# Patient Record
Sex: MALE | Race: WHITE | Employment: UNEMPLOYED | URBAN - METROPOLITAN AREA
[De-identification: names, ages, dates, MRNs, and addresses within clinical notes are randomized per-mention and may not be internally consistent; named-entity substitution may affect disease eponyms.]

---

## 2017-02-06 ENCOUNTER — OFFICE VISIT (OUTPATIENT)
Dept: FAMILY MEDICINE CLINIC | Age: 52
End: 2017-02-06

## 2017-02-06 VITALS
SYSTOLIC BLOOD PRESSURE: 103 MMHG | WEIGHT: 315 LBS | DIASTOLIC BLOOD PRESSURE: 51 MMHG | TEMPERATURE: 98.4 F | BODY MASS INDEX: 41.75 KG/M2 | OXYGEN SATURATION: 96 % | HEIGHT: 73 IN | HEART RATE: 85 BPM

## 2017-02-06 DIAGNOSIS — I48.20 CHRONIC ATRIAL FIBRILLATION (HCC): ICD-10-CM

## 2017-02-06 DIAGNOSIS — I95.1 ORTHOSTATIC HYPOTENSION: ICD-10-CM

## 2017-02-06 DIAGNOSIS — R06.09 DYSPNEA ON EXERTION: ICD-10-CM

## 2017-02-06 DIAGNOSIS — I10 ESSENTIAL HYPERTENSION: Primary | ICD-10-CM

## 2017-02-06 DIAGNOSIS — E11.9 CONTROLLED TYPE 2 DIABETES MELLITUS WITHOUT COMPLICATION, WITHOUT LONG-TERM CURRENT USE OF INSULIN (HCC): ICD-10-CM

## 2017-02-06 LAB — HBA1C MFR BLD HPLC: 7.9 %

## 2017-02-06 RX ORDER — CARVEDILOL 25 MG/1
TABLET ORAL
Qty: 30 TAB | Refills: 12 | Status: SHIPPED | OUTPATIENT
Start: 2017-02-06 | End: 2017-04-19 | Stop reason: SDUPTHER

## 2017-02-06 RX ORDER — METFORMIN HYDROCHLORIDE 500 MG/1
500 TABLET ORAL 2 TIMES DAILY WITH MEALS
Qty: 60 TAB | Refills: 12 | Status: SHIPPED | OUTPATIENT
Start: 2017-02-06 | End: 2017-12-27 | Stop reason: SDUPTHER

## 2017-02-06 RX ORDER — SPIRONOLACTONE 25 MG/1
TABLET ORAL DAILY
COMMUNITY
End: 2017-02-06 | Stop reason: ALTCHOICE

## 2017-02-06 RX ORDER — CARVEDILOL 25 MG/1
TABLET ORAL
Refills: 1 | COMMUNITY
Start: 2016-12-05 | End: 2017-02-06 | Stop reason: SDUPTHER

## 2017-02-06 RX ORDER — ATORVASTATIN CALCIUM 40 MG/1
TABLET, FILM COATED ORAL DAILY
COMMUNITY
End: 2017-02-06 | Stop reason: SDUPTHER

## 2017-02-06 RX ORDER — FUROSEMIDE 40 MG/1
40 TABLET ORAL DAILY
Qty: 30 TAB | Refills: 12 | Status: SHIPPED | OUTPATIENT
Start: 2017-02-06 | End: 2017-08-14

## 2017-02-06 RX ORDER — CARVEDILOL 25 MG/1
12.5 TABLET ORAL 2 TIMES DAILY
Qty: 60 TAB | Refills: 1 | Status: ON HOLD
Start: 2017-02-06 | End: 2017-08-14

## 2017-02-06 RX ORDER — ATORVASTATIN CALCIUM 40 MG/1
40 TABLET, FILM COATED ORAL DAILY
Qty: 30 TAB | Refills: 12 | Status: SHIPPED | OUTPATIENT
Start: 2017-02-06 | End: 2018-02-27 | Stop reason: SDUPTHER

## 2017-02-06 RX ORDER — ALBUTEROL SULFATE 90 UG/1
2 AEROSOL, METERED RESPIRATORY (INHALATION)
Qty: 1 INHALER | Refills: 11 | Status: SHIPPED | OUTPATIENT
Start: 2017-02-06 | End: 2018-02-27 | Stop reason: SDUPTHER

## 2017-02-06 NOTE — PROGRESS NOTES
HISTORY OF PRESENT ILLNESS  Daron Newton is a 50 y.o. male. HPI In for hospital followup. Had cardioversion at [facility] last month. Has been having dyspnea since then, L sided headache, memory loss. Needs diabetes test strips. Walking from waiting room to exam room will cause dyspnea. Not working for the last 2 years. NO smoking for the last 2 years. Has been dxed with COPD. No edema. STaying at a shelter , [facility]. ROS    Physical Exam   Constitutional: He appears well-developed and well-nourished. HENT:   Right Ear: External ear normal.   Left Ear: External ear normal.   Mouth/Throat: Oropharynx is clear and moist.   Neck: No thyromegaly present. Cardiovascular: Normal rate, regular rhythm, normal heart sounds and intact distal pulses. Pulmonary/Chest: Effort normal and breath sounds normal. No respiratory distress. He has no wheezes. Bilateral rhonchi   Abdominal: Soft. Bowel sounds are normal. He exhibits no distension and no mass. There is no tenderness. There is no guarding. Musculoskeletal: Normal range of motion. He exhibits no edema. Lymphadenopathy:     He has no cervical adenopathy. Nursing note and vitals reviewed. ASSESSMENT and PLAN  Orders Placed This Encounter    XR CHEST PA LAT    CBC WITH AUTOMATED DIFF    METABOLIC PANEL, COMPREHENSIVE    LIPID PANEL    AMB POC HEMOGLOBIN A1C    glucose blood VI test strips (FREESTYLE LITE STRIPS) strip    carvedilol (COREG) 25 mg tablet    carvedilol (COREG) 25 mg tablet    atorvastatin (LIPITOR) 40 mg tablet    metFORMIN (GLUCOPHAGE) 500 mg tablet    apixaban (ELIQUIS) 5 mg tablet    furosemide (LASIX) 40 mg tablet    albuterol (PROVENTIL HFA, VENTOLIN HFA, PROAIR HFA) 90 mcg/actuation inhaler     Taligregory Ariel was seen today for heart problem, ed follow-up and memory loss.     Diagnoses and all orders for this visit:    Essential hypertension  -     CBC WITH AUTOMATED DIFF  -     METABOLIC PANEL, COMPREHENSIVE  - LIPID PANEL  -     carvedilol (COREG) 25 mg tablet; Take 0.5 Tabs by mouth two (2) times a day. To slow heart rate down    Dyspnea on exertion  -     XR CHEST PA LAT; Future    Chronic atrial fibrillation (HCC)  -     apixaban (ELIQUIS) 5 mg tablet; Take 1 Tab by mouth two (2) times a day. To prevent stroke    Controlled type 2 diabetes mellitus without complication, without long-term current use of insulin (HCC)  -     AMB POC HEMOGLOBIN A1C    Orthostatic hypotension    Other orders  -     glucose blood VI test strips (FREESTYLE LITE STRIPS) strip; As directed  -     carvedilol (COREG) 25 mg tablet; 1/2  Tab po bid for heart and blood pressure  -     atorvastatin (LIPITOR) 40 mg tablet; Take 1 Tab by mouth daily. For cholesterol  -     metFORMIN (GLUCOPHAGE) 500 mg tablet; Take 1 Tab by mouth two (2) times daily (with meals). For diabetes  -     furosemide (LASIX) 40 mg tablet; Take 1 Tab by mouth daily. For fluid  -     albuterol (PROVENTIL HFA, VENTOLIN HFA, PROAIR HFA) 90 mcg/actuation inhaler; Take 2 Puffs by inhalation every four (4) hours as needed for Wheezing or Shortness of Breath. Follow-up Disposition:  Return in about 3 days (around 2/9/2017).    Will decrease coreg dose and dc spironolactone

## 2017-02-06 NOTE — PROGRESS NOTES
Chief Complaint   Patient presents with    Heart Problem    ED Follow-up     [address] 2017   Pneumonia-copd     Memory Loss     1. Have you been to the ER, urgent care clinic since your last visit? Hospitalized since your last visit? See chief complaint    2. Have you seen or consulted any other health care providers outside of the [address] [place] [address] since your last visit? Include any pap smears or colon screening.  No       Health Maintenance Due   Topic Date Due    FOBT Q 1 YEAR AGE 50-75  01/05/2017

## 2017-02-06 NOTE — MR AVS SNAPSHOT
Visit Information Date & Time Provider Department Dept. Phone Encounter #  
 2/6/2017 11:00 AM Dom Boles MD [facility] [phone] [id number] Upcoming Health Maintenance Date Due FOBT Q 1 YEAR AGE 50-75 1/5/2017 DTaP/Tdap/Td series (2 - Td) 2/6/2027 Allergies as of 2/6/2017  Review Complete On: 2/6/2017 By: Dom Boles MD  
  
 Severity Noted Reaction Type Reactions Mushroom Flavor  02/17/2014   Systemic Swelling Current Immunizations  Never Reviewed Name Date Td, Adsorbed PF 4/13/2016 Not reviewed this visit You Were Diagnosed With   
  
 Codes Comments Essential hypertension    -  Primary ICD-10-CM: I10 
ICD-9-CM: 401.9 Dyspnea on exertion     ICD-10-CM: R06.09 
ICD-9-CM: 786.09 Chronic atrial fibrillation (HCC)     ICD-10-CM: A44.9 ICD-9-CM: 427.31 Controlled type 2 diabetes mellitus without complication, without long-term current use of insulin ([facility]     ICD-10-CM: E11.9 ICD-9-CM: 250.00 Vitals BP Pulse Temp Height(growth percentile) Weight(growth percentile) SpO2  
 103/51 85 98.4 °F (36.9 °C) (Oral) 6' 1\" (1.854 m) 333 lb (151 kg) 96% BMI Smoking Status 43.93 kg/m2 Former Smoker BMI and BSA Data Body Mass Index Body Surface Area  
 43.93 kg/m 2 2.79 m 2 Preferred Pharmacy Pharmacy Name Phone [facility] [phone] Your Updated Medication List  
  
   
This list is accurate as of: 2/6/17 11:03 AM.  Always use your most recent med list.  
  
  
  
  
 albuterol 90 mcg/actuation inhaler Commonly known as:  PROVENTIL HFA, VENTOLIN HFA, PROAIR HFA Take 2 Puffs by inhalation every four (4) hours as needed for Wheezing or Shortness of Breath. apixaban 5 mg tablet Commonly known as:  Azar Gault Take 1 Tab by mouth two (2) times a day. To prevent stroke  
  
 atorvastatin 40 mg tablet Commonly known as:  LIPITOR Take 1 Tab by mouth daily. For cholesterol * carvedilol 25 mg tablet Commonly known as:  Clekianna Fab Take 0.5 Tabs by mouth two (2) times a day. To slow heart rate down * carvedilol 25 mg tablet Commonly known as:  COREG  
1/2  Tab po bid for heart and blood pressure  
  
 furosemide 40 mg tablet Commonly known as:  LASIX Take 1 Tab by mouth daily. For fluid  
  
 glucose blood VI test strips strip Commonly known as:  FREESTYLE LITE STRIPS As directed  
  
 metFORMIN 500 mg tablet Commonly known as:  GLUCOPHAGE Take 1 Tab by mouth two (2) times daily (with meals). For diabetes * Notice: This list has 2 medication(s) that are the same as other medications prescribed for you. Read the directions carefully, and ask your doctor or other care provider to review them with you. Prescriptions Sent to Pharmacy Refills  
 glucose blood VI test strips (FREESTYLE LITE STRIPS) strip 12 Sig: As directed Class: Normal  
 Pharmacy: [facility] Ph #: [phone]  
 carvedilol (COREG) 25 mg tablet 12 Si/2  Tab po bid for heart and blood pressure Class: Normal  
 Pharmacy: [facility] Ph #: [phone]  
 atorvastatin (LIPITOR) 40 mg tablet 12 Sig: Take 1 Tab by mouth daily. For cholesterol Class: Normal  
 Pharmacy: [facility] [address], [facility] [address] Ph #: [phone] Route: Oral  
 metFORMIN (GLUCOPHAGE) 500 mg tablet 12 Sig: Take 1 Tab by mouth two (2) times daily (with meals). For diabetes Class: Normal  
 [facility] [address] Ph #: [phone] Route: Oral  
 apixaban (ELIQUIS) 5 mg tablet 12 Sig: Take 1 Tab by mouth two (2) times a day. To prevent stroke  Class: Normal  
 Pharmacy: [place] [address] [address] [facility] Ph #: [phone] Route: Oral  
 furosemide (LASIX) 40 mg tablet 12 Sig: Take 1 Tab by mouth daily. For fluid Class: Normal  
 Pharmacy: [address] Ph #: [phone] Route: Oral  
 albuterol (PROVENTIL HFA, VENTOLIN HFA, PROAIR HFA) 90 mcg/actuation inhaler 11 Sig: Take 2 Puffs by inhalation every four (4) hours as needed for Wheezing or Shortness of Breath. Class: Normal  
 Pharmacy: [address] Ph #: [phone] Route: Inhalation We Performed the Following AMB POC HEMOGLOBIN A1C [57274 CPT(R)] CBC WITH AUTOMATED DIFF [41345 CPT(R)] LIPID PANEL [70747 CPT(R)] METABOLIC PANEL, COMPREHENSIVE [93517 CPT(R)] To-Do List   
 02/06/2017 Imaging:  XR CHEST PA LAT Introducing [facility]! [facility] introduces [employer] patient portal. Now you can access parts of your medical record, email your doctor's office, and request medication refills online. 1. In your internet browser, go to [email or web address] 2. Click on the First Time User? Click Here link in the Sign In box. You will see the New Member Sign Up page. 3. Enter your [employer] Access Code exactly as it appears below. You will not need to use this code after youve completed the sign-up process. If you do not sign up before the expiration date, you must request a new code. · [employer] Access Code: [email or web address] Expires: 5/7/2017 11:03 AM 
 
4. Enter the last four digits of your Social Security Number (xxxx) and Date of Birth (mm/dd/yyyy) as indicated and click Submit. You will be taken to the next sign-up page. 5. Create a [employer] ID. This will be your [employer] login ID and cannot be changed, so think of one that is secure and easy to remember. 6. Create a HappyBox password. You can change your password at any time. 7. Enter your Password Reset Question and Answer. This can be used at a later time if you forget your password. 8. Enter your e-mail address. You will receive e-mail notification when new information is available in [address] 9. Click Sign Up. You can now view and download portions of your medical record. 10. Click the Download Summary menu link to download a portable copy of your medical information. If you have questions, please visit the Frequently Asked Questions section of the HappyBox website. Remember, HappyBox is NOT to be used for urgent needs. For medical emergencies, dial 911. Now available from your iPhone and Android! Please provide this summary of care documentation to your next provider. Your primary care clinician is listed as Nalini Otto. If you have any questions after today's visit, please call [phone].

## 2017-02-07 ENCOUNTER — TELEPHONE (OUTPATIENT)
Dept: FAMILY MEDICINE CLINIC | Age: 52
End: 2017-02-07

## 2017-02-07 ENCOUNTER — DOCUMENTATION ONLY (OUTPATIENT)
Dept: FAMILY MEDICINE CLINIC | Age: 52
End: 2017-02-07

## 2017-02-07 LAB
ALBUMIN SERPL-MCNC: 3.9 G/DL (ref 3.5–5.5)
ALBUMIN/GLOB SERPL: 1.6 {RATIO} (ref 1.1–2.5)
ALP SERPL-CCNC: 58 IU/L (ref 39–117)
ALT SERPL-CCNC: 25 IU/L (ref 0–44)
AST SERPL-CCNC: 11 IU/L (ref 0–40)
BASOPHILS # BLD AUTO: 0.1 X10E3/UL (ref 0–0.2)
BASOPHILS NFR BLD AUTO: 1 %
BILIRUB SERPL-MCNC: 0.7 MG/DL (ref 0–1.2)
BUN SERPL-MCNC: 19 MG/DL (ref 6–24)
BUN/CREAT SERPL: 18 (ref 9–20)
CALCIUM SERPL-MCNC: 9.3 MG/DL (ref 8.7–10.2)
CHLORIDE SERPL-SCNC: 95 MMOL/L (ref 96–106)
CHOLEST SERPL-MCNC: 106 MG/DL (ref 100–199)
CO2 SERPL-SCNC: 23 MMOL/L (ref 18–29)
CREAT SERPL-MCNC: 1.03 MG/DL (ref 0.76–1.27)
EOSINOPHIL # BLD AUTO: 0.1 X10E3/UL (ref 0–0.4)
EOSINOPHIL NFR BLD AUTO: 1 %
ERYTHROCYTE [DISTWIDTH] IN BLOOD BY AUTOMATED COUNT: 15.6 % (ref 12.3–15.4)
GLOBULIN SER CALC-MCNC: 2.5 G/DL (ref 1.5–4.5)
GLUCOSE SERPL-MCNC: 268 MG/DL (ref 65–99)
HCT VFR BLD AUTO: 42.6 % (ref 37.5–51)
HDLC SERPL-MCNC: 25 MG/DL
HGB BLD-MCNC: 14 G/DL (ref 12.6–17.7)
IMM GRANULOCYTES # BLD: 0.1 X10E3/UL (ref 0–0.1)
IMM GRANULOCYTES NFR BLD: 1 %
INTERPRETATION, 910389: NORMAL
LDLC SERPL CALC-MCNC: 15 MG/DL (ref 0–99)
LYMPHOCYTES # BLD AUTO: 2.5 X10E3/UL (ref 0.7–3.1)
LYMPHOCYTES NFR BLD AUTO: 24 %
MCH RBC QN AUTO: 29.3 PG (ref 26.6–33)
MCHC RBC AUTO-ENTMCNC: 32.9 G/DL (ref 31.5–35.7)
MCV RBC AUTO: 89 FL (ref 79–97)
MONOCYTES # BLD AUTO: 0.5 X10E3/UL (ref 0.1–0.9)
MONOCYTES NFR BLD AUTO: 5 %
NEUTROPHILS # BLD AUTO: 6.9 X10E3/UL (ref 1.4–7)
NEUTROPHILS NFR BLD AUTO: 68 %
PLATELET # BLD AUTO: 190 X10E3/UL (ref 150–379)
POTASSIUM SERPL-SCNC: 4.5 MMOL/L (ref 3.5–5.2)
PROT SERPL-MCNC: 6.4 G/DL (ref 6–8.5)
RBC # BLD AUTO: 4.78 X10E6/UL (ref 4.14–5.8)
SODIUM SERPL-SCNC: 136 MMOL/L (ref 134–144)
TRIGL SERPL-MCNC: 332 MG/DL (ref 0–149)
VLDLC SERPL CALC-MCNC: 66 MG/DL (ref 5–40)
WBC # BLD AUTO: 10.2 X10E3/UL (ref 3.4–10.8)

## 2017-02-07 RX ORDER — BLOOD-GLUCOSE METER
EACH MISCELLANEOUS
Qty: 1 EACH | Refills: 0 | Status: SHIPPED | OUTPATIENT
Start: 2017-02-07 | End: 2018-11-21

## 2017-02-07 NOTE — PROGRESS NOTES
Pt called and notified that rx's were signed and sent over to pharmacy. Pt verbalized understanding and stated he just got them.

## 2017-02-07 NOTE — TELEPHONE ENCOUNTER
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 Pt is at the pharmacy now to  the test strips and the pharmacy has not received the fax. The pt would like to speak with someone regarding this.   His contact number is [phone]

## 2017-02-07 NOTE — PROGRESS NOTES
Received a call from pt stating that he was told his request for change in test strips and meter would be signed off and sent to pharmacy immmediately. Pt not sure who he spoke with but pt do not speak with the nurse. Pt stated he missed his appt and is about to miss his bus but to the homeless shelter and if this isnt done now he will have to sleep on the street. Pt very upset. Writer was just notified of pt situation but informed pt that we do not inform pt's that rx's will be sent immediately due to Doctor needing to sign off and approved. Told pt that the request to change to new meter and strips was sent to  and since pt is at the pharmacy will ask  to sign off rxs.

## 2017-02-07 NOTE — TELEPHONE ENCOUNTER
Pt is requesting a One touch test meter and test strips called in, he states the free style is not covered by his insurance    [facility]    Pt ph number is [phone]

## 2017-02-07 NOTE — TELEPHONE ENCOUNTER
Called pt back. Informed pt would call pharmacy to clarify test strips for testing BID.  Pt verbalized understanding

## 2017-03-28 RX ORDER — LISINOPRIL 5 MG/1
5 TABLET ORAL DAILY
Qty: 90 TAB | Refills: 3 | Status: SHIPPED | OUTPATIENT
Start: 2017-03-28 | End: 2018-04-02 | Stop reason: SDUPTHER

## 2017-04-19 ENCOUNTER — OFFICE VISIT (OUTPATIENT)
Dept: FAMILY MEDICINE CLINIC | Age: 52
End: 2017-04-19

## 2017-04-19 ENCOUNTER — DOCUMENTATION ONLY (OUTPATIENT)
Dept: FAMILY MEDICINE CLINIC | Age: 52
End: 2017-04-19

## 2017-04-19 VITALS
SYSTOLIC BLOOD PRESSURE: 103 MMHG | HEART RATE: 78 BPM | DIASTOLIC BLOOD PRESSURE: 68 MMHG | WEIGHT: 315 LBS | HEIGHT: 73 IN | RESPIRATION RATE: 18 BRPM | TEMPERATURE: 98.1 F | BODY MASS INDEX: 41.75 KG/M2 | OXYGEN SATURATION: 95 %

## 2017-04-19 DIAGNOSIS — I10 ESSENTIAL HYPERTENSION: Primary | ICD-10-CM

## 2017-04-19 DIAGNOSIS — R06.09 DYSPNEA ON EXERTION: ICD-10-CM

## 2017-04-19 LAB
BILIRUB UR QL STRIP: NEGATIVE
GLUCOSE UR-MCNC: NEGATIVE MG/DL
KETONES P FAST UR STRIP-MCNC: NEGATIVE MG/DL
PH UR STRIP: 5.5 [PH] (ref 4.6–8)
PROT UR QL STRIP: NEGATIVE MG/DL
SP GR UR STRIP: 1 (ref 1–1.03)
UA UROBILINOGEN AMB POC: NORMAL (ref 0.2–1)
URINALYSIS CLARITY POC: CLEAR
URINALYSIS COLOR POC: YELLOW
URINE BLOOD POC: NEGATIVE
URINE LEUKOCYTES POC: NEGATIVE
URINE NITRITES POC: NEGATIVE

## 2017-04-19 RX ORDER — IPRATROPIUM BROMIDE AND ALBUTEROL SULFATE 2.5; .5 MG/3ML; MG/3ML
3 SOLUTION RESPIRATORY (INHALATION)
Qty: 50 NEBULE | Refills: 12 | Status: SHIPPED | OUTPATIENT
Start: 2017-04-19 | End: 2018-06-05 | Stop reason: SDUPTHER

## 2017-04-19 NOTE — MR AVS SNAPSHOT
Visit Information Date & Time Provider Department Dept. Phone Encounter #  
 4/19/2017  9:15 AM Tyler La MD [facility] [phone] [id number] Follow-up Instructions Return in about 4 weeks (around 5/17/2017). Upcoming Health Maintenance Date Due FOBT Q 1 YEAR AGE 50-75 1/5/2017 DTaP/Tdap/Td series (2 - Td) 2/6/2027 Allergies as of 4/19/2017  Review Complete On: 4/19/2017 By: Alfred Dangelo LPN Severity Noted Reaction Type Reactions Mushroom Flavor  02/17/2014   Systemic Swelling Current Immunizations  Never Reviewed Name Date Td, Adsorbed PF 4/13/2016 Not reviewed this visit You Were Diagnosed With   
  
 Codes Comments Essential hypertension    -  Primary ICD-10-CM: I10 
ICD-9-CM: 401.9 Dyspnea on exertion     ICD-10-CM: R06.09 
ICD-9-CM: 786.09 Vitals BP Pulse Temp Resp Height(growth percentile) Weight(growth percentile) 103/68 78 98.1 °F (36.7 °C) (Oral) 18 6' 1\" (1.854 m) 333 lb 3.2 oz (151.1 kg) SpO2 BMI Smoking Status 95% 43.96 kg/m2 Former Smoker Vitals History BMI and BSA Data Body Mass Index Body Surface Area 43.96 kg/m 2 2.79 m 2 Preferred Pharmacy Pharmacy Name Phone [facility] [phone] Your Updated Medication List  
  
   
This list is accurate as of: 4/19/17  9:43 AM.  Always use your most recent med list.  
  
  
  
  
 albuterol 90 mcg/actuation inhaler Commonly known as:  PROVENTIL HFA, VENTOLIN HFA, PROAIR HFA Take 2 Puffs by inhalation every four (4) hours as needed for Wheezing or Shortness of Breath. albuterol-ipratropium 2.5 mg-0.5 mg/3 ml Nebu Commonly known as:  DUO-NEB  
3 mL by Nebulization route every six (6) hours as needed. apixaban 5 mg tablet Commonly known as:  Claudean Fries Take 1 Tab by mouth two (2) times a day. To prevent stroke atorvastatin 40 mg tablet Commonly known as:  LIPITOR Take 1 Tab by mouth daily. For cholesterol Blood-Glucose Meter Misc Use to check BS twice a day. Dx code E11.9  
  
 carvedilol 25 mg tablet Commonly known as:  Davon Shy Take 0.5 Tabs by mouth two (2) times a day. To slow heart rate down  
  
 furosemide 40 mg tablet Commonly known as:  LASIX Take 1 Tab by mouth daily. For fluid * glucose blood VI test strips strip Commonly known as:  FREESTYLE LITE STRIPS As directed * glucose blood VI test strips strip Commonly known as:  ONETOUCH ULTRA TEST Use to check BS twice a day. Dx code E11.9  
  
 lisinopril 5 mg tablet Commonly known as:  Keystone Neve Take 1 Tab by mouth daily. metFORMIN 500 mg tablet Commonly known as:  GLUCOPHAGE Take 1 Tab by mouth two (2) times daily (with meals). For diabetes  
  
 umeclidinium-vilanterol 62.5-25 mcg/actuation inhaler Commonly known as:  Hugo Lewis Take 1 Puff by inhalation daily. * Notice: This list has 2 medication(s) that are the same as other medications prescribed for you. Read the directions carefully, and ask your doctor or other care provider to review them with you. Prescriptions Sent to Pharmacy Refills  
 albuterol-ipratropium (DUO-NEB) 2.5 mg-0.5 mg/3 ml nebu 12 Sig: 3 mL by Nebulization route every six (6) hours as needed. Class: Normal  
 Pharmacy: [facility], [employer] [address] Ph #: [phone] Route: Nebulization  
 umeclidinium-vilanterol (ANORO ELLIPTA) 62.5-25 mcg/actuation inhaler 12 Sig: Take 1 Puff by inhalation daily. Class: Normal  
 Pharmacy: [facility] [address], [facility] [address] Ph #: [phone] Route: Inhalation We Performed the Following AMB POC SPIROMETRY [91196 CPT(R)] AMB POC URINALYSIS DIP STICK AUTO W/ MICRO [60534 CPT(R)] METABOLIC PANEL, COMPREHENSIVE [47868 CPT(R)] Follow-up Instructions Return in about 4 weeks (around 5/17/2017). Introducing [facility]! [employer] introduces [employer] patient portal. Now you can access parts of your medical record, email your doctor's office, and request medication refills online. 1. In your internet browser, go to [email or web address] 2. Click on the First Time User? Click Here link in the Sign In box. You will see the New Member Sign Up page. 3. Enter your [employer] Access Code exactly as it appears below. You will not need to use this code after youve completed the sign-up process. If you do not sign up before the expiration date, you must request a new code. · [employer] Access Code: [email or web address] Expires: 5/7/2017 12:03 PM 
 
4. Enter the last four digits of your Social Security Number (xxxx) and Date of Birth (mm/dd/yyyy) as indicated and click Submit. You will be taken to the next sign-up page. 5. Create a [employer] ID. This will be your [employer] login ID and cannot be changed, so think of one that is secure and easy to remember. 6. Create a [employer] password. You can change your password at any time. 7. Enter your Password Reset Question and Answer. This can be used at a later time if you forget your password. 8. Enter your e-mail address. You will receive e-mail notification when new information is available in [address] 9. Click Sign Up. You can now view and download portions of your medical record. 10. Click the Download Summary menu link to download a portable copy of your medical information. If you have questions, please visit the Frequently Asked Questions section of the [employer] website. Remember, [employer] is NOT to be used for urgent needs. For medical emergencies, dial 911. Now available from your iPhone and Android! Please provide this summary of care documentation to your next provider. Your primary care clinician is listed as Onofre Rowland. If you have any questions after today's visit, please call [phone].

## 2017-04-19 NOTE — PROGRESS NOTES
Chief Complaint   Patient presents with    ED Follow-up    Hypertension     Pt here due to going to ED for chest pain in march at [facility] and SOB. Dx with bronchitis. Pt stated he still feels congested at times. Last here 2/6/17. Coreg was decreased and spironolactone was decreased.

## 2017-04-19 NOTE — PROGRESS NOTES
HISTORY OF PRESENT ILLNESS  Melody Velasquez is a 50 y.o. male. HPI Pt. Comes in for blood pressure check. Has been having shortness of breath. Was in [facility] 2 weeks ago with bronchitis. Cough hasnt been productive. No edema. Slight chest pains at times. Needs rx for nebulizer solution. Moderate wheezing. hasnt smoked in 2 years. Used to smoke 1.5 ppd. Says that nebulizer machine works much better than proair inhaler. ROS    Physical Exam   Constitutional: He appears well-developed and well-nourished. HENT:   Right Ear: External ear normal.   Left Ear: External ear normal.   Mouth/Throat: Oropharynx is clear and moist.   Neck: No thyromegaly present. Cardiovascular: Normal rate, regular rhythm, normal heart sounds and intact distal pulses. Pulmonary/Chest: Effort normal and breath sounds normal. No respiratory distress. He has no wheezes. Abdominal: Soft. Bowel sounds are normal. He exhibits no distension and no mass. There is no tenderness. There is no guarding. Musculoskeletal: Normal range of motion. He exhibits no edema. Lymphadenopathy:     He has no cervical adenopathy. Nursing note and vitals reviewed. ASSESSMENT and PLAN  Orders Placed This Encounter    AMB POC SPIROMETRY    AMB POC URINALYSIS DIP STICK AUTO W/ MICRO    albuterol-ipratropium (DUO-NEB) 2.5 mg-0.5 mg/3 ml nebu    umeclidinium-vilanterol (ANORO ELLIPTA) 62.5-25 mcg/actuation inhaler     Ramandeep Starks was seen today for ed follow-up and hypertension. Diagnoses and all orders for this visit:    Essential hypertension  -     Cancel: METABOLIC PANEL, COMPREHENSIVE  -     AMB POC URINALYSIS DIP STICK AUTO W/ MICRO  -     AMB POC SPIROMETRY    Dyspnea on exertion    Other orders  -     albuterol-ipratropium (DUO-NEB) 2.5 mg-0.5 mg/3 ml nebu; 3 mL by Nebulization route every six (6) hours as needed. -     umeclidinium-vilanterol (ANORO ELLIPTA) 62.5-25 mcg/actuation inhaler; Take 1 Puff by inhalation daily.       Follow-up Disposition:  Return in about 4 weeks (around 5/17/2017).

## 2017-04-20 ENCOUNTER — DOCUMENTATION ONLY (OUTPATIENT)
Dept: FAMILY MEDICINE CLINIC | Age: 52
End: 2017-04-20

## 2017-07-21 ENCOUNTER — TELEPHONE (OUTPATIENT)
Dept: FAMILY MEDICINE CLINIC | Age: 52
End: 2017-07-21

## 2017-07-21 NOTE — TELEPHONE ENCOUNTER
PA Was started for this pt for Juliette Mcgraw ([place]: Rima Cárdenas) - [facility]  Anoro Ellipta 62.5-25MCG/INH aerosol powder  Status: Sent to Plan  Created: July 21st, 2017  Sent: July 21st, 2017

## 2017-08-10 ENCOUNTER — OFFICE VISIT (OUTPATIENT)
Dept: FAMILY MEDICINE CLINIC | Age: 52
End: 2017-08-10

## 2017-08-10 ENCOUNTER — HOSPITAL ENCOUNTER (OUTPATIENT)
Age: 52
Setting detail: OBSERVATION
Discharge: HOME | DRG: 194 | End: 2017-08-14
Attending: EMERGENCY MEDICINE | Admitting: FAMILY MEDICINE
Payer: COMMERCIAL

## 2017-08-10 ENCOUNTER — APPOINTMENT (OUTPATIENT)
Dept: GENERAL RADIOLOGY | Age: 52
DRG: 194 | End: 2017-08-10
Attending: EMERGENCY MEDICINE
Payer: COMMERCIAL

## 2017-08-10 VITALS
SYSTOLIC BLOOD PRESSURE: 105 MMHG | DIASTOLIC BLOOD PRESSURE: 88 MMHG | HEART RATE: 93 BPM | OXYGEN SATURATION: 94 % | RESPIRATION RATE: 18 BRPM | BODY MASS INDEX: 41.75 KG/M2 | TEMPERATURE: 98.4 F | WEIGHT: 315 LBS | HEIGHT: 73 IN

## 2017-08-10 DIAGNOSIS — I48.20 CHRONIC ATRIAL FIBRILLATION (HCC): ICD-10-CM

## 2017-08-10 DIAGNOSIS — I50.9 ACUTE CONGESTIVE HEART FAILURE, UNSPECIFIED CONGESTIVE HEART FAILURE TYPE: Primary | ICD-10-CM

## 2017-08-10 DIAGNOSIS — R06.02 SHORTNESS OF BREATH: Primary | ICD-10-CM

## 2017-08-10 DIAGNOSIS — I10 ESSENTIAL HYPERTENSION: ICD-10-CM

## 2017-08-10 DIAGNOSIS — I48.91 ATRIAL FIBRILLATION WITH RVR (HCC): ICD-10-CM

## 2017-08-10 DIAGNOSIS — I50.43 ACUTE ON CHRONIC COMBINED SYSTOLIC AND DIASTOLIC CONGESTIVE HEART FAILURE (HCC): ICD-10-CM

## 2017-08-10 DIAGNOSIS — E11.9 CONTROLLED TYPE 2 DIABETES MELLITUS WITHOUT COMPLICATION, WITHOUT LONG-TERM CURRENT USE OF INSULIN (HCC): ICD-10-CM

## 2017-08-10 DIAGNOSIS — R06.02 SHORTNESS OF BREATH: ICD-10-CM

## 2017-08-10 DIAGNOSIS — J98.4 RESTRICTIVE LUNG DISEASE: ICD-10-CM

## 2017-08-10 LAB
ALBUMIN SERPL BCP-MCNC: 3.5 G/DL (ref 3.5–5)
ALBUMIN/GLOB SERPL: 0.8 {RATIO} (ref 1.1–2.2)
ALP SERPL-CCNC: 85 U/L (ref 45–117)
ALT SERPL-CCNC: 36 U/L (ref 12–78)
ANION GAP BLD CALC-SCNC: 8 MMOL/L (ref 5–15)
AST SERPL W P-5'-P-CCNC: 24 U/L (ref 15–37)
BASOPHILS # BLD AUTO: 0 K/UL (ref 0–0.1)
BASOPHILS # BLD: 0 % (ref 0–1)
BILIRUB SERPL-MCNC: 1.2 MG/DL (ref 0.2–1)
BNP SERPL-MCNC: 4975 PG/ML (ref 0–125)
BUN SERPL-MCNC: 20 MG/DL (ref 6–20)
BUN/CREAT SERPL: 20 (ref 12–20)
CALCIUM SERPL-MCNC: 9.1 MG/DL (ref 8.5–10.1)
CHLORIDE SERPL-SCNC: 103 MMOL/L (ref 97–108)
CO2 SERPL-SCNC: 26 MMOL/L (ref 21–32)
CREAT SERPL-MCNC: 0.98 MG/DL (ref 0.7–1.3)
EOSINOPHIL # BLD: 0.1 K/UL (ref 0–0.4)
EOSINOPHIL NFR BLD: 1 % (ref 0–7)
ERYTHROCYTE [DISTWIDTH] IN BLOOD BY AUTOMATED COUNT: 15.4 % (ref 11.5–14.5)
GLOBULIN SER CALC-MCNC: 4.2 G/DL (ref 2–4)
GLUCOSE BLD STRIP.AUTO-MCNC: 276 MG/DL (ref 65–100)
GLUCOSE BLD STRIP.AUTO-MCNC: 303 MG/DL (ref 65–100)
GLUCOSE SERPL-MCNC: 165 MG/DL (ref 65–100)
HBA1C MFR BLD HPLC: 6.9 %
HCT VFR BLD AUTO: 38.8 % (ref 36.6–50.3)
HGB BLD-MCNC: 12.6 G/DL (ref 12.1–17)
LYMPHOCYTES # BLD AUTO: 24 % (ref 12–49)
LYMPHOCYTES # BLD: 2.1 K/UL (ref 0.8–3.5)
MCH RBC QN AUTO: 28.3 PG (ref 26–34)
MCHC RBC AUTO-ENTMCNC: 32.5 G/DL (ref 30–36.5)
MCV RBC AUTO: 87 FL (ref 80–99)
MONOCYTES # BLD: 0.2 K/UL (ref 0–1)
MONOCYTES NFR BLD AUTO: 2 % (ref 5–13)
NEUTS SEG # BLD: 6.5 K/UL (ref 1.8–8)
NEUTS SEG NFR BLD AUTO: 73 % (ref 32–75)
PLATELET # BLD AUTO: 163 K/UL (ref 150–400)
POTASSIUM SERPL-SCNC: 3.6 MMOL/L (ref 3.5–5.1)
PROT SERPL-MCNC: 7.7 G/DL (ref 6.4–8.2)
RBC # BLD AUTO: 4.46 M/UL (ref 4.1–5.7)
SERVICE CMNT-IMP: ABNORMAL
SERVICE CMNT-IMP: ABNORMAL
SODIUM SERPL-SCNC: 137 MMOL/L (ref 136–145)
TROPONIN I SERPL-MCNC: 0.07 NG/ML
WBC # BLD AUTO: 9 K/UL (ref 4.1–11.1)

## 2017-08-10 PROCEDURE — 74011636637 HC RX REV CODE- 636/637: Performed by: FAMILY MEDICINE

## 2017-08-10 PROCEDURE — 99218 HC RM OBSERVATION: CPT

## 2017-08-10 PROCEDURE — 96376 TX/PRO/DX INJ SAME DRUG ADON: CPT

## 2017-08-10 PROCEDURE — 65660000000 HC RM CCU STEPDOWN

## 2017-08-10 PROCEDURE — 36415 COLL VENOUS BLD VENIPUNCTURE: CPT | Performed by: EMERGENCY MEDICINE

## 2017-08-10 PROCEDURE — 99284 EMERGENCY DEPT VISIT MOD MDM: CPT

## 2017-08-10 PROCEDURE — 74011250637 HC RX REV CODE- 250/637: Performed by: FAMILY MEDICINE

## 2017-08-10 PROCEDURE — 85025 COMPLETE CBC W/AUTO DIFF WBC: CPT | Performed by: EMERGENCY MEDICINE

## 2017-08-10 PROCEDURE — 74011000250 HC RX REV CODE- 250: Performed by: FAMILY MEDICINE

## 2017-08-10 PROCEDURE — 80053 COMPREHEN METABOLIC PANEL: CPT | Performed by: EMERGENCY MEDICINE

## 2017-08-10 PROCEDURE — 84484 ASSAY OF TROPONIN QUANT: CPT | Performed by: EMERGENCY MEDICINE

## 2017-08-10 PROCEDURE — 82962 GLUCOSE BLOOD TEST: CPT

## 2017-08-10 PROCEDURE — 83880 ASSAY OF NATRIURETIC PEPTIDE: CPT | Performed by: EMERGENCY MEDICINE

## 2017-08-10 PROCEDURE — 71010 XR CHEST PORT: CPT

## 2017-08-10 PROCEDURE — 94640 AIRWAY INHALATION TREATMENT: CPT

## 2017-08-10 PROCEDURE — 96374 THER/PROPH/DIAG INJ IV PUSH: CPT

## 2017-08-10 PROCEDURE — 93005 ELECTROCARDIOGRAM TRACING: CPT

## 2017-08-10 PROCEDURE — 74011250636 HC RX REV CODE- 250/636: Performed by: EMERGENCY MEDICINE

## 2017-08-10 PROCEDURE — 77030029684 HC NEB SM VOL KT MONA -A

## 2017-08-10 RX ORDER — INSULIN LISPRO 100 [IU]/ML
INJECTION, SOLUTION INTRAVENOUS; SUBCUTANEOUS
Status: DISCONTINUED | OUTPATIENT
Start: 2017-08-11 | End: 2017-08-14 | Stop reason: HOSPADM

## 2017-08-10 RX ORDER — IPRATROPIUM BROMIDE AND ALBUTEROL SULFATE 2.5; .5 MG/3ML; MG/3ML
3 SOLUTION RESPIRATORY (INHALATION)
Status: DISCONTINUED | OUTPATIENT
Start: 2017-08-10 | End: 2017-08-12

## 2017-08-10 RX ORDER — ATORVASTATIN CALCIUM 40 MG/1
40 TABLET, FILM COATED ORAL DAILY
Status: DISCONTINUED | OUTPATIENT
Start: 2017-08-11 | End: 2017-08-14 | Stop reason: HOSPADM

## 2017-08-10 RX ORDER — DEXTROSE 50 % IN WATER (D50W) INTRAVENOUS SYRINGE
12.5-25 AS NEEDED
Status: DISCONTINUED | OUTPATIENT
Start: 2017-08-10 | End: 2017-08-10 | Stop reason: SDUPTHER

## 2017-08-10 RX ORDER — ZOLPIDEM TARTRATE 5 MG/1
5 TABLET ORAL
Status: DISCONTINUED | OUTPATIENT
Start: 2017-08-10 | End: 2017-08-14 | Stop reason: HOSPADM

## 2017-08-10 RX ORDER — SODIUM CHLORIDE 0.9 % (FLUSH) 0.9 %
5-10 SYRINGE (ML) INJECTION EVERY 8 HOURS
Status: DISCONTINUED | OUTPATIENT
Start: 2017-08-10 | End: 2017-08-14 | Stop reason: HOSPADM

## 2017-08-10 RX ORDER — INSULIN LISPRO 100 [IU]/ML
INJECTION, SOLUTION INTRAVENOUS; SUBCUTANEOUS
Status: DISCONTINUED | OUTPATIENT
Start: 2017-08-11 | End: 2017-08-10

## 2017-08-10 RX ORDER — SODIUM CHLORIDE 0.9 % (FLUSH) 0.9 %
5-10 SYRINGE (ML) INJECTION AS NEEDED
Status: DISCONTINUED | OUTPATIENT
Start: 2017-08-10 | End: 2017-08-14 | Stop reason: HOSPADM

## 2017-08-10 RX ORDER — ALBUTEROL SULFATE 90 UG/1
2 AEROSOL, METERED RESPIRATORY (INHALATION)
Status: DISCONTINUED | OUTPATIENT
Start: 2017-08-10 | End: 2017-08-14 | Stop reason: HOSPADM

## 2017-08-10 RX ORDER — FUROSEMIDE 10 MG/ML
80 INJECTION INTRAMUSCULAR; INTRAVENOUS 2 TIMES DAILY
Status: DISCONTINUED | OUTPATIENT
Start: 2017-08-10 | End: 2017-08-13

## 2017-08-10 RX ORDER — MAGNESIUM SULFATE 100 %
4 CRYSTALS MISCELLANEOUS AS NEEDED
Status: DISCONTINUED | OUTPATIENT
Start: 2017-08-10 | End: 2017-08-14 | Stop reason: HOSPADM

## 2017-08-10 RX ORDER — CARVEDILOL 12.5 MG/1
12.5 TABLET ORAL 2 TIMES DAILY
Status: DISCONTINUED | OUTPATIENT
Start: 2017-08-10 | End: 2017-08-12

## 2017-08-10 RX ORDER — LISINOPRIL 5 MG/1
5 TABLET ORAL DAILY
Status: DISCONTINUED | OUTPATIENT
Start: 2017-08-11 | End: 2017-08-14 | Stop reason: HOSPADM

## 2017-08-10 RX ORDER — FUROSEMIDE 10 MG/ML
80 INJECTION INTRAMUSCULAR; INTRAVENOUS
Status: COMPLETED | OUTPATIENT
Start: 2017-08-10 | End: 2017-08-10

## 2017-08-10 RX ORDER — DEXTROSE MONOHYDRATE 100 MG/ML
125-250 INJECTION, SOLUTION INTRAVENOUS AS NEEDED
Status: DISCONTINUED | OUTPATIENT
Start: 2017-08-10 | End: 2017-08-14 | Stop reason: HOSPADM

## 2017-08-10 RX ADMIN — INSULIN LISPRO 5 UNITS: 100 INJECTION, SOLUTION INTRAVENOUS; SUBCUTANEOUS at 23:29

## 2017-08-10 RX ADMIN — Medication 10 ML: at 17:17

## 2017-08-10 RX ADMIN — APIXABAN 5 MG: 5 TABLET, FILM COATED ORAL at 18:46

## 2017-08-10 RX ADMIN — CARVEDILOL 12.5 MG: 12.5 TABLET, FILM COATED ORAL at 18:46

## 2017-08-10 RX ADMIN — Medication 10 ML: at 23:30

## 2017-08-10 RX ADMIN — FUROSEMIDE 80 MG: 10 INJECTION, SOLUTION INTRAMUSCULAR; INTRAVENOUS at 16:57

## 2017-08-10 RX ADMIN — IPRATROPIUM BROMIDE AND ALBUTEROL SULFATE 3 ML: .5; 3 SOLUTION RESPIRATORY (INHALATION) at 20:58

## 2017-08-10 NOTE — PROGRESS NOTES
[address] SHIFT NURSING NOTE    Bedside shift change report given to elvie  (oncoming nurse) by Georgia  (offgoing nurse). Report included the following information SBAR. SHIFT SUMMARY: patient arrived to unit. He is tachycardic at rest.    Admission Date 8/10/2017   Admission Diagnosis CHF (congestive heart failure) ([address]   Consults IP CONSULT TO CARDIOLOGY        Consults   [] PT   [] OT   [] Speech   [] Palliative      [] Hospice    [] Case Management   [] None   Cardiac Monitoring   [] Yes   [] No     Antibiotics   [] Yes   [] No   GI Prophylaxis  (Ex: Protonix, Pepcid, etc,.)   [] Yes   [] No          DVT Prophylaxis   SCDs:             Ochoa stockings:         [] Medication (Ex: Lovenox, Eliquis, Brilinta, Coumadin,  Heparin, etc..)   [] Contraindicated   [] No VTE needed       Urinary Catheter             LDAs               Peripheral IV 08/10/17 Left Wrist (Active)   Site Assessment Clean, dry, & intact 8/10/2017  7:35 PM   Phlebitis Assessment 0 8/10/2017  7:35 PM   Infiltration Assessment 0 8/10/2017  7:35 PM   Dressing Status Clean, dry, & intact 8/10/2017  7:35 PM   Dressing Type Transparent 8/10/2017  7:35 PM   Hub Color/Line Status Blue 8/10/2017  7:35 PM   Action Taken Blood drawn 8/10/2017  4:33 PM                      I/Os   Intake/Output Summary (Last 24 hours) at 08/10/17 1942  Last data filed at 08/10/17 1836   Gross per 24 hour   Intake                0 ml   Output             2100 ml   Net            -2100 ml         Activity Level           Diet Active Orders   Diet    DIET REGULAR      Purposeful Rounding every 1-2 hour?    [] Yes    Aris Score  Total Score: 1   Bed Alarm (If score 3 or >)   [] Yes    [] Refused (See signed refusal form in chart)   Alvaro Score          Alvaro Score (if score 14 or less)   [] PMT consult   [] Nutrition consult   [] Wound Care consult      []  Specialty bed         Influenza Vaccine Received Flu Vaccine for Current Season (usually Sept-March): Not Flu Season Needs prior to discharge:   Home O2 required:    [] Yes   [] No     If yes, how much O2 required? Other:        Readmission Risk Assessment Tool Score Low Risk            6       Total Score        4 Pt. Coverage (Medicare=5 , Medicaid, or Self-Pay=4)    2 Charlson Comorbidity Score (Age + Comorbid Conditions)        Criteria that do not apply:    Has Seen PCP in Last 6 Months (Yes=3, No=0)    . Living with Significant Other. Assisted Living. LTAC. SNF.  or   Rehab    Patient Length of Stay (>5 days = 3)    IP Visits Last 12 Months (1-3=4, 4=9, >4=11)       Expected Length of Stay - - -   Actual Length of Stay 0

## 2017-08-10 NOTE — PROGRESS NOTES
Received report from Joshua Guillen in the ER on this patient in room 2288. He arrived to the unit and was tachycardic with activity.

## 2017-08-10 NOTE — ED NOTES
TRANSFER - OUT REPORT:    Verbal report given to  RN on [facility]  being transferred to [address] [place] [address] for routine progression of care       Report consisted of patients Situation, Background, Assessment and   Recommendations(SBAR). Information from the following report(s) SBAR, Kardex, ED Summary and Intake/Output was reviewed with the receiving nurse. Lines:   Peripheral IV 08/10/17 Left Wrist (Active)   Site Assessment Clean, dry, & intact 8/10/2017  4:33 PM   Phlebitis Assessment 0 8/10/2017  4:33 PM   Infiltration Assessment 0 8/10/2017  4:33 PM   Dressing Status Clean, dry, & intact 8/10/2017  4:33 PM   Hub Color/Line Status Blue 8/10/2017  4:33 PM   Action Taken Blood drawn 8/10/2017  4:33 PM        Opportunity for questions and clarification was provided.       Patient transported with:   Monitor  Registered Nurse

## 2017-08-10 NOTE — PROGRESS NOTES
HISTORY OF PRESENT ILLNESS  Deana Lee is a 50 y.o. male. HPI has been having dyspnea for a few months. Walking from one room to the other causes dyspnea. Also has orthopnea. Quit smoking 2 years ago. Is followed by cardiology, told that they wouldn't do an ablation due to his size. Has had 2 cardioversions- were unsuccessful. They are considering a pacemaker. Usually goes to [facility], sees dr. Tiffani Patel. Some pedal edema at times. Gets lots of chest pains intermittently thru the day. Pains can last 15-30 seconds. Had cardiac cath last year. FEV1 and FVC have been 45-50% of predicted. Insurance is currently covering all his prescriptions.      ROS    Physical Exam    ASSESSMENT and PLAN  pt admitted

## 2017-08-10 NOTE — H&P
[address]   HISTORY AND PHYSICAL       Name:  Oneta Severance   MR#:  [id number]   :  1965   Account #:  [de-identified]        Date of Adm:  08/10/2017       HISTORY OF PRESENT ILLNESS: The patient is a very pleasant 50  year-old white male admitted for evaluation and treatment of   congestive heart failure. The patient has had progressive dyspnea over   the last month. Walking from one room to the other causes severe   shortness of breath requiring him to sit down. He also has been having   orthopnea, says that he was unable to sleep the last 2 nights because   of shortness of breath. The patient has a prior history of recurrent   admissions at [facility] for heart failure, usually related to rapid   atrial fibrillation. He has had 2 cardioversions, which were   unsuccessful. Apparently, he has been told that he is too large   because of his body habitus to try an ablation. He has been followed   by Dr. Yrn Sosa. He has had multiple admissions at [facility] for   heart failure. Apparently, has had a transradial cardiac catheter in 2016, some ostial tapering of the left main, the left anterior   descending was free of disease, the circumflex was free of   disease, large ramus artery with mild distal disease, right coronary   artery disease, tortuous but mild disease. Echo apparently has shown   mild to moderately reduced ejection fraction, with no sign of valvular   disease. He was seen in the office and was obviously short of breath. Chest x-ray showed marked cardiomegaly, with pulmonary vascular   congestion, and he was admitted for further evaluation and treatment. On exam, he was also noted to be edematous, and review of office   records showed a 19-pound weight gain from 4 months ago.     PAST MEDICAL HISTORY: Medical problems include hypertension,   obstructive lung disease with FEV1 and FVC of 50% and 45% of predicted. History of recurrent heart failure. History of morbid   obesity. History of diabetes. PAST SURGERY: None. MEDICATIONS ON ADMISSION INCLUDED   1. Anoro inhaler, that he has lost.   2. DuoNeb via nebulizer. 3. Lisinopril 5 mg daily. 4. Coreg 25 mg half tab b.i.d.   5. Lovastatin 40 mg daily. 6. Metformin 500 mg b.i.d.   7. Eliquis 5 mg b.i.d.   8. Furosemide 40 mg 1 daily. 9. Albuterol inhaler p.r.n. ALLERGIES: OXYCODONE CAUSING A RASH. SOCIAL HISTORY: Smoking: The patient quit in 2014, had been a   pack a day smoker prior to that. Alcohol use is negative. 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 REVIEW OF SYSTEMS   CARDIOVASCULAR: As above. RESPIRATORY: Past history of smoking. Says he lost his Anoro   inhaler and the pharmacy would not refill it. Mild cough. GASTROINTESTINAL: Appetite has been okay, no abdominal pain or   melena. GENITOURINARY: No difficulty voiding. No hematuria. HEMATOLOGY/ONCOLOGY: No history of anemia or cancer. EAR, NOSE and THROAT: No runny nose or sore throat. GENERAL: A 19-pound weight gain as noted above. No fever or chills. NEUROLOGIC: No history of strokes or TIA. The remainder of review of systems are negative. PHYSICAL EXAMINATION   VITAL SIGNS: On admission, the patient is obviously short of breath. Respiratory rate was 30, platelets 830, up 19 pounds from 04/19/2017,   pulse was 100 and irregularly irregular, temperature was 98.4,   respiratory rate was 30. Height 6 foot 1 inch, weight 352. LUNGS: Basilar rales bilaterally. CARDIOVASCULAR: Irregularly irregular rhythm. No murmurs, thrills,   rubs or gallops. ABDOMEN: Soft without mass, tenderness or organomegaly. EXTREMITIES: 1-2+ edema bilaterally. DIAGNOSTIC DATA: Chest x-ray shows massive cardiomegaly with   increased pulmonary vascular congestion. ASSESSMENT   1. Progressive diastolic heart failure.    2. History of recurrent admissions for atrial fibrillation with rapid   ventricular response. 3. History of restrictive lung disease. 4. History of diabetes. 5. History of morbid obesity. 6. History of hypertension. PLAN: Admit the patient to the hospital, give IV Lasix, obtain echo   tomorrow morning and obtain cardiology consultation.         Mirella Puckett MD      MS / [facility] SHAHEED BROWER   D:  08/10/2017   17:16   T:  08/10/2017   17:55   Job #:  [id number]

## 2017-08-10 NOTE — ED TRIAGE NOTES
Pt arrived via EMS from PCP office with c/o worsening SOB. Pt has a hx of CHF. No acute distress. VSS.

## 2017-08-10 NOTE — ED NOTES
Notified by telemetry tech patient's heart monitor was for incorrect room. Retrieved correct monitor and placed on patient.

## 2017-08-10 NOTE — IP AVS SNAPSHOT
[address] [facility] 
[phone] Patient: Britton Edwards MRN: [id number] :1965 You are allergic to the following Allergen Reactions Mushroom Flavor Swelling Oxycodone Rash Recent Documentation Weight BMI Smoking Status 153.4 kg 44.62 kg/m2 Former Smoker Emergency Contacts Name Discharge Info Relation Home Work Mobile Alysia Enciso  Friend [5] [phone] Cullen Phelan  Mother [14] [phone] About your hospitalization You were admitted on:  August 10, 2017 You last received care in the:  [facility] 2 CARDIOPULMONARY CARE You were discharged on:  2017 Unit phone number:  [phone] Why you were hospitalized Your primary diagnosis was:  Not on File Your diagnoses also included:  Chf (Congestive Heart Failure) (Hcc), Hypertension, Restrictive Lung Disease, Dyspnea, Atrial Fibrillation With Rvr (Hcc) Providers Seen During Your Hospitalizations Provider Role Specialty Primary office phone Paramjit Horn MD Attending Provider Emergency Medicine [phone] Carli Onofre MD Attending Provider Emergency Medicine [phone] Pantera Eason MD Attending Provider [facility] [phone] Your Primary Care Physician (PCP) Primary Care Physician Office Phone Office Fax Perfecto Jones [phone] Follow-up Information Follow up With Details Comments Contact Info Pantera Eason MD   [address] [place] [address] 7 [phone] Current Discharge Medication List  
  
START taking these medications Dose & Instructions Dispensing Information Comments Morning Noon Evening Bedtime  
 potassium chloride 20 mEq tablet Commonly known as:  K-DUR, KLOR-CON Your next dose is:  TODAY Dose:  20 mEq Take 1 Tab by mouth two (2) times a day. Quantity:  60 Tab Refills:  12 CONTINUE these medications which have CHANGED Dose & Instructions Dispensing Information Comments Morning Noon Evening Bedtime  
 carvedilol 25 mg tablet Commonly known as:  Lisette Mcclain What changed:  how much to take Your next dose is:  TODAY Dose:  25 mg Take 1 Tab by mouth two (2) times a day. To slow heart rate down Quantity:  60 Tab Refills:  12  
     
   
   
  
   
  
 furosemide 80 mg tablet Commonly known as:  LASIX What changed:   
- medication strength 
- how much to take 
- how to take this - when to take this 
- additional instructions Your next dose is:  TOMORROW  
   
 2 tabs every am for fluid Quantity:  60 Tab Refills:  12 CONTINUE these medications which have NOT CHANGED Dose & Instructions Dispensing Information Comments Morning Noon Evening Bedtime  
 albuterol 90 mcg/actuation inhaler Commonly known as:  PROVENTIL HFA, VENTOLIN HFA, PROAIR HFA Your next dose is:  AS NEEDED Dose:  2 Puff Take 2 Puffs by inhalation every four (4) hours as needed for Wheezing or Shortness of Breath. Quantity:  1 Inhaler Refills:  11  
     
   
   
   
  
 albuterol-ipratropium 2.5 mg-0.5 mg/3 ml Nebu Commonly known as:  [place] Arabella Your next dose is:  AS NEEDED Dose:  3 mL  
3 mL by Nebulization route every six (6) hours as needed. Quantity:  50 Nebule Refills:  12  
     
   
   
   
  
 apixaban 5 mg tablet Commonly known as:  Sal Gerardor Your next dose is:  TODAY Dose:  5 mg Take 1 Tab by mouth two (2) times a day. To prevent stroke Quantity:  60 Tab Refills:  12  
     
   
   
  
   
  
 atorvastatin 40 mg tablet Commonly known as:  LIPITOR Your next dose is:  TOMORROW Dose:  40 mg Take 1 Tab by mouth daily. For cholesterol Quantity:  30 Tab Refills:  12 Blood-Glucose Meter Misc Use to check BS twice a day. Dx code E11.9 Quantity:  1 Each Refills:  0  
     
   
   
   
  
 * glucose blood VI test strips strip Commonly known as:  FREESTYLE LITE STRIPS As directed Quantity:  100 Strip Refills:  12  
     
   
   
   
  
 * glucose blood VI test strips strip Commonly known as:  ONETOUCH ULTRA TEST Use to check BS twice a day. Dx code E11.9 Quantity:  100 Strip Refills:  12  
     
   
   
   
  
 lisinopril 5 mg tablet Commonly known as:  Eunice November Your next dose is:  TOMORROW Dose:  5 mg Take 1 Tab by mouth daily. Quantity:  90 Tab Refills:  3  
     
  
   
   
   
  
 metFORMIN 500 mg tablet Commonly known as:  GLUCOPHAGE Your next dose is:  TODAY Dose:  500 mg Take 1 Tab by mouth two (2) times daily (with meals). For diabetes Quantity:  60 Tab Refills:  12  
     
   
   
  
   
  
 umeclidinium-vilanterol 62.5-25 mcg/actuation inhaler Commonly known as:  Sherry He Your next dose is:  TOMORROW Dose:  1 Puff Take 1 Puff by inhalation daily. Quantity:  1 Inhaler Refills:  12 * Notice: This list has 2 medication(s) that are the same as other medications prescribed for you. Read the directions carefully, and ask your doctor or other care provider to review them with you. Where to Get Your Medications Information on where to get these meds will be given to you by the nurse or doctor. ! Ask your nurse or doctor about these medications  
  carvedilol 25 mg tablet  
 furosemide 80 mg tablet  
 potassium chloride 20 mEq tablet Discharge Instructions PATIENT DISCHARGE INSTRUCTIONS PATIENT DISCHARGE INSTRUCTIONS Brandon Avery / [id number] : 1965 Admitted 8/10/2017 Discharged: 2017 · It is important that you take the medication exactly as they are prescribed. · Keep your medication in the bottles provided by the pharmacist and keep a list of the medication names, dosages, and times to be taken in your wallet. · Do not take other medications without consulting your doctor. What to do at [facility] Recommended Diet: Regular Diet Recommended Activity: Activity as tolerated If you experience any of the following symptoms increasing shortness of breath3, please follow up with / Fletcher France. Signed By: Sallie Browne MD   
 August 14, 2017 Discharge Instructions Attachments/References HEART FAILURE: AVOIDING TRIGGERS (ENGLISH) Discharge Orders None Introducing [facility]! Betty Camp introduces [employer] patient portal. Now you can access parts of your medical record, email your doctor's office, and request medication refills online. 1. In your internet browser, go to [email or web address] 2. Click on the First Time User? Click Here link in the Sign In box. You will see the New Member Sign Up page. 3. Enter your [employer] Access Code exactly as it appears below. You will not need to use this code after youve completed the sign-up process. If you do not sign up before the expiration date, you must request a new code. · [employer] Access Code: [email or web address] Expires: 11/8/2017  4:36 PM 
 
4. Enter the last four digits of your Social Security Number (xxxx) and Date of Birth (mm/dd/yyyy) as indicated and click Submit. You will be taken to the next sign-up page. 5. Create a [employer]o ID. This will be your [employer] login ID and cannot be changed, so think of one that is secure and easy to remember. 6. Create a [employer] password. You can change your password at any time. 7. Enter your Password Reset Question and Answer. This can be used at a later time if you forget your password. 8. Enter your e-mail address. You will receive e-mail notification when new information is available in [address] 9. Click Sign Up. You can now view and download portions of your medical record. 10. Click the Download Summary menu link to download a portable copy of your medical information. If you have questions, please visit the Frequently Asked Questions section of the [employer] website. Remember, [employer] is NOT to be used for urgent needs. For medical emergencies, dial 911. Now available from your iPhone and Android! General Information Please provide this summary of care documentation to your next provider. Patient Signature:  ____________________________________________________________ Date:  ____________________________________________________________  
  
Nisha Rodriguez Provider Signature:  ____________________________________________________________ Date:  ____________________________________________________________ More Information Avoiding Triggers With Heart Failure: Care Instructions Your Care Instructions Triggers are anything that make your heart failure flare up. A flare-up is also called \"sudden heart failure\" or \"acute heart failure. \" When you have a flare-up, fluid builds up in your lungs, and you have problems breathing. You might need to go to the hospital. By watching for changes in your condition and avoiding triggers, you can prevent heart failure flare-ups. Follow-up care is a key part of your treatment and safety. Be sure to make and go to all appointments, and call your doctor if you are having problems. It's also a good idea to know your test results and keep a list of the medicines you take. How can you care for yourself at home? Watch for changes in your weight and condition · Weigh yourself without clothing at the same time each day. Record your weight. Call your doctor if you have sudden weight gain, such as more than 2 to 3 pounds in a day or 5 pounds in a week.  (Your doctor may suggest a different range of weight gain.) A sudden weight gain may mean that your heart failure is getting worse. · Keep a daily record of your symptoms. Write down any changes in how you feel, such as new shortness of breath, cough, or problems eating. Also record if your ankles are more swollen than usual and if you feel more tired than usual. Note anything that you ate or did that could have triggered these changes. Limit sodium Sodium causes your body to hold on to extra water. This may cause your heart failure symptoms to get worse. People get most of their sodium from processed foods. Fast food and restaurant meals also tend to be very high in sodium. · Your doctor may suggest that you limit sodium to 2,000 milligrams (mg) a day or less. That is less than 1 teaspoon of salt a day, including all the salt you eat in cooking or in packaged foods. · Read food labels on cans and food packages. They tell you how much sodium you get in one serving. Check the serving size. If you eat more than one serving, you are getting more sodium. · Be aware that sodium can come in forms other than salt, including monosodium glutamate (MSG), sodium citrate, and sodium bicarbonate (baking soda). MSG is often added to Asian food. You can sometimes ask for food without MSG or salt. · Slowly reducing salt will help you adjust to the taste. Take the salt shaker off the table. · Flavor your food with garlic, lemon juice, onion, vinegar, herbs, and spices instead of salt. Do not use soy sauce, steak sauce, onion salt, garlic salt, mustard, or ketchup on your food, unless it is labeled \"low-sodium\" or \"low-salt. \" 
· Make your own salad dressings, sauces, and ketchup without adding salt. · Use fresh or frozen ingredients, instead of canned ones, whenever you can. Choose low-sodium canned goods. · Eat less processed food and food from restaurants, including fast food. Exercise as directed Moderate, regular exercise is very good for your heart. It improves your blood flow and helps control your weight. But too much exercise can stress your heart and cause a heart failure flare-up. · Check with your doctor before you start an exercise program. 
· Walking is an easy way to get exercise. Start out slowly. Gradually increase the length and pace of your walk. Swimming, riding a bike, and using a treadmill are also good forms of exercise. · When you exercise, watch for signs that your heart is working too hard. You are pushing yourself too hard if you cannot talk while you are exercising. If you become short of breath or dizzy or have chest pain, stop, sit down, and rest. 
· Do not exercise when you do not feel well. Take medicines correctly · Take your medicines exactly as prescribed. Call your doctor if you think you are having a problem with your medicine. · Make a list of all the medicines you take. Include those prescribed to you by other doctors and any over-the-counter medicines, vitamins, or supplements you take. Take this list with you when you go to any doctor. · Take your medicines at the same time every day. It may help you to post a list of all the medicines you take every day and what time of day you take them. · Make taking your medicine as simple as you can. Plan times to take your medicines when you are doing other things, such as eating a meal or getting ready for bed. This will make it easier to remember to take your medicines. · Get organized. Use helpful tools, such as daily or weekly pill containers. When should you call for help? Call 911 if you have symptoms of sudden heart failure such as: 
· You have severe trouble breathing. · You cough up pink, foamy mucus. · You have a new irregular or rapid heartbeat. Call your doctor now or seek immediate medical care if: 
· You have new or increased shortness of breath. · You are dizzy or lightheaded, or you feel like you may faint. · You have sudden weight gain, such as more than 2 to 3 pounds in a day or 5 pounds in a week. (Your doctor may suggest a different range of weight gain.) · You have increased swelling in your legs, ankles, or feet. · You are suddenly so tired or weak that you cannot do your usual activities. Watch closely for changes in your health, and be sure to contact your doctor if you develop new symptoms. Where can you learn more? Go to http://maisha-shelli.info/. Enter A146 in the search box to learn more about \"Avoiding Triggers With Heart Failure: Care Instructions. \" Current as of: February 23, 2017 Content Version: 11.3 © 2729-9431 [employer]. Care instructions adapted under license by [employer] (which disclaims liability or warranty for this information). If you have questions about a medical condition or this instruction, always ask your healthcare professional. [address] any warranty or liability for your use of this information.

## 2017-08-10 NOTE — PROGRESS NOTES
Chief Complaint   Patient presents with   [facility] ED Follow-up     breathing issues   [place]  2 month ago    Chest Pain     1. Have you been to the ER, urgent care clinic since your last visit? Hospitalized since your last visit? Se chief complaint    2. Have you seen or consulted any other health care providers outside of the [address] [place] [address] since your last visit? Include any pap smears or colon screening.  No     Health Maintenance Due   Topic Date Due    FOBT Q 1 YEAR AGE 50-75  01/05/2017    INFLUENZA AGE 9 TO ADULT  08/01/2017

## 2017-08-11 PROBLEM — I48.91 ATRIAL FIBRILLATION WITH RVR (HCC): Status: ACTIVE | Noted: 2017-08-11

## 2017-08-11 LAB
ALBUMIN SERPL BCP-MCNC: 3.5 G/DL (ref 3.5–5)
ALBUMIN SERPL-MCNC: 3.9 G/DL (ref 3.5–5.5)
ALBUMIN/GLOB SERPL: 0.8 {RATIO} (ref 1.1–2.2)
ALBUMIN/GLOB SERPL: 1.3 {RATIO} (ref 1.2–2.2)
ALP SERPL-CCNC: 77 IU/L (ref 39–117)
ALP SERPL-CCNC: 91 U/L (ref 45–117)
ALT SERPL-CCNC: 27 IU/L (ref 0–44)
ALT SERPL-CCNC: 35 U/L (ref 12–78)
ANION GAP BLD CALC-SCNC: 5 MMOL/L (ref 5–15)
AST SERPL W P-5'-P-CCNC: 19 U/L (ref 15–37)
AST SERPL-CCNC: 26 IU/L (ref 0–40)
ATRIAL RATE: 133 BPM
BASOPHILS # BLD AUTO: 0 K/UL (ref 0–0.1)
BASOPHILS # BLD: 0 % (ref 0–1)
BILIRUB SERPL-MCNC: 1.1 MG/DL (ref 0–1.2)
BILIRUB SERPL-MCNC: 1.3 MG/DL (ref 0.2–1)
BNP SERPL-MCNC: 4582 PG/ML (ref 0–125)
BUN SERPL-MCNC: 18 MG/DL (ref 6–20)
BUN SERPL-MCNC: 20 MG/DL (ref 6–24)
BUN/CREAT SERPL: 17 (ref 12–20)
BUN/CREAT SERPL: 19 (ref 9–20)
CALCIUM SERPL-MCNC: 8.5 MG/DL (ref 8.5–10.1)
CALCIUM SERPL-MCNC: 9.4 MG/DL (ref 8.7–10.2)
CALCULATED R AXIS, ECG10: -72 DEGREES
CALCULATED T AXIS, ECG11: 85 DEGREES
CHLORIDE SERPL-SCNC: 100 MMOL/L (ref 97–108)
CHLORIDE SERPL-SCNC: 98 MMOL/L (ref 96–106)
CO2 SERPL-SCNC: 20 MMOL/L (ref 18–29)
CO2 SERPL-SCNC: 30 MMOL/L (ref 21–32)
CREAT SERPL-MCNC: 1.04 MG/DL (ref 0.76–1.27)
CREAT SERPL-MCNC: 1.08 MG/DL (ref 0.7–1.3)
DIAGNOSIS, 93000: NORMAL
EOSINOPHIL # BLD: 0.1 K/UL (ref 0–0.4)
EOSINOPHIL NFR BLD: 2 % (ref 0–7)
ERYTHROCYTE [DISTWIDTH] IN BLOOD BY AUTOMATED COUNT: 15.6 % (ref 11.5–14.5)
EST. AVERAGE GLUCOSE BLD GHB EST-MCNC: 169 MG/DL
GLOBULIN SER CALC-MCNC: 3 G/DL (ref 1.5–4.5)
GLOBULIN SER CALC-MCNC: 4.4 G/DL (ref 2–4)
GLUCOSE BLD STRIP.AUTO-MCNC: 225 MG/DL (ref 65–100)
GLUCOSE BLD STRIP.AUTO-MCNC: 260 MG/DL (ref 65–100)
GLUCOSE BLD STRIP.AUTO-MCNC: 282 MG/DL (ref 65–100)
GLUCOSE BLD STRIP.AUTO-MCNC: 338 MG/DL (ref 65–100)
GLUCOSE SERPL-MCNC: 190 MG/DL (ref 65–99)
GLUCOSE SERPL-MCNC: 211 MG/DL (ref 65–100)
HBA1C MFR BLD: 7.5 % (ref 4.2–6.3)
HCT VFR BLD AUTO: 40.3 % (ref 36.6–50.3)
HGB BLD-MCNC: 13 G/DL (ref 12.1–17)
LYMPHOCYTES # BLD AUTO: 25 % (ref 12–49)
LYMPHOCYTES # BLD: 2.2 K/UL (ref 0.8–3.5)
MCH RBC QN AUTO: 28.4 PG (ref 26–34)
MCHC RBC AUTO-ENTMCNC: 32.3 G/DL (ref 30–36.5)
MCV RBC AUTO: 88.2 FL (ref 80–99)
MONOCYTES # BLD: 0.3 K/UL (ref 0–1)
MONOCYTES NFR BLD AUTO: 3 % (ref 5–13)
NEUTS SEG # BLD: 6.3 K/UL (ref 1.8–8)
NEUTS SEG NFR BLD AUTO: 70 % (ref 32–75)
NRBC # BLD: 0.02 K/UL (ref 0–0.01)
NRBC BLD-RTO: 0.2 PER 100 WBC
PLATELET # BLD AUTO: 162 K/UL (ref 150–400)
POTASSIUM SERPL-SCNC: 3.5 MMOL/L (ref 3.5–5.1)
POTASSIUM SERPL-SCNC: 4 MMOL/L (ref 3.5–5.2)
PROT SERPL-MCNC: 6.9 G/DL (ref 6–8.5)
PROT SERPL-MCNC: 7.9 G/DL (ref 6.4–8.2)
Q-T INTERVAL, ECG07: 350 MS
QRS DURATION, ECG06: 88 MS
QTC CALCULATION (BEZET), ECG08: 469 MS
RBC # BLD AUTO: 4.57 M/UL (ref 4.1–5.7)
SERVICE CMNT-IMP: ABNORMAL
SODIUM SERPL-SCNC: 135 MMOL/L (ref 136–145)
SODIUM SERPL-SCNC: 139 MMOL/L (ref 134–144)
TROPONIN I SERPL-MCNC: 0.07 NG/ML
VENTRICULAR RATE, ECG03: 108 BPM
WBC # BLD AUTO: 8.9 K/UL (ref 4.1–11.1)
WBC NRBC COR # BLD: ABNORMAL 10*3/UL

## 2017-08-11 PROCEDURE — 94640 AIRWAY INHALATION TREATMENT: CPT

## 2017-08-11 PROCEDURE — 84484 ASSAY OF TROPONIN QUANT: CPT | Performed by: FAMILY MEDICINE

## 2017-08-11 PROCEDURE — 74011250637 HC RX REV CODE- 250/637: Performed by: FAMILY MEDICINE

## 2017-08-11 PROCEDURE — 36415 COLL VENOUS BLD VENIPUNCTURE: CPT | Performed by: FAMILY MEDICINE

## 2017-08-11 PROCEDURE — 99218 HC RM OBSERVATION: CPT

## 2017-08-11 PROCEDURE — 65660000000 HC RM CCU STEPDOWN

## 2017-08-11 PROCEDURE — 77030029684 HC NEB SM VOL KT MONA -A

## 2017-08-11 PROCEDURE — 82962 GLUCOSE BLOOD TEST: CPT

## 2017-08-11 PROCEDURE — 74011250636 HC RX REV CODE- 250/636: Performed by: FAMILY MEDICINE

## 2017-08-11 PROCEDURE — 74011000250 HC RX REV CODE- 250: Performed by: FAMILY MEDICINE

## 2017-08-11 PROCEDURE — C8929 TTE W OR WO FOL WCON,DOPPLER: HCPCS

## 2017-08-11 PROCEDURE — 85025 COMPLETE CBC W/AUTO DIFF WBC: CPT | Performed by: FAMILY MEDICINE

## 2017-08-11 PROCEDURE — 80053 COMPREHEN METABOLIC PANEL: CPT | Performed by: FAMILY MEDICINE

## 2017-08-11 PROCEDURE — 74011636637 HC RX REV CODE- 636/637: Performed by: FAMILY MEDICINE

## 2017-08-11 PROCEDURE — 83880 ASSAY OF NATRIURETIC PEPTIDE: CPT | Performed by: FAMILY MEDICINE

## 2017-08-11 PROCEDURE — 96376 TX/PRO/DX INJ SAME DRUG ADON: CPT

## 2017-08-11 PROCEDURE — 83036 HEMOGLOBIN GLYCOSYLATED A1C: CPT | Performed by: FAMILY MEDICINE

## 2017-08-11 RX ORDER — POTASSIUM CHLORIDE 20 MEQ/1
20 TABLET, EXTENDED RELEASE ORAL 2 TIMES DAILY
Status: DISCONTINUED | OUTPATIENT
Start: 2017-08-11 | End: 2017-08-14 | Stop reason: HOSPADM

## 2017-08-11 RX ORDER — ACETAMINOPHEN 325 MG/1
650 TABLET ORAL
Status: DISCONTINUED | OUTPATIENT
Start: 2017-08-11 | End: 2017-08-14 | Stop reason: HOSPADM

## 2017-08-11 RX ORDER — SODIUM CHLORIDE 0.9 % (FLUSH) 0.9 %
SYRINGE (ML) INJECTION
Status: DISCONTINUED
Start: 2017-08-11 | End: 2017-08-14 | Stop reason: HOSPADM

## 2017-08-11 RX ADMIN — FUROSEMIDE 80 MG: 10 INJECTION, SOLUTION INTRAMUSCULAR; INTRAVENOUS at 17:08

## 2017-08-11 RX ADMIN — INSULIN LISPRO 7 UNITS: 100 INJECTION, SOLUTION INTRAVENOUS; SUBCUTANEOUS at 12:50

## 2017-08-11 RX ADMIN — IPRATROPIUM BROMIDE AND ALBUTEROL SULFATE 3 ML: .5; 3 SOLUTION RESPIRATORY (INHALATION) at 10:03

## 2017-08-11 RX ADMIN — ACETAMINOPHEN 650 MG: 325 TABLET ORAL at 12:06

## 2017-08-11 RX ADMIN — CARVEDILOL 12.5 MG: 12.5 TABLET, FILM COATED ORAL at 17:06

## 2017-08-11 RX ADMIN — UMECLIDINIUM BROMIDE AND VILANTEROL TRIFENATATE 1 PUFF: 62.5; 25 POWDER RESPIRATORY (INHALATION) at 09:14

## 2017-08-11 RX ADMIN — INSULIN LISPRO 4 UNITS: 100 INJECTION, SOLUTION INTRAVENOUS; SUBCUTANEOUS at 09:15

## 2017-08-11 RX ADMIN — IPRATROPIUM BROMIDE AND ALBUTEROL SULFATE 3 ML: .5; 3 SOLUTION RESPIRATORY (INHALATION) at 15:36

## 2017-08-11 RX ADMIN — Medication 10 ML: at 13:34

## 2017-08-11 RX ADMIN — Medication 10 ML: at 19:49

## 2017-08-11 RX ADMIN — INSULIN LISPRO 5 UNITS: 100 INJECTION, SOLUTION INTRAVENOUS; SUBCUTANEOUS at 21:58

## 2017-08-11 RX ADMIN — APIXABAN 5 MG: 5 TABLET, FILM COATED ORAL at 17:06

## 2017-08-11 RX ADMIN — ATORVASTATIN CALCIUM 40 MG: 40 TABLET, FILM COATED ORAL at 09:14

## 2017-08-11 RX ADMIN — FUROSEMIDE 80 MG: 10 INJECTION, SOLUTION INTRAMUSCULAR; INTRAVENOUS at 09:14

## 2017-08-11 RX ADMIN — CARVEDILOL 12.5 MG: 12.5 TABLET, FILM COATED ORAL at 09:14

## 2017-08-11 RX ADMIN — POTASSIUM CHLORIDE 20 MEQ: 1500 TABLET, EXTENDED RELEASE ORAL at 17:06

## 2017-08-11 RX ADMIN — LISINOPRIL 5 MG: 5 TABLET ORAL at 09:13

## 2017-08-11 RX ADMIN — INSULIN LISPRO 7 UNITS: 100 INJECTION, SOLUTION INTRAVENOUS; SUBCUTANEOUS at 17:08

## 2017-08-11 RX ADMIN — IPRATROPIUM BROMIDE AND ALBUTEROL SULFATE 3 ML: .5; 3 SOLUTION RESPIRATORY (INHALATION) at 20:50

## 2017-08-11 RX ADMIN — POTASSIUM CHLORIDE 20 MEQ: 1500 TABLET, EXTENDED RELEASE ORAL at 09:13

## 2017-08-11 RX ADMIN — Medication 10 ML: at 06:22

## 2017-08-11 RX ADMIN — APIXABAN 5 MG: 5 TABLET, FILM COATED ORAL at 09:13

## 2017-08-11 RX ADMIN — PERFLUTREN 2 ML: 6.52 INJECTION, SUSPENSION INTRAVENOUS at 12:00

## 2017-08-11 NOTE — PROGRESS NOTES
Bedside shift change report given to Wolf Ordoñez RN (oncoming nurse) by BELLE Foster and Declan, [facility] (offgoing nurse). Report included the following information SBAR, Kardex, Intake/Output, MAR, Recent Results, Med Rec Status and Cardiac Rhythm AFIB.      Primary Nurse Tarsha Cristobal RN and Angela Paredes RN performed a dual skin assessment on this patient No impairment noted  Alvaro score is 20

## 2017-08-11 NOTE — CDMP QUERY
2/2    Dr. Terrell Lynn MD :  Please clarify if this patient is being treated/managed for:    =>hyponatremia in setting of Na levels 139-135  =>Other Explanation of clinical findings  =>Unable to Determine (no explanation of clinical findings)    The medical record reflects the following clinical findings, treatment, and risk factors:    Risk Factors: 51 yo admitted for CHF  Clinical Indicators: Na levels 139-135  Treatment: serial labs, Lasix iv,     Please clarify and document your clinical opinion in the progress notes and discharge summary including the definitive and/or presumptive diagnosis, (suspected or probable), related to the above clinical findings. Please include clinical findings supporting your diagnosis. Thank Hay Veliz RN [address] [place] [address]; [id number]

## 2017-08-11 NOTE — PROGRESS NOTES
This is a 50 yr old male who has been admitted for treatment of CHF. Tried to visit patient and conduct an interview but patient was asleep. Will try back at a later time. Per review of the chart patient voiced inability to ambulate without the use os cane and getting SOB. PMH: HTN    Care Management Interventions  PCP Verified by CM:  Yes  Current Support Network: Own Home

## 2017-08-11 NOTE — PROGRESS NOTES
08/10/17 1908   Vitals   Temp 98 °F (36.7 °C)   Temp Source Oral   Pulse (Heart Rate) (!) 115   Resp Rate 28   O2 Sat (%) 94 %   Level of Consciousness Alert   BP (!) 146/94  (post activity)   MAP (Calculated) 111   BP 1 Location Left arm   BP 1 Method Automatic   BP Patient Position At rest   Cardiac Rhythm A Fib   MEWS Score 4   MEWS = 4 d/t elevated BP, HR and RR. Patient just returned from bathroom and has continued c/o HAMM. Will continue to monitor closely.

## 2017-08-11 NOTE — DIABETES MGMT
DTC Consult Note    Recommendations/ Comments: Please consider the followin- diabetic diet restrictions      DTC will continue to follow patient as needed. ____________________________    Consult received for:                    [x]           Hospital Blood Glucose Management    Chart reviewed and initial evaluation complete on [facility]. Patient is a 50 y.o. male with known diabetes on Metformin at home. A1c:   Lab Results   Component Value Date/Time    Hemoglobin A1c 7.5 2017 02:02 AM       Recent Glucose Results:   Lab Results   Component Value Date/Time     (H) 2017 02:02 AM     (H) 08/10/2017 04:32 PM    GLUCPOC 225 (H) 2017 08:32 AM    GLUCPOC 303 (H) 08/10/2017 09:47 PM    GLUCPOC 276 (H) 08/10/2017 07:15 PM        Lab Results   Component Value Date/Time    Creatinine 1.08 2017 02:02 AM       Active Orders   Diet    DIET REGULAR        PO intake: No data found. Current hospital DM medication: Lispro Correctional insulin with \"insulin resistant\" sensitivity      Patient follow appointment for diabetes management: PCP    Thank you.   Valentino Greenspan, [address] [place] [address], [address]  Office:  [phone]

## 2017-08-11 NOTE — CARDIO/PULMONARY
CP Rehab Note: chart review    Admit: SOB    Mhx: AFib, HT, HTN, restrictive lung disease, cardioversion,  EF 60% on 7/21/14. Former smoker. Echo ordered. Cardiology consult ordered. CP Rehab will continue to follow.

## 2017-08-11 NOTE — PROGRESS NOTES
Bedside [facility] by Jovan Easley (offgoing nurse). Report included the following information SBAR, Kardex, Intake/Output, MAR, Recent Results and Cardiac Rhythm afib. [address] SHIFT NURSING NOTE    Bedside shift change report given to nurse (oncoming nurse) by Yassets (offgoing nurse). Report included the following information SBAR, Kardex, Intake/Output, MAR, Recent Results and Cardiac Rhythm afib. SHIFT SUMMARY: ECHO completed today.     Admission Date 8/10/2017   Admission Diagnosis CHF (congestive heart failure) ([facility] [address]   Consults IP CONSULT TO CARDIOLOGY        Consults   [] PT   [] OT   [] Speech   [] Palliative      [] Hospice    [] Case Management   [] None   Cardiac Monitoring   [x] Yes   [] No     Antibiotics   [] Yes   [] No   GI Prophylaxis  (Ex: Protonix, Pepcid, etc,.)   [x] Yes   [] No          DVT Prophylaxis   SCDs:             Ochoa stockings:         [x] Medication (Ex: Lovenox, Eliquis, Brilinta, Coumadin,  Heparin, etc..)   [] Contraindicated   [] No VTE needed       Urinary Catheter             LDAs               Peripheral IV 08/10/17 Left Wrist (Active)   Site Assessment Clean, dry, & intact 8/11/2017  1:43 PM   Phlebitis Assessment 0 8/11/2017  1:43 PM   Infiltration Assessment 0 8/11/2017  1:43 PM   Dressing Status Clean, dry, & intact 8/11/2017  1:43 PM   Dressing Type Transparent;Tape 8/11/2017  1:43 PM   Hub Color/Line Status Blue;Flushed;Patent 8/11/2017  1:43 PM   Action Taken Blood drawn 8/10/2017  4:33 PM   Alcohol Cap Used No 8/11/2017  1:43 PM                      I/Os   Intake/Output Summary (Last 24 hours) at 08/11/17 1349  Last data filed at 08/11/17 0814   Gross per 24 hour   Intake              840 ml   Output             4700 ml   Net            -3860 ml         Activity Level Activity Level: Up ad katy     Activity Assistance: No assistance needed   Diet Active Orders   Diet    DIET REGULAR      Purposeful Rounding every 1-2 hour?   [x] Yes    Aris Score  Total Score: 1   Bed Alarm (If score 3 or >)   [] Yes    [] Refused (See signed refusal form in chart)   Alvaro Score  Alvaro Score: 20       Alvaro Score (if score 14 or less)   [] PMT consult   [] Nutrition consult   [] Wound Care consult      []  Specialty bed         Influenza Vaccine Received Flu Vaccine for Current Season (usually Sept-March): Not Flu Season               Needs prior to discharge:   Home O2 required:    [] Yes   [x] No     If yes, how much O2 required? Other:    Last Bowel Movement Date: 08/11/17   Readmission Risk Assessment Tool Score Low Risk            9       Total Score        3 Has Seen PCP in Last 6 Months (Yes=3, No=0)    4 Pt. Coverage (Medicare=5 , Medicaid, or Self-Pay=4)    2 Charlson Comorbidity Score (Age + Comorbid Conditions)        Criteria that do not apply:    . Living with Significant Other. Assisted Living. LTAC. SNF.  or   Rehab    Patient Length of Stay (>5 days = 3)    IP Visits Last 12 Months (1-3=4, 4=9, >4=11)       Expected Length of Stay 3d 12h   Actual Length of Stay 1

## 2017-08-11 NOTE — CDMP QUERY
1/2  Dr. Lucie Charlton MD :  Please clarify if this patient is being treated/managed for:    =>acute on chronic diastolic CHF in setting of 'progressive diastolic HF', 19 pound weight gain in 4 months, progressive diastolic HF, probnp [id number]  =>Other Explanation of clinical findings  =>Unable to Determine (no explanation of clinical findings)    The medical record reflects the following clinical findings, treatment, and risk factors:    Risk Factors: 51 yo admitted for CHF  Clinical Indicators: 19 pound weight gain in 4 months, progressive diastolic HF, probnp [phone]  Treatment: lasix iv, cardiac consult, serial labs     Please clarify and document your clinical opinion in the progress notes and discharge summary including the definitive and/or presumptive diagnosis, (suspected or probable), related to the above clinical findings. Please include clinical findings supporting your diagnosis. Thank Unruly Joy  [address]; V;[id number]

## 2017-08-11 NOTE — PROGRESS NOTES
10:20 PM Paged Dr. Barry Gordon, on call for Dr. Scotty Lim, regarding patient's blood glucose of 303 this evening and patient does not have orders for SSI. Awaiting return page. 10:50 PM Second page to Dr. Barry Gordon. 11:10 PM Spoke with Dr. Barry Gordon regarding patient's blood glucose. Orders received to initiate hyper/hypoglycemia protocol and SSI. See MAR. Will continue to monitor closely.

## 2017-08-11 NOTE — CONSULTS
[address] [facility], [address] [facility]     Date of  Admission: 8/10/2017  3:59 PM     Admission type:Emergency    Consult for: management of afib  Consult by: Dr. Darvin Mak     Subjective:     Markie Osman is a 50 y.o. male admitted for CHF (congestive heart failure) ([facility] [address] and chronic afib. Mr. Vipin Lawson is followed by Dr. Raine Ortega, [facility] and has been seen at [facility] as well. Admitted by PCP for c/o of worsening SOB, weight gain, inability to sleep due to SOB. Hx of chronic afib with 2 previous cardioversions in the past, neg cardiac cath at [facility] 4/2016, and supposed HF (?DHF vs SHF or both) - echo pending. Since admission has received IV lasix with neg 4.5L out. Feeling less SOB. ECG with RVR, no acute changes, troponin 0.07 x 2. States he occasionally has chest discomfort, but mostly with coughing, and SOB. Likely has MENDY, but has never been tested - plans to f/u with sleep center at discharge  Cardiac risk factors: family history, dyslipidemia, diabetes mellitus, obesity, sedentary life style, male gender, hypertension. Patient Active Problem List    Diagnosis Date Noted    Atrial fibrillation with RVR ([facility] [address] 08/11/2017    CHF (congestive heart failure) ([facility] [address] 08/10/2017    Restrictive lung disease     Dyspnea 07/15/2014    Hypertension 02/17/2014      Karl Humphreys MD  Past Medical History:   Diagnosis Date   Sully Lawson [facility]) 1/6/15    [facility] ED.  Pt reported this    Atrial fibrillation with RVR ([facility] [address] 8/11/2017    Heart failure (HCC)     Hypertension     Restrictive lung disease       Social History     Social History    Marital status: SINGLE     Spouse name: N/A    Number of children: N/A    Years of education: N/A     Social History Main Topics    Smoking status: Former Smoker     Packs/day: 1.00     Quit date: 2/3/2014    Smokeless tobacco: Never Used    Alcohol use No    Drug use: None    Sexual activity: Not Asked     Other Topics Concern    None     Social History Narrative    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      Allergies   Allergen Reactions    Mushroom Flavor Swelling    Oxycodone Rash      History reviewed. No pertinent family history.    Current Facility-Administered Medications   Medication Dose Route Frequency    potassium chloride (K-DUR, KLOR-CON) SR tablet 20 mEq  20 mEq Oral BID    acetaminophen (TYLENOL) tablet 650 mg  650 mg Oral Q4H PRN    sodium chloride (NS) 0.9 % flush        perflutren lipid microspheres (DEFINITY) 1.1 mg/mL contrast injection        albuterol (PROVENTIL HFA, VENTOLIN HFA, PROAIR HFA) inhaler 2 Puff  2 Puff Inhalation Q4H PRN    albuterol-ipratropium (DUO-NEB) 2.5 MG-0.5 MG/3 ML  3 mL Nebulization QID RT    apixaban (ELIQUIS) tablet 5 mg  5 mg Oral BID    atorvastatin (LIPITOR) tablet 40 mg  40 mg Oral DAILY    carvedilol (COREG) tablet 12.5 mg  12.5 mg Oral BID    lisinopril (PRINIVIL, ZESTRIL) tablet 5 mg  5 mg Oral DAILY    umeclidinium-vilanterol (ANORO ELLIPTA) 62.5 mcg- 25 mcg/inhalation  1 Puff Inhalation DAILY    sodium chloride (NS) flush 5-10 mL  5-10 mL IntraVENous Q8H    sodium chloride (NS) flush 5-10 mL  5-10 mL IntraVENous PRN    zolpidem (AMBIEN) tablet 5 mg  5 mg Oral QHS PRN    furosemide (LASIX) injection 80 mg  80 mg IntraVENous BID    glucose chewable tablet 16 g  4 Tab Oral PRN    glucagon (GLUCAGEN) injection 1 mg  1 mg IntraMUSCular PRN    insulin lispro (HUMALOG) injection   SubCUTAneous AC&HS    dextrose 10% infusion 125-250 mL  125-250 mL IntraVENous PRN        Review of Symptoms:   Constitutional: chronic debilitation  Eyes: negative   Ears, nose, mouth, throat, and face: negative  Respiratory: chronic HAMM, worse lately, unable to lay flat  Cardiovascular: occ chest pains  Gastrointestinal: negative  Genitourinary:negative Musculoskeletal:fatigue, weakness  Neurological: negative   Endocrine: negative          Objective:      Visit Vitals    /75 (BP 1 Location: Right arm, BP Patient Position: At rest)    Pulse 76    Temp 97.9 °F (36.6 °C)    Resp 22    Wt 156.7 kg (345 lb 8 oz)    SpO2 94%    BMI 45.58 kg/m2       Physical:   General: morbidly obese  male in no acute distress  Heart: irr, irr,  no m/S3/JVD, no carotid bruits   Lungs: diminished, shallow inspirations  Abdomen: Soft, +BS, NTND   Extremities: +1 jasen LE edema; chronic venous stasis  Neurologic: Grossly normal    Data Review:   Recent Labs      08/11/17   0202  08/10/17   1632   WBC  8.9  9.0   HGB  13.0  12.6   HCT  40.3  38.8   PLT  162  163     Recent Labs      08/11/17   0202  08/10/17   1632  08/10/17   1557   NA  135*  137  139   K  3.5  3.6  4.0   CL  100  103  98   CO2  30  26  20   GLU  211*  165*  190*   BUN  18  20  20   CREA  1.08  0.98  1.04   CA  8.5  9.1  9.4   ALB  3.5  3.5  3.9   TBILI  1.3*  1.2*  1.1   SGOT  19  24  26   ALT  35  36  27       Recent Labs      08/11/17   0202  08/10/17   1632   TROIQ  0.07*  0.07*         Intake/Output Summary (Last 24 hours) at 08/11/17 1557  Last data filed at 08/11/17 1448   Gross per 24 hour   Intake             1080 ml   Output             5300 ml   Net            -4220 ml        Cardiographics    Telemetry: afib with RVR, slowed to rate control  ECG: afib with rvr  Echocardiogram:  pending  CXRAY:Moderate bilateral pulmonary edema consistent with congestive heart failure  which has increased       Assessment:       Active Problems:    Hypertension (2/17/2014)      Dyspnea (7/15/2014)      Restrictive lung disease ()      CHF (congestive heart failure) ([facility]) (8/10/2017)      Atrial fibrillation with RVR ([facility]) (8/11/2017)         Plan:     Afib with RVR:  Rate controlled now, on BB, Eliquis.   Rate accelerated likely due to volume overload  Patient discussed that his Cardiologist considering PPI/AV cheryl ablation - recommend that the patient f/u with him at discharge for further management    HF of unknown type:   Echo pending  Agree with aggressive diuresis, monitoring I/Os, labs, and daily weights  Continue on BB and ACEI. Thank you for consulting SUE Holder NP       Pt seen and examined in details. Agree with NP A&P. No current indication for PPM. Case was also d/w Dr. Emily Abernathy. Medical treatment and will have him f/u with his cardiologist. Per pt he was seen at [facility] for a. Fib ablation and was deemed not suitable for ablation. Will d/w Dr. Cullen Antoine.      Lewis Howard MD

## 2017-08-11 NOTE — PROGRESS NOTES
Problem: Falls - Risk of  Goal: *Absence of Falls  Document Aris Fall Risk and appropriate interventions in the flowsheet.    Outcome: Progressing Towards Goal  Fall Risk Interventions:              Medication Interventions: Bed/chair exit alarm, Patient to call before getting OOB, Teach patient to arise slowly

## 2017-08-11 NOTE — PROGRESS NOTES
General Daily Progress Note    Admit Date: 8/10/2017  Hospital day 2    Subjective:     Patient has no complaint of chest pain. ..   Medication side effects: none    Current Facility-Administered Medications   Medication Dose Route Frequency    potassium chloride (K-DUR, KLOR-CON) SR tablet 20 mEq  20 mEq Oral BID    albuterol (PROVENTIL HFA, VENTOLIN HFA, PROAIR HFA) inhaler 2 Puff  2 Puff Inhalation Q4H PRN    albuterol-ipratropium (DUO-NEB) 2.5 MG-0.5 MG/3 ML  3 mL Nebulization QID RT    apixaban (ELIQUIS) tablet 5 mg  5 mg Oral BID    atorvastatin (LIPITOR) tablet 40 mg  40 mg Oral DAILY    carvedilol (COREG) tablet 12.5 mg  12.5 mg Oral BID    lisinopril (PRINIVIL, ZESTRIL) tablet 5 mg  5 mg Oral DAILY    umeclidinium-vilanterol (ANORO ELLIPTA) 62.5 mcg- 25 mcg/inhalation  1 Puff Inhalation DAILY    sodium chloride (NS) flush 5-10 mL  5-10 mL IntraVENous Q8H    sodium chloride (NS) flush 5-10 mL  5-10 mL IntraVENous PRN    zolpidem (AMBIEN) tablet 5 mg  5 mg Oral QHS PRN    furosemide (LASIX) injection 80 mg  80 mg IntraVENous BID    glucose chewable tablet 16 g  4 Tab Oral PRN    glucagon (GLUCAGEN) injection 1 mg  1 mg IntraMUSCular PRN    insulin lispro (HUMALOG) injection   SubCUTAneous AC&HS    dextrose 10% infusion 125-250 mL  125-250 mL IntraVENous PRN        Review of Systems  Pertinent items are noted in HPI. Objective:     Patient Vitals for the past 8 hrs:   BP Temp Pulse Resp SpO2   08/11/17 0721 130/82 97.9 °F (36.6 °C) 90 22 94 %   08/11/17 0209 (!) 147/97 97.8 °F (36.6 °C) 93 22 96 %        08/09 1901 - 08/11 0700  In: 480 [P.O.:480]  Out: 3700 [Urine:3700]    Physical Exam: Lungs: rales R base, L base  Heart: irregularly irregular rhythm  Abdomen: soft, non-tender.  Bowel sounds normal. No masses,  no organomegaly  Extremities: edema , 1-2+ bilaterally      ECG: atrial fibrillation, rate 90     Data Review   Recent Results (from the past 24 hour(s))   AMB POC HEMOGLOBIN A1C Collection Time: 08/10/17  2:48 PM   Result Value Ref Range    Hemoglobin A1c (POC) 6.9 %   METABOLIC PANEL, COMPREHENSIVE    Collection Time: 08/10/17  3:57 PM   Result Value Ref Range    Glucose 190 (H) 65 - 99 mg/dL    BUN 20 6 - 24 mg/dL    Creatinine 1.04 0.76 - 1.27 mg/dL    GFR est non-AA 83 >59 mL/min/1.73    GFR est AA 96 >59 mL/min/1.73    BUN/Creatinine ratio 19 9 - 20    Sodium 139 134 - 144 mmol/L    Potassium 4.0 3.5 - 5.2 mmol/L    Chloride 98 96 - 106 mmol/L    CO2 20 18 - 29 mmol/L    Calcium 9.4 8.7 - 10.2 mg/dL    Protein, total 6.9 6.0 - 8.5 g/dL    Albumin 3.9 3.5 - 5.5 g/dL    GLOBULIN, TOTAL 3.0 1.5 - 4.5 g/dL    A-G Ratio 1.3 1.2 - 2.2    Bilirubin, total 1.1 0.0 - 1.2 mg/dL    Alk. phosphatase 77 39 - 117 IU/L    AST (SGOT) 26 0 - 40 IU/L    ALT (SGPT) 27 0 - 44 IU/L   EKG, 12 LEAD, INITIAL    Collection Time: 08/10/17  4:05 PM   Result Value Ref Range    Ventricular Rate 108 BPM    Atrial Rate 133 BPM    QRS Duration 88 ms    Q-T Interval 350 ms    QTC Calculation (Bezet) 469 ms    Calculated R Axis -72 degrees    Calculated T Axis 85 degrees    Diagnosis       Atrial fibrillation with rapid ventricular response  Left axis deviation  Anteroseptal infarct (cited on or before 02-SEP-2012)  When compared with ECG of 02-SEP-2012 23:56,  Atrial fibrillation has replaced Sinus rhythm  Vent. rate has increased BY  39 BPM  Confirmed by Simon Jaimes ([id number]) on 8/11/2017 7:36:31 AM     CBC WITH AUTOMATED DIFF    Collection Time: 08/10/17  4:32 PM   Result Value Ref Range    WBC 9.0 4.1 - 11.1 K/uL    RBC 4.46 4.10 - 5.70 M/uL    HGB 12.6 12.1 - 17.0 g/dL    HCT 38.8 36.6 - 50.3 %    MCV 87.0 80.0 - 99.0 FL    MCH 28.3 26.0 - 34.0 PG    MCHC 32.5 30.0 - 36.5 g/dL    RDW 15.4 (H) 11.5 - 14.5 %    PLATELET 719 946 - 948 K/uL    NEUTROPHILS 73 32 - 75 %    LYMPHOCYTES 24 12 - 49 %    MONOCYTES 2 (L) 5 - 13 %    EOSINOPHILS 1 0 - 7 %    BASOPHILS 0 0 - 1 %    ABS.  NEUTROPHILS 6.5 1.8 - 8.0 K/UL ABS. LYMPHOCYTES 2.1 0.8 - 3.5 K/UL    ABS. MONOCYTES 0.2 0.0 - 1.0 K/UL    ABS. EOSINOPHILS 0.1 0.0 - 0.4 K/UL    ABS. BASOPHILS 0.0 0.0 - 0.1 K/UL   METABOLIC PANEL, COMPREHENSIVE    Collection Time: 08/10/17  4:32 PM   Result Value Ref Range    Sodium 137 136 - 145 mmol/L    Potassium 3.6 3.5 - 5.1 mmol/L    Chloride 103 97 - 108 mmol/L    CO2 26 21 - 32 mmol/L    Anion gap 8 5 - 15 mmol/L    Glucose 165 (H) 65 - 100 mg/dL    BUN 20 6 - 20 MG/DL    Creatinine 0.98 0.70 - 1.30 MG/DL    BUN/Creatinine ratio 20 12 - 20      GFR est AA >60 >60 ml/min/1.73m2    GFR est non-AA >60 >60 ml/min/1.73m2    Calcium 9.1 8.5 - 10.1 MG/DL    Bilirubin, total 1.2 (H) 0.2 - 1.0 MG/DL    ALT (SGPT) 36 12 - 78 U/L    AST (SGOT) 24 15 - 37 U/L    Alk.  phosphatase 85 45 - 117 U/L    Protein, total 7.7 6.4 - 8.2 g/dL    Albumin 3.5 3.5 - 5.0 g/dL    Globulin 4.2 (H) 2.0 - 4.0 g/dL    A-G Ratio 0.8 (L) 1.1 - 2.2     NT-PRO BNP    Collection Time: 08/10/17  4:32 PM   Result Value Ref Range    NT pro-BNP 4975 (H) 0 - 125 PG/ML   TROPONIN I    Collection Time: 08/10/17  4:32 PM   Result Value Ref Range    Troponin-I, Qt. 0.07 (H) <0.05 ng/mL   GLUCOSE, POC    Collection Time: 08/10/17  7:15 PM   Result Value Ref Range    Glucose (POC) 276 (H) 65 - 100 mg/dL    Performed by Damien Maynard    GLUCOSE, POC    Collection Time: 08/10/17  9:47 PM   Result Value Ref Range    Glucose (POC) 303 (H) 65 - 100 mg/dL    Performed by Aga Varela    METABOLIC PANEL, COMPREHENSIVE    Collection Time: 08/11/17  2:02 AM   Result Value Ref Range    Sodium 135 (L) 136 - 145 mmol/L    Potassium 3.5 3.5 - 5.1 mmol/L    Chloride 100 97 - 108 mmol/L    CO2 30 21 - 32 mmol/L    Anion gap 5 5 - 15 mmol/L    Glucose 211 (H) 65 - 100 mg/dL    BUN 18 6 - 20 MG/DL    Creatinine 1.08 0.70 - 1.30 MG/DL    BUN/Creatinine ratio 17 12 - 20      GFR est AA >60 >60 ml/min/1.73m2    GFR est non-AA >60 >60 ml/min/1.73m2    Calcium 8.5 8.5 - 10.1 MG/DL    Bilirubin, total 1.3 (H) 0.2 - 1.0 MG/DL    ALT (SGPT) 35 12 - 78 U/L    AST (SGOT) 19 15 - 37 U/L    Alk. phosphatase 91 45 - 117 U/L    Protein, total 7.9 6.4 - 8.2 g/dL    Albumin 3.5 3.5 - 5.0 g/dL    Globulin 4.4 (H) 2.0 - 4.0 g/dL    A-G Ratio 0.8 (L) 1.1 - 2.2     CBC WITH AUTOMATED DIFF    Collection Time: 08/11/17  2:02 AM   Result Value Ref Range    WBC 8.9 4.1 - 11.1 K/uL    RBC 4.57 4.10 - 5.70 M/uL    HGB 13.0 12.1 - 17.0 g/dL    HCT 40.3 36.6 - 50.3 %    MCV 88.2 80.0 - 99.0 FL    MCH 28.4 26.0 - 34.0 PG    MCHC 32.3 30.0 - 36.5 g/dL    RDW 15.6 (H) 11.5 - 14.5 %    PLATELET 719 566 - 988 K/uL    NEUTROPHILS 70 32 - 75 %    LYMPHOCYTES 25 12 - 49 %    MONOCYTES 3 (L) 5 - 13 %    EOSINOPHILS 2 0 - 7 %    BASOPHILS 0 0 - 1 %    ABS. NEUTROPHILS 6.3 1.8 - 8.0 K/UL    ABS. LYMPHOCYTES 2.2 0.8 - 3.5 K/UL    ABS. MONOCYTES 0.3 0.0 - 1.0 K/UL    ABS. EOSINOPHILS 0.1 0.0 - 0.4 K/UL    ABS. BASOPHILS 0.0 0.0 - 0.1 K/UL   NT-PRO BNP    Collection Time: 08/11/17  2:02 AM   Result Value Ref Range    NT pro-BNP 4582 (H) 0 - 125 PG/ML   TROPONIN I    Collection Time: 08/11/17  2:02 AM   Result Value Ref Range    Troponin-I, Qt. 0.07 (H) <0.05 ng/mL   HEMOGLOBIN A1C WITH EAG    Collection Time: 08/11/17  2:02 AM   Result Value Ref Range    Hemoglobin A1c 7.5 (H) 4.2 - 6.3 %    Est. average glucose 169 mg/dL   NUCLEATED RBC    Collection Time: 08/11/17  2:02 AM   Result Value Ref Range    NRBC 0.2 (H) 0  WBC    ABSOLUTE NRBC 0.02 (H) 0.00 - 0.01 K/uL    WBC CORRECTED FOR NR ADJUSTED FOR NUCLEATED RBC'S             Assessment:     Active Problems:    CHF (congestive heart failure) ([facility] [address] (8/10/2017)        Plan:     1. Diastolic heart failure. I/O -  480/3700. Symptomatically improved. Continue iv lasix today. For repeat echo today. 2. Mild hypokalemia- will replace  3. Chronic A fib- followed by Dr. Milagro Daugherty- has failed 2 cardioversions. Has also been told that ablation cant be attempted due to body size. 4. Diabetes. 5. Hx cardiac cath 1 year ago- minimal cad.

## 2017-08-12 LAB
ANION GAP BLD CALC-SCNC: 8 MMOL/L (ref 5–15)
BUN SERPL-MCNC: 17 MG/DL (ref 6–20)
BUN/CREAT SERPL: 18 (ref 12–20)
CALCIUM SERPL-MCNC: 8.3 MG/DL (ref 8.5–10.1)
CHLORIDE SERPL-SCNC: 100 MMOL/L (ref 97–108)
CO2 SERPL-SCNC: 26 MMOL/L (ref 21–32)
CREAT SERPL-MCNC: 0.93 MG/DL (ref 0.7–1.3)
ERYTHROCYTE [DISTWIDTH] IN BLOOD BY AUTOMATED COUNT: 15.4 % (ref 11.5–14.5)
GLUCOSE BLD STRIP.AUTO-MCNC: 281 MG/DL (ref 65–100)
GLUCOSE BLD STRIP.AUTO-MCNC: 330 MG/DL (ref 65–100)
GLUCOSE BLD STRIP.AUTO-MCNC: 345 MG/DL (ref 65–100)
GLUCOSE BLD STRIP.AUTO-MCNC: 360 MG/DL (ref 65–100)
GLUCOSE SERPL-MCNC: 246 MG/DL (ref 65–100)
HCT VFR BLD AUTO: 39.1 % (ref 36.6–50.3)
HGB BLD-MCNC: 12.6 G/DL (ref 12.1–17)
MCH RBC QN AUTO: 28.4 PG (ref 26–34)
MCHC RBC AUTO-ENTMCNC: 32.2 G/DL (ref 30–36.5)
MCV RBC AUTO: 88.1 FL (ref 80–99)
PLATELET # BLD AUTO: 135 K/UL (ref 150–400)
POTASSIUM SERPL-SCNC: 3.9 MMOL/L (ref 3.5–5.1)
RBC # BLD AUTO: 4.44 M/UL (ref 4.1–5.7)
SERVICE CMNT-IMP: ABNORMAL
SODIUM SERPL-SCNC: 134 MMOL/L (ref 136–145)
WBC # BLD AUTO: 8.3 K/UL (ref 4.1–11.1)

## 2017-08-12 PROCEDURE — 74011000250 HC RX REV CODE- 250: Performed by: FAMILY MEDICINE

## 2017-08-12 PROCEDURE — 65660000000 HC RM CCU STEPDOWN

## 2017-08-12 PROCEDURE — 96376 TX/PRO/DX INJ SAME DRUG ADON: CPT

## 2017-08-12 PROCEDURE — 82962 GLUCOSE BLOOD TEST: CPT

## 2017-08-12 PROCEDURE — 99218 HC RM OBSERVATION: CPT

## 2017-08-12 PROCEDURE — 80048 BASIC METABOLIC PNL TOTAL CA: CPT | Performed by: FAMILY MEDICINE

## 2017-08-12 PROCEDURE — 36415 COLL VENOUS BLD VENIPUNCTURE: CPT | Performed by: FAMILY MEDICINE

## 2017-08-12 PROCEDURE — 74011250637 HC RX REV CODE- 250/637: Performed by: FAMILY MEDICINE

## 2017-08-12 PROCEDURE — 74011250637 HC RX REV CODE- 250/637: Performed by: INTERNAL MEDICINE

## 2017-08-12 PROCEDURE — 74011250636 HC RX REV CODE- 250/636: Performed by: FAMILY MEDICINE

## 2017-08-12 PROCEDURE — 94640 AIRWAY INHALATION TREATMENT: CPT

## 2017-08-12 PROCEDURE — 74011636637 HC RX REV CODE- 636/637: Performed by: FAMILY MEDICINE

## 2017-08-12 PROCEDURE — 85027 COMPLETE CBC AUTOMATED: CPT | Performed by: FAMILY MEDICINE

## 2017-08-12 RX ORDER — IPRATROPIUM BROMIDE AND ALBUTEROL SULFATE 2.5; .5 MG/3ML; MG/3ML
3 SOLUTION RESPIRATORY (INHALATION)
Status: DISCONTINUED | OUTPATIENT
Start: 2017-08-12 | End: 2017-08-14 | Stop reason: HOSPADM

## 2017-08-12 RX ORDER — CARVEDILOL 12.5 MG/1
25 TABLET ORAL 2 TIMES DAILY
Status: DISCONTINUED | OUTPATIENT
Start: 2017-08-12 | End: 2017-08-14 | Stop reason: HOSPADM

## 2017-08-12 RX ORDER — INSULIN LISPRO 100 [IU]/ML
15 INJECTION, SOLUTION INTRAVENOUS; SUBCUTANEOUS ONCE
Status: ACTIVE | OUTPATIENT
Start: 2017-08-12 | End: 2017-08-13

## 2017-08-12 RX ORDER — DILTIAZEM HYDROCHLORIDE 30 MG/1
30 TABLET, FILM COATED ORAL
Status: DISCONTINUED | OUTPATIENT
Start: 2017-08-12 | End: 2017-08-12

## 2017-08-12 RX ORDER — IPRATROPIUM BROMIDE AND ALBUTEROL SULFATE 2.5; .5 MG/3ML; MG/3ML
3 SOLUTION RESPIRATORY (INHALATION)
Status: DISCONTINUED | OUTPATIENT
Start: 2017-08-12 | End: 2017-08-12

## 2017-08-12 RX ADMIN — FUROSEMIDE 80 MG: 10 INJECTION, SOLUTION INTRAMUSCULAR; INTRAVENOUS at 08:57

## 2017-08-12 RX ADMIN — IPRATROPIUM BROMIDE AND ALBUTEROL SULFATE 3 ML: .5; 3 SOLUTION RESPIRATORY (INHALATION) at 20:37

## 2017-08-12 RX ADMIN — POTASSIUM CHLORIDE 20 MEQ: 1500 TABLET, EXTENDED RELEASE ORAL at 17:12

## 2017-08-12 RX ADMIN — INSULIN LISPRO 15 UNITS: 100 INJECTION, SOLUTION INTRAVENOUS; SUBCUTANEOUS at 13:41

## 2017-08-12 RX ADMIN — INSULIN LISPRO 7 UNITS: 100 INJECTION, SOLUTION INTRAVENOUS; SUBCUTANEOUS at 17:17

## 2017-08-12 RX ADMIN — ATORVASTATIN CALCIUM 40 MG: 40 TABLET, FILM COATED ORAL at 08:57

## 2017-08-12 RX ADMIN — APIXABAN 5 MG: 5 TABLET, FILM COATED ORAL at 17:13

## 2017-08-12 RX ADMIN — POTASSIUM CHLORIDE 20 MEQ: 1500 TABLET, EXTENDED RELEASE ORAL at 08:56

## 2017-08-12 RX ADMIN — CARVEDILOL 25 MG: 12.5 TABLET, FILM COATED ORAL at 17:13

## 2017-08-12 RX ADMIN — LISINOPRIL 5 MG: 5 TABLET ORAL at 08:56

## 2017-08-12 RX ADMIN — INSULIN LISPRO 5 UNITS: 100 INJECTION, SOLUTION INTRAVENOUS; SUBCUTANEOUS at 21:55

## 2017-08-12 RX ADMIN — Medication 10 ML: at 20:43

## 2017-08-12 RX ADMIN — UMECLIDINIUM BROMIDE AND VILANTEROL TRIFENATATE 1 PUFF: 62.5; 25 POWDER RESPIRATORY (INHALATION) at 08:56

## 2017-08-12 RX ADMIN — APIXABAN 5 MG: 5 TABLET, FILM COATED ORAL at 08:57

## 2017-08-12 RX ADMIN — ACETAMINOPHEN 650 MG: 325 TABLET ORAL at 11:08

## 2017-08-12 RX ADMIN — INSULIN LISPRO 10 UNITS: 100 INJECTION, SOLUTION INTRAVENOUS; SUBCUTANEOUS at 08:57

## 2017-08-12 RX ADMIN — FUROSEMIDE 80 MG: 10 INJECTION, SOLUTION INTRAMUSCULAR; INTRAVENOUS at 18:00

## 2017-08-12 RX ADMIN — Medication 10 ML: at 14:15

## 2017-08-12 RX ADMIN — CARVEDILOL 12.5 MG: 12.5 TABLET, FILM COATED ORAL at 08:56

## 2017-08-12 NOTE — PROGRESS NOTES
Problem: Falls - Risk of  Goal: *Absence of Falls  Document Aris Fall Risk and appropriate interventions in the flowsheet.    Outcome: Progressing Towards Goal  Fall Risk Interventions:              Medication Interventions: Assess postural VS orthostatic hypotension

## 2017-08-12 NOTE — PROGRESS NOTES
Bedside shift change report given to Martin [facility] (oncoming nurse) by Dylon Landers (offgoing nurse). Report included the following information SBAR, Kardex, Intake/Output, MAR, Recent Results and Cardiac Rhythm afib. Spoke to MD regarding BG being 360. Dr. Linnette Buckley ordered 15 units of insulin for the pt for lunch dosing. [address] [facility] SHIFT NURSING NOTE    Bedside shift change report given to Dylon Landers (oncoming nurse) by Martin [facility] (offgoing nurse). Report included the following information SBAR, Kardex, Intake/Output, MAR, Recent Results and Cardiac Rhythm afib.     SHIFT SUMMARY:     Admission Date 8/10/2017   Admission Diagnosis CHF (congestive heart failure) ([facility] [address]   Consults IP CONSULT TO CARDIOLOGY        Consults   [] PT   [] OT   [] Speech   [] Palliative      [] Hospice    [] Case Management   [] None   Cardiac Monitoring   [x] Yes   [] No     Antibiotics   [] Yes   [] No   GI Prophylaxis  (Ex: Protonix, Pepcid, etc,.)   [x] Yes   [] No          DVT Prophylaxis   SCDs:             Ochoa stockings:         [x] Medication (Ex: Lovenox, Eliquis, Brilinta, Coumadin,  Heparin, etc..)   [] Contraindicated   [] No VTE needed       Urinary Catheter             LDAs               Peripheral IV 08/10/17 Left Wrist (Active)   Site Assessment Clean, dry, & intact 8/12/2017  2:28 PM   Phlebitis Assessment 0 8/12/2017  2:28 PM   Infiltration Assessment 0 8/12/2017  2:28 PM   Dressing Status Clean, dry, & intact 8/12/2017  2:28 PM   Dressing Type Transparent 8/12/2017  2:28 PM   Hub Color/Line Status Blue;Flushed;Patent 8/12/2017  2:28 PM   Action Taken Blood drawn 8/10/2017  4:33 PM   Alcohol Cap Used No 8/12/2017  2:28 PM                      I/Os   Intake/Output Summary (Last 24 hours) at 08/12/17 1431  Last data filed at 08/12/17 1107   Gross per 24 hour   Intake              240 ml   Output             5300 ml   Net            -5060 ml         Activity Level Activity Level: Up ad katy     Activity Assistance: No assistance needed Diet Active Orders   Diet    DIET REGULAR      Purposeful Rounding every 1-2 hour? [x] Yes    Aris Score  Total Score: 1   Bed Alarm (If score 3 or >)   [] Yes    [] Refused (See signed refusal form in chart)   Alvaro Score  Alvaro Score: 20       Alvaro Score (if score 14 or less)   [] PMT consult   [] Nutrition consult   [] Wound Care consult      []  Specialty bed         Influenza Vaccine Received Flu Vaccine for Current Season (usually Sept-March): Not Flu Season               Needs prior to discharge:   Home O2 required:    [] Yes   [x] No     If yes, how much O2 required? Other:    Last Bowel Movement Date: 08/11/17   Readmission Risk Assessment Tool Score Low Risk            9       Total Score        3 Has Seen PCP in Last 6 Months (Yes=3, No=0)    4 Pt. Coverage (Medicare=5 , Medicaid, or Self-Pay=4)    2 Charlson Comorbidity Score (Age + Comorbid Conditions)        Criteria that do not apply:    . Living with Significant Other. Assisted Living. LTAC. SNF.  or   Rehab    Patient Length of Stay (>5 days = 3)    IP Visits Last 12 Months (1-3=4, 4=9, >4=11)       Expected Length of Stay 3d 12h   Actual Length of Stay 2

## 2017-08-12 NOTE — CARDIO/PULMONARY
Cardiac Rehab:    Admit: SOB     Mhx: AFib, HT, HTN, restrictive lung disease, cardioversion,  EF 60% on 7/21/14.     Former smoker    Pt visited for CHF teaching. The CHF teaching packet and admission medication reconciliation sheet were provided. The pt also received information regarding the low sodium diet. Instruction given on s/s of CHF, checking weight every am and calling MD if weight is up 2-3 lbs in a day or 5 lbs in a week (or as directed by the physician), fluid/Na restrictions, s/s of worsening CHF and when to call MD.  Discussed the CHF \"zones\" and subsequent actions with pt. Reviewed activity as tolerated with frequent rest periods as needed, taking medications as prescribed, and the importance of follow up visits with physician. Pt denies adding sodium to his foods. Unable to list high sodium foods. Reviewed. States he hasn't been tracking daily weight as his scale is in storage. Reminded pt importance of monitoring for fluid overload. States he takes his medications as prescribed. Coaching needed in identifying name of his diuretic. Encouraged patient to verbalize concerns/questions. Pt verbalized understanding.

## 2017-08-12 NOTE — PROGRESS NOTES
[address] SHIFT NURSING NOTE    Bedside and Verbal shift change report given to [id number] [address] [facility] (oncoming nurse) by Keya Foley (offgoing nurse). Report included the following information SBAR. SHIFT SUMMARY:         Admission Date 8/10/2017   Admission Diagnosis CHF (congestive heart failure) ([facility] [address]   Consults IP CONSULT TO CARDIOLOGY        Consults   [] PT   [] OT   [] Speech   [] Palliative      [] Hospice    [] Case Management   [] None   Cardiac Monitoring   [x] Yes   [] No     Antibiotics   [] Yes   [] No   GI Prophylaxis  (Ex: Protonix, Pepcid, etc,.)   [] Yes   [] No          DVT Prophylaxis   SCDs:             Ochoa stockings:         [x] Medication (Ex: Lovenox, Eliquis, Brilinta, Coumadin,  Heparin, etc..)   [] Contraindicated   [] No VTE needed       Urinary Catheter             LDAs               Peripheral IV 08/10/17 Left Wrist (Active)   Site Assessment Clean, dry, & intact 8/12/2017  7:37 PM   Phlebitis Assessment 0 8/12/2017  7:37 PM   Infiltration Assessment 0 8/12/2017  7:37 PM   Dressing Status Clean, dry, & intact 8/12/2017  7:37 PM   Dressing Type Transparent;Tape 8/12/2017  7:37 PM   Hub Color/Line Status Blue;Capped 8/12/2017  7:37 PM   Action Taken Blood drawn 8/10/2017  4:33 PM   Alcohol Cap Used No 8/12/2017  2:28 PM                      I/Os   Intake/Output Summary (Last 24 hours) at 08/12/17 1940  Last data filed at 08/12/17 1837   Gross per 24 hour   Intake              720 ml   Output             4150 ml   Net            -3430 ml         Activity Level Activity Level: Up ad katy     Activity Assistance: No assistance needed   Diet Active Orders   Diet    DIET REGULAR      Purposeful Rounding every 1-2 hour?    [x] Yes    Aris Score  Total Score: 1   Bed Alarm (If score 3 or >)   [] Yes    [] Refused (See signed refusal form in chart)   Alvaro Score  Alvaro Score: 20       Alvaro Score (if score 14 or less)   [] PMT consult   [] Nutrition consult   [] Wound Care consult      []  Specialty bed Influenza Vaccine Received Flu Vaccine for Current Season (usually Sept-March): Not Flu Season               Needs prior to discharge:   Home O2 required:    [] Yes   [x] No     If yes, how much O2 required? Other:    Last Bowel Movement Date: 08/11/17   Readmission Risk Assessment Tool Score Low Risk            9       Total Score        3 Has Seen PCP in Last 6 Months (Yes=3, No=0)    4 Pt. Coverage (Medicare=5 , Medicaid, or Self-Pay=4)    2 Charlson Comorbidity Score (Age + Comorbid Conditions)        Criteria that do not apply:    . Living with Significant Other. Assisted Living. LTAC. SNF.  or   Rehab    Patient Length of Stay (>5 days = 3)    IP Visits Last 12 Months (1-3=4, 4=9, >4=11)       Expected Length of Stay 3d 12h   Actual Length of Stay 2

## 2017-08-12 NOTE — PROGRESS NOTES
Visited by Ming Skaggs 67 Partner on 8/12/17.     Victor Hugo Patient, MPS, [facility], [address] [facility] [address]     Paging Service  [phone]

## 2017-08-12 NOTE — PROGRESS NOTES
General Daily Progress Note    Admit Date: 8/10/2017  Hospital day 3    Subjective:     Patient has improving dyspnea. .   Medication side effects: none    Current Facility-Administered Medications   Medication Dose Route Frequency    potassium chloride (K-DUR, KLOR-CON) SR tablet 20 mEq  20 mEq Oral BID    acetaminophen (TYLENOL) tablet 650 mg  650 mg Oral Q4H PRN    sodium chloride (NS) 0.9 % flush        perflutren lipid microspheres (DEFINITY) 1.1 mg/mL contrast injection        albuterol (PROVENTIL HFA, VENTOLIN HFA, PROAIR HFA) inhaler 2 Puff  2 Puff Inhalation Q4H PRN    albuterol-ipratropium (DUO-NEB) 2.5 MG-0.5 MG/3 ML  3 mL Nebulization QID RT    apixaban (ELIQUIS) tablet 5 mg  5 mg Oral BID    atorvastatin (LIPITOR) tablet 40 mg  40 mg Oral DAILY    carvedilol (COREG) tablet 12.5 mg  12.5 mg Oral BID    lisinopril (PRINIVIL, ZESTRIL) tablet 5 mg  5 mg Oral DAILY    umeclidinium-vilanterol (ANORO ELLIPTA) 62.5 mcg- 25 mcg/inhalation  1 Puff Inhalation DAILY    sodium chloride (NS) flush 5-10 mL  5-10 mL IntraVENous Q8H    sodium chloride (NS) flush 5-10 mL  5-10 mL IntraVENous PRN    zolpidem (AMBIEN) tablet 5 mg  5 mg Oral QHS PRN    furosemide (LASIX) injection 80 mg  80 mg IntraVENous BID    glucose chewable tablet 16 g  4 Tab Oral PRN    glucagon (GLUCAGEN) injection 1 mg  1 mg IntraMUSCular PRN    insulin lispro (HUMALOG) injection   SubCUTAneous AC&HS    dextrose 10% infusion 125-250 mL  125-250 mL IntraVENous PRN        Review of Systems  Constitutional: positive for fatigue and malaise  Respiratory: positive for dyspnea on exertion  Cardiovascular: positive for dyspnea  Gastrointestinal: negative    Objective:     Patient Vitals for the past 8 hrs:   BP Temp Pulse Resp SpO2 Weight   08/12/17 0536 (!) 137/95 97.9 °F (36.6 °C) 68 20 95 % -   08/12/17 0027 - - - - - 341 lb (154.7 kg)   08/11/17 2342 153/90 97.7 °F (36.5 °C) 92 20 97 % -        08/10 1901 - 08/12 0700  In: 3806 [P.O.:1320]  Out: 5 [Urine:5900]    Physical Exam:   Visit Vitals    BP (!) 137/95 (BP 1 Location: Right arm, BP Patient Position: Sitting)    Pulse 68    Temp 97.9 °F (36.6 °C)    Resp 20    Wt 341 lb (154.7 kg)    SpO2 95%    BMI 44.99 kg/m2     General appearance: alert, fatigued, cooperative, no distress, appears stated age  Lungs: clear to auscultation bilaterally  Heart: irregularly irregular rhythm  Abdomen: soft, non-tender.  Bowel sounds normal. No masses,  no organomegaly  Extremities: edema 2+      ECG: normal sinus rhythm     Data Review   Recent Results (from the past 24 hour(s))   GLUCOSE, POC    Collection Time: 08/11/17  8:32 AM   Result Value Ref Range    Glucose (POC) 225 (H) 65 - 100 mg/dL    Performed by Charo Jessika (PCT)    GLUCOSE, POC    Collection Time: 08/11/17 11:57 AM   Result Value Ref Range    Glucose (POC) 282 (H) 65 - 100 mg/dL    Performed by [employer] (PCT)    GLUCOSE, POC    Collection Time: 08/11/17  4:41 PM   Result Value Ref Range    Glucose (POC) 260 (H) 65 - 100 mg/dL    Performed by [employer] (PCT)    GLUCOSE, POC    Collection Time: 08/11/17  9:22 PM   Result Value Ref Range    Glucose (POC) 338 (H) 65 - 100 mg/dL    Performed by Deanna Koch    METABOLIC PANEL, BASIC    Collection Time: 08/12/17  5:47 AM   Result Value Ref Range    Sodium 134 (L) 136 - 145 mmol/L    Potassium 3.9 3.5 - 5.1 mmol/L    Chloride 100 97 - 108 mmol/L    CO2 26 21 - 32 mmol/L    Anion gap 8 5 - 15 mmol/L    Glucose 246 (H) 65 - 100 mg/dL    BUN 17 6 - 20 MG/DL    Creatinine 0.93 0.70 - 1.30 MG/DL    BUN/Creatinine ratio 18 12 - 20      GFR est AA >60 >60 ml/min/1.73m2    GFR est non-AA >60 >60 ml/min/1.73m2    Calcium 8.3 (L) 8.5 - 10.1 MG/DL   CBC W/O DIFF    Collection Time: 08/12/17  5:47 AM   Result Value Ref Range    WBC 8.3 4.1 - 11.1 K/uL    RBC 4.44 4.10 - 5.70 M/uL    HGB 12.6 12.1 - 17.0 g/dL    HCT 39.1 36.6 - 50.3 %    MCV 88.1 80.0 - 99.0 FL    MCH 28.4 26.0 - 34.0 PG MCHC 32.2 30.0 - 36.5 g/dL    RDW 15.4 (H) 11.5 - 14.5 %    PLATELET 834 (L) 270 - 400 K/uL           Assessment:     Active Problems:    Hypertension (2/17/2014)      Dyspnea (7/15/2014)      Restrictive lung disease ()      CHF (congestive heart failure) ([facility] [address] (8/10/2017)      Atrial fibrillation with RVR ([facility] (8/11/2017)        Plan:     Feeling better ,rate good ,continues to diurese. Likely home in am

## 2017-08-12 NOTE — PROGRESS NOTES
8/12/2017 9:47 AM    Admit Date: 8/10/2017    Admit Diagnosis: CHF (congestive heart failure) ([facility])    Subjective:     Honey Baltazar  reports dyspnea is better. -ve fluid balance.      Visit Vitals    /78 (BP 1 Location: Right arm, BP Patient Position: At rest)    Pulse 89    Temp 98.5 °F (36.9 °C)    Resp 18    Wt 341 lb (154.7 kg)    SpO2 96%    BMI 44.99 kg/m2     Current Facility-Administered Medications   Medication Dose Route Frequency    albuterol-ipratropium (DUO-NEB) 2.5 MG-0.5 MG/3 ML  3 mL Nebulization TID RT    carvedilol (COREG) tablet 25 mg  25 mg Oral BID    potassium chloride (K-DUR, KLOR-CON) SR tablet 20 mEq  20 mEq Oral BID    acetaminophen (TYLENOL) tablet 650 mg  650 mg Oral Q4H PRN    sodium chloride (NS) 0.9 % flush        perflutren lipid microspheres (DEFINITY) 1.1 mg/mL contrast injection        albuterol (PROVENTIL HFA, VENTOLIN HFA, PROAIR HFA) inhaler 2 Puff  2 Puff Inhalation Q4H PRN    apixaban (ELIQUIS) tablet 5 mg  5 mg Oral BID    atorvastatin (LIPITOR) tablet 40 mg  40 mg Oral DAILY    lisinopril (PRINIVIL, ZESTRIL) tablet 5 mg  5 mg Oral DAILY    umeclidinium-vilanterol (ANORO ELLIPTA) 62.5 mcg- 25 mcg/inhalation  1 Puff Inhalation DAILY    sodium chloride (NS) flush 5-10 mL  5-10 mL IntraVENous Q8H    sodium chloride (NS) flush 5-10 mL  5-10 mL IntraVENous PRN    zolpidem (AMBIEN) tablet 5 mg  5 mg Oral QHS PRN    furosemide (LASIX) injection 80 mg  80 mg IntraVENous BID    glucose chewable tablet 16 g  4 Tab Oral PRN    glucagon (GLUCAGEN) injection 1 mg  1 mg IntraMUSCular PRN    insulin lispro (HUMALOG) injection   SubCUTAneous AC&HS    dextrose 10% infusion 125-250 mL  125-250 mL IntraVENous PRN         Objective:      Visit Vitals    /78 (BP 1 Location: Right arm, BP Patient Position: At rest)    Pulse 89    Temp 98.5 °F (36.9 °C)    Resp 18    Wt 341 lb (154.7 kg)    SpO2 96%    BMI 44.99 kg/m2       Physical Exam:  Abdomen: soft, non-tender.  Bowel sounds normal.   Extremities: no cyanosis or edema  Heart: Irregular rate and rhythm, S1, S2 normal, no murmur, click, rub or gallop  Lungs: clear to auscultation bilaterally  Neurologic: Grossly normal    Data Review:   Labs:    Recent Results (from the past 24 hour(s))   GLUCOSE, POC    Collection Time: 08/11/17 11:57 AM   Result Value Ref Range    Glucose (POC) 282 (H) 65 - 100 mg/dL    Performed by [employer] (PCT)    GLUCOSE, POC    Collection Time: 08/11/17  4:41 PM   Result Value Ref Range    Glucose (POC) 260 (H) 65 - 100 mg/dL    Performed by [employer] (PCT)    GLUCOSE, POC    Collection Time: 08/11/17  9:22 PM   Result Value Ref Range    Glucose (POC) 338 (H) 65 - 100 mg/dL    Performed by [facility]    METABOLIC PANEL, BASIC    Collection Time: 08/12/17  5:47 AM   Result Value Ref Range    Sodium 134 (L) 136 - 145 mmol/L    Potassium 3.9 3.5 - 5.1 mmol/L    Chloride 100 97 - 108 mmol/L    CO2 26 21 - 32 mmol/L    Anion gap 8 5 - 15 mmol/L    Glucose 246 (H) 65 - 100 mg/dL    BUN 17 6 - 20 MG/DL    Creatinine 0.93 0.70 - 1.30 MG/DL    BUN/Creatinine ratio 18 12 - 20      GFR est AA >60 >60 ml/min/1.73m2    GFR est non-AA >60 >60 ml/min/1.73m2    Calcium 8.3 (L) 8.5 - 10.1 MG/DL   CBC W/O DIFF    Collection Time: 08/12/17  5:47 AM   Result Value Ref Range    WBC 8.3 4.1 - 11.1 K/uL    RBC 4.44 4.10 - 5.70 M/uL    HGB 12.6 12.1 - 17.0 g/dL    HCT 39.1 36.6 - 50.3 %    MCV 88.1 80.0 - 99.0 FL    MCH 28.4 26.0 - 34.0 PG    MCHC 32.2 30.0 - 36.5 g/dL    RDW 15.4 (H) 11.5 - 14.5 %    PLATELET 001 (L) 194 - 400 K/uL   GLUCOSE, POC    Collection Time: 08/12/17  7:31 AM   Result Value Ref Range    Glucose (POC) 330 (H) 65 - 100 mg/dL    Performed by Vicky Sorto        Telemetry: AFIB      Assessment:     Active Problems:    Hypertension (2/17/2014)      Dyspnea (7/15/2014)      Restrictive lung disease ()      [facility] (8/10/2017) Atrial fibrillation with RVR ([facility] [address] (8/11/2017)        Plan:     Clinically better. Increase coreg dose. If feels good by tomorrow then f/u with his cardiologist at [facility] for further f/u and work up. Echo noted. Continue current meds.

## 2017-08-12 NOTE — PROGRESS NOTES
Problem: Falls - Risk of  Goal: *Absence of Falls  Document Aris Fall Risk and appropriate interventions in the flowsheet.    Outcome: Progressing Towards Goal  Fall Risk Interventions:              Medication Interventions: Patient to call before getting OOB

## 2017-08-12 NOTE — PROGRESS NOTES
[address] SHIFT NURSING NOTE    Bedside and Verbal shift change report given to [id number] ASUNCIONKaren Canales [facility] (oncoming nurse) by Juil Burciaga (offgoing nurse). Report included the following information SBAR. SHIFT SUMMARY:         Admission Date 8/10/2017   Admission Diagnosis CHF (congestive heart failure) ([facility] [address]   Consults IP CONSULT TO CARDIOLOGY        Consults   [] PT   [] OT   [] Speech   [] Palliative      [] Hospice    [] Case Management   [] None   Cardiac Monitoring   [x] Yes   [] No     Antibiotics   [] Yes   [] No   GI Prophylaxis  (Ex: Protonix, Pepcid, etc,.)   [] Yes   [] No          DVT Prophylaxis   SCDs:             Ochoa stockings:         [x] Medication (Ex: Lovenox, Eliquis, Brilinta, Coumadin,  Heparin, etc..)   [] Contraindicated   [] No VTE needed       Urinary Catheter             LDAs               Peripheral IV 08/10/17 Left Wrist (Active)   Site Assessment Clean, dry, & intact 8/11/2017  8:25 PM   Phlebitis Assessment 0 8/11/2017  8:25 PM   Infiltration Assessment 0 8/11/2017  8:25 PM   Dressing Status Clean, dry, & intact 8/11/2017  8:25 PM   Dressing Type Transparent;Tape 8/11/2017  8:25 PM   Hub Color/Line Status Blue;Capped 8/11/2017  8:25 PM   Action Taken Blood drawn 8/10/2017  4:33 PM   Alcohol Cap Used No 8/11/2017  1:43 PM                      I/Os   Intake/Output Summary (Last 24 hours) at 08/11/17 2029  Last data filed at 08/11/17 2025   Gross per 24 hour   Intake             1080 ml   Output             4700 ml   Net            -3620 ml         Activity Level Activity Level: Up ad katy     Activity Assistance: No assistance needed   Diet Active Orders   Diet    DIET REGULAR      Purposeful Rounding every 1-2 hour?    [x] Yes    Aris Score  Total Score: 1   Bed Alarm (If score 3 or >)   [] Yes    [] Refused (See signed refusal form in chart)   Alvaro Score  Alvaro Score: 20       Alvaro Score (if score 14 or less)   [] PMT consult   [] Nutrition consult   [] Wound Care consult      []  Specialty bed Influenza Vaccine Received Flu Vaccine for Current Season (usually Sept-March): Not Flu Season               Needs prior to discharge:   Home O2 required:    [] Yes   [x] No     If yes, how much O2 required? Other:    Last Bowel Movement Date: 08/11/17   Readmission Risk Assessment Tool Score Low Risk            9       Total Score        3 Has Seen PCP in Last 6 Months (Yes=3, No=0)    4 Pt. Coverage (Medicare=5 , Medicaid, or Self-Pay=4)    2 Charlson Comorbidity Score (Age + Comorbid Conditions)        Criteria that do not apply:    . Living with Significant Other. Assisted Living. LTAC. SNF.  or   Rehab    Patient Length of Stay (>5 days = 3)    IP Visits Last 12 Months (1-3=4, 4=9, >4=11)       Expected Length of Stay 3d 12h   Actual Length of Stay 1

## 2017-08-12 NOTE — PROGRESS NOTES
Problem: Falls - Risk of  Goal: *Absence of Falls  Document Aris Fall Risk and appropriate interventions in the flowsheet.    Outcome: Progressing Towards Goal  Fall Risk Interventions:              Medication Interventions: Assess postural VS orthostatic hypotension, Evaluate medications/consider consulting pharmacy, Patient to call before getting OOB

## 2017-08-13 LAB
GLUCOSE BLD STRIP.AUTO-MCNC: 327 MG/DL (ref 65–100)
GLUCOSE BLD STRIP.AUTO-MCNC: 349 MG/DL (ref 65–100)
GLUCOSE BLD STRIP.AUTO-MCNC: 383 MG/DL (ref 65–100)
GLUCOSE BLD STRIP.AUTO-MCNC: 403 MG/DL (ref 65–100)
SERVICE CMNT-IMP: ABNORMAL

## 2017-08-13 PROCEDURE — 74011636637 HC RX REV CODE- 636/637: Performed by: FAMILY MEDICINE

## 2017-08-13 PROCEDURE — 94640 AIRWAY INHALATION TREATMENT: CPT

## 2017-08-13 PROCEDURE — 99218 HC RM OBSERVATION: CPT

## 2017-08-13 PROCEDURE — 82962 GLUCOSE BLOOD TEST: CPT

## 2017-08-13 PROCEDURE — 74011250637 HC RX REV CODE- 250/637: Performed by: FAMILY MEDICINE

## 2017-08-13 PROCEDURE — 74011000250 HC RX REV CODE- 250: Performed by: FAMILY MEDICINE

## 2017-08-13 PROCEDURE — 74011250637 HC RX REV CODE- 250/637: Performed by: INTERNAL MEDICINE

## 2017-08-13 PROCEDURE — 65660000000 HC RM CCU STEPDOWN

## 2017-08-13 RX ORDER — FLUTICASONE PROPIONATE 50 MCG
2 SPRAY, SUSPENSION (ML) NASAL
Status: DISCONTINUED | OUTPATIENT
Start: 2017-08-13 | End: 2017-08-14 | Stop reason: HOSPADM

## 2017-08-13 RX ORDER — METFORMIN HYDROCHLORIDE 500 MG/1
500 TABLET ORAL 2 TIMES DAILY WITH MEALS
Status: DISCONTINUED | OUTPATIENT
Start: 2017-08-13 | End: 2017-08-14 | Stop reason: HOSPADM

## 2017-08-13 RX ORDER — DILTIAZEM HYDROCHLORIDE 120 MG/1
120 CAPSULE, COATED, EXTENDED RELEASE ORAL DAILY
Status: DISCONTINUED | OUTPATIENT
Start: 2017-08-13 | End: 2017-08-14 | Stop reason: HOSPADM

## 2017-08-13 RX ADMIN — FUROSEMIDE 120 MG: 80 TABLET ORAL at 17:21

## 2017-08-13 RX ADMIN — CARVEDILOL 25 MG: 12.5 TABLET, FILM COATED ORAL at 17:21

## 2017-08-13 RX ADMIN — INSULIN LISPRO 15 UNITS: 100 INJECTION, SOLUTION INTRAVENOUS; SUBCUTANEOUS at 12:51

## 2017-08-13 RX ADMIN — APIXABAN 5 MG: 5 TABLET, FILM COATED ORAL at 09:37

## 2017-08-13 RX ADMIN — METFORMIN HYDROCHLORIDE 500 MG: 500 TABLET, FILM COATED ORAL at 17:21

## 2017-08-13 RX ADMIN — POTASSIUM CHLORIDE 20 MEQ: 1500 TABLET, EXTENDED RELEASE ORAL at 09:37

## 2017-08-13 RX ADMIN — POTASSIUM CHLORIDE 20 MEQ: 1500 TABLET, EXTENDED RELEASE ORAL at 17:21

## 2017-08-13 RX ADMIN — INSULIN LISPRO 10 UNITS: 100 INJECTION, SOLUTION INTRAVENOUS; SUBCUTANEOUS at 08:42

## 2017-08-13 RX ADMIN — ACETAMINOPHEN 650 MG: 325 TABLET ORAL at 17:21

## 2017-08-13 RX ADMIN — IPRATROPIUM BROMIDE AND ALBUTEROL SULFATE 3 ML: .5; 3 SOLUTION RESPIRATORY (INHALATION) at 20:42

## 2017-08-13 RX ADMIN — FLUTICASONE PROPIONATE 2 SPRAY: 50 SPRAY, METERED NASAL at 14:56

## 2017-08-13 RX ADMIN — FUROSEMIDE 120 MG: 80 TABLET ORAL at 09:49

## 2017-08-13 RX ADMIN — INSULIN LISPRO 15 UNITS: 100 INJECTION, SOLUTION INTRAVENOUS; SUBCUTANEOUS at 17:21

## 2017-08-13 RX ADMIN — IPRATROPIUM BROMIDE AND ALBUTEROL SULFATE 3 ML: .5; 3 SOLUTION RESPIRATORY (INHALATION) at 07:38

## 2017-08-13 RX ADMIN — Medication 10 ML: at 20:40

## 2017-08-13 RX ADMIN — INSULIN LISPRO 5 UNITS: 100 INJECTION, SOLUTION INTRAVENOUS; SUBCUTANEOUS at 20:39

## 2017-08-13 RX ADMIN — ATORVASTATIN CALCIUM 40 MG: 40 TABLET, FILM COATED ORAL at 09:37

## 2017-08-13 RX ADMIN — UMECLIDINIUM BROMIDE AND VILANTEROL TRIFENATATE 1 PUFF: 62.5; 25 POWDER RESPIRATORY (INHALATION) at 09:49

## 2017-08-13 RX ADMIN — LISINOPRIL 5 MG: 5 TABLET ORAL at 09:37

## 2017-08-13 RX ADMIN — DILTIAZEM HYDROCHLORIDE 120 MG: 120 CAPSULE, COATED, EXTENDED RELEASE ORAL at 09:37

## 2017-08-13 RX ADMIN — CARVEDILOL 25 MG: 12.5 TABLET, FILM COATED ORAL at 09:37

## 2017-08-13 RX ADMIN — APIXABAN 5 MG: 5 TABLET, FILM COATED ORAL at 17:21

## 2017-08-13 NOTE — PROGRESS NOTES
General Daily Progress Note    Admit Date: 8/10/2017  Hospital day 4    Subjective:     Patient has some wheezing and HAMM,improving. .   Medication side effects: none    Current Facility-Administered Medications   Medication Dose Route Frequency    carvedilol (COREG) tablet 25 mg  25 mg Oral BID    albuterol-ipratropium (DUO-NEB) 2.5 MG-0.5 MG/3 ML  3 mL Nebulization BID RT    potassium chloride (K-DUR, KLOR-CON) SR tablet 20 mEq  20 mEq Oral BID    acetaminophen (TYLENOL) tablet 650 mg  650 mg Oral Q4H PRN    sodium chloride (NS) 0.9 % flush        perflutren lipid microspheres (DEFINITY) 1.1 mg/mL contrast injection        albuterol (PROVENTIL HFA, VENTOLIN HFA, PROAIR HFA) inhaler 2 Puff  2 Puff Inhalation Q4H PRN    apixaban (ELIQUIS) tablet 5 mg  5 mg Oral BID    atorvastatin (LIPITOR) tablet 40 mg  40 mg Oral DAILY    lisinopril (PRINIVIL, ZESTRIL) tablet 5 mg  5 mg Oral DAILY    umeclidinium-vilanterol (ANORO ELLIPTA) 62.5 mcg- 25 mcg/inhalation  1 Puff Inhalation DAILY    sodium chloride (NS) flush 5-10 mL  5-10 mL IntraVENous Q8H    sodium chloride (NS) flush 5-10 mL  5-10 mL IntraVENous PRN    zolpidem (AMBIEN) tablet 5 mg  5 mg Oral QHS PRN    furosemide (LASIX) injection 80 mg  80 mg IntraVENous BID    glucose chewable tablet 16 g  4 Tab Oral PRN    glucagon (GLUCAGEN) injection 1 mg  1 mg IntraMUSCular PRN    insulin lispro (HUMALOG) injection   SubCUTAneous AC&HS    dextrose 10% infusion 125-250 mL  125-250 mL IntraVENous PRN        Review of Systems  Constitutional: positive for fatigue and malaise  Respiratory: positive for wheezing or dyspnea on exertion  Cardiovascular: positive for dyspnea  Gastrointestinal: negative    Objective:     Patient Vitals for the past 8 hrs:   BP Temp Pulse Resp SpO2 Weight   08/13/17 0739 - - - - 95 % -   08/13/17 0722 125/75 98.3 °F (36.8 °C) 85 18 96 % -   08/13/17 0222 - - - - - 339 lb 15.2 oz (154.2 kg)        08/11 1901 - 08/13 0700  In: 720 [P.O.:720]  Out: 1321 [Urine:5450]    Physical Exam:   Visit Vitals    /75 (BP 1 Location: Right arm, BP Patient Position: At rest)    Pulse 85    Temp 98.3 °F (36.8 °C)    Resp 18    Wt 339 lb 15.2 oz (154.2 kg)    SpO2 95%    BMI 44.85 kg/m2     General appearance: alert, fatigued, cooperative, no distress, appears stated age  Lungs: diminished breath sounds diffusely  Heart: regular rate and rhythm, S1, S2 normal, no murmur, click, rub or gallop  Abdomen: soft, non-tender. Bowel sounds normal. No masses,  no organomegaly  Extremities: edema 1+      ECG: sinus tachycardia     Data Review   Recent Results (from the past 24 hour(s))   GLUCOSE, POC    Collection Time: 08/12/17 11:30 AM   Result Value Ref Range    Glucose (POC) 360 (H) 65 - 100 mg/dL    Performed by [address] [place] [address], POC    Collection Time: 08/12/17  4:31 PM   Result Value Ref Range    Glucose (POC) 281 (H) 65 - 100 mg/dL    Performed by Nerissa Frederick (PCT)    GLUCOSE, POC    Collection Time: 08/12/17  8:42 PM   Result Value Ref Range    Glucose (POC) 345 (H) 65 - 100 mg/dL    Performed by Alisson Park    GLUCOSE, POC    Collection Time: 08/13/17  7:23 AM   Result Value Ref Range    Glucose (POC) 349 (H) 65 - 100 mg/dL    Performed by Nessa Somers            Assessment:     Active Problems:    Hypertension (2/17/2014)      Dyspnea (7/15/2014)      Restrictive lung disease ()      CHF (congestive heart failure) ([facility] [address] (8/10/2017)      Atrial fibrillation with RVR ([facility] [address] (8/11/2017)        Plan:     Feeling better,continues to diurese. Nearing dry weight. Hopefully home in am

## 2017-08-13 NOTE — PROGRESS NOTES
[address] SHIFT NURSING NOTE    Bedside shift change report given to Sean Hernandez (oncoming nurse) by Navdeep Hodgson (offgoing nurse). Report included the following information SBAR, Kardex and MAR. SHIFT SUMMARY:         Admission Date 8/10/2017   Admission Diagnosis CHF (congestive heart failure) ([facility] [address]   Consults IP CONSULT TO CARDIOLOGY        Consults   [] PT   [] OT   [] Speech   [] Palliative      [] Hospice    [] Case Management   [x] None   Cardiac Monitoring   [x] Yes   [] No     Antibiotics   [] Yes   [x] No   GI Prophylaxis  (Ex: Protonix, Pepcid, etc,.)   [] Yes   [x] No          DVT Prophylaxis   SCDs:             Ochoa stockings:         [x] Medication (Ex: Lovenox, Eliquis, Brilinta, Coumadin,  Heparin, etc..)   [] Contraindicated   [] No VTE needed       Urinary Catheter             LDAs               Peripheral IV 08/10/17 Left Wrist (Active)   Site Assessment Clean, dry, & intact 8/13/2017  7:55 AM   Phlebitis Assessment 0 8/13/2017  7:55 AM   Infiltration Assessment 0 8/13/2017  7:55 AM   Dressing Status Clean, dry, & intact 8/13/2017  7:55 AM   Dressing Type Transparent;Tape 8/13/2017  7:55 AM   Hub Color/Line Status Blue;Flushed 8/13/2017  7:55 AM   Action Taken Blood drawn 8/10/2017  4:33 PM   Alcohol Cap Used No 8/12/2017  2:28 PM                      I/Os   Intake/Output Summary (Last 24 hours) at 08/13/17 1024  Last data filed at 08/12/17 1837   Gross per 24 hour   Intake              720 ml   Output             2750 ml   Net            -2030 ml         Activity Level Activity Level: Up ad katy     Activity Assistance: No assistance needed   Diet Active Orders   Diet    DIET REGULAR      Purposeful Rounding every 1-2 hour?    [x] Yes    Aris Score  Total Score: 1   Bed Alarm (If score 3 or >)   [] Yes    [] Refused (See signed refusal form in chart)   Alvaro Score  Alvaro Score: 22       Alvaro Score (if score 14 or less)   [] PMT consult   [] Nutrition consult   [] Wound Care consult      []  Specialty bed         Influenza Vaccine Received Flu Vaccine for Current Season (usually Sept-March): Not Flu Season               Needs prior to discharge:   Home O2 required:    [] Yes   [x] No     If yes, how much O2 required? Other:    Last Bowel Movement Date: 08/11/17   Readmission Risk Assessment Tool Score Low Risk            9       Total Score        3 Has Seen PCP in Last 6 Months (Yes=3, No=0)    4 Pt. Coverage (Medicare=5 , Medicaid, or Self-Pay=4)    2 Charlson Comorbidity Score (Age + Comorbid Conditions)        Criteria that do not apply:    . Living with Significant Other. Assisted Living. LTAC. SNF.  or   Rehab    Patient Length of Stay (>5 days = 3)    IP Visits Last 12 Months (1-3=4, 4=9, >4=11)       Expected Length of Stay 3d 12h   Actual Length of Stay 3

## 2017-08-13 NOTE — PROGRESS NOTES
8/13/2017 8:41 AM    Admit Date: 8/10/2017    Admit Diagnosis: CHF (congestive heart failure) ([facility]    Subjective:     Navneet Terrell feels better but still somewhat SOB. No other complaints.      Visit Vitals    /75 (BP 1 Location: Right arm, BP Patient Position: At rest)    Pulse 85    Temp 98.3 °F (36.8 °C)    Resp 18    Wt 339 lb 15.2 oz (154.2 kg)    SpO2 95%    BMI 44.85 kg/m2     Current Facility-Administered Medications   Medication Dose Route Frequency    furosemide (LASIX) tablet 120 mg  120 mg Oral ACB&D    dilTIAZem CD (CARDIZEM CD) capsule 120 mg  120 mg Oral DAILY    carvedilol (COREG) tablet 25 mg  25 mg Oral BID    albuterol-ipratropium (DUO-NEB) 2.5 MG-0.5 MG/3 ML  3 mL Nebulization BID RT    potassium chloride (K-DUR, KLOR-CON) SR tablet 20 mEq  20 mEq Oral BID    acetaminophen (TYLENOL) tablet 650 mg  650 mg Oral Q4H PRN    sodium chloride (NS) 0.9 % flush        perflutren lipid microspheres (DEFINITY) 1.1 mg/mL contrast injection        albuterol (PROVENTIL HFA, VENTOLIN HFA, PROAIR HFA) inhaler 2 Puff  2 Puff Inhalation Q4H PRN    apixaban (ELIQUIS) tablet 5 mg  5 mg Oral BID    atorvastatin (LIPITOR) tablet 40 mg  40 mg Oral DAILY    lisinopril (PRINIVIL, ZESTRIL) tablet 5 mg  5 mg Oral DAILY    umeclidinium-vilanterol (ANORO ELLIPTA) 62.5 mcg- 25 mcg/inhalation  1 Puff Inhalation DAILY    sodium chloride (NS) flush 5-10 mL  5-10 mL IntraVENous Q8H    sodium chloride (NS) flush 5-10 mL  5-10 mL IntraVENous PRN    zolpidem (AMBIEN) tablet 5 mg  5 mg Oral QHS PRN    glucose chewable tablet 16 g  4 Tab Oral PRN    glucagon (GLUCAGEN) injection 1 mg  1 mg IntraMUSCular PRN    insulin lispro (HUMALOG) injection   SubCUTAneous AC&HS    dextrose 10% infusion 125-250 mL  125-250 mL IntraVENous PRN         Objective:      Visit Vitals    /75 (BP 1 Location: Right arm, BP Patient Position: At rest)    Pulse 85    Temp 98.3 °F (36.8 °C)    Resp 18  Wt 339 lb 15.2 oz (154.2 kg)    SpO2 95%    BMI 44.85 kg/m2       Physical Exam:  Abdomen: soft, non-tender. Bowel sounds normal.   Extremities: no cyanosis or edema  Heart: Irregular rate and rhythm, S1, S2 normal, no murmur, click, rub or gallop  Lungs: clear to auscultation bilaterally  Neurologic: Grossly normal    Data Review:   Labs:    Recent Results (from the past 24 hour(s))   GLUCOSE, POC    Collection Time: 08/12/17 11:30 AM   Result Value Ref Range    Glucose (POC) 360 (H) 65 - 100 mg/dL    Performed by [address] [place] [address], POC    Collection Time: 08/12/17  4:31 PM   Result Value Ref Range    Glucose (POC) 281 (H) 65 - 100 mg/dL    Performed by Monica Delcid (PCT)    GLUCOSE, POC    Collection Time: 08/12/17  8:42 PM   Result Value Ref Range    Glucose (POC) 345 (H) 65 - 100 mg/dL    Performed by Radha Donahue    GLUCOSE, POC    Collection Time: 08/13/17  7:23 AM   Result Value Ref Range    Glucose (POC) 349 (H) 65 - 100 mg/dL    Performed by Stefan Mckeon        Telemetry: AFIB      Assessment:     Active Problems:    Hypertension (2/17/2014)      Dyspnea (7/15/2014)      Restrictive lung disease ()      CHF (congestive heart failure) ([facility] [address] (8/10/2017)      Atrial fibrillation with RVR ([facility] [address] (8/11/2017)        Plan:     Clinically better. Add cardizem for better rate control. On coreg and NOAC. F/u with his cardiologist at [facility] [place]. Echo noted.

## 2017-08-14 VITALS
WEIGHT: 315 LBS | TEMPERATURE: 97.8 F | DIASTOLIC BLOOD PRESSURE: 64 MMHG | RESPIRATION RATE: 18 BRPM | OXYGEN SATURATION: 94 % | HEART RATE: 93 BPM | BODY MASS INDEX: 44.62 KG/M2 | SYSTOLIC BLOOD PRESSURE: 102 MMHG

## 2017-08-14 LAB
ANION GAP BLD CALC-SCNC: 4 MMOL/L (ref 5–15)
BUN SERPL-MCNC: 17 MG/DL (ref 6–20)
BUN/CREAT SERPL: 18 (ref 12–20)
CALCIUM SERPL-MCNC: 8.9 MG/DL (ref 8.5–10.1)
CHLORIDE SERPL-SCNC: 94 MMOL/L (ref 97–108)
CO2 SERPL-SCNC: 35 MMOL/L (ref 21–32)
CREAT SERPL-MCNC: 0.97 MG/DL (ref 0.7–1.3)
ERYTHROCYTE [DISTWIDTH] IN BLOOD BY AUTOMATED COUNT: 15.3 % (ref 11.5–14.5)
GLUCOSE BLD STRIP.AUTO-MCNC: 205 MG/DL (ref 65–100)
GLUCOSE BLD STRIP.AUTO-MCNC: 310 MG/DL (ref 65–100)
GLUCOSE SERPL-MCNC: 191 MG/DL (ref 65–100)
HCT VFR BLD AUTO: 42.1 % (ref 36.6–50.3)
HGB BLD-MCNC: 13.6 G/DL (ref 12.1–17)
MCH RBC QN AUTO: 28.3 PG (ref 26–34)
MCHC RBC AUTO-ENTMCNC: 32.3 G/DL (ref 30–36.5)
MCV RBC AUTO: 87.7 FL (ref 80–99)
PLATELET # BLD AUTO: 182 K/UL (ref 150–400)
POTASSIUM SERPL-SCNC: 4.1 MMOL/L (ref 3.5–5.1)
RBC # BLD AUTO: 4.8 M/UL (ref 4.1–5.7)
SERVICE CMNT-IMP: ABNORMAL
SERVICE CMNT-IMP: ABNORMAL
SODIUM SERPL-SCNC: 133 MMOL/L (ref 136–145)
WBC # BLD AUTO: 9.3 K/UL (ref 4.1–11.1)

## 2017-08-14 PROCEDURE — 36415 COLL VENOUS BLD VENIPUNCTURE: CPT | Performed by: FAMILY MEDICINE

## 2017-08-14 PROCEDURE — 80048 BASIC METABOLIC PNL TOTAL CA: CPT | Performed by: FAMILY MEDICINE

## 2017-08-14 PROCEDURE — 82962 GLUCOSE BLOOD TEST: CPT

## 2017-08-14 PROCEDURE — 99218 HC RM OBSERVATION: CPT

## 2017-08-14 PROCEDURE — 74011250637 HC RX REV CODE- 250/637: Performed by: INTERNAL MEDICINE

## 2017-08-14 PROCEDURE — 85027 COMPLETE CBC AUTOMATED: CPT | Performed by: FAMILY MEDICINE

## 2017-08-14 PROCEDURE — 74011636637 HC RX REV CODE- 636/637: Performed by: FAMILY MEDICINE

## 2017-08-14 PROCEDURE — 74011250637 HC RX REV CODE- 250/637: Performed by: FAMILY MEDICINE

## 2017-08-14 PROCEDURE — 94640 AIRWAY INHALATION TREATMENT: CPT

## 2017-08-14 PROCEDURE — 74011000250 HC RX REV CODE- 250: Performed by: FAMILY MEDICINE

## 2017-08-14 RX ORDER — CARVEDILOL 25 MG/1
25 TABLET ORAL 2 TIMES DAILY
Qty: 60 TAB | Refills: 12 | Status: SHIPPED | OUTPATIENT
Start: 2017-08-14 | End: 2018-08-15 | Stop reason: SDUPTHER

## 2017-08-14 RX ORDER — POTASSIUM CHLORIDE 20 MEQ/1
20 TABLET, EXTENDED RELEASE ORAL 2 TIMES DAILY
Qty: 60 TAB | Refills: 12 | Status: SHIPPED | OUTPATIENT
Start: 2017-08-14 | End: 2018-08-15 | Stop reason: SDUPTHER

## 2017-08-14 RX ORDER — FUROSEMIDE 80 MG/1
TABLET ORAL
Qty: 60 TAB | Refills: 12 | Status: SHIPPED | OUTPATIENT
Start: 2017-08-14 | End: 2018-04-04 | Stop reason: SDUPTHER

## 2017-08-14 RX ADMIN — DILTIAZEM HYDROCHLORIDE 120 MG: 120 CAPSULE, COATED, EXTENDED RELEASE ORAL at 08:50

## 2017-08-14 RX ADMIN — FUROSEMIDE 120 MG: 80 TABLET ORAL at 08:49

## 2017-08-14 RX ADMIN — APIXABAN 5 MG: 5 TABLET, FILM COATED ORAL at 08:50

## 2017-08-14 RX ADMIN — IPRATROPIUM BROMIDE AND ALBUTEROL SULFATE 3 ML: .5; 3 SOLUTION RESPIRATORY (INHALATION) at 08:09

## 2017-08-14 RX ADMIN — POTASSIUM CHLORIDE 20 MEQ: 1500 TABLET, EXTENDED RELEASE ORAL at 08:49

## 2017-08-14 RX ADMIN — UMECLIDINIUM BROMIDE AND VILANTEROL TRIFENATATE 1 PUFF: 62.5; 25 POWDER RESPIRATORY (INHALATION) at 08:56

## 2017-08-14 RX ADMIN — INSULIN LISPRO 10 UNITS: 100 INJECTION, SOLUTION INTRAVENOUS; SUBCUTANEOUS at 11:43

## 2017-08-14 RX ADMIN — INSULIN LISPRO 4 UNITS: 100 INJECTION, SOLUTION INTRAVENOUS; SUBCUTANEOUS at 07:30

## 2017-08-14 RX ADMIN — Medication 5 ML: at 06:00

## 2017-08-14 RX ADMIN — ATORVASTATIN CALCIUM 40 MG: 40 TABLET, FILM COATED ORAL at 08:49

## 2017-08-14 RX ADMIN — CARVEDILOL 25 MG: 12.5 TABLET, FILM COATED ORAL at 08:50

## 2017-08-14 RX ADMIN — METFORMIN HYDROCHLORIDE 500 MG: 500 TABLET, FILM COATED ORAL at 08:50

## 2017-08-14 RX ADMIN — LISINOPRIL 5 MG: 5 TABLET ORAL at 08:49

## 2017-08-14 NOTE — PROGRESS NOTES
8/14/2017 8:09 AM    Admit Date: 8/10/2017    Admit Diagnosis: CHF (congestive heart failure) (HCC)    Subjective:     Daron Newton denies chest pain, chest pressure/discomfort, dyspnea, palpitations, irregular heart beats, near-syncope.     Visit Vitals    /83 (BP 1 Location: Right arm, BP Patient Position: Sitting)    Pulse 71    Temp 97.9 °F (36.6 °C)    Resp 18    Wt 338 lb 3.2 oz (153.4 kg)    SpO2 91%    BMI 44.62 kg/m2     Current Facility-Administered Medications   Medication Dose Route Frequency    furosemide (LASIX) tablet 120 mg  120 mg Oral ACB&D    dilTIAZem CD (CARDIZEM CD) capsule 120 mg  120 mg Oral DAILY    metFORMIN (GLUCOPHAGE) tablet 500 mg  500 mg Oral BID WITH MEALS    fluticasone (FLONASE) 50 mcg/actuation nasal spray 2 Spray  2 Spray Both Nostrils BID PRN    carvedilol (COREG) tablet 25 mg  25 mg Oral BID    albuterol-ipratropium (DUO-NEB) 2.5 MG-0.5 MG/3 ML  3 mL Nebulization BID RT    potassium chloride (K-DUR, KLOR-CON) SR tablet 20 mEq  20 mEq Oral BID    acetaminophen (TYLENOL) tablet 650 mg  650 mg Oral Q4H PRN    sodium chloride (NS) 0.9 % flush        perflutren lipid microspheres (DEFINITY) 1.1 mg/mL contrast injection        albuterol (PROVENTIL HFA, VENTOLIN HFA, PROAIR HFA) inhaler 2 Puff  2 Puff Inhalation Q4H PRN    apixaban (ELIQUIS) tablet 5 mg  5 mg Oral BID    atorvastatin (LIPITOR) tablet 40 mg  40 mg Oral DAILY    lisinopril (PRINIVIL, ZESTRIL) tablet 5 mg  5 mg Oral DAILY    umeclidinium-vilanterol (ANORO ELLIPTA) 62.5 mcg- 25 mcg/inhalation  1 Puff Inhalation DAILY    sodium chloride (NS) flush 5-10 mL  5-10 mL IntraVENous Q8H    sodium chloride (NS) flush 5-10 mL  5-10 mL IntraVENous PRN    zolpidem (AMBIEN) tablet 5 mg  5 mg Oral QHS PRN    glucose chewable tablet 16 g  4 Tab Oral PRN    glucagon (GLUCAGEN) injection 1 mg  1 mg IntraMUSCular PRN    insulin lispro (HUMALOG) injection   SubCUTAneous AC&HS    dextrose 10% infusion 125-250 mL  125-250 mL IntraVENous PRN         Objective:      Visit Vitals    /83 (BP 1 Location: Right arm, BP Patient Position: Sitting)    Pulse 71    Temp 97.9 °F (36.6 °C)    Resp 18    Wt 338 lb 3.2 oz (153.4 kg)    SpO2 91%    BMI 44.62 kg/m2       Physical Exam:  Abdomen: soft, non-tender. Bowel sounds normal.   Extremities: no cyanosis or edema  Heart: regular rate and rhythm, S1, S2 normal, no murmur, click, rub or gallop  Lungs: clear to auscultation bilaterally  Neurologic: Grossly normal    Data Review:   Labs:    Recent Results (from the past 24 hour(s))   GLUCOSE, POC    Collection Time: 08/13/17 11:53 AM   Result Value Ref Range    Glucose (POC) 383 (H) 65 - 100 mg/dL    Performed by [address] [place] [address], POC    Collection Time: 08/13/17  4:49 PM   Result Value Ref Range    Glucose (POC) 403 (H) 65 - 100 mg/dL    Performed by [facility], POC    Collection Time: 08/13/17  8:34 PM   Result Value Ref Range    Glucose (POC) 327 (H) 65 - 100 mg/dL    Performed by Vanessa Garrison    CBC W/O DIFF    Collection Time: 08/14/17  5:18 AM   Result Value Ref Range    WBC 9.3 4.1 - 11.1 K/uL    RBC 4.80 4. 10 - 5.70 M/uL    HGB 13.6 12.1 - 17.0 g/dL    HCT 42.1 36.6 - 50.3 %    MCV 87.7 80.0 - 99.0 FL    MCH 28.3 26.0 - 34.0 PG    MCHC 32.3 30.0 - 36.5 g/dL    RDW 15.3 (H) 11.5 - 14.5 %    PLATELET 139 880 - 395 K/uL   METABOLIC PANEL, BASIC    Collection Time: 08/14/17  5:18 AM   Result Value Ref Range    Sodium 133 (L) 136 - 145 mmol/L    Potassium 4.1 3.5 - 5.1 mmol/L    Chloride 94 (L) 97 - 108 mmol/L    CO2 35 (H) 21 - 32 mmol/L    Anion gap 4 (L) 5 - 15 mmol/L    Glucose 191 (H) 65 - 100 mg/dL    BUN 17 6 - 20 MG/DL    Creatinine 0.97 0.70 - 1.30 MG/DL    BUN/Creatinine ratio 18 12 - 20      GFR est AA >60 >60 ml/min/1.73m2    GFR est non-AA >60 >60 ml/min/1.73m2    Calcium 8.9 8.5 - 10.1 MG/DL   GLUCOSE, POC    Collection Time: 08/14/17  7:51 AM   Result Value Ref Range    Glucose (POC) 205 (H) 65 - 100 mg/dL    Performed by Bonifacio Hogan        Telemetry: AFIB    Assessment:     Active Problems:    Hypertension (2/17/2014)      Dyspnea (7/15/2014)      Restrictive lung disease ()      CHF (congestive heart failure) ([facility] [address] (8/10/2017)      [facility] (8/11/2017)        Plan:     Clinically better. Rate improved after adding cardizem. Continue current meds. [place] [facility] -ve fluid balance. Wt down.

## 2017-08-14 NOTE — DISCHARGE INSTRUCTIONS
PATIENT DISCHARGE INSTRUCTIONS      PATIENT DISCHARGE INSTRUCTIONS    Griselda Marin / [id number] : 1965    Admitted 8/10/2017 Discharged: 2017       · It is important that you take the medication exactly as they are prescribed. · Keep your medication in the bottles provided by the pharmacist and keep a list of the medication names, dosages, and times to be taken in your wallet. · Do not take other medications without consulting your doctor. What to do at Home    Recommended Diet: Regular Diet    Recommended Activity: Activity as tolerated    If you experience any of the following symptoms increasing shortness of breath3, please follow up with / Yaniv Young.         Signed By: Júnior Olmstead MD     2017

## 2017-08-14 NOTE — PROGRESS NOTES
[facility] SHIFT NURSING NOTE    Bedside shift change report given to Kenya Bernstein (oncoming nurse) by Abhishek Ramos (offgoing nurse). Report included the following information SBAR, Kardex and MAR. SHIFT SUMMARY:   4718 IV and tele removed. Discharge education completed with patient. Opportunity for questions provided. Pt verbalized understanding. Pt stable for discharge. Admission Date 8/10/2017   Admission Diagnosis CHF (congestive heart failure) ([address]   Consults IP CONSULT TO CARDIOLOGY        Consults   [] PT   [] OT   [] Speech   [] Palliative      [] Hospice    [] Case Management   [x] None   Cardiac Monitoring   [x] Yes   [] No     Antibiotics   [] Yes   [x] No   GI Prophylaxis  (Ex: Protonix, Pepcid, etc,.)   [] Yes   [x] No          DVT Prophylaxis   SCDs:             Ochoa stockings:         [x] Medication (Ex: Lovenox, Eliquis, Brilinta, Coumadin,  Heparin, etc..)   [] Contraindicated   [] No VTE needed       Urinary Catheter             LDAs               Peripheral IV 08/10/17 Left Wrist (Active)   Site Assessment Clean, dry, & intact 8/14/2017  8:00 AM   Phlebitis Assessment 0 8/14/2017  8:00 AM   Infiltration Assessment 0 8/14/2017  8:00 AM   Dressing Status Clean, dry, & intact 8/14/2017  8:00 AM   Dressing Type Transparent;Tape 8/14/2017  8:00 AM   Hub Color/Line Status Blue;Flushed 8/14/2017  8:00 AM   Action Taken Blood drawn 8/10/2017  4:33 PM   Alcohol Cap Used No 8/12/2017  2:28 PM                      I/Os No intake or output data in the 24 hours ending 08/14/17 0905      Activity Level Activity Level: Up ad katy     Activity Assistance: No assistance needed   Diet Active Orders   Diet    DIET DIABETIC CONSISTENT CARB Regular      Purposeful Rounding every 1-2 hour?    [x] Yes    Aris Score  Total Score: 1   Bed Alarm (If score 3 or >)   [] Yes    [] Refused (See signed refusal form in chart)   Alvaro Score  Alvaro Score: 22       Alvaro Score (if score 14 or less)   [] PMT consult   [] Nutrition consult   [] Wound Care consult      []  Specialty bed         Influenza Vaccine Received Flu Vaccine for Current Season (usually Sept-March): Not Flu Season               Needs prior to discharge:   Home O2 required:    [] Yes   [x] No     If yes, how much O2 required? Other:    Last Bowel Movement Date: 08/13/17   Readmission Risk Assessment Tool Score Low Risk            9       Total Score        3 Has Seen PCP in Last 6 Months (Yes=3, No=0)    4 Pt. Coverage (Medicare=5 , Medicaid, or Self-Pay=4)    2 Charlson Comorbidity Score (Age + Comorbid Conditions)        Criteria that do not apply:    . Living with Significant Other. Assisted Living. LTAC. SNF.  or   Rehab    Patient Length of Stay (>5 days = 3)    IP Visits Last 12 Months (1-3=4, 4=9, >4=11)       Expected Length of Stay 3d 12h   Actual Length of Stay 4

## 2017-08-14 NOTE — CARDIO/PULMONARY
Cardiac Rehab:     Admit: SOB      Mhx: AFib, HT, HTN, restrictive lung disease, cardioversion,  EF 40% 8/11/2017 JADEN.     Former smoker. DIET DIABETIC CONSISTENT CARB Regular    To be discharged today. Met with pt briefly as his door had been closed,lights off and under the covers. Lifted head up when I walked in and I introduced myself and asked if he would like any teaching on fluid overload. He stated he already knows all about it and I then reminded him when he goes home to weigh daily,keep sodium under 1500 mg daily,read labels,take meds as ordered and follow up with MD for appts. He thanked me and lay his head back on pillow.

## 2017-08-14 NOTE — PROGRESS NOTES
Patient has been cleared for discharge home with no needs identified. Patient wishes to schedule his own cardiology appointment and he has declined Hospital to Home Program.    Care Management Interventions  PCP Verified by CM:  Yes  Current Support Network: Own Home  Discharge Location  Discharge Placement: Home

## 2017-08-15 NOTE — DISCHARGE SUMMARY
[place] [facility] Mckeon, [address]   DISCHARGE SUMMARY       Name:  Stephie Saucedo   MR#:  [id number]   :  1965   Account #:  [de-identified]        Date of Adm:  08/10/2017       FINAL DIAGNOSES:   1. Diastolic congestive heart failure. 2. Atrial fibrillation with rapid ventricular response. 3. Diabetes. 4. Morbid obesity. 5. History of restrictive lung disease. 6. History of hypertension. DISCHARGE MEDICATIONS:   1. Lasix 80 mg 2 tablets daily. 2. Coreg 25 mg twice daily. 3. Potassium chloride 20 mEq b.i.d.   4. Metformin 500 mg b.i.d.   5. Anoro inhaler, 1 inhalation daily. 6. DuoNeb via nebulizer. 7. Lexapro 5 mg daily. 8. Lovastatin 40 mg daily. 9. Metformin 500 mg b.i.d. 10. Eliquis 5 mg b.i.d. FOLLOW UP: With me in my office in 1 week. CONSULTATIONS: Cardiology, Dr. Karmen Nair. HISTORY OF PRESENT ILLNESS: The patient is a 53-year-old white   male admitted for evaluation and treatment of congestive heart failure. The patient had progressive dyspnea for the last month, walking from   one room to the other caused severe shortness of breath requiring him   to sit down. He also had been having orthopnea, was unable to sleep   the last few nights because of shortness of breath. The patient has a   prior history of recurrent admissions to [facility] for heart failure   usually related to rapid atrial fibrillation. He has had 2 cardioversions   which were unsuccessful. Apparently, he has been told that it is too   large because of body habitus for an atrial fibrillation. He has been   followed by Dr. Yonatan Kumari at [facility]. He has had multiple   admissions for heart failure. He apparently had a transradial cardiac   catheterization in 2016 showing some mild ostial tapering of the   left main. Left anterior descending was free of disease. Circumflex was   free of disease.  There is a large ramus artery with mild distal   disease and right coronary artery disease. Right coronary artery was   tortuous, but had mild disease. Echo has shown mild to moderately   reduced ejection fraction with no sign of valvular disease. The patient   was seen in the office the day of admission, was obviously quite short   of breath. Chest x-ray showed marked cardiomegaly with pulmonary   vascular congestion. He was also noted to be edematous on exam and   review of office records showed a 19 pound weight gain from 4 months   ago. He was admitted for further evaluation and treatment. PAST MEDICAL HISTORY: Medical problems include hypertension,   obstructive lung disease with FEV1 and SVC of 50,  45% of   predicted, history of recurrent heart failure, history of morbid obesity,   history of chronic atrial fibrillation, history of diabetes. PAST SURGERY: None. MEDICATIONS ON ADMISSION:   1. Anoro inhaler that he had lost.   2. DuoNebs via nebulizer. 3. Lisinopril 5 mg daily. 4. Coreg 25 mg half tab b.i.d.   5. Lovastatin 40 mg daily. 6. Metformin 500 mg b.i.d.   7. Eliquis 5 mg b.i.d.   8. Losartan 40 mg one daily. 9. Albuterol inhaler p.r.n. ALLERGIES: OXYCODONE CAUSES A RASH. SOCIAL HISTORY: Smoking: He quit in 2014, had been a pack a day   smoker prior to that. Alcohol use is negative. 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 REVIEW OF SYSTEMS: Please see HPI. PHYSICAL EXAMINATION   GENERAL: The patient is mildly short of breath. VITAL SIGNS: Respiratory rate was 30, 52 with 19 pounds on   04/19/2017, pulse 100 and irregularly irregular, temperature 98.4,   respiratory rate was 30, height 6 foot 1, weight 352. LUNGS: Basilar   rales bilaterally. CARDIOVASCULAR: Irregularly irregular rhythm. No murmurs, rubs,   or gallops. ABDOMEN: Soft without mass, tenderness or organomegaly. EXTREMITIES: 1-2+ edema bilaterally.      DIAGNOSTIC DATA: Chest x-ray showed massive cardiomegaly with   increased pulmonary vascular congestion. HOSPITAL COURSE: The patient was admitted, treated with IV Lasix,   and lost some 14 pounds while in the hospital. Overall, his breathing   was doing better, feels mildly short of breath. An echocardiogram was   done while in the hospital. This showed ejection fraction in the range of   40%, mild mitral regurgitation. Aortic valve, no regurgitation, no   stenosis. In general, the patient is feeling much better. He is able to   walk around the room without shortness of breath. He was seen in   consultation by Cardiology. Discharged home on a high-dose of Lasix. He is to follow up in the office in 3-5 days.          Trey Rhodes MD      MS / [facility]   D:  08/14/2017   17:23   T:  08/14/2017   23:15   Job #:  [id number]

## 2017-08-21 ENCOUNTER — OFFICE VISIT (OUTPATIENT)
Dept: FAMILY MEDICINE CLINIC | Age: 52
End: 2017-08-21

## 2017-08-21 VITALS
RESPIRATION RATE: 16 BRPM | TEMPERATURE: 96.6 F | SYSTOLIC BLOOD PRESSURE: 108 MMHG | BODY MASS INDEX: 41.75 KG/M2 | HEART RATE: 78 BPM | WEIGHT: 315 LBS | DIASTOLIC BLOOD PRESSURE: 72 MMHG | HEIGHT: 73 IN | OXYGEN SATURATION: 94 %

## 2017-08-21 DIAGNOSIS — J98.4 RESTRICTIVE LUNG DISEASE: ICD-10-CM

## 2017-08-21 DIAGNOSIS — I50.32 CHRONIC DIASTOLIC CONGESTIVE HEART FAILURE (HCC): Primary | ICD-10-CM

## 2017-08-21 DIAGNOSIS — I48.91 ATRIAL FIBRILLATION WITH RVR (HCC): ICD-10-CM

## 2017-08-21 NOTE — MR AVS SNAPSHOT
Visit Information Date & Time Provider Department Dept. Phone Encounter #  
 8/21/2017  9:15 AM Juan Alberto Liz MD [facility] [phone] [id number] Follow-up Instructions Return in about 3 weeks (around 9/11/2017). Upcoming Health Maintenance Date Due Pneumococcal 19-64 Medium Risk (1 of 1 - PPSV23) 1/5/1986 FOBT Q 1 YEAR AGE 50-75 1/5/2017 INFLUENZA AGE 9 TO ADULT 8/1/2017 DTaP/Tdap/Td series (2 - Td) 2/6/2027 Allergies as of 8/21/2017  Review Complete On: 8/21/2017 By: Juan Alberto Liz MD  
  
 Severity Noted Reaction Type Reactions Mushroom Flavor  02/17/2014   Systemic Swelling Oxycodone  08/10/2017    Rash Current Immunizations  Never Reviewed Name Date Td, Adsorbed PF 4/13/2016 Not reviewed this visit You Were Diagnosed With   
  
 Codes Comments Chronic diastolic congestive heart failure (HCC)    -  Primary ICD-10-CM: I50.32 
ICD-9-CM: 428.32, 428.0 Restrictive lung disease     ICD-10-CM: J98.4 ICD-9-CM: 518.89 Atrial fibrillation with RVR (HCC)     ICD-10-CM: I48.91 
ICD-9-CM: 427.31 Vitals BP Pulse Temp Resp Height(growth percentile) Weight(growth percentile) 108/72 78 96.6 °F (35.9 °C) (Oral) 16 6' 1\" (1.854 m) 337 lb (152.9 kg) SpO2 BMI Smoking Status 94% 44.46 kg/m2 Former Smoker Vitals History BMI and BSA Data Body Mass Index Body Surface Area 44.46 kg/m 2 2.81 m 2 Preferred Pharmacy Pharmacy Name Phone [facility] [address] , [address] [place] [address] [phone] Your Updated Medication List  
  
   
This list is accurate as of: 8/21/17  9:17 AM.  Always use your most recent med list.  
  
  
  
  
 albuterol 90 mcg/actuation inhaler Commonly known as:  PROVENTIL HFA, VENTOLIN HFA, PROAIR HFA Take 2 Puffs by inhalation every four (4) hours as needed for Wheezing or Shortness of Breath. albuterol-ipratropium 2.5 mg-0.5 mg/3 ml Nebu Commonly known as:  DUO-NEB  
3 mL by Nebulization route every six (6) hours as needed. apixaban 5 mg tablet Commonly known as:  Doren Fausto Take 1 Tab by mouth two (2) times a day. To prevent stroke  
  
 atorvastatin 40 mg tablet Commonly known as:  LIPITOR Take 1 Tab by mouth daily. For cholesterol Blood-Glucose Meter Misc Use to check BS twice a day. Dx code E11.9  
  
 carvedilol 25 mg tablet Commonly known as:  Antionette Finder Take 1 Tab by mouth two (2) times a day. To slow heart rate down  
  
 furosemide 80 mg tablet Commonly known as:  LASIX  
2 tabs every am for fluid * glucose blood VI test strips strip Commonly known as:  FREESTYLE LITE STRIPS As directed * glucose blood VI test strips strip Commonly known as:  ONETOUCH ULTRA TEST Use to check BS twice a day. Dx code E11.9  
  
 lisinopril 5 mg tablet Commonly known as:  Inga Husbands Take 1 Tab by mouth daily. metFORMIN 500 mg tablet Commonly known as:  GLUCOPHAGE Take 1 Tab by mouth two (2) times daily (with meals). For diabetes  
  
 potassium chloride 20 mEq tablet Commonly known as:  K-DUR, KLOR-CON Take 1 Tab by mouth two (2) times a day. umeclidinium-vilanterol 62.5-25 mcg/actuation inhaler Commonly known as:  Jeannetta Tuan Take 1 Puff by inhalation daily. * Notice: This list has 2 medication(s) that are the same as other medications prescribed for you. Read the directions carefully, and ask your doctor or other care provider to review them with you. We Performed the Following METABOLIC PANEL, BASIC [26840 CPT(R)] Follow-up Instructions Return in about 3 weeks (around 9/11/2017). Introducing [facility]! Carlos Manuel Singleton introduces teextee patient portal. Now you can access parts of your medical record, email your doctor's office, and request medication refills online. 1. In your internet browser, go to [email or web address] 2. Click on the First Time User? Click Here link in the Sign In box. You will see the New Member Sign Up page. 3. Enter your Zapstitch Access Code exactly as it appears below. You will not need to use this code after youve completed the sign-up process. If you do not sign up before the expiration date, you must request a new code. · Zap[employer]ch Access Code: [email or web address] Expires: 11/8/2017  4:36 PM 
 
4. Enter the last four digits of your Social Security Number (xxxx) and Date of Birth (mm/dd/yyyy) as indicated and click Submit. You will be taken to the next sign-up page. 5. Create a Project Insiderst ID. This will be your Zapstitch login ID and cannot be changed, so think of one that is secure and easy to remember. 6. Create a Zap[employer]ch password. You can change your password at any time. 7. Enter your Password Reset Question and Answer. This can be used at a later time if you forget your password. 8. Enter your e-mail address. You will receive e-mail notification when new information is available in [address] 9. Click Sign Up. You can now view and download portions of your medical record. 10. Click the Download Summary menu link to download a portable copy of your medical information. If you have questions, please visit the Frequently Asked Questions section of the Zap[employer]ch website. Remember, Zapstitch is NOT to be used for urgent needs. For medical emergencies, dial 911. Now available from your iPhone and Android! Please provide this summary of care documentation to your next provider. Your primary care clinician is listed as Eber Castaneda. If you have any questions after today's visit, please call [phone].

## 2017-08-21 NOTE — PROGRESS NOTES
Chief Complaint   Patient presents with   [facility] Follow Up     chf  8/10/17     1. Have you been to the ER, urgent care clinic since your last visit? Hospitalized since your last visit? YES [facility] 8/10/17 chf    2. Have you seen or consulted any other health care providers outside of the [address] [place] [address] since your last visit? Include any pap smears or colon screening.  No     Health Maintenance Due   Topic Date Due    Pneumococcal 19-64 Medium Risk (1 of 1 - PPSV23) 01/05/1986    FOBT Q 1 YEAR AGE 50-75  01/05/2017    INFLUENZA AGE 9 TO ADULT  08/01/2017

## 2017-08-21 NOTE — PROGRESS NOTES
HISTORY OF PRESENT ILLNESS  Becka Rascon is a 50 y.o. male. HPI In for hospital followup. Still has some mild dyspnea, altho somewhat better than before admission. Minor chest pains. Still has some swelling in feet. Moderate cough, nonproductive. Currently on Lasix 80 mg 2 daily. ROS    Physical Exam   Constitutional: He appears well-developed and well-nourished. HENT:   Right Ear: External ear normal.   Left Ear: External ear normal.   Mouth/Throat: Oropharynx is clear and moist.   Neck: No thyromegaly present. Cardiovascular: Normal rate, normal heart sounds and intact distal pulses. Irregular irregular rhythmn   Pulmonary/Chest: Effort normal and breath sounds normal. No respiratory distress. He has no wheezes. Abdominal: Soft. Bowel sounds are normal. He exhibits no distension and no mass. There is no tenderness. There is no guarding. Musculoskeletal: Normal range of motion. He exhibits no edema. Lymphadenopathy:     He has no cervical adenopathy. Nursing note and vitals reviewed. ASSESSMENT and PLAN  Orders Placed This Encounter    METABOLIC PANEL, BASIC     Diagnoses and all orders for this visit:    1. Chronic diastolic congestive heart failure (HCC)  -     METABOLIC PANEL, BASIC    2. Restrictive lung disease    3. Atrial fibrillation with RVR ([facility] [address]      Follow-up Disposition:  Return in about 3 weeks (around 9/11/2017).

## 2017-08-22 LAB
BUN SERPL-MCNC: 24 MG/DL (ref 6–24)
BUN/CREAT SERPL: 23 (ref 9–20)
CALCIUM SERPL-MCNC: 9.2 MG/DL (ref 8.7–10.2)
CHLORIDE SERPL-SCNC: 94 MMOL/L (ref 96–106)
CO2 SERPL-SCNC: 23 MMOL/L (ref 18–29)
CREAT SERPL-MCNC: 1.06 MG/DL (ref 0.76–1.27)
GLUCOSE SERPL-MCNC: 322 MG/DL (ref 65–99)
POTASSIUM SERPL-SCNC: 4.2 MMOL/L (ref 3.5–5.2)
SODIUM SERPL-SCNC: 137 MMOL/L (ref 134–144)

## 2017-08-22 NOTE — PROGRESS NOTES
Pt was admitted with acute on chronic diastolic heart failure- wgt gain, pulmonary edema on CXR, dyspnea at rest.

## 2017-08-28 ENCOUNTER — TELEPHONE (OUTPATIENT)
Dept: FAMILY MEDICINE CLINIC | Age: 52
End: 2017-08-28

## 2017-08-31 ENCOUNTER — DOCUMENTATION ONLY (OUTPATIENT)
Dept: FAMILY MEDICINE CLINIC | Age: 52
End: 2017-08-31

## 2017-09-11 ENCOUNTER — OFFICE VISIT (OUTPATIENT)
Dept: FAMILY MEDICINE CLINIC | Age: 52
End: 2017-09-11

## 2017-09-11 VITALS
DIASTOLIC BLOOD PRESSURE: 86 MMHG | TEMPERATURE: 96.9 F | RESPIRATION RATE: 14 BRPM | BODY MASS INDEX: 41.75 KG/M2 | HEIGHT: 73 IN | OXYGEN SATURATION: 95 % | HEART RATE: 65 BPM | WEIGHT: 315 LBS | SYSTOLIC BLOOD PRESSURE: 146 MMHG

## 2017-09-11 DIAGNOSIS — I50.43 ACUTE ON CHRONIC COMBINED SYSTOLIC AND DIASTOLIC CONGESTIVE HEART FAILURE (HCC): Primary | ICD-10-CM

## 2017-09-11 DIAGNOSIS — I10 ESSENTIAL HYPERTENSION: ICD-10-CM

## 2017-09-11 NOTE — PROGRESS NOTES
HISTORY OF PRESENT ILLNESS  Farnaz Guevara is a 50 y.o. male. HPI In for followup. Still having some mild dyspnea. Has been having some sharp pains going into legs. Has to go to pharmacy twice a month. Medicine refills arent on the same schedule. Swelling in feet is doing better. Still having some difficulty sleeping. Pain in legs begins in lateral hips, and goes down both legs. Not taking nay meds for pain in legs. Has lost 14 lbs since hospital admission. ROS    Physical Exam   Constitutional: He appears well-developed and well-nourished. HENT:   Right Ear: External ear normal.   Left Ear: External ear normal.   Mouth/Throat: Oropharynx is clear and moist.   Neck: No thyromegaly present. Cardiovascular: Normal rate, regular rhythm, normal heart sounds and intact distal pulses. Pulmonary/Chest: Effort normal and breath sounds normal. No respiratory distress. He has no wheezes. Abdominal: Soft. Bowel sounds are normal. He exhibits no distension and no mass. There is no tenderness. There is no guarding. Musculoskeletal: Normal range of motion. He exhibits no edema. Lymphadenopathy:     He has no cervical adenopathy. Nursing note and vitals reviewed. ASSESSMENT and PLAN  Orders Placed This Encounter    METABOLIC PANEL, BASIC     Diagnoses and all orders for this visit:    1. Acute on chronic combined systolic and diastolic congestive heart failure ([address]    2. Essential hypertension  -     METABOLIC PANEL, BASIC      Follow-up Disposition:  Return in about 2 months (around 11/11/2017).

## 2017-09-11 NOTE — PROGRESS NOTES
Chief Complaint   Patient presents with    Breathing Problem    Hip Pain     bilateral at night     1. Have you been to the ER, urgent care clinic since your last visit? Hospitalized since your last visit? No    2. Have you seen or consulted any other health care providers outside of the [address] [place] [address] since your last visit? Include any pap smears or colon screening.  No     Health Maintenance Due   Topic Date Due    FOBT Q 1 YEAR AGE 50-75  01/05/2017

## 2017-09-11 NOTE — MR AVS SNAPSHOT
Visit Information Date & Time Provider Department Dept. Phone Encounter #  
 9/11/2017  2:30 PM Karina Arriola MD [facility] [phone] [id number] Follow-up Instructions Return in about 2 months (around 11/11/2017). Upcoming Health Maintenance Date Due FOBT Q 1 YEAR AGE 50-75 1/5/2017 DTaP/Tdap/Td series (2 - Td) 2/6/2027 Allergies as of 9/11/2017  Review Complete On: 9/11/2017 By: Karina Arriola MD  
  
 Severity Noted Reaction Type Reactions Influenza Virus Vaccines  09/11/2017    Nausea Only Fever  diarrhea Mushroom Flavor  02/17/2014   Systemic Swelling Oxycodone  08/10/2017    Rash Pneumococcal 23-jesús Ps Vaccine  09/11/2017    Nausea and Vomiting Vomit Current Immunizations  Never Reviewed Name Date Td, Adsorbed PF 4/13/2016 Not reviewed this visit You Were Diagnosed With   
  
 Codes Comments Acute on chronic combined systolic and diastolic congestive heart failure ([facility] [address]    -  Primary ICD-10-CM: I50.43 ICD-9-CM: 428.43, 428.0 Essential hypertension     ICD-10-CM: I10 
ICD-9-CM: 401.9 Vitals BP Pulse Temp Resp Height(growth percentile) Weight(growth percentile) 146/86 65 96.9 °F (36.1 °C) (Oral) 14 6' 1\" (1.854 m) 323 lb (146.5 kg) SpO2 BMI Smoking Status 95% 42.61 kg/m2 Former Smoker Vitals History BMI and BSA Data Body Mass Index Body Surface Area  
 42.61 kg/m 2 2.75 m 2 Preferred Pharmacy Pharmacy Name Phone [facility] Stovall [address] [place] [address], [address] [place] [address] [phone] Your Updated Medication List  
  
   
This list is accurate as of: 9/11/17  2:34 PM.  Always use your most recent med list.  
  
  
  
  
 albuterol 90 mcg/actuation inhaler Commonly known as:  PROVENTIL HFA, VENTOLIN HFA, PROAIR HFA Take 2 Puffs by inhalation every four (4) hours as needed for Wheezing or Shortness of Breath. albuterol-ipratropium 2.5 mg-0.5 mg/3 ml Nebu Commonly known as:  DUO-NEB  
3 mL by Nebulization route every six (6) hours as needed. apixaban 5 mg tablet Commonly known as:  Caridad Pillow Take 1 Tab by mouth two (2) times a day. To prevent stroke  
  
 atorvastatin 40 mg tablet Commonly known as:  LIPITOR Take 1 Tab by mouth daily. For cholesterol Blood-Glucose Meter Misc Use to check BS twice a day. Dx code E11.9  
  
 carvedilol 25 mg tablet Commonly known as:  Manual [place]ito Take 1 Tab by mouth two (2) times a day. To slow heart rate down  
  
 furosemide 80 mg tablet Commonly known as:  LASIX  
2 tabs every am for fluid * glucose blood VI test strips strip Commonly known as:  FREESTYLE LITE STRIPS As directed * glucose blood VI test strips strip Commonly known as:  ONETOUCH ULTRA TEST Use to check BS twice a day. Dx code E11.9  
  
 lisinopril 5 mg tablet Commonly known as:  Noemi Sylva Take 1 Tab by mouth daily. metFORMIN 500 mg tablet Commonly known as:  GLUCOPHAGE Take 1 Tab by mouth two (2) times daily (with meals). For diabetes  
  
 potassium chloride 20 mEq tablet Commonly known as:  K-DUR, KLOR-CON Take 1 Tab by mouth two (2) times a day. umeclidinium-vilanterol 62.5-25 mcg/actuation inhaler Commonly known as:  Vessie Oscar Take 1 Puff by inhalation daily. * Notice: This list has 2 medication(s) that are the same as other medications prescribed for you. Read the directions carefully, and ask your doctor or other care provider to review them with you. We Performed the Following METABOLIC PANEL, BASIC [03731 CPT(R)] Follow-up Instructions Return in about 2 months (around 11/11/2017). Introducing [facility]! Staci Swann introduces [employer] patient portal. Now you can access parts of your medical record, email your doctor's office, and request medication refills online. 1. In your internet browser, go to [email or web address] 2. Click on the First Time User? Click Here link in the Sign In box. You will see the New Member Sign Up page. 3. Enter your [employer] Access Code exactly as it appears below. You will not need to use this code after youve completed the sign-up process. If you do not sign up before the expiration date, you must request a new code. · [employer] Access Code: [email or web address] Expires: 11/8/2017  4:36 PM 
 
4. Enter the last four digits of your Social Security Number (xxxx) and Date of Birth (mm/dd/yyyy) as indicated and click Submit. You will be taken to the next sign-up page. 5. Create a Virt[employer] ID. This will be your [employer] login ID and cannot be changed, so think of one that is secure and easy to remember. 6. Create a [employer] password. You can change your password at any time. 7. Enter your Password Reset Question and Answer. This can be used at a later time if you forget your password. 8. Enter your e-mail address. You will receive e-mail notification when new information is available in [address] 9. Click Sign Up. You can now view and download portions of your medical record. 10. Click the Download Summary menu link to download a portable copy of your medical information. If you have questions, please visit the Frequently Asked Questions section of the [employer] website. Remember, [employer] is NOT to be used for urgent needs. For medical emergencies, dial 911. Now available from your iPhone and Android! Please provide this summary of care documentation to your next provider. Your primary care clinician is listed as Andres Ya. If you have any questions after today's visit, please call [phone].

## 2017-09-12 ENCOUNTER — TELEPHONE (OUTPATIENT)
Dept: FAMILY MEDICINE CLINIC | Age: 52
End: 2017-09-12

## 2017-09-12 ENCOUNTER — DOCUMENTATION ONLY (OUTPATIENT)
Dept: FAMILY MEDICINE CLINIC | Age: 52
End: 2017-09-12

## 2017-09-12 LAB
BUN SERPL-MCNC: 21 MG/DL (ref 6–24)
BUN/CREAT SERPL: 23 (ref 9–20)
CALCIUM SERPL-MCNC: 9.2 MG/DL (ref 8.7–10.2)
CHLORIDE SERPL-SCNC: 92 MMOL/L (ref 96–106)
CO2 SERPL-SCNC: 25 MMOL/L (ref 18–29)
CREAT SERPL-MCNC: 0.92 MG/DL (ref 0.76–1.27)
GLUCOSE SERPL-MCNC: 278 MG/DL (ref 65–99)
POTASSIUM SERPL-SCNC: 3.8 MMOL/L (ref 3.5–5.2)
SODIUM SERPL-SCNC: 136 MMOL/L (ref 134–144)

## 2017-09-12 NOTE — TELEPHONE ENCOUNTER
Patient wants a return call regarding the paper work for the nebulizer. He would like a call ASAP.   Please give him a call @ [phone]

## 2017-09-12 NOTE — PROGRESS NOTES
DME company [employer] is within [employer]. Order sent to them for nebulizer machine. Pt's insurance states that the DME will contact them and they will start a PA for the nebulizer  Machine. Order faxed to [phone].

## 2017-09-12 NOTE — TELEPHONE ENCOUNTER
Called pt back. Pt gave website to obtain correct form for PA to get a nebulizer machine through his insurance. website given was www. [employer]/va. Told pt would go on website and try to obtain form .  Pt verbalized understanding

## 2017-09-19 ENCOUNTER — TELEPHONE (OUTPATIENT)
Dept: FAMILY MEDICINE CLINIC | Age: 52
End: 2017-09-19

## 2017-09-19 NOTE — TELEPHONE ENCOUNTER
Pt called and asked us if we could send the prior auth for Nebulizer to [facility]. I told the pt I would look into the issue.  [facility] medical supply who is supposed to handle the  PA for nebulizer and spoke with Bernard. Shirley stated that the [facility] that is handling his PA for Nebulizer is out sick, when asked if anyone else is there to cover those cases, Bernard said the lady that was out sick was the only one able to do that. I gave pt's phone number to Bernard and asked her to call the pt and explain this. Shirley took the number, verbalized understanding and stated that she would call the pt.

## 2017-09-20 ENCOUNTER — DOCUMENTATION ONLY (OUTPATIENT)
Dept: FAMILY MEDICINE CLINIC | Age: 52
End: 2017-09-20

## 2017-09-20 NOTE — PROGRESS NOTES
PA form completed for pt's nebulizer machine and faxed back.  Awaiting outcome along with DME company to finish submitting their request to insurance

## 2017-09-21 ENCOUNTER — DOCUMENTATION ONLY (OUTPATIENT)
Dept: FAMILY MEDICINE CLINIC | Age: 52
End: 2017-09-21

## 2017-09-21 NOTE — PROGRESS NOTES
Pt's insurance [employer] called about getting a order sent to a [employer] company regarding a nebulizer. Told Munira that a order has already been sent to [employer] on 9/12/17and pt was notified then about the order and where it was sent to.   Representative verbalized understanding and will also let pt know

## 2017-09-22 ENCOUNTER — TELEPHONE (OUTPATIENT)
Dept: FAMILY MEDICINE CLINIC | Age: 52
End: 2017-09-22

## 2017-09-22 NOTE — TELEPHONE ENCOUNTER
----- Message from [facility] sent at 9/22/2017  9:38 AM EDT -----  Regarding: Dr. Oziel Figueroa, from [address] [place] [address], is requesting that nurse Uzair Howe give her a call regarding the matter they just spoke about. Darcy's direct number is [phone], and the office's toll free number is [phone].

## 2017-09-22 NOTE — TELEPHONE ENCOUNTER
Called pt to [facility]. Spoke with representative. They will call [facility] where order was sent for nebulizer and see where they are with the order.  Number given to representative to call [phone]

## 2017-09-22 NOTE — TELEPHONE ENCOUNTER
Called back and spoke with karla from [facility]. She informed me that [facility] stated they did not received the fax that was sent of 9/12/17 for the nebulizer machine.  Told karla will refax again

## 2017-09-22 NOTE — TELEPHONE ENCOUNTER
----- Message from Viji Cohn sent at 9/21/2017  3:36 PM EDT -----  Regarding: Dr. Ajay Simms from [facility]  needs you to call member services at [phone] to give authorization for the nebulizer for the patient.

## 2017-12-20 ENCOUNTER — OFFICE VISIT (OUTPATIENT)
Dept: FAMILY MEDICINE CLINIC | Age: 52
End: 2017-12-20

## 2017-12-20 VITALS
BODY MASS INDEX: 41.75 KG/M2 | TEMPERATURE: 97.2 F | HEART RATE: 82 BPM | HEIGHT: 73 IN | OXYGEN SATURATION: 95 % | SYSTOLIC BLOOD PRESSURE: 95 MMHG | WEIGHT: 315 LBS | RESPIRATION RATE: 16 BRPM | DIASTOLIC BLOOD PRESSURE: 68 MMHG

## 2017-12-20 DIAGNOSIS — I50.43 ACUTE ON CHRONIC COMBINED SYSTOLIC AND DIASTOLIC CONGESTIVE HEART FAILURE (HCC): Primary | ICD-10-CM

## 2017-12-20 DIAGNOSIS — I10 ESSENTIAL HYPERTENSION: ICD-10-CM

## 2017-12-20 DIAGNOSIS — E11.9 CONTROLLED TYPE 2 DIABETES MELLITUS WITHOUT COMPLICATION, WITHOUT LONG-TERM CURRENT USE OF INSULIN (HCC): ICD-10-CM

## 2017-12-20 PROBLEM — E66.01 OBESITY, MORBID (HCC): Status: ACTIVE | Noted: 2017-12-20

## 2017-12-20 LAB — HBA1C MFR BLD HPLC: 11.4 %

## 2017-12-20 NOTE — MR AVS SNAPSHOT
Visit Information Date & Time Provider Department Dept. Phone Encounter #  
 12/20/2017 10:30 AM Júnior Olmstead MD [facility] [phone] [id number] Upcoming Health Maintenance Date Due FOBT Q 1 YEAR AGE 50-75 1/5/2017 DTaP/Tdap/Td series (2 - Td) 2/6/2027 Allergies as of 12/20/2017  Review Complete On: 12/20/2017 By: Júnior Olmstead MD  
  
 Severity Noted Reaction Type Reactions Influenza Virus Vaccines  09/11/2017    Nausea Only Fever  diarrhea Mushroom Flavor  02/17/2014   Systemic Swelling Oxycodone  08/10/2017    Rash Pneumococcal 23-jesús Ps Vaccine  09/11/2017    Nausea and Vomiting Vomit Current Immunizations  Never Reviewed Name Date Td, Adsorbed PF 4/13/2016 Not reviewed this visit You Were Diagnosed With   
  
 Codes Comments Acute on chronic combined systolic and diastolic congestive heart failure ([facility] [address]    -  Primary ICD-10-CM: I50.43 ICD-9-CM: 428.43, 428.0 Essential hypertension     ICD-10-CM: I10 
ICD-9-CM: 401.9 Controlled type 2 diabetes mellitus without complication, without long-term current use of insulin ([facility] [address]     ICD-10-CM: E11.9 ICD-9-CM: 250.00 Vitals BP Pulse Temp Resp Height(growth percentile) Weight(growth percentile) 95/68 82 97.2 °F (36.2 °C) (Oral) 16 6' 1\" (1.854 m) 344 lb 6.4 oz (156.2 kg) SpO2 BMI Smoking Status 95% 45.44 kg/m2 Former Smoker Vitals History BMI and BSA Data Body Mass Index Body Surface Area 45.44 kg/m 2 2.84 m 2 Preferred Pharmacy Pharmacy Name Phone [facility] [address] [phone] Your Updated Medication List  
  
   
This list is accurate as of: 12/20/17 11:02 AM.  Always use your most recent med list.  
  
  
  
  
 albuterol 90 mcg/actuation inhaler Commonly known as:  PROVENTIL HFA, VENTOLIN HFA, PROAIR HFA  
 Take 2 Puffs by inhalation every four (4) hours as needed for Wheezing or Shortness of Breath. albuterol-ipratropium 2.5 mg-0.5 mg/3 ml Nebu Commonly known as:  DUO-NEB  
3 mL by Nebulization route every six (6) hours as needed. apixaban 5 mg tablet Commonly known as:  Lombardo Net Take 1 Tab by mouth two (2) times a day. To prevent stroke  
  
 atorvastatin 40 mg tablet Commonly known as:  LIPITOR Take 1 Tab by mouth daily. For cholesterol Blood-Glucose Meter Misc Use to check BS twice a day. Dx code E11.9  
  
 carvedilol 25 mg tablet Commonly known as:  Macel Samuel Take 1 Tab by mouth two (2) times a day. To slow heart rate down  
  
 furosemide 80 mg tablet Commonly known as:  LASIX  
2 tabs every am for fluid * glucose blood VI test strips strip Commonly known as:  FREESTYLE LITE STRIPS As directed * glucose blood VI test strips strip Commonly known as:  ONETOUCH ULTRA TEST Use to check BS twice a day. Dx code E11.9  
  
 lisinopril 5 mg tablet Commonly known as:  Kassie Needy Take 1 Tab by mouth daily. metFORMIN 500 mg tablet Commonly known as:  GLUCOPHAGE Take 1 Tab by mouth two (2) times daily (with meals). For diabetes  
  
 potassium chloride 20 mEq tablet Commonly known as:  K-DUR, KLOR-CON Take 1 Tab by mouth two (2) times a day. umeclidinium-vilanterol 62.5-25 mcg/actuation inhaler Commonly known as:  Thelma Contras Take 1 Puff by inhalation daily. * Notice: This list has 2 medication(s) that are the same as other medications prescribed for you. Read the directions carefully, and ask your doctor or other care provider to review them with you. We Performed the Following AMB POC HEMOGLOBIN A1C [19219 CPT(R)] METABOLIC PANEL, BASIC [83866 CPT(R)] Introducing [facility]!    
 Mario Matson introduces [employer] patient portal. Now you can access parts of your medical record, email your doctor's office, and request medication refills online. 1. In your internet browser, go to [email or web address] 2. Click on the First Time User? Click Here link in the Sign In box. You will see the New Member Sign Up page. 3. Enter your [employer] Access Code exactly as it appears below. You will not need to use this code after youve completed the sign-up process. If you do not sign up before the expiration date, you must request a new code. · [employer] Access Code: [email or web address] Expires: 3/20/2018  9:46 AM 
 
4. Enter the last four digits of your Social Security Number (xxxx) and Date of Birth (mm/dd/yyyy) as indicated and click Submit. You will be taken to the next sign-up page. 5. Create a Users[employer] ID. This will be your [employer] login ID and cannot be changed, so think of one that is secure and easy to remember. 6. Create a [employer] password. You can change your password at any time. 7. Enter your Password Reset Question and Answer. This can be used at a later time if you forget your password. 8. Enter your e-mail address. You will receive e-mail notification when new information is available in [address] 9. Click Sign Up. You can now view and download portions of your medical record. 10. Click the Download Summary menu link to download a portable copy of your medical information. If you have questions, please visit the Frequently Asked Questions section of the [employer] website. Remember, [employer] is NOT to be used for urgent needs. For medical emergencies, dial 911. Now available from your iPhone and Android! Please provide this summary of care documentation to your next provider. Your primary care clinician is listed as Libby Wilson. If you have any questions after today's visit, please call [phone].

## 2017-12-20 NOTE — PROGRESS NOTES
HISTORY OF PRESENT ILLNESS  Meaghan Knox is a 50 y.o. male. HPI In for checkup. Still has some mild dyspnea when walks. Has gained some 21 lbs since last seen. ROS    Physical Exam   Constitutional: He appears well-developed and well-nourished. HENT:   Right Ear: External ear normal.   Left Ear: External ear normal.   Mouth/Throat: Oropharynx is clear and moist.   Neck: No thyromegaly present. Cardiovascular: Normal rate, regular rhythm, normal heart sounds and intact distal pulses. Pulmonary/Chest: Effort normal and breath sounds normal. No respiratory distress. He has no wheezes. Abdominal: Soft. Bowel sounds are normal. He exhibits no distension and no mass. There is no tenderness. There is no guarding. Musculoskeletal: Normal range of motion. He exhibits no edema. Lymphadenopathy:     He has no cervical adenopathy. Nursing note and vitals reviewed. ASSESSMENT and PLAN  Orders Placed This Encounter    METABOLIC PANEL, BASIC    AMB POC HEMOGLOBIN A1C     Diagnoses and all orders for this visit:    1. Acute on chronic combined systolic and diastolic congestive heart failure (HCC)  -     METABOLIC PANEL, BASIC    2. Essential hypertension    3. Controlled type 2 diabetes mellitus without complication, without long-term current use of insulin (HCC)  -     AMB POC HEMOGLOBIN A1C      Follow-up Disposition:  Return in about 3 months (around 3/20/2018).

## 2017-12-20 NOTE — PROGRESS NOTES
Chief Complaint   Patient presents with    Heart Problem    Generalized Body Aches    CHF    COPD    Documentation     handicap sign for vehicle       1. Have you been to the ER, urgent care clinic since your last visit? Hospitalized since your last visit? No    2. Have you seen or consulted any other health care providers outside of the [address] [place] [address] since your last visit? Include any pap smears or colon screening.  No     Health Maintenance Due   Topic Date Due    FOBT Q 1 YEAR AGE 50-75  01/05/2017

## 2017-12-21 LAB
BUN SERPL-MCNC: 17 MG/DL (ref 6–24)
BUN/CREAT SERPL: 16 (ref 9–20)
CALCIUM SERPL-MCNC: 9 MG/DL (ref 8.7–10.2)
CHLORIDE SERPL-SCNC: 91 MMOL/L (ref 96–106)
CO2 SERPL-SCNC: 22 MMOL/L (ref 18–29)
CREAT SERPL-MCNC: 1.05 MG/DL (ref 0.76–1.27)
GLUCOSE SERPL-MCNC: 499 MG/DL (ref 65–99)
POTASSIUM SERPL-SCNC: 4.5 MMOL/L (ref 3.5–5.2)
SODIUM SERPL-SCNC: 134 MMOL/L (ref 134–144)

## 2017-12-27 ENCOUNTER — TELEPHONE (OUTPATIENT)
Dept: FAMILY MEDICINE CLINIC | Age: 52
End: 2017-12-27

## 2017-12-27 DIAGNOSIS — M54.50 CHRONIC BILATERAL LOW BACK PAIN WITHOUT SCIATICA: Primary | ICD-10-CM

## 2017-12-27 DIAGNOSIS — G89.29 CHRONIC BILATERAL LOW BACK PAIN WITHOUT SCIATICA: Primary | ICD-10-CM

## 2017-12-27 RX ORDER — GLIPIZIDE 10 MG/1
10 TABLET ORAL 2 TIMES DAILY
Qty: 60 TAB | Refills: 12 | Status: SHIPPED | OUTPATIENT
Start: 2017-12-27 | End: 2018-11-23 | Stop reason: SDUPTHER

## 2017-12-27 RX ORDER — METFORMIN HYDROCHLORIDE 1000 MG/1
1000 TABLET ORAL 2 TIMES DAILY WITH MEALS
Qty: 60 TAB | Refills: 12 | Status: SHIPPED | OUTPATIENT
Start: 2017-12-27 | End: 2018-04-09 | Stop reason: SDUPTHER

## 2017-12-27 RX ORDER — TRAMADOL HYDROCHLORIDE 50 MG/1
TABLET ORAL
Qty: 60 TAB | Refills: 2 | Status: SHIPPED | OUTPATIENT
Start: 2017-12-27 | End: 2018-04-09 | Stop reason: SDUPTHER

## 2017-12-28 ENCOUNTER — DOCUMENTATION ONLY (OUTPATIENT)
Dept: FAMILY MEDICINE CLINIC | Age: 52
End: 2017-12-28

## 2017-12-30 ENCOUNTER — APPOINTMENT (OUTPATIENT)
Dept: GENERAL RADIOLOGY | Age: 52
End: 2017-12-30
Attending: EMERGENCY MEDICINE
Payer: COMMERCIAL

## 2017-12-30 ENCOUNTER — HOSPITAL ENCOUNTER (EMERGENCY)
Age: 52
Discharge: HOME | End: 2017-12-30
Attending: EMERGENCY MEDICINE
Payer: COMMERCIAL

## 2017-12-30 ENCOUNTER — APPOINTMENT (OUTPATIENT)
Dept: ULTRASOUND IMAGING | Age: 52
End: 2017-12-30
Attending: EMERGENCY MEDICINE
Payer: COMMERCIAL

## 2017-12-30 VITALS
SYSTOLIC BLOOD PRESSURE: 128 MMHG | OXYGEN SATURATION: 96 % | RESPIRATION RATE: 20 BRPM | WEIGHT: 315 LBS | DIASTOLIC BLOOD PRESSURE: 59 MMHG | TEMPERATURE: 97.6 F | BODY MASS INDEX: 45.78 KG/M2 | HEART RATE: 100 BPM

## 2017-12-30 DIAGNOSIS — R73.9 HYPERGLYCEMIA: ICD-10-CM

## 2017-12-30 DIAGNOSIS — R10.11 RUQ ABDOMINAL PAIN: Primary | ICD-10-CM

## 2017-12-30 DIAGNOSIS — R05.9 COUGH: ICD-10-CM

## 2017-12-30 LAB
ALBUMIN SERPL-MCNC: 3.5 G/DL (ref 3.5–5)
ALBUMIN/GLOB SERPL: 0.8 {RATIO} (ref 1.1–2.2)
ALP SERPL-CCNC: 99 U/L (ref 45–117)
ALT SERPL-CCNC: 37 U/L (ref 12–78)
ANION GAP SERPL CALC-SCNC: 8 MMOL/L (ref 5–15)
APPEARANCE UR: CLEAR
AST SERPL-CCNC: 18 U/L (ref 15–37)
BACTERIA URNS QL MICRO: NEGATIVE /HPF
BASOPHILS # BLD: 0 K/UL (ref 0–0.1)
BASOPHILS NFR BLD: 0 % (ref 0–1)
BILIRUB SERPL-MCNC: 1 MG/DL (ref 0.2–1)
BILIRUB UR QL: NEGATIVE
BUN SERPL-MCNC: 21 MG/DL (ref 6–20)
BUN/CREAT SERPL: 14 (ref 12–20)
CALCIUM SERPL-MCNC: 8.4 MG/DL (ref 8.5–10.1)
CHLORIDE SERPL-SCNC: 95 MMOL/L (ref 97–108)
CO2 SERPL-SCNC: 27 MMOL/L (ref 21–32)
COLOR UR: ABNORMAL
CREAT SERPL-MCNC: 1.45 MG/DL (ref 0.7–1.3)
EOSINOPHIL # BLD: 0 K/UL (ref 0–0.4)
EOSINOPHIL NFR BLD: 0 % (ref 0–7)
EPITH CASTS URNS QL MICRO: ABNORMAL /LPF
ERYTHROCYTE [DISTWIDTH] IN BLOOD BY AUTOMATED COUNT: 14.6 % (ref 11.5–14.5)
GLOBULIN SER CALC-MCNC: 4.2 G/DL (ref 2–4)
GLUCOSE BLD STRIP.AUTO-MCNC: 297 MG/DL (ref 65–100)
GLUCOSE BLD STRIP.AUTO-MCNC: 412 MG/DL (ref 65–100)
GLUCOSE SERPL-MCNC: 550 MG/DL (ref 65–100)
GLUCOSE UR STRIP.AUTO-MCNC: >1000 MG/DL
HCT VFR BLD AUTO: 47.3 % (ref 36.6–50.3)
HGB BLD-MCNC: 15.8 G/DL (ref 12.1–17)
HGB UR QL STRIP: NEGATIVE
HYALINE CASTS URNS QL MICRO: ABNORMAL /LPF (ref 0–5)
KETONES UR QL STRIP.AUTO: NEGATIVE MG/DL
LEUKOCYTE ESTERASE UR QL STRIP.AUTO: NEGATIVE
LIPASE SERPL-CCNC: 191 U/L (ref 73–393)
LYMPHOCYTES # BLD: 1.3 K/UL (ref 0.8–3.5)
LYMPHOCYTES NFR BLD: 14 % (ref 12–49)
MCH RBC QN AUTO: 29.4 PG (ref 26–34)
MCHC RBC AUTO-ENTMCNC: 33.4 G/DL (ref 30–36.5)
MCV RBC AUTO: 88.1 FL (ref 80–99)
MONOCYTES # BLD: 0.4 K/UL (ref 0–1)
MONOCYTES NFR BLD: 4 % (ref 5–13)
NEUTS SEG # BLD: 7.8 K/UL (ref 1.8–8)
NEUTS SEG NFR BLD: 82 % (ref 32–75)
NITRITE UR QL STRIP.AUTO: NEGATIVE
PH UR STRIP: 5 [PH] (ref 5–8)
PLATELET # BLD AUTO: 190 K/UL (ref 150–400)
POTASSIUM SERPL-SCNC: 4.2 MMOL/L (ref 3.5–5.1)
PROT SERPL-MCNC: 7.7 G/DL (ref 6.4–8.2)
PROT UR STRIP-MCNC: NEGATIVE MG/DL
RBC # BLD AUTO: 5.37 M/UL (ref 4.1–5.7)
RBC #/AREA URNS HPF: ABNORMAL /HPF (ref 0–5)
SERVICE CMNT-IMP: ABNORMAL
SERVICE CMNT-IMP: ABNORMAL
SODIUM SERPL-SCNC: 130 MMOL/L (ref 136–145)
SP GR UR REFRACTOMETRY: 1.02 (ref 1–1.03)
UA: UC IF INDICATED,UAUC: ABNORMAL
UROBILINOGEN UR QL STRIP.AUTO: 0.2 EU/DL (ref 0.2–1)
WBC # BLD AUTO: 9.6 K/UL (ref 4.1–11.1)
WBC URNS QL MICRO: ABNORMAL /HPF (ref 0–4)

## 2017-12-30 PROCEDURE — 74011250636 HC RX REV CODE- 250/636: Performed by: EMERGENCY MEDICINE

## 2017-12-30 PROCEDURE — 96376 TX/PRO/DX INJ SAME DRUG ADON: CPT

## 2017-12-30 PROCEDURE — 82962 GLUCOSE BLOOD TEST: CPT

## 2017-12-30 PROCEDURE — 36415 COLL VENOUS BLD VENIPUNCTURE: CPT | Performed by: EMERGENCY MEDICINE

## 2017-12-30 PROCEDURE — 96374 THER/PROPH/DIAG INJ IV PUSH: CPT

## 2017-12-30 PROCEDURE — 99284 EMERGENCY DEPT VISIT MOD MDM: CPT

## 2017-12-30 PROCEDURE — 80053 COMPREHEN METABOLIC PANEL: CPT | Performed by: EMERGENCY MEDICINE

## 2017-12-30 PROCEDURE — 74011636637 HC RX REV CODE- 636/637: Performed by: EMERGENCY MEDICINE

## 2017-12-30 PROCEDURE — 83690 ASSAY OF LIPASE: CPT | Performed by: EMERGENCY MEDICINE

## 2017-12-30 PROCEDURE — 81001 URINALYSIS AUTO W/SCOPE: CPT | Performed by: EMERGENCY MEDICINE

## 2017-12-30 PROCEDURE — 76705 ECHO EXAM OF ABDOMEN: CPT

## 2017-12-30 PROCEDURE — 85025 COMPLETE CBC W/AUTO DIFF WBC: CPT | Performed by: EMERGENCY MEDICINE

## 2017-12-30 PROCEDURE — 71020 XR CHEST PA LAT: CPT

## 2017-12-30 PROCEDURE — 96375 TX/PRO/DX INJ NEW DRUG ADDON: CPT

## 2017-12-30 RX ORDER — FENTANYL CITRATE 50 UG/ML
50 INJECTION, SOLUTION INTRAMUSCULAR; INTRAVENOUS
Status: COMPLETED | OUTPATIENT
Start: 2017-12-30 | End: 2017-12-30

## 2017-12-30 RX ORDER — HYDROCODONE BITARTRATE AND HOMATROPINE METHYLBROMIDE 1.5; 5 MG/5ML; MG/5ML
5 SYRUP ORAL
Qty: 100 ML | Refills: 0 | Status: SHIPPED | OUTPATIENT
Start: 2017-12-30 | End: 2018-08-15 | Stop reason: ALTCHOICE

## 2017-12-30 RX ADMIN — INSULIN HUMAN 5 UNITS: 100 INJECTION, SOLUTION PARENTERAL at 14:49

## 2017-12-30 RX ADMIN — FENTANYL CITRATE 50 MCG: 50 INJECTION, SOLUTION INTRAMUSCULAR; INTRAVENOUS at 11:25

## 2017-12-30 RX ADMIN — INSULIN HUMAN 10 UNITS: 100 INJECTION, SOLUTION PARENTERAL at 13:21

## 2017-12-30 NOTE — ED NOTES
Dr Shivani Pollard reviewed discharge instructions with the patient. The patient verbalized understanding. All questions and concerns were addressed. The patient declined a wheelchair and is discharged ambulatory in the care of family members with instructions and prescriptions in hand. Pt is alert and oriented x 4. Respirations are clear and unlabored.

## 2017-12-30 NOTE — DISCHARGE INSTRUCTIONS
Abdominal Pain: Care Instructions  Your Care Instructions    Abdominal pain has many possible causes. Some aren't serious and get better on their own in a few days. Others need more testing and treatment. If your pain continues or gets worse, you need to be rechecked and may need more tests to find out what is wrong. You may need surgery to correct the problem. Don't ignore new symptoms, such as fever, nausea and vomiting, urination problems, pain that gets worse, and dizziness. These may be signs of a more serious problem. Your doctor may have recommended a follow-up visit in the next 8 to 12 hours. If you are not getting better, you may need more tests or treatment. The doctor has checked you carefully, but problems can develop later. If you notice any problems or new symptoms, get medical treatment right away. Follow-up care is a key part of your treatment and safety. Be sure to make and go to all appointments, and call your doctor if you are having problems. It's also a good idea to know your test results and keep a list of the medicines you take. How can you care for yourself at home? · Rest until you feel better. · To prevent dehydration, drink plenty of fluids, enough so that your urine is light yellow or clear like water. Choose water and other caffeine-free clear liquids until you feel better. If you have kidney, heart, or liver disease and have to limit fluids, talk with your doctor before you increase the amount of fluids you drink. · If your stomach is upset, eat mild foods, such as rice, dry toast or crackers, bananas, and applesauce. Try eating several small meals instead of two or three large ones. · Wait until 48 hours after all symptoms have gone away before you have spicy foods, alcohol, and drinks that contain caffeine. · Do not eat foods that are high in fat. · Avoid anti-inflammatory medicines such as aspirin, ibuprofen (Advil, Motrin), and naproxen (Aleve).  These can cause stomach upset. Talk to your doctor if you take daily aspirin for another health problem. When should you call for help? Call 911 anytime you think you may need emergency care. For example, call if:  ? · You passed out (lost consciousness). ? · You pass maroon or very bloody stools. ? · You vomit blood or what looks like coffee grounds. ? · You have new, severe belly pain. ?Call your doctor now or seek immediate medical care if:  ? · Your pain gets worse, especially if it becomes focused in one area of your belly. ? · You have a new or higher fever. ? · Your stools are black and look like tar, or they have streaks of blood. ? · You have unexpected vaginal bleeding. ? · You have symptoms of a urinary tract infection. These may include:  ¨ Pain when you urinate. ¨ Urinating more often than usual.  ¨ Blood in your urine. ? · You are dizzy or lightheaded, or you feel like you may faint. ? Watch closely for changes in your health, and be sure to contact your doctor if:  ? · You are not getting better after 1 day (24 hours). Where can you learn more? Go to http://[employer].info/. Enter D181 in the search box to learn more about \"Abdominal Pain: Care Instructions. \"  Current as of: March 20, 2017  Content Version: 11.4  © 3468-0962 [email or web address]. Care instructions adapted under license by [employer] (which disclaims liability or warranty for this information). If you have questions about a medical condition or this instruction, always ask your healthcare professional. [place] [address] any warranty or liability for your use of this information. Cough: Care Instructions  Your Care Instructions    A cough is your body's response to something that bothers your throat or airways. Many things can cause a cough. You might cough because of a cold or the flu, bronchitis, or asthma.  Smoking, postnasal drip, allergies, and stomach acid that backs up into your throat also can cause coughs. A cough is a symptom, not a disease. Most coughs stop when the cause, such as a cold, goes away. You can take a few steps at home to cough less and feel better. Follow-up care is a key part of your treatment and safety. Be sure to make and go to all appointments, and call your doctor if you are having problems. It's also a good idea to know your test results and keep a list of the medicines you take. How can you care for yourself at home? · Drink lots of water and other fluids. This helps thin the mucus and soothes a dry or sore throat. Honey or lemon juice in hot water or tea may ease a dry cough. · Take cough medicine as directed by your doctor. · Prop up your head on pillows to help you breathe and ease a dry cough. · Try cough drops to soothe a dry or sore throat. Cough drops don't stop a cough. Medicine-flavored cough drops are no better than candy-flavored drops or hard candy. · Do not smoke. Avoid secondhand smoke. If you need help quitting, talk to your doctor about stop-smoking programs and medicines. These can increase your chances of quitting for good. When should you call for help? Call 911 anytime you think you may need emergency care. For example, call if:  ? · You have severe trouble breathing. ?Call your doctor now or seek immediate medical care if:  ? · You cough up blood. ? · You have new or worse trouble breathing. ? · You have a new or higher fever. ? · You have a new rash. ? Watch closely for changes in your health, and be sure to contact your doctor if:  ? · You cough more deeply or more often, especially if you notice more mucus or a change in the color of your mucus. ? · You have new symptoms, such as a sore throat, an earache, or sinus pain. ? · You do not get better as expected. Where can you learn more? Go to http://maisha-shelli.info/.   Enter D223 in the search box to learn more about \"Cough: Care Instructions. \"  Current as of: May 12, 2017  Content Version: 11.4       Learning About High Blood Sugar  What is high blood sugar? Your body turns the food you eat into glucose (sugar), which it uses for energy. But if your body isn't able to use the sugar right away, it can build up in your blood and lead to high blood sugar. When the amount of sugar in your blood stays too high for too much of the time, you may have diabetes. Diabetes is a disease that can cause serious health problems. The good news is that lifestyle changes may help you get your blood sugar back to normal and avoid or delay diabetes. What causes high blood sugar? Sugar (glucose) can build up in your blood if you:  · Are overweight. · Have a family history of diabetes. · Take certain medicines, such as steroids. What are the symptoms? Having high blood sugar may not cause any symptoms at all. Or it may make you feel very thirsty or very hungry. You may also urinate more often than usual, have blurry vision, or lose weight without trying. How is high blood sugar treated? You can take steps to lower your blood sugar level if you understand what makes it get higher. Your doctor may want you to learn how to test your blood sugar level at home. Then you can see how illness, stress, or different kinds of food or medicine raise or lower your blood sugar level. Other tests may be needed to see if you have diabetes. How can you prevent high blood sugar? · Watch your weight. If you're overweight, losing just a small amount of weight may help. Reducing fat around your waist is most important. · Limit the amount of calories, sweets, and unhealthy fat you eat. Ask your doctor if a dietitian can help you. A registered dietitian can help you create meal plans that fit your lifestyle. · Get at least 30 minutes of exercise on most days of the week. Exercise helps control your blood sugar. It also helps you maintain a healthy weight. Walking is a good choice. You also may want to do other activities, such as running, swimming, cycling, or playing tennis or team sports. · If your doctor prescribed medicines, take them exactly as prescribed. Call your doctor if you think you are having a problem with your medicine. You will get more details on the specific medicines your doctor prescribes. Follow-up care is a key part of your treatment and safety. Be sure to make and go to all appointments, and call your doctor if you are having problems. It's also a good idea to know your test results and keep a list of the medicines you take. Where can you learn more? Go to http://maisha-shelli.info/. Enter O108 in the search box to learn more about \"Learning About High Blood Sugar. \"  Current as of: March 13, 2017  Content Version: 11.4  © 0336-9655 [employer]. Care instructions adapted under license by "[employer] (which disclaims liability or warranty for this information). If you have questions about a medical condition or this instruction, always ask your healthcare professional. [address] any warranty or liability for your use of this information. © 5754-2985 [employer]. Care instructions adapted under license by "[employer] (which disclaims liability or warranty for this information). If you have questions about a medical condition or this instruction, always ask your healthcare professional. [address] any warranty or liability for your use of this information.

## 2017-12-30 NOTE — ED NOTES
Complaint of right side pain radiating around to the RLQ x 1 week, worse when he breathes or coughs. Pt denies fever/n/v/d or urinary sx. Pain is sharp. It started with coughing and has progressed to being exacerbated by breathing. Pt is a morbidly obese male who refuses to remove his clothing and put on a hospital gown.

## 2017-12-30 NOTE — ED PROVIDER NOTES
EMERGENCY DEPARTMENT HISTORY AND PHYSICAL EXAM      Date: 12/30/2017  Patient Name: Casie Nunes    History of Presenting Illness     Chief Complaint   Patient presents with    Abdominal Pain     right mid abd pain x 1 week denies n/v       History Provided By: Patient    HPI: Casie Nunes, 50 y.o. male presents ambulatory to the ED with cc of cough, sharp severe R flank pain radiating to the RUQ abdominal pain x one week. Pt reports sxs started with a cough. He notes having R flank pain that radiated to the RUQ region. Pt states pain is worse with breathing and coughing. He notes having hx of similar R flank pain with coughing. He denies any changes in pain after eating. He also reports having chronic CP and SOB. Pt specifically denies any fevers, N/V/D, constipation, dysuria, numbness, weakness, and rashes. PMHx: HTN, A-fib, CHF, restrictive lung disease  Social hx: -(former) Tobacco, -EtOH, -Drugs    PCP: Claude Chavez MD    There are no other complaints, changes, or physical findings at this time. Current Outpatient Prescriptions   Medication Sig Dispense Refill    HYDROcodone-homatropine (HYCODAN) 5-1.5 mg/5 mL (5 mL) syrup Take 5 mL by mouth three (3) times daily as needed. Max Daily Amount: 15 mL. 100 mL 0    glipiZIDE (GLUCOTROL) 10 mg tablet Take 1 Tab by mouth two (2) times a day. 60 Tab 12    metFORMIN (GLUCOPHAGE) 1,000 mg tablet Take 1 Tab by mouth two (2) times daily (with meals). For diabetes 60 Tab 12    traMADol (ULTRAM) 50 mg tablet 1-2 tabs every 6 hours as needed for back pain 60 Tab 2    carvedilol (COREG) 25 mg tablet Take 1 Tab by mouth two (2) times a day. To slow heart rate down 60 Tab 12    furosemide (LASIX) 80 mg tablet 2 tabs every am for fluid 60 Tab 12    potassium chloride (K-DUR, KLOR-CON) 20 mEq tablet Take 1 Tab by mouth two (2) times a day.  60 Tab 12    albuterol-ipratropium (DUO-NEB) 2.5 mg-0.5 mg/3 ml nebu 3 mL by Nebulization route every six (6) hours as needed. 50 Nebule 12    umeclidinium-vilanterol (ANORO ELLIPTA) 62.5-25 mcg/actuation inhaler Take 1 Puff by inhalation daily. 1 Inhaler 12    lisinopril (PRINIVIL, ZESTRIL) 5 mg tablet Take 1 Tab by mouth daily. 90 Tab 3    Blood-Glucose Meter misc Use to check BS twice a day. Dx code E11.9 1 Each 0    glucose blood VI test strips (ONETOUCH ULTRA TEST) strip Use to check BS twice a day. Dx code E11.9 100 Strip 12    glucose blood VI test strips (FREESTYLE LITE STRIPS) strip As directed 100 Strip 12    atorvastatin (LIPITOR) 40 mg tablet Take 1 Tab by mouth daily. For cholesterol 30 Tab 12    apixaban (ELIQUIS) 5 mg tablet Take 1 Tab by mouth two (2) times a day. To prevent stroke 60 Tab 12    albuterol (PROVENTIL HFA, VENTOLIN HFA, PROAIR HFA) 90 mcg/actuation inhaler Take 2 Puffs by inhalation every four (4) hours as needed for Wheezing or Shortness of Breath. 1 Inhaler 11       Past History     Past Medical History:  Past Medical History:   Diagnosis Date    A-fib [facility]) 1/6/15    [facility] ED. Pt reported this    Atrial fibrillation with RVR ([address] 8/11/2017    Heart failure (HCC)     Hypertension     Restrictive lung disease        Past Surgical History:  History reviewed. No pertinent surgical history. Family History:  History reviewed. No pertinent family history. Social History:  Social History   Substance Use Topics    Smoking status: Former Smoker     Packs/day: 1.00     Quit date: 2/3/2014    Smokeless tobacco: Never Used    Alcohol use No       Allergies: Allergies   Allergen Reactions    Influenza Virus Vaccines Nausea Only     Fever  diarrhea    Mushroom Flavor Swelling    Oxycodone Rash    Pneumococcal 23-Allison Ps Vaccine Nausea and Vomiting     Vomit           Review of Systems   Review of Systems   Constitutional: Negative for chills, fatigue and fever. HENT: Negative for congestion and rhinorrhea. Eyes: Negative for visual disturbance.    Respiratory: Positive for cough. Negative for wheezing. Cardiovascular: Negative for palpitations. Gastrointestinal: Positive for abdominal pain (RUQ). Negative for abdominal distention, constipation, diarrhea, nausea and vomiting. Endocrine: Negative. Genitourinary: Positive for flank pain (R). Negative for difficulty urinating and dysuria. Skin: Negative for rash. Neurological: Negative for dizziness, weakness and light-headedness. Psychiatric/Behavioral: Negative for suicidal ideas. Physical Exam   Physical Exam   Constitutional: He is oriented to person, place, and time. He appears well-developed and well-nourished. No distress. Elevated BMI. HENT:   Head: Normocephalic and atraumatic. Mouth/Throat: Oropharynx is clear and moist.   Eyes: Conjunctivae and EOM are normal.   Neck: Neck supple. No JVD present. No tracheal deviation present. Cardiovascular: Normal rate, regular rhythm and intact distal pulses. Exam reveals no gallop and no friction rub. No murmur heard. Pulmonary/Chest: Effort normal and breath sounds normal. No stridor. No respiratory distress. He has no wheezes. Clear to auscultation. Abdominal: Soft. Bowel sounds are normal. He exhibits no distension and no mass. There is tenderness in the right upper quadrant. There is CVA tenderness (R) and positive Arauz's sign. There is no guarding. Musculoskeletal: Normal range of motion. He exhibits no edema or tenderness. No deformity   Neurological: He is alert and oriented to person, place, and time. He has normal strength. No focal deficits   Skin: Skin is warm, dry and intact. No rash noted. Psychiatric: He has a normal mood and affect. His behavior is normal. Judgment and thought content normal.   Nursing note and vitals reviewed.         Diagnostic Study Results     Labs -     Recent Results (from the past 12 hour(s))   URINALYSIS W/ REFLEX CULTURE    Collection Time: 12/30/17 11:26 AM   Result Value Ref Range    Color YELLOW/STRAW      Appearance CLEAR CLEAR      Specific gravity 1.023 1.003 - 1.030      pH (UA) 5.0 5.0 - 8.0      Protein NEGATIVE  NEG mg/dL    Glucose >1000 (A) NEG mg/dL    Ketone NEGATIVE  NEG mg/dL    Bilirubin NEGATIVE  NEG      Blood NEGATIVE  NEG      Urobilinogen 0.2 0.2 - 1.0 EU/dL    Nitrites NEGATIVE  NEG      Leukocyte Esterase NEGATIVE  NEG      WBC 0-4 0 - 4 /hpf    RBC 0-5 0 - 5 /hpf    Epithelial cells FEW FEW /lpf    Bacteria NEGATIVE  NEG /hpf    UA:UC IF INDICATED CULTURE NOT INDICATED BY UA RESULT CNI      Hyaline cast 0-2 0 - 5 /lpf   METABOLIC PANEL, COMPREHENSIVE    Collection Time: 12/30/17 11:26 AM   Result Value Ref Range    Sodium 130 (L) 136 - 145 mmol/L    Potassium 4.2 3.5 - 5.1 mmol/L    Chloride 95 (L) 97 - 108 mmol/L    CO2 27 21 - 32 mmol/L    Anion gap 8 5 - 15 mmol/L    Glucose 550 (H) 65 - 100 mg/dL    BUN 21 (H) 6 - 20 MG/DL    Creatinine 1.45 (H) 0.70 - 1.30 MG/DL    BUN/Creatinine ratio 14 12 - 20      GFR est AA >60 >60 ml/min/1.73m2    GFR est non-AA 52 (L) >60 ml/min/1.73m2    Calcium 8.4 (L) 8.5 - 10.1 MG/DL    Bilirubin, total 1.0 0.2 - 1.0 MG/DL    ALT (SGPT) 37 12 - 78 U/L    AST (SGOT) 18 15 - 37 U/L    Alk. phosphatase 99 45 - 117 U/L    Protein, total 7.7 6.4 - 8.2 g/dL    Albumin 3.5 3.5 - 5.0 g/dL    Globulin 4.2 (H) 2.0 - 4.0 g/dL    A-G Ratio 0.8 (L) 1.1 - 2.2     CBC WITH AUTOMATED DIFF    Collection Time: 12/30/17 11:26 AM   Result Value Ref Range    WBC 9.6 4.1 - 11.1 K/uL    RBC 5.37 4.10 - 5.70 M/uL    HGB 15.8 12.1 - 17.0 g/dL    HCT 47.3 36.6 - 50.3 %    MCV 88.1 80.0 - 99.0 FL    MCH 29.4 26.0 - 34.0 PG    MCHC 33.4 30.0 - 36.5 g/dL    RDW 14.6 (H) 11.5 - 14.5 %    PLATELET 937 598 - 052 K/uL    NEUTROPHILS 82 (H) 32 - 75 %    LYMPHOCYTES 14 12 - 49 %    MONOCYTES 4 (L) 5 - 13 %    EOSINOPHILS 0 0 - 7 %    BASOPHILS 0 0 - 1 %    ABS. NEUTROPHILS 7.8 1.8 - 8.0 K/UL    ABS. LYMPHOCYTES 1.3 0.8 - 3.5 K/UL    ABS. MONOCYTES 0.4 0.0 - 1.0 K/UL    ABS. EOSINOPHILS 0.0 0.0 - 0.4 K/UL    ABS. BASOPHILS 0.0 0.0 - 0.1 K/UL   LIPASE    Collection Time: 12/30/17 11:26 AM   Result Value Ref Range    Lipase 191 73 - 393 U/L   GLUCOSE, POC    Collection Time: 12/30/17  2:17 PM   Result Value Ref Range    Glucose (POC) 412 (H) 65 - 100 mg/dL    Performed by [employer] [facility] [address], POC    Collection Time: 12/30/17  3:21 PM   Result Value Ref Range    Glucose (POC) 297 (H) 65 - 100 mg/dL    Performed by Ju Louie        Radiologic Studies -   US [facility]   Final Result   INDICATION:  RUQ abdominal pain      Exam: Right upper quadrant ultrasound was performed.     FINDINGS:      Gallbladder: The gallbladder is normal. There is no gallbladder distention,  pericholecystic fluid, sonographic Arauz's sign or gallbladder wall thickening. The common bile duct is within normal limits measuring 3 mm in diameter.      Liver: The liver is diffusely echogenic. The portal vein is patent and  demonstrates appropriate directional hepatopedal flow.      Right kidney: The right kidney measures 11.2 cm in sagittal length. There is no  hydronephrosis.      Pancreas: The pancreas is not well-visualized due to overlying bowel gas.     IMPRESSION  IMPRESSION:   1. No cholelithiasis or evidence of acute cholecystitis. 2. Diffuse hepatic steatosis.        CXR Results  (Last 48 hours)               12/30/17 1242  XR CHEST PA LAT Final result    Impression:  IMPRESSION: Mild bilateral increased interstitial markings, possibly   representing interstitial edema. Narrative: Indication:  R inferior lateral chest pain. Right mid abdominal pain for one   week. Exam: PA and lateral views of the chest.       Direct comparison is made to prior CXR dated August 2017. Findings: Cardiomediastinal silhouette is within normal limits. There are mild   diffuse bilateral increased interstitial markings. There is no focal airspace   process. No pleural fluid is visualized.  There is no pneumothorax. Medical Decision Making   I am the first provider for this patient. I reviewed the vital signs, available nursing notes, past medical history, past surgical history, family history and social history. Vital Signs-Reviewed the patient's vital signs. Patient Vitals for the past 12 hrs:   Temp Pulse Resp BP SpO2   12/30/17 1430 - - - - 96 %   12/30/17 1400 - - - - 96 %   12/30/17 1330 - - - - 97 %   12/30/17 1300 - - - - 97 %   12/30/17 1230 - - - 128/59 96 %   12/30/17 1130 - - - (!) 131/91 97 %   12/30/17 1100 - - - (!) 123/92 98 %   12/30/17 1039 97.6 °F (36.4 °C) 100 20 (!) 138/93 96 %       Records Reviewed: Old Medical Records    Provider Notes (Medical Decision Making):   Pt presents with RUQ pain and R flank pain, worse with breathing and movement. DDx includes biliary colic, cholecystitis, pneumonia, renal stone, pyelonephritis, uti. Low suspicion for PE as patient is on Eliquis, and patient has no SOB, tachycardia, or O2 requirement. Will check labs, RUQ US, ua, CXR. ED Course:   Initial assessment performed. The patients presenting problems have been discussed, and they are in agreement with the care plan formulated and outlined with them. I have encouraged them to ask questions as they arise throughout their visit. 1:43 PM  Updated patient on his lab and imaging results. Pt states his pain has improved, but still hurts when he coughs. Pt has a dry, hacking cough. Pt states he has not smoked in 2 years, and has an albuterol inhaler and nebulizer at home which he uses. Discussed with patient his hyperglycemia--patient has been compliant with his medications but states he went to [facility] 2[facility] this morning. Insulin has been ordered for patient but will defer fluids given patient's hx of CHF. Pt does not appear volume overloaded at this time. Pt states she has tramadol at home that he can take for pain.      SIGN OUT:  2:04 PM  Patient's presentation, labs/imaging and plan of care was reviewed with Susi Zarate MD as part of sign out. They will await pt's BS to return to normal and dispo accordingly  as part of the plan discussed with the patient. Susi Zarate MD's assistance in completion of this plan is greatly appreciated but it should be noted that I will be the provider of record for this patient. Naseem Tristanian, DO    2:19 PM  Pt's BS is now 12. Critical Care Time:   None    Disposition:  DISCHARGE NOTE  3:42 PM  The patient has been re-evaluated and is ready for discharge. Reviewed available results with patient. Counseled pt on diagnosis and care plan. Pt has expressed understanding, and all questions have been answered. Pt agrees with plan and agrees to F/U as recommended, or return to the ED if their sxs worsen. Discharge instructions have been provided and explained to the pt, along with reasons to return to the ED. PLAN:  1. Discharge Medication List as of 12/30/2017  1:54 PM      START taking these medications    Details   HYDROcodone-homatropine (HYCODAN) 5-1.5 mg/5 mL (5 mL) syrup Take 5 mL by mouth three (3) times daily as needed. Max Daily Amount: 15 mL. , Print, Disp-100 mL, R-0         CONTINUE these medications which have NOT CHANGED    Details   glipiZIDE (GLUCOTROL) 10 mg tablet Take 1 Tab by mouth two (2) times a day., Normal, Disp-60 Tab, R-12      metFORMIN (GLUCOPHAGE) 1,000 mg tablet Take 1 Tab by mouth two (2) times daily (with meals). For diabetes, Normal, Disp-60 Tab, R-12      traMADol (ULTRAM) 50 mg tablet 1-2 tabs every 6 hours as needed for back pain, Print, Disp-60 Tab, R-2      carvedilol (COREG) 25 mg tablet Take 1 Tab by mouth two (2) times a day.  To slow heart rate down, Print, Disp-60 Tab, R-12      furosemide (LASIX) 80 mg tablet 2 tabs every am for fluid, Print, Disp-60 Tab, R-12      potassium chloride (K-DUR, KLOR-CON) 20 mEq tablet Take 1 Tab by mouth two (2) times a day., Print, Disp-60 Tab, R-12 albuterol-ipratropium (DUO-NEB) 2.5 mg-0.5 mg/3 ml nebu 3 mL by Nebulization route every six (6) hours as needed., Normal, Disp-50 Nebule, R-12      umeclidinium-vilanterol (ANORO ELLIPTA) 62.5-25 mcg/actuation inhaler Take 1 Puff by inhalation daily. , Normal, Disp-1 Inhaler, R-12      lisinopril (PRINIVIL, ZESTRIL) 5 mg tablet Take 1 Tab by mouth daily. , Normal, Disp-90 Tab, R-3      Blood-Glucose Meter misc Use to check BS twice a day. Dx code E11.9, Normal, Disp-1 Each, R-0      !! glucose blood VI test strips (ONETOUCH ULTRA TEST) strip Use to check BS twice a day. Dx code E11.9, Normal, Disp-100 Strip, R-12      !! glucose blood VI test strips (FREESTYLE LITE STRIPS) strip As directed, Normal, Disp-100 Strip, R-12      atorvastatin (LIPITOR) 40 mg tablet Take 1 Tab by mouth daily. For cholesterol, Normal, Disp-30 Tab, R-12      apixaban (ELIQUIS) 5 mg tablet Take 1 Tab by mouth two (2) times a day. To prevent stroke, Normal, Disp-60 Tab, R-12      albuterol (PROVENTIL HFA, VENTOLIN HFA, PROAIR HFA) 90 mcg/actuation inhaler Take 2 Puffs by inhalation every four (4) hours as needed for Wheezing or Shortness of Breath., Normal, Disp-1 Inhaler, R-11       !! - Potential duplicate medications found. Please discuss with provider. 2.   Follow-up Information     Follow up With Details Comments Contact Info    Jimmie Che MD Schedule an appointment as soon as possible for a visit  [address] [facility]  [address] [facility]  [phone]      \[facility] [place] [facility]\"" EMERGENCY DEPT  As needed, If symptoms worsen [address] [place] [address]  [phone]        Return to ED if worse     Diagnosis     Clinical Impression:   1. RUQ abdominal pain    2. Hyperglycemia    3. Cough        Attestations: This note is prepared by Demetri Brooke, acting as Scribe for Lynn Sharp DO.     Dewanda Copas, DO: The scribe's documentation has been prepared under my direction and personally reviewed by me in its entirety. I confirm that the note above accurately reflects all work, treatment, procedures, and medical decision making performed by me.

## 2018-02-27 DIAGNOSIS — I48.20 CHRONIC ATRIAL FIBRILLATION (HCC): ICD-10-CM

## 2018-02-27 RX ORDER — ATORVASTATIN CALCIUM 40 MG/1
TABLET, FILM COATED ORAL
Qty: 30 TAB | Refills: 7 | Status: SHIPPED | OUTPATIENT
Start: 2018-02-27 | End: 2018-08-15 | Stop reason: SDUPTHER

## 2018-02-27 RX ORDER — ALBUTEROL SULFATE 90 UG/1
AEROSOL, METERED RESPIRATORY (INHALATION)
Qty: 1 INHALER | Refills: 7 | Status: SHIPPED | OUTPATIENT
Start: 2018-02-27 | End: 2018-08-15 | Stop reason: SDUPTHER

## 2018-02-27 RX ORDER — APIXABAN 5 MG/1
TABLET, FILM COATED ORAL
Qty: 60 TAB | Refills: 9 | Status: SHIPPED | OUTPATIENT
Start: 2018-02-27 | End: 2018-11-23 | Stop reason: SDUPTHER

## 2018-04-02 RX ORDER — LISINOPRIL 5 MG/1
TABLET ORAL
Qty: 30 TAB | Refills: 1 | Status: SHIPPED | OUTPATIENT
Start: 2018-04-02 | End: 2018-04-04 | Stop reason: SDUPTHER

## 2018-04-04 ENCOUNTER — OFFICE VISIT (OUTPATIENT)
Dept: FAMILY MEDICINE CLINIC | Age: 53
End: 2018-04-04

## 2018-04-04 VITALS
OXYGEN SATURATION: 91 % | SYSTOLIC BLOOD PRESSURE: 127 MMHG | WEIGHT: 315 LBS | HEIGHT: 73 IN | RESPIRATION RATE: 14 BRPM | TEMPERATURE: 98.1 F | BODY MASS INDEX: 41.75 KG/M2 | DIASTOLIC BLOOD PRESSURE: 84 MMHG | HEART RATE: 99 BPM

## 2018-04-04 DIAGNOSIS — R06.01 ORTHOPNEA: Primary | ICD-10-CM

## 2018-04-04 DIAGNOSIS — E11.9 CONTROLLED TYPE 2 DIABETES MELLITUS WITHOUT COMPLICATION, WITHOUT LONG-TERM CURRENT USE OF INSULIN (HCC): ICD-10-CM

## 2018-04-04 LAB — HBA1C MFR BLD HPLC: 8.8 %

## 2018-04-04 RX ORDER — LISINOPRIL 20 MG/1
20 TABLET ORAL DAILY
Qty: 30 TAB | Refills: 12 | Status: SHIPPED | OUTPATIENT
Start: 2018-04-04 | End: 2018-11-23 | Stop reason: SDUPTHER

## 2018-04-04 RX ORDER — FUROSEMIDE 80 MG/1
TABLET ORAL
Qty: 90 TAB | Refills: 12 | Status: SHIPPED | OUTPATIENT
Start: 2018-04-04 | End: 2018-04-09 | Stop reason: SDUPTHER

## 2018-04-04 NOTE — PROGRESS NOTES
HISTORY OF PRESENT ILLNESS  Dina Cedeño is a 48 y.o. male. HPI In for recheck. Still gets short of breath when walks. NO real edema. Occasional chest pains. Had an episode 3 days ago that lasted a few days. Some chest pain in anterior chest when takes a deep breath. Some cough, nonproductive. Some dizziness. Say that is taking his meds regularly. Needs meds refilled. Some orthopnea. Blood sugar was 78 and 150 yesterday, over 300 this am.     ROS    Physical Exam   Constitutional: He appears well-developed and well-nourished. HENT:   Right Ear: External ear normal.   Left Ear: External ear normal.   Mouth/Throat: Oropharynx is clear and moist.   Neck: No thyromegaly present. Cardiovascular: Normal rate, normal heart sounds and intact distal pulses. Irregular irregular rhythmn   Pulmonary/Chest: Effort normal and breath sounds normal. No respiratory distress. He has no wheezes. Abdominal: Soft. Bowel sounds are normal. He exhibits no distension and no mass. There is no tenderness. There is no guarding. Musculoskeletal: Normal range of motion. He exhibits edema (tr edema bilaterally). Lymphadenopathy:     He has no cervical adenopathy. Nursing note and vitals reviewed. ASSESSMENT and PLAN  Orders Placed This Encounter    METABOLIC PANEL, BASIC    AMB POC COMPLETE CBC, AUTOMATED    AMB POC HEMOGLOBIN A1C    lisinopril (PRINIVIL, ZESTRIL) 20 mg tablet     Sig: Take 1 Tab by mouth daily. For heart     Dispense:  30 Tab     Refill:  12    furosemide (LASIX) 80 mg tablet     Sig: 3 tabs every am for fluid     Dispense:  90 Tab     Refill:  12     Orders Placed This Encounter    METABOLIC PANEL, BASIC    AMB POC COMPLETE CBC, AUTOMATED    AMB POC HEMOGLOBIN A1C    lisinopril (PRINIVIL, ZESTRIL) 20 mg tablet    furosemide (LASIX) 80 mg tablet     Diagnoses and all orders for this visit:    1. Orthopnea  -     AMB POC COMPLETE CBC, AUTOMATED    2.  Controlled type 2 diabetes mellitus without complication, without long-term current use of insulin ([facility])  -     METABOLIC PANEL, BASIC  -     AMB POC HEMOGLOBIN A1C    Other orders  -     lisinopril (PRINIVIL, ZESTRIL) 20 mg tablet; Take 1 Tab by mouth daily. For heart  -     furosemide (LASIX) 80 mg tablet; 3 tabs every am for fluid      Follow-up Disposition:  Return in about 3 weeks (around 4/25/2018).
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
## 2018-04-05 LAB
BUN SERPL-MCNC: 20 MG/DL (ref 6–24)
BUN/CREAT SERPL: 19 (ref 9–20)
CALCIUM SERPL-MCNC: 9.4 MG/DL (ref 8.7–10.2)
CHLORIDE SERPL-SCNC: 93 MMOL/L (ref 96–106)
CO2 SERPL-SCNC: 25 MMOL/L (ref 18–29)
CREAT SERPL-MCNC: 1.03 MG/DL (ref 0.76–1.27)
GFR SERPLBLD CREATININE-BSD FMLA CKD-EPI: 84 ML/MIN/1.73
GFR SERPLBLD CREATININE-BSD FMLA CKD-EPI: 97 ML/MIN/1.73
GLUCOSE SERPL-MCNC: 292 MG/DL (ref 65–99)
POTASSIUM SERPL-SCNC: 3.8 MMOL/L (ref 3.5–5.2)
SODIUM SERPL-SCNC: 139 MMOL/L (ref 134–144)

## 2018-04-09 DIAGNOSIS — M54.50 CHRONIC BILATERAL LOW BACK PAIN WITHOUT SCIATICA: ICD-10-CM

## 2018-04-09 DIAGNOSIS — R06.02 SHORTNESS OF BREATH: Primary | ICD-10-CM

## 2018-04-09 DIAGNOSIS — G89.29 CHRONIC BILATERAL LOW BACK PAIN WITHOUT SCIATICA: ICD-10-CM

## 2018-04-09 RX ORDER — METFORMIN HYDROCHLORIDE 1000 MG/1
TABLET ORAL
Qty: 75 TAB | Refills: 12 | Status: SHIPPED | OUTPATIENT
Start: 2018-04-09 | End: 2018-11-23 | Stop reason: SDUPTHER

## 2018-04-09 RX ORDER — FUROSEMIDE 80 MG/1
TABLET ORAL
Qty: 120 TAB | Refills: 12 | Status: SHIPPED | OUTPATIENT
Start: 2018-04-09 | End: 2018-08-12 | Stop reason: SDUPTHER

## 2018-04-09 RX ORDER — TRAMADOL HYDROCHLORIDE 50 MG/1
TABLET ORAL
Qty: 60 TAB | Refills: 2 | Status: SHIPPED | OUTPATIENT
Start: 2018-04-09 | End: 2018-06-05 | Stop reason: SDUPTHER

## 2018-04-09 NOTE — PROGRESS NOTES
pc with pt. Still having some dyspnea. Will increase lasix 80 mg to 2 bid, and send for CA of chest. Will also increase metformin 500 mg to 5 a day for diabetes.

## 2018-04-10 ENCOUNTER — TELEPHONE (OUTPATIENT)
Dept: FAMILY MEDICINE CLINIC | Age: 53
End: 2018-04-10

## 2018-04-19 ENCOUNTER — HOSPITAL ENCOUNTER (OUTPATIENT)
Dept: CT IMAGING | Age: 53
Discharge: HOME | End: 2018-04-19
Attending: FAMILY MEDICINE
Payer: MEDICAID

## 2018-04-19 DIAGNOSIS — R06.02 SHORTNESS OF BREATH: ICD-10-CM

## 2018-04-19 PROCEDURE — 74011636320 HC RX REV CODE- 636/320: Performed by: FAMILY MEDICINE

## 2018-04-19 PROCEDURE — 71275 CT ANGIOGRAPHY CHEST: CPT

## 2018-04-19 PROCEDURE — 74011250636 HC RX REV CODE- 250/636: Performed by: FAMILY MEDICINE

## 2018-04-19 RX ORDER — SODIUM CHLORIDE 0.9 % (FLUSH) 0.9 %
10 SYRINGE (ML) INJECTION
Status: COMPLETED | OUTPATIENT
Start: 2018-04-19 | End: 2018-04-19

## 2018-04-19 RX ORDER — SODIUM CHLORIDE 9 MG/ML
50 INJECTION, SOLUTION INTRAVENOUS
Status: COMPLETED | OUTPATIENT
Start: 2018-04-19 | End: 2018-04-19

## 2018-04-19 RX ADMIN — IOPAMIDOL 80 ML: 755 INJECTION, SOLUTION INTRAVENOUS at 13:12

## 2018-04-19 RX ADMIN — Medication 10 ML: at 13:13

## 2018-04-19 RX ADMIN — SODIUM CHLORIDE 50 ML/HR: 900 INJECTION, SOLUTION INTRAVENOUS at 13:13

## 2018-06-05 DIAGNOSIS — G89.29 CHRONIC BILATERAL LOW BACK PAIN WITHOUT SCIATICA: ICD-10-CM

## 2018-06-05 DIAGNOSIS — M54.50 CHRONIC BILATERAL LOW BACK PAIN WITHOUT SCIATICA: ICD-10-CM

## 2018-06-05 RX ORDER — TRAMADOL HYDROCHLORIDE 50 MG/1
TABLET ORAL
Qty: 56 TAB | Refills: 1 | Status: SHIPPED | OUTPATIENT
Start: 2018-06-05 | End: 2018-08-15 | Stop reason: SDUPTHER

## 2018-06-05 RX ORDER — IPRATROPIUM BROMIDE AND ALBUTEROL SULFATE 2.5; .5 MG/3ML; MG/3ML
SOLUTION RESPIRATORY (INHALATION)
Qty: 50 NEBULE | Refills: 10 | Status: SHIPPED | OUTPATIENT
Start: 2018-06-05 | End: 2019-03-04 | Stop reason: SDUPTHER

## 2018-06-06 ENCOUNTER — OFFICE VISIT (OUTPATIENT)
Dept: FAMILY MEDICINE CLINIC | Age: 53
End: 2018-06-06

## 2018-06-06 VITALS
DIASTOLIC BLOOD PRESSURE: 80 MMHG | OXYGEN SATURATION: 96 % | TEMPERATURE: 98.8 F | SYSTOLIC BLOOD PRESSURE: 108 MMHG | BODY MASS INDEX: 41.75 KG/M2 | HEIGHT: 73 IN | RESPIRATION RATE: 14 BRPM | HEART RATE: 97 BPM | WEIGHT: 315 LBS

## 2018-06-06 DIAGNOSIS — I48.91 ATRIAL FIBRILLATION WITH RVR (HCC): ICD-10-CM

## 2018-06-06 DIAGNOSIS — J98.4 RESTRICTIVE LUNG DISEASE: Primary | ICD-10-CM

## 2018-06-06 NOTE — PROGRESS NOTES
Chief Complaint   Patient presents with    Shortness of Breath    CHF    LOW BACK PAIN    Chest Pain    Dizziness     \"quite frequently)      1. Have you been to the ER, urgent care clinic since your last visit? Hospitalized since your last visit? No    2. Have you seen or consulted any other health care providers outside of the [address] [place] [address] since your last visit? Include any pap smears or colon screening. No     Health Maintenance Due   Topic Date Due    FOOT EXAM Q1  01/05/1975    MICROALBUMIN Q1  01/05/1977    EYE EXAM RETINAL OR DILATED Q1  01[birth year]05/1977    FOBT Q 1 YEAR AGE 50-75  01/05/2017    LIPID PANEL Q1  02/06/2018     Pt not sure about retinal scan. Pt feels \"FCI\".   Pt wants to find eye doctor

## 2018-06-06 NOTE — PROGRESS NOTES
HISTORY OF PRESENT ILLNESS  Gustavo Frances is a 48 y.o. male. HPI In for recheck. Still gets short of breath very easily- walking from bedroom to the bathroom. Occasional mild chest tightness. Mild nonproductive cough. No real edema. Some palpitations at times. ROS    Physical Exam   Constitutional: He appears well-developed and well-nourished. HENT:   Right Ear: External ear normal.   Left Ear: External ear normal.   Mouth/Throat: Oropharynx is clear and moist.   Neck: No thyromegaly present. Cardiovascular: Normal rate, regular rhythm, normal heart sounds and intact distal pulses. Pulmonary/Chest: Effort normal and breath sounds normal. No respiratory distress. He has no wheezes. Abdominal: Soft. Bowel sounds are normal. He exhibits no distension and no mass. There is no tenderness. There is no guarding. Musculoskeletal: Normal range of motion. He exhibits no edema. Lymphadenopathy:     He has no cervical adenopathy. Nursing note and vitals reviewed. ASSESSMENT and PLAN  Orders Placed This Encounter    METABOLIC PANEL, COMPREHENSIVE   [facility] [address] [facility]     Referral Priority:   Routine     Referral Type:   Consultation     Referral Reason:   Specialty Services Required     Referral Location:   [facility] [place] [address].     Referred to Provider:   Lindsay Holliday MD    [facility] [address]     Referral Priority:   Routine     Referral Type:   Consultation     Referral Reason:   Specialty Services Required     Referred to Provider:   Mine Thomson MD     Orders Placed This Encounter    METABOLIC PANEL, COMPREHENSIVE   [facility] [address]     Diagnoses and all orders for this visit:    1. Restrictive lung disease  -     [facility]    2.  Atrial fibrillation with RVR ([address]  -     [facility]  -     METABOLIC PANEL, COMPREHENSIVE

## 2018-06-06 NOTE — MR AVS SNAPSHOT
[address] [facility] 
 
 
 [address] [facility] [place] [address] [address] [phone] Patient: Kasia Arroyo MRN: [id number] :1965 Visit Information Date & Time Provider Department Dept. Phone Encounter #  
 2018 11:45 AM Desmond Damon MD [facility] [phone] [id number] Follow-up Instructions Return in about 4 weeks (around 2018). Upcoming Health Maintenance Date Due  
 FOOT EXAM Q1 1977 MICROALBUMIN Q1 1977 EYE EXAM RETINAL OR DILATED Q1 1977 FOBT Q 1 YEAR AGE 50-75 2017 LIPID PANEL Q1 2018 Influenza Age 5 to Adult 2018 HEMOGLOBIN A1C Q6M 10/4/2018 DTaP/Tdap/Td series (2 - Td) 2027 Allergies as of 2018  Review Complete On: 2018 By: Desmond Damon MD  
  
 Severity Noted Reaction Type Reactions Influenza Virus Vaccines  2017    Nausea Only Fever  diarrhea Mushroom Flavor  2014   Systemic Swelling Oxycodone  08/10/2017    Rash Pneumococcal 23-jesús Ps Vaccine  2017    Nausea and Vomiting Vomit Current Immunizations  Reviewed on 2017 Name Date Td, Adsorbed PF 2016 Not reviewed this visit You Were Diagnosed With   
  
 Codes Comments Restrictive lung disease    -  Primary ICD-10-CM: J98.4 ICD-9-CM: 518.89 Atrial fibrillation with RVR (HCC)     ICD-10-CM: I48.91 
ICD-9-CM: 427.31 Vitals BP Pulse Temp Resp Height(growth percentile) Weight(growth percentile) 108/80 97 98.8 °F (37.1 °C) (Oral) 14 6' 1\" (1.854 m) (!) 359 lb (162.8 kg) SpO2 BMI Smoking Status 96% 47.36 kg/m2 Former Smoker Vitals History BMI and BSA Data Body Mass Index Body Surface Area  
 47.36 kg/m 2 2.9 m 2 Preferred Pharmacy Pharmacy Name Phone Magalie Driscoll [address] [place] [address], [address] [place] [address] [phone] Your Updated Medication List  
  
   
 This list is accurate as of 6/6/18  1:09 PM.  Always use your most recent med list.  
  
  
  
  
 albuterol-ipratropium 2.5 mg-0.5 mg/3 ml Nebu Commonly known as:  Reyna Dale INHALE THREE MILLILITERS (ONE VIAL) VIA NEBULIZATION BY MOUTH EVERY 6 HOURS AS NEEDED  
  
 atorvastatin 40 mg tablet Commonly known as:  LIPITOR  
TAKE ONE TABLET BY MOUTH DAILY FOR CHOLESTEROL Blood-Glucose Meter Misc Use to check BS twice a day. Dx code E11.9  
  
 carvedilol 25 mg tablet Commonly known as:  Soham Grzegorz Take 1 Tab by mouth two (2) times a day. To slow heart rate down ELIQUIS 5 mg tablet Generic drug:  apixaban TAKE ONE TABLET BY MOUTH TWICE A DAY TO PREVENT STROKE  
  
 fluticasone-umeclidin-vilanter 100-62.5-25 mcg Dsdv Commonly known as:  Mg Maldonado Take 1 Inhalation by inhalation daily. furosemide 80 mg tablet Commonly known as:  LASIX  
2 tabs po- in am, and 2 tabs po around noon  
  
 glipiZIDE 10 mg tablet Commonly known as:  Nash Pane Take 1 Tab by mouth two (2) times a day. * glucose blood VI test strips strip Commonly known as:  FREESTYLE LITE STRIPS As directed * glucose blood VI test strips strip Commonly known as:  ONETOUCH ULTRA TEST Use to check BS twice a day. Dx code E11.9  
  
 * ONETOUCH ULTRA BLUE TEST STRIP strip Generic drug:  glucose blood VI test strips TEST BLOOD SUGAR TWICE DAILY HYDROcodone-homatropine 5-1.5 mg/5 mL (5 mL) syrup Commonly known as:  HYCODAN Take 5 mL by mouth three (3) times daily as needed. Max Daily Amount: 15 mL. lisinopril 20 mg tablet Commonly known as:  Shirley Flight Take 1 Tab by mouth daily. For heart  
  
 metFORMIN 1,000 mg tablet Commonly known as:  GLUCOPHAGE  
1 1/2 pills in am and 1 pill in pm. For diabetes  
  
 potassium chloride 20 mEq tablet Commonly known as:  K-DUR, KLOR-CON Take 1 Tab by mouth two (2) times a day. traMADol 50 mg tablet Commonly known as:  ULTRAM  
TAKE 1 TO 2 TABLETS BY MOUTH EVERY 6 HOURS AS NEEDED FOR PAIN  
  
 VENTOLIN HFA 90 mcg/actuation inhaler Generic drug:  albuterol INHALE TWO PUFFS BY MOUTH EVERY 4 HOURS AS NEEDED FOR FOR WHEEZING AND FOR SHORTNESS OF BREATH * Notice: This list has 3 medication(s) that are the same as other medications prescribed for you. Read the directions carefully, and ask your doctor or other care provider to review them with you. We Performed the Following REFERRAL TO CARDIOLOGY [ADD23 Custom] REFERRAL TO PULMONARY DISEASE [ECT79 Custom] Comments:  
 Restrictive lung disease. Follow-up Instructions Return in about 4 weeks (around 7/4/2018). Referral Information Referral ID Referred By Referred To  
  
 [id number] [facility] [address] [facility] [address], [address] Visits Status Start Date End Date 1 New Request 6/6/18 6/6/19 If your referral has a status of pending review or denied, additional information will be sent to support the outcome of this decision. Referral ID Referred By Referred To  
 [id number] JEFFREY Tang MD  
   [address] [address] [address] Phone: [phone] Fax: [phone] Visits Status Start Date End Date 1 New Request 6/6/18 6/6/19 If your referral has a status of pending review or denied, additional information will be sent to support the outcome of this decision. Introducing [facility] & HEALTH SERVICES! [facility] introduces [employer] patient portal. Now you can access parts of your medical record, email your doctor's office, and request medication refills online. 1. In your internet browser, go to [email or web address]. [employer]/"[employer] 2. Click on the First Time User? Click Here link in the Sign In box. You will see the New Member Sign Up page. 3. Enter your Hiri Access Code exactly as it appears below. You will not need to use this code after youve completed the sign-up process. If you do not sign up before the expiration date, you must request a new code. · Hiri Access Code: [email or web address] Expires: 7/3/2018 11:11 AM 
 
4. Enter the last four digits of your Social Security Number (xxxx) and Date of Birth (mm/dd/yyyy) as indicated and click Submit. You will be taken to the next sign-up page. 5. Create a Hiri ID. This will be your Hiri login ID and cannot be changed, so think of one that is secure and easy to remember. 6. Create a Hiri password. You can change your password at any time. 7. Enter your Password Reset Question and Answer. This can be used at a later time if you forget your password. 8. Enter your e-mail address. You will receive e-mail notification when new information is available in [address] 9. Click Sign Up. You can now view and download portions of your medical record. 10. Click the Download Summary menu link to download a portable copy of your medical information. If you have questions, please visit the Frequently Asked Questions section of the Hiri website. Remember, Hiri is NOT to be used for urgent needs. For medical emergencies, dial 911. Now available from your iPhone and Android! Please provide this summary of care documentation to your next provider. Your primary care clinician is listed as Jessica Salinas. If you have any questions after today's visit, please call [phone].

## 2018-06-07 ENCOUNTER — DOCUMENTATION ONLY (OUTPATIENT)
Dept: FAMILY MEDICINE CLINIC | Age: 53
End: 2018-06-07

## 2018-06-07 LAB
ALBUMIN SERPL-MCNC: 4.1 G/DL (ref 3.5–5.5)
ALBUMIN/GLOB SERPL: 1.4 {RATIO} (ref 1.2–2.2)
ALP SERPL-CCNC: 77 IU/L (ref 39–117)
ALT SERPL-CCNC: 25 IU/L (ref 0–44)
AST SERPL-CCNC: 16 IU/L (ref 0–40)
BILIRUB SERPL-MCNC: 0.9 MG/DL (ref 0–1.2)
BUN SERPL-MCNC: 12 MG/DL (ref 6–24)
BUN/CREAT SERPL: 11 (ref 9–20)
CALCIUM SERPL-MCNC: 9 MG/DL (ref 8.7–10.2)
CHLORIDE SERPL-SCNC: 98 MMOL/L (ref 96–106)
CO2 SERPL-SCNC: 22 MMOL/L (ref 18–29)
CREAT SERPL-MCNC: 1.09 MG/DL (ref 0.76–1.27)
GFR SERPLBLD CREATININE-BSD FMLA CKD-EPI: 78 ML/MIN/1.73
GFR SERPLBLD CREATININE-BSD FMLA CKD-EPI: 90 ML/MIN/1.73
GLOBULIN SER CALC-MCNC: 3 G/DL (ref 1.5–4.5)
GLUCOSE SERPL-MCNC: 246 MG/DL (ref 65–99)
POTASSIUM SERPL-SCNC: 4.2 MMOL/L (ref 3.5–5.2)
PROT SERPL-MCNC: 7.1 G/DL (ref 6–8.5)
SODIUM SERPL-SCNC: 139 MMOL/L (ref 134–144)

## 2018-06-08 ENCOUNTER — DOCUMENTATION ONLY (OUTPATIENT)
Dept: FAMILY MEDICINE CLINIC | Age: 53
End: 2018-06-08

## 2018-07-02 ENCOUNTER — OFFICE VISIT (OUTPATIENT)
Dept: CARDIOLOGY CLINIC | Age: 53
End: 2018-07-02

## 2018-07-02 VITALS
RESPIRATION RATE: 20 BRPM | SYSTOLIC BLOOD PRESSURE: 130 MMHG | BODY MASS INDEX: 41.75 KG/M2 | OXYGEN SATURATION: 94 % | HEART RATE: 90 BPM | WEIGHT: 315 LBS | DIASTOLIC BLOOD PRESSURE: 62 MMHG | HEIGHT: 73 IN

## 2018-07-02 DIAGNOSIS — E78.2 MIXED HYPERLIPIDEMIA: ICD-10-CM

## 2018-07-02 DIAGNOSIS — R07.89 CHEST TIGHTNESS: ICD-10-CM

## 2018-07-02 DIAGNOSIS — I10 ESSENTIAL HYPERTENSION: ICD-10-CM

## 2018-07-02 DIAGNOSIS — Z76.89 ENCOUNTER TO ESTABLISH CARE: ICD-10-CM

## 2018-07-02 DIAGNOSIS — R06.02 SHORTNESS OF BREATH: Primary | ICD-10-CM

## 2018-07-02 DIAGNOSIS — E66.01 OBESITY, MORBID (HCC): ICD-10-CM

## 2018-07-02 DIAGNOSIS — I48.20 CHRONIC ATRIAL FIBRILLATION (HCC): ICD-10-CM

## 2018-07-02 DIAGNOSIS — J98.4 RESTRICTIVE LUNG DISEASE: ICD-10-CM

## 2018-07-02 DIAGNOSIS — E11.8 CONTROLLED TYPE 2 DIABETES MELLITUS WITH COMPLICATION, WITHOUT LONG-TERM CURRENT USE OF INSULIN (HCC): ICD-10-CM

## 2018-07-02 NOTE — PROGRESS NOTES
[address] [place] [address], [address] [facility]  [phone]     Subjective:      Tiffani Olivas is a 48 y.o. male with pmhx HTN, HLD, DM, AF, HFrEF, restrictive lung disease, who is here to establish care and further evaluation of worsening SOB especially on exertion with associated chest tightness. Also reports occasional palpitations, PND. Denies orthopnea, LE edema, syncope, or presyncope. Cardiac risk factors: HTN, HLD, DM, age, M,  elevated BMI, sedentary lifestyle, unknown family hx, former smoker    Hx smoking: former   Alcohol: former   Illegal drug use: denies    Family hx: mother: unk  Father: unk   Siblings: unk    Patient Active Problem List    Diagnosis Date Noted    Obesity, morbid ([address] 12/20/2017    Atrial fibrillation with RVR ([facility] 08/11/2017    CHF (congestive heart failure) ([facility] 08/10/2017    Restrictive lung disease     Dyspnea 07/15/2014    Hypertension 02/17/2014      Sunny Keating MD  Past Medical History:   Diagnosis Date   [facility]) 1/6/15    [facility] ED. Pt reported this    Atrial fibrillation with RVR ([facility] 8/11/2017    Heart failure (HCC)     Hypertension     Restrictive lung disease     FVC2.59 (52%), FEV1 1.95 (49)- 2017    S/P cardiac cath     Dr. Kendra Hurtado, 2016. minimal cad      No past surgical history on file. Allergies   Allergen Reactions    Influenza Virus Vaccines Nausea Only     Fever  diarrhea    Mushroom Flavor Swelling    Oxycodone Rash    Pneumococcal 23-Allison Ps Vaccine Nausea and Vomiting     Vomit        No family history on file. Social History     Social History    Marital status: SINGLE     Spouse name: N/A    Number of children: N/A    Years of education: N/A     Occupational History    Not on file.      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       Current Outpatient Prescriptions   Medication Sig    albuterol-ipratropium (DUO-NEB) 2.5 mg-0.5 mg/3 ml nebu INHALE THREE MILLILITERS (ONE VIAL) VIA NEBULIZATION BY MOUTH EVERY 6 HOURS AS NEEDED    traMADol (ULTRAM) 50 mg tablet TAKE 1 TO 2 TABLETS BY MOUTH EVERY 6 HOURS AS NEEDED FOR PAIN    ONETOUCH ULTRA BLUE TEST STRIP strip TEST BLOOD SUGAR TWICE DAILY    fluticasone-umeclidin-vilanter (TRELEGY ELLIPTA) 100-62.5-25 mcg dsdv Take 1 Inhalation by inhalation daily.  metFORMIN (GLUCOPHAGE) 1,000 mg tablet 1 1/2 pills in am and 1 pill in pm. For diabetes (Patient taking differently: 1 pills in am and 1 pill in pm. For diabetes)    furosemide (LASIX) 80 mg tablet 2 tabs po- in am, and 2 tabs po around noon    lisinopril (PRINIVIL, ZESTRIL) 20 mg tablet Take 1 Tab by mouth daily. For heart    atorvastatin (LIPITOR) 40 mg tablet TAKE ONE TABLET BY MOUTH DAILY FOR CHOLESTEROL    ELIQUIS 5 mg tablet TAKE ONE TABLET BY MOUTH TWICE A DAY TO PREVENT STROKE    VENTOLIN HFA 90 mcg/actuation inhaler INHALE TWO PUFFS BY MOUTH EVERY 4 HOURS AS NEEDED FOR FOR WHEEZING AND FOR SHORTNESS OF BREATH    HYDROcodone-homatropine (HYCODAN) 5-1.5 mg/5 mL (5 mL) syrup Take 5 mL by mouth three (3) times daily as needed. Max Daily Amount: 15 mL.  glipiZIDE (GLUCOTROL) 10 mg tablet Take 1 Tab by mouth two (2) times a day.  carvedilol (COREG) 25 mg tablet Take 1 Tab by mouth two (2) times a day. To slow heart rate down    potassium chloride (K-DUR, KLOR-CON) 20 mEq tablet Take 1 Tab by mouth two (2) times a day.  Blood-Glucose Meter misc Use to check BS twice a day. Dx code E11.9    glucose blood VI test strips (ONETOUCH ULTRA TEST) strip Use to check BS twice a day.  Dx code E11.9    glucose blood VI test strips (FREESTYLE LITE STRIPS) strip As directed     No current facility-administered medications for this visit. Review of Symptoms:  11 systems reviewed, negative other than as stated in the HPI    Physical ExamPhysical Exam:    Vitals:    07/02/18 1042 07/02/18 1053 07/02/18 1054   BP: 150/74 140/70 130/62   Pulse: 90     Resp: 20     SpO2: 94%     Weight: (!) 362 lb (164.2 kg)     Height: 6' 1\" (1.854 m)       Body mass index is 47.76 kg/(m^2). General PE   Gen:  NAD  Mental Status - Alert. General Appearance - Not in acute distress. Chest and Lung Exam   Inspection: Accessory muscles - No use of accessory muscles in breathing. Auscultation:   Breath sounds: - Normal.   Cardiovascular   Inspection: Jugular vein - Bilateral - Inspection Normal.   Palpation/Percussion:   Apical Impulse: - Normal.   Auscultation: Rhythm - Regular. Heart Sounds - S1 WNL and S2 WNL. No S3 or S4. Murmurs & Other Heart Sounds: Auscultation of the heart reveals - No Murmurs. Peripheral Vascular   Upper Extremity: Inspection - Bilateral - No Cyanotic nailbeds or Digital clubbing. Lower Extremity:   Palpation: Edema - Bilateral - No edema. Abdomen:   Soft, non-tender, bowel sounds are active.   Neuro: A&O times 3, CN and motor grossly WNL    Labs:   Lab Results   Component Value Date/Time    Cholesterol, total 106 02/06/2017 11:12 AM    HDL Cholesterol 25 (L) 02/06/2017 11:12 AM    LDL, calculated 15 02/06/2017 11:12 AM    Triglyceride 332 (H) 02/06/2017 11:12 AM     No results found for: CPK, CPKX, CPX  Lab Results   Component Value Date/Time    Sodium 139 06/06/2018 01:10 PM    Potassium 4.2 06/06/2018 01:10 PM    Chloride 98 06/06/2018 01:10 PM    CO2 22 06/06/2018 01:10 PM    Anion gap 8 12/30/2017 11:26 AM    Glucose 246 (H) 06/06/2018 01:10 PM    BUN 12 06/06/2018 01:10 PM    Creatinine 1.09 06/06/2018 01:10 PM    BUN/Creatinine ratio 11 06/06/2018 01:10 PM    GFR est AA 90 06/06/2018 01:10 PM    GFR est non-AA 78 06/06/2018 01:10 PM    Calcium 9.0 06/06/2018 01:10 PM Bilirubin, total 0.9 06/06/2018 01:10 PM    AST (SGOT) 16 06/06/2018 01:10 PM    Alk. phosphatase 77 06/06/2018 01:10 PM    Protein, total 7.1 06/06/2018 01:10 PM    Albumin 4.1 06/06/2018 01:10 PM    Globulin 4.2 (H) 12/30/2017 11:26 AM    A-G Ratio 1.4 06/06/2018 01:10 PM    ALT (SGPT) 25 06/06/2018 01:10 PM       EKG:  AF     Assessment:     Assessment:      1. Shortness of breath    2. Essential hypertension    3. Chronic atrial fibrillation ([address]    4. Restrictive lung disease    5. Obesity, morbid ([address]    6. Controlled type 2 diabetes mellitus with complication, without long-term current use of insulin ([facility]    7. Encounter to establish care    8. Mixed hyperlipidemia        Orders Placed This Encounter    AMB POC EKG ROUTINE W/ 12 LEADS, INTER & REP     Order Specific Question:   Reason for Exam:     Answer:   genevieve        Plan:     Pt  is a 48 y.o. male with pmhx HTN, HLD, DM, AF, HFrEF, restrictive lung disease, who is here to establish care and further evaluation of worsening SOB especially on exertion with associated chest tightness. Also reports occasional palpitations, PND. Denies orthopnea, LE edema, syncope, or presyncope. Cardiac risk factors: HTN, HLD, DM, age, M,  elevated BMI, sedentary lifestyle, unknown family hx, former smoker      Minimal CAD per cardiac cath in 2016 by Dr Raine Ortega  Reports worsening exertional chest tightness, HAMM  On BB. Evaluate with Lexsican      AF, rate controlled  Reports 3 attempted cardioversions  On BB and Eliquis  Will likely refer to EP after testing completed  Understands the relationship between obesity/MENDY/PAF     HFrEF  EF40%, mild per per Echo in 08/17  NYHA III. On BB, Ace-I. Also on Furosemide 160 mg BID--Cr stable in 6/18  Repeat echo    HTN  Controlled with current therapy. HLD  2/17 LDL at 15. On statin.  Lipids and labs followed by PCP    DM  On oral agents    Stated dx MENDY but insurance wont cover CPAP      Continue current care and follow up when testing complete      Tiffanie Briscoe NP       Pt seen and examined in details. Agree with NP A&P. Will also get benefit from bariatric surgery referral. D/w pt. Will address it once above is done.      Asiya Loo MD

## 2018-07-02 NOTE — MR AVS SNAPSHOT
[address] [facility] 
[phone] Patient: Raúl Salmeron MRN: [id number] :1965 Visit Information Date & Time Provider Department Dept. Phone Encounter #  
 2018 10:30 AM Richar Heard, [address] [place] [address] [facility] [phone] [id number] Follow-up Instructions Return in about 6 months (around 2019). Routing History Your Appointments 2018 12:30 PM  
ECHO CARDIOGRAMS 2D with Emma Wilson, [facility]) Appt Note: Dr HERNANDEZ  6'1\" 352STRESS TEST LEXISCAN/CARDIOLITE [[id number]] (Order [id number]) 2D ECHO COMPLETE ADULT (TTE) W OR WO CONTR [[id number]] (Order [id number])  
 [address] [place] [address]  
[phone] [address] [place] [address] [address] [phone]  
  
    
 2018  1:30 PM  
NUCLEAR MEDICINE with NUCLEAR, [facility]) Appt Note: Dr HERNANDEZ  6'1\" 352STRESS TEST LEXISCAN/CARDIOLITE [[id number]] (Order [id number]) 2D ECHO COMPLETE ADULT (TTE) W OR WO CONTR [[id number]] (Order [id number])  
 [address] [place] [address]  
[phone] [address] [place] [address]  
  
    
 2018 11:00 AM  
ROUTINE CARE with Marta Chilel MD  
[facility]) Appt Note: routine follow up; rs from  routine follow up; rs from  routine follow up - kd 18  
 [address] [facility] [address] [address] [phone] [address] [facility] [address] Upcoming Health Maintenance Date Due  
 FOOT EXAM Q1 1977 MICROALBUMIN Q1 1977 EYE EXAM RETINAL OR DILATED Q1 1977 FOBT Q 1 YEAR AGE 50-75 2017 LIPID PANEL Q1 2018 Influenza Age 5 to Adult 2018 HEMOGLOBIN A1C Q6M 10/4/2018 DTaP/Tdap/Td series (2 - Td) 2027 Allergies as of 7/2/2018  Review Complete On: 7/2/2018 By: Joni Dempsey MD  
  
 Severity Noted Reaction Type Reactions Influenza Virus Vaccines  09/11/2017    Nausea Only Fever  diarrhea Mushroom Flavor  02/17/2014   Systemic Swelling Oxycodone  08/10/2017    Rash Pneumococcal 23-jesús Ps Vaccine  09/11/2017    Nausea and Vomiting Vomit Current Immunizations  Reviewed on 12/30/2017 Name Date Td, Adsorbed PF 4/13/2016 Not reviewed this visit You Were Diagnosed With   
  
 Codes Comments Shortness of breath    -  Primary ICD-10-CM: R06.02 
ICD-9-CM: 786.05 Essential hypertension     ICD-10-CM: I10 
ICD-9-CM: 401.9 Chronic atrial fibrillation (HCC)     ICD-10-CM: Y13.5 ICD-9-CM: 427.31 Restrictive lung disease     ICD-10-CM: J98.4 ICD-9-CM: 518.89 Obesity, morbid ([facility]     ICD-10-CM: E66.01 
ICD-9-CM: 278.01 Controlled type 2 diabetes mellitus with complication, without long-term current use of insulin (HCC)     ICD-10-CM: E11.8 ICD-9-CM: 250.90 Encounter to establish care     ICD-10-CM: Z76.89 
ICD-9-CM: V65.8 Mixed hyperlipidemia     ICD-10-CM: E78.2 ICD-9-CM: 272.2 Chest tightness     ICD-10-CM: R07.89 ICD-9-CM: 786.59 Vitals BP Pulse Resp Height(growth percentile) Weight(growth percentile) SpO2  
 130/62 (BP 1 Location: Right arm, BP Patient Position: Standing) 90 20 6' 1\" (1.854 m) (!) 362 lb (164.2 kg) 94% BMI Smoking Status 47.76 kg/m2 Former Smoker Vitals History BMI and BSA Data Body Mass Index Body Surface Area 47.76 kg/m 2 2.91 m 2 Preferred Pharmacy Pharmacy Name Phone [address] [address] Cecile Wilson, [address] [facility] [phone] Your Updated Medication List  
  
   
This list is accurate as of 7/2/18 11:48 AM.  Always use your most recent med list.  
  
  
  
  
 albuterol-ipratropium 2.5 mg-0.5 mg/3 ml Nebu Commonly known as:  Nadege Bryant INHALE THREE MILLILITERS (ONE VIAL) VIA NEBULIZATION BY MOUTH EVERY 6 HOURS AS NEEDED  
  
 atorvastatin 40 mg tablet Commonly known as:  LIPITOR  
TAKE ONE TABLET BY MOUTH DAILY FOR CHOLESTEROL Blood-Glucose Meter Misc Use to check BS twice a day. Dx code E11.9  
  
 carvedilol 25 mg tablet Commonly known as:  Va Jeffy Take 1 Tab by mouth two (2) times a day. To slow heart rate down ELIQUIS 5 mg tablet Generic drug:  apixaban TAKE ONE TABLET BY MOUTH TWICE A DAY TO PREVENT STROKE  
  
 fluticasone-umeclidin-vilanter 100-62.5-25 mcg Dsdv Commonly known as:  Angela Tiana Take 1 Inhalation by inhalation daily. furosemide 80 mg tablet Commonly known as:  LASIX  
2 tabs po- in am, and 2 tabs po around noon  
  
 glipiZIDE 10 mg tablet Commonly known as:  Stephanie Gennaro Take 1 Tab by mouth two (2) times a day. * glucose blood VI test strips strip Commonly known as:  FREESTYLE LITE STRIPS As directed * glucose blood VI test strips strip Commonly known as:  ONETOUCH ULTRA TEST Use to check BS twice a day. Dx code E11.9  
  
 * ONETOUCH ULTRA BLUE TEST STRIP strip Generic drug:  glucose blood VI test strips TEST BLOOD SUGAR TWICE DAILY HYDROcodone-homatropine 5-1.5 mg/5 mL (5 mL) syrup Commonly known as:  HYCODAN Take 5 mL by mouth three (3) times daily as needed. Max Daily Amount: 15 mL. lisinopril 20 mg tablet Commonly known as:  Kerwin Teto Take 1 Tab by mouth daily. For heart  
  
 metFORMIN 1,000 mg tablet Commonly known as:  GLUCOPHAGE  
1 1/2 pills in am and 1 pill in pm. For diabetes  
  
 potassium chloride 20 mEq tablet Commonly known as:  K-DUR, KLOR-CON Take 1 Tab by mouth two (2) times a day. traMADol 50 mg tablet Commonly known as:  ULTRAM  
TAKE 1 TO 2 TABLETS BY MOUTH EVERY 6 HOURS AS NEEDED FOR PAIN  
  
 VENTOLIN HFA 90 mcg/actuation inhaler Generic drug:  albuterol INHALE TWO PUFFS BY MOUTH EVERY 4 HOURS AS NEEDED FOR FOR WHEEZING AND FOR SHORTNESS OF BREATH * Notice: This list has 3 medication(s) that are the same as other medications prescribed for you. Read the directions carefully, and ask your doctor or other care provider to review them with you. We Performed the Following 2D ECHO COMPLETE ADULT (TTE) W OR WO CONTR [08255 CPT(R)] AMB POC EKG ROUTINE W/ 12 LEADS, INTER & REP [65807 CPT(R)] Follow-up Instructions Return in about 6 months (around 1/2/2019). To-Do List   
 07/02/2018 ECG:  STRESS TEST LEXISCAN/CARDIOLITE Introducing [facility] & HEALTH SERVICES! [facility] introduces [employer] patient portal. Now you can access parts of your medical record, email your doctor's office, and request medication refills online. 1. In your internet browser, go to [email or web address] 2. Click on the First Time User? Click Here link in the Sign In box. You will see the New Member Sign Up page. 3. Enter your CloudOn Access Code exactly as it appears below. You will not need to use this code after youve completed the sign-up process. If you do not sign up before the expiration date, you must request a new code. · [employer] Access Code: [email or web address] Expires: 7/3/2018 11:11 AM 
 
4. Enter the last four digits of your Social Security Number (xxxx) and Date of Birth (mm/dd/yyyy) as indicated and click Submit. You will be taken to the next sign-up page. 5. Create a CloudOn ID. This will be your CloudOn login ID and cannot be changed, so think of one that is secure and easy to remember. 6. Create a CloudOn password. You can change your password at any time. 7. Enter your Password Reset Question and Answer. This can be used at a later time if you forget your password. 8. Enter your e-mail address. You will receive e-mail notification when new information is available in [address] 9. Click Sign Up. You can now view and download portions of your medical record. 10. Click the Download Summary menu link to download a portable copy of your medical information. If you have questions, please visit the Frequently Asked Questions section of the [employer] website. Remember, [employer] is NOT to be used for urgent needs. For medical emergencies, dial 911. Now available from your iPhone and Android! Please provide this summary of care documentation to your next provider. Your primary care clinician is listed as Chance Rai. If you have any questions after today's visit, please call [phone].

## 2018-07-02 NOTE — PROGRESS NOTES
1. Have you been to the ER, urgent care clinic since your last visit? Hospitalized since your last visit? YES. 2 MONTHS AGO. 2. Have you seen or consulted any other health care providers outside of the [address] [place] [address] since your last visit? Include any pap smears or colon screening. NO    NEW PATIENT. C/O SOB, TIGHTNESS IN CHEST WHEN BREATHING, DIZZINESS.

## 2018-07-12 RX ORDER — LISINOPRIL 5 MG/1
TABLET ORAL
Qty: 30 TAB | Refills: 0 | Status: SHIPPED | OUTPATIENT
Start: 2018-07-12 | End: 2018-08-12 | Stop reason: SDUPTHER

## 2018-07-17 ENCOUNTER — CLINICAL SUPPORT (OUTPATIENT)
Dept: CARDIOLOGY CLINIC | Age: 53
End: 2018-07-17

## 2018-07-17 DIAGNOSIS — R07.89 OTHER CHEST PAIN: ICD-10-CM

## 2018-07-17 DIAGNOSIS — R00.2 PALPITATIONS: ICD-10-CM

## 2018-07-17 DIAGNOSIS — R06.09 DOE (DYSPNEA ON EXERTION): Primary | ICD-10-CM

## 2018-07-20 ENCOUNTER — TELEPHONE (OUTPATIENT)
Dept: FAMILY MEDICINE CLINIC | Age: 53
End: 2018-07-20

## 2018-07-20 NOTE — TELEPHONE ENCOUNTER
PA resubmitted for Trelegy. KEY  [id number]   \"Your demographic data has been sent to the plan successfully. They will respond with your clinical questions and you will be notified by email when available within the next business day. You can also check for an update later by opening this request from your dashboard. Please do not fax or call the plan to resubmit this request.  If you need assistance, please chat with Cover[employer]Meds or call us at [phone]. \"

## 2018-07-23 ENCOUNTER — TELEPHONE (OUTPATIENT)
Dept: CARDIOLOGY CLINIC | Age: 53
End: 2018-07-23

## 2018-07-23 ENCOUNTER — CLINICAL SUPPORT (OUTPATIENT)
Dept: CARDIOLOGY CLINIC | Age: 53
End: 2018-07-23

## 2018-07-23 DIAGNOSIS — Z76.89 ENCOUNTER TO ESTABLISH CARE: ICD-10-CM

## 2018-07-23 DIAGNOSIS — E11.8 CONTROLLED TYPE 2 DIABETES MELLITUS WITH COMPLICATION, WITHOUT LONG-TERM CURRENT USE OF INSULIN (HCC): ICD-10-CM

## 2018-07-23 DIAGNOSIS — J98.4 RESTRICTIVE LUNG DISEASE: ICD-10-CM

## 2018-07-23 DIAGNOSIS — E78.2 MIXED HYPERLIPIDEMIA: ICD-10-CM

## 2018-07-23 DIAGNOSIS — I48.20 CHRONIC ATRIAL FIBRILLATION (HCC): ICD-10-CM

## 2018-07-23 DIAGNOSIS — E66.01 OBESITY, MORBID (HCC): ICD-10-CM

## 2018-07-23 DIAGNOSIS — R06.02 SHORTNESS OF BREATH: ICD-10-CM

## 2018-07-23 DIAGNOSIS — I10 ESSENTIAL HYPERTENSION: ICD-10-CM

## 2018-07-23 DIAGNOSIS — R07.89 CHEST TIGHTNESS: ICD-10-CM

## 2018-07-23 NOTE — TELEPHONE ENCOUNTER
----- Message from Betsy Haines MD sent at 7/19/2018  5:47 PM EDT -----  Inform him Echo is K. EF has improved to 50%. Left message to call me back. Spoke to pt in office before his stress test, verified pt with two pt identifiers, told pt his echo is okay and his EF has improved to 50%. Pt verbalized understanding.

## 2018-07-23 NOTE — TELEPHONE ENCOUNTER
Patient is, currently, in the office and would like to know if you can discuss his results with him while he is in the office. Patient is having the second part of his nuclear test today.     Thanks,    [employer]

## 2018-07-31 NOTE — PROGRESS NOTES
Stress test showed No evidence of ischemia or infarction. F/u with DR Macias Prseamus as scheduled or sooner if worsening symptoms.

## 2018-08-03 ENCOUNTER — TELEPHONE (OUTPATIENT)
Dept: CARDIOLOGY CLINIC | Age: 53
End: 2018-08-03

## 2018-08-03 NOTE — TELEPHONE ENCOUNTER
----- Message from Yves Varela, NP sent at 7/31/2018  4:34 PM EDT -----  Stress test showed No evidence of ischemia or infarction. F/u with DR Benítez Part as scheduled or sooner if worsening symptoms. Called pt and left message to call me back. Called pt for 2nd time, verified pt with two pt identifiers, relayed message above to pt. Advised pt to f/u in office if any concerning symptoms. Pt verbalized understanding.

## 2018-08-13 RX ORDER — FUROSEMIDE 80 MG/1
TABLET ORAL
Qty: 60 TAB | Refills: 11 | Status: ON HOLD | OUTPATIENT
Start: 2018-08-13 | End: 2018-11-21 | Stop reason: SDUPTHER

## 2018-08-13 RX ORDER — POTASSIUM CHLORIDE 1500 MG/1
TABLET, FILM COATED, EXTENDED RELEASE ORAL
Qty: 60 TAB | Refills: 11 | Status: SHIPPED | OUTPATIENT
Start: 2018-08-13 | End: 2018-08-15 | Stop reason: ALTCHOICE

## 2018-08-13 RX ORDER — LISINOPRIL 5 MG/1
TABLET ORAL
Qty: 30 TAB | Refills: 0 | Status: SHIPPED | OUTPATIENT
Start: 2018-08-13 | End: 2018-08-15 | Stop reason: ALTCHOICE

## 2018-08-15 ENCOUNTER — OFFICE VISIT (OUTPATIENT)
Dept: FAMILY MEDICINE CLINIC | Age: 53
End: 2018-08-15

## 2018-08-15 VITALS
WEIGHT: 315 LBS | OXYGEN SATURATION: 95 % | TEMPERATURE: 97.2 F | RESPIRATION RATE: 14 BRPM | BODY MASS INDEX: 41.75 KG/M2 | HEART RATE: 84 BPM | SYSTOLIC BLOOD PRESSURE: 114 MMHG | DIASTOLIC BLOOD PRESSURE: 84 MMHG | HEIGHT: 73 IN

## 2018-08-15 DIAGNOSIS — E11.9 CONTROLLED TYPE 2 DIABETES MELLITUS WITHOUT COMPLICATION, WITHOUT LONG-TERM CURRENT USE OF INSULIN (HCC): ICD-10-CM

## 2018-08-15 DIAGNOSIS — R06.02 SHORTNESS OF BREATH: ICD-10-CM

## 2018-08-15 DIAGNOSIS — M54.50 CHRONIC BILATERAL LOW BACK PAIN WITHOUT SCIATICA: ICD-10-CM

## 2018-08-15 DIAGNOSIS — I10 ESSENTIAL HYPERTENSION: ICD-10-CM

## 2018-08-15 DIAGNOSIS — I48.91 ATRIAL FIBRILLATION WITH RVR (HCC): Primary | ICD-10-CM

## 2018-08-15 DIAGNOSIS — G89.29 CHRONIC BILATERAL LOW BACK PAIN WITHOUT SCIATICA: ICD-10-CM

## 2018-08-15 DIAGNOSIS — E78.00 HYPERCHOLESTEROLEMIA: ICD-10-CM

## 2018-08-15 LAB — HBA1C MFR BLD HPLC: 9.6 %

## 2018-08-15 RX ORDER — POTASSIUM CHLORIDE 20 MEQ/1
20 TABLET, EXTENDED RELEASE ORAL 2 TIMES DAILY
Qty: 60 TAB | Refills: 12 | Status: SHIPPED | OUTPATIENT
Start: 2018-08-15 | End: 2018-11-23 | Stop reason: SDUPTHER

## 2018-08-15 RX ORDER — ALBUTEROL SULFATE 90 UG/1
AEROSOL, METERED RESPIRATORY (INHALATION)
Qty: 1 INHALER | Refills: 12 | Status: SHIPPED | OUTPATIENT
Start: 2018-08-15 | End: 2020-07-23 | Stop reason: ALTCHOICE

## 2018-08-15 RX ORDER — TRAMADOL HYDROCHLORIDE 50 MG/1
TABLET ORAL
Qty: 60 TAB | Refills: 2 | Status: SHIPPED | OUTPATIENT
Start: 2018-08-15 | End: 2018-11-12 | Stop reason: SDUPTHER

## 2018-08-15 RX ORDER — CARVEDILOL 25 MG/1
25 TABLET ORAL 2 TIMES DAILY
Qty: 60 TAB | Refills: 12 | Status: ON HOLD | OUTPATIENT
Start: 2018-08-15 | End: 2018-11-21 | Stop reason: SDUPTHER

## 2018-08-15 RX ORDER — ATORVASTATIN CALCIUM 40 MG/1
TABLET, FILM COATED ORAL
Qty: 30 TAB | Refills: 12 | Status: SHIPPED | OUTPATIENT
Start: 2018-08-15 | End: 2018-11-23 | Stop reason: SDUPTHER

## 2018-08-15 NOTE — PROGRESS NOTES
Chief Complaint   Patient presents with    Referral Follow Up     pulmonary  f/u    Shortness of Breath     1. Have you been to the ER, urgent care clinic since your last visit? Hospitalized since your last visit? No    2. Have you seen or consulted any other health care providers outside of the [address] [place] [address] since your last visit? Include any pap smears or colon screening.  No     Health Maintenance Due   Topic Date Due    FOOT EXAM Q1  01/05/1977    MICROALBUMIN Q1  01/05/1977    EYE EXAM RETINAL OR DILATED Q1  01/05/1977    FOBT Q 1 YEAR AGE 50-75  01/05/2017    LIPID PANEL Q1  02/06/2018    Influenza Age 9 to Adult  08/01/2018

## 2018-08-15 NOTE — PROGRESS NOTES
HISTORY OF PRESENT ILLNESS  Nguyễn Blackmon is a 48 y.o. male. HPI In for followup. Was seen by pulmonary yesterday, had a spirometry test, and was told that didn't have a lung problem. Needs several prescriptions refilled. Still gets short of breath easily. Walking in house will cause dyspnea. Still gets some intermittent chest pains, can last up to 30 minutes. L foot is also numb and sore. Soreness is worse when tries to walk. ROS    Physical Exam   Constitutional: He appears well-developed and well-nourished. HENT:   Right Ear: External ear normal.   Left Ear: External ear normal.   Mouth/Throat: Oropharynx is clear and moist.   Neck: No thyromegaly present. Cardiovascular: Normal rate, regular rhythm, normal heart sounds and intact distal pulses. Pulmonary/Chest: Effort normal and breath sounds normal. No respiratory distress. He has no wheezes. Abdominal: Soft. Bowel sounds are normal. He exhibits no distension and no mass. There is no tenderness. There is no guarding. Musculoskeletal: Normal range of motion. He exhibits edema (1+ edema bilaterally). Lymphadenopathy:     He has no cervical adenopathy. Nursing note and vitals reviewed. ASSESSMENT and PLAN  Orders Placed This Encounter    CBC WITH AUTOMATED DIFF    METABOLIC PANEL, BASIC    LIPID PANEL    AMB POC HEMOGLOBIN A1C    traMADol (ULTRAM) 50 mg tablet     Sig: TAKE 1 TO 2 TABLETS BY MOUTH EVERY 6 HOURS AS NEEDED FOR PAIN     Dispense:  60 Tab     Refill:  2    atorvastatin (LIPITOR) 40 mg tablet     Sig: TAKE ONE TABLET BY MOUTH DAILY FOR CHOLESTEROL     Dispense:  30 Tab     Refill:  12    albuterol (VENTOLIN HFA) 90 mcg/actuation inhaler     Sig: INHALE TWO PUFFS BY MOUTH EVERY 4 HOURS AS NEEDED FOR FOR WHEEZING AND FOR SHORTNESS OF BREATH     Dispense:  1 Inhaler     Refill:  12    carvedilol (COREG) 25 mg tablet     Sig: Take 1 Tab by mouth two (2) times a day.  To slow heart rate down     Dispense:  60 Tab     Refill: 12    potassium chloride (K-DUR, KLOR-CON) 20 mEq tablet     Sig: Take 1 Tab by mouth two (2) times a day. Dispense:  60 Tab     Refill:  12     Orders Placed This Encounter    CBC WITH AUTOMATED DIFF    METABOLIC PANEL, BASIC    LIPID PANEL    AMB POC HEMOGLOBIN A1C    traMADol (ULTRAM) 50 mg tablet    atorvastatin (LIPITOR) 40 mg tablet    albuterol (VENTOLIN HFA) 90 mcg/actuation inhaler    carvedilol (COREG) 25 mg tablet    potassium chloride (K-DUR, KLOR-CON) 20 mEq tablet     Diagnoses and all orders for this visit:    1. Atrial fibrillation with RVR ([facility] [address]    2. Shortness of breath  -     CBC WITH AUTOMATED DIFF    3. Controlled type 2 diabetes mellitus without complication, without long-term current use of insulin ([facility])  -     METABOLIC PANEL, BASIC  -     AMB POC HEMOGLOBIN A1C    4. Hypercholesterolemia  -     LIPID PANEL    5. Chronic bilateral low back pain without sciatica  -     traMADol (ULTRAM) 50 mg tablet; TAKE 1 TO 2 TABLETS BY MOUTH EVERY 6 HOURS AS NEEDED FOR PAIN    6. Essential hypertension  -     carvedilol (COREG) 25 mg tablet; Take 1 Tab by mouth two (2) times a day. To slow heart rate down    Other orders  -     atorvastatin (LIPITOR) 40 mg tablet; TAKE ONE TABLET BY MOUTH DAILY FOR CHOLESTEROL  -     albuterol (VENTOLIN HFA) 90 mcg/actuation inhaler; INHALE TWO PUFFS BY MOUTH EVERY 4 HOURS AS NEEDED FOR FOR WHEEZING AND FOR SHORTNESS OF BREATH  -     potassium chloride (K-DUR, KLOR-CON) 20 mEq tablet; Take 1 Tab by mouth two (2) times a day.

## 2018-08-15 NOTE — MR AVS SNAPSHOT
[facility] 
 
 
 [address] [facility] [address] 
[phone] Patient: Flora Zeng MRN: [id number] :[id number] Visit Information Date & Time Provider Department Dept. Phone Encounter #  
 8/15/2018 11:45 AM Ruthie Garcia MD [facility] [phone] [id number] Follow-up Instructions Return in about 6 months (around 2/15/2019). Upcoming Health Maintenance Date Due  
 FOOT EXAM Q1 1977 MICROALBUMIN Q1 1977 EYE EXAM RETINAL OR DILATED Q1 1977 FOBT Q 1 YEAR AGE 50-75 2017 LIPID PANEL Q1 2018 Influenza Age 5 to Adult 2018 HEMOGLOBIN A1C Q6M 10/4/2018 DTaP/Tdap/Td series (2 - Td) 2027 Allergies as of 8/15/2018  Review Complete On: 8/15/2018 By: Ruthie Garcia MD  
  
 Severity Noted Reaction Type Reactions Influenza Virus Vaccines  2017    Nausea Only Fever  diarrhea Mushroom Flavor  2014   Systemic Swelling Oxycodone  08/10/2017    Rash Pneumococcal 23-jes[address] Ps Vaccine  2017    Nausea and Vomiting Vomit Current Immunizations  Reviewed on 2017 Name Date Td, Adsorbed PF 2016 Not reviewed this visit You Were Diagnosed With   
  
 Codes Comments Atrial fibrillation with RVR ([facility] [address]    -  Primary ICD-10-CM: I48.91 
ICD-9-CM: 427.31 Shortness of breath     ICD-10-CM: R06.02 
ICD-9-CM: 786.05 Controlled type 2 diabetes mellitus without complication, without long-term current use of insulin ([facility] [address]     ICD-10-CM: E11.9 ICD-9-CM: 250.00 Hypercholesterolemia     ICD-10-CM: E78.00 ICD-9-CM: 272.0 Chronic bilateral low back pain without sciatica     ICD-10-CM: M54.5, G89.29 ICD-9-CM: 724.2, 338.29 Essential hypertension     ICD-10-CM: I10 
ICD-9-CM: 401.9 Vitals BP Pulse Temp Resp Height(growth percentile) Weight(growth percentile) 114/84 84 97.2 °F (36.2 °C) (Oral) 14 6' 1\" (1.854 m) (!) 360 lb 9.6 oz (163.6 kg) SpO2 BMI Smoking Status 95% 47.58 kg/m2 Former Smoker Vitals History BMI and BSA Data Body Mass Index Body Surface Area  
 47.58 kg/m 2 2.9 m 2 Preferred Pharmacy Pharmacy Name Phone [facility] [address] [facility], [address] [place] [address] [phone] Your Updated Medication List  
  
   
This list is accurate as of 8/15/18 12:56 PM.  Always use your most recent med list.  
  
  
  
  
 albuterol 90 mcg/actuation inhaler Commonly known as:  VENTOLIN HFA INHALE TWO PUFFS BY MOUTH EVERY 4 HOURS AS NEEDED FOR FOR WHEEZING AND FOR SHORTNESS OF BREATH  
  
 albuterol-ipratropium 2.5 mg-0.5 mg/3 ml Nebu Commonly known as:  Ferny Bence INHALE THREE MILLILITERS (ONE VIAL) VIA NEBULIZATION BY MOUTH EVERY 6 HOURS AS NEEDED  
  
 atorvastatin 40 mg tablet Commonly known as:  LIPITOR  
TAKE ONE TABLET BY MOUTH DAILY FOR CHOLESTEROL Blood-Glucose Meter Misc Use to check BS twice a day. Dx code E11.9  
  
 carvedilol 25 mg tablet Commonly known as:  Tiffanie Chew Take 1 Tab by mouth two (2) times a day. To slow heart rate down ELIQUIS 5 mg tablet Generic drug:  apixaban TAKE ONE TABLET BY MOUTH TWICE A DAY TO PREVENT STROKE  
  
 furosemide 80 mg tablet Commonly known as:  LASIX TAKE TWO TABLETS BY MOUTH EVERY MORNING FOR FLUID  
  
 glipiZIDE 10 mg tablet Commonly known as:  Jaja [place] Take 1 Tab by mouth two (2) times a day. * glucose blood VI test strips strip Commonly known as:  FREESTYLE LITE STRIPS As directed * glucose blood VI test strips strip Commonly known as:  ONETOUCH ULTRA TEST Use to check BS twice a day. Dx code E11.9  
  
 * ONETOUCH ULTRA BLUE TEST STRIP strip Generic drug:  glucose blood VI test strips TEST BLOOD SUGAR TWICE DAILY  
  
 lisinopril 20 mg tablet Commonly known as:  Therdillan Guzman  
 Take 1 Tab by mouth daily. For heart  
  
 metFORMIN 1,000 mg tablet Commonly known as:  GLUCOPHAGE  
1 1/2 pills in am and 1 pill in pm. For diabetes  
  
 potassium chloride 20 mEq tablet Commonly known as:  K-DUR, KLOR-CON Take 1 Tab by mouth two (2) times a day. traMADol 50 mg tablet Commonly known as:  ULTRAM  
TAKE 1 TO 2 TABLETS BY MOUTH EVERY 6 HOURS AS NEEDED FOR PAIN  
  
 * Notice: This list has 3 medication(s) that are the same as other medications prescribed for you. Read the directions carefully, and ask your doctor or other care provider to review them with you. Prescriptions Printed Refills  
 traMADol (ULTRAM) 50 mg tablet 2 Sig: TAKE 1 TO 2 TABLETS BY MOUTH EVERY 6 HOURS AS NEEDED FOR PAIN Class: Print Prescriptions Sent to Pharmacy Refills  
 atorvastatin (LIPITOR) 40 mg tablet 12 Sig: TAKE ONE TABLET BY MOUTH DAILY FOR CHOLESTEROL Class: Normal  
 Pharmacy: [address] Ph #: [phone]  
 albuterol (VENTOLIN HFA) 90 mcg/actuation inhaler 12 Sig: INHALE TWO PUFFS BY MOUTH EVERY 4 HOURS AS NEEDED FOR FOR WHEEZING AND FOR SHORTNESS OF BREATH Class: Normal  
 Pharmacy: [address] Ph #: [phone]  
 carvedilol (COREG) 25 mg tablet 12 Sig: Take 1 Tab by mouth two (2) times a day. To slow heart rate down Class: Normal  
 Pharmacy: [address] Ph #: [phone] Route: Oral  
 potassium chloride (K-DUR, KLOR-CON) 20 mEq tablet 12 Sig: Take 1 Tab by mouth two (2) times a day. Class: Normal  
 Pharmacy: [address] Ph #: [phone] Route: Oral  
  
We Performed the Following AMB POC HEMOGLOBIN A1C [28775 CPT(R)] CBC WITH AUTOMATED DIFF [71260 CPT(R)] LIPID PANEL [88779 CPT(R)] METABOLIC PANEL, BASIC [41911 CPT(R)] Follow-up Instructions Return in about 6 months (around 2/15/2019). Introducing [facility]! Meghan Omer introduces MySiteApp patient portal. Now you can access parts of your medical record, email your doctor's office, and request medication refills online. 1. In your internet browser, go to [email or web address] 2. Click on the First Time User? Click Here link in the Sign In box. You will see the New Member Sign Up page. 3. Enter your MySiteApp Access Code exactly as it appears below. You will not need to use this code after youve completed the sign-up process. If you do not sign up before the expiration date, you must request a new code. · My[employer] Access Code: [email or web address] Expires: 10/18/2018  8:34 AM 
 
4. Enter the last four digits of your Social Security Number (xxxx) and Date of Birth (mm/dd/yyyy) as indicated and click Submit. You will be taken to the next sign-up page. 5. Create a MySiteApp ID. This will be your MySiteApp login ID and cannot be changed, so think of one that is secure and easy to remember. 6. Create a My[employer] password. You can change your password at any time. 7. Enter your Password Reset Question and Answer. This can be used at a later time if you forget your password. 8. Enter your e-mail address. You will receive e-mail notification when new information is available in [address]. 9. Click Sign Up. You can now view and download portions of your medical record. 10. Click the Download Summary menu link to download a portable copy of your medical information. If you have questions, please visit the Frequently Asked Questions section of the [employer] website. Remember, MySiteApp is NOT to be used for urgent needs. For medical emergencies, dial 911. Now available from your iPhone and Android! Please provide this summary of care documentation to your next provider. Your primary care clinician is listed as Chance Rai. If you have any questions after today's visit, please call [phone].

## 2018-08-16 LAB
BASOPHILS # BLD AUTO: 0 X10E3/UL (ref 0–0.2)
BASOPHILS NFR BLD AUTO: 0 %
BUN SERPL-MCNC: 12 MG/DL (ref 6–24)
BUN/CREAT SERPL: 12 (ref 9–20)
CALCIUM SERPL-MCNC: 9.2 MG/DL (ref 8.7–10.2)
CHLORIDE SERPL-SCNC: 98 MMOL/L (ref 96–106)
CHOLEST SERPL-MCNC: 84 MG/DL (ref 100–199)
CO2 SERPL-SCNC: 21 MMOL/L (ref 20–29)
CREAT SERPL-MCNC: 0.99 MG/DL (ref 0.76–1.27)
EOSINOPHIL # BLD AUTO: 0.1 X10E3/UL (ref 0–0.4)
EOSINOPHIL NFR BLD AUTO: 1 %
ERYTHROCYTE [DISTWIDTH] IN BLOOD BY AUTOMATED COUNT: 15.9 % (ref 12.3–15.4)
GLUCOSE SERPL-MCNC: 293 MG/DL (ref 65–99)
HCT VFR BLD AUTO: 43.4 % (ref 37.5–51)
HDLC SERPL-MCNC: 23 MG/DL
HGB BLD-MCNC: 14.2 G/DL (ref 13–17.7)
IMM GRANULOCYTES # BLD: 0.1 X10E3/UL (ref 0–0.1)
IMM GRANULOCYTES NFR BLD: 1 %
INTERPRETATION, 910389: NORMAL
LDLC SERPL CALC-MCNC: 22 MG/DL (ref 0–99)
LYMPHOCYTES # BLD AUTO: 1.5 X10E3/UL (ref 0.7–3.1)
LYMPHOCYTES NFR BLD AUTO: 16 %
Lab: NORMAL
MCH RBC QN AUTO: 28.9 PG (ref 26.6–33)
MCHC RBC AUTO-ENTMCNC: 32.7 G/DL (ref 31.5–35.7)
MCV RBC AUTO: 88 FL (ref 79–97)
MONOCYTES # BLD AUTO: 0.5 X10E3/UL (ref 0.1–0.9)
MONOCYTES NFR BLD AUTO: 5 %
NEUTROPHILS # BLD AUTO: 7.2 X10E3/UL (ref 1.4–7)
NEUTROPHILS NFR BLD AUTO: 77 %
PLATELET # BLD AUTO: 178 X10E3/UL (ref 150–379)
POTASSIUM SERPL-SCNC: 4.3 MMOL/L (ref 3.5–5.2)
RBC # BLD AUTO: 4.91 X10E6/UL (ref 4.14–5.8)
SODIUM SERPL-SCNC: 139 MMOL/L (ref 134–144)
TRIGL SERPL-MCNC: 195 MG/DL (ref 0–149)
VLDLC SERPL CALC-MCNC: 39 MG/DL (ref 5–40)
WBC # BLD AUTO: 9.4 X10E3/UL (ref 3.4–10.8)

## 2018-08-23 ENCOUNTER — DOCUMENTATION ONLY (OUTPATIENT)
Dept: FAMILY MEDICINE CLINIC | Age: 53
End: 2018-08-23

## 2018-09-21 RX ORDER — LISINOPRIL 5 MG/1
TABLET ORAL
Qty: 30 TAB | Refills: 0 | Status: SHIPPED | OUTPATIENT
Start: 2018-09-21 | End: 2018-10-21 | Stop reason: SDUPTHER

## 2018-09-27 ENCOUNTER — DOCUMENTATION ONLY (OUTPATIENT)
Dept: FAMILY MEDICINE CLINIC | Age: 53
End: 2018-09-27

## 2018-09-28 ENCOUNTER — TELEPHONE (OUTPATIENT)
Dept: FAMILY MEDICINE CLINIC | Age: 53
End: 2018-09-28

## 2018-11-12 DIAGNOSIS — G89.29 CHRONIC BILATERAL LOW BACK PAIN WITHOUT SCIATICA: ICD-10-CM

## 2018-11-12 DIAGNOSIS — M54.50 CHRONIC BILATERAL LOW BACK PAIN WITHOUT SCIATICA: ICD-10-CM

## 2018-11-12 RX ORDER — TRAMADOL HYDROCHLORIDE 50 MG/1
TABLET ORAL
Qty: 60 TAB | Refills: 2 | Status: SHIPPED | OUTPATIENT
Start: 2018-11-12 | End: 2019-07-09 | Stop reason: SDUPTHER

## 2018-11-12 NOTE — TELEPHONE ENCOUNTER
----- Message from Kaleb Hummel sent at 11/12/2018  3:04 PM EST -----  Regarding: Homar Houser/ Telephone  Pt requesting a authorization to be called in for prescription: \"tramadol\". [address] [place] [address] on file.  Pt best contact number is [phone]

## 2018-11-12 NOTE — TELEPHONE ENCOUNTER
Last Visit: 8/15  Next Appt: 12/4  Previous Refill Encounter: 8/15-60+2    Requested Prescriptions     Pending Prescriptions Disp Refills    traMADol (ULTRAM) 50 mg tablet 60 Tab 2     Sig: TAKE 1 TO 2 TABLETS BY MOUTH EVERY 6 HOURS AS NEEDED FOR PAIN

## 2018-11-14 ENCOUNTER — DOCUMENTATION ONLY (OUTPATIENT)
Dept: FAMILY MEDICINE CLINIC | Age: 53
End: 2018-11-14

## 2018-11-14 ENCOUNTER — HOSPITAL ENCOUNTER (INPATIENT)
Age: 53
LOS: 6 days | Discharge: HOME | DRG: 175 | End: 2018-11-21
Attending: EMERGENCY MEDICINE | Admitting: INTERNAL MEDICINE
Payer: MEDICAID

## 2018-11-14 DIAGNOSIS — I50.9 CONGESTIVE HEART FAILURE, UNSPECIFIED HF CHRONICITY, UNSPECIFIED HEART FAILURE TYPE (HCC): ICD-10-CM

## 2018-11-14 DIAGNOSIS — I48.91 ATRIAL FIBRILLATION WITH RAPID VENTRICULAR RESPONSE (HCC): Primary | ICD-10-CM

## 2018-11-14 DIAGNOSIS — I10 ESSENTIAL HYPERTENSION: ICD-10-CM

## 2018-11-14 DIAGNOSIS — N17.9 AKI (ACUTE KIDNEY INJURY) (HCC): ICD-10-CM

## 2018-11-14 DIAGNOSIS — R77.8 ELEVATED TROPONIN: ICD-10-CM

## 2018-11-14 PROCEDURE — 93005 ELECTROCARDIOGRAM TRACING: CPT

## 2018-11-14 PROCEDURE — 99285 EMERGENCY DEPT VISIT HI MDM: CPT

## 2018-11-14 PROCEDURE — 84484 ASSAY OF TROPONIN QUANT: CPT

## 2018-11-14 PROCEDURE — 83880 ASSAY OF NATRIURETIC PEPTIDE: CPT

## 2018-11-14 PROCEDURE — 83735 ASSAY OF MAGNESIUM: CPT

## 2018-11-14 PROCEDURE — 80053 COMPREHEN METABOLIC PANEL: CPT

## 2018-11-14 RX ORDER — DILTIAZEM HYDROCHLORIDE 5 MG/ML
15 INJECTION INTRAVENOUS
Status: COMPLETED | OUTPATIENT
Start: 2018-11-14 | End: 2018-11-15

## 2018-11-14 NOTE — PROGRESS NOTES
Tramadol called into [facility] pharmacy.  [facility] reviewed

## 2018-11-15 ENCOUNTER — APPOINTMENT (OUTPATIENT)
Dept: GENERAL RADIOLOGY | Age: 53
DRG: 175 | End: 2018-11-15
Attending: EMERGENCY MEDICINE
Payer: MEDICAID

## 2018-11-15 ENCOUNTER — APPOINTMENT (OUTPATIENT)
Dept: CT IMAGING | Age: 53
DRG: 175 | End: 2018-11-15
Attending: FAMILY MEDICINE
Payer: MEDICAID

## 2018-11-15 LAB
ALBUMIN SERPL-MCNC: 3.4 G/DL (ref 3.5–5)
ALBUMIN/GLOB SERPL: 0.9 {RATIO} (ref 1.1–2.2)
ALP SERPL-CCNC: 83 U/L (ref 45–117)
ALT SERPL-CCNC: 49 U/L (ref 12–78)
ANION GAP SERPL CALC-SCNC: 10 MMOL/L (ref 5–15)
APPEARANCE UR: CLEAR
ARTERIAL PATENCY WRIST A: YES
AST SERPL-CCNC: 35 U/L (ref 15–37)
ATRIAL RATE: 92 BPM
BACTERIA URNS QL MICRO: NEGATIVE /HPF
BASE DEFICIT BLDA-SCNC: 1.5 MMOL/L
BASOPHILS # BLD: 0.1 K/UL (ref 0–0.1)
BASOPHILS NFR BLD: 1 % (ref 0–1)
BDY SITE: ABNORMAL
BILIRUB SERPL-MCNC: 1 MG/DL (ref 0.2–1)
BILIRUB UR QL: NEGATIVE
BNP SERPL-MCNC: 2329 PG/ML (ref 0–125)
BUN SERPL-MCNC: 14 MG/DL (ref 6–20)
BUN/CREAT SERPL: 10 (ref 12–20)
CALCIUM SERPL-MCNC: 8.7 MG/DL (ref 8.5–10.1)
CALCULATED R AXIS, ECG10: -80 DEGREES
CALCULATED T AXIS, ECG11: 85 DEGREES
CHLORIDE SERPL-SCNC: 98 MMOL/L (ref 97–108)
CO2 SERPL-SCNC: 26 MMOL/L (ref 21–32)
COLOR UR: ABNORMAL
COMMENT, HOLDF: NORMAL
CREAT SERPL-MCNC: 1.4 MG/DL (ref 0.7–1.3)
D DIMER PPP FEU-MCNC: 0.48 MG/L FEU (ref 0–0.65)
DIAGNOSIS, 93000: NORMAL
DIFFERENTIAL METHOD BLD: ABNORMAL
EOSINOPHIL # BLD: 0.1 K/UL (ref 0–0.4)
EOSINOPHIL NFR BLD: 1 % (ref 0–7)
EPITH CASTS URNS QL MICRO: ABNORMAL /LPF
ERYTHROCYTE [DISTWIDTH] IN BLOOD BY AUTOMATED COUNT: 15.4 % (ref 11.5–14.5)
EST. AVERAGE GLUCOSE BLD GHB EST-MCNC: 226 MG/DL
GLOBULIN SER CALC-MCNC: 4 G/DL (ref 2–4)
GLUCOSE BLD STRIP.AUTO-MCNC: 301 MG/DL (ref 65–100)
GLUCOSE BLD STRIP.AUTO-MCNC: 318 MG/DL (ref 65–100)
GLUCOSE BLD STRIP.AUTO-MCNC: 338 MG/DL (ref 65–100)
GLUCOSE BLD STRIP.AUTO-MCNC: 370 MG/DL (ref 65–100)
GLUCOSE BLD STRIP.AUTO-MCNC: 436 MG/DL (ref 65–100)
GLUCOSE SERPL-MCNC: 407 MG/DL (ref 65–100)
GLUCOSE UR STRIP.AUTO-MCNC: >1000 MG/DL
HBA1C MFR BLD: 9.5 % (ref 4.2–6.3)
HCO3 BLDA-SCNC: 23 MMOL/L (ref 22–26)
HCT VFR BLD AUTO: 40.1 % (ref 36.6–50.3)
HGB BLD-MCNC: 13.1 G/DL (ref 12.1–17)
HGB UR QL STRIP: NEGATIVE
HYALINE CASTS URNS QL MICRO: ABNORMAL /LPF (ref 0–5)
IMM GRANULOCYTES # BLD: 0.1 K/UL (ref 0–0.04)
IMM GRANULOCYTES NFR BLD AUTO: 1 % (ref 0–0.5)
KETONES UR QL STRIP.AUTO: NEGATIVE MG/DL
LEUKOCYTE ESTERASE UR QL STRIP.AUTO: NEGATIVE
LYMPHOCYTES # BLD: 1.9 K/UL (ref 0.8–3.5)
LYMPHOCYTES NFR BLD: 20 % (ref 12–49)
MAGNESIUM SERPL-MCNC: 2.2 MG/DL (ref 1.6–2.4)
MCH RBC QN AUTO: 29.2 PG (ref 26–34)
MCHC RBC AUTO-ENTMCNC: 32.7 G/DL (ref 30–36.5)
MCV RBC AUTO: 89.3 FL (ref 80–99)
MONOCYTES # BLD: 0.5 K/UL (ref 0–1)
MONOCYTES NFR BLD: 6 % (ref 5–13)
NEUTS SEG # BLD: 6.7 K/UL (ref 1.8–8)
NEUTS SEG NFR BLD: 72 % (ref 32–75)
NITRITE UR QL STRIP.AUTO: NEGATIVE
NRBC # BLD: 0 K/UL (ref 0–0.01)
NRBC BLD-RTO: 0 PER 100 WBC
PCO2 BLDA: 36 MMHG (ref 35–45)
PH BLDA: 7.41 [PH] (ref 7.35–7.45)
PH UR STRIP: 5.5 [PH] (ref 5–8)
PLATELET # BLD AUTO: 171 K/UL (ref 150–400)
PMV BLD AUTO: 10.3 FL (ref 8.9–12.9)
PO2 BLDA: 77 MMHG (ref 80–100)
POTASSIUM SERPL-SCNC: 4 MMOL/L (ref 3.5–5.1)
PROT SERPL-MCNC: 7.4 G/DL (ref 6.4–8.2)
PROT UR STRIP-MCNC: ABNORMAL MG/DL
Q-T INTERVAL, ECG07: 306 MS
QRS DURATION, ECG06: 90 MS
QTC CALCULATION (BEZET), ECG08: 460 MS
RBC # BLD AUTO: 4.49 M/UL (ref 4.1–5.7)
RBC #/AREA URNS HPF: ABNORMAL /HPF (ref 0–5)
SAMPLES BEING HELD,HOLD: NORMAL
SAO2 % BLD: 96 % (ref 92–97)
SAO2% DEVICE SAO2% SENSOR NAME: ABNORMAL
SERVICE CMNT-IMP: ABNORMAL
SODIUM SERPL-SCNC: 134 MMOL/L (ref 136–145)
SP GR UR REFRACTOMETRY: 1.01 (ref 1–1.03)
SPECIMEN SITE: ABNORMAL
TROPONIN I SERPL-MCNC: 0.09 NG/ML
TROPONIN I SERPL-MCNC: 0.09 NG/ML
TROPONIN I SERPL-MCNC: 0.11 NG/ML
TSH SERPL DL<=0.05 MIU/L-ACNC: 4.21 UIU/ML (ref 0.36–3.74)
UROBILINOGEN UR QL STRIP.AUTO: 1 EU/DL (ref 0.2–1)
VENTRICULAR RATE, ECG03: 136 BPM
WBC # BLD AUTO: 9.3 K/UL (ref 4.1–11.1)
WBC URNS QL MICRO: ABNORMAL /HPF (ref 0–4)

## 2018-11-15 PROCEDURE — 74011250637 HC RX REV CODE- 250/637: Performed by: EMERGENCY MEDICINE

## 2018-11-15 PROCEDURE — 82962 GLUCOSE BLOOD TEST: CPT

## 2018-11-15 PROCEDURE — 74011636637 HC RX REV CODE- 636/637: Performed by: FAMILY MEDICINE

## 2018-11-15 PROCEDURE — 74011000258 HC RX REV CODE- 258: Performed by: EMERGENCY MEDICINE

## 2018-11-15 PROCEDURE — 71046 X-RAY EXAM CHEST 2 VIEWS: CPT

## 2018-11-15 PROCEDURE — 74011636637 HC RX REV CODE- 636/637: Performed by: EMERGENCY MEDICINE

## 2018-11-15 PROCEDURE — 74011250637 HC RX REV CODE- 250/637: Performed by: HOSPITALIST

## 2018-11-15 PROCEDURE — 74011636320 HC RX REV CODE- 636/320: Performed by: FAMILY MEDICINE

## 2018-11-15 PROCEDURE — 65660000000 HC RM CCU STEPDOWN

## 2018-11-15 PROCEDURE — 74011250636 HC RX REV CODE- 250/636: Performed by: EMERGENCY MEDICINE

## 2018-11-15 PROCEDURE — 71275 CT ANGIOGRAPHY CHEST: CPT

## 2018-11-15 PROCEDURE — 74011636637 HC RX REV CODE- 636/637: Performed by: INTERNAL MEDICINE

## 2018-11-15 PROCEDURE — 74011250636 HC RX REV CODE- 250/636: Performed by: FAMILY MEDICINE

## 2018-11-15 PROCEDURE — 74011000250 HC RX REV CODE- 250: Performed by: EMERGENCY MEDICINE

## 2018-11-15 PROCEDURE — 36600 WITHDRAWAL OF ARTERIAL BLOOD: CPT

## 2018-11-15 PROCEDURE — 96365 THER/PROPH/DIAG IV INF INIT: CPT

## 2018-11-15 PROCEDURE — 96374 THER/PROPH/DIAG INJ IV PUSH: CPT

## 2018-11-15 PROCEDURE — 96376 TX/PRO/DX INJ SAME DRUG ADON: CPT

## 2018-11-15 PROCEDURE — 74011000250 HC RX REV CODE- 250: Performed by: INTERNAL MEDICINE

## 2018-11-15 PROCEDURE — 74011250637 HC RX REV CODE- 250/637: Performed by: INTERNAL MEDICINE

## 2018-11-15 PROCEDURE — 36415 COLL VENOUS BLD VENIPUNCTURE: CPT

## 2018-11-15 PROCEDURE — 84443 ASSAY THYROID STIM HORMONE: CPT

## 2018-11-15 PROCEDURE — 85379 FIBRIN DEGRADATION QUANT: CPT

## 2018-11-15 PROCEDURE — 82803 BLOOD GASES ANY COMBINATION: CPT

## 2018-11-15 PROCEDURE — 96361 HYDRATE IV INFUSION ADD-ON: CPT

## 2018-11-15 PROCEDURE — 83036 HEMOGLOBIN GLYCOSYLATED A1C: CPT

## 2018-11-15 PROCEDURE — 85025 COMPLETE CBC W/AUTO DIFF WBC: CPT

## 2018-11-15 PROCEDURE — 81001 URINALYSIS AUTO W/SCOPE: CPT

## 2018-11-15 PROCEDURE — 94640 AIRWAY INHALATION TREATMENT: CPT

## 2018-11-15 PROCEDURE — 77030029684 HC NEB SM VOL KT MONA -A

## 2018-11-15 RX ORDER — INSULIN LISPRO 100 [IU]/ML
INJECTION, SOLUTION INTRAVENOUS; SUBCUTANEOUS
Status: DISCONTINUED | OUTPATIENT
Start: 2018-11-15 | End: 2018-11-21 | Stop reason: HOSPADM

## 2018-11-15 RX ORDER — SODIUM CHLORIDE 0.9 % (FLUSH) 0.9 %
10 SYRINGE (ML) INJECTION
Status: COMPLETED | OUTPATIENT
Start: 2018-11-15 | End: 2018-11-15

## 2018-11-15 RX ORDER — FUROSEMIDE 80 MG/1
80 TABLET ORAL
Status: DISCONTINUED | OUTPATIENT
Start: 2018-11-16 | End: 2018-11-18

## 2018-11-15 RX ORDER — ALBUTEROL SULFATE 90 UG/1
1 AEROSOL, METERED RESPIRATORY (INHALATION)
Status: DISCONTINUED | OUTPATIENT
Start: 2018-11-15 | End: 2018-11-21 | Stop reason: HOSPADM

## 2018-11-15 RX ORDER — MAGNESIUM SULFATE 100 %
4 CRYSTALS MISCELLANEOUS AS NEEDED
Status: DISCONTINUED | OUTPATIENT
Start: 2018-11-15 | End: 2018-11-21 | Stop reason: HOSPADM

## 2018-11-15 RX ORDER — SODIUM CHLORIDE 9 MG/ML
50 INJECTION, SOLUTION INTRAVENOUS
Status: COMPLETED | OUTPATIENT
Start: 2018-11-15 | End: 2018-11-15

## 2018-11-15 RX ORDER — GUAIFENESIN 100 MG/5ML
81 LIQUID (ML) ORAL DAILY
Status: DISCONTINUED | OUTPATIENT
Start: 2018-11-15 | End: 2018-11-21 | Stop reason: HOSPADM

## 2018-11-15 RX ORDER — ATORVASTATIN CALCIUM 40 MG/1
40 TABLET, FILM COATED ORAL DAILY
Status: DISCONTINUED | OUTPATIENT
Start: 2018-11-15 | End: 2018-11-21 | Stop reason: HOSPADM

## 2018-11-15 RX ORDER — IPRATROPIUM BROMIDE AND ALBUTEROL SULFATE 2.5; .5 MG/3ML; MG/3ML
3 SOLUTION RESPIRATORY (INHALATION)
Status: DISCONTINUED | OUTPATIENT
Start: 2018-11-15 | End: 2018-11-16

## 2018-11-15 RX ORDER — DEXTROSE 50 % IN WATER (D50W) INTRAVENOUS SYRINGE
25-50 AS NEEDED
Status: DISCONTINUED | OUTPATIENT
Start: 2018-11-15 | End: 2018-11-21 | Stop reason: HOSPADM

## 2018-11-15 RX ORDER — LEVALBUTEROL INHALATION SOLUTION 1.25 MG/3ML
1.25 SOLUTION RESPIRATORY (INHALATION)
Status: DISCONTINUED | OUTPATIENT
Start: 2018-11-15 | End: 2018-11-21 | Stop reason: HOSPADM

## 2018-11-15 RX ORDER — INSULIN GLARGINE 100 [IU]/ML
30 INJECTION, SOLUTION SUBCUTANEOUS
Status: COMPLETED | OUTPATIENT
Start: 2018-11-15 | End: 2018-11-15

## 2018-11-15 RX ORDER — CARVEDILOL 12.5 MG/1
25 TABLET ORAL 2 TIMES DAILY
Status: DISCONTINUED | OUTPATIENT
Start: 2018-11-15 | End: 2018-11-20

## 2018-11-15 RX ORDER — INSULIN GLARGINE 100 [IU]/ML
25 INJECTION, SOLUTION SUBCUTANEOUS DAILY
Status: DISCONTINUED | OUTPATIENT
Start: 2018-11-15 | End: 2018-11-16

## 2018-11-15 RX ORDER — INSULIN LISPRO 100 [IU]/ML
10 INJECTION, SOLUTION INTRAVENOUS; SUBCUTANEOUS ONCE
Status: COMPLETED | OUTPATIENT
Start: 2018-11-15 | End: 2018-11-15

## 2018-11-15 RX ORDER — TRAMADOL HYDROCHLORIDE 50 MG/1
50 TABLET ORAL
Status: DISCONTINUED | OUTPATIENT
Start: 2018-11-15 | End: 2018-11-21 | Stop reason: HOSPADM

## 2018-11-15 RX ORDER — POTASSIUM CHLORIDE 20 MEQ/1
20 TABLET, EXTENDED RELEASE ORAL 2 TIMES DAILY
Status: DISCONTINUED | OUTPATIENT
Start: 2018-11-16 | End: 2018-11-20

## 2018-11-15 RX ORDER — ASPIRIN 325 MG
325 TABLET ORAL ONCE
Status: COMPLETED | OUTPATIENT
Start: 2018-11-15 | End: 2018-11-15

## 2018-11-15 RX ADMIN — IOPAMIDOL 90 ML: 755 INJECTION, SOLUTION INTRAVENOUS at 10:25

## 2018-11-15 RX ADMIN — CARVEDILOL 25 MG: 12.5 TABLET, FILM COATED ORAL at 21:06

## 2018-11-15 RX ADMIN — INSULIN LISPRO 4 UNITS: 100 INJECTION, SOLUTION INTRAVENOUS; SUBCUTANEOUS at 21:05

## 2018-11-15 RX ADMIN — SODIUM CHLORIDE 500 ML: 900 INJECTION, SOLUTION INTRAVENOUS at 01:35

## 2018-11-15 RX ADMIN — APIXABAN 5 MG: 5 TABLET, FILM COATED ORAL at 10:44

## 2018-11-15 RX ADMIN — Medication 10 ML: at 10:25

## 2018-11-15 RX ADMIN — CARVEDILOL 25 MG: 12.5 TABLET, FILM COATED ORAL at 08:18

## 2018-11-15 RX ADMIN — DILTIAZEM HYDROCHLORIDE 15 MG: 5 INJECTION INTRAVENOUS at 00:11

## 2018-11-15 RX ADMIN — INSULIN GLARGINE 25 UNITS: 100 INJECTION, SOLUTION SUBCUTANEOUS at 10:00

## 2018-11-15 RX ADMIN — INSULIN HUMAN 15 UNITS: 100 INJECTION, SOLUTION PARENTERAL at 16:48

## 2018-11-15 RX ADMIN — INSULIN LISPRO 12 UNITS: 100 INJECTION, SOLUTION INTRAVENOUS; SUBCUTANEOUS at 14:02

## 2018-11-15 RX ADMIN — DILTIAZEM HYDROCHLORIDE 5 MG/HR: 5 INJECTION, SOLUTION INTRAVENOUS at 14:01

## 2018-11-15 RX ADMIN — ALBUTEROL SULFATE 1 PUFF: 90 AEROSOL, METERED RESPIRATORY (INHALATION) at 20:58

## 2018-11-15 RX ADMIN — ASPIRIN 325 MG: 325 TABLET ORAL at 00:59

## 2018-11-15 RX ADMIN — ATORVASTATIN CALCIUM 40 MG: 40 TABLET, FILM COATED ORAL at 08:18

## 2018-11-15 RX ADMIN — IPRATROPIUM BROMIDE AND ALBUTEROL SULFATE 3 ML: .5; 3 SOLUTION RESPIRATORY (INHALATION) at 08:18

## 2018-11-15 RX ADMIN — DILTIAZEM HYDROCHLORIDE 5 MG/HR: 5 INJECTION, SOLUTION INTRAVENOUS at 01:35

## 2018-11-15 RX ADMIN — ALBUTEROL SULFATE 1 PUFF: 90 AEROSOL, METERED RESPIRATORY (INHALATION) at 08:19

## 2018-11-15 RX ADMIN — APIXABAN 5 MG: 5 TABLET, FILM COATED ORAL at 21:06

## 2018-11-15 RX ADMIN — SODIUM CHLORIDE 500 ML: 900 INJECTION, SOLUTION INTRAVENOUS at 00:11

## 2018-11-15 RX ADMIN — IPRATROPIUM BROMIDE AND ALBUTEROL SULFATE 3 ML: .5; 3 SOLUTION RESPIRATORY (INHALATION) at 19:51

## 2018-11-15 RX ADMIN — SODIUM CHLORIDE 50 ML/HR: 900 INJECTION, SOLUTION INTRAVENOUS at 10:25

## 2018-11-15 RX ADMIN — ASPIRIN 81 MG CHEWABLE TABLET 81 MG: 81 TABLET CHEWABLE at 08:18

## 2018-11-15 RX ADMIN — TRAMADOL HYDROCHLORIDE 50 MG: 50 TABLET, FILM COATED ORAL at 23:57

## 2018-11-15 RX ADMIN — INSULIN GLARGINE 30 UNITS: 100 INJECTION, SOLUTION SUBCUTANEOUS at 14:01

## 2018-11-15 RX ADMIN — INSULIN LISPRO 7 UNITS: 100 INJECTION, SOLUTION INTRAVENOUS; SUBCUTANEOUS at 08:19

## 2018-11-15 RX ADMIN — ALBUTEROL SULFATE 1 PUFF: 90 AEROSOL, METERED RESPIRATORY (INHALATION) at 14:01

## 2018-11-15 RX ADMIN — INSULIN LISPRO 10 UNITS: 100 INJECTION, SOLUTION INTRAVENOUS; SUBCUTANEOUS at 00:59

## 2018-11-15 NOTE — ED NOTES
Pt meal tray has arrived. POC Glucose 436. Attempted to contact Dr Isabel Olivares regarding pt BS without success. Will try again

## 2018-11-15 NOTE — ED PROVIDER NOTES
EMERGENCY DEPARTMENT HISTORY AND PHYSICAL EXAM 
 
 
Date: 11/14/2018 Patient Name: Deandra Abraham History of Presenting Illness Chief Complaint Patient presents with  Shortness of Breath  High Blood Sugar History Provided By: Patient and EMS 
 
HPI: Deandra Abraham, 48 y.o. male with PMHx significant for A-fib (on eliquis), CHF, HTN, COPD, emphysema, and diabetes, presents via EMS to the ED with cc of persistent, progressively worsening, constant SOB that began a couple of days ago but worsened starting at 0400 today. The pt endorses using his inhaler and breathing treatments regularly but without much relief. . EMS reports that the pt is hyperglycemic and has a BG of 506. The pt states that he is taking his diabetes medications and notes that his current elevated BGL could be due to him drinking half of a 2L ginger ale tonight. The pt states that he is experiencing polydipsia/polyuria iver the last 24 hours. The pt also c/o persistent, intermittent, mild, sharp/stabbing, non-radiating CP that is exacerbated w/ deep inspiration but notes that he has not experienced the pain today. He specifically denies experiencing fever, chills, cough, N/V/D, abdominal pain, incontinence, lightheadedness, dizziness, HA, diaphoresis, neck pain, or back pain. PMHx: A-fib, CHF, HTN, COPD, emphysema, and diabetes PSHx: cardiac catheterization (2016) SHx: - EtOH, - tobacco, - illicit drugs There are no other complaints, changes, or physical findings at this time. PCP: Alex Jackson MD 
 
Current Facility-Administered Medications Medication Dose Route Frequency Provider Last Rate Last Dose  sodium chloride 0.9 % bolus infusion 500 mL  500 mL IntraVENous NOW Thao George MD      
 dilTIAZem (CARDIZEM) 125 mg in dextrose 5% 125 mL infusion  0-15 mg/hr IntraVENous TITRATE Thao George MD      
 
Current Outpatient Medications Medication Sig Dispense Refill  traMADol (ULTRAM) 50 mg tablet TAKE 1 TO 2 TABLETS BY MOUTH EVERY 6 HOURS AS NEEDED FOR PAIN 60 Tab 2  
 lisinopril (PRINIVIL, ZESTRIL) 5 mg tablet TAKE ONE TABLET BY MOUTH DAILY 30 Tab 12  Liraglutide (VICTOZA) 0.6 mg/0.1 mL (18 mg/3 mL) pnij 0.6 mg daily- 1st week, then 1.2 mg daily- 2nd week, then 1.8 mg daily. Please give pt needles also 1 Pen 12  
 atorvastatin (LIPITOR) 40 mg tablet TAKE ONE TABLET BY MOUTH DAILY FOR CHOLESTEROL 30 Tab 12  
 albuterol (VENTOLIN HFA) 90 mcg/actuation inhaler INHALE TWO PUFFS BY MOUTH EVERY 4 HOURS AS NEEDED FOR FOR WHEEZING AND FOR SHORTNESS OF BREATH 1 Inhaler 12  
 carvedilol (COREG) 25 mg tablet Take 1 Tab by mouth two (2) times a day. To slow heart rate down 60 Tab 12  
 potassium chloride (K-DUR, KLOR-CON) 20 mEq tablet Take 1 Tab by mouth two (2) times a day. 60 Tab 12  
 furosemide (LASIX) 80 mg tablet TAKE TWO TABLETS BY MOUTH EVERY MORNING FOR FLUID 60 Tab 11  
 albuterol-ipratropium (DUO-NEB) 2.5 mg-0.5 mg/3 ml nebu INHALE THREE MILLILITERS (ONE VIAL) VIA NEBULIZATION BY MOUTH EVERY 6 HOURS AS NEEDED 50 Nebule 10  
 ONETOUCH ULTRA BLUE TEST STRIP strip TEST BLOOD SUGAR TWICE DAILY 50 Strip 9  
 metFORMIN (GLUCOPHAGE) 1,000 mg tablet 1 1/2 pills in am and 1 pill in pm. For diabetes (Patient taking differently: 1 pills in am and 1 pill in pm. For diabetes) 75 Tab 12  
 lisinopril (PRINIVIL, ZESTRIL) 20 mg tablet Take 1 Tab by mouth daily. For heart 30 Tab 12  
 ELIQUIS 5 mg tablet TAKE ONE TABLET BY MOUTH TWICE A DAY TO PREVENT STROKE 60 Tab 9  
 glipiZIDE (GLUCOTROL) 10 mg tablet Take 1 Tab by mouth two (2) times a day. 60 Tab 12  Blood-Glucose Meter misc Use to check BS twice a day. Dx code E11.9 1 Each 0  
 glucose blood VI test strips (ONETOUCH ULTRA TEST) strip Use to check BS twice a day. Dx code E11.9 100 Strip 12  
 glucose blood VI test strips (FREESTYLE LITE STRIPS) strip As directed 100 Strip 12 Past History Past Medical History: 
Past Medical History:  
Diagnosis Date  A-fib ([facility] 1/6/15 [facility] ED. Pt reported this  Atrial fibrillation with RVR ([facility] [address] 2017  Heart failure ([facility] [address]  Hypertension  Restrictive lung disease FVC2.59 (52%), FEV1 1.95 (49)- 2017  S/P cardiac cath Dr. Justo Richter, 2016. minimal cad  Sleep apnea Past Surgical History: No past surgical history on file. Family History: No family history on file. Social History: 
Social History Tobacco Use  Smoking status: Former Smoker Packs/day: 1.00 Last attempt to quit: 2/3/2014 Years since quittin.7  Smokeless tobacco: Never Used Substance Use Topics  Alcohol use: No  
 Drug use: No  
 
 
Allergies: Allergies Allergen Reactions  Influenza Virus Vaccines Nausea Only Fever  diarrhea  Mushroom Flavor Swelling  Oxycodone Rash  Pneumococcal 23-Allison Ps Vaccine Nausea and Vomiting Vomit Review of Systems Review of Systems Constitutional: Negative for chills, diaphoresis, fatigue and fever. +hyperglycemia HENT: Negative for congestion, ear pain and rhinorrhea. Eyes: Negative for pain and visual disturbance. Respiratory: Positive for shortness of breath. Negative for cough. Cardiovascular: Positive for chest pain. Negative for palpitations and leg swelling. Gastrointestinal: Negative for abdominal pain, diarrhea, nausea and vomiting.  
     -incontinence Endocrine: Positive for polydipsia. Genitourinary: Negative for dysuria and flank pain. Musculoskeletal: Negative for back pain and neck pain. Skin: Negative for rash and wound. Neurological: Negative for dizziness, syncope, light-headedness and headaches. Psychiatric/Behavioral: Negative for self-injury and suicidal ideas. Physical Exam  
Physical Exam  
 
GENERAL: alert and oriented, no acute distress EYES: PEERL, No injection, discharge or icterus. HENT: Mucous membranes dry. NECK: Supple LUNGS: Airway patent. Non-labored respirations. Breath sounds clear with good air entry bilaterally. HEART: Irregularly irregular rhythm. Tachycardic in the 130s. No peripheral edema ABDOMEN: Non-distended and non-tender, without guarding or rebound. SKIN:  warm, dry MSK/ EXTREMITIES: Without swelling, tenderness or deformity, symmetric with normal ROM 
NEUROLOGICAL: Alert, oriented Diagnostic Study Results Labs - Recent Results (from the past 12 hour(s)) METABOLIC PANEL, COMPREHENSIVE Collection Time: 11/14/18 11:48 PM  
Result Value Ref Range Sodium 134 (L) 136 - 145 mmol/L Potassium 4.0 3.5 - 5.1 mmol/L Chloride 98 97 - 108 mmol/L  
 CO2 26 21 - 32 mmol/L Anion gap 10 5 - 15 mmol/L Glucose 407 (H) 65 - 100 mg/dL BUN 14 6 - 20 MG/DL Creatinine 1.40 (H) 0.70 - 1.30 MG/DL  
 BUN/Creatinine ratio 10 (L) 12 - 20 GFR est AA >60 >60 ml/min/1.73m2 GFR est non-AA 53 (L) >60 ml/min/1.73m2 Calcium 8.7 8.5 - 10.1 MG/DL Bilirubin, total 1.0 0.2 - 1.0 MG/DL  
 ALT (SGPT) 49 12 - 78 U/L  
 AST (SGOT) 35 15 - 37 U/L Alk. phosphatase 83 45 - 117 U/L Protein, total 7.4 6.4 - 8.2 g/dL Albumin 3.4 (L) 3.5 - 5.0 g/dL Globulin 4.0 2.0 - 4.0 g/dL A-G Ratio 0.9 (L) 1.1 - 2.2 MAGNESIUM Collection Time: 11/14/18 11:48 PM  
Result Value Ref Range Magnesium 2.2 1.6 - 2.4 mg/dL NT-PRO BNP Collection Time: 11/14/18 11:48 PM  
Result Value Ref Range NT pro-BNP 2,329 (H) 0 - 125 PG/ML  
TROPONIN I Collection Time: 11/14/18 11:48 PM  
Result Value Ref Range Troponin-I, Qt. 0.09 (H) <0.05 ng/mL SAMPLES BEING HELD Collection Time: 11/14/18 11:48 PM  
Result Value Ref Range SAMPLES BEING HELD BL   
 COMMENT Add-on orders for these samples will be processed based on acceptable specimen integrity and analyte stability, which may vary by analyte.   
CBC WITH AUTOMATED DIFF  
 Collection Time: 11/15/18 12:25 AM  
Result Value Ref Range WBC 9.3 4.1 - 11.1 K/uL  
 RBC 4.49 4. 10 - 5.70 M/uL  
 HGB 13.1 12.1 - 17.0 g/dL HCT 40.1 36.6 - 50.3 % MCV 89.3 80.0 - 99.0 FL  
 MCH 29.2 26.0 - 34.0 PG  
 MCHC 32.7 30.0 - 36.5 g/dL  
 RDW 15.4 (H) 11.5 - 14.5 % PLATELET 655 126 - 943 K/uL MPV 10.3 8.9 - 12.9 FL  
 NRBC 0.0 0  WBC ABSOLUTE NRBC 0.00 0.00 - 0.01 K/uL NEUTROPHILS 72 32 - 75 % LYMPHOCYTES 20 12 - 49 % MONOCYTES 6 5 - 13 % EOSINOPHILS 1 0 - 7 % BASOPHILS 1 0 - 1 % IMMATURE GRANULOCYTES 1 (H) 0.0 - 0.5 % ABS. NEUTROPHILS 6.7 1.8 - 8.0 K/UL  
 ABS. LYMPHOCYTES 1.9 0.8 - 3.5 K/UL  
 ABS. MONOCYTES 0.5 0.0 - 1.0 K/UL  
 ABS. EOSINOPHILS 0.1 0.0 - 0.4 K/UL  
 ABS. BASOPHILS 0.1 0.0 - 0.1 K/UL  
 ABS. IMM. GRANS. 0.1 (H) 0.00 - 0.04 K/UL  
 DF AUTOMATED Radiologic Studies - CXR Results  (Last 48 hours)  
          
 11/15/18 0038  XR CHEST PA LAT Final result Impression:  IMPRESSION: No acute intrathoracic disease. Narrative:  CLINICAL HISTORY: Dyspnea INDICATION: Dyspnea, A. fib COMPARISON: 12/30/2017 FINDINGS:   
PA and lateral views of the chest are obtained. The cardiopericardial silhouette is prominent. Chronic increased interstitial  
lung markings. The patient is on a cardiac monitor. . There is no pleural  
effusion, pneumothorax or focal consolidation present. Medical Decision Making I am the first provider for this patient. I reviewed the vital signs, available nursing notes, past medical history, past surgical history, family history and social history. Vital Signs-Reviewed the patient's vital signs. Patient Vitals for the past 12 hrs: 
 Temp Pulse Resp BP SpO2  
11/15/18 0103  (!) 108 20 106/66 97 % 11/15/18 0049     96 % 11/15/18 0047  (!) 109 13  96 % 11/15/18 0033  (!) 132 16 112/78 94 % 11/14/18 2302 98 °F (36.7 °C) 75 24 145/84 95 % Pulse Oximetry Analysis - 94% on RA Cardiac Monitor:  
Rate: 132 bpm 
Rhythm: Sinus Tachycardia EKG interpretation: (Preliminary) (23:03:14) Rhythm: A-fib w/ RVR w/ premature ventricular or aberrantly conducted complexes; and regular . Rate (approx.): 136; Axis: left axis deviation; AL interval: normal; QRS interval: normal ; ST/T wave: normal; Other findings: none. Written by Naomy Aguirre, ED Scribe, as dictated by Alyssa Rhodes MD. Records Reviewed: Nursing Notes and Old Medical Records Provider Notes (Medical Decision Making): On presentation the patient is well appearing, in no acute  distress with vital signs notable for sinus tachycardia. Based on the history and exam the differential diagnosis for this patient includes A-fib w/ RVR, heart failure, ACS, COPD exacerbation, PNA, and DKA. Workup notable for slight trop elevation, elevated BNP, MOODY and afib with rates in the 130-140's. Feel that his elevated BNP and trop may be rate related so initiated diltiazem infusion as well as fluids as his lungs are clear and he appears dry on exam.  On re-evaluation his heart rate had improved as well as his dyspnea. Will admit for further management. ED Course:  
Initial assessment performed. The patients presenting problems have been discussed, and they are in agreement with the care plan formulated and outlined with them. I have encouraged them to ask questions as they arise throughout their visit. CONSULT NOTE:  
1:17 AM 
Vishal Rhodes MD spoke with Dr. Karson Feliz, Specialty: Hospitalist 
Discussed pt's hx, disposition, and available diagnostic and imaging results. Reviewed care plans. Consultant will evaluate pt for admission. Written by Naomy Aguirre, DIGNA Scribe, as dictated by Alyssa Rhodes MD. Critical Care Time:  
Critical Care Note  12:45 AM 
 
IMPENDING DETERIORATION -Cardiovascular ASSOCIATED RISK FACTORS - Dysrhythmia MANAGEMENT- Bedside Assessment and Supervision of Care INTERPRETATION -  Xrays, ECG and Blood Pressure INTERVENTIONS - diltiazem infusion, IV fluids CASE REVIEW - Hospitalist 
TREATMENT RESPONSE -Improved PERFORMED BY - Self NOTES   : 
I have provided a total of 35 minutes of critical time. The reason for providing this level of medical care for this critically ill patient was due to a critical illness that impaired one or more vital organ systems such that there was a high probability of imminent or life threatening deterioration in the patients condition. This care involved high complexity decision making to assess, manipulate, and support vital system functions,  lab review, consultations with specialist, family decision- making, bedside attention and documentation. During this entire length of time I was immediately available to the patient Disposition: 
Admit 12:45 AM 
Patient is being admitted to the hospital by Dr. Edda Rodriguez. The results of their tests and reasons for their admission have been discussed with them and/or available family. They convey agreement and understanding for the need to be admitted and for their admission diagnosis. Consultation has been made with the inpatient physician specialist for hospitalization. Written by Robert Sanchez ED Scribe, as dictated by Bruce Mathews MD. 
 
PLAN: 
1. Admit to the hospital. 
 
Diagnosis Clinical Impression:  
1.afib with RVR 2. MOODY 3. Elevated troponin 4. Heart failure Attestation: This note is prepared by Robert Sanchez, acting as Scribe for [employer]. Adrienne Mathews MD. Bruce Mathews MD: The scribe's documentation has been prepared under my direction and personally reviewed by me in its entirety. I confirm that the note above accurately reflects all work, treatment, procedures, and medical decision making performed by me.

## 2018-11-15 NOTE — ED NOTES
Kaylen Gardner MD and discussed pt's BG of 370. Received a telephone verbal order with readback from MD Nohemy for 15 units of regular insulin.

## 2018-11-15 NOTE — ED NOTES
Pt requesting Jello and Icy at this time. Pt provided with Bartholomew Jello and aware that this option is sugar free and reminded of his BS being > 400 at this time. Pt response \"so. \"

## 2018-11-15 NOTE — PROGRESS NOTES
General Daily Progress Note Admit Date: 11/14/2018 Hospital day 2 Subjective:  
 
Patient has no complaint of chest pain at present. Has had gradually progressive dyspnea over the last 3 days, with some L anterior pleuritic chest pain. Hx a fib, had stopped eliquis for dental procedure last week. Denies noncompliance with meds. Feeling better now than last night. .. Medication side effects: none Current Facility-Administered Medications Medication Dose Route Frequency  dilTIAZem (CARDIZEM) 125 mg in dextrose 5% 125 mL infusion  0-15 mg/hr IntraVENous TITRATE  carvedilol (COREG) tablet 25 mg  25 mg Oral BID  
 . PHARMACY TO SUBSTITUTE PER PROTOCOL (Reordered from: Liraglutide (VICTOZA) 0.6 mg/0.1 mL (18 mg/3 mL) pnij)    Per Protocol  atorvastatin (LIPITOR) tablet 40 mg  40 mg Oral DAILY  albuterol (PROVENTIL HFA, VENTOLIN HFA, PROAIR HFA) inhaler 1 Puff  1 Puff Inhalation Q6H RT  
 albuterol-ipratropium (DUO-NEB) 2.5 MG-0.5 MG/3 ML  3 mL Nebulization Q4H RT  
 apixaban (ELIQUIS) tablet 5 mg  5 mg Oral BID  
 glucose chewable tablet 16 g  4 Tab Oral PRN  
 dextrose (D50W) injection syrg 12.5-25 g  25-50 mL IntraVENous PRN  
 glucagon (GLUCAGEN) injection 1 mg  1 mg IntraMUSCular PRN  
 insulin lispro (HUMALOG) injection   SubCUTAneous AC&HS  levalbuterol (XOPENEX) nebulizer soln 1.25 mg/3 mL  1.25 mg Nebulization Q4H PRN  
 aspirin chewable tablet 81 mg  81 mg Oral DAILY  insulin glargine (LANTUS) injection 25 Units  25 Units SubCUTAneous DAILY Current Outpatient Medications Medication Sig  
 traMADol (ULTRAM) 50 mg tablet TAKE 1 TO 2 TABLETS BY MOUTH EVERY 6 HOURS AS NEEDED FOR PAIN  
 lisinopril (PRINIVIL, ZESTRIL) 5 mg tablet TAKE ONE TABLET BY MOUTH DAILY  Liraglutide (VICTOZA) 0.6 mg/0.1 mL (18 mg/3 mL) pnij 0.6 mg daily- 1st week, then 1.2 mg daily- 2nd week, then 1.8 mg daily. Please give pt needles also  atorvastatin (LIPITOR) 40 mg tablet TAKE ONE TABLET BY MOUTH DAILY FOR CHOLESTEROL  albuterol (VENTOLIN HFA) 90 mcg/actuation inhaler INHALE TWO PUFFS BY MOUTH EVERY 4 HOURS AS NEEDED FOR FOR WHEEZING AND FOR SHORTNESS OF BREATH  carvedilol (COREG) 25 mg tablet Take 1 Tab by mouth two (2) times a day. To slow heart rate down  potassium chloride (K-DUR, KLOR-CON) 20 mEq tablet Take 1 Tab by mouth two (2) times a day.  furosemide (LASIX) 80 mg tablet TAKE TWO TABLETS BY MOUTH EVERY MORNING FOR FLUID  
 albuterol-ipratropium (DUO-NEB) 2.5 mg-0.5 mg/3 ml nebu INHALE THREE MILLILITERS (ONE VIAL) VIA NEBULIZATION BY MOUTH EVERY 6 HOURS AS NEEDED  
 ONETOUCH ULTRA BLUE TEST STRIP strip TEST BLOOD SUGAR TWICE DAILY  metFORMIN (GLUCOPHAGE) 1,000 mg tablet 1 1/2 pills in am and 1 pill in pm. For diabetes (Patient taking differently: 1 pills in am and 1 pill in pm. For diabetes)  lisinopril (PRINIVIL, ZESTRIL) 20 mg tablet Take 1 Tab by mouth daily. For heart  ELIQUIS 5 mg tablet TAKE ONE TABLET BY MOUTH TWICE A DAY TO PREVENT STROKE  glipiZIDE (GLUCOTROL) 10 mg tablet Take 1 Tab by mouth two (2) times a day.  Blood-Glucose Meter misc Use to check BS twice a day. Dx code E11.9  
 glucose blood VI test strips (ONETOUCH ULTRA TEST) strip Use to check BS twice a day. Dx code E11.9  
 glucose blood VI test strips (FREESTYLE LITE STRIPS) strip As directed Review of Systems Pertinent items are noted in HPI. Objective:  
 
Patient Vitals for the past 8 hrs: 
 BP Temp Pulse Resp SpO2  
11/15/18 0827 108/67  96 17 97 % 11/15/18 0700   (!) 114 22 96 % 11/15/18 0600 109/65 98.4 °F (36.9 °C) (!) 106 16 96 % 11/15/18 0313 112/60  (!) 115  96 % No intake/output data recorded. No intake/output data recorded. Physical Exam:  
Visit Vitals /67 Pulse 96 Temp 98.4 °F (36.9 °C) Resp 17 SpO2 97% Lungs: clear to auscultation bilaterally Heart: irregularly irregular rhythm Abdomen: soft, non-tender. Bowel sounds normal. No masses,  no organomegaly Extremities: edema , 1+ bilaterally ECG: atrial fibrillation, rate 104 Data Review Recent Results (from the past 24 hour(s)) METABOLIC PANEL, COMPREHENSIVE Collection Time: 11/14/18 11:48 PM  
Result Value Ref Range Sodium 134 (L) 136 - 145 mmol/L Potassium 4.0 3.5 - 5.1 mmol/L Chloride 98 97 - 108 mmol/L  
 CO2 26 21 - 32 mmol/L Anion gap 10 5 - 15 mmol/L Glucose 407 (H) 65 - 100 mg/dL BUN 14 6 - 20 MG/DL Creatinine 1.40 (H) 0.70 - 1.30 MG/DL  
 BUN/Creatinine ratio 10 (L) 12 - 20 GFR est AA >60 >60 ml/min/1.73m2 GFR est non-AA 53 (L) >60 ml/min/1.73m2 Calcium 8.7 8.5 - 10.1 MG/DL Bilirubin, total 1.0 0.2 - 1.0 MG/DL  
 ALT (SGPT) 49 12 - 78 U/L  
 AST (SGOT) 35 15 - 37 U/L Alk. phosphatase 83 45 - 117 U/L Protein, total 7.4 6.4 - 8.2 g/dL Albumin 3.4 (L) 3.5 - 5.0 g/dL Globulin 4.0 2.0 - 4.0 g/dL A-G Ratio 0.9 (L) 1.1 - 2.2 MAGNESIUM Collection Time: 11/14/18 11:48 PM  
Result Value Ref Range Magnesium 2.2 1.6 - 2.4 mg/dL NT-PRO BNP Collection Time: 11/14/18 11:48 PM  
Result Value Ref Range NT pro-BNP 2,329 (H) 0 - 125 PG/ML  
TROPONIN I Collection Time: 11/14/18 11:48 PM  
Result Value Ref Range Troponin-I, Qt. 0.09 (H) <0.05 ng/mL SAMPLES BEING HELD Collection Time: 11/14/18 11:48 PM  
Result Value Ref Range SAMPLES BEING HELD BL   
 COMMENT Add-on orders for these samples will be processed based on acceptable specimen integrity and analyte stability, which may vary by analyte. CBC WITH AUTOMATED DIFF Collection Time: 11/15/18 12:25 AM  
Result Value Ref Range WBC 9.3 4.1 - 11.1 K/uL  
 RBC 4.49 4. 10 - 5.70 M/uL  
 HGB 13.1 12.1 - 17.0 g/dL HCT 40.1 36.6 - 50.3 % MCV 89.3 80.0 - 99.0 FL  
 MCH 29.2 26.0 - 34.0 PG  
 MCHC 32.7 30.0 - 36.5 g/dL  
 RDW 15.4 (H) 11.5 - 14.5 % PLATELET 643 343 - 256 K/uL MPV 10.3 8.9 - 12.9 FL  
 NRBC 0.0 0  WBC ABSOLUTE NRBC 0.00 0.00 - 0.01 K/uL NEUTROPHILS 72 32 - 75 % LYMPHOCYTES 20 12 - 49 % MONOCYTES 6 5 - 13 % EOSINOPHILS 1 0 - 7 % BASOPHILS 1 0 - 1 % IMMATURE GRANULOCYTES 1 (H) 0.0 - 0.5 % ABS. NEUTROPHILS 6.7 1.8 - 8.0 K/UL  
 ABS. LYMPHOCYTES 1.9 0.8 - 3.5 K/UL  
 ABS. MONOCYTES 0.5 0.0 - 1.0 K/UL  
 ABS. EOSINOPHILS 0.1 0.0 - 0.4 K/UL  
 ABS. BASOPHILS 0.1 0.0 - 0.1 K/UL  
 ABS. IMM. GRANS. 0.1 (H) 0.00 - 0.04 K/UL  
 DF AUTOMATED HEMOGLOBIN A1C WITH EAG Collection Time: 11/15/18 12:25 AM  
Result Value Ref Range Hemoglobin A1c 9.5 (H) 4.2 - 6.3 % Est. average glucose 226 mg/dL GLUCOSE, POC Collection Time: 11/15/18  3:05 AM  
Result Value Ref Range Glucose (POC) 338 (H) 65 - 100 mg/dL Performed by TONO OCONNELL   
TROPONIN I Collection Time: 11/15/18  3:10 AM  
Result Value Ref Range Troponin-I, Qt. 0.11 (H) <0.05 ng/mL BLOOD GAS, ARTERIAL Collection Time: 11/15/18  4:45 AM  
Result Value Ref Range pH 7.41 7.35 - 7.45    
 PCO2 36 35.0 - 45.0 mmHg PO2 77 (L) 80 - 100 mmHg O2 SAT 96 92 - 97 % BICARBONATE 23 22 - 26 mmol/L  
 BASE DEFICIT 1.5 mmol/L  
 O2 METHOD ROOM AIR Sample source ARTERIAL    
 SITE RIGHT RADIAL PASHA'S TEST YES    
D DIMER Collection Time: 11/15/18  4:50 AM  
Result Value Ref Range D-dimer 0.48 0.00 - 0.65 mg/L FEU TSH 3RD GENERATION Collection Time: 11/15/18  4:50 AM  
Result Value Ref Range TSH 4.21 (H) 0.36 - 3.74 uIU/mL GLUCOSE, POC Collection Time: 11/15/18  8:05 AM  
Result Value Ref Range Glucose (POC) 318 (H) 65 - 100 mg/dL Performed by Lindy Haines Assessment:  
 
Active Problems: 
  Atrial fibrillation with RVR ([address] (8/11/2017) Plan: 1. A fib with RVR- better on cardizem drip 2. Dyspnea- probably secondary to above problem.  CXR was basically normal, and he was having some L sided pleuritic chest pain - will check CTA to rule out PE. Troponin slightly elevated. For cardiology consult. Had a cath in 2016 (DR. Peterson) showing minimal CAD. Also for echo 3. Diabetes- will add lantus and continue sliding scale for now. 4. Morbid obesity 5. Hx restrictive lung disease on spirometry and sleep apnea

## 2018-11-15 NOTE — ED NOTES
This RN spoke with Dr Natalia Manning and orders obtained for Insulin coverage. This RN also spoke with Cardiology Dr Mandi Medellin and updated him on consult.  Advises he will come and see pt in ED

## 2018-11-15 NOTE — PROGRESS NOTES
Non-formulary medication: victoza (liraglutide) 0.6mg/0.1mL Formulary medication: sliding scale insulin. Per P & T committee, switch patient to sliding scale insulin while admitted when patient cannot supply victoza. Patient does not have victoza on him and states he usually just does sliding scale insulin when admitted to hospital. Patient already has active order for sliding scale insulin. Will contact provider to discontinue victoza. Lantus + sliding scale ordered per provider as addressed per note.  
 
SKYLER Vernon

## 2018-11-15 NOTE — ED NOTES
Peter Keating NP bedside evaluating pt. Cardizem drip turned off at this time. Pt rate controlled around 80's

## 2018-11-15 NOTE — H&P
Hospitalist Admission NoteNAME: [facility] [place]  1965 MRN:  [id number] Date/Time:  11/15/2018 6:18 AM 
 
Patient PCP: Dequan Em MD 
______________________________________________________________________ Given the patient's current clinical presentation, I have a high level of concern for decompensation if discharged from the emergency department. Complex decision making was performed, which includes reviewing the patient's available past medical records, laboratory results, and x-ray films. My assessment of this patient's clinical condition and my plan of care is as follows. Assessment / Plan: A. fib with RVR Admit patient to stepdown Start patient on IV Cardizem Continue anticoagulation Eliquis Echocardiogram 
Cardiology consultation Check TSH level DM Hold oral hypoglycemic medication Start patient on sliding scale with insulin Hypertension Continue Coreg 25 mg 2 times a day Hold lisinopril because of worsening renal function Shortness of breath most likely secondary to atrial fibrillation with RVR and COPD Start patient on Xopenex nebulizer Continue Cardizem drip Check d-dimer and if it is significantly elevated consider VQ scan Check ABG Acute renal failure Hold Lasix and lisinopril Gentle hydration with IV fluid Elevated troponin  
-check serial troponin  
-cardiology consult  
-start on ASA Code Status: full code Surrogate Decision Maker: 
Nicolette Rosenberg Sister DVT Prophylaxis: Eliquis GI Prophylaxis: not indicated Baseline: independent Subjective: CHIEF COMPLAINT: SOB HISTORY OF PRESENT ILLNESS:    
 
48years old male from home with past medical history significant for atrial fibrillation, CHF, hypertension, COPD, emphysema, diabetes presented to the ED complaining from worsening shortness of breath started couple days ago associated with generalized weakness, patient also complaining from palpitation today, in ED patient heart rate was noticed to be 132 and atrial fibrillation, patient was started on Cardizem drip, patient stated he hold his Eliquis for 3 days because he had dental procedure done for him couple days ago, patient denies any fever any chills any cough, patient complaining from chest pain with deep breath. We were asked to admit for work up and evaluation of the above problems. Past Medical History:  
Diagnosis Date  A-fib ([facility] 1/6/15 [facility] ED. Pt reported this  Atrial fibrillation with RVR ([facility] 2017  Heart failure ([facility]  Hypertension  Restrictive lung disease FVC2.59 (52%), FEV1 1.95 (49)- 2017  S/P cardiac cath Dr. Mina Plata, 2016. minimal cad  Sleep apnea No past surgical history on file. Social History Tobacco Use  Smoking status: Former Smoker Packs/day: 1.00 Last attempt to quit: 2/3/2014 Years since quittin.7  Smokeless tobacco: Never Used Substance Use Topics  Alcohol use: No  
  
 
No family history on file. Allergies Allergen Reactions  Influenza Virus Vaccines Nausea Only Fever  diarrhea  Mushroom Flavor Swelling  Oxycodone Rash  Pneumococcal 23-Allison Ps Vaccine Nausea and Vomiting Vomit Prior to Admission medications Medication Sig Start Date End Date Taking? Authorizing Provider  
traMADol (ULTRAM) 50 mg tablet TAKE 1 TO 2 TABLETS BY MOUTH EVERY 6 HOURS AS NEEDED FOR PAIN 18   Tiarra Byrd MD  
lisinopril (PRINIVIL, ZESTRIL) 5 mg tablet TAKE ONE TABLET BY MOUTH DAILY 10/22/18   Tiarra Byrd MD  
Liraglutide (VICTOZA) 0.6 mg/0.1 mL (18 mg/3 mL) pnij 0.6 mg daily- 1st week, then 1.2 mg daily- 2nd week, then 1.8 mg daily.  Please give pt needles also 18   Tiarra Byrd MD  
atorvastatin (LIPITOR) 40 mg tablet TAKE ONE TABLET BY MOUTH DAILY FOR CHOLESTEROL 8/15/18   Tiarra Byrd MD  
 albuterol (VENTOLIN HFA) 90 mcg/actuation inhaler INHALE TWO PUFFS BY MOUTH EVERY 4 HOURS AS NEEDED FOR FOR WHEEZING AND FOR SHORTNESS OF BREATH 8/15/18   Phyllis Belcher MD  
carvedilol (COREG) 25 mg tablet Take 1 Tab by mouth two (2) times a day. To slow heart rate down 8/15/18   Phyllis Belcher MD  
potassium chloride (K-DUR, KLOR-CON) 20 mEq tablet Take 1 Tab by mouth two (2) times a day. 8/15/18   Phyllis Belcher MD  
furosemide (LASIX) 80 mg tablet TAKE TWO TABLETS BY MOUTH EVERY MORNING FOR FLUID 8/13/18   Phyllis Belcher MD  
albuterol-ipratropium (DUO-NEB) 2.5 mg-0.5 mg/3 ml nebu INHALE THREE MILLILITERS (ONE VIAL) VIA NEBULIZATION BY MOUTH EVERY 6 HOURS AS NEEDED 6/5/18   Phyllis Belcher MD  
[facility] ULTRA BLUE TEST STRIP strip TEST BLOOD SUGAR TWICE DAILY 5/2/18   Phyllis Belcher MD  
metFORMIN (GLUCOPHAGE) 1,000 mg tablet 1 1/2 pills in am and 1 pill in pm. For diabetes Patient taking differently: 1 pills in am and 1 pill in pm. For diabetes 4/9/18   Phyllis Belcher MD  
lisinopril (PRINIVIL, ZESTRIL) 20 mg tablet Take 1 Tab by mouth daily. For heart 4/4/18   Phyllis Belcher MD  
ELIQUIS 5 mg tablet TAKE ONE TABLET BY MOUTH TWICE A DAY TO PREVENT STROKE 2/27/18   Phyllis Belcher MD  
glipiZIDE (GLUCOTROL) 10 mg tablet Take 1 Tab by mouth two (2) times a day. 12/27/17   Phyllis Belcher MD  
Blood-Glucose Meter misc Use to check BS twice a day. Dx code E11.9 2/7/17   Phyllis Belcher MD  
glucose blood VI test strips (ONETOUCH ULTRA TEST) strip Use to check BS twice a day. Dx code E11.9 2/7/17   Phyllis Belcher MD  
glucose blood VI test strips (FREESTYLE LITE STRIPS) strip As directed 2/6/17   Phyllis Belcher MD  
 
 
REVIEW OF SYSTEMS:    
I am not able to complete the review of systems because: The patient is intubated and sedated The patient has altered mental status due to his acute medical problems The patient has baseline aphasia from prior stroke(s) The patient has baseline dementia and is not reliable historian The patient is in acute medical distress and unable to provide information Total of 12 systems reviewed as follows:   
   POSITIVE= underlined text  Negative = text not underlined General:  fever, chills, sweats, generalized weakness, weight loss/gain,  
   loss of appetite Eyes:    blurred vision, eye pain, loss of vision, double vision ENT:    rhinorrhea, pharyngitis Respiratory:   cough, sputum production, SOB, HAMM, wheezing, pleuritic pain  
Cardiology:   chest pain, palpitations, orthopnea, PND, edema, syncope Gastrointestinal:  abdominal pain , N/V, diarrhea, dysphagia, constipation, bleeding Genitourinary:  frequency, urgency, dysuria, hematuria, incontinence Muskuloskeletal :  arthralgia, myalgia, back pain Hematology:  easy bruising, nose or gum bleeding, lymphadenopathy Dermatological: rash, ulceration, pruritis, color change / jaundice Endocrine:   hot flashes or polydipsia Neurological:  headache, dizziness, confusion, focal weakness, paresthesia, Speech difficulties, memory loss, gait difficulty Psychological: Feelings of anxiety, depression, agitation Objective: VITALS:   
Visit Vitals /60 Pulse (!) 115 Temp 98 °F (36.7 °C) Resp 20 SpO2 96% PHYSICAL EXAM: 
 
General:    Alert, cooperative, no distress, appears stated age. HEENT: Atraumatic, anicteric sclerae, pink conjunctivae No oral ulcers, mucosa moist, throat clear, dentition fair Neck:  Supple, symmetrical,  thyroid: non tender Lungs:   Clear to auscultation bilaterally. No Wheezing or Rhonchi. No rales. Chest wall:  No tenderness  No Accessory muscle use. Heart:   Irregular irregular   No  murmur   No edema Abdomen:   Soft, non-tender. Not distended. Bowel sounds normal 
Extremities: No cyanosis. No clubbing,   
  Skin turgor normal, Capillary refill normal, Radial dial pulse 2+ Skin:     Not pale. Not Jaundiced  No rashes Psych:  Good insight. Not depressed. Not anxious or agitated. Neurologic: EOMs intact. No facial asymmetry. No aphasia or slurred speech. Symmetrical strength, Sensation grossly intact. Alert and oriented X 4.  
 
_______________________________________________________________________ Care Plan discussed with: 
  Comments Patient y Family  y   
RN Care Manager Consultant:     
_______________________________________________________________________ Expected  Disposition:  
Home with Family y HH/PT/OT/RN   
SNF/LTC   
MIKAYLA   
________________________________________________________________________ TOTAL TIME:  70 Minutes Critical Care Provided     Minutes non procedure based Comments  
 y Reviewed previous records  
>50% of visit spent in counseling and coordination of care y Discussion with patient and/or family and questions answered 
  
 
________________________________________________________________________ Signed: Ermalinda Aase, MD 
 
Procedures: see electronic medical records for all procedures/Xrays and details which were not copied into this note but were reviewed prior to creation of Plan. LAB DATA REVIEWED:   
Recent Results (from the past 24 hour(s)) METABOLIC PANEL, COMPREHENSIVE Collection Time: 11/14/18 11:48 PM  
Result Value Ref Range Sodium 134 (L) 136 - 145 mmol/L Potassium 4.0 3.5 - 5.1 mmol/L Chloride 98 97 - 108 mmol/L  
 CO2 26 21 - 32 mmol/L Anion gap 10 5 - 15 mmol/L Glucose 407 (H) 65 - 100 mg/dL BUN 14 6 - 20 MG/DL Creatinine 1.40 (H) 0.70 - 1.30 MG/DL  
 BUN/Creatinine ratio 10 (L) 12 - 20 GFR est AA >60 >60 ml/min/1.73m2 GFR est non-AA 53 (L) >60 ml/min/1.73m2 Calcium 8.7 8.5 - 10.1 MG/DL Bilirubin, total 1.0 0.2 - 1.0 MG/DL  
 ALT (SGPT) 49 12 - 78 U/L  
 AST (SGOT) 35 15 - 37 U/L Alk.  phosphatase 83 45 - 117 U/L  
 Protein, total 7.4 6.4 - 8.2 g/dL Albumin 3.4 (L) 3.5 - 5.0 g/dL Globulin 4.0 2.0 - 4.0 g/dL A-G Ratio 0.9 (L) 1.1 - 2.2 MAGNESIUM Collection Time: 11/14/18 11:48 PM  
Result Value Ref Range Magnesium 2.2 1.6 - 2.4 mg/dL NT-PRO BNP Collection Time: 11/14/18 11:48 PM  
Result Value Ref Range NT pro-BNP 2,329 (H) 0 - 125 PG/ML  
TROPONIN I Collection Time: 11/14/18 11:48 PM  
Result Value Ref Range Troponin-I, Qt. 0.09 (H) <0.05 ng/mL SAMPLES BEING HELD Collection Time: 11/14/18 11:48 PM  
Result Value Ref Range SAMPLES BEING HELD BL   
 COMMENT Add-on orders for these samples will be processed based on acceptable specimen integrity and analyte stability, which may vary by analyte. CBC WITH AUTOMATED DIFF Collection Time: 11/15/18 12:25 AM  
Result Value Ref Range WBC 9.3 4.1 - 11.1 K/uL  
 RBC 4.49 4. 10 - 5.70 M/uL  
 HGB 13.1 12.1 - 17.0 g/dL HCT 40.1 36.6 - 50.3 % MCV 89.3 80.0 - 99.0 FL  
 MCH 29.2 26.0 - 34.0 PG  
 MCHC 32.7 30.0 - 36.5 g/dL  
 RDW 15.4 (H) 11.5 - 14.5 % PLATELET 653 408 - 875 K/uL MPV 10.3 8.9 - 12.9 FL  
 NRBC 0.0 0  WBC ABSOLUTE NRBC 0.00 0.00 - 0.01 K/uL NEUTROPHILS 72 32 - 75 % LYMPHOCYTES 20 12 - 49 % MONOCYTES 6 5 - 13 % EOSINOPHILS 1 0 - 7 % BASOPHILS 1 0 - 1 % IMMATURE GRANULOCYTES 1 (H) 0.0 - 0.5 % ABS. NEUTROPHILS 6.7 1.8 - 8.0 K/UL  
 ABS. LYMPHOCYTES 1.9 0.8 - 3.5 K/UL  
 ABS. MONOCYTES 0.5 0.0 - 1.0 K/UL  
 ABS. EOSINOPHILS 0.1 0.0 - 0.4 K/UL  
 ABS. BASOPHILS 0.1 0.0 - 0.1 K/UL  
 ABS. IMM. GRANS. 0.1 (H) 0.00 - 0.04 K/UL  
 DF AUTOMATED    
GLUCOSE, POC Collection Time: 11/15/18  3:05 AM  
Result Value Ref Range Glucose (POC) 338 (H) 65 - 100 mg/dL Performed by TONO OCONNELL   
TROPONIN I Collection Time: 11/15/18  3:10 AM  
Result Value Ref Range Troponin-I, Qt. 0.11 (H) <0.05 ng/mL BLOOD GAS, ARTERIAL  Collection Time: 11/15/18  4:45 AM  
 Result Value Ref Range pH 7.41 7.35 - 7.45    
 PCO2 36 35.0 - 45.0 mmHg PO2 77 (L) 80 - 100 mmHg O2 SAT 96 92 - 97 % BICARBONATE 23 22 - 26 mmol/L  
 BASE DEFICIT 1.5 mmol/L  
 O2 METHOD ROOM AIR Sample source ARTERIAL    
 SITE RIGHT RADIAL PASHA'S TEST YES    
D DIMER Collection Time: 11/15/18  4:50 AM  
Result Value Ref Range D-dimer 0.48 0.00 - 0.65 mg/L FEU TSH 3RD GENERATION Collection Time: 11/15/18  4:50 AM  
Result Value Ref Range TSH 4.21 (H) 0.36 - 3.74 uIU/mL

## 2018-11-15 NOTE — ED NOTES
Bedside and Verbal shift change report given to Via Joao Worthington (oncoming nurse) by Alexander Mills (offgoing nurse). Report included the following information SBAR, ED Summary, MAR and Recent Results.

## 2018-11-15 NOTE — CONSULTS
[address], [address] [place] [address] Ne, [address] [facility]     Date of  Admission: 11/14/2018 10:56 PM     Admission type:Emergency    Consult for: afib RVR  Consult by: Subjective:     Dusty Avila is a 48 y.o. male admitted for Atrial fibrillation with RVR ([facility] [address] [address] Patient complains of  dyspnea, palpitations, dyspnea on exertion. Previous treatment/evaluation includes echocardiogram, stress echocardiogram and cardiac catheterization. PMH significant for DM type 2, obesity, Afib, CHF, COPD, and HTN. Patient last seen by Dr. [facility]ler [facility] in July 2018. Patient presenting to ED with c/o dyspnea and palpitations. States that he has been experiencing shortness of breath for a long time and it has not improved at all. States that palpitations started around noon yesterday 11/14. Denies any recent follow up with cardiology. States that he has been taking his medications regularly. When asked about his diabetes management and blood glucose checks, patient states that he is out of test strips and thus has not been checking his sugars recently. When patient presented to ED, blood glucose was ~500, which patient contributes to the large quantity of ginger ale he drank prior to coming. Patient states that he has had a sleep study done in the past, but has not gotten insurance approval for CPAP machine. States that he will follow-up with his doctor about this when discharge. Denies chest pain and peripheral edema. Does states that he has been gaining weight recently. Cardiac risk factors: smoking/ tobacco exposure, dyslipidemia, diabetes mellitus, obesity, sedentary life style, male gender, hypertension.       Patient Active Problem List    Diagnosis Date Noted    Controlled type 2 diabetes mellitus without complication, without long-term current use of insulin ([facility] 08/15/2018    Sleep apnea     Obesity, morbid ([facility] 12/20/2017  [facility]    CHF (congestive heart failure) ([facility] 08/10/2017    Restrictive lung disease     Dyspnea 07/15/2014    Hypertension 2014      Rand Brennan MD  Past Medical History:   Diagnosis Date   [facility]) 1/6/15    [facility] ED. Pt reported this    Atrial fibrillation with RVR ([facility] 2017    Heart failure (HCC)     Hypertension     Restrictive lung disease     FVC2.59 (52%), FEV1 1.95 (49)- 2017    S/P cardiac cath     Dr. Vicki Ayoub, 2016. minimal cad    Sleep apnea       Social History     Socioeconomic History    Marital status: SINGLE     Spouse name: Not on file    Number of children: Not on file    Years of education: Not on file    Highest education level: Not on file   Social Needs    Financial resource strain: Not on file    Food insecurity - worry: Not on file    Food insecurity - inability: Not on file    Transportation needs - medical: Not on file   [place] needs - non-medical: Not on file   Occupational History    Not on file   Tobacco Use    Smoking status: Former Smoker     Packs/day: 1.00     Last attempt to quit: 2/3/2014     Years since quittin.7    Smokeless tobacco: Never Used   Substance and Sexual Activity    Alcohol use: No    Drug use: No    Sexual activity: Not on file   Other Topics Concern    Not on file   Social History Narrative    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      Allergies   Allergen Reactions    Influenza Virus Vaccines Nausea Only     Fever  diarrhea    Mushroom Flavor Swelling    Oxycodone Rash    Pneumococcal 23-Allison Ps Vaccine Nausea and Vomiting     Vomit        No family history on file.    Current Facility-Administered Medications   Medication Dose Route Frequency    dilTIAZem (CARDIZEM) 125 mg in dextrose 5% 125 mL infusion  0-15 mg/hr IntraVENous TITRATE    carvedilol (COREG) tablet 25 mg  25 mg Oral BID    atorvastatin (LIPITOR) tablet 40 mg  40 mg Oral DAILY    albuterol (PROVENTIL HFA, VENTOLIN HFA, PROAIR HFA) inhaler 1 Puff  1 Puff Inhalation Q6H RT    albuterol-ipratropium (DUO-NEB) 2.5 MG-0.5 MG/3 ML  3 mL Nebulization Q4H RT    apixaban (ELIQUIS) tablet 5 mg  5 mg Oral BID    glucose chewable tablet 16 g  4 Tab Oral PRN    dextrose (D50W) injection syrg 12.5-25 g  25-50 mL IntraVENous PRN    glucagon (GLUCAGEN) injection 1 mg  1 mg IntraMUSCular PRN    insulin lispro (HUMALOG) injection   SubCUTAneous AC&HS    levalbuterol (XOPENEX) nebulizer soln 1.25 mg/3 mL  1.25 mg Nebulization Q4H PRN    aspirin chewable tablet 81 mg  81 mg Oral DAILY    insulin glargine (LANTUS) injection 25 Units  25 Units SubCUTAneous DAILY     Current Outpatient Medications   Medication Sig    traMADol (ULTRAM) 50 mg tablet TAKE 1 TO 2 TABLETS BY MOUTH EVERY 6 HOURS AS NEEDED FOR PAIN    lisinopril (PRINIVIL, ZESTRIL) 5 mg tablet TAKE ONE TABLET BY MOUTH DAILY    Liraglutide (VICTOZA) 0.6 mg/0.1 mL (18 mg/3 mL) pnij 0.6 mg daily- 1st week, then 1.2 mg daily- 2nd week, then 1.8 mg daily. Please give pt needles also    atorvastatin (LIPITOR) 40 mg tablet TAKE ONE TABLET BY MOUTH DAILY FOR CHOLESTEROL    albuterol (VENTOLIN HFA) 90 mcg/actuation inhaler INHALE TWO PUFFS BY MOUTH EVERY 4 HOURS AS NEEDED FOR FOR WHEEZING AND FOR SHORTNESS OF BREATH    carvedilol (COREG) 25 mg tablet Take 1 Tab by mouth two (2) times a day. To slow heart rate down    potassium chloride (K-DUR, KLOR-CON) 20 mEq tablet Take 1 Tab by mouth two (2) times a day.     furosemide (LASIX) 80 mg tablet TAKE TWO TABLETS BY MOUTH EVERY MORNING FOR FLUID    albuterol-ipratropium (DUO-NEB) 2.5 mg-0.5 mg/3 ml nebu INHALE THREE MILLILITERS (ONE VIAL) VIA NEBULIZATION BY MOUTH EVERY 6 HOURS AS NEEDED    ONETOUCH ULTRA BLUE TEST STRIP strip TEST BLOOD SUGAR TWICE DAILY    metFORMIN (GLUCOPHAGE) 1,000 mg tablet 1 1/2 pills in am and 1 pill in pm. For diabetes (Patient taking differently: 1 pills in am and 1 pill in pm. For diabetes)    lisinopril (PRINIVIL, ZESTRIL) 20 mg tablet Take 1 Tab by mouth daily. For heart    ELIQUIS 5 mg tablet TAKE ONE TABLET BY MOUTH TWICE A DAY TO PREVENT STROKE    glipiZIDE (GLUCOTROL) 10 mg tablet Take 1 Tab by mouth two (2) times a day.  Blood-Glucose Meter misc Use to check BS twice a day. Dx code E11.9    glucose blood VI test strips (ONETOUCH ULTRA TEST) strip Use to check BS twice a day. Dx code E11.9    glucose blood VI test strips (FREESTYLE LITE STRIPS) strip As directed        Review of Symptoms:   11 systems reviewed, negative other than as stated in the HPI        Objective:      Visit Vitals  /84 (BP 1 Location: Right arm, BP Patient Position: At rest;Sitting)   Pulse 81   Temp 98.4 °F (36.9 °C)   Resp 22   SpO2 96%       Physical:   General: Morbidly obese  male in no acute distress. Alert and oriented x3. Heart: RRR, no m/S3/JVD, no carotid bruits   Lungs: clear   Abdomen: Soft, +BS, NTND   Extremities: LE jasen +DP/PT, no edema   Neurologic: Grossly normal    Data Review:   Recent Labs     11/15/18  0025   WBC 9.3   HGB 13.1   HCT 40.1        Recent Labs     11/14/18  2348   *   K 4.0   CL 98   CO2 26   *   BUN 14   CREA 1.40*   CA 8.7   MG 2.2   ALB 3.4*   TBILI 1.0   SGOT 35   ALT 49       Recent Labs     11/15/18  1246 11/15/18  0310 11/14/18  2348   TROIQ 0.09* 0.11* 0.09*       No intake or output data in the 24 hours ending 11/15/18 1511     Cardiographics    Telemetry: Afib, rate controlled 80s  ECG: Afib with RVR    CXRAY: 11/14/18 No acute intrathoracic disease. Chest CTA: 11/15/18  Cardiomegaly. No definite pulmonary emboli identified. There is minor left basilar atelectasis/tiny effusion. No focal airspace process identified. Echocardiogram: 7/17/18  Left ventricle: Systolic function was at the lower limits of normal.  Ejection fraction was estimated to be 50 %.  No obvious wall motion  abnormalities identified in the views obtained. There was moderate  concentric hypertrophy. Left atrium: The atrium was dilated. Lexiscan: 7/23/18  Myocardial perfusion imaging is abnormal as noted above.  Overall calculated left ventricular systolic ejection fraction is likely to be inaccurate due to gating artifact from atrial fibrillation.  Correlation with echocardiogram recommended. Sury Garnica is no old study for comparison. These test results indicate low to moderate likelihood for the presence of angiographically significant coronary artery disease.  Prominent soft tissue attenuation versus nontransmural infarction.  No evidence of ischemia. Assessment:       Active Problems:    Atrial fibrillation with RVR ([facility]) (8/11/2017)       Plan:     Afib with RVR:  Rate controlled in 80s now. IV diltiazem was at 5 mg/hr- now turned off. Continue coreg and eliquis. Rapid ventricular rate possibly d/t underlying infection, MENDY, hypercapnia, or stress/demand from elevated blood glucose? Continue to monitor at this time. ECHO pending. HFrEF  EF50%, 8/2017  NYHA III. On BB, Ace-I. Also on Furosemide 160 mg BID     HTN  Controlled with current therapy. Maritza Avila, NPS    Agree with NP Student assessment and plan. No clear sign of infection, checking urinalysis. If rate continues to accelerate can restart Diltiazem.    Dr. Beverley Rizvi will see in AM

## 2018-11-15 NOTE — ED NOTES
Bedside shift change report given to Akua Roberts RN (oncoming nurse) by Tosha Meza RN (offgoing nurse). Report included the following information SBAR.

## 2018-11-15 NOTE — ED TRIAGE NOTES
Pt arrives to the ED via EMS with C/O SOB. Pt reports it has been getting progressively worse over the last few days but was really bad starting at 0400 this morning. Pt reports taking an albuterol treatment earlier without relief. Pt reports he has a hx of a fib and diabetes. EMS reports patients BG was 506.

## 2018-11-15 NOTE — ED NOTES
Spoke with Marshall Desai NP for Cardiology Earlier consult placed to incorrect group. Cardiology consult re paged to [facility].

## 2018-11-15 NOTE — ED NOTES
Pt ambulatory to restroom. Pt roommate has brought pt shorts to wear. Pt remains in home shirt and clothing. IV Cardizem infusing at 12.5 mg/hr. Pt Cardiology consult called by ED  this AM.

## 2018-11-16 ENCOUNTER — APPOINTMENT (OUTPATIENT)
Dept: GENERAL RADIOLOGY | Age: 53
DRG: 175 | End: 2018-11-16
Attending: INTERNAL MEDICINE
Payer: MEDICAID

## 2018-11-16 LAB
ANION GAP SERPL CALC-SCNC: 7 MMOL/L (ref 5–15)
BASOPHILS # BLD: 0.1 K/UL (ref 0–0.1)
BASOPHILS NFR BLD: 1 % (ref 0–1)
BUN SERPL-MCNC: 13 MG/DL (ref 6–20)
BUN/CREAT SERPL: 12 (ref 12–20)
CALCIUM SERPL-MCNC: 8.9 MG/DL (ref 8.5–10.1)
CHLORIDE SERPL-SCNC: 100 MMOL/L (ref 97–108)
CO2 SERPL-SCNC: 28 MMOL/L (ref 21–32)
CREAT SERPL-MCNC: 1.09 MG/DL (ref 0.7–1.3)
DIFFERENTIAL METHOD BLD: ABNORMAL
EOSINOPHIL # BLD: 0.1 K/UL (ref 0–0.4)
EOSINOPHIL NFR BLD: 1 % (ref 0–7)
ERYTHROCYTE [DISTWIDTH] IN BLOOD BY AUTOMATED COUNT: 15.4 % (ref 11.5–14.5)
GLUCOSE BLD STRIP.AUTO-MCNC: 267 MG/DL (ref 65–100)
GLUCOSE BLD STRIP.AUTO-MCNC: 324 MG/DL (ref 65–100)
GLUCOSE BLD STRIP.AUTO-MCNC: 333 MG/DL (ref 65–100)
GLUCOSE BLD STRIP.AUTO-MCNC: 344 MG/DL (ref 65–100)
GLUCOSE BLD STRIP.AUTO-MCNC: 369 MG/DL (ref 65–100)
GLUCOSE SERPL-MCNC: 249 MG/DL (ref 65–100)
HCT VFR BLD AUTO: 38.8 % (ref 36.6–50.3)
HGB BLD-MCNC: 12.4 G/DL (ref 12.1–17)
IMM GRANULOCYTES # BLD: 0.1 K/UL (ref 0–0.04)
IMM GRANULOCYTES NFR BLD AUTO: 1 % (ref 0–0.5)
LYMPHOCYTES # BLD: 2.1 K/UL (ref 0.8–3.5)
LYMPHOCYTES NFR BLD: 21 % (ref 12–49)
MCH RBC QN AUTO: 28.9 PG (ref 26–34)
MCHC RBC AUTO-ENTMCNC: 32 G/DL (ref 30–36.5)
MCV RBC AUTO: 90.4 FL (ref 80–99)
MONOCYTES # BLD: 0.5 K/UL (ref 0–1)
MONOCYTES NFR BLD: 6 % (ref 5–13)
NEUTS SEG # BLD: 7 K/UL (ref 1.8–8)
NEUTS SEG NFR BLD: 71 % (ref 32–75)
NRBC # BLD: 0.02 K/UL (ref 0–0.01)
NRBC BLD-RTO: 0.2 PER 100 WBC
PLATELET # BLD AUTO: 145 K/UL (ref 150–400)
PMV BLD AUTO: 9.9 FL (ref 8.9–12.9)
POTASSIUM SERPL-SCNC: 4.2 MMOL/L (ref 3.5–5.1)
RBC # BLD AUTO: 4.29 M/UL (ref 4.1–5.7)
SERVICE CMNT-IMP: ABNORMAL
SODIUM SERPL-SCNC: 135 MMOL/L (ref 136–145)
TROPONIN I SERPL-MCNC: 0.1 NG/ML
WBC # BLD AUTO: 9.9 K/UL (ref 4.1–11.1)

## 2018-11-16 PROCEDURE — 77030018836 HC SOL IRR NACL ICUM -A

## 2018-11-16 PROCEDURE — 36415 COLL VENOUS BLD VENIPUNCTURE: CPT

## 2018-11-16 PROCEDURE — 94640 AIRWAY INHALATION TREATMENT: CPT

## 2018-11-16 PROCEDURE — 99152 MOD SED SAME PHYS/QHP 5/>YRS: CPT

## 2018-11-16 PROCEDURE — 74011636637 HC RX REV CODE- 636/637: Performed by: INTERNAL MEDICINE

## 2018-11-16 PROCEDURE — 77030018673

## 2018-11-16 PROCEDURE — 77030030806 HC PTCH ENSIT NAVX STJU -G

## 2018-11-16 PROCEDURE — 77030037029 HC IMPL ENV ICD ANTIBACT ABSRB TYRX MEDT -G

## 2018-11-16 PROCEDURE — 80048 BASIC METABOLIC PNL TOTAL CA: CPT

## 2018-11-16 PROCEDURE — 65660000000 HC RM CCU STEPDOWN

## 2018-11-16 PROCEDURE — 82962 GLUCOSE BLOOD TEST: CPT

## 2018-11-16 PROCEDURE — 02HK3JZ INSERTION OF PACEMAKER LEAD INTO RIGHT VENTRICLE, PERCUTANEOUS APPROACH: ICD-10-PCS | Performed by: INTERNAL MEDICINE

## 2018-11-16 PROCEDURE — 4A0234Z MEASUREMENT OF CARDIAC ELECTRICAL ACTIVITY, PERCUTANEOUS APPROACH: ICD-10-PCS | Performed by: INTERNAL MEDICINE

## 2018-11-16 PROCEDURE — C1894 INTRO/SHEATH, NON-LASER: HCPCS

## 2018-11-16 PROCEDURE — C1733 CATH, EP, OTHR THAN COOL-TIP: HCPCS

## 2018-11-16 PROCEDURE — 74011250637 HC RX REV CODE- 250/637: Performed by: HOSPITALIST

## 2018-11-16 PROCEDURE — 77030002996 HC SUT SLK J&J -A

## 2018-11-16 PROCEDURE — C1898 LEAD, PMKR, OTHER THAN TRANS: HCPCS

## 2018-11-16 PROCEDURE — 84484 ASSAY OF TROPONIN QUANT: CPT

## 2018-11-16 PROCEDURE — 74011250636 HC RX REV CODE- 250/636

## 2018-11-16 PROCEDURE — 74011636637 HC RX REV CODE- 636/637: Performed by: FAMILY MEDICINE

## 2018-11-16 PROCEDURE — A4565 SLINGS: HCPCS

## 2018-11-16 PROCEDURE — 77030016894 HC CBL EP DX CATH3 STJU -B

## 2018-11-16 PROCEDURE — 02K83ZZ MAP CONDUCTION MECHANISM, PERCUTANEOUS APPROACH: ICD-10-PCS | Performed by: INTERNAL MEDICINE

## 2018-11-16 PROCEDURE — 71045 X-RAY EXAM CHEST 1 VIEW: CPT

## 2018-11-16 PROCEDURE — 77030037713 HC CLOSR DEV INCIS ZIP STRY -B

## 2018-11-16 PROCEDURE — 0JH604Z INSERTION OF PACEMAKER, SINGLE CHAMBER INTO CHEST SUBCUTANEOUS TISSUE AND FASCIA, OPEN APPROACH: ICD-10-PCS | Performed by: INTERNAL MEDICINE

## 2018-11-16 PROCEDURE — 02583ZZ DESTRUCTION OF CONDUCTION MECHANISM, PERCUTANEOUS APPROACH: ICD-10-PCS | Performed by: INTERNAL MEDICINE

## 2018-11-16 PROCEDURE — 85025 COMPLETE CBC W/AUTO DIFF WBC: CPT

## 2018-11-16 PROCEDURE — C1730 CATH, EP, 19 OR FEW ELECT: HCPCS

## 2018-11-16 PROCEDURE — 77030018729 HC ELECTRD DEFIB PAD CARD -B

## 2018-11-16 PROCEDURE — C1786 PMKR, SINGLE, RATE-RESP: HCPCS

## 2018-11-16 PROCEDURE — 74011000250 HC RX REV CODE- 250: Performed by: INTERNAL MEDICINE

## 2018-11-16 PROCEDURE — 77010033678 HC OXYGEN DAILY

## 2018-11-16 PROCEDURE — 77030031139 HC SUT VCRL2 J&J -A

## 2018-11-16 PROCEDURE — 74011250637 HC RX REV CODE- 250/637: Performed by: INTERNAL MEDICINE

## 2018-11-16 PROCEDURE — 94760 N-INVAS EAR/PLS OXIMETRY 1: CPT

## 2018-11-16 PROCEDURE — 74011000250 HC RX REV CODE- 250

## 2018-11-16 PROCEDURE — 74011250637 HC RX REV CODE- 250/637: Performed by: FAMILY MEDICINE

## 2018-11-16 PROCEDURE — L3650 SO 8 ABD RESTRAINT PRE OTS: HCPCS

## 2018-11-16 RX ORDER — FENTANYL CITRATE 50 UG/ML
INJECTION, SOLUTION INTRAMUSCULAR; INTRAVENOUS
Status: COMPLETED
Start: 2018-11-16 | End: 2018-11-16

## 2018-11-16 RX ORDER — ACETAMINOPHEN 325 MG/1
650 TABLET ORAL
Status: DISCONTINUED | OUTPATIENT
Start: 2018-11-16 | End: 2018-11-21 | Stop reason: HOSPADM

## 2018-11-16 RX ORDER — SODIUM CHLORIDE 0.9 % (FLUSH) 0.9 %
5-10 SYRINGE (ML) INJECTION EVERY 8 HOURS
Status: DISCONTINUED | OUTPATIENT
Start: 2018-11-16 | End: 2018-11-21 | Stop reason: HOSPADM

## 2018-11-16 RX ORDER — NALOXONE HYDROCHLORIDE 0.4 MG/ML
0.4 INJECTION, SOLUTION INTRAMUSCULAR; INTRAVENOUS; SUBCUTANEOUS AS NEEDED
Status: DISCONTINUED | OUTPATIENT
Start: 2018-11-16 | End: 2018-11-21 | Stop reason: HOSPADM

## 2018-11-16 RX ORDER — IPRATROPIUM BROMIDE AND ALBUTEROL SULFATE 2.5; .5 MG/3ML; MG/3ML
3 SOLUTION RESPIRATORY (INHALATION)
Status: DISCONTINUED | OUTPATIENT
Start: 2018-11-17 | End: 2018-11-21 | Stop reason: HOSPADM

## 2018-11-16 RX ORDER — LIDOCAINE HYDROCHLORIDE 10 MG/ML
INJECTION, SOLUTION EPIDURAL; INFILTRATION; INTRACAUDAL; PERINEURAL
Status: DISPENSED
Start: 2018-11-16 | End: 2018-11-16

## 2018-11-16 RX ORDER — BACITRACIN 50000 [IU]/1
50000 INJECTION, POWDER, FOR SOLUTION INTRAMUSCULAR ONCE
Status: CANCELLED | OUTPATIENT
Start: 2018-11-16 | End: 2018-11-16

## 2018-11-16 RX ORDER — HEPARIN SODIUM 200 [USP'U]/100ML
INJECTION, SOLUTION INTRAVENOUS
Status: COMPLETED
Start: 2018-11-16 | End: 2018-11-16

## 2018-11-16 RX ORDER — HEPARIN SODIUM 200 [USP'U]/100ML
2000 INJECTION, SOLUTION INTRAVENOUS ONCE
Status: CANCELLED | OUTPATIENT
Start: 2018-11-16 | End: 2018-11-16

## 2018-11-16 RX ORDER — VANCOMYCIN HYDROCHLORIDE 1 G/20ML
INJECTION, POWDER, LYOPHILIZED, FOR SOLUTION INTRAVENOUS
Status: COMPLETED
Start: 2018-11-16 | End: 2018-11-16

## 2018-11-16 RX ORDER — GLIPIZIDE 5 MG/1
10 TABLET ORAL 2 TIMES DAILY
Status: DISCONTINUED | OUTPATIENT
Start: 2018-11-16 | End: 2018-11-21 | Stop reason: HOSPADM

## 2018-11-16 RX ORDER — LIDOCAINE HYDROCHLORIDE 10 MG/ML
1-40 INJECTION INFILTRATION; PERINEURAL
Status: CANCELLED | OUTPATIENT
Start: 2018-11-16

## 2018-11-16 RX ORDER — ALBUTEROL SULFATE 0.83 MG/ML
SOLUTION RESPIRATORY (INHALATION)
Status: DISPENSED
Start: 2018-11-16 | End: 2018-11-17

## 2018-11-16 RX ORDER — INSULIN GLARGINE 100 [IU]/ML
35 INJECTION, SOLUTION SUBCUTANEOUS DAILY
Status: DISCONTINUED | OUTPATIENT
Start: 2018-11-16 | End: 2018-11-17

## 2018-11-16 RX ORDER — FENTANYL CITRATE 50 UG/ML
12.5-5 INJECTION, SOLUTION INTRAMUSCULAR; INTRAVENOUS
Status: CANCELLED | OUTPATIENT
Start: 2018-11-16

## 2018-11-16 RX ORDER — MIDAZOLAM HYDROCHLORIDE 1 MG/ML
INJECTION, SOLUTION INTRAMUSCULAR; INTRAVENOUS
Status: COMPLETED
Start: 2018-11-16 | End: 2018-11-16

## 2018-11-16 RX ORDER — DOBUTAMINE HYDROCHLORIDE 200 MG/100ML
INJECTION INTRAVENOUS
Status: COMPLETED
Start: 2018-11-16 | End: 2018-11-16

## 2018-11-16 RX ORDER — IPRATROPIUM BROMIDE AND ALBUTEROL SULFATE 2.5; .5 MG/3ML; MG/3ML
3 SOLUTION RESPIRATORY (INHALATION)
Status: DISCONTINUED | OUTPATIENT
Start: 2018-11-16 | End: 2018-11-16

## 2018-11-16 RX ORDER — SODIUM CHLORIDE 900 MG/100ML
INJECTION INTRAVENOUS
Status: DISCONTINUED
Start: 2018-11-16 | End: 2018-11-21 | Stop reason: HOSPADM

## 2018-11-16 RX ORDER — DOBUTAMINE HYDROCHLORIDE 200 MG/100ML
0-10 INJECTION INTRAVENOUS
Status: CANCELLED | OUTPATIENT
Start: 2018-11-16

## 2018-11-16 RX ORDER — MIDAZOLAM HYDROCHLORIDE 1 MG/ML
1-5 INJECTION, SOLUTION INTRAMUSCULAR; INTRAVENOUS
Status: CANCELLED | OUTPATIENT
Start: 2018-11-16

## 2018-11-16 RX ORDER — LIDOCAINE HYDROCHLORIDE 10 MG/ML
INJECTION INFILTRATION; PERINEURAL
Status: COMPLETED
Start: 2018-11-16 | End: 2018-11-16

## 2018-11-16 RX ORDER — BACITRACIN 50000 [IU]/1
INJECTION, POWDER, FOR SOLUTION INTRAMUSCULAR
Status: COMPLETED
Start: 2018-11-16 | End: 2018-11-16

## 2018-11-16 RX ORDER — SODIUM CHLORIDE 0.9 % (FLUSH) 0.9 %
5-10 SYRINGE (ML) INJECTION AS NEEDED
Status: DISCONTINUED | OUTPATIENT
Start: 2018-11-16 | End: 2018-11-21 | Stop reason: HOSPADM

## 2018-11-16 RX ADMIN — ASPIRIN 81 MG CHEWABLE TABLET 81 MG: 81 TABLET CHEWABLE at 08:44

## 2018-11-16 RX ADMIN — IPRATROPIUM BROMIDE AND ALBUTEROL SULFATE 3 ML: .5; 3 SOLUTION RESPIRATORY (INHALATION) at 00:51

## 2018-11-16 RX ADMIN — IPRATROPIUM BROMIDE AND ALBUTEROL SULFATE 3 ML: .5; 3 SOLUTION RESPIRATORY (INHALATION) at 07:49

## 2018-11-16 RX ADMIN — INSULIN LISPRO 4 UNITS: 100 INJECTION, SOLUTION INTRAVENOUS; SUBCUTANEOUS at 21:39

## 2018-11-16 RX ADMIN — GLIPIZIDE 10 MG: 5 TABLET ORAL at 08:44

## 2018-11-16 RX ADMIN — MIDAZOLAM HYDROCHLORIDE 3 MG: 1 INJECTION, SOLUTION INTRAMUSCULAR; INTRAVENOUS at 12:15

## 2018-11-16 RX ADMIN — FUROSEMIDE 80 MG: 80 TABLET ORAL at 17:32

## 2018-11-16 RX ADMIN — MIDAZOLAM HYDROCHLORIDE 2 MG: 1 INJECTION, SOLUTION INTRAMUSCULAR; INTRAVENOUS at 11:55

## 2018-11-16 RX ADMIN — TRAMADOL HYDROCHLORIDE 50 MG: 50 TABLET, FILM COATED ORAL at 22:02

## 2018-11-16 RX ADMIN — BACITRACIN 50000 UNITS: 5000 INJECTION, POWDER, FOR SOLUTION INTRAMUSCULAR at 12:19

## 2018-11-16 RX ADMIN — IPRATROPIUM BROMIDE AND ALBUTEROL SULFATE 3 ML: .5; 3 SOLUTION RESPIRATORY (INHALATION) at 20:02

## 2018-11-16 RX ADMIN — HEPARIN SODIUM 2000 UNITS: 200 INJECTION, SOLUTION INTRAVENOUS at 12:00

## 2018-11-16 RX ADMIN — CARVEDILOL 25 MG: 12.5 TABLET, FILM COATED ORAL at 17:32

## 2018-11-16 RX ADMIN — DOBUTAMINE HYDROCHLORIDE 5 MCG/KG/MIN: 200 INJECTION INTRAVENOUS at 12:40

## 2018-11-16 RX ADMIN — APIXABAN 5 MG: 5 TABLET, FILM COATED ORAL at 08:44

## 2018-11-16 RX ADMIN — FUROSEMIDE 80 MG: 80 TABLET ORAL at 08:44

## 2018-11-16 RX ADMIN — VANCOMYCIN HYDROCHLORIDE 1000 MG: 1 INJECTION, POWDER, LYOPHILIZED, FOR SOLUTION INTRAVENOUS at 11:45

## 2018-11-16 RX ADMIN — LIDOCAINE HYDROCHLORIDE 10 ML: 10 INJECTION, SOLUTION INFILTRATION; PERINEURAL at 12:30

## 2018-11-16 RX ADMIN — INSULIN LISPRO 7 UNITS: 100 INJECTION, SOLUTION INTRAVENOUS; SUBCUTANEOUS at 14:40

## 2018-11-16 RX ADMIN — POTASSIUM CHLORIDE 20 MEQ: 20 TABLET, EXTENDED RELEASE ORAL at 17:32

## 2018-11-16 RX ADMIN — POTASSIUM CHLORIDE 20 MEQ: 20 TABLET, EXTENDED RELEASE ORAL at 08:44

## 2018-11-16 RX ADMIN — INSULIN GLARGINE 35 UNITS: 100 INJECTION, SOLUTION SUBCUTANEOUS at 08:44

## 2018-11-16 RX ADMIN — MIDAZOLAM HYDROCHLORIDE 3 MG: 1 INJECTION, SOLUTION INTRAMUSCULAR; INTRAVENOUS at 11:50

## 2018-11-16 RX ADMIN — INSULIN LISPRO 7 UNITS: 100 INJECTION, SOLUTION INTRAVENOUS; SUBCUTANEOUS at 08:44

## 2018-11-16 RX ADMIN — GLIPIZIDE 10 MG: 5 TABLET ORAL at 17:32

## 2018-11-16 RX ADMIN — IPRATROPIUM BROMIDE AND ALBUTEROL SULFATE 3 ML: .5; 3 SOLUTION RESPIRATORY (INHALATION) at 15:26

## 2018-11-16 RX ADMIN — FENTANYL CITRATE 50 MCG: 50 INJECTION, SOLUTION INTRAMUSCULAR; INTRAVENOUS at 11:50

## 2018-11-16 RX ADMIN — INSULIN LISPRO 5 UNITS: 100 INJECTION, SOLUTION INTRAVENOUS; SUBCUTANEOUS at 17:32

## 2018-11-16 RX ADMIN — CARVEDILOL 25 MG: 12.5 TABLET, FILM COATED ORAL at 08:44

## 2018-11-16 RX ADMIN — LIDOCAINE HYDROCHLORIDE 30 ML: 10 INJECTION, SOLUTION INFILTRATION; PERINEURAL at 12:08

## 2018-11-16 RX ADMIN — ATORVASTATIN CALCIUM 40 MG: 40 TABLET, FILM COATED ORAL at 08:44

## 2018-11-16 RX ADMIN — Medication 10 ML: at 14:42

## 2018-11-16 RX ADMIN — FENTANYL CITRATE 50 MCG: 50 INJECTION, SOLUTION INTRAMUSCULAR; INTRAVENOUS at 12:10

## 2018-11-16 RX ADMIN — ALBUTEROL SULFATE 1 PUFF: 90 AEROSOL, METERED RESPIRATORY (INHALATION) at 20:00

## 2018-11-16 RX ADMIN — Medication 10 ML: at 22:03

## 2018-11-16 NOTE — PROCEDURES
[address] [place] [address] [address] [facility]  [phone]    Indications and Pre-Procedure Diagnosis:  Abhishek Evans is a [facility] y.o. male with AF with tachycardia and sob is referred for single chamber pacemaker. Post Procedure Diagnosis:    AF  Tachycardia  sob    Pacemaker Implant Procedure and Findings:  Informed consent was obtained and the patient was premedicated with vancomycin. The procedure was performed under local anesthesia. Continuous pulse oximetry and cuff pressure were monitored. During the procedure, the patient received Versed and Fentanyl for sedation per nursing personnel. The left deltopectoral area was prepped and draped in the usual sterile fashion and was liberally infiltrated with 1% lidocaine. An incision was made over the left subpectoral area and a generator pocket was manually dissected. Access was achieved in the left axillary vein under fluoroscopic guidance and using the seldinger technique. Through the left axillary vein, pacing leads were positioned in appropriate regions in the right heart chambers where satisfactory pacing and sensing parameters were measured. Stability of the leads was assessed with deep breathing and there was no diaphragmatic pacing at 10V output. The leads were anchored using the sleeves and a pulse generator pocket fashioned using blunt dissection. The leads were then connected to the pulse generator. The pulse generator pocket was then liberally infiltrated with bacitracin solution, and the device implanted with a single silk fixation suture in the header to prevent migration. The wound was closed in layers using intermittent 2-0 Vicryl and Zipline suture sleeve. A bio-occlusive dressing were applied to the skin. Fluoroscopy and total procedure times were 1 and 20 minutes respectively. Estimated blood loss <10 ml. Sharp count: correct. Specimen(s) collected: none. The following procedure related complication occurred: none.  The following problems were encountered: none. Findings: successful pacemaker placement. Device Data Measurements:  Lead Sensing (mV) Threshold (V)Pulse Width (ms) Impedance (Ohms)    RV 9.9  1.1  0.5   1215    Final Programmed Parameters  Bradycardia pacing rate  75 bpm  Pacing Mode    VVIR  Pacing Output    3.5 V@ 0.5 ms    Supplies Summary available in the chart  [employer]    Thank you for allowing me to participate in this patients care.     Awilda Lu MD, Iker Burk

## 2018-11-16 NOTE — PROGRESS NOTES
Pharmacy Automatic Direct Oral Anticoagulant Renal Dosing Protocol Patient currently receiving Apixaban 5 mg bid with an indication of AF. Start date: PTA Major interacting medications:  
 
Platelet inhibitors (dose/frequency): ASA Labs: 
Recent Labs 11/16/18 
99 [id number]  11/14/18 
2348 CREA 1.09  --  1.40* HGB 12.4   < >  --   
*   < >  --   
 < > = values in this interval not displayed. Wt Readings from Last 1 Encounters:  
11/15/18 (!) 161.5 kg (356 lb 0.7 oz) Creatinine Clearance: 88 Impression/Plan:  
Apixaban 5 mg BID to start in 72 hours Pharmacy will follow daily and adjust as appropriate. Thank you, 
Azeem Browning RPH  
 
? Child Sainz Score calculator 
[facility]. ? Apixaban ? Dabigatran 
? Edoxaban ? Rivaroxaban 
 
[email or web address]. [email or web address]

## 2018-11-16 NOTE — PROCEDURES
[address]  [phone]    Indications and Pre-Procedure Diagnosis:  Zbigniew Velasquez is a 48 y.o. male with AF with rvr and sob is referred for electr-physiologic evaluation and intervention. Post Procedure Diagnosis    AF  Tachycardia  sob    AV Rabia Ablation Procedure  After informed consent was obtained, the patient was brought back to the EP lab in fasting condition. The presenting rhythm was atrial fibrillation. The patient received Versed and Fentanyl for conscious sedation per nursing personnel. Venous sheaths were placed in the right femoral vein using sterile Seldinger technique. Mapping was performed using standard catheter-based techniques and 3-D electro-anatomic mapping. A quadrapolar catheter was placed in the His position for mapping. An Blazer 8F/10mm Large Curve ablation catheter was advanced to the AV junction and 1 RF lesions totaling 2 minutes were applied resulting in complete heart block. Dobutamine at 5 mcg/min was started and no increased ventricular rates were noted. A slow ventricular escape rhythm of 40 bpm was present. Rapid atrial pacing to 200 ms, on and off dobutamine, demonstrated antegrade AV block. Rapid ventricular pacing to 600 ms, on and off dobutamine, demonstrated retrograde VA block. At the end of the procedure, catheters and sheaths were removed and manual compression held for hemostasis. Fluoroscopy and total procedure times were 1 and 20 minutes respectively. Estimated blood loss: < 10 ml. Sharp counts: correct. Specimen (s) collected: none. The following procedure related complication occurred: none. The following problems were encountered: none. Conduction Intervals (ms)  H-V QRS Q-T R-R   55 528 525 192     AV Rabia Conduction    VA Block when pacing at 600 ms    AV Wenckebach when pacing at 200 ms    Findings and Summary: This study demonstrates:  1. Successful AV node ablation    Recommendations:    1.  Hold oac, resume in 72 hours  2. VVIR 75 bpm      Thank you for allowing me to participate in this patients care.     Deejay Joya MD, Bridger Shah

## 2018-11-16 NOTE — PROGRESS NOTES
PCU SHIFT NURSING NOTE Bedside and Verbal shift change report given to Jose Manuel Mei RN (oncoming nurse) by Gaudencio Matson RN (offgoing nurse). Report included the following information SBAR, Kardex, Intake/Output, MAR, Recent Results and Cardiac Rhythm A-Fib. Shift Summary:  
1320 - PACU nurse holding pt to keep pt from moving left arm and raising above his head; pt very forgetful and keeps arguing that he needs to turn on side. Angelika Reed RN for assistance. [address] after chest xray. Admission Date 11/14/2018 Admission Diagnosis Atrial fibrillation with RVR ([facility] [address] Consults IP CONSULT TO CARDIOLOGY Consults []PT []OT []Speech  
[]Case Management  
  
[] Palliative Cardiac Monitoring Order []Yes []No  
 
IV drips []Yes Drip:                            Dose: 
Drip:                            Dose: 
Drip:                            Dose:  
[]No  
 
GI Prophylaxis []Yes []No  
 
 
 
DVT Prophylaxis SCDs:     
     
 Ochoa stockings:     
  
[] Medication []Contraindicated []None Activity Level Activity Level: Up ad katy Activity Assistance: No assistance needed Purposeful Rounding every 1-2 hour? []Yes Gutierrez Score  Total Score: 2 Bed Alarm (If score 3 or >) []Yes  
[] Refused (See signed refusal form in chart) Alvaro Score  Alvaro Score: 21 Alvaro Score (if score 14 or less) []PMT consult  
[]Wound Care consult []Specialty bed  
[] Nutrition consult Needs prior to discharge:  
Home O2 required:   
[]Yes []No  
 If yes, how much O2 required? Other:  
 Last Bowel Movement: Last Bowel Movement Date: 11/15/18 Influenza Vaccine Received Flu Vaccine for Current Season (usually Sept-March): No  
 Patient/Guardian Refused (Notify MD): No  
Pneumonia Vaccine Diet Active Orders Diet DIET DIABETIC CONSISTENT CARB Regular LDAs Peripheral IV 11/14/18 Left; Anterior Forearm (Active) Site Assessment Clean, dry, & intact 11/16/2018  4:57 AM  
Phlebitis Assessment 0 11/16/2018  4:57 AM  
Infiltration Assessment 0 11/16/2018  4:57 AM  
Dressing Status Clean, dry, & intact 11/16/2018  4:57 AM  
Dressing Type Transparent;Tape 11/16/2018  4:57 AM  
Hub Color/Line Status Pink;Flushed;Patent 11/16/2018  4:57 AM  
   
Peripheral IV 11/15/18 Left Antecubital (Active) Site Assessment Clean, dry, & intact 11/15/2018  6:47 PM  
Phlebitis Assessment 0 11/15/2018  6:47 PM  
Infiltration Assessment 0 11/15/2018  6:47 PM  
Dressing Status Clean, dry, & intact 11/15/2018  6:47 PM  
Dressing Type Tape;Transparent 11/15/2018  6:47 PM  
Hub Color/Line Status Pink;Flushed 11/15/2018  6:47 PM  
                  
Urinary Catheter Intake & Output Date 11/15/18 0700 - 11/16/18 0659 11/16/18 0700 - 11/17/18 7013 Shift 7025-8307 1594-0906 24 Hour Total 4719-8137 6013-5593 24 Hour Total  
INTAKE  
P.O.  240 240     
  P. O.  240 240     
I. V.(mL/kg/hr)  50(0) 50(0) Volume (0.9% sodium chloride infusion)  50 50 Shift Total(mL/kg)  290(1.8) 290(1.8) OUTPUT Urine(mL/kg/hr) 650(0.3) 150(0.1) 800(0.2) Urine Voided 650 150 800 Shift Total(mL/kg) 650(4.1) 150(0.9) 800(5) NET -650 140 -510 Weight (kg) 158.5 161.5 161.5 161.5 161.5 161.5 Readmission Risk Assessment Tool Score Low Risk 6 Total Score 3 Has Seen PCP in Last 6 Months (Yes=3, No=0)  
 4 Pt. Coverage (Medicare=5 , Medicaid, or Self-Pay=4) 4 Charlson Comorbidity Score (Age + Comorbid Conditions) Criteria that do not apply:  
 . Living with Significant Other. Assisted Living. LTAC. SNF. or  
Rehab Patient Length of Stay (>5 days = 3) IP Visits Last 12 Months (1-3=4, 4=9, >4=11) Expected Length of Stay 2d 12h Actual Length of Stay 1

## 2018-11-16 NOTE — PROGRESS NOTES
**Consult Information** 
Member Facility: [facility] Facility MRN: [id number] Consult ID: [id number] Facility Time Zone: ET 
Date and Time of Consult: 11/15/2018 11:16:08 PM 
Requesting Clinician: Xavier Reed Time of Call : 11/15/2018 11:33:00 PM 
Patient Name: Jas Price YOB: 1965 Gender: Male **Clinical Note** Clinical Note: Pt usually takes 80mg of Lasix daily and takes prn Tramadol for chronic back pain. Can we have orders for this. Pt currently asking for Tramadol now. Also when pt arrived to floor he had 14 beats of v-tach. Asymptomatic. Vitals stable. started him on lasix 80 mg twice a day and oral tramadol. Electrolytes are normal; monitor for any further V tach

## 2018-11-16 NOTE — PROGRESS NOTES
11/15/18 2047 Vitals Temp 98.3 °F (36.8 °C) Temp Source Oral  
Pulse (Heart Rate) (!) 110 Heart Rate Source Monitor Resp Rate 22  
O2 Sat (%) 98 % Level of Consciousness Alert  
BP (!) 143/99 MAP (Monitor) 111 MEWS Score 3 Elevated HR. Asymptomatic.

## 2018-11-16 NOTE — PROGRESS NOTES
[facility] on 11/16/2018. Documented by Rev. Lisa Felix, [facility] paging service: [phone])

## 2018-11-16 NOTE — PROGRESS NOTES
General Daily Progress Note Admit Date: 11/14/2018 Hospital day 2 Subjective:  
 
Patient has no complaint of chest pain. Still gets short of breath walking to bathroom and back. Heart rate much better. .. Medication side effects: none Current Facility-Administered Medications Medication Dose Route Frequency  dilTIAZem (CARDIZEM) 125 mg in dextrose 5% 125 mL infusion  0-15 mg/hr IntraVENous TITRATE  carvedilol (COREG) tablet 25 mg  25 mg Oral BID  atorvastatin (LIPITOR) tablet 40 mg  40 mg Oral DAILY  albuterol (PROVENTIL HFA, VENTOLIN HFA, PROAIR HFA) inhaler 1 Puff  1 Puff Inhalation Q6H RT  
 albuterol-ipratropium (DUO-NEB) 2.5 MG-0.5 MG/3 ML  3 mL Nebulization Q4H RT  
 apixaban (ELIQUIS) tablet 5 mg  5 mg Oral BID  
 glucose chewable tablet 16 g  4 Tab Oral PRN  
 dextrose (D50W) injection syrg 12.5-25 g  25-50 mL IntraVENous PRN  
 glucagon (GLUCAGEN) injection 1 mg  1 mg IntraMUSCular PRN  
 insulin lispro (HUMALOG) injection   SubCUTAneous AC&HS  levalbuterol (XOPENEX) nebulizer soln 1.25 mg/3 mL  1.25 mg Nebulization Q4H PRN  
 aspirin chewable tablet 81 mg  81 mg Oral DAILY  insulin glargine (LANTUS) injection 25 Units  25 Units SubCUTAneous DAILY  traMADol (ULTRAM) tablet 50 mg  50 mg Oral Q6H PRN  
 furosemide (LASIX) tablet 80 mg  80 mg Oral ACB&D  potassium chloride (K-DUR, KLOR-CON) SR tablet 20 mEq  20 mEq Oral BID Review of Systems Pertinent items are noted in HPI. Objective:  
 
Patient Vitals for the past 8 hrs: 
 BP Temp Pulse Resp SpO2  
11/16/18 0734 115/83 97.9 °F (36.6 °C) 97 20 96 % No intake/output data recorded. 11/14 1901 - 11/16 0700 In: 48 [I.V.:50] Out: 650 [Urine:650] Physical Exam:  
Visit Vitals /83 Pulse 97 Temp 97.9 °F (36.6 °C) Resp 20 Wt (!) 356 lb 0.7 oz (161.5 kg) SpO2 96% BMI 46.97 kg/m² Lungs: clear to auscultation bilaterally Heart: irregularly irregular rhythm Abdomen: soft, non-tender. Bowel sounds normal. No masses,  no organomegaly Extremities: edema , 1+ bilaterally ECG: atrial fibrillation, rate 85 Data Review Recent Results (from the past 24 hour(s)) GLUCOSE, POC Collection Time: 11/15/18  8:05 AM  
Result Value Ref Range Glucose (POC) 318 (H) 65 - 100 mg/dL Performed by Edgar Patten GLUCOSE, POC Collection Time: 11/15/18 11:52 AM  
Result Value Ref Range Glucose (POC) 436 (H) 65 - 100 mg/dL Performed by Christos Rogers   
TROPONIN I Collection Time: 11/15/18 12:46 PM  
Result Value Ref Range Troponin-I, Qt. 0.09 (H) <0.05 ng/mL GLUCOSE, POC Collection Time: 11/15/18  4:05 PM  
Result Value Ref Range Glucose (POC) 370 (H) 65 - 100 mg/dL Performed by Agusto Ramírez (PCT) URINALYSIS W/ RFLX MICROSCOPIC Collection Time: 11/15/18  5:23 PM  
Result Value Ref Range Color YELLOW/STRAW Appearance CLEAR CLEAR Specific gravity 1.015 1.003 - 1.030    
 pH (UA) 5.5 5.0 - 8.0 Protein TRACE (A) NEG mg/dL Glucose >1,000 (A) NEG mg/dL Ketone NEGATIVE  NEG mg/dL Bilirubin NEGATIVE  NEG Blood NEGATIVE  NEG Urobilinogen 1.0 0.2 - 1.0 EU/dL Nitrites NEGATIVE  NEG Leukocyte Esterase NEGATIVE  NEG    
 WBC 0-4 0 - 4 /hpf  
 RBC 0-5 0 - 5 /hpf Epithelial cells FEW FEW /lpf Bacteria NEGATIVE  NEG /hpf Hyaline cast 0-2 0 - 5 /lpf  
GLUCOSE, POC Collection Time: 11/15/18  8:56 PM  
Result Value Ref Range Glucose (POC) 301 (H) 65 - 100 mg/dL Performed by Debi Cornejo METABOLIC PANEL, BASIC Collection Time: 11/16/18  4:57 AM  
Result Value Ref Range Sodium 135 (L) 136 - 145 mmol/L Potassium 4.2 3.5 - 5.1 mmol/L Chloride 100 97 - 108 mmol/L  
 CO2 28 21 - 32 mmol/L Anion gap 7 5 - 15 mmol/L Glucose 249 (H) 65 - 100 mg/dL BUN 13 6 - 20 MG/DL Creatinine 1.09 0.70 - 1.30 MG/DL  
 BUN/Creatinine ratio 12 12 - 20 GFR est AA >60 >60 ml/min/1.73m2 GFR est non-AA >60 >60 ml/min/1.73m2 Calcium 8.9 8.5 - 10.1 MG/DL  
TROPONIN I Collection Time: 11/16/18  4:57 AM  
Result Value Ref Range Troponin-I, Qt. 0.10 (H) <0.05 ng/mL CBC WITH AUTOMATED DIFF Collection Time: 11/16/18  4:57 AM  
Result Value Ref Range WBC 9.9 4.1 - 11.1 K/uL  
 RBC 4.29 4. 10 - 5.70 M/uL  
 HGB 12.4 12.1 - 17.0 g/dL HCT 38.8 36.6 - 50.3 % MCV 90.4 80.0 - 99.0 FL  
 MCH 28.9 26.0 - 34.0 PG  
 MCHC 32.0 30.0 - 36.5 g/dL  
 RDW 15.4 (H) 11.5 - 14.5 % PLATELET 043 (L) 394 - 400 K/uL MPV 9.9 8.9 - 12.9 FL  
 NRBC 0.2 (H) 0  WBC ABSOLUTE NRBC 0.02 (H) 0.00 - 0.01 K/uL NEUTROPHILS 71 32 - 75 % LYMPHOCYTES 21 12 - 49 % MONOCYTES 6 5 - 13 % EOSINOPHILS 1 0 - 7 % BASOPHILS 1 0 - 1 % IMMATURE GRANULOCYTES 1 (H) 0.0 - 0.5 % ABS. NEUTROPHILS 7.0 1.8 - 8.0 K/UL  
 ABS. LYMPHOCYTES 2.1 0.8 - 3.5 K/UL  
 ABS. MONOCYTES 0.5 0.0 - 1.0 K/UL  
 ABS. EOSINOPHILS 0.1 0.0 - 0.4 K/UL  
 ABS. BASOPHILS 0.1 0.0 - 0.1 K/UL  
 ABS. IMM. GRANS. 0.1 (H) 0.00 - 0.04 K/UL  
 DF AUTOMATED Assessment:  
 
Active Problems: 
  Atrial fibrillation with RVR ([address] (8/11/2017) Plan: 1. A fib with RVR- off cardizem drip 2. Dyspnea- probably secondary to above problem. CXR and CTA unremarkable. Troponin slightly elevated. . Had a cath in 2016 (DR. Peterson) showing minimal CAD. Also for echo- results pending. 3.Diabetes- will add lantus and continue sliding scale for now. Resume his glipizide and increase dose of Lantus to 35 units daily. 4. Morbid obesity 5. Hx restrictive lung disease on spirometry and sleep apnea 6. Recurrent episodes of admission for rapid a fib- will discuss with cardiology whether he would be a candidate for ablation or av node ablation + pacemaker.   
Will also consult Dr. Tristan Sam

## 2018-11-16 NOTE — ED NOTES
TRANSFER - OUT REPORT: 
 
Verbal report given to Rashid Goodrich RN(name) on Shaun Hernandez  being transferred to PCU(unit) for routine progression of care Report consisted of patients Situation, Background, Assessment and  
Recommendations(SBAR). Information from the following report(s) SBAR was reviewed with the receiving nurse. Lines:  
Peripheral IV 11/14/18 Left; Anterior Forearm (Active) Site Assessment Clean, dry, & intact 11/15/2018  6:47 PM  
Phlebitis Assessment 0 11/15/2018  6:47 PM  
Infiltration Assessment 0 11/15/2018  6:47 PM  
Dressing Status Clean, dry, & intact 11/15/2018  6:47 PM  
Dressing Type Tape;Transparent 11/15/2018  6:47 PM  
Hub Color/Line Status Pink;Flushed 11/15/2018  6:47 PM  
   
Peripheral IV 11/15/18 Left Antecubital (Active) Site Assessment Clean, dry, & intact 11/15/2018  6:47 PM  
Phlebitis Assessment 0 11/15/2018  6:47 PM  
Infiltration Assessment 0 11/15/2018  6:47 PM  
Dressing Status Clean, dry, & intact 11/15/2018  6:47 PM  
Dressing Type Tape;Transparent 11/15/2018  6:47 PM  
Hub Color/Line Status Pink;Flushed 11/15/2018  6:47 PM  
  
 
Opportunity for questions and clarification was provided. Patient transported with: 
 Monitor

## 2018-11-16 NOTE — DIABETES MGMT
DTC Progress Note Recommendations/ Comments: Chart reviewed for extreme hyperglycemia. Note pt received 55 units of Lantus total yesterday plus 48 units of correction insulin. Also pt's weight is 161.5 kg. If appropriate, please consider: 
Add a one time dose of Lantus 20 units now Increasing Lantus to 60 units daily Changing correction insulin to insulin resistant scale Current hospital DM medication: Glipizide 10 mg BID, Humalog for correction, normal sensitivity scale and Lantus 35 units once daily AM. Chart reviewed on [address] [place] [address],[address]. Patient is a 48 y.o. male with hx Type 2 Diabetes on Metformin BID, Glipizide 10 mg BID and Victoza at home. A1c:  
Lab Results Component Value Date/Time Hemoglobin A1c 9.5 (H) 11/15/2018 12:25 AM  
 Hemoglobin A1c 7.5 (H) 08/11/2017 02:02 AM  
 
 
Recent Glucose Results:  
Lab Results Component Value Date/Time  (H) 11/16/2018 04:57 AM  
 GLUCPOC 333 (H) 11/16/2018 08:43 AM  
 GLUCPOC 301 (H) 11/15/2018 08:56 PM  
 GLUCPOC 370 (H) 11/15/2018 04:05 PM  
  
 
Lab Results Component Value Date/Time Creatinine 1.09 11/16/2018 04:57 AM  
 
CrCl cannot be calculated (Unknown ideal weight. ). Active Orders Diet DIET NPO  
  
 
PO intake: No data found. Will continue to follow as needed. Thank you Mervat Luz RD, [facility]

## 2018-11-16 NOTE — PROGRESS NOTES
TRANSFER - IN REPORT: 
 
Verbal report received from B(name) on Juan Alberto Chambers  being received from ED(unit) for routine progression of care Report consisted of patients Situation, Background, Assessment and  
Recommendations(SBAR). Information from the following report(s) SBAR, Kardex, MAR, Recent Results and Cardiac Rhythm afib was reviewed with the receiving nurse. Opportunity for questions and clarification was provided. Assessment completed upon patients arrival to unit and care assumed. Primary Nurse Melvina Baptiste RN and Denice Zapata RN performed a dual skin assessment on this patient No impairment noted Alvaro score is 21. Only able to do partial skin assessment, as pt refused to let us look at groin, buttock area. Has on shorts, and politely refused to pull them Waneta Fass after being educated on reasons for performing skin assessments. Melvina Baptiste RN

## 2018-11-17 PROBLEM — I42.8 CARDIOMYOPATHY, NONISCHEMIC (HCC): Status: ACTIVE | Noted: 2018-11-17

## 2018-11-17 LAB
ATRIAL RATE: 75 BPM
CALCULATED R AXIS, ECG10: -67 DEGREES
CALCULATED T AXIS, ECG11: 97 DEGREES
DIAGNOSIS, 93000: NORMAL
GLUCOSE BLD STRIP.AUTO-MCNC: 257 MG/DL (ref 65–100)
GLUCOSE BLD STRIP.AUTO-MCNC: 292 MG/DL (ref 65–100)
GLUCOSE BLD STRIP.AUTO-MCNC: 305 MG/DL (ref 65–100)
GLUCOSE BLD STRIP.AUTO-MCNC: 323 MG/DL (ref 65–100)
Q-T INTERVAL, ECG07: 496 MS
QRS DURATION, ECG06: 198 MS
QTC CALCULATION (BEZET), ECG08: 553 MS
SERVICE CMNT-IMP: ABNORMAL
VENTRICULAR RATE, ECG03: 75 BPM

## 2018-11-17 PROCEDURE — 82962 GLUCOSE BLOOD TEST: CPT

## 2018-11-17 PROCEDURE — 93005 ELECTROCARDIOGRAM TRACING: CPT

## 2018-11-17 PROCEDURE — 74011250637 HC RX REV CODE- 250/637: Performed by: INTERNAL MEDICINE

## 2018-11-17 PROCEDURE — 74011250637 HC RX REV CODE- 250/637: Performed by: FAMILY MEDICINE

## 2018-11-17 PROCEDURE — 65660000000 HC RM CCU STEPDOWN

## 2018-11-17 PROCEDURE — 74011636637 HC RX REV CODE- 636/637: Performed by: INTERNAL MEDICINE

## 2018-11-17 PROCEDURE — 94640 AIRWAY INHALATION TREATMENT: CPT

## 2018-11-17 PROCEDURE — 74011250637 HC RX REV CODE- 250/637: Performed by: HOSPITALIST

## 2018-11-17 PROCEDURE — 74011636637 HC RX REV CODE- 636/637: Performed by: FAMILY MEDICINE

## 2018-11-17 PROCEDURE — 74011000250 HC RX REV CODE- 250: Performed by: INTERNAL MEDICINE

## 2018-11-17 PROCEDURE — C8923 2D TTE W OR W/O FOL W/CON,CO: HCPCS

## 2018-11-17 RX ORDER — LISINOPRIL 5 MG/1
2.5 TABLET ORAL DAILY
Status: DISCONTINUED | OUTPATIENT
Start: 2018-11-18 | End: 2018-11-19

## 2018-11-17 RX ORDER — INSULIN GLARGINE 100 [IU]/ML
50 INJECTION, SOLUTION SUBCUTANEOUS DAILY
Status: DISCONTINUED | OUTPATIENT
Start: 2018-11-17 | End: 2018-11-18

## 2018-11-17 RX ADMIN — CARVEDILOL 25 MG: 12.5 TABLET, FILM COATED ORAL at 08:37

## 2018-11-17 RX ADMIN — INSULIN LISPRO 7 UNITS: 100 INJECTION, SOLUTION INTRAVENOUS; SUBCUTANEOUS at 11:34

## 2018-11-17 RX ADMIN — INSULIN LISPRO 5 UNITS: 100 INJECTION, SOLUTION INTRAVENOUS; SUBCUTANEOUS at 08:37

## 2018-11-17 RX ADMIN — Medication 10 ML: at 22:20

## 2018-11-17 RX ADMIN — ACETAMINOPHEN 650 MG: 325 TABLET ORAL at 13:02

## 2018-11-17 RX ADMIN — INSULIN LISPRO 5 UNITS: 100 INJECTION, SOLUTION INTRAVENOUS; SUBCUTANEOUS at 17:03

## 2018-11-17 RX ADMIN — POTASSIUM CHLORIDE 20 MEQ: 20 TABLET, EXTENDED RELEASE ORAL at 08:37

## 2018-11-17 RX ADMIN — ASPIRIN 81 MG CHEWABLE TABLET 81 MG: 81 TABLET CHEWABLE at 08:37

## 2018-11-17 RX ADMIN — ATORVASTATIN CALCIUM 40 MG: 40 TABLET, FILM COATED ORAL at 08:37

## 2018-11-17 RX ADMIN — ALBUTEROL SULFATE 1 PUFF: 90 AEROSOL, METERED RESPIRATORY (INHALATION) at 02:00

## 2018-11-17 RX ADMIN — INSULIN GLARGINE 50 UNITS: 100 INJECTION, SOLUTION SUBCUTANEOUS at 08:37

## 2018-11-17 RX ADMIN — GLIPIZIDE 10 MG: 5 TABLET ORAL at 08:37

## 2018-11-17 RX ADMIN — FUROSEMIDE 80 MG: 80 TABLET ORAL at 08:37

## 2018-11-17 RX ADMIN — Medication 10 ML: at 05:13

## 2018-11-17 RX ADMIN — ALBUTEROL SULFATE 1 PUFF: 90 AEROSOL, METERED RESPIRATORY (INHALATION) at 22:13

## 2018-11-17 RX ADMIN — IPRATROPIUM BROMIDE AND ALBUTEROL SULFATE 3 ML: .5; 3 SOLUTION RESPIRATORY (INHALATION) at 07:38

## 2018-11-17 RX ADMIN — ALBUTEROL SULFATE 1 PUFF: 90 AEROSOL, METERED RESPIRATORY (INHALATION) at 08:37

## 2018-11-17 RX ADMIN — IPRATROPIUM BROMIDE AND ALBUTEROL SULFATE 3 ML: .5; 3 SOLUTION RESPIRATORY (INHALATION) at 20:18

## 2018-11-17 RX ADMIN — GLIPIZIDE 10 MG: 5 TABLET ORAL at 17:06

## 2018-11-17 RX ADMIN — FUROSEMIDE 80 MG: 80 TABLET ORAL at 17:03

## 2018-11-17 RX ADMIN — TRAMADOL HYDROCHLORIDE 50 MG: 50 TABLET, FILM COATED ORAL at 22:12

## 2018-11-17 RX ADMIN — INSULIN LISPRO 4 UNITS: 100 INJECTION, SOLUTION INTRAVENOUS; SUBCUTANEOUS at 22:12

## 2018-11-17 RX ADMIN — Medication 10 ML: at 12:56

## 2018-11-17 RX ADMIN — IPRATROPIUM BROMIDE AND ALBUTEROL SULFATE 3 ML: .5; 3 SOLUTION RESPIRATORY (INHALATION) at 13:35

## 2018-11-17 RX ADMIN — CARVEDILOL 25 MG: 12.5 TABLET, FILM COATED ORAL at 17:06

## 2018-11-17 RX ADMIN — POTASSIUM CHLORIDE 20 MEQ: 20 TABLET, EXTENDED RELEASE ORAL at 17:06

## 2018-11-17 NOTE — PROGRESS NOTES
PCU SHIFT NURSING NOTE Bedside and Verbal shift change report given to Rose Marie Patrick RN (oncoming nurse) by Bunny Jacinto (offgoing nurse). Report included the following information SBAR, Kardex, Intake/Output, MAR, Recent Results and Cardiac Rhythm Paced. Shift Summary:  
0800 - Pt stating that he is walking to the bathroom without assistance; pt refusing to wear arm immobilizer. Observed pt using left arm despite education. Dressing to pacemaker site reinforced. Admission Date 11/14/2018 Admission Diagnosis Atrial fibrillation with RVR ([address] Consults IP CONSULT TO CARDIOLOGY 
IP CONSULT TO ELECTROPHYSIOLOGY Consults []PT []OT []Speech  
[]Case Management  
  
[] Palliative Cardiac Monitoring Order []Yes []No  
 
IV drips []Yes Drip:                            Dose: 
Drip:                            Dose: 
Drip:                            Dose:  
[]No  
 
GI Prophylaxis []Yes []No  
 
 
 
DVT Prophylaxis SCDs:     
     
 Ochoa stockings:     
  
[] Medication []Contraindicated []None Activity Level Activity Level: Bed Rest   
 Activity Assistance: No assistance needed Purposeful Rounding every 1-2 hour? []Yes Gutierrez Score  Total Score: 1 Bed Alarm (If score 3 or >) []Yes  
[] Refused (See signed refusal form in chart) Alvaro Score  Alvaro Score: 20 Alvaro Score (if score 14 or less) []PMT consult  
[]Wound Care consult []Specialty bed  
[] Nutrition consult Needs prior to discharge:  
Home O2 required:   
[]Yes []No  
 If yes, how much O2 required? Other:  
 Last Bowel Movement: Last Bowel Movement Date: 11/15/18 Influenza Vaccine Received Flu Vaccine for Current Season (usually Sept-March): No  
 Patient/Guardian Refused (Notify MD): No  
Pneumonia Vaccine Diet Active Orders Diet DIET CARDIAC Regular LDAs Peripheral IV 11/14/18 Left; Anterior Forearm (Active) Site Assessment Clean, dry, & intact 11/17/2018  4:59 AM  
Phlebitis Assessment 0 11/17/2018  4:59 AM  
Infiltration Assessment 0 11/17/2018  4:59 AM  
Dressing Status Clean, dry, & intact 11/17/2018  4:59 AM  
Dressing Type Transparent 11/17/2018  4:59 AM  
Hub Color/Line Status Pink;Flushed;Capped 11/17/2018  4:59 AM  
Action Taken Open ports on tubing capped 11/17/2018  4:59 AM  
Alcohol Cap Used Yes 11/17/2018  4:59 AM  
   
Peripheral IV 11/15/18 Left Antecubital (Active) Site Assessment Clean, dry, & intact 11/17/2018  4:59 AM  
Phlebitis Assessment 0 11/17/2018  4:59 AM  
Infiltration Assessment 0 11/17/2018  4:59 AM  
Dressing Status Clean, dry, & intact 11/17/2018  4:59 AM  
Dressing Type Transparent 11/17/2018  4:59 AM  
Hub Color/Line Status Pink;Flushed;Capped 11/17/2018  4:59 AM  
Action Taken Open ports on tubing capped 11/17/2018  4:59 AM  
Alcohol Cap Used Yes 11/17/2018  4:59 AM  
                  
Urinary Catheter Intake & Output Date 11/16/18 0700 - 11/17/18 0659 11/17/18 0700 - 11/18/18 2489 Shift 9539-1290 2924-4897 24 Hour Total 5993-7850 6153-7218 24 Hour Total  
INTAKE  
P.O.  1680 1680     
  P. O.  1680 1680 Shift Total(mL/kg)  1928(26.7) [id number]) OUTPUT Urine(mL/kg/hr)  1200(0.6) 1200(0.3) Urine Voided  1200 1200 Shift Total(mL/kg)  1200(7.4) 1200(7.4) NET  480 480 Weight (kg) 161.5 161.5 161.5 161.5 161.5 161.5 Readmission Risk Assessment Tool Score Low Risk 6 Total Score 3 Has Seen PCP in Last 6 Months (Yes=3, No=0)  
 4 Pt. Coverage (Medicare=5 , Medicaid, or Self-Pay=4) 4 Charlson Comorbidity Score (Age + Comorbid Conditions) Criteria that do not apply:  
 . Living with Significant Other. Assisted Living. LTAC. SNF. or  
Rehab Patient Length of Stay (>5 days = 3) IP Visits Last 12 Months (1-3=4, 4=9, >4=11) Expected Length of Stay 3d 7h Actual Length of Stay 2

## 2018-11-17 NOTE — PROGRESS NOTES
ADULT PROTOCOL: JET AEROSOL ASSESSMENT Patient  Oxana Nuñez     48 y.o.   male     2018  9:52 PM 
 
Breath Sounds Pre Procedure: Right Breath Sounds: Diminished Left Breath Sounds: Diminished Breath Sounds Post Procedure: Right Breath Sounds: Diminished Left Breath Sounds: Diminished Breathing pattern: Pre procedure Breathing Pattern: Regular Post procedure Breathing Pattern: Regular Heart Rate: Pre procedure Pulse: 75 Post procedure Pulse: 78 Resp Rate: Pre procedure Respirations: 16 Post procedure Respirations: 22 
 
     
 
Cough: Pre procedure Cough: Non-productive Post procedure Cough: Non-productive Oxygen: O2 Device: Nasal cannula   Flow rate (L/min) 2 Changed: NO SpO2: Pre procedure SpO2: 96 %   with oxygen Post procedure SpO2: 97 %  with oxygen Nebulizer Therapy: Current medications Aerosolized Medications: DuoNeb Changed: YES, to TID Smoking History:  Smoking status: Former Smoker  
    Packs/day: 1.00  
    Last attempt to quit: 2/3/2014  
    Years since quittin.7 Problem List:  
Patient Active Problem List  
Diagnosis Code  Hypertension I10  Dyspnea R06.00  Restrictive lung disease J98.4  CHF (congestive heart failure) ([facility]) I50.9  Atrial fibrillation with RVR ([facility]) I48.91  
 Obesity, morbid ([facility]) E66.01  
 Sleep apnea G47.30  Controlled type 2 diabetes mellitus without complication, without long-term current use of insulin ([facility]) E11.9 Respiratory Therapist: Micheal Oconnor RT

## 2018-11-17 NOTE — PROGRESS NOTES
General Daily Progress Note Admit Date: 11/14/2018 Hospital day 4 Subjective:  
 
Patient has no complaint of chest pain. Still has dyspnea walking to bathroom. Blood sugars remain high. .  
Medication side effects: none Current Facility-Administered Medications Medication Dose Route Frequency  insulin glargine (LANTUS) injection 50 Units  50 Units SubCUTAneous DAILY  umeclidinium-vilanterol (ANORO ELLIPTA) 62.5 mcg- 25 mcg/inhalation  1 Puff Inhalation DAILY  glipiZIDE (GLUCOTROL) tablet 10 mg  10 mg Oral BID  ADDaptor      
 0.9% sodium chloride (MBP/ADV) infusion  sodium chloride (NS) flush 5-10 mL  5-10 mL IntraVENous Q8H  
 sodium chloride (NS) flush 5-10 mL  5-10 mL IntraVENous PRN  
 naloxone (NARCAN) injection 0.4 mg  0.4 mg IntraVENous PRN  
 acetaminophen (TYLENOL) tablet 650 mg  650 mg Oral Q4H PRN  
 [START ON 11/19/2018] apixaban (ELIQUIS) tablet 5 mg  5 mg Oral Q12H  
 albuterol (PROVENTIL VENTOLIN) 2.5 mg /3 mL (0.083 %) nebulizer solution  albuterol-ipratropium (DUO-NEB) 2.5 MG-0.5 MG/3 ML  3 mL Nebulization TID RT  
 dilTIAZem (CARDIZEM) 125 mg in dextrose 5% 125 mL infusion  0-15 mg/hr IntraVENous TITRATE  carvedilol (COREG) tablet 25 mg  25 mg Oral BID  atorvastatin (LIPITOR) tablet 40 mg  40 mg Oral DAILY  albuterol (PROVENTIL HFA, VENTOLIN HFA, PROAIR HFA) inhaler 1 Puff  1 Puff Inhalation Q6H RT  
 glucose chewable tablet 16 g  4 Tab Oral PRN  
 dextrose (D50W) injection syrg 12.5-25 g  25-50 mL IntraVENous PRN  
 glucagon (GLUCAGEN) injection 1 mg  1 mg IntraMUSCular PRN  
 insulin lispro (HUMALOG) injection   SubCUTAneous AC&HS  levalbuterol (XOPENEX) nebulizer soln 1.25 mg/3 mL  1.25 mg Nebulization Q4H PRN  
 aspirin chewable tablet 81 mg  81 mg Oral DAILY  traMADol (ULTRAM) tablet 50 mg  50 mg Oral Q6H PRN  
 furosemide (LASIX) tablet 80 mg  80 mg Oral ACB&D  
  potassium chloride (K-DUR, KLOR-CON) SR tablet 20 mEq  20 mEq Oral BID Review of Systems Pertinent items are noted in HPI. Objective:  
 
Patient Vitals for the past 8 hrs: 
 BP Temp Pulse Resp SpO2  
11/17/18 0738     95 % 11/17/18 0459 109/70 98.5 °F (36.9 °C) 75 16 94 % No intake/output data recorded. 11/15 1901 - 11/17 0700 In: 1970 [P.O.:1920; I.V.:50] [facility] 1350 [facility] Physical Exam:  
Visit Vitals /70 (BP 1 Location: Right arm, BP Patient Position: At rest) Pulse 75 Temp 98.5 °F (36.9 °C) Resp 16 Ht 6' (1.829 m) Wt (!) 356 lb 0.7 oz (161.5 kg) SpO2 95% BMI 48.29 kg/m² Lungs: clear to auscultation bilaterally Heart: regular rate and rhythm, S1, S2 normal, no murmur, click, rub or gallop Abdomen: soft, non-tender. Bowel sounds normal. No masses,  no organomegaly Extremities: edema 1 bilaterally ECG: normal sinus rhythm Data Review Recent Results (from the past 24 hour(s)) GLUCOSE, POC Collection Time: 11/16/18  8:43 AM  
Result Value Ref Range Glucose (POC) 333 (H) 65 - 100 mg/dL Performed by "[employer] GLUCOSE, POC Collection Time: 11/16/18 11:14 AM  
Result Value Ref Range Glucose (POC) 369 (H) 65 - 100 mg/dL Performed by "[employer] GLUCOSE, POC Collection Time: 11/16/18  2:39 PM  
Result Value Ref Range Glucose (POC) 344 (H) 65 - 100 mg/dL Performed by "[employer] GLUCOSE, POC Collection Time: 11/16/18  5:20 PM  
Result Value Ref Range Glucose (POC) 267 (H) 65 - 100 mg/dL Performed by Estefani Pisano GLUCOSE, POC Collection Time: 11/16/18  9:18 PM  
Result Value Ref Range Glucose (POC) 324 (H) 65 - 100 mg/dL Performed by Yves Estelle   
EKG, 12 LEAD, INITIAL Collection Time: 11/17/18  5:05 AM  
Result Value Ref Range Ventricular Rate 75 BPM  
 Atrial Rate 75 BPM  
 QRS Duration 198 ms Q-T Interval 496 ms QTC Calculation (Bezet) 553 ms Calculated R Axis -67 degrees Calculated T Axis 97 degrees Diagnosis Electronic ventricular pacemaker When compared with ECG of 14-NOV-2018 23:03, Electronic ventricular pacemaker has replaced Atrial fibrillation Vent. rate has decreased BY  61 BPM 
  
 
 
   
Assessment:  
 
Principal Problem: 
  Atrial fibrillation with RVR ([address] (8/11/2017) Active Problems: Hypertension (2/17/2014) Obesity, morbid ([address] (12/20/2017) Plan: 1. A fib with RVR- sp av cheryl ablation and pacemaker placement 2. Dyspnea- persistent. . CXR and CTA unremarkable. Troponin slightly elevated. . Had a cath in 2016 (DR. Peterson) showing minimal CAD and L main ostial tapering. Ricardo Mealy recently showed low to moderate likelihood of CAD. Will resume his Anoro. ABG not all that bad. Dyspnea may be related to weight and restrictive lung disease. Will discuss with cardiology. 3.Diabetes- will increase lantus and continue sliding scale for now. Resume his glipizide. 4. Morbid obesity 5. Hx restrictive lung disease on spirometry and sleep apnea 6. Recurrent episodes of admission for rapid a fib- appreciate Dr. Miah Holly help.

## 2018-11-18 ENCOUNTER — APPOINTMENT (OUTPATIENT)
Dept: GENERAL RADIOLOGY | Age: 53
DRG: 175 | End: 2018-11-18
Attending: FAMILY MEDICINE
Payer: MEDICAID

## 2018-11-18 LAB
GLUCOSE BLD STRIP.AUTO-MCNC: 287 MG/DL (ref 65–100)
GLUCOSE BLD STRIP.AUTO-MCNC: 304 MG/DL (ref 65–100)
GLUCOSE BLD STRIP.AUTO-MCNC: 305 MG/DL (ref 65–100)
GLUCOSE BLD STRIP.AUTO-MCNC: 394 MG/DL (ref 65–100)
SERVICE CMNT-IMP: ABNORMAL
TROPONIN I SERPL-MCNC: 0.24 NG/ML

## 2018-11-18 PROCEDURE — 74011250637 HC RX REV CODE- 250/637: Performed by: HOSPITALIST

## 2018-11-18 PROCEDURE — 74011636637 HC RX REV CODE- 636/637: Performed by: FAMILY MEDICINE

## 2018-11-18 PROCEDURE — 84484 ASSAY OF TROPONIN QUANT: CPT

## 2018-11-18 PROCEDURE — 94640 AIRWAY INHALATION TREATMENT: CPT

## 2018-11-18 PROCEDURE — 74011250637 HC RX REV CODE- 250/637: Performed by: INTERNAL MEDICINE

## 2018-11-18 PROCEDURE — 36415 COLL VENOUS BLD VENIPUNCTURE: CPT

## 2018-11-18 PROCEDURE — 74011000250 HC RX REV CODE- 250: Performed by: INTERNAL MEDICINE

## 2018-11-18 PROCEDURE — 74011250637 HC RX REV CODE- 250/637: Performed by: FAMILY MEDICINE

## 2018-11-18 PROCEDURE — 65660000000 HC RM CCU STEPDOWN

## 2018-11-18 PROCEDURE — 82962 GLUCOSE BLOOD TEST: CPT

## 2018-11-18 PROCEDURE — 74011636637 HC RX REV CODE- 636/637: Performed by: INTERNAL MEDICINE

## 2018-11-18 PROCEDURE — 71046 X-RAY EXAM CHEST 2 VIEWS: CPT

## 2018-11-18 PROCEDURE — 93005 ELECTROCARDIOGRAM TRACING: CPT

## 2018-11-18 PROCEDURE — 77030029684 HC NEB SM VOL KT MONA -A

## 2018-11-18 RX ORDER — FUROSEMIDE 80 MG/1
80 TABLET ORAL ONCE
Status: COMPLETED | OUTPATIENT
Start: 2018-11-18 | End: 2018-11-18

## 2018-11-18 RX ORDER — FUROSEMIDE 80 MG/1
160 TABLET ORAL
Status: DISCONTINUED | OUTPATIENT
Start: 2018-11-19 | End: 2018-11-19

## 2018-11-18 RX ORDER — FUROSEMIDE 80 MG/1
80 TABLET ORAL
Status: DISCONTINUED | OUTPATIENT
Start: 2018-11-18 | End: 2018-11-19

## 2018-11-18 RX ORDER — INSULIN GLARGINE 100 [IU]/ML
60 INJECTION, SOLUTION SUBCUTANEOUS DAILY
Status: DISCONTINUED | OUTPATIENT
Start: 2018-11-19 | End: 2018-11-19

## 2018-11-18 RX ADMIN — IPRATROPIUM BROMIDE AND ALBUTEROL SULFATE 3 ML: .5; 3 SOLUTION RESPIRATORY (INHALATION) at 07:47

## 2018-11-18 RX ADMIN — INSULIN LISPRO 7 UNITS: 100 INJECTION, SOLUTION INTRAVENOUS; SUBCUTANEOUS at 16:49

## 2018-11-18 RX ADMIN — INSULIN LISPRO 5 UNITS: 100 INJECTION, SOLUTION INTRAVENOUS; SUBCUTANEOUS at 08:19

## 2018-11-18 RX ADMIN — INSULIN LISPRO 3 UNITS: 100 INJECTION, SOLUTION INTRAVENOUS; SUBCUTANEOUS at 22:35

## 2018-11-18 RX ADMIN — TRAMADOL HYDROCHLORIDE 50 MG: 50 TABLET, FILM COATED ORAL at 03:48

## 2018-11-18 RX ADMIN — INSULIN LISPRO 7 UNITS: 100 INJECTION, SOLUTION INTRAVENOUS; SUBCUTANEOUS at 11:29

## 2018-11-18 RX ADMIN — ASPIRIN 81 MG CHEWABLE TABLET 81 MG: 81 TABLET CHEWABLE at 08:20

## 2018-11-18 RX ADMIN — POTASSIUM CHLORIDE 20 MEQ: 20 TABLET, EXTENDED RELEASE ORAL at 08:20

## 2018-11-18 RX ADMIN — ATORVASTATIN CALCIUM 40 MG: 40 TABLET, FILM COATED ORAL at 08:21

## 2018-11-18 RX ADMIN — POTASSIUM CHLORIDE 20 MEQ: 20 TABLET, EXTENDED RELEASE ORAL at 16:48

## 2018-11-18 RX ADMIN — ALBUTEROL SULFATE 1 PUFF: 90 AEROSOL, METERED RESPIRATORY (INHALATION) at 20:00

## 2018-11-18 RX ADMIN — GLIPIZIDE 10 MG: 5 TABLET ORAL at 16:49

## 2018-11-18 RX ADMIN — INSULIN GLARGINE 50 UNITS: 100 INJECTION, SOLUTION SUBCUTANEOUS at 08:19

## 2018-11-18 RX ADMIN — Medication 10 ML: at 13:37

## 2018-11-18 RX ADMIN — IPRATROPIUM BROMIDE AND ALBUTEROL SULFATE 3 ML: .5; 3 SOLUTION RESPIRATORY (INHALATION) at 20:14

## 2018-11-18 RX ADMIN — CARVEDILOL 25 MG: 12.5 TABLET, FILM COATED ORAL at 08:20

## 2018-11-18 RX ADMIN — GLIPIZIDE 10 MG: 5 TABLET ORAL at 08:20

## 2018-11-18 RX ADMIN — IPRATROPIUM BROMIDE AND ALBUTEROL SULFATE 3 ML: .5; 3 SOLUTION RESPIRATORY (INHALATION) at 13:26

## 2018-11-18 RX ADMIN — FUROSEMIDE 80 MG: 80 TABLET ORAL at 08:20

## 2018-11-18 RX ADMIN — UMECLIDINIUM BROMIDE AND VILANTEROL TRIFENATATE 1 PUFF: 62.5; 25 POWDER RESPIRATORY (INHALATION) at 18:55

## 2018-11-18 RX ADMIN — ALBUTEROL SULFATE 1 PUFF: 90 AEROSOL, METERED RESPIRATORY (INHALATION) at 02:00

## 2018-11-18 RX ADMIN — Medication 10 ML: at 22:35

## 2018-11-18 RX ADMIN — Medication 10 ML: at 06:55

## 2018-11-18 RX ADMIN — ALBUTEROL SULFATE 1 PUFF: 90 AEROSOL, METERED RESPIRATORY (INHALATION) at 13:37

## 2018-11-18 RX ADMIN — LISINOPRIL 2.5 MG: 5 TABLET ORAL at 08:20

## 2018-11-18 RX ADMIN — CARVEDILOL 25 MG: 12.5 TABLET, FILM COATED ORAL at 16:48

## 2018-11-18 RX ADMIN — FUROSEMIDE 80 MG: 80 TABLET ORAL at 16:48

## 2018-11-18 RX ADMIN — TRAMADOL HYDROCHLORIDE 50 MG: 50 TABLET, FILM COATED ORAL at 22:35

## 2018-11-18 RX ADMIN — FUROSEMIDE 80 MG: 80 TABLET ORAL at 11:29

## 2018-11-18 NOTE — PROGRESS NOTES
11/17/2018 7:57 PM 
 
Admit Date: 11/14/2018 Admit Diagnosis:  
Atrial fibrillation with RVR ([address] Subjective:  
 
Juan Alberto Mccarty is still short of breath with exertion. Current Facility-Administered Medications Medication Dose Route Frequency  insulin glargine (LANTUS) injection 50 Units  50 Units SubCUTAneous DAILY  umeclidinium-vilanterol (ANORO ELLIPTA) 62.5 mcg- 25 mcg/inhalation  1 Puff Inhalation DAILY  perflutren lipid microspheres (DEFINITY) contrast injection 2 mL  2 mL IntraVENous ONCE  
 [START ON 11/18/2018] lisinopril (PRINIVIL, ZESTRIL) tablet 2.5 mg  2.5 mg Oral DAILY  glipiZIDE (GLUCOTROL) tablet 10 mg  10 mg Oral BID  ADDaptor      
 0.9% sodium chloride (MBP/ADV) infusion  sodium chloride (NS) flush 5-10 mL  5-10 mL IntraVENous Q8H  
 sodium chloride (NS) flush 5-10 mL  5-10 mL IntraVENous PRN  
 naloxone (NARCAN) injection 0.4 mg  0.4 mg IntraVENous PRN  
 acetaminophen (TYLENOL) tablet 650 mg  650 mg Oral Q4H PRN  
 [START ON 11/19/2018] apixaban (ELIQUIS) tablet 5 mg  5 mg Oral Q12H  
 albuterol-ipratropium (DUO-NEB) 2.5 MG-0.5 MG/3 ML  3 mL Nebulization TID RT  
 dilTIAZem (CARDIZEM) 125 mg in dextrose 5% 125 mL infusion  0-15 mg/hr IntraVENous TITRATE  carvedilol (COREG) tablet 25 mg  25 mg Oral BID  atorvastatin (LIPITOR) tablet 40 mg  40 mg Oral DAILY  albuterol (PROVENTIL HFA, VENTOLIN HFA, PROAIR HFA) inhaler 1 Puff  1 Puff Inhalation Q6H RT  
 glucose chewable tablet 16 g  4 Tab Oral PRN  
 dextrose (D50W) injection syrg 12.5-25 g  25-50 mL IntraVENous PRN  
 glucagon (GLUCAGEN) injection 1 mg  1 mg IntraMUSCular PRN  
 insulin lispro (HUMALOG) injection   SubCUTAneous AC&HS  levalbuterol (XOPENEX) nebulizer soln 1.25 mg/3 mL  1.25 mg Nebulization Q4H PRN  
 aspirin chewable tablet 81 mg  81 mg Oral DAILY  traMADol (ULTRAM) tablet 50 mg  50 mg Oral Q6H PRN  
 furosemide (LASIX) tablet 80 mg  80 mg Oral ACB&D  
  potassium chloride (K-DUR, KLOR-CON) SR tablet 20 mEq  20 mEq Oral BID Objective:  
  
Physical Exam:   
Visit Vitals /59 Pulse 75 Temp 97.7 °F (36.5 °C) Resp 18 Ht 6' (1.829 m) Wt (!) 356 lb 0.7 oz (161.5 kg) SpO2 95% BMI 48.29 kg/m² Gen:  NAD Mental Status - Alert. General Appearance - Not in acute distress. Chest and Lung Exam  
Inspection: Accessory muscles - No use of accessory muscles in breathing. Auscultation:  
Breath sounds: - Normal.  
Cardiovascular Inspection: Jugular vein - Bilateral - Inspection Normal.  Pacemaker site uncomplicated Palpation/Percussion:  
Apical Impulse: - Normal.  
Auscultation: Rhythm - Regular. Heart Sounds - S1 WNL and S2 WNL. No S3 or S4. Murmurs & Other Heart Sounds: Auscultation of the heart reveals - No Murmurs. Peripheral Vascular Upper Extremity: Inspection - Bilateral - No Cyanotic nailbeds or Digital clubbing. Lower Extremity:  
Palpation: Edema - Bilateral - No edema. No groin bruits or hematomas Abdomen:   Soft, non-tender, bowel sounds are active. Neuro: A&O times 3, CN and motor grossly WNL Data Review:  
Recent Labs 11/16/18 
0457 11/15/18 
0025 WBC 9.9 9.3 HGB 12.4 13.1 HCT 38.8 40.1 * 171 Recent Labs 11/16/18 
99 [id number] 11/14/18 
2348 * 134* K 4.2 4.0  
 98 CO2 28 26 * 407* BUN 13 14 CREA 1.09 1.40* CA 8.9 8.7 MG  --  2.2 ALB  --  3.4* TBILI  --  1.0 SGOT  --  35 ALT  --  49 Recent Labs 11/16/18 
99 [id number] 11/15/18 
1246 11/15/18 
0310 11/14/18 
2348 TROIQ 0.10* 0.09* 0.11* 0.09* Intake/Output Summary (Last 24 hours) at 11/17/2018 2006 Last data filed at 11/17/2018 5217 Gross per 24 hour Intake 1200 ml Output 900 ml Net 300 ml Telemetry: Ventricular paced, occasional nonsustained VT Assessment:  
 
Principal Problem: 
  Atrial fibrillation with RVR ([address] (8/11/2017) Active Problems: Hypertension (2/17/2014) Obesity, morbid ([facility] (12/20/2017) Controlled type 2 diabetes mellitus without complication, without long-term current use of insulin ([facility] (8/15/2018) Cardiomyopathy, nonischemic ([facility] (11/17/2018) Plan:  
 
Persistent atrial fibrillation status post AV cheryl ablation and pacemaker: 
· Pacemaker and groin sites stable. Normal pacemaker functioning, chest x-ray okay. · Continue Eliquis Newly diagnosed nonischemic cardiomyopathy, LVEF 20-25%: 
· Suspect this may be largely tachycardia induced. The EF was 50% in July 2018. Stress test negative for ischemia at that time. Had a prior cardiac catheterization without significant CAD. Hope for improvement now that AV cheryl ablation and pacemaker have taken place. · Restarting lisinopril at a very low dose with borderline hypotension. Continue Coreg. Continue Lasix. · Patient is currently lying flat, clinically appears compensated. · Recheck chest x-ray Monday morning. Ordering strict ins and outs, daily weights. Dr. Malika Ackerman and/or Anika Hester will follow with you again starting Monday.

## 2018-11-18 NOTE — PROGRESS NOTES
ADULT PROTOCOL: JET AEROSOL  REASSESSMENT Patient  Zen Hanson     48 y.o.   male     11/18/2018  9:06 AM 
 
Breath Sounds Pre Procedure:  Diminished Breath Sounds Post Procedure: Diminished Breathing pattern: Pre procedure Breathing Pattern: Regular Post procedure Breathing Pattern: Regular Heart Rate: Pre procedure Pulse: 76 Post procedure Pulse: 76 Resp Rate: Pre procedure Respirations: 18 Post procedure Respirations: 18 
 
     
 
Cough: Pre procedure Cough: Non-productive Post procedure Cough: Non-productive Oxygen: O2 Device: Room air SpO2: Pre procedure SpO2: 94 % Post procedure SpO2: 94 % Nebulizer Therapy: Current medications Aerosolized Medications: DuoNeb Smoking History: Former Problem List:  
Patient Active Problem List  
Diagnosis Code  Hypertension I10  Dyspnea R06.00  Restrictive lung disease J98.4  CHF (congestive heart failure) ([facility]) I50.9  Atrial fibrillation with RVR ([facility]) I48.91  
 Obesity, morbid ([facility]) E66.01  
 Sleep apnea G47.30  Controlled type 2 diabetes mellitus without complication, without long-term current use of insulin ([facility]) E11.9  Cardiomyopathy, nonischemic ([facility]) I42.8 Respiratory Therapist: Lorena Burton V RT

## 2018-11-18 NOTE — PROGRESS NOTES
PCU SHIFT NURSING NOTE Bedside and Verbal shift change report given to Melani Nickerson RN (oncoming nurse) by Candy Rogers (offgoing nurse). Report included the following information SBAR, Kardex, Intake/Output, MAR, Recent Results and Cardiac Rhythm PaCED. Shift Summary:  
 
0930 - Informed Dr. Vicki Cadena that pt has been complaining of aching in his upper chest area, described that it hurts when he lays on his right side. MD placed order for EKG and troponin to r/o cardiac changes. 6927 - EKG obtained; shows no changes to prior EKG. Dr. Cohen Blinks aware. 12 - Paged Dr. Domingo Kaye regarding troponin 0.24; waiting for return call. 18 - Paged Dr. Domingo Kaye again; waiting for return call. 1 - Notified Dr. Domingo Kaye of elevated troponin and pt's complaints. Troponin expected to be slightly elevated after ablation per MD. Also made him aware of additional ordered lasix. MD to see pt soon. Admission Date 11/14/2018 Admission Diagnosis Atrial fibrillation with RVR ([address] Consults IP CONSULT TO CARDIOLOGY 
IP CONSULT TO ELECTROPHYSIOLOGY Consults []PT []OT []Speech  
[]Case Management  
  
[] Palliative Cardiac Monitoring Order []Yes []No  
 
IV drips []Yes Drip:                            Dose: 
Drip:                            Dose: 
Drip:                            Dose:  
[]No  
 
GI Prophylaxis []Yes []No  
 
 
 
DVT Prophylaxis SCDs:     
     
 Ochoa stockings:     
  
[] Medication []Contraindicated []None Activity Level Activity Level: Chair Activity Assistance: No assistance needed Purposeful Rounding every 1-2 hour? []Yes Gutierrez Score  Total Score: 1 Bed Alarm (If score 3 or >) []Yes  
[] Refused (See signed refusal form in chart) Alvaro Score  Alvaro Score: 21 Alvaro Score (if score 14 or less) []PMT consult  
[]Wound Care consult []Specialty bed  
[] Nutrition consult Needs prior to discharge:  
Home O2 required:   
[]Yes []No  
 If yes, how much O2 required? Other:  
 Last Bowel Movement: Last Bowel Movement Date: 11/15/18 Influenza Vaccine Received Flu Vaccine for Current Season (usually Sept-March): No  
 Patient/Guardian Refused (Notify MD): No  
Pneumonia Vaccine Diet Active Orders Diet DIET CARDIAC Regular LDAs Peripheral IV 11/15/18 Left Antecubital (Active) Site Assessment Clean, dry, & intact 11/18/2018  3:30 AM  
Phlebitis Assessment 0 11/18/2018  3:30 AM  
Infiltration Assessment 0 11/18/2018  3:30 AM  
Dressing Status Clean, dry, & intact 11/18/2018  3:30 AM  
Dressing Type Transparent 11/18/2018  3:30 AM  
Hub Color/Line Status Pink;Flushed;Capped 11/18/2018  3:30 AM  
Action Taken Open ports on tubing capped 11/18/2018  3:30 AM  
Alcohol Cap Used Yes 11/18/2018  3:30 AM  
                  
Urinary Catheter Intake & Output Date 11/17/18 0700 - 11/18/18 0659 11/18/18 0700 - 11/19/18 9425 Shift 5218-3891 5893-2954 24 Hour Total 6452-3195 0718-4935 24 Hour Total  
INTAKE  
P.O.  480 480     
  P. O.  480 480 Shift Total(mL/kg)  480(3) 480(3) OUTPUT Urine(mL/kg/hr)  1050(0.5) 1050(0.3) Urine Voided  1050 1050 Shift Total(mL/kg)  1050(6.5) 1050(6.5) NET  -570 -570 Weight (kg) 161.5 161.3 161.3 161.3 161.3 161.3 Readmission Risk Assessment Tool Score Low Risk 6 Total Score 3 Has Seen PCP in Last 6 Months (Yes=3, No=0)  
 4 Pt. Coverage (Medicare=5 , Medicaid, or Self-Pay=4) 4 Charlson Comorbidity Score (Age + Comorbid Conditions) Criteria that do not apply:  
 . Living with Significant Other. Assisted Living. LTAC. SNF. or  
Rehab Patient Length of Stay (>5 days = 3) IP Visits Last 12 Months (1-3=4, 4=9, >4=11) Expected Length of Stay 3d 7h Actual Length of Stay 3

## 2018-11-18 NOTE — PROGRESS NOTES
General Daily Progress Note Admit Date: 11/14/2018 Hospital day 5 Subjective:  
 
Patient has no complaint of fever, chills. Had one hour episode of chest pain last night , pleuritic at times. Usually sleeps on L side, but cannot do this now due to pacemaker. Dyspnea persists, even walking to bathroom. .. Medication side effects: none Current Facility-Administered Medications Medication Dose Route Frequency  [START ON 11/19/2018] insulin glargine (LANTUS) injection 60 Units  60 Units SubCUTAneous DAILY  furosemide (LASIX) tablet 160 mg  160 mg Oral QAM RT  
 furosemide (LASIX) tablet 80 mg  80 mg Oral DAILY WITH DINNER  
 umeclidinium-vilanterol (ANORO ELLIPTA) 62.5 mcg- 25 mcg/inhalation  1 Puff Inhalation DAILY  lisinopril (PRINIVIL, ZESTRIL) tablet 2.5 mg  2.5 mg Oral DAILY  glipiZIDE (GLUCOTROL) tablet 10 mg  10 mg Oral BID  ADDaptor      
 0.9% sodium chloride (MBP/ADV) infusion  sodium chloride (NS) flush 5-10 mL  5-10 mL IntraVENous Q8H  
 sodium chloride (NS) flush 5-10 mL  5-10 mL IntraVENous PRN  
 naloxone (NARCAN) injection 0.4 mg  0.4 mg IntraVENous PRN  
 acetaminophen (TYLENOL) tablet 650 mg  650 mg Oral Q4H PRN  
 [START ON 11/19/2018] apixaban (ELIQUIS) tablet 5 mg  5 mg Oral Q12H  
 albuterol-ipratropium (DUO-NEB) 2.5 MG-0.5 MG/3 ML  3 mL Nebulization TID RT  
 carvedilol (COREG) tablet 25 mg  25 mg Oral BID  atorvastatin (LIPITOR) tablet 40 mg  40 mg Oral DAILY  albuterol (PROVENTIL HFA, VENTOLIN HFA, PROAIR HFA) inhaler 1 Puff  1 Puff Inhalation Q6H RT  
 glucose chewable tablet 16 g  4 Tab Oral PRN  
 dextrose (D50W) injection syrg 12.5-25 g  25-50 mL IntraVENous PRN  
 glucagon (GLUCAGEN) injection 1 mg  1 mg IntraMUSCular PRN  
 insulin lispro (HUMALOG) injection   SubCUTAneous AC&HS  levalbuterol (XOPENEX) nebulizer soln 1.25 mg/3 mL  1.25 mg Nebulization Q4H PRN  
 aspirin chewable tablet 81 mg  81 mg Oral DAILY  traMADol (ULTRAM) tablet 50 mg  50 mg Oral Q6H PRN  potassium chloride (K-DUR, KLOR-CON) SR tablet 20 mEq  20 mEq Oral BID Review of Systems Pertinent items are noted in HPI. Objective:  
 
Patient Vitals for the past 8 hrs: 
 BP Temp Pulse Resp SpO2  
11/18/18 0747     94 % 11/18/18 0712 110/83 98.2 °F (36.8 °C) 75 20 98 % 11/18/18 0330 112/80 98 °F (36.7 °C) 75 20 97 % No intake/output data recorded. 11/16 1901 - 11/18 0700 In: 2160 [P.O.:2160] Out: 2250 [Urine:2250] Physical Exam:  
Visit Vitals /83 Pulse 75 Temp 98.2 °F (36.8 °C) Resp 20 Ht 6' (1.829 m) Wt (!) 355 lb 9.6 oz (161.3 kg) SpO2 94% BMI 48.23 kg/m² Lungs: clear to auscultation bilaterally Heart: regular rate and rhythm, S1, S2 normal, no murmur, click, rub or gallop Abdomen: soft, non-tender. Bowel sounds normal. No masses,  no organomegaly Extremities: edema , 1-2 + bilaterally ECG: normal sinus rhythm Data Review Recent Results (from the past 24 hour(s)) GLUCOSE, POC Collection Time: 11/17/18 11:13 AM  
Result Value Ref Range Glucose (POC) 305 (H) 65 - 100 mg/dL Performed by Henderson Fee GLUCOSE, POC Collection Time: 11/17/18  4:37 PM  
Result Value Ref Range Glucose (POC) 257 (H) 65 - 100 mg/dL Performed by Henderson Fee GLUCOSE, POC Collection Time: 11/17/18  9:03 PM  
Result Value Ref Range Glucose (POC) 323 (H) 65 - 100 mg/dL Performed by Courtney House (PCT) GLUCOSE, POC Collection Time: 11/18/18  7:44 AM  
Result Value Ref Range Glucose (POC) 287 (H) 65 - 100 mg/dL Performed by Ariel Brown Assessment:  
 
Principal Problem: 
  Atrial fibrillation with RVR ([facility] (8/11/2017) Active Problems: Hypertension (2/17/2014) Obesity, morbid ([address] (12/20/2017) Controlled type 2 diabetes mellitus without complication, without long-term current use of insulin ([facility] (8/15/2018) Cardiomyopathy, nonischemic ([facility] (11/17/2018) Plan: 1. A fib with RVR- sp av cheryl ablation and pacemaker placement 2. Dyspnea- persistent. Charlette Running CTA unremarkable. Troponin slightly elevated. . Had a cath in 2016 (DR. Peterson) showing minimal CAD and L main ostial tapering. Michael Chon recently showed low to moderate likelihood of CAD. . ABG not all that bad. Dyspnea may be related to weight and restrictive lung disease. He also has had a marked drop in his EF on recent echo- EF now 20-25%, was 50 % earlier this year. Diffuse hypokinesis. Agree with lisinopril, and will increase his lasix from 160 mg/day to 240 in view of edema. Have to watch bp closely as is on low end of normal.  
3.Diabetes- will increase lantus again today  and continue sliding scale for now. Resume his glipizide. 4. Morbid obesity 5. Hx restrictive lung disease on spirometry and sleep apnea. Anoro resumed on 11-17.   
6. Recurrent episodes of admission for rapid a fib- appreciate Dr. Ryan Tse help.  
Neal Connell. Episode of chest pain last night- check EKG and troponin, altho may just be positional, as is used to sleeping on L side.

## 2018-11-19 ENCOUNTER — APPOINTMENT (OUTPATIENT)
Dept: GENERAL RADIOLOGY | Age: 53
DRG: 175 | End: 2018-11-19
Attending: INTERNAL MEDICINE
Payer: MEDICAID

## 2018-11-19 LAB
ANION GAP SERPL CALC-SCNC: 9 MMOL/L (ref 5–15)
ATRIAL RATE: 74 BPM
BASOPHILS # BLD: 0 K/UL (ref 0–0.1)
BASOPHILS NFR BLD: 1 % (ref 0–1)
BUN SERPL-MCNC: 18 MG/DL (ref 6–20)
BUN/CREAT SERPL: 14 (ref 12–20)
CALCIUM SERPL-MCNC: 8.6 MG/DL (ref 8.5–10.1)
CALCULATED R AXIS, ECG10: -77 DEGREES
CALCULATED T AXIS, ECG11: 95 DEGREES
CHLORIDE SERPL-SCNC: 95 MMOL/L (ref 97–108)
CO2 SERPL-SCNC: 28 MMOL/L (ref 21–32)
CREAT SERPL-MCNC: 1.27 MG/DL (ref 0.7–1.3)
DIAGNOSIS, 93000: NORMAL
DIFFERENTIAL METHOD BLD: ABNORMAL
EOSINOPHIL # BLD: 0.1 K/UL (ref 0–0.4)
EOSINOPHIL NFR BLD: 1 % (ref 0–7)
ERYTHROCYTE [DISTWIDTH] IN BLOOD BY AUTOMATED COUNT: 15.9 % (ref 11.5–14.5)
GLUCOSE BLD STRIP.AUTO-MCNC: 200 MG/DL (ref 65–100)
GLUCOSE BLD STRIP.AUTO-MCNC: 247 MG/DL (ref 65–100)
GLUCOSE BLD STRIP.AUTO-MCNC: 334 MG/DL (ref 65–100)
GLUCOSE BLD STRIP.AUTO-MCNC: 358 MG/DL (ref 65–100)
GLUCOSE SERPL-MCNC: 340 MG/DL (ref 65–100)
HCT VFR BLD AUTO: 38.5 % (ref 36.6–50.3)
HGB BLD-MCNC: 12.6 G/DL (ref 12.1–17)
IMM GRANULOCYTES # BLD: 0.1 K/UL (ref 0–0.04)
IMM GRANULOCYTES NFR BLD AUTO: 1 % (ref 0–0.5)
LYMPHOCYTES # BLD: 1.2 K/UL (ref 0.8–3.5)
LYMPHOCYTES NFR BLD: 13 % (ref 12–49)
MCH RBC QN AUTO: 29.3 PG (ref 26–34)
MCHC RBC AUTO-ENTMCNC: 32.7 G/DL (ref 30–36.5)
MCV RBC AUTO: 89.5 FL (ref 80–99)
MONOCYTES # BLD: 0.4 K/UL (ref 0–1)
MONOCYTES NFR BLD: 5 % (ref 5–13)
NEUTS SEG # BLD: 7 K/UL (ref 1.8–8)
NEUTS SEG NFR BLD: 80 % (ref 32–75)
NRBC # BLD: 0 K/UL (ref 0–0.01)
NRBC BLD-RTO: 0 PER 100 WBC
P-R INTERVAL, ECG05: 176 MS
PLATELET # BLD AUTO: 166 K/UL (ref 150–400)
PMV BLD AUTO: 10.6 FL (ref 8.9–12.9)
POTASSIUM SERPL-SCNC: 3.4 MMOL/L (ref 3.5–5.1)
Q-T INTERVAL, ECG07: 490 MS
QRS DURATION, ECG06: 202 MS
QTC CALCULATION (BEZET), ECG08: 547 MS
RBC # BLD AUTO: 4.3 M/UL (ref 4.1–5.7)
SERVICE CMNT-IMP: ABNORMAL
SODIUM SERPL-SCNC: 132 MMOL/L (ref 136–145)
VENTRICULAR RATE, ECG03: 75 BPM
WBC # BLD AUTO: 8.8 K/UL (ref 4.1–11.1)

## 2018-11-19 PROCEDURE — 97161 PT EVAL LOW COMPLEX 20 MIN: CPT

## 2018-11-19 PROCEDURE — 74011250636 HC RX REV CODE- 250/636: Performed by: FAMILY MEDICINE

## 2018-11-19 PROCEDURE — 74011000250 HC RX REV CODE- 250: Performed by: INTERNAL MEDICINE

## 2018-11-19 PROCEDURE — G8979 MOBILITY GOAL STATUS: HCPCS

## 2018-11-19 PROCEDURE — 71045 X-RAY EXAM CHEST 1 VIEW: CPT

## 2018-11-19 PROCEDURE — 80048 BASIC METABOLIC PNL TOTAL CA: CPT

## 2018-11-19 PROCEDURE — 85025 COMPLETE CBC W/AUTO DIFF WBC: CPT

## 2018-11-19 PROCEDURE — G8980 MOBILITY D/C STATUS: HCPCS

## 2018-11-19 PROCEDURE — 74011250637 HC RX REV CODE- 250/637: Performed by: FAMILY MEDICINE

## 2018-11-19 PROCEDURE — 94640 AIRWAY INHALATION TREATMENT: CPT

## 2018-11-19 PROCEDURE — 36415 COLL VENOUS BLD VENIPUNCTURE: CPT

## 2018-11-19 PROCEDURE — 74011250637 HC RX REV CODE- 250/637: Performed by: INTERNAL MEDICINE

## 2018-11-19 PROCEDURE — 65660000000 HC RM CCU STEPDOWN

## 2018-11-19 PROCEDURE — G8978 MOBILITY CURRENT STATUS: HCPCS

## 2018-11-19 PROCEDURE — 74011636637 HC RX REV CODE- 636/637: Performed by: INTERNAL MEDICINE

## 2018-11-19 PROCEDURE — 74011250637 HC RX REV CODE- 250/637: Performed by: HOSPITALIST

## 2018-11-19 PROCEDURE — 82962 GLUCOSE BLOOD TEST: CPT

## 2018-11-19 PROCEDURE — 74011636637 HC RX REV CODE- 636/637: Performed by: FAMILY MEDICINE

## 2018-11-19 RX ORDER — INSULIN GLARGINE 100 [IU]/ML
70 INJECTION, SOLUTION SUBCUTANEOUS DAILY
Status: DISCONTINUED | OUTPATIENT
Start: 2018-11-19 | End: 2018-11-20

## 2018-11-19 RX ORDER — LISINOPRIL 5 MG/1
5 TABLET ORAL DAILY
Status: DISCONTINUED | OUTPATIENT
Start: 2018-11-19 | End: 2018-11-20

## 2018-11-19 RX ORDER — FUROSEMIDE 10 MG/ML
80 INJECTION INTRAMUSCULAR; INTRAVENOUS 2 TIMES DAILY
Status: DISCONTINUED | OUTPATIENT
Start: 2018-11-19 | End: 2018-11-21 | Stop reason: HOSPADM

## 2018-11-19 RX ADMIN — POTASSIUM CHLORIDE 20 MEQ: 20 TABLET, EXTENDED RELEASE ORAL at 17:07

## 2018-11-19 RX ADMIN — TRAMADOL HYDROCHLORIDE 50 MG: 50 TABLET, FILM COATED ORAL at 11:20

## 2018-11-19 RX ADMIN — Medication 10 ML: at 22:53

## 2018-11-19 RX ADMIN — UMECLIDINIUM BROMIDE AND VILANTEROL TRIFENATATE 1 PUFF: 62.5; 25 POWDER RESPIRATORY (INHALATION) at 08:38

## 2018-11-19 RX ADMIN — IPRATROPIUM BROMIDE AND ALBUTEROL SULFATE 3 ML: .5; 3 SOLUTION RESPIRATORY (INHALATION) at 07:28

## 2018-11-19 RX ADMIN — GLIPIZIDE 10 MG: 5 TABLET ORAL at 08:31

## 2018-11-19 RX ADMIN — Medication 10 ML: at 17:07

## 2018-11-19 RX ADMIN — IPRATROPIUM BROMIDE AND ALBUTEROL SULFATE 3 ML: .5; 3 SOLUTION RESPIRATORY (INHALATION) at 14:18

## 2018-11-19 RX ADMIN — Medication 10 ML: at 15:22

## 2018-11-19 RX ADMIN — LISINOPRIL 5 MG: 5 TABLET ORAL at 08:31

## 2018-11-19 RX ADMIN — GLIPIZIDE 10 MG: 5 TABLET ORAL at 17:07

## 2018-11-19 RX ADMIN — IPRATROPIUM BROMIDE AND ALBUTEROL SULFATE 3 ML: .5; 3 SOLUTION RESPIRATORY (INHALATION) at 21:52

## 2018-11-19 RX ADMIN — CARVEDILOL 25 MG: 12.5 TABLET, FILM COATED ORAL at 08:31

## 2018-11-19 RX ADMIN — INSULIN LISPRO 5 UNITS: 100 INJECTION, SOLUTION INTRAVENOUS; SUBCUTANEOUS at 22:53

## 2018-11-19 RX ADMIN — INSULIN GLARGINE 70 UNITS: 100 INJECTION, SOLUTION SUBCUTANEOUS at 08:30

## 2018-11-19 RX ADMIN — ALBUTEROL SULFATE 1 PUFF: 90 AEROSOL, METERED RESPIRATORY (INHALATION) at 22:58

## 2018-11-19 RX ADMIN — FUROSEMIDE 80 MG: 10 INJECTION, SOLUTION INTRAMUSCULAR; INTRAVENOUS at 08:34

## 2018-11-19 RX ADMIN — INSULIN LISPRO 7 UNITS: 100 INJECTION, SOLUTION INTRAVENOUS; SUBCUTANEOUS at 11:53

## 2018-11-19 RX ADMIN — ATORVASTATIN CALCIUM 40 MG: 40 TABLET, FILM COATED ORAL at 08:31

## 2018-11-19 RX ADMIN — FUROSEMIDE 80 MG: 10 INJECTION, SOLUTION INTRAMUSCULAR; INTRAVENOUS at 17:07

## 2018-11-19 RX ADMIN — APIXABAN 5 MG: 5 TABLET, FILM COATED ORAL at 22:53

## 2018-11-19 RX ADMIN — INSULIN LISPRO 3 UNITS: 100 INJECTION, SOLUTION INTRAVENOUS; SUBCUTANEOUS at 17:07

## 2018-11-19 RX ADMIN — ALBUTEROL SULFATE 1 PUFF: 90 AEROSOL, METERED RESPIRATORY (INHALATION) at 02:00

## 2018-11-19 RX ADMIN — CARVEDILOL 25 MG: 12.5 TABLET, FILM COATED ORAL at 17:07

## 2018-11-19 RX ADMIN — INSULIN LISPRO 3 UNITS: 100 INJECTION, SOLUTION INTRAVENOUS; SUBCUTANEOUS at 08:30

## 2018-11-19 RX ADMIN — POTASSIUM CHLORIDE 20 MEQ: 20 TABLET, EXTENDED RELEASE ORAL at 08:31

## 2018-11-19 RX ADMIN — Medication 10 ML: at 08:34

## 2018-11-19 RX ADMIN — ASPIRIN 81 MG CHEWABLE TABLET 81 MG: 81 TABLET CHEWABLE at 08:31

## 2018-11-19 RX ADMIN — Medication 10 ML: at 05:50

## 2018-11-19 NOTE — PROGRESS NOTES
PCU SHIFT NURSING NOTE Bedside and Verbal shift change report given to Alvaro Power (oncoming nurse) by Katrina Calderon (offgoing nurse). Report included the following information SBAR, Kardex, Procedure Summary, Intake/Output, MAR and Recent Results. Shift Summary: [id number]: Patient up to chair, breathing treatment given. Portable chest xray done. Right upper chest wall pacemaker dressing site dry and intact 1030: Blood work drawn and sent to lab 1115: Tramadol given for back pain 6 out of 10 on pain scale 
1200: Patient back to bed and resting, no complaints. 1440: Physical therapy at bedside 1800: Patient back to bed to rest 
 
Admission Date 11/14/2018 Admission Diagnosis Atrial fibrillation with RVR ([facility] [address] Consults IP CONSULT TO CARDIOLOGY 
IP CONSULT TO ELECTROPHYSIOLOGY Consults [x]PT [x]OT []Speech  
[]Case Management  
  
[] Palliative Cardiac Monitoring Order  
[x]Yes []No  
 
IV drips []Yes Drip:                            Dose: 
Drip:                            Dose: 
Drip:                            Dose:  
[]No  
 
GI Prophylaxis []Yes []No  
 
 
 
DVT Prophylaxis SCDs:     
     
 Ochoa stockings:     
  
[] Medication []Contraindicated []None Activity Level Activity Level: Chair Activity Assistance: No assistance needed Purposeful Rounding every 1-2 hour? [x]Yes Gutierrez Score  Total Score: 1 Bed Alarm (If score 3 or >) []Yes  
[] Refused (See signed refusal form in chart) Alvaro Score  Alvaro Score: 20 Alvaro Score (if score 14 or less) []PMT consult  
[]Wound Care consult []Specialty bed  
[] Nutrition consult Needs prior to discharge:  
Home O2 required:   
[]Yes  
[x]No  
 If yes, how much O2 required? Other:  
 Last Bowel Movement: Last Bowel Movement Date: 11/15/18 Influenza Vaccine Received Flu Vaccine for Current Season (usually Sept-March): No  
 Patient/Guardian Refused (Notify MD): Yes Pneumonia Vaccine Diet Active Orders Diet DIET CARDIAC Regular LDAs Peripheral IV 11/15/18 Left Antecubital (Active) Site Assessment Clean, dry, & intact 11/18/2018 10:38 PM  
Phlebitis Assessment 0 11/18/2018 10:38 PM  
Infiltration Assessment 0 11/18/2018 10:38 PM  
Dressing Status Clean, dry, & intact 11/18/2018 10:38 PM  
Dressing Type Transparent 11/18/2018 10:38 PM  
Hub Color/Line Status Pink;Capped 11/18/2018 10:38 PM  
Action Taken Open ports on tubing capped 11/18/2018 10:38 PM  
Alcohol Cap Used Yes 11/18/2018 10:38 PM  
                  
Urinary Catheter Intake & Output Date 11/18/18 0700 - 11/19/18 0659 11/19/18 0700 - 11/20/18 7767 Shift 6122-7875 0942-6036 24 Hour Total 1401-2720 2854-9065 24 Hour Total  
INTAKE  
P.O.  480 480     
  P. O.  480 480 Shift Total(mL/kg)  480(3) 480(3) OUTPUT Urine(mL/kg/hr) 1950(1) 300(0.2) 2250(0.6) Urine Voided 1764 271 2229 Urine Occurrence(s)  1 x 1 x Shift Total(mL/kg) 5200(61.9) 300(1.9) [id number]) NET -1950 [address] Weight (kg) 161.3 160.8 160.8 160.8 160.8 160.8 Readmission Risk Assessment Tool Score Low Risk 6 Total Score 3 Has Seen PCP in Last 6 Months (Yes=3, No=0)  
 4 Pt. Coverage (Medicare=5 , Medicaid, or Self-Pay=4) 4 Charlson Comorbidity Score (Age + Comorbid Conditions) Criteria that do not apply:  
 . Living with Significant Other. Assisted Living. LTAC. SNF. or  
Rehab Patient Length of Stay (>5 days = 3) IP Visits Last 12 Months (1-3=4, 4=9, >4=11) Expected Length of Stay 3d 7h Actual Length of Stay 4

## 2018-11-19 NOTE — PROGRESS NOTES
11/18/2018 8:23 PM 
 
Admit Date: 11/14/2018 Admit Diagnosis:  
Atrial fibrillation with RVR ([facility] [address] Subjective:  
 
[place] Current Facility-Administered Medications Medication Dose Route Frequency  [START ON 11/19/2018] insulin glargine (LANTUS) injection 60 Units  60 Units SubCUTAneous DAILY  [START ON 11/19/2018] furosemide (LASIX) tablet 160 mg  160 mg Oral QAM RT  
 furosemide (LASIX) tablet 80 mg  80 mg Oral DAILY WITH DINNER  
 umeclidinium-vilanterol (ANORO ELLIPTA) 62.5 mcg- 25 mcg/inhalation  1 Puff Inhalation DAILY  lisinopril (PRINIVIL, ZESTRIL) tablet 2.5 mg  2.5 mg Oral DAILY  glipiZIDE (GLUCOTROL) tablet 10 mg  10 mg Oral BID  ADDaptor      
 0.9% sodium chloride (MBP/ADV) infusion  sodium chloride (NS) flush 5-10 mL  5-10 mL IntraVENous Q8H  
 sodium chloride (NS) flush 5-10 mL  5-10 mL IntraVENous PRN  
 naloxone (NARCAN) injection 0.4 mg  0.4 mg IntraVENous PRN  
 acetaminophen (TYLENOL) tablet 650 mg  650 mg Oral Q4H PRN  
 [START ON 11/19/2018] apixaban (ELIQUIS) tablet 5 mg  5 mg Oral Q12H  
 albuterol-ipratropium (DUO-NEB) 2.5 MG-0.5 MG/3 ML  3 mL Nebulization TID RT  
 carvedilol (COREG) tablet 25 mg  25 mg Oral BID  atorvastatin (LIPITOR) tablet 40 mg  40 mg Oral DAILY  albuterol (PROVENTIL HFA, VENTOLIN HFA, PROAIR HFA) inhaler 1 Puff  1 Puff Inhalation Q6H RT  
 glucose chewable tablet 16 g  4 Tab Oral PRN  
 dextrose (D50W) injection syrg 12.5-25 g  25-50 mL IntraVENous PRN  
 glucagon (GLUCAGEN) injection 1 mg  1 mg IntraMUSCular PRN  
 insulin lispro (HUMALOG) injection   SubCUTAneous AC&HS  levalbuterol (XOPENEX) nebulizer soln 1.25 mg/3 mL  1.25 mg Nebulization Q4H PRN  
 aspirin chewable tablet 81 mg  81 mg Oral DAILY  traMADol (ULTRAM) tablet 50 mg  50 mg Oral Q6H PRN  potassium chloride (K-DUR, KLOR-CON) SR tablet 20 mEq  20 mEq Oral BID Objective:  
  
Physical Exam:   
Visit Vitals BP 96/61 Pulse 75 Temp 98.2 °F (36.8 °C) Resp 20 Ht 6' (1.829 m) Wt (!) 355 lb 9.6 oz (161.3 kg) SpO2 98% BMI 48.23 kg/m² Gen:  NAD Mental Status - Alert. General Appearance - Not in acute distress. Chest and Lung Exam  
Inspection: Accessory muscles - No use of accessory muscles in breathing. Auscultation:  
Breath sounds: - Normal.  
Cardiovascular Inspection: Jugular vein - Bilateral - Inspection Normal.  
Palpation/Percussion:  
Apical Impulse: - Normal.  
Auscultation: Rhythm - Regular. Heart Sounds - S1 WNL and S2 WNL. No S3 or S4. Murmurs & Other Heart Sounds: Auscultation of the heart reveals - No Murmurs. Peripheral Vascular Upper Extremity: Inspection - Bilateral - No Cyanotic nailbeds or Digital clubbing. Lower Extremity:  
Palpation: Edema - Bilateral - No edema. Abdomen:   Soft, non-tender, bowel sounds are active. Neuro: A&O times 3, CN and motor grossly WNL Data Review:  
Recent Labs 11/16/18 
0457 WBC 9.9 HGB 12.4 HCT 38.8 * Recent Labs 11/16/18 
0457 * K 4.2  CO2 28  
* BUN 13  
CREA 1.09  
CA 8.9 Recent Labs 11/18/18 
79 749 74 51 11/16/18 
0457 TROIQ 0.24* 0.10* Intake/Output Summary (Last 24 hours) at 11/18/2018 2023 Last data filed at 11/18/2018 1523 Gross per 24 hour Intake 480 ml Output 3000 ml Net -2520 ml Telemetry: Ventricular pacing Assessment:  
 
Principal Problem: 
  Atrial fibrillation with RVR ([facility] [address] (8/11/2017) Active Problems: Hypertension (2/17/2014) Obesity, morbid ([address] (12/20/2017) Controlled type 2 diabetes mellitus without complication, without long-term current use of insulin ([address] (8/15/2018) Cardiomyopathy, nonischemic ([facility] [address] (11/17/2018) Plan:  
 
 
Persistent atrial fibrillation status post AV cheryl ablation and pacemaker: 
· Pacemaker and groin sites stable. Normal pacemaker functioning, chest x-ray okay.  
· Continue Eliquis 
  
 Newly diagnosed nonischemic cardiomyopathy, LVEF 20-25%: 
· Suspect this may be largely tachycardia induced. The EF was 50% in July 2018. Stress test negative for ischemia at that time. Had a prior cardiac catheterization without significant CAD. Hope for improvement now that AV cheryl ablation and pacemaker have taken place. · Restarting lisinopril at a very low dose with borderline hypotension. Continue Coreg. Continue Lasix. Extra Lasix given this morning for a total of 160 mg. Patient notes increased diuresis. · Patient is currently lying flat, clinically appears compensated. · Recheck chest x-ray Monday morning. Ordering strict ins and outs, daily weights.   Dr. Vinny Ramírez and/or Mone Wong will follow with you again starting Monday.

## 2018-11-19 NOTE — PROGRESS NOTES
Bedside shift change report given to BISHOP Marinelli RN (oncoming nurse) by Aileen Friday RN (offgoing nurse). Report included the following information SBAR, Kardex, Procedure Summary, Intake/Output, MAR, Recent Results, Med Rec Status and Cardiac Rhythm 100% Vpaced. Pt continues to have BG>300- pt eating non-diabetic foods-pt not concerned with his elevated BS-remain safe and monitored throughout shift.

## 2018-11-19 NOTE — PROGRESS NOTES
Cardiac Electrophysiology Progress Note [address] [facility], [address]  [phone] 
 
11/19/2018 10:49 AM 
 
Admit Date: 11/14/2018 Admit Diagnosis: Atrial fibrillation with RVR ([address] Subjective:  
 
Shaun Hernandez   denies chest pain, chest pressure/discomfort, dyspnea, palpitations. Visit Vitals /71 Pulse 82 Temp 98.8 °F (37.1 °C) Resp 17 Ht 6' (1.829 m) Wt (!) 354 lb 8 oz (160.8 kg) SpO2 97% BMI 48.08 kg/m² Current Facility-Administered Medications Medication Dose Route Frequency  furosemide (LASIX) injection 80 mg  80 mg IntraVENous BID  lisinopril (PRINIVIL, ZESTRIL) tablet 5 mg  5 mg Oral DAILY  insulin glargine (LANTUS) injection 70 Units  70 Units SubCUTAneous DAILY  umeclidinium-vilanterol (ANORO ELLIPTA) 62.5 mcg- 25 mcg/inhalation  1 Puff Inhalation DAILY  glipiZIDE (GLUCOTROL) tablet 10 mg  10 mg Oral BID  ADDaptor      
 0.9% sodium chloride (MBP/ADV) infusion  sodium chloride (NS) flush 5-10 mL  5-10 mL IntraVENous Q8H  
 sodium chloride (NS) flush 5-10 mL  5-10 mL IntraVENous PRN  
 naloxone (NARCAN) injection 0.4 mg  0.4 mg IntraVENous PRN  
 acetaminophen (TYLENOL) tablet 650 mg  650 mg Oral Q4H PRN  
 apixaban (ELIQUIS) tablet 5 mg  5 mg Oral Q12H  
 albuterol-ipratropium (DUO-NEB) 2.5 MG-0.5 MG/3 ML  3 mL Nebulization TID RT  
 carvedilol (COREG) tablet 25 mg  25 mg Oral BID  atorvastatin (LIPITOR) tablet 40 mg  40 mg Oral DAILY  albuterol (PROVENTIL HFA, VENTOLIN HFA, PROAIR HFA) inhaler 1 Puff  1 Puff Inhalation Q6H RT  
 glucose chewable tablet 16 g  4 Tab Oral PRN  
 dextrose (D50W) injection syrg 12.5-25 g  25-50 mL IntraVENous PRN  
 glucagon (GLUCAGEN) injection 1 mg  1 mg IntraMUSCular PRN  
 insulin lispro (HUMALOG) injection   SubCUTAneous AC&HS  levalbuterol (XOPENEX) nebulizer soln 1.25 mg/3 mL  1.25 mg Nebulization Q4H PRN  
  aspirin chewable tablet 81 mg  81 mg Oral DAILY  traMADol (ULTRAM) tablet 50 mg  50 mg Oral Q6H PRN  potassium chloride (K-DUR, KLOR-CON) SR tablet 20 mEq  20 mEq Oral BID Objective:  
  
Visit Vitals /71 Pulse 82 Temp 98.8 °F (37.1 °C) Resp 17 Ht 6' (1.829 m) Wt (!) 354 lb 8 oz (160.8 kg) SpO2 97% BMI 48.08 kg/m² Physical Exam: Abdomen: obese, soft, non-tender Extremities: extremities normal, + edema lower extremity edema. Heart: regular rate and rhythm Lungs: clear to auscultation bilaterally Pulses: 2+ and symmetric Data Review:  
Labs:   
No results for input(s): WBC, HGB, HCT, PLT, HGBEXT, HCTEXT, PLTEXT in the last 72 hours. No results for input(s): NA, K, CL, CO2, GLU, BUN, CREA, CA, MG, PHOS, ALB, TBIL, TBILI, SGOT, ALT, INR in the last 72 hours. No lab exists for component:  BNP, INREXT Recent Labs 11/18/18 
7700 TROIQ 0.24* Intake/Output Summary (Last 24 hours) at 11/19/2018 1049 Last data filed at 11/19/2018 4312 Gross per 24 hour Intake 840 ml Output 2850 ml Net -2010 ml Telemetry: V paced Assessment:  
 
Principal Problem: 
  Atrial fibrillation with RVR ([address] (8/11/2017) Active Problems: Hypertension (2/17/2014) Obesity, morbid ([address] (12/20/2017) Controlled type 2 diabetes mellitus without complication, without long-term current use of insulin ([address] (8/15/2018) Cardiomyopathy, nonischemic ([facility] [address] (11/17/2018) Plan:  
 
Rhona Colon is s/p AV node ablation and PPM 11/16/18. He is feeling better. Left subclavian PM dressing dry and intact. Ef 20-25% per echo. Poss tachycardia induced cardiomyopathy. Will schedule echo for 90 days, if EF remains < 35% he will be a candidate for ICD. Instructed pt to leave dressing on until seen in follow up. Will schedule a follow up for him today. Continue [address] Cardiology will sign off.   
 
Geetha Charles, ANP 
 Patient seen and examined by me with nurse practitioner. I personally performed all components of the history, physical, and medical decision making and agree with the assessment and plan with minor modifications as noted. Resume oac. Cont ace inh and bb. Ok for [employer] from ep standpoint. F/u in 1-2 weeks as outpatient. Hopefully tachycardia induced cardiomyopathy Zandra Hitchcock MD, [facility] 
 
 
 
11/19/2018 10:49 AM

## 2018-11-19 NOTE — DIABETES MGMT
DTC Progress Note Recommendations/ Comments: Chart reviewed for hyperglycemia. Noted Lantus increased to 70 units. If appropriate, please consider: 1. Changing correction scale to insulin resistant 2. Add CHO consistent to current diet If pt's BG continues to trend upward despite increase in Lantus, pt may require prandial insulin to help manage BG 
 
 
Current hospital DM medication: Glipizide 10 mg BID, Humalog for correction, normal sensitivity scale and Lantus 35 units once daily AM. Chart reviewed on [address] [place] [address],[address]. Patient is a 48 y.o. male with hx Type 2 Diabetes on Metformin BID, Glipizide 10 mg BID and Victoza at home. A1c:  
Lab Results Component Value Date/Time Hemoglobin A1c 9.5 (H) 11/15/2018 12:25 AM  
 Hemoglobin A1c 7.5 (H) 08/11/2017 02:02 AM  
 
 
Recent Glucose Results:  
Lab Results Component Value Date/Time  (H) 11/19/2018 10:34 AM  
 GLUCPOC 334 (H) 11/19/2018 11:51 AM  
 GLUCPOC 200 (H) 11/19/2018 07:26 AM  
 GLUCPOC 304 (H) 11/18/2018 09:49 PM  
  
 
Lab Results Component Value Date/Time Creatinine 1.27 11/19/2018 10:34 AM  
 
Estimated Creatinine Clearance: 107.9 mL/min (based on SCr of 1.27 mg/dL). Active Orders Diet DIET CARDIAC Regular PO intake:  
Patient Vitals for the past 72 hrs: 
 % Diet Eaten 11/19/18 0730 100 % Will continue to follow as needed. Thank you Suzie Gonzalez, [facility]

## 2018-11-19 NOTE — PROGRESS NOTES
General Daily Progress Note Admit Date: 11/14/2018 Hospital day 6 Subjective:  
 
Patient has no complaint of chest pain. Still has some moderate dyspnea on exertion and thinks ankles are swelling more. .. Medication side effects: none Current Facility-Administered Medications Medication Dose Route Frequency  furosemide (LASIX) injection 80 mg  80 mg IntraVENous BID  lisinopril (PRINIVIL, ZESTRIL) tablet 5 mg  5 mg Oral DAILY  insulin glargine (LANTUS) injection 60 Units  60 Units SubCUTAneous DAILY  umeclidinium-vilanterol (ANORO ELLIPTA) 62.5 mcg- 25 mcg/inhalation  1 Puff Inhalation DAILY  glipiZIDE (GLUCOTROL) tablet 10 mg  10 mg Oral BID  ADDaptor      
 0.9% sodium chloride (MBP/ADV) infusion  sodium chloride (NS) flush 5-10 mL  5-10 mL IntraVENous Q8H  
 sodium chloride (NS) flush 5-10 mL  5-10 mL IntraVENous PRN  
 naloxone (NARCAN) injection 0.4 mg  0.4 mg IntraVENous PRN  
 acetaminophen (TYLENOL) tablet 650 mg  650 mg Oral Q4H PRN  
 apixaban (ELIQUIS) tablet 5 mg  5 mg Oral Q12H  
 albuterol-ipratropium (DUO-NEB) 2.5 MG-0.5 MG/3 ML  3 mL Nebulization TID RT  
 carvedilol (COREG) tablet 25 mg  25 mg Oral BID  atorvastatin (LIPITOR) tablet 40 mg  40 mg Oral DAILY  albuterol (PROVENTIL HFA, VENTOLIN HFA, PROAIR HFA) inhaler 1 Puff  1 Puff Inhalation Q6H RT  
 glucose chewable tablet 16 g  4 Tab Oral PRN  
 dextrose (D50W) injection syrg 12.5-25 g  25-50 mL IntraVENous PRN  
 glucagon (GLUCAGEN) injection 1 mg  1 mg IntraMUSCular PRN  
 insulin lispro (HUMALOG) injection   SubCUTAneous AC&HS  levalbuterol (XOPENEX) nebulizer soln 1.25 mg/3 mL  1.25 mg Nebulization Q4H PRN  
 aspirin chewable tablet 81 mg  81 mg Oral DAILY  traMADol (ULTRAM) tablet 50 mg  50 mg Oral Q6H PRN  potassium chloride (K-DUR, KLOR-CON) SR tablet 20 mEq  20 mEq Oral BID Review of Systems Pertinent items are noted in HPI. Objective: Patient Vitals for the past 8 hrs: 
 BP Temp Pulse Resp SpO2  
11/19/18 0730 119/71 98.8 °F (37.1 °C) 82 17 97 % 11/19/18 0729     95 % No intake/output data recorded. 11/17 1901 - 11/19 0700 In: 960 [P.O.:960] Out: 3300 [YYAYI:[id number]] Physical Exam:  
Visit Vitals /71 Pulse 82 Temp 98.8 °F (37.1 °C) Resp 17 Ht 6' (1.829 m) Wt (!) 354 lb 8 oz (160.8 kg) SpO2 97% BMI 48.08 kg/m² Lungs: rales R base, L base Heart: regular rate and rhythm, S1, S2 normal, no murmur, click, rub or gallop Abdomen: soft, non-tender. Bowel sounds normal. No masses,  no organomegaly Extremities: edema -2 + edema bilaterally ECG: normal sinus rhythm Data Review Recent Results (from the past 24 hour(s)) EKG, 12 LEAD, INITIAL Collection Time: 11/18/18  9:43 AM  
Result Value Ref Range Ventricular Rate 75 BPM  
 Atrial Rate 74 BPM  
 P-R Interval 176 ms QRS Duration 202 ms Q-T Interval 490 ms QTC Calculation (Bezet) 547 ms Calculated R Axis -77 degrees Calculated T Axis 95 degrees Diagnosis Electronic ventricular pacemaker When compared with ECG of 17-NOV-2018 05:05, No significant change was found TROPONIN I Collection Time: 11/18/18  9:55 AM  
Result Value Ref Range Troponin-I, Qt. 0.24 (H) <0.05 ng/mL GLUCOSE, POC Collection Time: 11/18/18 10:47 AM  
Result Value Ref Range Glucose (POC) 394 (H) 65 - 100 mg/dL Performed by Mariya Uriarte GLUCOSE, POC Collection Time: 11/18/18  4:41 PM  
Result Value Ref Range Glucose (POC) 305 (H) 65 - 100 mg/dL Performed by Mariya Uriarte GLUCOSE, POC Collection Time: 11/18/18  9:49 PM  
Result Value Ref Range Glucose (POC) 304 (H) 65 - 100 mg/dL Performed by Ricky Lima ([facility]) GLUCOSE, POC Collection Time: 11/19/18  7:26 AM  
Result Value Ref Range Glucose (POC) 200 (H) 65 - 100 mg/dL Performed by Jose Jon Assessment:  
 
Principal Problem: Atrial fibrillation with RVR ([facility] (8/11/2017) Active Problems: Hypertension (2/17/2014) Obesity, morbid ([facility] (12/20/2017) Controlled type 2 diabetes mellitus without complication, without long-term current use of insulin ([facility] (8/15/2018) Cardiomyopathy, nonischemic ([facility] (11/17/2018) Plan: 1. A fib with RVR- sp av cheryl ablation and pacemaker placement 2. Dyspnea- persistent. . Svetlana Ryan showing CHF and cardiomegaly. will chage to iv lasix and increase lisinopril dose to 5 mg daily.  Troponin slightly elevated. . Had a cath in 2016 (DR. Peterson) showing minimal CAD and L main ostial tapering. Yana Berman recently showed low to moderate likelihood of CAD. . ABG not all that bad. Dyspnea may also be related to weight and restrictive lung disease. He also has had a marked drop in his EF on recent echo- EF now 20-25%, was 50 % earlier this year. Diffuse hypokinesis. 3.Diabetes- will increase lantus again today  and continue sliding scale for now. Resume his glipizide.  
4. Morbid obesity 5. Hx restrictive lung disease on spirometry and sleep apnea. Anoro resumed on 11-17. 6. Recurrent episodes of admission for rapid a fib- appreciate Dr. Elsie Conte help.  
 7. Episode of chest pain last night- check EKG and troponin, altho may just be positional, as is used to sleeping on L side.
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
## 2018-11-20 ENCOUNTER — APPOINTMENT (OUTPATIENT)
Dept: GENERAL RADIOLOGY | Age: 53
DRG: 175 | End: 2018-11-20
Attending: FAMILY MEDICINE
Payer: MEDICAID

## 2018-11-20 ENCOUNTER — HOME HEALTH ADMISSION (OUTPATIENT)
Dept: HOME HEALTH SERVICES | Facility: HOME HEALTH | Age: 53
End: 2018-11-20

## 2018-11-20 LAB
GLUCOSE BLD STRIP.AUTO-MCNC: 167 MG/DL (ref 65–100)
GLUCOSE BLD STRIP.AUTO-MCNC: 243 MG/DL (ref 65–100)
GLUCOSE BLD STRIP.AUTO-MCNC: 257 MG/DL (ref 65–100)
GLUCOSE BLD STRIP.AUTO-MCNC: 321 MG/DL (ref 65–100)
SERVICE CMNT-IMP: ABNORMAL

## 2018-11-20 PROCEDURE — 74011636637 HC RX REV CODE- 636/637: Performed by: FAMILY MEDICINE

## 2018-11-20 PROCEDURE — 65660000000 HC RM CCU STEPDOWN

## 2018-11-20 PROCEDURE — 94760 N-INVAS EAR/PLS OXIMETRY 1: CPT

## 2018-11-20 PROCEDURE — 74011250637 HC RX REV CODE- 250/637: Performed by: INTERNAL MEDICINE

## 2018-11-20 PROCEDURE — 74011250637 HC RX REV CODE- 250/637: Performed by: HOSPITALIST

## 2018-11-20 PROCEDURE — 74011250637 HC RX REV CODE- 250/637: Performed by: FAMILY MEDICINE

## 2018-11-20 PROCEDURE — 94640 AIRWAY INHALATION TREATMENT: CPT

## 2018-11-20 PROCEDURE — 82962 GLUCOSE BLOOD TEST: CPT

## 2018-11-20 PROCEDURE — 71046 X-RAY EXAM CHEST 2 VIEWS: CPT

## 2018-11-20 PROCEDURE — 74011250636 HC RX REV CODE- 250/636: Performed by: FAMILY MEDICINE

## 2018-11-20 PROCEDURE — 74011000250 HC RX REV CODE- 250: Performed by: INTERNAL MEDICINE

## 2018-11-20 PROCEDURE — 74011636637 HC RX REV CODE- 636/637: Performed by: INTERNAL MEDICINE

## 2018-11-20 RX ORDER — POTASSIUM CHLORIDE 20 MEQ/1
40 TABLET, EXTENDED RELEASE ORAL 2 TIMES DAILY
Status: DISCONTINUED | OUTPATIENT
Start: 2018-11-20 | End: 2018-11-21 | Stop reason: HOSPADM

## 2018-11-20 RX ORDER — INSULIN GLARGINE 100 [IU]/ML
80 INJECTION, SOLUTION SUBCUTANEOUS DAILY
Status: DISCONTINUED | OUTPATIENT
Start: 2018-11-20 | End: 2018-11-21 | Stop reason: HOSPADM

## 2018-11-20 RX ORDER — CARVEDILOL 12.5 MG/1
12.5 TABLET ORAL 2 TIMES DAILY
Status: DISCONTINUED | OUTPATIENT
Start: 2018-11-20 | End: 2018-11-21 | Stop reason: HOSPADM

## 2018-11-20 RX ORDER — LISINOPRIL 5 MG/1
5 TABLET ORAL DAILY
Status: DISCONTINUED | OUTPATIENT
Start: 2018-11-20 | End: 2018-11-21 | Stop reason: HOSPADM

## 2018-11-20 RX ADMIN — Medication 10 ML: at 17:06

## 2018-11-20 RX ADMIN — IPRATROPIUM BROMIDE AND ALBUTEROL SULFATE 3 ML: .5; 3 SOLUTION RESPIRATORY (INHALATION) at 19:25

## 2018-11-20 RX ADMIN — ASPIRIN 81 MG CHEWABLE TABLET 81 MG: 81 TABLET CHEWABLE at 08:35

## 2018-11-20 RX ADMIN — GLIPIZIDE 10 MG: 5 TABLET ORAL at 17:05

## 2018-11-20 RX ADMIN — UMECLIDINIUM BROMIDE AND VILANTEROL TRIFENATATE 1 PUFF: 62.5; 25 POWDER RESPIRATORY (INHALATION) at 09:29

## 2018-11-20 RX ADMIN — APIXABAN 5 MG: 5 TABLET, FILM COATED ORAL at 22:06

## 2018-11-20 RX ADMIN — TRAMADOL HYDROCHLORIDE 50 MG: 50 TABLET, FILM COATED ORAL at 00:11

## 2018-11-20 RX ADMIN — LISINOPRIL 5 MG: 5 TABLET ORAL at 09:27

## 2018-11-20 RX ADMIN — POTASSIUM CHLORIDE 40 MEQ: 20 TABLET, EXTENDED RELEASE ORAL at 09:27

## 2018-11-20 RX ADMIN — ALBUTEROL SULFATE 1 PUFF: 90 AEROSOL, METERED RESPIRATORY (INHALATION) at 09:28

## 2018-11-20 RX ADMIN — CARVEDILOL 12.5 MG: 12.5 TABLET, FILM COATED ORAL at 09:27

## 2018-11-20 RX ADMIN — IPRATROPIUM BROMIDE AND ALBUTEROL SULFATE 3 ML: .5; 3 SOLUTION RESPIRATORY (INHALATION) at 14:12

## 2018-11-20 RX ADMIN — ALBUTEROL SULFATE 1 PUFF: 90 AEROSOL, METERED RESPIRATORY (INHALATION) at 19:44

## 2018-11-20 RX ADMIN — CARVEDILOL 12.5 MG: 12.5 TABLET, FILM COATED ORAL at 17:05

## 2018-11-20 RX ADMIN — Medication 10 ML: at 14:32

## 2018-11-20 RX ADMIN — Medication 10 ML: at 08:36

## 2018-11-20 RX ADMIN — IPRATROPIUM BROMIDE AND ALBUTEROL SULFATE 3 ML: .5; 3 SOLUTION RESPIRATORY (INHALATION) at 07:14

## 2018-11-20 RX ADMIN — FUROSEMIDE 80 MG: 10 INJECTION, SOLUTION INTRAMUSCULAR; INTRAVENOUS at 17:06

## 2018-11-20 RX ADMIN — INSULIN LISPRO 4 UNITS: 100 INJECTION, SOLUTION INTRAVENOUS; SUBCUTANEOUS at 22:06

## 2018-11-20 RX ADMIN — ATORVASTATIN CALCIUM 40 MG: 40 TABLET, FILM COATED ORAL at 08:35

## 2018-11-20 RX ADMIN — INSULIN LISPRO 5 UNITS: 100 INJECTION, SOLUTION INTRAVENOUS; SUBCUTANEOUS at 11:57

## 2018-11-20 RX ADMIN — Medication 10 ML: at 22:06

## 2018-11-20 RX ADMIN — INSULIN GLARGINE 80 UNITS: 100 INJECTION, SOLUTION SUBCUTANEOUS at 09:27

## 2018-11-20 RX ADMIN — Medication 10 ML: at 06:22

## 2018-11-20 RX ADMIN — FUROSEMIDE 80 MG: 10 INJECTION, SOLUTION INTRAMUSCULAR; INTRAVENOUS at 08:36

## 2018-11-20 RX ADMIN — INSULIN LISPRO 3 UNITS: 100 INJECTION, SOLUTION INTRAVENOUS; SUBCUTANEOUS at 17:06

## 2018-11-20 RX ADMIN — APIXABAN 5 MG: 5 TABLET, FILM COATED ORAL at 08:35

## 2018-11-20 RX ADMIN — POTASSIUM CHLORIDE 40 MEQ: 20 TABLET, EXTENDED RELEASE ORAL at 17:05

## 2018-11-20 RX ADMIN — INSULIN LISPRO 2 UNITS: 100 INJECTION, SOLUTION INTRAVENOUS; SUBCUTANEOUS at 07:30

## 2018-11-20 RX ADMIN — ALBUTEROL SULFATE 1 PUFF: 90 AEROSOL, METERED RESPIRATORY (INHALATION) at 02:00

## 2018-11-20 RX ADMIN — GLIPIZIDE 10 MG: 5 TABLET ORAL at 08:35

## 2018-11-20 NOTE — CDMP QUERY
Please clarify if this patient is (was) being treated/managed for:  
 
=> Acute on chronic systolic (congestive) heart failure in the setting of A-Fib with RVR requiring Cardiology consult, 2D echo and IV diuretics =>Chronic systolic (congestive)heart failre in the setting of A-Fib with RVR requiring Cardiology consult, 2D echo and IV diuretics 
=> Other explanation of clinical findings 
=> Clinically Undetermined (no explanation for clinical findings) The medical record reflects the following clinical findings, treatment, and risk factors. Risk Factors:  hx of CHF/HTN/COPD/ A-Fib; c/o persistent and worsening SOB Clinical Indicators: Extremities: edema , 1+ bilaterally; RR: 20-24; Dyspnea- persistent; cxr:Pulmonary vascular congestion without overt pulmonary edema; 2D Echo: The ventricle was dilated. Systolic function was severely reduced. Ejection fraction was estimated in the range of 20 % to 25 %. There was severe diffuse hypokinesis. Treatment: Cardiology consult; 2D Echo; Lasix 80 mg IV BID Please clarify and document your clinical opinion in the progress notes and discharge summary including the definitive and/or presumptive diagnosis, (suspected or probable), related to the above clinical findings. Please include clinical findings supporting your diagnosis. Thank [facility] 
[phone]

## 2018-11-20 NOTE — PROGRESS NOTES
Bedside shift change report given to BISHOP Marinelli RN (oncoming nurse) by Jacklyn Mueller RN (offgoing nurse). Report included the following information SBAR, Kardex, Procedure Summary, Intake/Output, Recent Results, Med Rec Status and Cardiac Rhythm V paced.

## 2018-11-20 NOTE — PROGRESS NOTES
PCU SHIFT NURSING NOTE Bedside and Verbal shift change report given to Madhuri Samuels (oncoming nurse) by Jesus Schultz (offgoing nurse). Report included the following information SBAR, Kardex, Procedure Summary, Intake/Output, MAR and Recent Results. Shift Summary: 6828 
 
6067: Patient off the unit for PA and lateral chest xray 1600: Back from xray, sitting up in chair. No complaints at this time. Vital signs stable Admission Date 11/14/2018 Admission Diagnosis Atrial fibrillation with RVR ([address] Consults IP CONSULT TO CARDIOLOGY 
IP CONSULT TO ELECTROPHYSIOLOGY Consults [x]PT []OT []Speech  
[]Case Management  
  
[] Palliative Cardiac Monitoring Order  
[x]Yes []No  
 
IV drips []Yes Drip:                            Dose: 
Drip:                            Dose: 
Drip:                            Dose:  
[]No  
 
GI Prophylaxis []Yes []No  
 
 
 
DVT Prophylaxis SCDs:     
     
 Ochoa stockings:     
  
[] Medication []Contraindicated []None Activity Level Activity Level: Up ad katy Activity Assistance: No assistance needed Purposeful Rounding every 1-2 hour? [x]Yes Gutierrez Score  Total Score: 1 Bed Alarm (If score 3 or >) []Yes  
[] Refused (See signed refusal form in chart) Alvaro Score  Alvaro Score: 21 Alvaro Score (if score 14 or less) []PMT consult  
[]Wound Care consult []Specialty bed  
[] Nutrition consult Needs prior to discharge:  
Home O2 required:   
[]Yes []No  
 If yes, how much O2 required? Other:  
 Last Bowel Movement: Last Bowel Movement Date: 11/15/18 Influenza Vaccine Received Flu Vaccine for Current Season (usually Sept-March): No  
 Patient/Guardian Refused (Notify MD): Yes Pneumonia Vaccine Diet Active Orders Diet DIET CARDIAC Regular LDAs Peripheral IV 11/15/18 Left Antecubital (Active) Site Assessment Clean, dry, & intact 11/20/2018  9:12 AM  
 Phlebitis Assessment 0 11/20/2018  9:12 AM  
Infiltration Assessment 0 11/20/2018  9:12 AM  
Dressing Status Clean, dry, & intact 11/20/2018  9:12 AM  
Dressing Type Transparent 11/20/2018  9:12 AM  
Hub Color/Line Status Pink;Capped 11/20/2018  6:20 AM  
Action Taken Open ports on tubing capped 11/20/2018  6:20 AM  
Alcohol Cap Used Yes 11/20/2018  6:20 AM  
                  
Urinary Catheter Intake & Output Date 11/19/18 0700 - 11/20/18 0659 11/20/18 0700 - 11/21/18 3563 Shift 9723-7002 3804-7789 24 Hour Total 6867-6642 1790-9533 24 Hour Total  
INTAKE  
P.O.  360  360  
  P. O.  360  360 Shift Total(mL/kg) 870(5.4) 360(2.2) 1230(7.6) 360(2.2)  360(2.2) OUTPUT Urine(mL/kg/hr) 1850(1) 900(0.5) 2750(0.7) 450  450 Urine Voided 0160 943 2452 450  450 Urine Occurrence(s) 2 x  2 x Shift Total(mL/kg) 1850(11.5) 900(5.6) [id number] 450(2.8)  450(2.8) NET -980 -540 -1520 -90  -90 Weight (kg) 160.8 161.7 161.7 161.7 161.7 161.7 Readmission Risk Assessment Tool Score Medium Risk 15 Total Score 3 Has Seen PCP in Last 6 Months (Yes=3, No=0) 3 Patient Length of Stay (>5 days = 3)  
 4 Pt. Coverage (Medicare=5 , Medicaid, or Self-Pay=4) 4 Charlson Comorbidity Score (Age + Comorbid Conditions) Criteria that do not apply:  
 . Living with Significant Other. Assisted Living. LTAC. SNF. or  
Rehab  
 IP Visits Last 12 Months (1-3=4, 4=9, >4=11) Expected Length of Stay 3d 7h Actual Length of Stay 5

## 2018-11-20 NOTE — PROGRESS NOTES
General Daily Progress Note Admit Date: 11/14/2018 Hospital day 7 Subjective:  
 
Patient has no complaint of chest pain. Still has exertional dyspnea. Occasional mild dizziness when stands. .. Medication side effects: hypokalemia Current Facility-Administered Medications Medication Dose Route Frequency  carvedilol (COREG) tablet 12.5 mg  12.5 mg Oral BID  insulin glargine (LANTUS) injection 80 Units  80 Units SubCUTAneous DAILY  potassium chloride (K-DUR, KLOR-CON) SR tablet 40 mEq  40 mEq Oral BID  lisinopril (PRINIVIL, ZESTRIL) tablet 5 mg  5 mg Oral DAILY  furosemide (LASIX) injection 80 mg  80 mg IntraVENous BID  
 umeclidinium-vilanterol (ANORO ELLIPTA) 62.5 mcg- 25 mcg/inhalation  1 Puff Inhalation DAILY  glipiZIDE (GLUCOTROL) tablet 10 mg  10 mg Oral BID  ADDaptor      
 0.9% sodium chloride (MBP/ADV) infusion  sodium chloride (NS) flush 5-10 mL  5-10 mL IntraVENous Q8H  
 sodium chloride (NS) flush 5-10 mL  5-10 mL IntraVENous PRN  
 naloxone (NARCAN) injection 0.4 mg  0.4 mg IntraVENous PRN  
 acetaminophen (TYLENOL) tablet 650 mg  650 mg Oral Q4H PRN  
 apixaban (ELIQUIS) tablet 5 mg  5 mg Oral Q12H  
 albuterol-ipratropium (DUO-NEB) 2.5 MG-0.5 MG/3 ML  3 mL Nebulization TID RT  
 atorvastatin (LIPITOR) tablet 40 mg  40 mg Oral DAILY  albuterol (PROVENTIL HFA, VENTOLIN HFA, PROAIR HFA) inhaler 1 Puff  1 Puff Inhalation Q6H RT  
 glucose chewable tablet 16 g  4 Tab Oral PRN  
 dextrose (D50W) injection syrg 12.5-25 g  25-50 mL IntraVENous PRN  
 glucagon (GLUCAGEN) injection 1 mg  1 mg IntraMUSCular PRN  
 insulin lispro (HUMALOG) injection   SubCUTAneous AC&HS  levalbuterol (XOPENEX) nebulizer soln 1.25 mg/3 mL  1.25 mg Nebulization Q4H PRN  
 aspirin chewable tablet 81 mg  81 mg Oral DAILY  traMADol (ULTRAM) tablet 50 mg  50 mg Oral Q6H PRN Review of Systems Pertinent items are noted in HPI. Objective: Patient Vitals for the past 8 hrs: 
 BP Temp Pulse Resp SpO2 Weight 11/20/18 0620 (!) 89/49 98.2 °F (36.8 °C) 75 20 94 %   
11/20/18 0011      (!) 356 lb 7.7 oz (161.7 kg) No intake/output data recorded. 11/18 1901 - 11/20 0700 In: 9713 [P.O.:1710] Out: 1013 Ariel Salinas [[id number]] Physical Exam:  
Visit Vitals /77 Pulse 76 Temp 97.7 °F (36.5 °C) Resp 18 Ht 6' (1.829 m) Wt (!) 356 lb 7.7 oz (161.7 kg) SpO2 99% BMI 48.35 kg/m² Lungs: rales R base Heart: regular rate and rhythm, S1, S2 normal, no murmur, click, rub or gallop Abdomen: soft, non-tender. Bowel sounds normal. No masses,  no organomegaly Extremities: edema , 1= bilaterally ECG: normal sinus rhythm Data Review Recent Results (from the past 24 hour(s)) METABOLIC PANEL, BASIC Collection Time: 11/19/18 10:34 AM  
Result Value Ref Range Sodium 132 (L) 136 - 145 mmol/L Potassium 3.4 (L) 3.5 - 5.1 mmol/L Chloride 95 (L) 97 - 108 mmol/L  
 CO2 28 21 - 32 mmol/L Anion gap 9 5 - 15 mmol/L Glucose 340 (H) 65 - 100 mg/dL BUN 18 6 - 20 MG/DL Creatinine 1.27 0.70 - 1.30 MG/DL  
 BUN/Creatinine ratio 14 12 - 20 GFR est AA >60 >60 ml/min/1.73m2 GFR est non-AA 60 (L) >60 ml/min/1.73m2 Calcium 8.6 8.5 - 10.1 MG/DL  
CBC WITH AUTOMATED DIFF Collection Time: 11/19/18 10:34 AM  
Result Value Ref Range WBC 8.8 4.1 - 11.1 K/uL  
 RBC 4.30 4. 10 - 5.70 M/uL  
 HGB 12.6 12.1 - 17.0 g/dL HCT 38.5 36.6 - 50.3 % MCV 89.5 80.0 - 99.0 FL  
 MCH 29.3 26.0 - 34.0 PG  
 MCHC 32.7 30.0 - 36.5 g/dL  
 RDW 15.9 (H) 11.5 - 14.5 % PLATELET 751 357 - 493 K/uL MPV 10.6 8.9 - 12.9 FL  
 NRBC 0.0 0  WBC ABSOLUTE NRBC 0.00 0.00 - 0.01 K/uL NEUTROPHILS 80 (H) 32 - 75 % LYMPHOCYTES 13 12 - 49 % MONOCYTES 5 5 - 13 % EOSINOPHILS 1 0 - 7 % BASOPHILS 1 0 - 1 % IMMATURE GRANULOCYTES 1 (H) 0.0 - 0.5 % ABS. NEUTROPHILS 7.0 1.8 - 8.0 K/UL  
 ABS. LYMPHOCYTES 1.2 0.8 - 3.5 K/UL ABS. MONOCYTES 0.4 0.0 - 1.0 K/UL  
 ABS. EOSINOPHILS 0.1 0.0 - 0.4 K/UL  
 ABS. BASOPHILS 0.0 0.0 - 0.1 K/UL  
 ABS. IMM. GRANS. 0.1 (H) 0.00 - 0.04 K/UL  
 DF AUTOMATED    
GLUCOSE, POC Collection Time: 11/19/18 11:51 AM  
Result Value Ref Range Glucose (POC) 334 (H) 65 - 100 mg/dL Performed by Stephy Medina GLUCOSE, POC Collection Time: 11/19/18  5:07 PM  
Result Value Ref Range Glucose (POC) 247 (H) 65 - 100 mg/dL Performed by Vikas Haro (PCT) GLUCOSE, POC Collection Time: 11/19/18  9:03 PM  
Result Value Ref Range Glucose (POC) 358 (H) 65 - 100 mg/dL Performed by Unkown  GLUCOSE, POC Collection Time: 11/20/18  7:23 AM  
Result Value Ref Range Glucose (POC) 167 (H) 65 - 100 mg/dL Performed by Vikas Haro (PCT) Assessment:  
 
Principal Problem: 
  Atrial fibrillation with RVR ([facility] [address] (8/11/2017) Active Problems: Hypertension (2/17/2014) Obesity, morbid ([address] (12/20/2017) Controlled type 2 diabetes mellitus without complication, without long-term current use of insulin ([address] (8/15/2018) Cardiomyopathy, nonischemic ([facility] [address] (11/17/2018) Plan: 1. A fib with RVR- sp av cheryl ablation and pacemaker placement 2. Dyspnea- persistent. . Marlee Morris showing CHF and cardiomegaly. will chage to iv lasix and increase lisinopril dose to 5 mg daily.  Troponin slightly elevated. . Had a cath in 2016 (DR. Peterson) showing minimal CAD and L main ostial tapering. Jaylene Terry recently showed low to moderate likelihood of CAD. . ABG not all that bad. Dyspnea may also be related to weight and restrictive lung disease. He also has had a marked drop in his EF on recent echo- EF now 20-25%, was 50 % earlier this year. Diffuse hypokinesis. Will work to increase dose of lisinopril and decrease dose of diuretics over next month or 2. 3.Diabetes- will increase lantus again today  and continue sliding scale for now. Resume his glipizide.  
 4. Morbid obesity 5. Hx restrictive lung disease on spirometry and sleep apnea. Anoro resumed on 11-17.  
6. Recurrent episodes of admission for rapid a fib- appreciate Dr. Harmeet Sierra help.  
7. Mild hypotension yesterday- will decrease dose of coreg. 8. Hypokalemia- increase dose of potassium 9.  Disposition- hope to get home tomorrow if continues to improve.

## 2018-11-20 NOTE — PROGRESS NOTES
2:31PM 
Referral sent to [facility] for [facility] for CHF. Referral sent to [place] for straight cane. Waiting for responses. 4:21PM 
H2H referral accepted. Information added to AVS. Cane delivered to pt's room. CM will continue to follow and assist with d/c planning. Dian Durand, MSW Care Manager

## 2018-11-21 VITALS
WEIGHT: 315 LBS | HEIGHT: 72 IN | DIASTOLIC BLOOD PRESSURE: 72 MMHG | TEMPERATURE: 97.4 F | OXYGEN SATURATION: 97 % | RESPIRATION RATE: 20 BRPM | HEART RATE: 79 BPM | SYSTOLIC BLOOD PRESSURE: 113 MMHG | BODY MASS INDEX: 42.66 KG/M2

## 2018-11-21 LAB
ANION GAP SERPL CALC-SCNC: 4 MMOL/L (ref 5–15)
BUN SERPL-MCNC: 17 MG/DL (ref 6–20)
BUN/CREAT SERPL: 17 (ref 12–20)
CALCIUM SERPL-MCNC: 8.5 MG/DL (ref 8.5–10.1)
CHLORIDE SERPL-SCNC: 99 MMOL/L (ref 97–108)
CO2 SERPL-SCNC: 32 MMOL/L (ref 21–32)
CREAT SERPL-MCNC: 1.03 MG/DL (ref 0.7–1.3)
GLUCOSE BLD STRIP.AUTO-MCNC: 169 MG/DL (ref 65–100)
GLUCOSE SERPL-MCNC: 191 MG/DL (ref 65–100)
POTASSIUM SERPL-SCNC: 4 MMOL/L (ref 3.5–5.1)
SERVICE CMNT-IMP: ABNORMAL
SODIUM SERPL-SCNC: 135 MMOL/L (ref 136–145)

## 2018-11-21 PROCEDURE — 74011250637 HC RX REV CODE- 250/637: Performed by: INTERNAL MEDICINE

## 2018-11-21 PROCEDURE — 74011636637 HC RX REV CODE- 636/637: Performed by: FAMILY MEDICINE

## 2018-11-21 PROCEDURE — 36415 COLL VENOUS BLD VENIPUNCTURE: CPT

## 2018-11-21 PROCEDURE — 74011000250 HC RX REV CODE- 250: Performed by: INTERNAL MEDICINE

## 2018-11-21 PROCEDURE — 74011636637 HC RX REV CODE- 636/637: Performed by: INTERNAL MEDICINE

## 2018-11-21 PROCEDURE — 82962 GLUCOSE BLOOD TEST: CPT

## 2018-11-21 PROCEDURE — 74011250636 HC RX REV CODE- 250/636: Performed by: FAMILY MEDICINE

## 2018-11-21 PROCEDURE — 74011250637 HC RX REV CODE- 250/637: Performed by: FAMILY MEDICINE

## 2018-11-21 PROCEDURE — 80048 BASIC METABOLIC PNL TOTAL CA: CPT

## 2018-11-21 PROCEDURE — 94640 AIRWAY INHALATION TREATMENT: CPT

## 2018-11-21 RX ORDER — FUROSEMIDE 80 MG/1
160 TABLET ORAL 2 TIMES DAILY
Qty: 120 TAB | Refills: 11 | Status: SHIPPED | OUTPATIENT
Start: 2018-11-21 | End: 2018-12-04

## 2018-11-21 RX ORDER — CARVEDILOL 25 MG/1
12.5 TABLET ORAL 2 TIMES DAILY
Qty: 60 TAB | Refills: 12 | Status: SHIPPED | OUTPATIENT
Start: 2018-11-21 | End: 2018-11-23

## 2018-11-21 RX ADMIN — FUROSEMIDE 80 MG: 10 INJECTION, SOLUTION INTRAMUSCULAR; INTRAVENOUS at 08:44

## 2018-11-21 RX ADMIN — LISINOPRIL 5 MG: 5 TABLET ORAL at 08:44

## 2018-11-21 RX ADMIN — INSULIN GLARGINE 80 UNITS: 100 INJECTION, SOLUTION SUBCUTANEOUS at 08:28

## 2018-11-21 RX ADMIN — IPRATROPIUM BROMIDE AND ALBUTEROL SULFATE 3 ML: .5; 3 SOLUTION RESPIRATORY (INHALATION) at 07:48

## 2018-11-21 RX ADMIN — Medication 10 ML: at 08:49

## 2018-11-21 RX ADMIN — APIXABAN 5 MG: 5 TABLET, FILM COATED ORAL at 08:44

## 2018-11-21 RX ADMIN — POTASSIUM CHLORIDE 40 MEQ: 20 TABLET, EXTENDED RELEASE ORAL at 08:44

## 2018-11-21 RX ADMIN — UMECLIDINIUM BROMIDE AND VILANTEROL TRIFENATATE 1 PUFF: 62.5; 25 POWDER RESPIRATORY (INHALATION) at 08:45

## 2018-11-21 RX ADMIN — INSULIN LISPRO 2 UNITS: 100 INJECTION, SOLUTION INTRAVENOUS; SUBCUTANEOUS at 08:28

## 2018-11-21 RX ADMIN — Medication 10 ML: at 06:00

## 2018-11-21 RX ADMIN — CARVEDILOL 12.5 MG: 12.5 TABLET, FILM COATED ORAL at 08:44

## 2018-11-21 RX ADMIN — ALBUTEROL SULFATE 1 PUFF: 90 AEROSOL, METERED RESPIRATORY (INHALATION) at 02:00

## 2018-11-21 RX ADMIN — ATORVASTATIN CALCIUM 40 MG: 40 TABLET, FILM COATED ORAL at 08:44

## 2018-11-21 RX ADMIN — GLIPIZIDE 10 MG: 5 TABLET ORAL at 08:44

## 2018-11-21 RX ADMIN — ASPIRIN 81 MG CHEWABLE TABLET 81 MG: 81 TABLET CHEWABLE at 08:44

## 2018-11-21 NOTE — PROGRESS NOTES
8:13AM 
D/c order acknowledged by CM. Pt to d/c home with [facility]. Cane delivered to pt's room. Per pt, he has ride coming and does not need Medicaid transportation arranged. PCP appt scheduled and on AVS. Pt ready for d/c from CM perspective. CM available for any additional needs. Kim Hurt, MSW Care Manager

## 2018-11-21 NOTE — DISCHARGE SUMMARY
[address]  MR#: [id number]  : 1965  ACCOUNT #: [de-identified]   ADMIT DATE: 2018  DISCHARGE DATE: 2018    FINAL DIAGNOSES:  1. Congestive heart failure. 2.  Atrial fibrillation with rapid ventricular response. 3.  Diabetes. 4.  Restrictive lung disease. 5.  Obesity hypoventilation. 6.  Cardiomyopathy, ejection fraction 20-25%. 7.  Mild hypotension. 8.  Hypokalemia. CONSULTATIONS:  Cardiology, Dr. Vinny Ramírez and Dr. Ceci Ricks. PROCEDURES:  AV node ablation and implantation of pacemaker. FOLLOWUP:  With me in 2 days. HISTORY OF PRESENT ILLNESS:  The patient is a 77-year-old white male with a history of chronic AFib, heart failure, hypertension, restrictive lung disease, morbid obesity, and diabetes, who came to the emergency room with worsening shortness of breath beginning 2-3 days prior to admission. This was associated with palpitations. In the emergency room, he was noted to have a heart rate of 132 and atrial fibrillation, and he was started on a Cardizem drip. The patient also stopped his Eliquis for 3 days due to a dental procedure. He denied any fever, chills, or cough, though has had some pleuritic chest pain. PAST MEDICAL HISTORY:  Significant for chronic AFib, several admissions for atrial fibrillation with rapid ventricular response, history of heart failure, history of hypertension, history of restrictive lung disease. FVC 2.59, or 52% of predicted, FEV1 of 1.95, or 49% of predicted. History of cardiac cath with  , minimal coronary artery disease, history of sleep apnea. SOCIAL HISTORY:  Smoking, a pack per day, quit in . Alcohol use is occasional beer. ALLERGIES:  INFLUENZA VACCINE, OXYCODONE, PNEUMOCOCCAL 23 VACCINE.     ADMISSION MEDICATIONS:  Tramadol 1-2 p.r.n. back pain, lisinopril 20 mg daily, Victoza 1.8 mg daily, atorvastatin 40 mg a day, albuterol inhaler, carvedilol 25 mg 1 tab b.i.d., potassium chloride 20 mEq b.i.d., furosemide 80 mg 2 tabs daily, DuoNeb p.r.n., metformin 1000 mg 1/2 in the morning and 1 in the evening, Eliquis 5 mg twice daily, glipizide 10 mg twice daily. REVIEW OF SYSTEMS:  Please see HPI. PHYSICAL EXAMINATION:  VITAL SIGNS:  Blood pressure 112/60, pulse 115, irregular, temperature 98.0, respiratory rate 20. O2 sat 96%. GENERAL:  Alert and cooperative. NECK:  Supple, symmetrical.  Thyroid nontender. LUNGS:  Clear to auscultation and percussion with basilar rales. CARDIOVASCULAR:  Irregularly irregular rhythm. No murmurs, rubs, or gallop. 2+ edema. ABDOMEN:  Soft, nontender. NEUROLOGIC:  Nonfocal.    HOSPITAL COURSE:  Patient was admitted. He eventually was weaned off his Cardizem drip. BNP was 2329. Troponin was 0.09 and peak was 0.24. A1c was 9.5. BUN 14, creatinine 1.4 on admission, blood glucose 407. Blood sugars were managed with Lantus and sliding scale as his glipizide was resumed. Discharge sugar was down to 191. Patient was seen in consultation by Dr. Naresh Martin and had an AV node ablation with pacemaker implantation. He had had several admissions for rapid AFib prior to this. Repeat echocardiogram while in the hospital showed an ejection fraction of 20-25% and severe diffuse hypokinesis. ABGs actually were reasonably well, PO2 of 77, pCO2 of 36, pH of 7.41, O2 sat 96%, and he never did really desaturate. Patient continued to have problems with peripheral edema and mild pulmonary edema on chest x-ray. He was treated with increasing doses of Lasix and eventually switched to IV Lasix. At the time of discharge, this was improving somewhat. His x-ray was improving. He had lost a few pounds. His edema had decreased a little bit. Eventually, he was stable enough to be discharged home on 11/21/2018. He will follow up with me in 2 days.   Over the next month or two, we will work to increase his lisinopril dose and decrease his diuretic dose.  Also of note, the patient is to follow up with Dr. Natali Palacios and repeat echo will be done in a few months with consideration of a defibrillator placement if this does not improve. DISPOSITION:  Discharged to home.       Parvez Davis MD       MS/DN  D: 11/21/2018 07:51     T: 11/21/2018 15:39  JOB #: [id number]

## 2018-11-21 NOTE — DISCHARGE INSTRUCTIONS
[address] [address]  [phone]        NEW PACEMAKER IMPLANT DISCHARGE INSTRUCTIONS    Patient ID:  Arvind Harris  [id number]  29 y.o.  1965    Admit Date: 11/14/2018    Discharge Date: 11/16/2018     Admitting Physician: Pedrito Torrez MD     Discharge Physician: Pedrito Torrez MD    Admission Diagnoses:   Atrial fibrillation with RVR [facility])    Discharge Diagnoses:   Principal Problem:    Atrial fibrillation with RVR ([facility] [address] (8/11/2017)    Active Problems:    Hypertension (2/17/2014)      Obesity, morbid ([facility] [address] (12/20/2017)        Discharge Condition: Good    Cardiology Procedures this Admission:  Pacemaker insertion. Disposition: home    Reference discharge instructions provided by nursing for diet and activity. Follow-up with device clinic in two weeks. Call [phone] to make an appointment. Signed:  ISAIAH Osei  11/16/2018  12:21 PM      DISCHARGE INSTRUCTIONS FOR PATIENTS WITH PACEMAKERS    1. Remember to call for an appointment for 2 weeks [phone] to check healing and implant programming. 2. Medic Alert Bracelets are available from your pharmacist to wear at all times if you choose to wear one. 3. Carry your ID card for pacemaker with you at all times. This card will be given to you in the hospital or mailed to you. 4. The pacemaker will bulge slightly under your skin. The bulge will decrease in size over the next few weeks. Please notify the doctor's office if you notice any of the following around your site:   A.  A bruise that does not go away. B.  Soreness or yellow, green, or brown drainage from the site. C. Any swelling from the site. D. If you have a fever of 100 degrees or higher that lasts for a few days. INCISION CARE       1.  Leave dressing over your site until it starts to fall off, usually in a few weeks.   2.  You may shower after 3 days as long as your incision isnt submerged or directly sprayed upon until well healed. 3.  For comfort, wear loose fitting clothing. 4.  Report any signs of infection, fever, pain, swelling, redness, oozing, or heat at site especially if these symptoms increase after the first 3 to 4 days. ACTIVITY PRECAUTIONS     1. Avoid rough contact with the implant site. 2. No driving for 14 days. 3. Avoid lifting your arm over your head, carrying anything on the affected side, or lifting over 10 pounds for 30 days. For the first 2 days only bend your arm at the elbow. 4. Any extreme activity such as golf, weight lifting or exercise biking should be restricted for 60 days. 5. Do not carry objects by holding them against your implant site. 6.  No shooting rifles or any type of gun with the affected shoulder permanently. SPECIAL PRECAUTIONS     1. You should avoid all strong magnetic fields, such as arc welding, large transformers, large motors. 2.  You may or may not (depending on your device) have an MRI which uses a strong magnet to take pictures. 3.  Treatments or surgery that requires diathermy or electrocautery should be discussed with your doctor before scheduled. 4. Avoid radio frequency transmitters, including radar. 5. Advise dentist or other medical personnel you see that you have a pacemaker. 6.  Cell phones and microwave oven use is okay. 7.  If you plan to move or take a trip to a new area, the doctor's office will give you a name of a doctor to contact for any problems. ANTIBIOTIC THERAPY    During the first 8 weeks after your pacemaker insertion, you may need antibiotics before any dental work or certain tests or operations. Let the dentist or doctor who is caring for you know that you have had an implanted device. S/P AV NODE ABLATION DISCHARGE INSTRUCTIONS    It is normal to feel tired the first couple days. Take it easy and follow the physicians instructions.       CHECK THE CATHETER INSERTION SITE DAILY:  You may shower 24 hours after the procedure, remove the bandage during showering. Wash with soap and water and pat dry. Gentle cleaning of the site with soap and water is sufficient, cover with a dry clean dressing or bandage. Do not apply creams or powders to the area. Do not sit in a bathtub or pool of water for 7 days or until wound has completely healed. Temporary bruising and discomfort is normal and may last a few weeks. You may have a  formation of a small lump at the site which may last up to 6 weeks. CALL THE PHYSICIAN:  If the site becomes red, swollen or feels warm to the touch  If there is bleeding or drainage or if there is unusual pain at the groin or down the leg. If there is any bleeding, lie down, apply pressure or have someone apply pressure with a clean cloth until the bleeding stops. If the bleeding continues, call 911 to be transported to the hospital.  DO NOT DRIVE YOURSELF, [facility]. Activity:      For the first 24-48 hours or as instructed by the physician:  No lifting, pushing or pulling over 5 pounds and no straining the insertion site. Do not life grocery bags or the garbage can, do not run the vacuum  or  for 7 days. Start with short walks as in the hospital and gradually increase as tolerated each day. It is recommended to walk 30 minutes 5-7 days per week. Follow your physicians instructions on activity. Avoid walking outside in extremes of heat or cold. Walk inside when it is cold and windy or hot and humid. Things to keep in mind:  No driving for at least 5 days, or as designated by your physician. Limit the number of times you go up and down the stairs  Take rests and pace yourself with activity. Be careful and do not strain with bowel movements. Medications:     Take all medications as prescribed  Call your physician if you have any questions  Keep an updated list of your medications with you at all times and give a list to your physician and pharmacist    Signs and Symptoms:  Be cautious of symptoms of angina or recurrent symptoms such as chest discomfort, unusual shortness of breath or fatigue, palpitations. After Care: Follow up with your physician as instructed. Follow a heart healthy diet with proper portion control, daily stress management, daily exercise, blood pressure and cholesterol control , and smoking cessation. [facility] Activation    Thank you for requesting access to [facility]. Please follow the instructions below to securely access and download your online medical record. [facility] allows you to send messages to your doctor, view your test results, renew your prescriptions, schedule appointments, and more. How Do I Sign Up? 1. In your internet browser, go to [email or web address]  2. Click on the First Time User? Click Here link in the Sign In box. You will be redirect to the New Member Sign Up page. 3. Enter your [facility] Access Code exactly as it appears below. You will not need to use this code after youve completed the sign-up process. If you do not sign up before the expiration date, you must request a new code. [facility] Access Code: [email or web address]  Expires: 2019 11:02 PM (This is the date your [facility] [employer] access code will )    4. Enter the last four digits of your Social Security Number (xxxx) and Date of Birth (mm/dd/yyyy) as indicated and click Submit. You will be taken to the next sign-up page. 5. Create a [facility] ID. This will be your [facility] login ID and cannot be changed, so think of one that is secure and easy to remember. 6. Create a [facility] password. You can change your password at any time. 7. Enter your Password Reset Question and Answer. This can be used at a later time if you forget your password. 8. Enter your e-mail address. You will receive e-mail notification when new information is available in [address] 9. Click Sign Up.  You can now view and download portions of your medical record. 10. Click the Download Summary menu link to download a portable copy of your medical information. Additional Information    If you have questions, please visit the Frequently Asked Questions section of the [employer] website at [email or web address] Remember, [facility] is NOT to be used for urgent needs. For medical emergencies, dial 911.

## 2018-11-21 NOTE — PROGRESS NOTES
PATIENT DISCHARGE INSTRUCTIONS PATIENT DISCHARGE INSTRUCTIONS Tonie Vallecillo / [id number] : 1965 Admitted 2018 Discharged: 2018 · It is important that you take the medication exactly as they are prescribed. · Keep your medication in the bottles provided by the pharmacist and keep a list of the medication names, dosages, and times to be taken in your wallet. · Do not take other medications without consulting your doctor. What to do at [facility] Recommended Diet: low salt diet Recommended Activity: Activity as tolerated If you experience any of the following symptoms increasing shortness of breath, please follow up with Dr. Perlita Costa. Signed By: Ligia Hatfield MD   
 2018

## 2018-11-23 ENCOUNTER — HOME CARE VISIT (OUTPATIENT)
Dept: HOME HEALTH SERVICES | Facility: HOME HEALTH | Age: 53
End: 2018-11-23

## 2018-11-23 ENCOUNTER — OFFICE VISIT (OUTPATIENT)
Dept: FAMILY MEDICINE CLINIC | Age: 53
End: 2018-11-23

## 2018-11-23 VITALS
WEIGHT: 315 LBS | SYSTOLIC BLOOD PRESSURE: 124 MMHG | DIASTOLIC BLOOD PRESSURE: 76 MMHG | HEART RATE: 77 BPM | OXYGEN SATURATION: 94 % | HEIGHT: 72 IN | TEMPERATURE: 98.2 F | RESPIRATION RATE: 14 BRPM | BODY MASS INDEX: 42.66 KG/M2

## 2018-11-23 DIAGNOSIS — I48.20 CHRONIC ATRIAL FIBRILLATION (HCC): ICD-10-CM

## 2018-11-23 DIAGNOSIS — I10 ESSENTIAL HYPERTENSION: ICD-10-CM

## 2018-11-23 DIAGNOSIS — I50.22 CHRONIC SYSTOLIC CONGESTIVE HEART FAILURE (HCC): Primary | ICD-10-CM

## 2018-11-23 RX ORDER — APIXABAN 5 MG/1
TABLET, FILM COATED ORAL
Qty: 60 TAB | Refills: 8 | Status: SHIPPED | OUTPATIENT
Start: 2018-11-23 | End: 2019-01-07 | Stop reason: ALTCHOICE

## 2018-11-23 RX ORDER — CARVEDILOL 12.5 MG/1
12.5 TABLET ORAL 2 TIMES DAILY
Qty: 60 TAB | Refills: 12 | Status: SHIPPED | OUTPATIENT
Start: 2018-11-23 | End: 2019-12-05 | Stop reason: SDUPTHER

## 2018-11-23 RX ORDER — METFORMIN HYDROCHLORIDE 1000 MG/1
TABLET ORAL
Qty: 75 TAB | Refills: 12 | Status: SHIPPED | OUTPATIENT
Start: 2018-11-23 | End: 2019-11-04 | Stop reason: SDUPTHER

## 2018-11-23 RX ORDER — GLIPIZIDE 10 MG/1
10 TABLET ORAL 2 TIMES DAILY
Qty: 60 TAB | Refills: 12 | Status: SHIPPED | OUTPATIENT
Start: 2018-11-23 | End: 2019-11-02 | Stop reason: SDUPTHER

## 2018-11-23 RX ORDER — POTASSIUM CHLORIDE 20 MEQ/1
20 TABLET, EXTENDED RELEASE ORAL 2 TIMES DAILY
Qty: 60 TAB | Refills: 12 | Status: SHIPPED | OUTPATIENT
Start: 2018-11-23 | End: 2019-01-14

## 2018-11-23 RX ORDER — ATORVASTATIN CALCIUM 40 MG/1
TABLET, FILM COATED ORAL
Qty: 30 TAB | Refills: 12 | Status: SHIPPED | OUTPATIENT
Start: 2018-11-23 | End: 2019-11-04 | Stop reason: SDUPTHER

## 2018-11-23 RX ORDER — LISINOPRIL 20 MG/1
20 TABLET ORAL DAILY
Qty: 30 TAB | Refills: 12 | Status: SHIPPED | OUTPATIENT
Start: 2018-11-23 | End: 2019-11-04 | Stop reason: SDUPTHER

## 2018-11-23 NOTE — PROGRESS NOTES
Chief Complaint   Patient presents with   [facility] ED Follow-up     AFIB  [facility]   11/14/18    Cough     1. Have you been to the ER, urgent care clinic since your last visit? Hospitalized since your last visit?see chief complaint     2. Have you seen or consulted any other health care providers outside of the [address] [place] [address] since your last visit? Include any pap smears or colon screening.  No     Health Maintenance Due   Topic Date Due    FOOT EXAM Q1  01/05/1977    MICROALBUMIN Q1  01/05/1977    EYE EXAM RETINAL OR DILATED Q1  01/05/1977    Shingrix Vaccine Age 50> (1 of 2) 01/05/2017    FOBT Q 1 YEAR AGE 50-75  01/05/2017

## 2018-11-23 NOTE — PROGRESS NOTES
HISTORY OF PRESENT ILLNESS  Jesus Johnson is a 48 y.o. male. HPI In for hospital followup. Was discharged from ED [facility] 2 days ago, after stay for CHF, rapid A fib. Breathing is doing better. Swelling in legs has gone down a lot. Mild chest tightness at times. Having some itching and soreness around site of pacemaker insertion. Had an AV node ablation and pacemaker insertions during stay. Brings in all meds today. Past Medical History:   Diagnosis Date   [facility]) 1/6/15    [facility] ED. Pt reported this    Atrial fibrillation with RVR ([address] 2017    Heart failure (HCC)     Hypertension     Restrictive lung disease     FVC2.59 (52%), FEV1 1.95 (49)- 2017    S/P cardiac cath     Dr. Danika Allen, 2016. 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        Current Outpatient Medications   Medication Sig Dispense Refill    carvedilol (COREG) 25 mg tablet Take 0.5 Tabs by mouth two (2) times a day. To slow heart rate down 60 Tab 12    furosemide (LASIX) 80 mg tablet Take 2 Tabs by mouth two (2) times a day. 120 Tab 11    traMADol (ULTRAM) 50 mg tablet TAKE 1 TO 2 TABLETS BY MOUTH EVERY 6 HOURS AS NEEDED FOR PAIN 60 Tab 2    Liraglutide (VICTOZA) 0.6 mg/0.1 mL (18 mg/3 mL) pnij 0.6 mg daily- 1st week, then 1.2 mg daily- 2nd week, then 1.8 mg daily.  Please give pt needles also 1 Pen 12    atorvastatin (LIPITOR) 40 mg tablet TAKE ONE TABLET BY MOUTH DAILY FOR CHOLESTEROL 30 Tab 12    albuterol (VENTOLIN HFA) 90 mcg/actuation inhaler INHALE TWO PUFFS BY MOUTH EVERY 4 HOURS AS NEEDED FOR FOR WHEEZING AND FOR SHORTNESS OF BREATH 1 Inhaler 12    potassium chloride (K-DUR, KLOR-CON) 20 mEq tablet Take 1 Tab by mouth two (2) times a day. 60 Tab 12    albuterol-ipratropium (DUO-NEB) 2.5 mg-0.5 mg/3 ml nebu INHALE THREE MILLILITERS (ONE VIAL) VIA NEBULIZATION BY MOUTH EVERY 6 HOURS AS NEEDED 50 Nebule 10    ONETOUCH ULTRA BLUE TEST STRIP strip TEST BLOOD SUGAR TWICE DAILY 50 Strip 9    metFORMIN (GLUCOPHAGE) 1,000 mg tablet 1 1/2 pills in am and 1 pill in pm. For diabetes (Patient taking differently: 1 pills in am and 1 pill in pm. For diabetes) 75 Tab 12    lisinopril (PRINIVIL, ZESTRIL) 20 mg tablet Take 1 Tab by mouth daily. For heart 30 Tab 12    ELIQUIS 5 mg tablet TAKE ONE TABLET BY MOUTH TWICE A DAY TO PREVENT STROKE 60 Tab 9    glipiZIDE (GLUCOTROL) 10 mg tablet Take 1 Tab by mouth two (2) times a day. 60 Tab 12    umeclidinium-vilanterol (ANORO ELLIPTA) 62.5-25 mcg/actuation inhaler Take 1 Puff by inhalation daily. 1 Inhaler 12         ROS    Physical Exam   Constitutional: He appears well-developed and well-nourished. HENT:   Right Ear: External ear normal.   Left Ear: External ear normal.   Mouth/Throat: Oropharynx is clear and moist.   Neck: No thyromegaly present. Cardiovascular: Normal rate, regular rhythm, normal heart sounds and intact distal pulses. Pulmonary/Chest: Effort normal and breath sounds normal. No respiratory distress. He has no wheezes. Abdominal: Soft. Bowel sounds are normal. He exhibits no distension and no mass. There is no tenderness. There is no guarding. Musculoskeletal: Normal range of motion. He exhibits edema (1+ bilaterally). Lymphadenopathy:     He has no cervical adenopathy. Nursing note and vitals reviewed.       ASSESSMENT and PLAN  Orders Placed This Encounter    METABOLIC PANEL, BASIC    glucose blood VI test strips (ONETOUCH ULTRA BLUE TEST STRIP) strip     Sig: Check blood sugar 3 times daily, before meals. Dispense:  150 Strip     Refill:  12    metFORMIN (GLUCOPHAGE) 1,000 mg tablet     Si 1/2 pills in am and 1 pill in pm. For diabetes     Dispense:  75 Tab     Refill:  12    lisinopril (PRINIVIL, ZESTRIL) 20 mg tablet     Sig: Take 1 Tab by mouth daily. For heart     Dispense:  30 Tab     Refill:  12    atorvastatin (LIPITOR) 40 mg tablet     Sig: TAKE ONE TABLET BY MOUTH DAILY FOR CHOLESTEROL     Dispense:  30 Tab     Refill:  12    potassium chloride (K-DUR, KLOR-CON) 20 mEq tablet     Sig: Take 1 Tab by mouth two (2) times a day. Dispense:  60 Tab     Refill:  12    apixaban (ELIQUIS) 5 mg tablet     Sig: TAKE ONE TABLET BY MOUTH TWICE A DAY TO PREVENT STROKE     Dispense:  60 Tab     Refill:  12    glipiZIDE (GLUCOTROL) 10 mg tablet     Sig: Take 1 Tab by mouth two (2) times a day. Dispense:  60 Tab     Refill:  12    carvedilol (COREG) 12.5 mg tablet     Sig: Take 1 Tab by mouth two (2) times a day. To slow heart rate down     Dispense:  60 Tab     Refill:  12     Orders Placed This Encounter    METABOLIC PANEL, BASIC    glucose blood VI test strips (ONETOUCH ULTRA BLUE TEST STRIP) strip    metFORMIN (GLUCOPHAGE) 1,000 mg tablet    lisinopril (PRINIVIL, ZESTRIL) 20 mg tablet    atorvastatin (LIPITOR) 40 mg tablet    potassium chloride (K-DUR, KLOR-CON) 20 mEq tablet    apixaban (ELIQUIS) 5 mg tablet    glipiZIDE (GLUCOTROL) 10 mg tablet    carvedilol (COREG) 12.5 mg tablet     Diagnoses and all orders for this visit:    1. Chronic systolic congestive heart failure (HCC)  -     METABOLIC PANEL, BASIC    2. Chronic atrial fibrillation (HCC)  -     apixaban (ELIQUIS) 5 mg tablet; TAKE ONE TABLET BY MOUTH TWICE A DAY TO PREVENT STROKE    3. Essential hypertension  -     carvedilol (COREG) 12.5 mg tablet; Take 1 Tab by mouth two (2) times a day.  To slow heart rate down    Other orders  -     glucose blood VI test strips (ONETOUCH ULTRA BLUE TEST STRIP) strip; Check blood sugar 3 times daily, before meals. -     metFORMIN (GLUCOPHAGE) 1,000 mg tablet; 1 1/2 pills in am and 1 pill in pm. For diabetes  -     lisinopril (PRINIVIL, ZESTRIL) 20 mg tablet; Take 1 Tab by mouth daily. For heart  -     atorvastatin (LIPITOR) 40 mg tablet; TAKE ONE TABLET BY MOUTH DAILY FOR CHOLESTEROL  -     potassium chloride (K-DUR, KLOR-CON) 20 mEq tablet; Take 1 Tab by mouth two (2) times a day. -     glipiZIDE (GLUCOTROL) 10 mg tablet; Take 1 Tab by mouth two (2) times a day. Follow-up Disposition:  Return in about 2 weeks (around 12/7/2018).

## 2018-11-24 LAB
BUN SERPL-MCNC: 16 MG/DL (ref 6–24)
BUN/CREAT SERPL: 14 (ref 9–20)
CALCIUM SERPL-MCNC: 8.4 MG/DL (ref 8.7–10.2)
CHLORIDE SERPL-SCNC: 94 MMOL/L (ref 96–106)
CO2 SERPL-SCNC: 27 MMOL/L (ref 20–29)
CREAT SERPL-MCNC: 1.12 MG/DL (ref 0.76–1.27)
GLUCOSE SERPL-MCNC: 336 MG/DL (ref 65–99)
POTASSIUM SERPL-SCNC: 4.1 MMOL/L (ref 3.5–5.2)
SODIUM SERPL-SCNC: 139 MMOL/L (ref 134–144)

## 2018-11-26 ENCOUNTER — TELEPHONE (OUTPATIENT)
Dept: FAMILY MEDICINE CLINIC | Age: 53
End: 2018-11-26

## 2018-11-26 ENCOUNTER — TELEPHONE (OUTPATIENT)
Dept: CARDIOLOGY CLINIC | Age: 53
End: 2018-11-26

## 2018-11-26 NOTE — TELEPHONE ENCOUNTER
Left voice mail, Advised to wait 8 weeks post implant of pacemaker before any dental work. Problem: Patient Care Overview  Goal: Plan of Care Review  Outcome: Ongoing (interventions implemented as appropriate)  Patient doing well this shift. Free from distress throughout shift. 1L NC in place throughout shift. Telemetry and continuous pulse ox in place. TPN and lipids in progress. VSS, Tmax 100.0. NPO throughout shift. Good UOP, no BM but passing flatus this shift. NGT placed sxn stopped at 9am, patient up with PT and NGT came out, replaced and XR confirmed, no output at 1pm, 27cc output at 5pm. Plan of care discussed with mother and family throughout shift, verbalized understanding to all.

## 2018-11-26 NOTE — TELEPHONE ENCOUNTER
Pt's dentist needs to know if he can have dental work done. Phone number is [phone]. Was told to ask for Albania Richardson.   Thanks, [employer]

## 2018-11-27 ENCOUNTER — HOME CARE VISIT (OUTPATIENT)
Dept: SCHEDULING | Facility: HOME HEALTH | Age: 53
End: 2018-11-27

## 2018-11-27 PROCEDURE — G0495 RN CARE TRAIN/EDU IN HH: HCPCS

## 2018-11-30 NOTE — TELEPHONE ENCOUNTER
[facility] requesting clearance for tooth extractions under local anesthesia.  Please advise thanks Advised she stop using vitamin A serum. \\n\\nRecommended she stop using epiduo if planning pregnancy, give it two months very she sees results. Detail Level: Detailed

## 2018-12-03 NOTE — TELEPHONE ENCOUNTER
MD Yana Boucher LPN 3 days ago      K. They can. (Routing comment)          MD Yamileth Boucher LPN 3 days ago      He can, as long as understand risk of stroke. No other choice. (Routing comment)     Carlyn Canas MD 4 hours ago (12:25 PM)      How many days prior can he hold his Eliquis prior to dental work? Thanks. (Routing comment)      MD Pam Boucher LPN   Caller: Unspecified (3 days ago,  8:45 AM)             2-3 days      Faxed note to [facility] stating that pt is cleared for procedure. If needed he can hold his Eliquis 2-3 days before procedure. Pt needs to understand he is at an increased risk for a blood clot while holding his Eliquis. Faxed to [phone] and received fax confirmation.

## 2018-12-04 ENCOUNTER — OFFICE VISIT (OUTPATIENT)
Dept: FAMILY MEDICINE CLINIC | Age: 53
End: 2018-12-04

## 2018-12-04 VITALS
SYSTOLIC BLOOD PRESSURE: 102 MMHG | RESPIRATION RATE: 18 BRPM | HEART RATE: 75 BPM | BODY MASS INDEX: 42.66 KG/M2 | OXYGEN SATURATION: 95 % | HEIGHT: 72 IN | TEMPERATURE: 96.6 F | WEIGHT: 315 LBS | DIASTOLIC BLOOD PRESSURE: 62 MMHG

## 2018-12-04 DIAGNOSIS — I50.42 CHRONIC COMBINED SYSTOLIC AND DIASTOLIC CONGESTIVE HEART FAILURE (HCC): Primary | ICD-10-CM

## 2018-12-04 RX ORDER — FUROSEMIDE 80 MG/1
TABLET ORAL
Qty: 120 TAB | Refills: 11
Start: 2018-12-04 | End: 2019-01-07

## 2018-12-04 NOTE — PROGRESS NOTES
General Jones is a 48 y.o. male     Chief Complaint   Patient presents with    Hypertension     follow up       Visit Vitals  /62 (BP 1 Location: Left arm, BP Patient Position: Sitting)   Pulse 75   Temp 96.6 °F (35.9 °C) (Oral)   Resp 18   Ht 6' (1.829 m)   Wt 346 lb (156.9 kg)   SpO2 95%   BMI 46.93 kg/m²       Health Maintenance Due   Topic Date Due    FOOT EXAM Q1  01/05/1977    MICROALBUMIN Q1  01/05/1977    EYE EXAM RETINAL OR DILATED Q1  01/05/1977    Shingrix Vaccine Age 50> (1 of 2) 01/05/2017    FOBT Q 1 YEAR AGE 50-75  01/05/2017       1. Have you been to the ER, urgent care clinic since your last visit? Hospitalized since your last visit? No    2. Have you seen or consulted any other health care providers outside of the [address] [place] [address] since your last visit? Include any pap smears or colon screening.  No

## 2018-12-04 NOTE — PROGRESS NOTES
HISTORY OF PRESENT ILLNESS  Shaun Hernandez is a 48 y.o. male. HPI In for followup of atrial fib. Is sp av node ablation and pacemaker insertion. Is walking is doing a little better, can walk from bedroom to bathroom without problems, but does get short of breath if walks to front door. Overall energy is improving very slowly. Blood sugars around 200, no hypoglycemic episodes. Needs prior auth on anoro. ROS    Physical Exam   Constitutional: He appears well-developed and well-nourished. HENT:   Right Ear: External ear normal.   Left Ear: External ear normal.   Mouth/Throat: Oropharynx is clear and moist.   Neck: No thyromegaly present. Cardiovascular: Normal rate, regular rhythm, normal heart sounds and intact distal pulses. Pulmonary/Chest: Effort normal and breath sounds normal. No respiratory distress. He has no wheezes. Abdominal: Soft. Bowel sounds are normal. He exhibits no distension and no mass. There is no tenderness. There is no guarding. Musculoskeletal: Normal range of motion. He exhibits no edema. Lymphadenopathy:     He has no cervical adenopathy. Nursing note and vitals reviewed. ASSESSMENT and PLAN  Orders Placed This Encounter    METABOLIC PANEL, BASIC    furosemide (LASIX) 80 mg tablet     Diagnoses and all orders for this visit:    1. Chronic combined systolic and diastolic congestive heart failure (HCC)  -     METABOLIC PANEL, BASIC    Other orders  -     furosemide (LASIX) 80 mg tablet; 2 tabs in am and 1 tab po in pm      Follow-up Disposition:  Return in about 3 weeks (around 12/25/2018). Will start decreasing dose of lasix and eventually increase lisionpril to 40 mg a day.

## 2018-12-05 LAB
BUN SERPL-MCNC: 22 MG/DL (ref 6–24)
BUN/CREAT SERPL: 22 (ref 9–20)
CALCIUM SERPL-MCNC: 9 MG/DL (ref 8.7–10.2)
CHLORIDE SERPL-SCNC: 91 MMOL/L (ref 96–106)
CO2 SERPL-SCNC: 28 MMOL/L (ref 20–29)
CREAT SERPL-MCNC: 1.02 MG/DL (ref 0.76–1.27)
GLUCOSE SERPL-MCNC: 333 MG/DL (ref 65–99)
POTASSIUM SERPL-SCNC: 3.7 MMOL/L (ref 3.5–5.2)
SODIUM SERPL-SCNC: 135 MMOL/L (ref 134–144)

## 2018-12-18 ENCOUNTER — TELEPHONE (OUTPATIENT)
Dept: FAMILY MEDICINE CLINIC | Age: 53
End: 2018-12-18

## 2018-12-18 NOTE — TELEPHONE ENCOUNTER
----- Message from Merline Guaman sent at 12/18/2018  3:26 PM EST -----  Regarding: /Telephone  Pt is requesting a call back from [facility] regarding a Rx ( did not give any details).     Best Contact:[phone]

## 2019-01-07 ENCOUNTER — OFFICE VISIT (OUTPATIENT)
Dept: FAMILY MEDICINE CLINIC | Age: 54
End: 2019-01-07

## 2019-01-07 VITALS
OXYGEN SATURATION: 95 % | BODY MASS INDEX: 42.66 KG/M2 | TEMPERATURE: 98.8 F | SYSTOLIC BLOOD PRESSURE: 116 MMHG | DIASTOLIC BLOOD PRESSURE: 80 MMHG | RESPIRATION RATE: 14 BRPM | WEIGHT: 315 LBS | HEART RATE: 78 BPM | HEIGHT: 72 IN

## 2019-01-07 DIAGNOSIS — I50.22 CHRONIC SYSTOLIC CONGESTIVE HEART FAILURE (HCC): Primary | ICD-10-CM

## 2019-01-07 DIAGNOSIS — G47.33 OSA (OBSTRUCTIVE SLEEP APNEA): ICD-10-CM

## 2019-01-07 RX ORDER — FUROSEMIDE 80 MG/1
TABLET ORAL
Qty: 120 TAB | Refills: 11 | Status: SHIPPED | OUTPATIENT
Start: 2019-01-07 | End: 2019-11-04 | Stop reason: SDUPTHER

## 2019-01-07 RX ORDER — TRAZODONE HYDROCHLORIDE 100 MG/1
100 TABLET ORAL
Qty: 30 TAB | Refills: 5 | Status: SHIPPED | OUTPATIENT
Start: 2019-01-07 | End: 2019-02-11

## 2019-01-07 NOTE — PROGRESS NOTES
Chief Complaint   Patient presents with    Breathing Problem    Heart Problem    CHF    Joint Pain     1. Have you been to the ER, urgent care clinic since your last visit? Hospitalized since your last visit? No    2. Have you seen or consulted any other health care providers outside of the [address] [place] [address] since your last visit? Include any pap smears or colon screening.  No     Health Maintenance Due   Topic Date Due    FOOT EXAM Q1  01/05/1977    MICROALBUMIN Q1  01/05/1977    EYE EXAM RETINAL OR DILATED  01/05/1977    Shingrix Vaccine Age 50> (1 of 2) 01/05/2017    FOBT Q 1 YEAR AGE 50-75  01/05/2017

## 2019-01-07 NOTE — PROGRESS NOTES
HISTORY OF PRESENT ILLNESS  Danny Braden is a 48 y.o. male. HPI In for followup. Some days has some trouble with breathing, some days doesn't. Overall is doing somewhat better, altho some days gets short of breath walking 30 feet. Feels lightheaded at times also. Swelling in feet is intermittent. .   Also trying to get a CPAP machine. Had a sleep study last year showing severe sleep apnea, gets drowsy during the day, has difficulty sleeping. , wakes up tired, wakes up at night choking. ROS    Physical Exam   Constitutional: He appears well-developed and well-nourished. HENT:   Right Ear: External ear normal.   Left Ear: External ear normal.   Mouth/Throat: Oropharynx is clear and moist.   Neck: No thyromegaly present. Cardiovascular: Normal rate, regular rhythm, normal heart sounds and intact distal pulses. Pulmonary/Chest: Effort normal and breath sounds normal. No respiratory distress. He has no wheezes. Abdominal: Soft. Bowel sounds are normal. He exhibits no distension and no mass. There is no tenderness. There is no guarding. Musculoskeletal: Normal range of motion. He exhibits no edema. Lymphadenopathy:     He has no cervical adenopathy. Nursing note and vitals reviewed. ASSESSMENT and PLAN  Orders Placed This Encounter    CBC WITH AUTOMATED DIFF    METABOLIC PANEL, BASIC    furosemide (LASIX) 80 mg tablet    traZODone (DESYREL) 100 mg tablet     Diagnoses and all orders for this visit:    1. Chronic systolic congestive heart failure (HCC)  -     CBC WITH AUTOMATED DIFF  -     METABOLIC PANEL, BASIC    2. MENDY (obstructive sleep apnea)    Other orders  -     furosemide (LASIX) 80 mg tablet; 2 tabs in am  -     traZODone (DESYREL) 100 mg tablet; Take 1 Tab by mouth nightly. For sleep      Follow-up Disposition:  Return in about 4 weeks (around 2/4/2019).

## 2019-01-08 LAB
BASOPHILS # BLD AUTO: 0.1 X10E3/UL (ref 0–0.2)
BASOPHILS NFR BLD AUTO: 1 %
BUN SERPL-MCNC: 14 MG/DL (ref 6–24)
BUN/CREAT SERPL: 12 (ref 9–20)
CALCIUM SERPL-MCNC: 9.2 MG/DL (ref 8.7–10.2)
CHLORIDE SERPL-SCNC: 94 MMOL/L (ref 96–106)
CO2 SERPL-SCNC: 26 MMOL/L (ref 20–29)
CREAT SERPL-MCNC: 1.16 MG/DL (ref 0.76–1.27)
EOSINOPHIL # BLD AUTO: 0.1 X10E3/UL (ref 0–0.4)
EOSINOPHIL NFR BLD AUTO: 1 %
ERYTHROCYTE [DISTWIDTH] IN BLOOD BY AUTOMATED COUNT: 15.3 % (ref 12.3–15.4)
GLUCOSE SERPL-MCNC: 256 MG/DL (ref 65–99)
HCT VFR BLD AUTO: 45.3 % (ref 37.5–51)
HGB BLD-MCNC: 15 G/DL (ref 13–17.7)
IMM GRANULOCYTES # BLD AUTO: 0.1 X10E3/UL (ref 0–0.1)
IMM GRANULOCYTES NFR BLD AUTO: 1 %
LYMPHOCYTES # BLD AUTO: 2 X10E3/UL (ref 0.7–3.1)
LYMPHOCYTES NFR BLD AUTO: 20 %
MCH RBC QN AUTO: 29.2 PG (ref 26.6–33)
MCHC RBC AUTO-ENTMCNC: 33.1 G/DL (ref 31.5–35.7)
MCV RBC AUTO: 88 FL (ref 79–97)
MONOCYTES # BLD AUTO: 0.5 X10E3/UL (ref 0.1–0.9)
MONOCYTES NFR BLD AUTO: 5 %
NEUTROPHILS # BLD AUTO: 7.4 X10E3/UL (ref 1.4–7)
NEUTROPHILS NFR BLD AUTO: 72 %
PLATELET # BLD AUTO: 212 X10E3/UL (ref 150–379)
POTASSIUM SERPL-SCNC: 3.3 MMOL/L (ref 3.5–5.2)
RBC # BLD AUTO: 5.13 X10E6/UL (ref 4.14–5.8)
SODIUM SERPL-SCNC: 141 MMOL/L (ref 134–144)
WBC # BLD AUTO: 10.1 X10E3/UL (ref 3.4–10.8)

## 2019-01-14 ENCOUNTER — CLINICAL SUPPORT (OUTPATIENT)
Dept: CARDIOLOGY CLINIC | Age: 54
End: 2019-01-14

## 2019-01-14 ENCOUNTER — OFFICE VISIT (OUTPATIENT)
Dept: CARDIOLOGY CLINIC | Age: 54
End: 2019-01-14

## 2019-01-14 VITALS
OXYGEN SATURATION: 94 % | HEART RATE: 81 BPM | BODY MASS INDEX: 42.66 KG/M2 | WEIGHT: 315 LBS | RESPIRATION RATE: 18 BRPM | HEIGHT: 72 IN | SYSTOLIC BLOOD PRESSURE: 98 MMHG | DIASTOLIC BLOOD PRESSURE: 72 MMHG

## 2019-01-14 DIAGNOSIS — Z95.0 CARDIAC PACEMAKER IN SITU: Primary | ICD-10-CM

## 2019-01-14 DIAGNOSIS — I10 ESSENTIAL HYPERTENSION: ICD-10-CM

## 2019-01-14 DIAGNOSIS — I48.91 ATRIAL FIBRILLATION WITH RVR (HCC): Primary | ICD-10-CM

## 2019-01-14 DIAGNOSIS — I42.8 CARDIOMYOPATHY, NONISCHEMIC (HCC): ICD-10-CM

## 2019-01-14 DIAGNOSIS — E11.9 CONTROLLED TYPE 2 DIABETES MELLITUS WITHOUT COMPLICATION, WITHOUT LONG-TERM CURRENT USE OF INSULIN (HCC): ICD-10-CM

## 2019-01-14 DIAGNOSIS — E66.01 OBESITY, MORBID (HCC): ICD-10-CM

## 2019-01-14 DIAGNOSIS — I48.91 ATRIAL FIBRILLATION WITH RVR (HCC): ICD-10-CM

## 2019-01-14 RX ORDER — POTASSIUM CHLORIDE 20 MEQ/1
TABLET, EXTENDED RELEASE ORAL
Qty: 90 TAB | Refills: 12 | Status: SHIPPED | OUTPATIENT
Start: 2019-01-14 | End: 2020-02-03

## 2019-01-14 NOTE — PROGRESS NOTES
Subjective:      Pauline Nolan is a 48 y.o. male is here for EP follow up from AV node ablation and PPM 11/16/18. The patient denies shortness of breath, orthopnea, PND, LE edema, palpitations, syncope, presyncope or fatigue. Took his pacer dressing off a week after discharge as he felt it had an odor. Notes cleaning it with alcohol and hydrogen peroxide. Additionally he took left over clindamycin 150, 2 tab BID that he had from his dentist. Reports after a week of medications, the site looked better so he stopped. Is aware of sharp pain in his sternum that occurs every 2 weeks, mostly at night, not with activity. Patient Active Problem List    Diagnosis Date Noted    Cardiomyopathy, nonischemic ([address] 11/17/2018    Controlled type 2 diabetes mellitus without complication, without long-term current use of insulin ([address] 08/15/2018    Sleep apnea     Obesity, morbid ([address] 12/20/2017    Atrial fibrillation with RVR ([address] 08/11/2017    CHF (congestive heart failure) ([address] 08/10/2017    Restrictive lung disease     Dyspnea 07/15/2014    Hypertension 02/17/2014      Rashmi Abernathy MD  Past Medical History:   Diagnosis Date   [facility]) 1/6/15    [facility] ED. Pt reported this    Atrial fibrillation with RVR ([address] 8/11/2017    Heart failure (HCC)     Hypertension     Restrictive lung disease     FVC2.59 (52%), FEV1 1.95 (49)- 2017    S/P cardiac cath     Dr. Teena Luna, 2016. minimal cad    Sleep apnea       Past Surgical History:   Procedure Laterality Date    CARDIAC SURG PROCEDURE UNLIST  11/14/2018    Implantation of PACEMAKER    HX PACEMAKER       Allergies   Allergen Reactions    Influenza Virus Vaccines Nausea Only     Fever  diarrhea    Mushroom Flavor Swelling    Oxycodone Rash    Pneumococcal 23-Allison Ps Vaccine Nausea and Vomiting     Vomit        History reviewed. No pertinent family history.  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      Current Outpatient Medications   Medication Sig    potassium chloride (K-DUR, KLOR-CON) 20 mEq tablet 2 tabs po qam, and 1 tab po qpm (Patient taking differently: 20 mEq. 2 tabs po qam, and 1 tab po qpm)    apixaban (ELIQUIS) 5 mg tablet Take 5 mg by mouth two (2) times a day.  furosemide (LASIX) 80 mg tablet 2 tabs in am    traZODone (DESYREL) 100 mg tablet Take 1 Tab by mouth nightly. For sleep    ONETOUCH ULTRA BLUE TEST STRIP strip USE TO TEST BLOOD SUGAR THREE TIMES DAILY BEFORE MEALS.  metFORMIN (GLUCOPHAGE) 1,000 mg tablet 1 1/2 pills in am and 1 pill in pm. For diabetes    lisinopril (PRINIVIL, ZESTRIL) 20 mg tablet Take 1 Tab by mouth daily. For heart    atorvastatin (LIPITOR) 40 mg tablet TAKE ONE TABLET BY MOUTH DAILY FOR CHOLESTEROL    glipiZIDE (GLUCOTROL) 10 mg tablet Take 1 Tab by mouth two (2) times a day.  carvedilol (COREG) 12.5 mg tablet Take 1 Tab by mouth two (2) times a day. To slow heart rate down (Patient taking differently: Take 12.5 mg by mouth daily. To slow heart rate down)    traMADol (ULTRAM) 50 mg tablet TAKE 1 TO 2 TABLETS BY MOUTH EVERY 6 HOURS AS NEEDED FOR PAIN    Liraglutide (VICTOZA) 0.6 mg/0.1 mL (18 mg/3 mL) pnij 0.6 mg daily- 1st week, then 1.2 mg daily- 2nd week, then 1.8 mg daily.  Please give pt needles also    albuterol (VENTOLIN HFA) 90 mcg/actuation inhaler INHALE TWO PUFFS BY MOUTH EVERY 4 HOURS AS NEEDED FOR FOR WHEEZING AND FOR SHORTNESS OF BREATH    albuterol-ipratropium (DUO-NEB) 2.5 mg-0.5 mg/3 ml nebu INHALE THREE MILLILITERS (ONE VIAL) VIA NEBULIZATION BY MOUTH EVERY 6 HOURS AS NEEDED    umeclidinium-vilanterol (ANORO ELLIPTA) 62.5-25 mcg/actuation inhaler Take 1 Puff by inhalation daily. No current facility-administered medications for this visit. Vitals:    01/14/19 1324   BP: 98/72   Pulse: 81   Resp: 18   SpO2: 94%   Weight: 346 lb (156.9 kg)   Height: 6' (1.829 m)       I have reviewed the nurses notes, vitals, problem list, allergy list, medical history, family, social history and medications. Review of Symptoms:    General: Pt denies excessive weight gain or loss. Pt is able to conduct ADL's  HEENT: Denies blurred vision, headaches, epistaxis and difficulty swallowing. Respiratory: Denies shortness of breath, HAMM, wheezing or stridor. Cardiovascular: Denies precordial pain, palpitations, edema or PND  Gastrointestinal: Denies poor appetite, indigestion, abdominal pain or blood in stool  Urinary: Denies dysuria, pyuria  Musculoskeletal: Denies pain or swelling from muscles or joints  Neurologic: Denies tremor, paresthesias, or sensory motor disturbance  Skin: Denies rash, itching or texture change. Psych: Denies depression      Physical Exam:      General: Well developed, in no acute distress. HEENT: Eyes - PERRL, no jvd  Heart:  Normal S1/S2 negative S3 or S4. Regular, no murmur, gallop or rub.   Respiratory: Clear bilaterally x 4, no wheezing or rales  Extremities:  No edema, normal cap refill, no cyanosis. Musculoskeletal: No clubbing  Neuro: A&Ox3, speech clear, gait stable. Skin: Skin color is normal. No rashes or lesions. Non diaphoretic  Vascular: 2+ pulses symmetric in all extremities    Cardiographics    Ekg: V paced.      Results for orders placed or performed during the hospital encounter of 11/14/18   EKG, 12 LEAD, INITIAL   Result Value Ref Range    Ventricular Rate 75 BPM    Atrial Rate 74 BPM    P-R Interval 176 ms    QRS Duration 202 ms    Q-T Interval 490 ms    QTC Calculation (Bezet) 547 ms    Calculated R Axis -77 degrees    Calculated T Axis 95 degrees    Diagnosis       Electronic ventricular pacemaker  When compared with ECG of 17-NOV-2018 05:05,  No significant change was found  Confirmed by Xena Rankin ([id number]) on 11/19/2018 9:14:58 AM           Lab Results   Component Value Date/Time    WBC 10.1 01/07/2019 01:06 PM    HGB 15.0 01/07/2019 01:06 PM    HCT 45.3 01/07/2019 01:06 PM    PLATELET 693 98/82/4886 01:06 PM    MCV 88 01/07/2019 01:06 PM      Lab Results   Component Value Date/Time    Sodium 141 01/07/2019 01:06 PM    Potassium 3.3 (L) 01/07/2019 01:06 PM    Chloride 94 (L) 01/07/2019 01:06 PM    CO2 26 01/07/2019 01:06 PM    Anion gap 4 (L) 11/21/2018 04:10 AM    Glucose 256 (H) 01/07/2019 01:06 PM    BUN 14 01/07/2019 01:06 PM    Creatinine 1.16 01/07/2019 01:06 PM    BUN/Creatinine ratio 12 01/07/2019 01:06 PM    GFR est AA 83 01/07/2019 01:06 PM    GFR est non-AA 72 01/07/2019 01:06 PM    Calcium 9.2 01/07/2019 01:06 PM    Bilirubin, total 1.0 11/14/2018 11:48 PM    AST (SGOT) 35 11/14/2018 11:48 PM    Alk. phosphatase 83 11/14/2018 11:48 PM    Protein, total 7.4 11/14/2018 11:48 PM    Albumin 3.4 (L) 11/14/2018 11:48 PM    Globulin 4.0 11/14/2018 11:48 PM    A-G Ratio 0.9 (L) 11/14/2018 11:48 PM    ALT (SGPT) 49 11/14/2018 11:48 PM      Lab Results   Component Value Date/Time    TSH 4.21 (H) 11/15/2018 04:50 AM        Assessment:           ICD-10-CM ICD-9-CM    1. Atrial fibrillation with RVR (HCC) I48.91 427.31    2.  Essential hypertension I10 401.9 AMB POC EKG ROUTINE W/ 12 LEADS, INTER & REP   3. Obesity, morbid ([facility] E66.01 278.01      Orders Placed This Encounter    AMB POC EKG ROUTINE W/ 12 LEADS, INTER & REP     Order Specific Question:   Reason for Exam:     Answer:   routine        Plan:     Silas Childers is s/p AV node ablation and PPM 11/16/18. Left subclavian PM site without signs of infection, approximated well. He is 100% RVP. EF 20-25% per echo in Nov. Poss tachycardia induced cardiomyopathy. Will schedule echo for 90 days (2/15/19), if EF remains < 35% he will be a candidate for ICD. Continue 934 . Follow after echo. Continue medical management for AF, HTN . Thank you for allowing me to participate in Sophie Araiza 's care.       Patricia Joseph, TEODORO

## 2019-01-14 NOTE — PROGRESS NOTES
1. Have you been to the ER, urgent care clinic since your last visit? Hospitalized since your last visit? Yes f/u from EP study. 2. Have you seen or consulted any other health care providers outside of the [address] [place] [address] since your last visit? Include any pap smears or colon screening. Seen by PCP.     Chief Complaint   Patient presents with   [facility] Follow Up     since pacemaker implant sharp chest pains

## 2019-02-06 ENCOUNTER — HOSPITAL ENCOUNTER (EMERGENCY)
Age: 54
Discharge: HOME | End: 2019-02-07
Attending: EMERGENCY MEDICINE
Payer: MEDICAID

## 2019-02-06 DIAGNOSIS — E87.6 HYPOKALEMIA: ICD-10-CM

## 2019-02-06 DIAGNOSIS — Z79.01 ANTICOAGULATED: ICD-10-CM

## 2019-02-06 DIAGNOSIS — R10.32 ABDOMINAL PAIN, LLQ (LEFT LOWER QUADRANT): Primary | ICD-10-CM

## 2019-02-06 PROCEDURE — 99283 EMERGENCY DEPT VISIT LOW MDM: CPT

## 2019-02-06 PROCEDURE — 85610 PROTHROMBIN TIME: CPT

## 2019-02-06 PROCEDURE — 85730 THROMBOPLASTIN TIME PARTIAL: CPT

## 2019-02-06 PROCEDURE — 85025 COMPLETE CBC W/AUTO DIFF WBC: CPT

## 2019-02-06 PROCEDURE — 80053 COMPREHEN METABOLIC PANEL: CPT

## 2019-02-06 PROCEDURE — 36415 COLL VENOUS BLD VENIPUNCTURE: CPT

## 2019-02-07 ENCOUNTER — APPOINTMENT (OUTPATIENT)
Dept: CT IMAGING | Age: 54
End: 2019-02-07
Attending: EMERGENCY MEDICINE
Payer: MEDICAID

## 2019-02-07 VITALS
HEIGHT: 72 IN | OXYGEN SATURATION: 97 % | BODY MASS INDEX: 42.66 KG/M2 | TEMPERATURE: 98.4 F | DIASTOLIC BLOOD PRESSURE: 71 MMHG | RESPIRATION RATE: 18 BRPM | HEART RATE: 79 BPM | WEIGHT: 315 LBS | SYSTOLIC BLOOD PRESSURE: 122 MMHG

## 2019-02-07 LAB
ALBUMIN SERPL-MCNC: 3.7 G/DL (ref 3.5–5)
ALBUMIN/GLOB SERPL: 0.9 {RATIO} (ref 1.1–2.2)
ALP SERPL-CCNC: 88 U/L (ref 45–117)
ALT SERPL-CCNC: 37 U/L (ref 12–78)
ANION GAP SERPL CALC-SCNC: 8 MMOL/L (ref 5–15)
APTT PPP: 26.2 SEC (ref 22.1–32)
AST SERPL-CCNC: 27 U/L (ref 15–37)
BASOPHILS # BLD: 0.1 K/UL (ref 0–0.1)
BASOPHILS NFR BLD: 1 % (ref 0–1)
BILIRUB SERPL-MCNC: 1 MG/DL (ref 0.2–1)
BUN SERPL-MCNC: 20 MG/DL (ref 6–20)
BUN/CREAT SERPL: 13 (ref 12–20)
CALCIUM SERPL-MCNC: 8.9 MG/DL (ref 8.5–10.1)
CHLORIDE SERPL-SCNC: 95 MMOL/L (ref 97–108)
CO2 SERPL-SCNC: 32 MMOL/L (ref 21–32)
CREAT SERPL-MCNC: 1.49 MG/DL (ref 0.7–1.3)
DIFFERENTIAL METHOD BLD: ABNORMAL
EOSINOPHIL # BLD: 0.1 K/UL (ref 0–0.4)
EOSINOPHIL NFR BLD: 1 % (ref 0–7)
ERYTHROCYTE [DISTWIDTH] IN BLOOD BY AUTOMATED COUNT: 15.1 % (ref 11.5–14.5)
GLOBULIN SER CALC-MCNC: 4.1 G/DL (ref 2–4)
GLUCOSE SERPL-MCNC: 215 MG/DL (ref 65–100)
HCT VFR BLD AUTO: 41.5 % (ref 36.6–50.3)
HGB BLD-MCNC: 14.1 G/DL (ref 12.1–17)
IMM GRANULOCYTES # BLD AUTO: 0.1 K/UL (ref 0–0.04)
IMM GRANULOCYTES NFR BLD AUTO: 1 % (ref 0–0.5)
INR PPP: 1.1 (ref 0.9–1.1)
LYMPHOCYTES # BLD: 2.8 K/UL (ref 0.8–3.5)
LYMPHOCYTES NFR BLD: 21 % (ref 12–49)
MCH RBC QN AUTO: 29.1 PG (ref 26–34)
MCHC RBC AUTO-ENTMCNC: 34 G/DL (ref 30–36.5)
MCV RBC AUTO: 85.7 FL (ref 80–99)
MONOCYTES # BLD: 0.8 K/UL (ref 0–1)
MONOCYTES NFR BLD: 6 % (ref 5–13)
NEUTS SEG # BLD: 9.3 K/UL (ref 1.8–8)
NEUTS SEG NFR BLD: 70 % (ref 32–75)
NRBC # BLD: 0 K/UL (ref 0–0.01)
NRBC BLD-RTO: 0 PER 100 WBC
PLATELET # BLD AUTO: 208 K/UL (ref 150–400)
PMV BLD AUTO: 9.7 FL (ref 8.9–12.9)
POTASSIUM SERPL-SCNC: 3 MMOL/L (ref 3.5–5.1)
PROT SERPL-MCNC: 7.8 G/DL (ref 6.4–8.2)
PROTHROMBIN TIME: 10.8 SEC (ref 9–11.1)
RBC # BLD AUTO: 4.84 M/UL (ref 4.1–5.7)
SODIUM SERPL-SCNC: 135 MMOL/L (ref 136–145)
THERAPEUTIC RANGE,PTTT: NORMAL SECS (ref 58–77)
WBC # BLD AUTO: 13.3 K/UL (ref 4.1–11.1)

## 2019-02-07 PROCEDURE — 74011250637 HC RX REV CODE- 250/637: Performed by: EMERGENCY MEDICINE

## 2019-02-07 PROCEDURE — 74176 CT ABD & PELVIS W/O CONTRAST: CPT

## 2019-02-07 RX ORDER — ACETAMINOPHEN 500 MG
1000 TABLET ORAL ONCE
Status: COMPLETED | OUTPATIENT
Start: 2019-02-07 | End: 2019-02-07

## 2019-02-07 RX ORDER — SODIUM CHLORIDE 0.9 % (FLUSH) 0.9 %
10 SYRINGE (ML) INJECTION
Status: DISCONTINUED | OUTPATIENT
Start: 2019-02-07 | End: 2019-02-07

## 2019-02-07 RX ORDER — POTASSIUM CHLORIDE 750 MG/1
40 TABLET, FILM COATED, EXTENDED RELEASE ORAL
Status: COMPLETED | OUTPATIENT
Start: 2019-02-07 | End: 2019-02-07

## 2019-02-07 RX ADMIN — POTASSIUM CHLORIDE 40 MEQ: 750 TABLET, EXTENDED RELEASE ORAL at 02:40

## 2019-02-07 RX ADMIN — ACETAMINOPHEN 1000 MG: 500 TABLET ORAL at 00:20

## 2019-02-07 NOTE — ED PROVIDER NOTES
0000EMERGENCY DEPARTMENT HISTORY AND PHYSICAL EXAM 
 
 
Date: 2/6/2019 Patient Name: Pauline Nolan History of Presenting Illness Chief Complaint Patient presents with  Bleeding/Bruising  
  reports noticing bruising to LLQ abdomen x couple hours and tendor to touch. pt currently on 5mg of eliquis History Provided By: Patient HPI: Pauline Nolan, 48 y.o. male with PMHx significant for A fib s/p pacemaker, CHF, DM, HTN, presents ambulatory with cane to the ED with cc of worsening LLQ abd pain pain which began 3-4 days ago. Pt states that he noticed a bruise in his L groin 3-4 days ago along with pain but was unsure when the bruising started. Today pt states the bruising is gone but pain remains prompting him to come to the ED. Pt endorses using Eliquis. Pt states he does inject insulin, but states he only uses his right abd, right thigh and left arm for injection sites. Pt does note he recently had dental surgery and has been taking ibuprofen for pain. Pt denies any n/v/d, constipation, or CP. There are no other complaints, changes, or physical findings at this time. PCP: Rashmi Abernathy MD 
 
No current facility-administered medications on file prior to encounter. Current Outpatient Medications on File Prior to Encounter Medication Sig Dispense Refill  ONETOUCH ULTRA BLUE TEST STRIP strip USE TO TEST BLOOD SUGAR THREE TIMES DAILY BEFORE MEALS. 100 Strip 0  
 potassium chloride (K-DUR, KLOR-CON) 20 mEq tablet 2 tabs po qam, and 1 tab po qpm (Patient taking differently: 20 mEq. 2 tabs po qam, and 1 tab po qpm) 90 Tab 12  
 apixaban (ELIQUIS) 5 mg tablet Take 5 mg by mouth two (2) times a day.  furosemide (LASIX) 80 mg tablet 2 tabs in am 120 Tab 11  
 traZODone (DESYREL) 100 mg tablet Take 1 Tab by mouth nightly. For sleep 30 Tab 5  
 ONETOUCH ULTRA BLUE TEST STRIP strip USE TO TEST BLOOD SUGAR THREE TIMES DAILY BEFORE MEALS.  100 Strip 0  
  metFORMIN (GLUCOPHAGE) 1,000 mg tablet 1 1/2 pills in am and 1 pill in pm. For diabetes 75 Tab 12  
 lisinopril (PRINIVIL, ZESTRIL) 20 mg tablet Take 1 Tab by mouth daily. For heart 30 Tab 12  
 atorvastatin (LIPITOR) 40 mg tablet TAKE ONE TABLET BY MOUTH DAILY FOR CHOLESTEROL 30 Tab 12  
 glipiZIDE (GLUCOTROL) 10 mg tablet Take 1 Tab by mouth two (2) times a day. 60 Tab 12  carvedilol (COREG) 12.5 mg tablet Take 1 Tab by mouth two (2) times a day. To slow heart rate down (Patient taking differently: Take 12.5 mg by mouth daily. To slow heart rate down) 60 Tab 12  
 umeclidinium-vilanterol (ANORO ELLIPTA) 62.5-25 mcg/actuation inhaler Take 1 Puff by inhalation daily. 1 Inhaler 12  
 traMADol (ULTRAM) 50 mg tablet TAKE 1 TO 2 TABLETS BY MOUTH EVERY 6 HOURS AS NEEDED FOR PAIN 60 Tab 2  Liraglutide (VICTOZA) 0.6 mg/0.1 mL (18 mg/3 mL) pnij 0.6 mg daily- 1st week, then 1.2 mg daily- 2nd week, then 1.8 mg daily. Please give pt needles also 1 Pen 12  
 albuterol (VENTOLIN HFA) 90 mcg/actuation inhaler INHALE TWO PUFFS BY MOUTH EVERY 4 HOURS AS NEEDED FOR FOR WHEEZING AND FOR SHORTNESS OF BREATH 1 Inhaler 12  
 albuterol-ipratropium (DUO-NEB) 2.5 mg-0.5 mg/3 ml nebu INHALE THREE MILLILITERS (ONE VIAL) VIA NEBULIZATION BY MOUTH EVERY 6 HOURS AS NEEDED 50 Nebule 10 Past History Past Medical History: 
Past Medical History:  
Diagnosis Date  A-fib ([address] 1/6/15 [facility] ED. Pt reported this  Atrial fibrillation with RVR ([address] 8/11/2017  Heart failure ([facility]  Hypertension  Restrictive lung disease FVC2.59 (52%), FEV1 1.95 (49)- 2017  S/P cardiac cath Dr. Garcia Every, 2016. minimal cad  Sleep apnea Past Surgical History: 
Past Surgical History:  
Procedure Laterality Date  CARDIAC SURG PROCEDURE UNLIST  11/14/2018 Implantation of PACEMAKER  
 HX PACEMAKER Family History: No family history on file. Social History: 
Social History Tobacco Use  Smoking status: Former Smoker Packs/day: 1.00 Types: Cigarettes Last attempt to quit: 2/3/2014 Years since quittin.0  Smokeless tobacco: Never Used Substance Use Topics  Alcohol use: No  
  Comment: stopped drinking at age 29  Drug use: No  
 
 
Allergies: Allergies Allergen Reactions  Influenza Virus Vaccines Nausea Only Fever  diarrhea  Mushroom Flavor Swelling  Oxycodone Rash  Pneumococcal 23-Allison Ps Vaccine Nausea and Vomiting Vomit Review of Systems Review of Systems Constitutional: Negative for chills and fever. HENT: Negative for congestion, ear pain, rhinorrhea, sore throat and trouble swallowing. Eyes: Negative for visual disturbance. Respiratory: Negative for cough, chest tightness and shortness of breath. Cardiovascular: Negative for chest pain and palpitations. Gastrointestinal: Positive for abdominal pain (LLQ). Negative for blood in stool, constipation, diarrhea, nausea and vomiting. Genitourinary: Negative for decreased urine volume, difficulty urinating, dysuria and frequency. Musculoskeletal: Negative for back pain and neck pain. Skin: Negative for color change and rash. Neurological: Negative for dizziness, weakness, light-headedness and headaches. Physical Exam  
Physical Exam  
Constitutional: He is oriented to person, place, and time. He appears well-developed. He does not appear ill. No distress. Morbidly Obese HENT:  
Mouth/Throat: Oropharynx is clear and moist.  
Eyes: Conjunctivae are normal.  
Neck: Neck supple. Cardiovascular: Normal rate and regular rhythm. Pulmonary/Chest: Effort normal and breath sounds normal. No accessory muscle usage. No respiratory distress. Abdominal: Soft. He exhibits no distension. There is tenderness in the left lower quadrant. There is no rebound, no guarding and negative Arauz's sign. Lymphadenopathy:  
  He has no cervical adenopathy. Neurological: He is alert and oriented to person, place, and time. He has normal strength. No cranial nerve deficit or sensory deficit. Skin: Skin is warm and dry. Nursing note and vitals reviewed. Diagnostic Study Results Labs - Recent Results (from the past 12 hour(s)) CBC WITH AUTOMATED DIFF Collection Time: 02/06/19 11:43 PM  
Result Value Ref Range WBC 13.3 (H) 4.1 - 11.1 K/uL  
 RBC 4.84 4.10 - 5.70 M/uL  
 HGB 14.1 12.1 - 17.0 g/dL HCT 41.5 36.6 - 50.3 % MCV 85.7 80.0 - 99.0 FL  
 MCH 29.1 26.0 - 34.0 PG  
 MCHC 34.0 30.0 - 36.5 g/dL  
 RDW 15.1 (H) 11.5 - 14.5 % PLATELET 801 896 - 431 K/uL MPV 9.7 8.9 - 12.9 FL  
 NRBC 0.0 0  WBC ABSOLUTE NRBC 0.00 0.00 - 0.01 K/uL NEUTROPHILS 70 32 - 75 % LYMPHOCYTES 21 12 - 49 % MONOCYTES 6 5 - 13 % EOSINOPHILS 1 0 - 7 % BASOPHILS 1 0 - 1 % IMMATURE GRANULOCYTES 1 (H) 0.0 - 0.5 % ABS. NEUTROPHILS 9.3 (H) 1.8 - 8.0 K/UL  
 ABS. LYMPHOCYTES 2.8 0.8 - 3.5 K/UL  
 ABS. MONOCYTES 0.8 0.0 - 1.0 K/UL  
 ABS. EOSINOPHILS 0.1 0.0 - 0.4 K/UL  
 ABS. BASOPHILS 0.1 0.0 - 0.1 K/UL  
 ABS. IMM. GRANS. 0.1 (H) 0.00 - 0.04 K/UL  
 DF AUTOMATED METABOLIC PANEL, COMPREHENSIVE Collection Time: 02/06/19 11:43 PM  
Result Value Ref Range Sodium 135 (L) 136 - 145 mmol/L Potassium 3.0 (L) 3.5 - 5.1 mmol/L Chloride 95 (L) 97 - 108 mmol/L  
 CO2 32 21 - 32 mmol/L Anion gap 8 5 - 15 mmol/L Glucose 215 (H) 65 - 100 mg/dL BUN 20 6 - 20 MG/DL Creatinine 1.49 (H) 0.70 - 1.30 MG/DL  
 BUN/Creatinine ratio 13 12 - 20 GFR est AA >60 >60 ml/min/1.73m2 GFR est non-AA 50 (L) >60 ml/min/1.73m2 Calcium 8.9 8.5 - 10.1 MG/DL Bilirubin, total 1.0 0.2 - 1.0 MG/DL  
 ALT (SGPT) 37 12 - 78 U/L  
 AST (SGOT) 27 15 - 37 U/L Alk. phosphatase 88 45 - 117 U/L Protein, total 7.8 6.4 - 8.2 g/dL Albumin 3.7 3.5 - 5.0 g/dL Globulin 4.1 (H) 2.0 - 4.0 g/dL A-G Ratio 0.9 (L) 1.1 - 2.2 PTT Collection Time: 02/06/19 11:43 PM  
Result Value Ref Range aPTT 26.2 22.1 - 32.0 sec  
 aPTT, therapeutic range     58.0 - 77.0 SECS  
PROTHROMBIN TIME + INR Collection Time: 02/06/19 11:43 PM  
Result Value Ref Range INR 1.1 0.9 - 1.1 Prothrombin time 10.8 9.0 - 11.1 sec Radiologic Studies -  
CT ABD PELV WO CONT    (Results Pending) CT Results  (Last 48 hours) None CXR Results  (Last 48 hours) None Medical Decision Making I am the first provider for this patient. I reviewed the vital signs, available nursing notes, past medical history, past surgical history, family history and social history. Vital Signs-Reviewed the patient's vital signs. Patient Vitals for the past 12 hrs: 
 Temp Pulse Resp BP SpO2  
02/07/19 0017  78 18 134/75 96 % 02/06/19 2109 98.4 °F (36.9 °C) 82 17 129/65 97 % Records Reviewed: Nursing Notes and Old Medical Records Provider Notes (Medical Decision Making): Presents with LLQ pain and bruising. No reported injury. Anticoagulated. Obesity makes abdominal exam difficult. Labs were unremarkable. CT imaging negative for diverticulitis, hernia, kidney stone, and/or hematoma. Improving already, will discharge and instructions to return if worse. He was been taking NSAIDs while on anticoagulants. Advised against this due to increased risk of bleeding. Potassium levels are reportedly always low per the patient. Supplemented with oral K in ED and will follow-up with PCP. ED Course:  
Initial assessment performed. The patients presenting problems have been discussed, and they are in agreement with the care plan formulated and outlined with them. I have encouraged them to ask questions as they arise throughout their visit. Critical Care Time:  
0 Disposition: 
DISCHARGE NOTE 
2:40 AM 
The patient has been re-evaluated and is ready for discharge.  Reviewed available results with patient. Counseled pt on diagnosis and care plan. Pt has expressed understanding, and all questions have been answered. Pt agrees with plan and agrees to F/U as recommended, or return to the ED if their sxs worsen. Discharge instructions have been provided and explained to the pt, along with reasons to return to the ED. PLAN: 
1. Current Discharge Medication List  
  
 
2. Follow-up Information Follow up With Specialties Details Why Contact Info Linnea Weinstein MD Family Practice Schedule an appointment as soon as possible for a visit in 1 week  [address] [place] [address] 7 [phone] Return to ED if worse Diagnosis Clinical Impression: 1. Abdominal pain, LLQ (left lower quadrant) 2. Anticoagulated 3. Hypokalemia Attestations: This note is prepared by Raman Kebede, acting as Scribe for Watson López MD. 
 
The scribe's documentation has been prepared under my direction and personally reviewed by me in its entirety. I confirm that the note above accurately reflects all work, treatment, procedures, and medical decision making performed by me, Watson López MD 
 
 
 
 
This note will not be viewable in [address]

## 2019-02-11 ENCOUNTER — OFFICE VISIT (OUTPATIENT)
Dept: FAMILY MEDICINE CLINIC | Age: 54
End: 2019-02-11

## 2019-02-11 VITALS
HEART RATE: 76 BPM | RESPIRATION RATE: 22 BRPM | BODY MASS INDEX: 42.66 KG/M2 | OXYGEN SATURATION: 98 % | SYSTOLIC BLOOD PRESSURE: 131 MMHG | DIASTOLIC BLOOD PRESSURE: 84 MMHG | WEIGHT: 315 LBS | HEIGHT: 72 IN | TEMPERATURE: 98.2 F

## 2019-02-11 DIAGNOSIS — E87.6 HYPOKALEMIA: ICD-10-CM

## 2019-02-11 DIAGNOSIS — F51.01 PRIMARY INSOMNIA: ICD-10-CM

## 2019-02-11 DIAGNOSIS — R10.10 PAIN OF UPPER ABDOMEN: Primary | ICD-10-CM

## 2019-02-11 RX ORDER — TRAZODONE HYDROCHLORIDE 100 MG/1
200 TABLET ORAL
Qty: 60 TAB | Refills: 5 | Status: SHIPPED | OUTPATIENT
Start: 2019-02-11 | End: 2019-09-03 | Stop reason: ALTCHOICE

## 2019-02-11 NOTE — PROGRESS NOTES
Identified pt with two pt identifiers(name and ). Reviewed record in preparation for visit and have obtained necessary documentation. Chief Complaint Patient presents with  
[facility] Follow Up  
  1 month follow up Visit Vitals /84 (BP 1 Location: Left arm, BP Patient Position: Sitting) Pulse 76 Temp 98.2 °F (36.8 °C) Resp 22 Ht 6' (1.829 m) Wt 342 lb 12.8 oz (155.5 kg) SpO2 98% BMI 46.49 kg/m² Health Maintenance Due Topic  FOOT EXAM Q1   
 MICROALBUMIN Q1   
 EYE EXAM RETINAL OR DILATED  Shingrix Vaccine Age 50> (1 of 2)  FOBT Q 1 YEAR AGE 50-75 Coordination of Care Questionnaire: 
:  
1) Have you been to an emergency room, urgent care, or hospitalized since your last visit?   no If yes, where when, and reason for visit? 2. Have seen or consulted any other health care provider since your last visit? YES If yes, where when, and reason for visit? 3) Do you have an Advanced Directive/ Living Will in place? NO If yes, do we have a copy on file NO If no, would you like information NO Patient is accompanied by self I have received verbal consent from [address] [place] [address] to discuss any/all medical information while they are present in the room.

## 2019-02-12 LAB
BASOPHILS # BLD AUTO: 0.1 X10E3/UL (ref 0–0.2)
BASOPHILS NFR BLD AUTO: 1 %
BUN SERPL-MCNC: 16 MG/DL (ref 6–24)
BUN/CREAT SERPL: 14 (ref 9–20)
CALCIUM SERPL-MCNC: 8.7 MG/DL (ref 8.7–10.2)
CHLORIDE SERPL-SCNC: 97 MMOL/L (ref 96–106)
CO2 SERPL-SCNC: 27 MMOL/L (ref 20–29)
CREAT SERPL-MCNC: 1.11 MG/DL (ref 0.76–1.27)
EOSINOPHIL # BLD AUTO: 0.2 X10E3/UL (ref 0–0.4)
EOSINOPHIL NFR BLD AUTO: 1 %
ERYTHROCYTE [DISTWIDTH] IN BLOOD BY AUTOMATED COUNT: 15.4 % (ref 12.3–15.4)
GLUCOSE SERPL-MCNC: 185 MG/DL (ref 65–99)
HCT VFR BLD AUTO: 43.6 % (ref 37.5–51)
HGB BLD-MCNC: 14.5 G/DL (ref 13–17.7)
IMM GRANULOCYTES # BLD AUTO: 0 X10E3/UL (ref 0–0.1)
IMM GRANULOCYTES NFR BLD AUTO: 0 %
LYMPHOCYTES # BLD AUTO: 2.5 X10E3/UL (ref 0.7–3.1)
LYMPHOCYTES NFR BLD AUTO: 23 %
MCH RBC QN AUTO: 29 PG (ref 26.6–33)
MCHC RBC AUTO-ENTMCNC: 33.3 G/DL (ref 31.5–35.7)
MCV RBC AUTO: 87 FL (ref 79–97)
MONOCYTES # BLD AUTO: 0.6 X10E3/UL (ref 0.1–0.9)
MONOCYTES NFR BLD AUTO: 6 %
NEUTROPHILS # BLD AUTO: 7.4 X10E3/UL (ref 1.4–7)
NEUTROPHILS NFR BLD AUTO: 69 %
PLATELET # BLD AUTO: 237 X10E3/UL (ref 150–379)
POTASSIUM SERPL-SCNC: 3.7 MMOL/L (ref 3.5–5.2)
RBC # BLD AUTO: 5 X10E6/UL (ref 4.14–5.8)
SODIUM SERPL-SCNC: 140 MMOL/L (ref 134–144)
WBC # BLD AUTO: 10.8 X10E3/UL (ref 3.4–10.8)

## 2019-02-19 ENCOUNTER — OFFICE VISIT (OUTPATIENT)
Dept: CARDIOLOGY CLINIC | Age: 54
End: 2019-02-19

## 2019-02-19 VITALS
OXYGEN SATURATION: 97 % | HEIGHT: 72 IN | DIASTOLIC BLOOD PRESSURE: 70 MMHG | RESPIRATION RATE: 18 BRPM | HEART RATE: 78 BPM | WEIGHT: 315 LBS | BODY MASS INDEX: 42.66 KG/M2 | SYSTOLIC BLOOD PRESSURE: 100 MMHG

## 2019-02-19 DIAGNOSIS — E78.2 MIXED HYPERLIPIDEMIA: ICD-10-CM

## 2019-02-19 DIAGNOSIS — I49.9 IRREGULAR HEARTBEAT: Primary | ICD-10-CM

## 2019-02-19 DIAGNOSIS — I44.2 CHB (COMPLETE HEART BLOCK) (HCC): ICD-10-CM

## 2019-02-19 DIAGNOSIS — I42.0 DILATED CARDIOMYOPATHY (HCC): ICD-10-CM

## 2019-02-19 DIAGNOSIS — I48.21 PERMANENT ATRIAL FIBRILLATION (HCC): ICD-10-CM

## 2019-02-19 DIAGNOSIS — I50.22 CHRONIC SYSTOLIC CONGESTIVE HEART FAILURE (HCC): ICD-10-CM

## 2019-02-19 NOTE — H&P (VIEW-ONLY)
Subjective:      Silas Childers is a 48 y.o. male is here for f/u of his af. He has sob with exertion and increased le edema. Patient Active Problem List    Diagnosis Date Noted    Cardiomyopathy, nonischemic ([address] 2018    Controlled type 2 diabetes mellitus without complication, without long-term current use of insulin ([address] 08/15/2018    Sleep apnea     Obesity, morbid ([address] 2017    Atrial fibrillation with RVR ([address] 2017    CHF (congestive heart failure) ([address] 08/10/2017    Restrictive lung disease     Dyspnea 07/15/2014    Hypertension 2014      Pearl Sylvester MD  Past Medical History:   Diagnosis Date   [facility]) 1/6/15    [facility] ED. Pt reported this    Atrial fibrillation with RVR ([address] 2017    Heart failure (HCC)     Hypertension     Restrictive lung disease     FVC2.59 (52%), FEV1 1.95 (49)- 2017    S/P cardiac cath     Dr. Pérez . minimal cad    Sleep apnea       Past Surgical History:   Procedure Laterality Date    CARDIAC SURG PROCEDURE UNLIST  2018    Implantation of PACEMAKER    HX PACEMAKER       Allergies   Allergen Reactions    Influenza Virus Vaccines Nausea Only     Fever  diarrhea    Mushroom Flavor Swelling    Oxycodone Rash    Pneumococcal 23-Allison Ps Vaccine Nausea and Vomiting     Vomit        History reviewed. No pertinent family history.  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      Current Outpatient Medications   Medication Sig    traZODone (DESYREL) 100 mg tablet Take 2 Tabs by mouth nightly. For sleep    ONETOUCH ULTRA BLUE TEST STRIP strip USE TO TEST BLOOD SUGAR THREE TIMES DAILY BEFORE MEALS.  potassium chloride (K-DUR, KLOR-CON) 20 mEq tablet 2 tabs po qam, and 1 tab po qpm (Patient taking differently: 20 mEq. 2 tabs po qam, and 1 tab po qpm)    apixaban (ELIQUIS) 5 mg tablet Take 5 mg by mouth two (2) times a day.  furosemide (LASIX) 80 mg tablet 2 tabs in am    ONETOUCH ULTRA BLUE TEST STRIP strip USE TO TEST BLOOD SUGAR THREE TIMES DAILY BEFORE MEALS.  metFORMIN (GLUCOPHAGE) 1,000 mg tablet 1 1/2 pills in am and 1 pill in pm. For diabetes    lisinopril (PRINIVIL, ZESTRIL) 20 mg tablet Take 1 Tab by mouth daily. For heart    atorvastatin (LIPITOR) 40 mg tablet TAKE ONE TABLET BY MOUTH DAILY FOR CHOLESTEROL    glipiZIDE (GLUCOTROL) 10 mg tablet Take 1 Tab by mouth two (2) times a day.  carvedilol (COREG) 12.5 mg tablet Take 1 Tab by mouth two (2) times a day. To slow heart rate down (Patient taking differently: Take 12.5 mg by mouth daily. To slow heart rate down)    traMADol (ULTRAM) 50 mg tablet TAKE 1 TO 2 TABLETS BY MOUTH EVERY 6 HOURS AS NEEDED FOR PAIN    Liraglutide (VICTOZA) 0.6 mg/0.1 mL (18 mg/3 mL) pnij 0.6 mg daily- 1st week, then 1.2 mg daily- 2nd week, then 1.8 mg daily. Please give pt needles also    albuterol (VENTOLIN HFA) 90 mcg/actuation inhaler INHALE TWO PUFFS BY MOUTH EVERY 4 HOURS AS NEEDED FOR FOR WHEEZING AND FOR SHORTNESS OF BREATH    albuterol-ipratropium (DUO-NEB) 2.5 mg-0.5 mg/3 ml nebu INHALE THREE MILLILITERS (ONE VIAL) VIA NEBULIZATION BY MOUTH EVERY 6 HOURS AS NEEDED    umeclidinium-vilanterol (ANORO ELLIPTA) 62.5-25 mcg/actuation inhaler Take 1 Puff by inhalation daily. No current facility-administered medications for this visit.        Vitals:    02/19/19 1358   BP: 100/70 Pulse: 78   Resp: 18   SpO2: 97%   Weight: 332 lb 8 oz (150.8 kg)   Height: 6' (1.829 m)       I have reviewed the nurses notes, vitals, problem list, allergy list, medical history, family, social history and medications. Review of Symptoms:    General: Pt denies excessive weight gain or loss. Pt is able to conduct ADL's  HEENT: Denies blurred vision, headaches, epistaxis and difficulty swallowing. Respiratory: + shortness of breath, HAMM, denies wheezing or stridor. Cardiovascular: Denies precordial pain, palpitations, edema or PND  Gastrointestinal: Denies poor appetite, indigestion, abdominal pain or blood in stool  Urinary: Denies dysuria, pyuria  Musculoskeletal: Denies pain or swelling from muscles or joints  Neurologic: Denies tremor, paresthesias, or sensory motor disturbance  Skin: Denies rash, itching or texture change. Psych: Denies depression      Physical Exam:      General: Well developed, in no acute distress. HEENT: Eyes - PERRL, no jvd  Heart:  Normal S1/S2 negative S3 or S4. Regular, no murmur, gallop or rub.   Respiratory: Clear bilaterally x 4, no wheezing or rales  Abdomen:   Soft, non-tender, bowel sounds are active.   Extremities:  +2 edema, normal cap refill, no cyanosis. Musculoskeletal: No clubbing  Neuro: A&Ox3, speech clear, gait stable. Skin: Skin color is normal. No rashes or lesions.  Non diaphoretic  Vascular: 2+ pulses symmetric in all extremities    Cardiographics    Ekg: v paced    Echo - ef 25-30    Results for orders placed or performed during the hospital encounter of 11/14/18   EKG, 12 LEAD, INITIAL   Result Value Ref Range    Ventricular Rate 75 BPM    Atrial Rate 74 BPM    P-R Interval 176 ms    QRS Duration 202 ms    Q-T Interval 490 ms    QTC Calculation (Bezet) 547 ms    Calculated R Axis -77 degrees    Calculated T Axis 95 degrees    Diagnosis       Electronic ventricular pacemaker  When compared with ECG of 17-NOV-2018 05:05,  No significant change was found  Confirmed by Vamshi Mcginnis ([id number]) on 11/19/2018 9:14:58 AM     f      Lab Results   Component Value Date/Time    WBC 10.8 02/11/2019 02:03 PM    HGB 14.5 02/11/2019 02:03 PM    HCT 43.6 02/11/2019 02:03 PM    PLATELET 380 98/21/1454 02:03 PM    MCV 87 02/11/2019 02:03 PM      Lab Results   Component Value Date/Time    Sodium 140 02/11/2019 02:03 PM    Potassium 3.7 02/11/2019 02:03 PM    Chloride 97 02/11/2019 02:03 PM    CO2 27 02/11/2019 02:03 PM    Anion gap 8 02/06/2019 11:43 PM    Glucose 185 (H) 02/11/2019 02:03 PM    BUN 16 02/11/2019 02:03 PM    Creatinine 1.11 02/11/2019 02:03 PM    BUN/Creatinine ratio 14 02/11/2019 02:03 PM    GFR est AA 88 02/11/2019 02:03 PM    GFR est non-AA 76 02/11/2019 02:03 PM    Calcium 8.7 02/11/2019 02:03 PM    Bilirubin, total 1.0 02/06/2019 11:43 PM    AST (SGOT) 27 02/06/2019 11:43 PM    Alk. phosphatase 88 02/06/2019 11:43 PM    Protein, total 7.8 02/06/2019 11:43 PM    Albumin 3.7 02/06/2019 11:43 PM    Globulin 4.1 (H) 02/06/2019 11:43 PM    A-G Ratio 0.9 (L) 02/06/2019 11:43 PM    ALT (SGPT) 37 02/06/2019 11:43 PM         Assessment:     Assessment:        ICD-10-CM ICD-9-CM    1. Irregular heartbeat I49.9 427.9 AMB POC EKG ROUTINE W/ 12 LEADS, INTER & REP      CBC WITH AUTOMATED DIFF      PROTHROMBIN TIME + INR      METABOLIC PANEL, COMPREHENSIVE      XR CHEST PA LAT   2. Dilated cardiomyopathy (HCC) I42.0 425.4 CBC WITH AUTOMATED DIFF      PROTHROMBIN TIME + INR      METABOLIC PANEL, COMPREHENSIVE      XR CHEST PA LAT   3. Chronic systolic congestive heart failure (HCC) I50.22 428.22 CBC WITH AUTOMATED DIFF     428.0 PROTHROMBIN TIME + INR      METABOLIC PANEL, COMPREHENSIVE      XR CHEST PA LAT   4. CHB (complete heart block) (HCC) I44.2 426.0 CBC WITH AUTOMATED DIFF      PROTHROMBIN TIME + INR      METABOLIC PANEL, COMPREHENSIVE      XR CHEST PA LAT   5.  Permanent atrial fibrillation (HCC) I48.2 427.31 CBC WITH AUTOMATED DIFF      PROTHROMBIN TIME + INR METABOLIC PANEL, COMPREHENSIVE      XR CHEST PA LAT   6. Mixed hyperlipidemia E78.2 272.2 CBC WITH AUTOMATED DIFF      PROTHROMBIN TIME + INR      METABOLIC PANEL, COMPREHENSIVE      XR CHEST PA LAT     Orders Placed This Encounter    XR CHEST PA LAT     Standing Status:   Future     Standing Expiration Date:   3/19/2020     Order Specific Question:   Reason for Exam     Answer:   pre op     Order Specific Question:   Is Patient Allergic to Contrast Dye? Answer:   Unknown    CBC WITH AUTOMATED DIFF    PROTHROMBIN TIME + INR    METABOLIC PANEL, COMPREHENSIVE    AMB POC EKG ROUTINE W/ 12 LEADS, INTER & REP     Order Specific Question:   Reason for Exam:     Answer:   routine        Plan:   Mr Marivel Allison is presenting with class III CHF. His repeat echo after greater than 3 months of med rx reveals that his lvef has not improved (ef 25-30%). With his chb, class III and cardiomyopathy, he is a candidate for an upgrade to a biv icd. I discussed the risks/benefits/alternatives of the procedure with the patient. Risks include (but are not limited to) bleeding, heart block, infection, cva/mi/tamponade/death. The patient understands and agrees to proceed. Thank you for this interesting consultation. Continue medical management for cardiomyopathy, af and chf. Thank you for allowing me to participate in Nolan Lyle 's care.     Azalia Sultana MD, [place]

## 2019-02-19 NOTE — PROGRESS NOTES
Subjective:      Charly Kim is a 48 y.o. male is here for f/u of his af. He has sob with exertion and increased le edema. Patient Active Problem List    Diagnosis Date Noted    Cardiomyopathy, nonischemic ([address] 2018    Controlled type 2 diabetes mellitus without complication, without long-term current use of insulin ([address] 08/15/2018    Sleep apnea     Obesity, morbid ([address] 2017    Atrial fibrillation with RVR ([address] 2017    CHF (congestive heart failure) ([address] 08/10/2017    Restrictive lung disease     Dyspnea 07/15/2014    Hypertension 2014      Kath Huffman MD  Past Medical History:   Diagnosis Date   [facility] 1/[facility]15    [facility] ED. Pt reported this    Atrial fibrillation with RVR ([address] 2017    Heart failure (HCC)     Hypertension     Restrictive lung disease     FVC2.59 (52%), FEV1 1.95 (49)- 2017    S/P cardiac cath     Dr. Huy Bee, 2016. minimal cad    Sleep apnea       Past Surgical History:   Procedure Laterality Date    CARDIAC SURG PROCEDURE UNLIST  2018    Implantation of PACEMAKER    HX PACEMAKER       Allergies   Allergen Reactions    Influenza Virus Vaccines Nausea Only     Fever  diarrhea    Mushroom Flavor Swelling    Oxycodone Rash    Pneumococcal 23-Allison Ps Vaccine Nausea and Vomiting     Vomit        History reviewed. No pertinent family history.  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      Current Outpatient Medications   Medication Sig    traZODone (DESYREL) 100 mg tablet Take 2 Tabs by mouth nightly. For sleep    ONETOUCH ULTRA BLUE TEST STRIP strip USE TO TEST BLOOD SUGAR THREE TIMES DAILY BEFORE MEALS.  potassium chloride (K-DUR, KLOR-CON) 20 mEq tablet 2 tabs po qam, and 1 tab po qpm (Patient taking differently: 20 mEq. 2 tabs po qam, and 1 tab po qpm)    apixaban (ELIQUIS) 5 mg tablet Take 5 mg by mouth two (2) times a day.  furosemide (LASIX) 80 mg tablet 2 tabs in am    ONETOUCH ULTRA BLUE TEST STRIP strip USE TO TEST BLOOD SUGAR THREE TIMES DAILY BEFORE MEALS.  metFORMIN (GLUCOPHAGE) 1,000 mg tablet 1 1/2 pills in am and 1 pill in pm. For diabetes    lisinopril (PRINIVIL, ZESTRIL) 20 mg tablet Take 1 Tab by mouth daily. For heart    atorvastatin (LIPITOR) 40 mg tablet TAKE ONE TABLET BY MOUTH DAILY FOR CHOLESTEROL    glipiZIDE (GLUCOTROL) 10 mg tablet Take 1 Tab by mouth two (2) times a day.  carvedilol (COREG) 12.5 mg tablet Take 1 Tab by mouth two (2) times a day. To slow heart rate down (Patient taking differently: Take 12.5 mg by mouth daily. To slow heart rate down)    traMADol (ULTRAM) 50 mg tablet TAKE 1 TO 2 TABLETS BY MOUTH EVERY 6 HOURS AS NEEDED FOR PAIN    Liraglutide (VICTOZA) 0.6 mg/0.1 mL (18 mg/3 mL) pnij 0.6 mg daily- 1st week, then 1.2 mg daily- 2nd week, then 1.8 mg daily. Please give pt needles also    albuterol (VENTOLIN HFA) 90 mcg/actuation inhaler INHALE TWO PUFFS BY MOUTH EVERY 4 HOURS AS NEEDED FOR FOR WHEEZING AND FOR SHORTNESS OF BREATH    albuterol-ipratropium (DUO-NEB) 2.5 mg-0.5 mg/3 ml nebu INHALE THREE MILLILITERS (ONE VIAL) VIA NEBULIZATION BY MOUTH EVERY 6 HOURS AS NEEDED    umeclidinium-vilanterol (ANORO ELLIPTA) 62.5-25 mcg/actuation inhaler Take 1 Puff by inhalation daily. No current facility-administered medications for this visit.        Vitals:    02/19/19 1358   BP: 100/70 Pulse: 78   Resp: 18   SpO2: 97%   Weight: 332 lb 8 oz (150.8 kg)   Height: 6' (1.829 m)       I have reviewed the nurses notes, vitals, problem list, allergy list, medical history, family, social history and medications. Review of Symptoms:    General: Pt denies excessive weight gain or loss. Pt is able to conduct ADL's  HEENT: Denies blurred vision, headaches, epistaxis and difficulty swallowing. Respiratory: + shortness of breath, HAMM, denies wheezing or stridor. Cardiovascular: Denies precordial pain, palpitations, edema or PND  Gastrointestinal: Denies poor appetite, indigestion, abdominal pain or blood in stool  Urinary: Denies dysuria, pyuria  Musculoskeletal: Denies pain or swelling from muscles or joints  Neurologic: Denies tremor, paresthesias, or sensory motor disturbance  Skin: Denies rash, itching or texture change. Psych: Denies depression      Physical Exam:      General: Well developed, in no acute distress. HEENT: Eyes - PERRL, no jvd  Heart:  Normal S1/S2 negative S3 or S4. Regular, no murmur, gallop or rub.   Respiratory: Clear bilaterally x 4, no wheezing or rales  Abdomen:   Soft, non-tender, bowel sounds are active.   Extremities:  +2 edema, normal cap refill, no cyanosis. Musculoskeletal: No clubbing  Neuro: A&Ox3, speech clear, gait stable. Skin: Skin color is normal. No rashes or lesions.  Non diaphoretic  Vascular: 2+ pulses symmetric in all extremities    Cardiographics    Ekg: v paced    Echo - ef 25-30    Results for orders placed or performed during the hospital encounter of 11/14/18   EKG, 12 LEAD, INITIAL   Result Value Ref Range    Ventricular Rate 75 BPM    Atrial Rate 74 BPM    P-R Interval 176 ms    QRS Duration 202 ms    Q-T Interval 490 ms    QTC Calculation (Bezet) 547 ms    Calculated R Axis -77 degrees    Calculated T Axis 95 degrees    Diagnosis       Electronic ventricular pacemaker  When compared with ECG of 17-NOV-2018 05:05,  No significant change was found  Confirmed by Marivel Allison ([id number]) on 11/19/2018 9:14:58 AM     f      Lab Results   Component Value Date/Time    WBC 10.8 02/11/2019 02:03 PM    HGB 14.5 02/11/2019 02:03 PM    HCT 43.6 02/11/2019 02:03 PM    PLATELET 576 09/88/4469 02:03 PM    MCV 87 02/11/2019 02:03 PM      Lab Results   Component Value Date/Time    Sodium 140 02/11/2019 02:03 PM    Potassium 3.7 02/11/2019 02:03 PM    Chloride 97 02/11/2019 02:03 PM    CO2 27 02/11/2019 02:03 PM    Anion gap 8 02/06/2019 11:43 PM    Glucose 185 (H) 02/11/2019 02:03 PM    BUN 16 02/11/2019 02:03 PM    Creatinine 1.11 02/11/2019 02:03 PM    BUN/Creatinine ratio 14 02/11/2019 02:03 PM    GFR est AA 88 02/11/2019 02:03 PM    GFR est non-AA 76 02/11/2019 02:03 PM    Calcium 8.7 02/11/2019 02:03 PM    Bilirubin, total 1.0 02/06/2019 11:43 PM    AST (SGOT) 27 02/06/2019 11:43 PM    Alk. phosphatase 88 02/06/2019 11:43 PM    Protein, total 7.8 02/06/2019 11:43 PM    Albumin 3.7 02/06/2019 11:43 PM    Globulin 4.1 (H) 02/06/2019 11:43 PM    A-G Ratio 0.9 (L) 02/06/2019 11:43 PM    ALT (SGPT) 37 02/06/2019 11:43 PM         Assessment:     Assessment:        ICD-10-CM ICD-9-CM    1. Irregular heartbeat I49.9 427.9 AMB POC EKG ROUTINE W/ 12 LEADS, INTER & REP      CBC WITH AUTOMATED DIFF      PROTHROMBIN TIME + INR      METABOLIC PANEL, COMPREHENSIVE      XR CHEST PA LAT   2. Dilated cardiomyopathy (HCC) I42.0 425.4 CBC WITH AUTOMATED DIFF      PROTHROMBIN TIME + INR      METABOLIC PANEL, COMPREHENSIVE      XR CHEST PA LAT   3. Chronic systolic congestive heart failure (HCC) I50.22 428.22 CBC WITH AUTOMATED DIFF     428.0 PROTHROMBIN TIME + INR      METABOLIC PANEL, COMPREHENSIVE      XR CHEST PA LAT   4. CHB (complete heart block) (HCC) I44.2 426.0 CBC WITH AUTOMATED DIFF      PROTHROMBIN TIME + INR      METABOLIC PANEL, COMPREHENSIVE      XR CHEST PA LAT   5.  Permanent atrial fibrillation (HCC) I48.2 427.31 CBC WITH AUTOMATED DIFF      PROTHROMBIN TIME + INR METABOLIC PANEL, COMPREHENSIVE      XR CHEST PA LAT   6. Mixed hyperlipidemia E78.2 272.2 CBC WITH AUTOMATED DIFF      PROTHROMBIN TIME + INR      METABOLIC PANEL, COMPREHENSIVE      XR CHEST PA LAT     Orders Placed This Encounter    XR CHEST PA LAT     Standing Status:   Future     Standing Expiration Date:   3/19/2020     Order Specific Question:   Reason for Exam     Answer:   pre op     Order Specific Question:   Is Patient Allergic to Contrast Dye? Answer:   Unknown    CBC WITH AUTOMATED DIFF    PROTHROMBIN TIME + INR    METABOLIC PANEL, COMPREHENSIVE    AMB POC EKG ROUTINE W/ 12 LEADS, INTER & REP     Order Specific Question:   Reason for Exam:     Answer:   routine        Plan:   Mr Osvaldo Nguyen is presenting with class III CHF. His repeat echo after greater than 3 months of med rx reveals that his lvef has not improved (ef 25-30%). With his chb, class III and cardiomyopathy, he is a candidate for an upgrade to a biv icd. I discussed the risks/benefits/alternatives of the procedure with the patient. Risks include (but are not limited to) bleeding, heart block, infection, cva/mi/tamponade/death. The patient understands and agrees to proceed. Thank you for this interesting consultation. Continue medical management for cardiomyopathy, af and chf. Thank you for allowing me to participate in Delio Turner 's care.     Irene Sun MD, [employer]

## 2019-03-04 ENCOUNTER — HOSPITAL ENCOUNTER (OUTPATIENT)
Dept: GENERAL RADIOLOGY | Age: 54
Discharge: HOME | End: 2019-03-04
Payer: MEDICAID

## 2019-03-04 DIAGNOSIS — Z01.811 PRE-OP CHEST EXAM: ICD-10-CM

## 2019-03-04 PROCEDURE — 71046 X-RAY EXAM CHEST 2 VIEWS: CPT

## 2019-03-04 RX ORDER — IPRATROPIUM BROMIDE AND ALBUTEROL SULFATE 2.5; .5 MG/3ML; MG/3ML
SOLUTION RESPIRATORY (INHALATION)
Qty: 50 NEBULE | Refills: 10 | Status: SHIPPED | OUTPATIENT
Start: 2019-03-04 | End: 2020-04-15

## 2019-03-04 NOTE — TELEPHONE ENCOUNTER
Called pt back . Pt stated he wanted to see about changing trazodone because he says when he takes it the next morning he feels like he is in a kianna. Asked pt what time does he take it and pt stated around 9-10. Told pt to try taking earlier at night and see if that makes a difference. Pt to try and let us know. Pt also asked for a refill on neb tx.

## 2019-03-09 LAB
ALBUMIN SERPL-MCNC: 4.2 G/DL (ref 3.5–5.5)
ALBUMIN/GLOB SERPL: 1.4 {RATIO} (ref 1.2–2.2)
ALP SERPL-CCNC: 79 IU/L (ref 39–117)
ALT SERPL-CCNC: 26 IU/L (ref 0–44)
AST SERPL-CCNC: 16 IU/L (ref 0–40)
BASOPHILS # BLD AUTO: 0 X10E3/UL (ref 0–0.2)
BASOPHILS NFR BLD AUTO: 1 %
BILIRUB SERPL-MCNC: 0.7 MG/DL (ref 0–1.2)
BUN SERPL-MCNC: 17 MG/DL (ref 6–24)
BUN/CREAT SERPL: 15 (ref 9–20)
CALCIUM SERPL-MCNC: 9 MG/DL (ref 8.7–10.2)
CHLORIDE SERPL-SCNC: 99 MMOL/L (ref 96–106)
CO2 SERPL-SCNC: 22 MMOL/L (ref 20–29)
CREAT SERPL-MCNC: 1.11 MG/DL (ref 0.76–1.27)
EOSINOPHIL # BLD AUTO: 0.1 X10E3/UL (ref 0–0.4)
EOSINOPHIL NFR BLD AUTO: 2 %
ERYTHROCYTE [DISTWIDTH] IN BLOOD BY AUTOMATED COUNT: 15.2 % (ref 12.3–15.4)
GLOBULIN SER CALC-MCNC: 3 G/DL (ref 1.5–4.5)
GLUCOSE SERPL-MCNC: 290 MG/DL (ref 65–99)
HCT VFR BLD AUTO: 43.5 % (ref 37.5–51)
HGB BLD-MCNC: 14.1 G/DL (ref 13–17.7)
IMM GRANULOCYTES # BLD AUTO: 0 X10E3/UL (ref 0–0.1)
IMM GRANULOCYTES NFR BLD AUTO: 0 %
INR PPP: 1.1 (ref 0.8–1.2)
LYMPHOCYTES # BLD AUTO: 1 X10E3/UL (ref 0.7–3.1)
LYMPHOCYTES NFR BLD AUTO: 16 %
MCH RBC QN AUTO: 28.8 PG (ref 26.6–33)
MCHC RBC AUTO-ENTMCNC: 32.4 G/DL (ref 31.5–35.7)
MCV RBC AUTO: 89 FL (ref 79–97)
MONOCYTES # BLD AUTO: 0.4 X10E3/UL (ref 0.1–0.9)
MONOCYTES NFR BLD AUTO: 7 %
NEUTROPHILS # BLD AUTO: 5 X10E3/UL (ref 1.4–7)
NEUTROPHILS NFR BLD AUTO: 74 %
PLATELET # BLD AUTO: 165 X10E3/UL (ref 150–379)
POTASSIUM SERPL-SCNC: 4 MMOL/L (ref 3.5–5.2)
PROT SERPL-MCNC: 7.2 G/DL (ref 6–8.5)
PROTHROMBIN TIME: 11.2 SEC (ref 9.1–12)
RBC # BLD AUTO: 4.89 X10E6/UL (ref 4.14–5.8)
SODIUM SERPL-SCNC: 140 MMOL/L (ref 134–144)
WBC # BLD AUTO: 6.6 X10E3/UL (ref 3.4–10.8)

## 2019-03-10 ENCOUNTER — APPOINTMENT (OUTPATIENT)
Dept: GENERAL RADIOLOGY | Age: 54
End: 2019-03-10
Attending: PHYSICIAN ASSISTANT
Payer: MEDICAID

## 2019-03-10 ENCOUNTER — HOSPITAL ENCOUNTER (EMERGENCY)
Age: 54
Discharge: HOME | End: 2019-03-10
Attending: INTERNAL MEDICINE
Payer: MEDICAID

## 2019-03-10 VITALS
HEART RATE: 88 BPM | DIASTOLIC BLOOD PRESSURE: 84 MMHG | SYSTOLIC BLOOD PRESSURE: 124 MMHG | WEIGHT: 315 LBS | TEMPERATURE: 97.9 F | OXYGEN SATURATION: 97 % | RESPIRATION RATE: 18 BRPM | BODY MASS INDEX: 46.79 KG/M2

## 2019-03-10 DIAGNOSIS — J06.9 VIRAL UPPER RESPIRATORY TRACT INFECTION: Primary | ICD-10-CM

## 2019-03-10 LAB
ANION GAP SERPL CALC-SCNC: 9 MMOL/L (ref 5–15)
BASOPHILS # BLD: 0.1 K/UL (ref 0–0.1)
BASOPHILS NFR BLD: 1 % (ref 0–1)
BNP SERPL-MCNC: 710 PG/ML (ref 0–125)
BUN SERPL-MCNC: 18 MG/DL (ref 6–20)
BUN/CREAT SERPL: 16 (ref 12–20)
CALCIUM SERPL-MCNC: 8.8 MG/DL (ref 8.5–10.1)
CHLORIDE SERPL-SCNC: 100 MMOL/L (ref 97–108)
CO2 SERPL-SCNC: 28 MMOL/L (ref 21–32)
CREAT SERPL-MCNC: 1.14 MG/DL (ref 0.7–1.3)
DIFFERENTIAL METHOD BLD: ABNORMAL
EOSINOPHIL # BLD: 0.1 K/UL (ref 0–0.4)
EOSINOPHIL NFR BLD: 1 % (ref 0–7)
ERYTHROCYTE [DISTWIDTH] IN BLOOD BY AUTOMATED COUNT: 14.2 % (ref 11.5–14.5)
FLUAV AG NPH QL IA: NEGATIVE
FLUBV AG NOSE QL IA: NEGATIVE
GLUCOSE BLD STRIP.AUTO-MCNC: 298 MG/DL (ref 65–100)
GLUCOSE SERPL-MCNC: 328 MG/DL (ref 65–100)
HCT VFR BLD AUTO: 41.5 % (ref 36.6–50.3)
HGB BLD-MCNC: 13.4 G/DL (ref 12.1–17)
IMM GRANULOCYTES # BLD AUTO: 0 K/UL (ref 0–0.04)
IMM GRANULOCYTES NFR BLD AUTO: 1 % (ref 0–0.5)
LYMPHOCYTES # BLD: 1.9 K/UL (ref 0.8–3.5)
LYMPHOCYTES NFR BLD: 23 % (ref 12–49)
MCH RBC QN AUTO: 28.3 PG (ref 26–34)
MCHC RBC AUTO-ENTMCNC: 32.3 G/DL (ref 30–36.5)
MCV RBC AUTO: 87.7 FL (ref 80–99)
MONOCYTES # BLD: 0.5 K/UL (ref 0–1)
MONOCYTES NFR BLD: 6 % (ref 5–13)
NEUTS SEG # BLD: 5.6 K/UL (ref 1.8–8)
NEUTS SEG NFR BLD: 68 % (ref 32–75)
NRBC # BLD: 0 K/UL (ref 0–0.01)
NRBC BLD-RTO: 0 PER 100 WBC
PLATELET # BLD AUTO: 192 K/UL (ref 150–400)
PMV BLD AUTO: 10.8 FL (ref 8.9–12.9)
POTASSIUM SERPL-SCNC: 4.1 MMOL/L (ref 3.5–5.1)
RBC # BLD AUTO: 4.73 M/UL (ref 4.1–5.7)
SERVICE CMNT-IMP: ABNORMAL
SODIUM SERPL-SCNC: 137 MMOL/L (ref 136–145)
TROPONIN I SERPL-MCNC: <0.05 NG/ML
WBC # BLD AUTO: 8.2 K/UL (ref 4.1–11.1)

## 2019-03-10 PROCEDURE — 93005 ELECTROCARDIOGRAM TRACING: CPT

## 2019-03-10 PROCEDURE — 74011000250 HC RX REV CODE- 250: Performed by: PHYSICIAN ASSISTANT

## 2019-03-10 PROCEDURE — 85025 COMPLETE CBC W/AUTO DIFF WBC: CPT

## 2019-03-10 PROCEDURE — 83880 ASSAY OF NATRIURETIC PEPTIDE: CPT

## 2019-03-10 PROCEDURE — 99283 EMERGENCY DEPT VISIT LOW MDM: CPT

## 2019-03-10 PROCEDURE — 82962 GLUCOSE BLOOD TEST: CPT

## 2019-03-10 PROCEDURE — 94640 AIRWAY INHALATION TREATMENT: CPT

## 2019-03-10 PROCEDURE — 80048 BASIC METABOLIC PNL TOTAL CA: CPT

## 2019-03-10 PROCEDURE — 74011636637 HC RX REV CODE- 636/637: Performed by: PHYSICIAN ASSISTANT

## 2019-03-10 PROCEDURE — 84484 ASSAY OF TROPONIN QUANT: CPT

## 2019-03-10 PROCEDURE — 96374 THER/PROPH/DIAG INJ IV PUSH: CPT

## 2019-03-10 PROCEDURE — 71046 X-RAY EXAM CHEST 2 VIEWS: CPT

## 2019-03-10 PROCEDURE — 36415 COLL VENOUS BLD VENIPUNCTURE: CPT

## 2019-03-10 PROCEDURE — 77030029684 HC NEB SM VOL KT MONA -A

## 2019-03-10 PROCEDURE — 87804 INFLUENZA ASSAY W/OPTIC: CPT

## 2019-03-10 RX ORDER — SODIUM CHLORIDE 0.9 % (FLUSH) 0.9 %
5-40 SYRINGE (ML) INJECTION EVERY 8 HOURS
Status: DISCONTINUED | OUTPATIENT
Start: 2019-03-10 | End: 2019-03-11 | Stop reason: HOSPADM

## 2019-03-10 RX ORDER — IPRATROPIUM BROMIDE AND ALBUTEROL SULFATE 2.5; .5 MG/3ML; MG/3ML
3 SOLUTION RESPIRATORY (INHALATION)
Status: COMPLETED | OUTPATIENT
Start: 2019-03-10 | End: 2019-03-10

## 2019-03-10 RX ORDER — SODIUM CHLORIDE 0.9 % (FLUSH) 0.9 %
5-40 SYRINGE (ML) INJECTION AS NEEDED
Status: DISCONTINUED | OUTPATIENT
Start: 2019-03-10 | End: 2019-03-11 | Stop reason: HOSPADM

## 2019-03-10 RX ADMIN — IPRATROPIUM BROMIDE AND ALBUTEROL SULFATE 3 ML: .5; 3 SOLUTION RESPIRATORY (INHALATION) at 20:59

## 2019-03-10 RX ADMIN — HUMAN INSULIN 10 UNITS: 100 INJECTION, SOLUTION SUBCUTANEOUS at 21:24

## 2019-03-10 NOTE — ED TRIAGE NOTES
Patient presents to ED with c/o chest tightness for 1 week. Patient states that he is suppose to have Defibrillator place at [facility].

## 2019-03-10 NOTE — ED NOTES
Pt arrived to ED  with c/o non-productive cough x 2 days. \"I want to make sure I don't have pneumonia. \". Pt is in no acute distress. Will continue to monitor. See nursing assessment. Safety precautions in place; call light within reach. Emergency Department Nursing Plan of Care       The Nursing Plan of Care is developed from the Nursing assessment and Emergency Department Attending provider initial evaluation. The plan of care may be reviewed in the ED Provider note.     The Plan of Care was developed with the following considerations:   Patient / Family readiness to learn indicated by:verbalized understanding  Persons(s) to be included in education: patient  Barriers to Learning/Limitations:No    Signed     Ligia Stack RN    3/10/2019   7:47 PM

## 2019-03-10 NOTE — ED PROVIDER NOTES
EMERGENCY DEPARTMENT HISTORY AND PHYSICAL EXAM      Date: 3/10/2019  Patient Name: Destiney Dykes    History of Presenting Illness     Chief Complaint   Patient presents with    Chest Pain       History Provided By: Patient    HPI: Destiney Dykes, 48 y.o. male with PMHx significant for HTN, dilated cardiomyopathy, chronic systolic CHF, complete heart block/ A-fib w/ pacemaker, mixed hyperlipidemia, restrictive lung disease, cardiac cath 2016 w/ minimal CAD, sleep apnea, presents ambulatory to the ED with cc of acute mild chest tightness, cough, wheezing, sob, rhinorrhea, congestion, head cold x 2-3 days. Albuterol neb tx w/o relief. Pt endorses he has not been taking his medications other than insulin, metformin, furosemide, albuterol prn x 4 days because he is scheduled to have Defibrillator placement procedure at [facility], [place], Cardiology tomorrow. Pt contacted PCP, Rosalia Aguillon, who recommended pt have xray in ED to make sure he does not have pneumonia. Denies fever, chills, n/v, CP, hemoptysis, back pain, syncope, lightheadedness, dizziness, numbness/tingling, palpitations, leg swelling. There are no other complaints, changes, or physical findings at this time. Cardiology: Mary Bhatia MD and Evelyn Padgett MD.      PCP: Lawrence Holcomb MD    No current facility-administered medications on file prior to encounter. Current Outpatient Medications on File Prior to Encounter   Medication Sig Dispense Refill    albuterol-ipratropium (DUO-NEB) 2.5 mg-0.5 mg/3 ml nebu INHALE THREE MILLILITERS (ONE VIAL) VIA NEBULIZATION BY MOUTH EVERY 6 HOURS AS NEEDED 50 Nebule 10    potassium chloride (K-DUR, KLOR-CON) 20 mEq tablet 2 tabs po qam, and 1 tab po qpm (Patient taking differently: 20 mEq. 2 tabs po qam, and 1 tab po qpm) 90 Tab 12    apixaban (ELIQUIS) 5 mg tablet Take 5 mg by mouth two (2) times a day.       furosemide (LASIX) 80 mg tablet 2 tabs in am 120 Tab 11    metFORMIN (GLUCOPHAGE) 1,000 mg tablet 1 1/2 pills in am and 1 pill in pm. For diabetes 75 Tab 12    lisinopril (PRINIVIL, ZESTRIL) 20 mg tablet Take 1 Tab by mouth daily. For heart 30 Tab 12    atorvastatin (LIPITOR) 40 mg tablet TAKE ONE TABLET BY MOUTH DAILY FOR CHOLESTEROL 30 Tab 12    glipiZIDE (GLUCOTROL) 10 mg tablet Take 1 Tab by mouth two (2) times a day. 60 Tab 12    carvedilol (COREG) 12.5 mg tablet Take 1 Tab by mouth two (2) times a day. To slow heart rate down (Patient taking differently: Take 12.5 mg by mouth daily. To slow heart rate down) 60 Tab 12    Liraglutide (VICTOZA) 0.6 mg/0.1 mL (18 mg/3 mL) pnij 0.6 mg daily- 1st week, then 1.2 mg daily- 2nd week, then 1.8 mg daily. Please give pt needles also 1 Pen 12    albuterol (VENTOLIN HFA) 90 mcg/actuation inhaler INHALE TWO PUFFS BY MOUTH EVERY 4 HOURS AS NEEDED FOR FOR WHEEZING AND FOR SHORTNESS OF BREATH 1 Inhaler 12    traZODone (DESYREL) 100 mg tablet Take 2 Tabs by mouth nightly. For sleep 60 Tab 5    ONETOUCH ULTRA BLUE TEST STRIP strip USE TO TEST BLOOD SUGAR THREE TIMES DAILY BEFORE MEALS. 100 Strip 0    ONETOUCH ULTRA BLUE TEST STRIP strip USE TO TEST BLOOD SUGAR THREE TIMES DAILY BEFORE MEALS. 100 Strip 0    umeclidinium-vilanterol (ANORO ELLIPTA) 62.5-25 mcg/actuation inhaler Take 1 Puff by inhalation daily. 1 Inhaler 12    traMADol (ULTRAM) 50 mg tablet TAKE 1 TO 2 TABLETS BY MOUTH EVERY 6 HOURS AS NEEDED FOR PAIN 60 Tab 2       Past History     Past Medical History:  Past Medical History:   Diagnosis Date    A-fib [facility]) 1/6/15    [facility] ED. Pt reported this    Atrial fibrillation with RVR ([address] 8/11/2017    Heart failure (HCC)     Hypertension     Restrictive lung disease     FVC2.59 (52%), FEV1 1.95 (49)- 2017    S/P cardiac cath     Dr. Jody Rodriguez, 2016.  minimal cad    Sleep apnea        Past Surgical History:  Past Surgical History:   Procedure Laterality Date    CARDIAC SURG PROCEDURE UNLIST  11/14/2018 Implantation of PACEMAKER    HX PACEMAKER         Family History:  History reviewed. No pertinent family history. Social History:  Social History     Tobacco Use    Smoking status: Former Smoker     Packs/day: 1.00     Types: Cigarettes     Last attempt to quit: 2/3/2014     Years since quittin.0    Smokeless tobacco: Never Used   Substance Use Topics    Alcohol use: No     Comment: stopped drinking at age 29   Hadrian[employer] Drug use: No       Allergies: Allergies   Allergen Reactions    Influenza Virus Vaccines Nausea Only     Fever  diarrhea    Mushroom Flavor Swelling    Oxycodone Rash    Pneumococcal 23-Allison Ps Vaccine Nausea and Vomiting     Vomit           Review of Systems   Review of Systems   Constitutional: Negative for activity change, appetite change, chills, diaphoresis, fatigue and fever. HENT: Positive for congestion and rhinorrhea. Negative for dental problem, drooling, ear discharge, ear pain, facial swelling, hearing loss, nosebleeds, postnasal drip, sinus pressure, sore throat, trouble swallowing and voice change. Eyes: Negative. Negative for pain, redness and visual disturbance. Respiratory: Positive for cough, chest tightness, shortness of breath and wheezing. Negative for apnea and stridor. Cardiovascular: Negative. Negative for chest pain, palpitations and leg swelling. Gastrointestinal: Negative. Negative for abdominal pain, constipation, diarrhea, nausea and vomiting. Genitourinary: Negative. Negative for dysuria. Musculoskeletal: Negative. Negative for arthralgias, back pain, gait problem, joint swelling, myalgias, neck pain and neck stiffness. Skin: Negative. Negative for rash. Neurological: Negative for dizziness, seizures, syncope, weakness, light-headedness and headaches. Psychiatric/Behavioral: Negative. Negative for confusion. Physical Exam   Physical Exam   Constitutional: He is oriented to person, place, and time.  He appears well-developed and well-nourished. No distress. Obese  male sitting upright in NAD. Talking in complete sentences. HENT:   Head: Normocephalic and atraumatic. Right Ear: Hearing and external ear normal.   Left Ear: Hearing and external ear normal.   Nose: Nose normal.   Eyes: Conjunctivae and EOM are normal. Pupils are equal, round, and reactive to light. Neck: Normal range of motion. Cardiovascular: Normal rate, regular rhythm, normal heart sounds and intact distal pulses. Exam reveals no gallop and no friction rub. No murmur heard. Pulmonary/Chest: Effort normal. No accessory muscle usage. No respiratory distress. He has wheezes (faint bilateral expiratory). He has no rhonchi. He has no rales. He exhibits no tenderness. Distant lung sounds. Abdominal: Soft. There is no tenderness. There is no rebound and no guarding. Obese. Musculoskeletal: Normal range of motion. Neurological: He is alert and oriented to person, place, and time. Skin: Skin is warm, dry and intact. He is not diaphoretic. No pallor. Psychiatric: He has a normal mood and affect. His speech is normal and behavior is normal. Judgment and thought content normal.   Nursing note and vitals reviewed. Diagnostic Study Results     Labs -     Recent Results (from the past 12 hour(s))   EKG, 12 LEAD, INITIAL    Collection Time: 03/10/19  7:37 PM   Result Value Ref Range    Ventricular Rate 82 BPM    Atrial Rate 83 BPM    QRS Duration 182 ms    Q-T Interval 436 ms    QTC Calculation (Bezet) 509 ms    Calculated R Axis -68 degrees    Calculated T Axis 99 degrees    Diagnosis       Ventricular-paced rhythm  Abnormal ECG  When compared with ECG of 18-NOV-2018 09:43,  Vent.  rate has increased BY   7 BPM     CBC WITH AUTOMATED DIFF    Collection Time: 03/10/19  7:54 PM   Result Value Ref Range    WBC 8.2 4.1 - 11.1 K/uL    RBC 4.73 4.10 - 5.70 M/uL    HGB 13.4 12.1 - 17.0 g/dL    HCT 41.5 36.6 - 50.3 %    MCV 87.7 80.0 - 99.0 FL    MCH 28.3 26.0 - 34.0 PG    MCHC 32.3 30.0 - 36.5 g/dL    RDW 14.2 11.5 - 14.5 %    PLATELET 254 378 - 462 K/uL    MPV 10.8 8.9 - 12.9 FL    NRBC 0.0 0  WBC    ABSOLUTE NRBC 0.00 0.00 - 0.01 K/uL    NEUTROPHILS 68 32 - 75 %    LYMPHOCYTES 23 12 - 49 %    MONOCYTES 6 5 - 13 %    EOSINOPHILS 1 0 - 7 %    BASOPHILS 1 0 - 1 %    IMMATURE GRANULOCYTES 1 (H) 0.0 - 0.5 %    ABS. NEUTROPHILS 5.6 1.8 - 8.0 K/UL    ABS. LYMPHOCYTES 1.9 0.8 - 3.5 K/UL    ABS. MONOCYTES 0.5 0.0 - 1.0 K/UL    ABS. EOSINOPHILS 0.1 0.0 - 0.4 K/UL    ABS. BASOPHILS 0.1 0.0 - 0.1 K/UL    ABS. IMM. GRANS. 0.0 0.00 - 0.04 K/UL    DF AUTOMATED     METABOLIC PANEL, BASIC    Collection Time: 03/10/19  7:54 PM   Result Value Ref Range    Sodium 137 136 - 145 mmol/L    Potassium 4.1 3.5 - 5.1 mmol/L    Chloride 100 97 - 108 mmol/L    CO2 28 21 - 32 mmol/L    Anion gap 9 5 - 15 mmol/L    Glucose 328 (H) 65 - 100 mg/dL    BUN 18 6 - 20 MG/DL    Creatinine 1.14 0.70 - 1.30 MG/DL    BUN/Creatinine ratio 16 12 - 20      GFR est AA >60 >60 ml/min/1.73m2    GFR est non-AA >60 >60 ml/min/1.73m2    Calcium 8.8 8.5 - 10.1 MG/DL   NT-PRO BNP    Collection Time: 03/10/19  7:54 PM   Result Value Ref Range    NT pro- (H) 0 - 125 PG/ML   TROPONIN I    Collection Time: 03/10/19  7:54 PM   Result Value Ref Range    Troponin-I, Qt. <0.05 <0.05 ng/mL   INFLUENZA A & B AG (RAPID TEST)    Collection Time: 03/10/19  7:54 PM   Result Value Ref Range    Influenza A Antigen NEGATIVE  NEG      Influenza B Antigen NEGATIVE  NEG     GLUCOSE, POC    Collection Time: 03/10/19  9:23 PM   Result Value Ref Range    Glucose (POC) 298 (H) 65 - 100 mg/dL    Performed by [facility]        Radiologic Studies -   XR CHEST PA LAT   Final Result   IMPRESSION:      No acute process. Stable exam.           CT Results  (Last 48 hours)    None        CXR Results  (Last 48 hours)               03/10/19 1959  XR CHEST PA LAT Final result    Impression:  IMPRESSION:       No acute process.  Stable exam.           Narrative:  EXAM:  XR CHEST PA LAT       INDICATION: Cough and chest tightness. COMPARISON: 3/4/2019       TECHNIQUE: PA and lateral chest views       FINDINGS: The left chest ICD and wire are stable. The cardiomediastinal contours   are stable. The pulmonary vasculature is within normal limits. The lungs and pleural spaces are clear. There is no pneumothorax. The bones and   upper abdomen are stable. Medical Decision Making   I am the first provider for this patient. I reviewed the vital signs, available nursing notes, past medical history, past surgical history, family history and social history. Vital Signs-Reviewed the patient's vital signs. Patient Vitals for the past 12 hrs:   Temp Pulse Resp BP SpO2   03/10/19 2130    124/84    03/10/19 1938 97.9 °F (36.6 °C) 88 18 (!) 152/91 97 %       Pulse Oximetry Analysis - 97% on RA    EKG interpretation: (Preliminary)  Rhythm: paced; and regular . Rate (approx.): 82; Axis: normal; [place] interval: normal; QRS interval: normal ; ST/T wave: normal; Other findings: abnormal ekg. Records Reviewed: Nursing Notes, Old Medical Records, Previous electrocardiograms, Previous Radiology Studies and Previous Laboratory Studies    Provider Notes (Medical Decision Making): The patient complains of nasal congestion, rhinorrhea, and cough. DDx: viral URI, COPD exacerbation, acute bronchitis, bacterial sinusitis vs. pharyngitis, flu; ACS unlikely. Will get CXR, ekg, labs to r/o PNA and treat symptoms. ED Course:   Initial assessment performed. The patients presenting problems have been discussed, and they are in agreement with the care plan formulated and outlined with them. I have encouraged them to ask questions as they arise throughout their visit. 9:13 PM  Pt endorses he is feeling better after receiving tx. States he will continue tx at home every 4 hrs. Refusing additional tx in ED at this time.  Pt also endorses he remebers he forgot his insulin today and that is why his BG is high. Requesting to go hoime and take medication, Advised pt we can give in ED and recheck BG. Pt agrees to receiving insulin but requesting discharge as he has to be up early. States he will follow upwt Cardiology tomorrow ad inform them of visit today. Vitals and exam stable. Critical Care Time:   0    Disposition:  9:15 PM  I have discussed with patient their diagnosis, treatment, and follow up plan. The patient agrees to follow up as outlined in discharge paperwork and also to return to the ED with any worsening. Bridgett Dougherty PA-C      PLAN:  1. Discharge Medication List as of 3/10/2019  9:18 PM      CONTINUE these medications which have NOT CHANGED    Details   albuterol-ipratropium (DUO-NEB) 2.5 mg-0.5 mg/3 ml nebu INHALE THREE MILLILITERS (ONE VIAL) VIA NEBULIZATION BY MOUTH EVERY 6 HOURS AS NEEDED, Normal, Disp-50 Nebule, R-10      potassium chloride (K-DUR, KLOR-CON) 20 mEq tablet 2 tabs po qam, and 1 tab po qpm, Normal, Disp-90 Tab, R-12      apixaban (ELIQUIS) 5 mg tablet Take 5 mg by mouth two (2) times a day., Historical Med      furosemide (LASIX) 80 mg tablet 2 tabs in am, Normal, Disp-120 Tab, R-11      metFORMIN (GLUCOPHAGE) 1,000 mg tablet 1 1/2 pills in am and 1 pill in pm. For diabetes, Normal, Disp-75 Tab, R-12      lisinopril (PRINIVIL, ZESTRIL) 20 mg tablet Take 1 Tab by mouth daily. For heart, Normal, Disp-30 Tab, R-12      atorvastatin (LIPITOR) 40 mg tablet TAKE ONE TABLET BY MOUTH DAILY FOR CHOLESTEROL, Normal, Disp-30 Tab, R-12      glipiZIDE (GLUCOTROL) 10 mg tablet Take 1 Tab by mouth two (2) times a day., Normal, Disp-60 Tab, R-12      carvedilol (COREG) 12.5 mg tablet Take 1 Tab by mouth two (2) times a day. To slow heart rate down, Normal, Disp-60 Tab, R-12      Liraglutide (VICTOZA) 0.6 mg/0.1 mL (18 mg/3 mL) pnij 0.6 mg daily- 1st week, then 1.2 mg daily- 2nd week, then 1.8 mg daily.  Please give pt needles also, Normal, Disp-1 Pen, R-12      albuterol (VENTOLIN HFA) 90 mcg/actuation inhaler INHALE TWO PUFFS BY MOUTH EVERY 4 HOURS AS NEEDED FOR FOR WHEEZING AND FOR SHORTNESS OF BREATH, Normal, Disp-1 Inhaler, R-12      traZODone (DESYREL) 100 mg tablet Take 2 Tabs by mouth nightly. For sleep, Normal, Disp-60 Tab, R-5      !! ONETOUCH ULTRA BLUE TEST STRIP strip USE TO TEST BLOOD SUGAR THREE TIMES DAILY BEFORE MEALS., Normal, Disp-100 Strip, R-0      !! ONETOUCH ULTRA BLUE TEST STRIP strip USE TO TEST BLOOD SUGAR THREE TIMES DAILY BEFORE MEALS., Normal, Disp-100 Strip, R-0      umeclidinium-vilanterol (ANORO ELLIPTA) 62.5-25 mcg/actuation inhaler Take 1 Puff by inhalation daily. , Print, Disp-1 Inhaler, R-12      traMADol (ULTRAM) 50 mg tablet TAKE 1 TO 2 TABLETS BY MOUTH EVERY 6 HOURS AS NEEDED FOR PAIN, Print, Disp-60 Tab, R-2       !! - Potential duplicate medications found. Please discuss with provider. 2.   Follow-up Information     Follow up With Specialties Details Why Contact Info    Delmi Matias MD [facility] Schedule an appointment as soon as possible for a visit in 1 day As needed [address] [address] [address] [facility]  [phone]          Return to ED if worse     Diagnosis     Clinical Impression:   1.  Viral upper respiratory tract infection        Attestations:

## 2019-03-11 ENCOUNTER — ANESTHESIA EVENT (OUTPATIENT)
Dept: CARDIAC CATH/INVASIVE PROCEDURES | Age: 54
End: 2019-03-11
Payer: MEDICAID

## 2019-03-11 ENCOUNTER — ANESTHESIA (OUTPATIENT)
Dept: CARDIAC CATH/INVASIVE PROCEDURES | Age: 54
End: 2019-03-11
Payer: MEDICAID

## 2019-03-11 ENCOUNTER — HOSPITAL ENCOUNTER (OUTPATIENT)
Age: 54
Discharge: HOME | End: 2019-03-11
Attending: INTERNAL MEDICINE | Admitting: INTERNAL MEDICINE
Payer: MEDICAID

## 2019-03-11 ENCOUNTER — APPOINTMENT (OUTPATIENT)
Dept: GENERAL RADIOLOGY | Age: 54
End: 2019-03-11
Attending: INTERNAL MEDICINE
Payer: MEDICAID

## 2019-03-11 VITALS
OXYGEN SATURATION: 94 % | SYSTOLIC BLOOD PRESSURE: 139 MMHG | DIASTOLIC BLOOD PRESSURE: 89 MMHG | HEART RATE: 76 BPM | RESPIRATION RATE: 16 BRPM | TEMPERATURE: 98.1 F

## 2019-03-11 DIAGNOSIS — I42.9 CARDIOMYOPATHY, UNSPECIFIED TYPE (HCC): ICD-10-CM

## 2019-03-11 LAB
GLUCOSE BLD STRIP.AUTO-MCNC: 182 MG/DL (ref 65–100)
GLUCOSE BLD STRIP.AUTO-MCNC: 218 MG/DL (ref 65–100)
SERVICE CMNT-IMP: ABNORMAL
SERVICE CMNT-IMP: ABNORMAL

## 2019-03-11 PROCEDURE — 33249 INSJ/RPLCMT DEFIB W/LEAD(S): CPT | Performed by: INTERNAL MEDICINE

## 2019-03-11 PROCEDURE — C1887 CATHETER, GUIDING: HCPCS | Performed by: INTERNAL MEDICINE

## 2019-03-11 PROCEDURE — C1894 INTRO/SHEATH, NON-LASER: HCPCS | Performed by: INTERNAL MEDICINE

## 2019-03-11 PROCEDURE — 74011636320 HC RX REV CODE- 636/320: Performed by: INTERNAL MEDICINE

## 2019-03-11 PROCEDURE — C1777 LEAD, AICD, ENDO SINGLE COIL: HCPCS | Performed by: INTERNAL MEDICINE

## 2019-03-11 PROCEDURE — 77030028698 HC BLD TISS PLSM MEDT -D: Performed by: INTERNAL MEDICINE

## 2019-03-11 PROCEDURE — 74011250636 HC RX REV CODE- 250/636

## 2019-03-11 PROCEDURE — C1900 LEAD, CORONARY VENOUS: HCPCS | Performed by: INTERNAL MEDICINE

## 2019-03-11 PROCEDURE — C1882 AICD, OTHER THAN SING/DUAL: HCPCS | Performed by: INTERNAL MEDICINE

## 2019-03-11 PROCEDURE — C1769 GUIDE WIRE: HCPCS | Performed by: INTERNAL MEDICINE

## 2019-03-11 PROCEDURE — 77030039825 HC MSK NSL PAP SUPERNO2VA VYRM -B: Performed by: NURSE ANESTHETIST, CERTIFIED REGISTERED

## 2019-03-11 PROCEDURE — 76060000034 HC ANESTHESIA 1.5 TO 2 HR: Performed by: INTERNAL MEDICINE

## 2019-03-11 PROCEDURE — 33233 REMOVAL OF PM GENERATOR: CPT | Performed by: INTERNAL MEDICINE

## 2019-03-11 PROCEDURE — 77030018729 HC ELECTRD DEFIB PAD CARD -B: Performed by: INTERNAL MEDICINE

## 2019-03-11 PROCEDURE — C1893 INTRO/SHEATH, FIXED,NON-PEEL: HCPCS | Performed by: INTERNAL MEDICINE

## 2019-03-11 PROCEDURE — 77030019580 HC CBL PACE MEDT -B: Performed by: INTERNAL MEDICINE

## 2019-03-11 PROCEDURE — C1751 CATH, INF, PER/CENT/MIDLINE: HCPCS | Performed by: INTERNAL MEDICINE

## 2019-03-11 PROCEDURE — 77030018673: Performed by: INTERNAL MEDICINE

## 2019-03-11 PROCEDURE — 74011250636 HC RX REV CODE- 250/636: Performed by: INTERNAL MEDICINE

## 2019-03-11 PROCEDURE — 77030018836 HC SOL IRR NACL ICUM -A: Performed by: INTERNAL MEDICINE

## 2019-03-11 PROCEDURE — 74011250637 HC RX REV CODE- 250/637: Performed by: INTERNAL MEDICINE

## 2019-03-11 PROCEDURE — 82962 GLUCOSE BLOOD TEST: CPT

## 2019-03-11 PROCEDURE — 77030033819 HC SELCT VEIN WORLEY MRTM -C: Performed by: INTERNAL MEDICINE

## 2019-03-11 PROCEDURE — 77030031139 HC SUT VCRL2 J&J -A: Performed by: INTERNAL MEDICINE

## 2019-03-11 PROCEDURE — 74011000250 HC RX REV CODE- 250: Performed by: INTERNAL MEDICINE

## 2019-03-11 PROCEDURE — 77030037029 HC IMPL ENV ICD ANTIBACT ABSRB TYRX MEDT -G: Performed by: INTERNAL MEDICINE

## 2019-03-11 PROCEDURE — 71045 X-RAY EXAM CHEST 1 VIEW: CPT

## 2019-03-11 DEVICE — CARDIAC RESYNCHRONIZATION THERAPY DEFIBRILLATOR
Type: IMPLANTABLE DEVICE | Status: FUNCTIONAL
Brand: VIGILANT™ X4 CRT-D

## 2019-03-11 DEVICE — ENVELOPE CMRM6133 ABSORB LRG US
Type: IMPLANTABLE DEVICE | Status: FUNCTIONAL
Brand: TYRX™

## 2019-03-11 DEVICE — PACE/SENSE LEAD
Type: IMPLANTABLE DEVICE | Status: FUNCTIONAL
Brand: ACUITY™ X4 STRAIGHT

## 2019-03-11 DEVICE — STEROID-ELUTING BIPOLAR PACE/SENSE AND DEFIBRILLATION LEAD
Type: IMPLANTABLE DEVICE | Status: FUNCTIONAL
Brand: ENDOTAK RELIANCE® SG

## 2019-03-11 RX ORDER — LIDOCAINE HYDROCHLORIDE 10 MG/ML
INJECTION INFILTRATION; PERINEURAL AS NEEDED
Status: DISCONTINUED | OUTPATIENT
Start: 2019-03-11 | End: 2019-03-11 | Stop reason: HOSPADM

## 2019-03-11 RX ORDER — SODIUM CHLORIDE 9 MG/ML
INJECTION, SOLUTION INTRAVENOUS
Status: DISCONTINUED | OUTPATIENT
Start: 2019-03-11 | End: 2019-03-11 | Stop reason: HOSPADM

## 2019-03-11 RX ORDER — BACITRACIN 50000 [IU]/1
INJECTION, POWDER, FOR SOLUTION INTRAMUSCULAR AS NEEDED
Status: DISCONTINUED | OUTPATIENT
Start: 2019-03-11 | End: 2019-03-11 | Stop reason: HOSPADM

## 2019-03-11 RX ORDER — SODIUM CHLORIDE 0.9 % (FLUSH) 0.9 %
5-40 SYRINGE (ML) INJECTION AS NEEDED
Status: DISCONTINUED | OUTPATIENT
Start: 2019-03-11 | End: 2019-03-11 | Stop reason: HOSPADM

## 2019-03-11 RX ORDER — PROPOFOL 10 MG/ML
INJECTION, EMULSION INTRAVENOUS AS NEEDED
Status: DISCONTINUED | OUTPATIENT
Start: 2019-03-11 | End: 2019-03-11 | Stop reason: HOSPADM

## 2019-03-11 RX ORDER — HEPARIN SODIUM 200 [USP'U]/100ML
INJECTION, SOLUTION INTRAVENOUS
Status: COMPLETED | OUTPATIENT
Start: 2019-03-11 | End: 2019-03-11

## 2019-03-11 RX ORDER — LIDOCAINE HYDROCHLORIDE 20 MG/ML
INJECTION, SOLUTION EPIDURAL; INFILTRATION; INTRACAUDAL; PERINEURAL AS NEEDED
Status: DISCONTINUED | OUTPATIENT
Start: 2019-03-11 | End: 2019-03-11 | Stop reason: HOSPADM

## 2019-03-11 RX ORDER — NALOXONE HYDROCHLORIDE 0.4 MG/ML
0.4 INJECTION, SOLUTION INTRAMUSCULAR; INTRAVENOUS; SUBCUTANEOUS AS NEEDED
Status: DISCONTINUED | OUTPATIENT
Start: 2019-03-11 | End: 2019-03-11 | Stop reason: HOSPADM

## 2019-03-11 RX ORDER — FENTANYL CITRATE 50 UG/ML
INJECTION, SOLUTION INTRAMUSCULAR; INTRAVENOUS AS NEEDED
Status: DISCONTINUED | OUTPATIENT
Start: 2019-03-11 | End: 2019-03-11 | Stop reason: HOSPADM

## 2019-03-11 RX ORDER — ACETAMINOPHEN 325 MG/1
650 TABLET ORAL
Status: DISCONTINUED | OUTPATIENT
Start: 2019-03-11 | End: 2019-03-11 | Stop reason: HOSPADM

## 2019-03-11 RX ORDER — MIDAZOLAM HYDROCHLORIDE 1 MG/ML
INJECTION, SOLUTION INTRAMUSCULAR; INTRAVENOUS AS NEEDED
Status: DISCONTINUED | OUTPATIENT
Start: 2019-03-11 | End: 2019-03-11 | Stop reason: HOSPADM

## 2019-03-11 RX ORDER — SODIUM CHLORIDE 0.9 % (FLUSH) 0.9 %
5-40 SYRINGE (ML) INJECTION EVERY 8 HOURS
Status: DISCONTINUED | OUTPATIENT
Start: 2019-03-11 | End: 2019-03-11 | Stop reason: HOSPADM

## 2019-03-11 RX ORDER — PROPOFOL 10 MG/ML
INJECTION, EMULSION INTRAVENOUS
Status: DISCONTINUED | OUTPATIENT
Start: 2019-03-11 | End: 2019-03-11 | Stop reason: HOSPADM

## 2019-03-11 RX ADMIN — PROPOFOL 40 MG: 10 INJECTION, EMULSION INTRAVENOUS at 09:37

## 2019-03-11 RX ADMIN — FENTANYL CITRATE 50 MCG: 50 INJECTION, SOLUTION INTRAMUSCULAR; INTRAVENOUS at 09:04

## 2019-03-11 RX ADMIN — PROPOFOL 20 MG: 10 INJECTION, EMULSION INTRAVENOUS at 08:20

## 2019-03-11 RX ADMIN — PROPOFOL 30 MG: 10 INJECTION, EMULSION INTRAVENOUS at 08:31

## 2019-03-11 RX ADMIN — MIDAZOLAM HYDROCHLORIDE 2 MG: 1 INJECTION, SOLUTION INTRAMUSCULAR; INTRAVENOUS at 08:12

## 2019-03-11 RX ADMIN — PROPOFOL 20 MG: 10 INJECTION, EMULSION INTRAVENOUS at 08:36

## 2019-03-11 RX ADMIN — FENTANYL CITRATE 50 MCG: 50 INJECTION, SOLUTION INTRAMUSCULAR; INTRAVENOUS at 08:12

## 2019-03-11 RX ADMIN — PROPOFOL 50 MCG/KG/MIN: 10 INJECTION, EMULSION INTRAVENOUS at 08:17

## 2019-03-11 RX ADMIN — ACETAMINOPHEN 650 MG: 325 TABLET ORAL at 10:58

## 2019-03-11 RX ADMIN — PROPOFOL 20 MG: 10 INJECTION, EMULSION INTRAVENOUS at 09:39

## 2019-03-11 RX ADMIN — LIDOCAINE HYDROCHLORIDE 40 MG: 20 INJECTION, SOLUTION EPIDURAL; INFILTRATION; INTRACAUDAL; PERINEURAL at 08:17

## 2019-03-11 RX ADMIN — PROPOFOL 10 MG: 10 INJECTION, EMULSION INTRAVENOUS at 08:40

## 2019-03-11 RX ADMIN — PROPOFOL 50 MG: 10 INJECTION, EMULSION INTRAVENOUS at 09:02

## 2019-03-11 RX ADMIN — SODIUM CHLORIDE: 9 INJECTION, SOLUTION INTRAVENOUS at 07:58

## 2019-03-11 RX ADMIN — PROPOFOL 20 MG: 10 INJECTION, EMULSION INTRAVENOUS at 08:23

## 2019-03-11 NOTE — DISCHARGE INSTRUCTIONS
[address]  [phone]        ICD INSERTION DISCHARGE INSTRUCTIONS    Patient ID:  Saintclair Nick  [id number]  69 y.o.  1965    Admit Date: 3/11/2019    Discharge Date: 3/11/2019     Admitting Physician: Jalil Holloway MD     Discharge Physician: Jalil Holloway MD    Admission Diagnoses:   Cardiomyopathy, unspecified type [facility]) [I42.9]    Discharge Diagnoses: Active Problems:    * No active hospital problems. *      Discharge Condition: Good    Cardiology Procedures this Admission:  S/P ICD implantation. Disposition: home    Reference discharge instructions provided by nursing for diet and activity. Follow-up with ICD/PM clinic in 2 weeks. Call [phone] to make an appointment. Signed:  ISAIAH Pagan  3/11/2019  9:09 AM      DISCHARGE INSTRUCTIONS FOR PATIENTS WITH ICD'S    1. Remember to call for an appointment in 2-4 weeks [phone]. 2. Medic Alert Bracelets are available from your pharmacist to wear at all times if you choose to wear one. 3. Carry your ID card for your ICD with you at all times. This card will be given to you in the hospital or mailed to you. 4. The ICD will bulge slightly under your skin. The bulge will decrease in size over the next few weeks. Please notify the doctor's office if you notice any of the following around your ICD site:   A.  A bruise that does not go away. B.  Soreness or yellow, green, or brown drainage from the site. C. Any swelling from the site. D. If you have a fever of 100 degrees or higher that lasts for a few days. INCISION CARE       1.  Leave the dressing over your site until your follow up visit. 2.  You may not shower until after follow up visit. 3.  For comfort, wear loose fitting clothing. 4.  Ice pack to affected shoulder for first 24 hours, wear your sling for 2 days.   5.  Report any signs of infection, fever, pain, swelling, redness, oozing, or heat at site especially if these symptoms increase after the first 3 to 4 days. ACTIVITY PRECAUTIONS     1. Avoid rough contact with the implant site. 2. No driving for 14 days. 3. Avoid lifting your arm over your head, carrying anything on the affected side, or lifting over 10 pounds for 30 days. For the first 2 days only bend your arm at the elbow. 4. Any extreme activity such as golf, weight lifting or exercise biking should be restricted for 60 days. 5. Do not carry objects by holding them against your implant site. 6.  No shooting rifles or any type of gun with the affected shoulder permanently. 7.  Welding and chainsaws are prohibited. SPECIAL PRECAUTIONS     1. You should avoid all strong magnetic fields, such as arc welding, large transformers, large motors. Some ICD devices will beep if it detects a strong magnet. If this occurs, move out of the area. 2.  You may or may not have an MRI which uses a strong magnet to take pictures. 3.  Treatments or surgery that requires diathermy or electrocautery should be discussed with your doctor before scheduled. 4.  Avoid radio frequency transmitters, including radar. 5.  Advise dentist or other medical personnel you see that you have an ICD. 6.  Cell phones and microwave oven use is okay. 7.  If you plan to move or take a trip to a new area, the doctor's office will give you a name of a doctor to contact for any problems. SPECIAL INSTRUCTIONS ON SHOCKS     1. Notify your doctor for any of the following:       A. Anytime a shock is received in a 24 hour period. An office visit is not usually required for a single shock. B.  Two or more shocks in a row. If you do not feel well, call the [facility], otherwise call your doctor. This may require an office visit. C. Two or more shocks spaced apart by several hours. This may require an office visit. 2.  Keep a record of events. Include date, time, symptoms and activity at that time. ANTIBIOTIC THERAPY    During the first 8 weeks after your ICD insertion, you may need antibiotics before any dental work or certain tests or operations. Let the dentist or doctor who is caring for you know that you have had an implanted device.

## 2019-03-11 NOTE — ED NOTES
Patient given copy of dc instructions and 0 script(s). Patient (s) 0 verbalized understanding of instructions and script (s). Patient given a current medication reconciliation form and verbalized understanding of their medications. Patient verbalized understanding of the importance of discussing medications with  his or her physician or clinic they will be following up with. Patient alert and oriented and in no acute distress. Patient discharged home ambulatory with cane.

## 2019-03-11 NOTE — DISCHARGE INSTRUCTIONS
Patient Education        Upper Respiratory Infection (Cold): Care Instructions  Your Care Instructions    An upper respiratory infection, or URI, is an infection of the nose, sinuses, or throat. URIs are spread by coughs, sneezes, and direct contact. The common cold is the most frequent kind of URI. The flu and sinus infections are other kinds of URIs. Almost all URIs are caused by viruses. Antibiotics won't cure them. But you can treat most infections with home care. This may include drinking lots of fluids and taking over-the-counter pain medicine. You will probably feel better in 4 to 10 days. The doctor has checked you carefully, but problems can develop later. If you notice any problems or new symptoms, get medical treatment right away. Follow-up care is a key part of your treatment and safety. Be sure to make and go to all appointments, and call your doctor if you are having problems. It's also a good idea to know your test results and keep a list of the medicines you take. How can you care for yourself at home? · To prevent dehydration, drink plenty of fluids, enough so that your urine is light yellow or clear like water. Choose water and other caffeine-free clear liquids until you feel better. If you have kidney, heart, or liver disease and have to limit fluids, talk with your doctor before you increase the amount of fluids you drink. · Take an over-the-counter pain medicine, such as acetaminophen (Tylenol), ibuprofen (Advil, Motrin), or naproxen (Aleve). Read and follow all instructions on the label. · Before you use cough and cold medicines, check the label. These medicines may not be safe for young children or for people with certain health problems. · Be careful when taking over-the-counter cold or flu medicines and Tylenol at the same time. Many of these medicines have acetaminophen, which is Tylenol. Read the labels to make sure that you are not taking more than the recommended dose.  Too much acetaminophen (Tylenol) can be harmful. · Get plenty of rest.  · Do not smoke or allow others to smoke around you. If you need help quitting, talk to your doctor about stop-smoking programs and medicines. These can increase your chances of quitting for good. When should you call for help? Call 911 anytime you think you may need emergency care. For example, call if:    · You have severe trouble breathing.    Call your doctor now or seek immediate medical care if:    · You seem to be getting much sicker.     · You have new or worse trouble breathing.     · You have a new or higher fever.     · You have a new rash.    Watch closely for changes in your health, and be sure to contact your doctor if:    · You have a new symptom, such as a sore throat, an earache, or sinus pain.     · You cough more deeply or more often, especially if you notice more mucus or a change in the color of your mucus.     · You do not get better as expected. Where can you learn more? Go to http://maisha-shelli.info/. Enter I034 in the search box to learn more about \"Upper Respiratory Infection (Cold): Care Instructions. \"  Current as of: September 5, 2018  Content Version: 11.9  © 6306-5965 [employer]. Care instructions adapted under license by [employer] (which disclaims liability or warranty for this information). If you have questions about a medical condition or this instruction, always ask your healthcare professional. [place] [address] any warranty or liability for your use of this information.

## 2019-03-11 NOTE — ANESTHESIA POSTPROCEDURE EVALUATION
Post-Anesthesia Evaluation and Assessment    Patient: Nolan Lyle MRN: [id number]  SSN: [id number]    YOB: 1965  Age: 48 y.o. Sex: male      I have evaluated the patient and they are stable and ready for discharge from the PACU. Cardiovascular Function/Vital Signs  Visit Vitals  BP (!) 181/108   Pulse 75   Temp 36.9 °C (98.5 °F)   Resp 17   SpO2 96%       Patient is status post * No anesthesia type entered * anesthesia for Procedure(s):  INSERT ICD BIV MULTI  Venogram  Remove Pacemaker Single Lead. Nausea/Vomiting: None    Postoperative hydration reviewed and adequate. Pain:  Pain Scale 1: Numeric (0 - 10) (03/11/19 1000)  Pain Intensity 1: 0 (03/11/19 1000)   Managed    Neurological Status: At baseline    Mental Status, Level of Consciousness: Alert and  oriented to person, place, and time    Pulmonary Status:   O2 Device: Nasal cannula (03/11/19 1000)   Adequate oxygenation and airway patent    Complications related to anesthesia: None    Post-anesthesia assessment completed.  No concerns    Signed By: Yonatan Reagan MD     March 11, 2019              Procedure(s):  INSERT ICD BIV MULTI  Venogram  Remove Pacemaker Single Lead.    <BSHSIANPOST>    Visit Vitals  BP (!) 181/108   Pulse 75   Temp 36.9 °C (98.5 °F)   Resp 17   SpO2 96%

## 2019-03-11 NOTE — INTERVAL H&P NOTE
H&P Update:  Kaye Kerr was seen and examined. History and physical has been reviewed. The patient has been examined.  There have been no significant clinical changes since the completion of the originally dated History and Physical.    Signed By: Go Lloyd MD     March 11, 2019 8:06 AM

## 2019-03-11 NOTE — ROUTINE PROCESS
Cardiac Cath Lab Recovery Arrival Note:      Jovanni Morgan arrived to Cardiac Cath Lab, Recovery Area. Staff introduced to patient. Patient identifiers verified with NAME and DATE OF BIRTH. Procedure verified with patient. Consent forms reviewed and signed by patient or authorized representative and verified. Allergies verified. Patient and family oriented to department. Patient and family informed of procedure and plan of care. Questions answered with review. Patient prepped for procedure, per orders from physician, prior to arrival.    Patient on cardiac monitor, non-invasive blood pressure, SPO2 monitor. On room air. Patient is A&Ox 3. Patient reports no complaints. Patient in stretcher, in low position, with side rails up, call bell within reach, patient instructed to call if assistance as needed. Patient prep in: [address], [place] [address]. Family in: waiting area. Prep by:  CAPTAIN MODESTA GOYAL [facility]

## 2019-03-11 NOTE — PROGRESS NOTES
Patient and family member provided with discharge instructions and follow up information. Opportunity for questions and answers provided. Pt refused sling for arm immobilization stating \"It is too small and I'm just going to throw it on the floor. \" Education provided about necessity of left arm remaining immobile for physician ordered amount of time. Pt acknowledged that he would be compliant without the sling. Nurse practitioner made aware. Patient vital signs stable and PO and mobility challenge passed. IV removed and patient discharged home with family member.

## 2019-03-11 NOTE — ANESTHESIA PREPROCEDURE EVALUATION
Anesthetic History   No history of anesthetic complications            Review of Systems / Medical History  Patient summary reviewed, nursing notes reviewed and pertinent labs reviewed    Pulmonary        Sleep apnea: CPAP  Shortness of breath         Neuro/Psych   Within defined limits           Cardiovascular    Hypertension: well controlled      CHF: NYHA Classification II    Pacemaker    Exercise tolerance: <4 METS     GI/Hepatic/Renal  Within defined limits              Endo/Other    Diabetes: type 2    Obesity     Other Findings              Physical Exam    Airway  Mallampati: II  TM Distance: > 6 cm  Neck ROM: normal range of motion   Mouth opening: Normal     Cardiovascular          Murmur: Grade 2, Mitral area     Dental    Dentition: Edentulous     Pulmonary    Rhonchi:bilateral             Abdominal  GI exam deferred       Other Findings            Anesthetic Plan    ASA: 3  Anesthesia type: MAC          Induction: Intravenous  Anesthetic plan and risks discussed with: Patient

## 2019-03-11 NOTE — PROGRESS NOTES
TRANSFER - IN REPORT:    Verbal report received from Cheryle [facility] on [address] [facility]  being received from EP Lab for routine progression of care. Report consisted of patients Situation, Background, Assessment and Recommendations(SBAR). Information from the following report(s) SBAR, Kardex, Procedure Summary and MAR was reviewed with the receiving clinician. Opportunity for questions and clarification was provided. Assessment completed upon patients arrival to [address] [place] [address] and care assumed. Cardiac Cath Lab Recovery Arrival Note:    [address] [facility] arrived to [facility] recovery area. Patient procedure= PPI. Patient on cardiac monitor, non-invasive blood pressure, SPO2 monitor. On 2L of O2  lpm via NC.  IV  of NS on pump at 100 ml/hr. Patient status doing well without problems. Patient is A&Ox 4. Patient reports no complaints. PROCEDURE SITE CHECK:    Procedure site:without any bleeding and no hematoma, no pain/discomfort reported at procedure site. No change in patient status. Continue to monitor patient and status.

## 2019-03-11 NOTE — Clinical Note
TRANSFER - OUT REPORT:  
 
Verbal report given to: recovery . Report consisted of patient's Situation, Background, Assessment and  
Recommendations(SBAR). Opportunity for questions and clarification was provided. Patient transported with a Registered Nurse. Patient transported to: recovery .

## 2019-03-12 LAB
ATRIAL RATE: 83 BPM
CALCULATED R AXIS, ECG10: -68 DEGREES
CALCULATED T AXIS, ECG11: 99 DEGREES
DIAGNOSIS, 93000: NORMAL
Q-T INTERVAL, ECG07: 436 MS
QRS DURATION, ECG06: 182 MS
QTC CALCULATION (BEZET), ECG08: 509 MS
VENTRICULAR RATE, ECG03: 82 BPM

## 2019-03-13 ENCOUNTER — HOSPITAL ENCOUNTER (EMERGENCY)
Age: 54
Discharge: HOME | End: 2019-03-13
Attending: EMERGENCY MEDICINE | Admitting: EMERGENCY MEDICINE
Payer: MEDICAID

## 2019-03-13 VITALS
HEART RATE: 76 BPM | WEIGHT: 315 LBS | TEMPERATURE: 98.3 F | OXYGEN SATURATION: 94 % | HEIGHT: 72 IN | SYSTOLIC BLOOD PRESSURE: 134 MMHG | DIASTOLIC BLOOD PRESSURE: 78 MMHG | RESPIRATION RATE: 16 BRPM | BODY MASS INDEX: 42.66 KG/M2

## 2019-03-13 DIAGNOSIS — L30.9 DERMATITIS: Primary | ICD-10-CM

## 2019-03-13 PROCEDURE — 99284 EMERGENCY DEPT VISIT MOD MDM: CPT

## 2019-03-13 PROCEDURE — 74011250637 HC RX REV CODE- 250/637: Performed by: EMERGENCY MEDICINE

## 2019-03-13 RX ORDER — SULFAMETHOXAZOLE AND TRIMETHOPRIM 800; 160 MG/1; MG/1
2 TABLET ORAL 2 TIMES DAILY
Qty: 40 TAB | Refills: 0 | Status: SHIPPED | OUTPATIENT
Start: 2019-03-13 | End: 2019-05-13 | Stop reason: ALTCHOICE

## 2019-03-13 RX ORDER — CEPHALEXIN 500 MG/1
500 CAPSULE ORAL 4 TIMES DAILY
Qty: 40 CAP | Refills: 0 | Status: SHIPPED | OUTPATIENT
Start: 2019-03-13 | End: 2019-03-23

## 2019-03-13 RX ORDER — CEPHALEXIN 250 MG/1
500 CAPSULE ORAL
Status: COMPLETED | OUTPATIENT
Start: 2019-03-13 | End: 2019-03-13

## 2019-03-13 RX ORDER — SULFAMETHOXAZOLE AND TRIMETHOPRIM 800; 160 MG/1; MG/1
2 TABLET ORAL
Status: COMPLETED | OUTPATIENT
Start: 2019-03-13 | End: 2019-03-13

## 2019-03-13 RX ADMIN — SULFAMETHOXAZOLE AND TRIMETHOPRIM 2 TABLET: 800; 160 TABLET ORAL at 22:04

## 2019-03-13 RX ADMIN — CEPHALEXIN 500 MG: 250 CAPSULE ORAL at 22:04

## 2019-03-14 NOTE — ED NOTES
Pt arrived to ED via ambulatory with c/o clear and purulent drainage from Pacemaker site placed on Monday at [facility]. Site excoriated and red around tegaderm. Pt. Encouraged not to touch area as observed as frequently touching site. Lungs clear. Pt is in no acute distress. Will continue to monitor. See nursing assessment. Safety precautions in place; call light within reach. Emergency Department Nursing Plan of Care       The Nursing Plan of Care is developed from the Nursing assessment and Emergency Department Attending provider initial evaluation. The plan of care may be reviewed in the ED Provider note.     The Plan of Care was developed with the following considerations:   Patient / Family readiness to learn indicated by:verbalized understanding  Persons(s) to be included in education: patient  Barriers to Learning/Limitations:No    Signed     Araceli Donahue RN    3/13/2019   9:23 PM

## 2019-03-14 NOTE — ED PROVIDER NOTES
EMERGENCY DEPARTMENT HISTORY AND PHYSICAL EXAM      Date: 3/13/2019  Patient Name: Becka Rascon    History of Presenting Illness     Chief Complaint   Patient presents with    Wound Check       History Provided By: Patient    HPI: Becka Rascon, 48 y.o. male with PMHx significant for HTN, A-fib, heart failure, Defibrillator placement, presents ambulatory to the ED with cc erythema with associated drainage and pain to defibrillator site this afternoon. Pt reports he had a defibrillator implantation ~ 3 days ago and states he was informed to come to ED if he noticed any drainage or erythema to wound site. Pt reports having a pacemaker implanted ~ 11/14/18, with an acute infection occurring afterwards. He reports infection resolved after taking abx. He denies any modifying factors. He specifically denies any fevers, chills, nausea, vomiting, chest pain, shortness of breath, headache, diarrhea, sweating or weight loss. There are no other complaints, changes, or physical findings at this time. PCP: Mikhail Valentin MD    No current facility-administered medications on file prior to encounter. Current Outpatient Medications on File Prior to Encounter   Medication Sig Dispense Refill    traZODone (DESYREL) 100 mg tablet Take 2 Tabs by mouth nightly. For sleep 60 Tab 5    ONETOUCH ULTRA BLUE TEST STRIP strip USE TO TEST BLOOD SUGAR THREE TIMES DAILY BEFORE MEALS. 100 Strip 0    potassium chloride (K-DUR, KLOR-CON) 20 mEq tablet 2 tabs po qam, and 1 tab po qpm (Patient taking differently: 20 mEq. 2 tabs po qam, and 1 tab po qpm) 90 Tab 12    apixaban (ELIQUIS) 5 mg tablet Take 5 mg by mouth two (2) times a day.  furosemide (LASIX) 80 mg tablet 2 tabs in am 120 Tab 11    metFORMIN (GLUCOPHAGE) 1,000 mg tablet 1 1/2 pills in am and 1 pill in pm. For diabetes 75 Tab 12    lisinopril (PRINIVIL, ZESTRIL) 20 mg tablet Take 1 Tab by mouth daily.  For heart 30 Tab 12    atorvastatin (LIPITOR) 40 mg tablet TAKE ONE TABLET BY MOUTH DAILY FOR CHOLESTEROL 30 Tab 12    glipiZIDE (GLUCOTROL) 10 mg tablet Take 1 Tab by mouth two (2) times a day. 60 Tab 12    carvedilol (COREG) 12.5 mg tablet Take 1 Tab by mouth two (2) times a day. To slow heart rate down (Patient taking differently: Take 12.5 mg by mouth daily. To slow heart rate down) 60 Tab 12    Liraglutide (VICTOZA) 0.6 mg/0.1 mL (18 mg/3 mL) pnij 0.6 mg daily- 1st week, then 1.2 mg daily- 2nd week, then 1.8 mg daily. Please give pt needles also 1 Pen 12    albuterol (VENTOLIN HFA) 90 mcg/actuation inhaler INHALE TWO PUFFS BY MOUTH EVERY 4 HOURS AS NEEDED FOR FOR WHEEZING AND FOR SHORTNESS OF BREATH 1 Inhaler 12    albuterol-ipratropium (DUO-NEB) 2.5 mg-0.5 mg/3 ml nebu INHALE THREE MILLILITERS (ONE VIAL) VIA NEBULIZATION BY MOUTH EVERY 6 HOURS AS NEEDED 50 Nebule 10    ONETOUCH ULTRA BLUE TEST STRIP strip USE TO TEST BLOOD SUGAR THREE TIMES DAILY BEFORE MEALS. 100 Strip 0    umeclidinium-vilanterol (ANORO ELLIPTA) 62.5-25 mcg/actuation inhaler Take 1 Puff by inhalation daily. 1 Inhaler 12    traMADol (ULTRAM) 50 mg tablet TAKE 1 TO 2 TABLETS BY MOUTH EVERY 6 HOURS AS NEEDED FOR PAIN 60 Tab 2       Past History     Past Medical History:  Past Medical History:   Diagnosis Date    A-fib [facility]) 1/6/15    [facility] ED. Pt reported this    Atrial fibrillation with RVR ([address] 8/11/2017    Heart failure (HCC)     Hypertension     Restrictive lung disease     FVC2.59 (52%), FEV1 1.95 (49)- 2017    S/P cardiac cath     Dr. Eliane Castillo, 2016. minimal cad    Sleep apnea        Past Surgical History:  Past Surgical History:   Procedure Laterality Date    CARDIAC SURG PROCEDURE UNLIST  11/14/2018    Implantation of PACEMAKER    HX PACEMAKER      ND INSJ/RPLCMT PERM DFB W/TRNSVNS LDS 1/DUAL CHMBR N/A 3/11/2019    INSERT ICD BIV MULTI performed by Kortney Donald MD at [address]  CARDIAC CATH LAB       Family History:  History reviewed.  No pertinent family history. Social History:  Social History     Tobacco Use    Smoking status: Former Smoker     Packs/day: 1.00     Types: Cigarettes     Last attempt to quit: 2/3/2014     Years since quittin.1    Smokeless tobacco: Never Used    Tobacco comment: Quit 3 years ago   Substance Use Topics    Alcohol use: No     Comment: stopped drinking at age 29   [facility] Drug use: No       Allergies: Allergies   Allergen Reactions    Codeine Hives    Influenza Virus Vaccines Nausea Only     Fever  diarrhea    Mushroom Flavor Swelling    Oxycodone Rash    Pneumococcal 23-Allison Ps Vaccine Nausea and Vomiting     Vomit           Review of Systems   Review of Systems   Constitutional: Negative. Negative for fever. HENT: Negative. Negative for drooling, facial swelling and trouble swallowing. Eyes: Negative. Negative for discharge and redness. Respiratory: Negative. Negative for chest tightness, shortness of breath and wheezing. Cardiovascular: Negative. Negative for chest pain. Gastrointestinal: Negative. Negative for abdominal distention, abdominal pain, constipation, diarrhea, nausea and vomiting. Endocrine: Negative. Genitourinary: Negative. Negative for difficulty urinating and dysuria. Musculoskeletal: Positive for myalgias. Negative for arthralgias. Skin: Positive for color change (erythema) and rash. Allergic/Immunologic: Negative. Neurological: Negative. Negative for syncope, facial asymmetry and speech difficulty. Hematological: Negative. Psychiatric/Behavioral: Negative. Negative for agitation and confusion. All other systems reviewed and are negative. Physical Exam   Physical Exam   Constitutional: He is oriented to person, place, and time. He appears well-developed and well-nourished. HENT:   Head: Normocephalic and atraumatic. Eyes: Conjunctivae and EOM are normal.   Neck: Neck supple. Cardiovascular: Normal rate, regular rhythm and intact distal pulses. Pulmonary/Chest: No accessory muscle usage. No respiratory distress. Abdominal: Soft. Normal appearance. There is no tenderness. Musculoskeletal: Normal range of motion. Neurological: He is alert and oriented to person, place, and time. Skin: Skin is warm and dry. There is erythema. Wound clean dry intact. No purulent drainage or erythema adjacent to wound. 2 cm inferior and lateral to wound is an area of erythema and spontaneous drained blister. Psychiatric: He has a normal mood and affect. His behavior is normal. Thought content normal.   Nursing note and vitals reviewed. Diagnostic Study Results     Labs -   No results found for this or any previous visit (from the past 12 hour(s)). Radiologic Studies -   None    Medical Decision Making   I am the first provider for this patient. I reviewed the vital signs, available nursing notes, past medical history, past surgical history, family history and social history. Vital Signs-Reviewed the patient's vital signs. Patient Vitals for the past 12 hrs:   Temp Pulse Resp BP SpO2   03/13/19 2125     94 %   03/13/19 2108 98.3 °F (36.8 °C) 76 16 134/78 94 %       Pulse Oximetry Analysis - 94% on RA    Records Reviewed: Nursing Notes, Old Medical Records, Previous Radiology Studies and Previous Laboratory Studies    Provider Notes (Medical Decision Making):   DDx: dermatitis, cellulitis, wound infection     ED Course:   Initial assessment performed. The patients presenting problems have been discussed, and they are in agreement with the care plan formulated and outlined with them. I have encouraged them to ask questions as they arise throughout their visit. ED Course as of Mar 13 2218   Wed Mar 13, 2019   2211 Consult Note:  10:11 PM  Ladean Schlatter, MD spoke with Dr. Emmanuel Sinclair,  Specialty: Cardiology   Discussed pt's hx, disposition, and available diagnostic and imaging results. Reviewed care plans.  Consultant wants pt to call office in the morning to move appointment to an earlier date. [CW]      ED Course User Index  [CW] Ian Meliton PERDUE       Critical Care Time:   0 minutes    Disposition:  Discharge Note:  9:52 PM  The pt is ready for discharge. The pt's signs, symptoms, diagnosis, and discharge instructions have been discussed and pt has conveyed their understanding. The pt is to follow up as recommended or return to ER should their symptoms worsen. Plan has been discussed and pt is in agreement. PLAN:  1. Current Discharge Medication List      START taking these medications    Details   trimethoprim-sulfamethoxazole (BACTRIM DS) 160-800 mg per tablet Take 2 Tabs by mouth two (2) times a day. Qty: 40 Tab, Refills: 0      cephALEXin (KEFLEX) 500 mg capsule Take 1 Cap by mouth four (4) times daily for 10 days. Qty: 40 Cap, Refills: 0           2. Follow-up Information     Follow up With Specialties Details Why Contact Info    Kristin Sousa MD [facility] Schedule an appointment as soon as possible for a visit As needed [place] [address]  [phone]      Lana Boucher MD Cardiology, [address] [place] [address] Vascular Surgery, Internal Medicine Schedule an appointment as soon as possible for a visit in 1 day  [address] [facility]  [phone]          Return to ED if worse     Diagnosis     Clinical Impression:   1. Dermatitis        Attestations: This note is prepared by Julieth Morataya, acting as Scribe for Luis Keller MD.    Luis Keller MD: The scribe's documentation has been prepared under my direction and personally reviewed by me in its entirety.  I confirm that the note above accurately reflects all work, treatment, procedures, and medical decision making performed by me

## 2019-03-14 NOTE — DISCHARGE INSTRUCTIONS
Patient Education        Dermatitis: Care Instructions  Your Care Instructions  Dermatitis is the general name used for any rash or inflammation of the skin. Different kinds of dermatitis cause different kinds of rashes. Common causes of a rash include new medicines, plants (such as poison oak or poison ivy), heat, and stress. Certain illnesses can also cause a rash. An allergic reaction to something that touches your skin, such as latex, nickel, or poison ivy, is called contact dermatitis. Contact dermatitis may also be caused by something that irritates the skin, such as bleach, a chemical, or soap. These types of rashes cannot be spread from person to person. How long your rash will last depends on what caused it. Rashes may last a few days or months. Follow-up care is a key part of your treatment and safety. Be sure to make and go to all appointments, and call your doctor if you are having problems. It's also a good idea to know your test results and keep a list of the medicines you take. How can you care for yourself at home? · Do not scratch the rash. Cut your nails short, and file them smooth. Or wear gloves if this helps keep you from scratching. · Wash the area with water only. Pat dry. · Put cold, wet cloths on the rash to reduce itching. · Keep cool, and stay out of the sun. · Leave the rash open to the air as much as possible. · If the rash itches, use hydrocortisone cream. Follow the directions on the label. Calamine lotion may help for plant rashes. · Take an over-the-counter antihistamine, such as diphenhydramine (Benadryl) or loratadine (Claritin), to help calm the itching. Read and follow all instructions on the label. · If your doctor prescribed a cream, use it as directed. If your doctor prescribed medicine, take it exactly as directed. When should you call for help?   Call your doctor now or seek immediate medical care if:    · You have symptoms of infection, such as:  ? Increased pain, swelling, warmth, or redness. ? Red streaks leading from the area. ? Pus draining from the area. ? A fever.     · You have joint pain along with the rash.    Watch closely for changes in your health, and be sure to contact your doctor if:    · Your rash is changing or getting worse.     · You are not getting better as expected. Where can you learn more? Go to http://maisha-shelli.info/. Enter (36) 3839 7117 in the search box to learn more about \"Dermatitis: Care Instructions. \"  Current as of: April 17, 2018  Content Version: 11.9  © 4032-0261 [employer]. Care instructions adapted under license by [employer] (which disclaims liability or warranty for this information). If you have questions about a medical condition or this instruction, always ask your healthcare professional. [address] any warranty or liability for your use of this information. Patient Education        Infection After Surgery: Care Instructions  Your Care Instructions  After surgery, an infection is always possible. It doesn't mean that the surgery didn't go well. Because an infection can be serious, your doctor has taken steps to manage it. Your doctor checked the infection and cleaned it if necessary. He or she may have made an opening in the area so that the pus can drain out. You may have gauze in the cut so that the area will stay open and keep draining. You may need antibiotics. You will need to follow up with your doctor to make sure the infection has gone away. Follow-up care is a key part of your treatment and safety. Be sure to make and go to all appointments, and call your doctor if you are having problems. It's also a good idea to know your test results and keep a list of the medicines you take. How can you care for yourself at home? · Make sure your surgeon knows that you saw a doctor about the infection.   · If your doctor prescribed antibiotics, take them as directed. Do not stop taking them just because you feel better. You need to take the full course of antibiotics. · Ask your doctor if you can take an over-the-counter pain medicine, such as acetaminophen (Tylenol), ibuprofen (Advil, Motrin), or naproxen (Aleve). Be safe with medicines. Read and follow all instructions on the label. · Do not take two or more pain medicines at the same time unless the doctor told you to. Many pain medicines have acetaminophen, which is Tylenol. Too much acetaminophen (Tylenol) can be harmful. · Prop up the area on a pillow anytime you sit or lie down during the next 3 days. Try to keep it above the level of your heart. This will help reduce swelling. · Keep the skin clean and dry. · If you have a bandage, keep it clean and dry. · You may have a dressing over the cut (incision). A dressing helps the incision heal and protects it. Your doctor will tell you how to take care of this. You can expect drainage from the wound. · If your doctor told you how to care for your incision, follow your doctor's instructions. If you did not get instructions, follow this general advice:  ? Wash around the incision with clean water 2 times a day. Don't use hydrogen peroxide or alcohol, which can slow healing. When should you call for help? Call your doctor now or seek immediate medical care if:    · You have signs that your infection is getting worse, such as:  ? Increased pain, swelling, warmth, or redness in the area. ? Red streaks leading from the area. ? Pus draining from the wound. ? A new or higher fever.    Watch closely for changes in your health, and be sure to contact your doctor if you have any problems. Where can you learn more? Go to http://maisha-shelli.info/. Enter C340 in the search box to learn more about \"Infection After Surgery: Care Instructions. \"  Current as of: September 23, 2018  Content Version: 11.9  © 6908-8700 [employer].  Care instructions adapted under license by [employer] (which disclaims liability or warranty for this information). If you have questions about a medical condition or this instruction, always ask your healthcare professional. [address] any warranty or liability for your use of this information.

## 2019-03-14 NOTE — ED TRIAGE NOTES
Had ICD replaced Monday at [facility]. Here for evaluation of site as he reports it has started oozing.

## 2019-03-20 ENCOUNTER — TELEPHONE (OUTPATIENT)
Dept: FAMILY MEDICINE CLINIC | Age: 54
End: 2019-03-20

## 2019-03-20 NOTE — TELEPHONE ENCOUNTER
Patient called stating that he would like a referral to have a colonoscopy. Patient stated that its a requirement with his insurance. Patient can be reached at [phone].

## 2019-03-25 DIAGNOSIS — Z02.6 ENCOUNTER FOR EXAMINATION FOR INSURANCE PURPOSES: Primary | ICD-10-CM

## 2019-04-01 ENCOUNTER — CLINICAL SUPPORT (OUTPATIENT)
Dept: CARDIOLOGY CLINIC | Age: 54
End: 2019-04-01

## 2019-04-01 DIAGNOSIS — Z95.810 PRE-OPERATIVE CARDIOVASCULAR EXAMINATION, ICD IN PLACE: Primary | ICD-10-CM

## 2019-04-01 DIAGNOSIS — Z51.89 VISIT FOR WOUND CHECK: ICD-10-CM

## 2019-04-01 DIAGNOSIS — Z95.810 ICD (IMPLANTABLE CARDIOVERTER-DEFIBRILLATOR) IN PLACE: Primary | ICD-10-CM

## 2019-04-01 DIAGNOSIS — I42.9 CARDIOMYOPATHY, UNSPECIFIED TYPE (HCC): ICD-10-CM

## 2019-04-01 DIAGNOSIS — I50.20 SYSTOLIC CONGESTIVE HEART FAILURE, UNSPECIFIED HF CHRONICITY (HCC): ICD-10-CM

## 2019-04-01 DIAGNOSIS — Z01.810 PRE-OPERATIVE CARDIOVASCULAR EXAMINATION, ICD IN PLACE: Primary | ICD-10-CM

## 2019-04-01 NOTE — PROGRESS NOTES
Reji Joe  1965  Julian Puente MD          Subjective:    Patient is here for 2 week site check and device interrogation after pacemaker implantation. The patient denies chest pain/shortness of breath or fevers. Patient Active Problem List    Diagnosis Date Noted    Cardiomyopathy ([facility] 03/11/2019     Priority: 1 - One    Cardiomyopathy, nonischemic ([facility] 11/17/2018    Controlled type 2 diabetes mellitus without complication, without long-term current use of insulin ([facility] 08/15/2018    Sleep apnea     Obesity, morbid ([facility] 12/20/2017    Atrial fibrillation with RVR ([facility] 08/11/2017    CHF (congestive heart failure) ([facility] 08/10/2017    Restrictive lung disease     Dyspnea 07/15/2014    Hypertension 02/17/2014      Past Medical History:   Diagnosis Date    A-fib [facility]) 1/6/15    [facility]. Pt reported this    Atrial fibrillation with RVR ([facility] 8/11/2017    Heart failure (HCC)     Hypertension     Restrictive lung disease     FVC2.59 (52%), FEV1 1.95 (49)- 2017    S/P cardiac cath     Dr. Em Majano, 2016. minimal cad    Sleep apnea       Past Surgical History:   Procedure Laterality Date    CARDIAC SURG PROCEDURE UNLIST  11/14/2018    Implantation of PACEMAKER    HX PACEMAKER      CO INSJ/RPLCMT PERM DFB W/TRNSVNS LDS 1/DUAL CHMBR N/A 3/11/2019    INSERT ICD BIV MULTI performed by Chen Leo MD at [facility] CARDIAC CATH LAB     Allergies   Allergen Reactions    Codeine Hives    Influenza Virus Vaccines Nausea Only     Fever  diarrhea    Mushroom Flavor Swelling    Oxycodone Rash    Pneumococcal 23-Allison Ps Vaccine Nausea and Vomiting     Vomit        History reviewed. No pertinent family history. Current Outpatient Medications   Medication Sig    trimethoprim-sulfamethoxazole (BACTRIM DS) 160-800 mg per tablet Take 2 Tabs by mouth two (2) times a day.     albuterol-ipratropium (DUO-NEB) 2.5 mg-0.5 mg/3 ml nebu INHALE THREE MILLILITERS (ONE VIAL) VIA NEBULIZATION BY MOUTH EVERY 6 HOURS AS NEEDED    traZODone (DESYREL) 100 mg tablet Take 2 Tabs by mouth nightly. For sleep    ONETOUCH ULTRA BLUE TEST STRIP strip USE TO TEST BLOOD SUGAR THREE TIMES DAILY BEFORE MEALS.  potassium chloride (K-DUR, KLOR-CON) 20 mEq tablet 2 tabs po qam, and 1 tab po qpm (Patient taking differently: 20 mEq. 2 tabs po qam, and 1 tab po qpm)    apixaban (ELIQUIS) 5 mg tablet Take 5 mg by mouth two (2) times a day.  furosemide (LASIX) 80 mg tablet 2 tabs in am    ONETOUCH ULTRA BLUE TEST STRIP strip USE TO TEST BLOOD SUGAR THREE TIMES DAILY BEFORE MEALS.  metFORMIN (GLUCOPHAGE) 1,000 mg tablet 1 1/2 pills in am and 1 pill in pm. For diabetes    lisinopril (PRINIVIL, ZESTRIL) 20 mg tablet Take 1 Tab by mouth daily. For heart    atorvastatin (LIPITOR) 40 mg tablet TAKE ONE TABLET BY MOUTH DAILY FOR CHOLESTEROL    glipiZIDE (GLUCOTROL) 10 mg tablet Take 1 Tab by mouth two (2) times a day.  carvedilol (COREG) 12.5 mg tablet Take 1 Tab by mouth two (2) times a day. To slow heart rate down (Patient taking differently: Take 12.5 mg by mouth daily. To slow heart rate down)    umeclidinium-vilanterol (ANORO ELLIPTA) 62.5-25 mcg/actuation inhaler Take 1 Puff by inhalation daily.  traMADol (ULTRAM) 50 mg tablet TAKE 1 TO 2 TABLETS BY MOUTH EVERY 6 HOURS AS NEEDED FOR PAIN    Liraglutide (VICTOZA) 0.6 mg/0.1 mL (18 mg/3 mL) pnij 0.6 mg daily- 1st week, then 1.2 mg daily- 2nd week, then 1.8 mg daily. Please give pt needles also    albuterol (VENTOLIN HFA) 90 mcg/actuation inhaler INHALE TWO PUFFS BY MOUTH EVERY 4 HOURS AS NEEDED FOR FOR WHEEZING AND FOR SHORTNESS OF BREATH     No current facility-administered medications for this visit. Review of Systems:    General: Pt denies excessive weight gain or loss. Pt is able to conduct ADL's  Respiratory: Denies shortness of breath, HAMM, wheezing or stridor.   Cardiovascular: Denies precordial pain, palpitations, edema or PND        Physical ExamPhysical Exam:      General: Well developed, in no acute distress. .  Chest: left subclavian pacer site approximated well  Neuro: A&Ox3, speech clear, gait stable. Assessment:   Assessment:     ICD-10-CM ICD-9-CM    1. ICD (implantable cardioverter-defibrillator) in place Z95.810 V45.02    2. Visit for wound check Z51.89 V58.89         Plan:     Patient feels well following upgrade to BIV ICD mplantation. Left subclavian pacemaker site approximated well, no discharge. No erythema or heat. He was seen in the ER at [facility] for possible site infection. He had drainage from a blister formed d/t adhesive, incision site was normal. He still has some erythema where outer edge of dresing had formed blister. Advised him to follow up promptly for recurrence of drainage. Follow up as planned.      Neptali Smith NP

## 2019-05-13 ENCOUNTER — OFFICE VISIT (OUTPATIENT)
Dept: FAMILY MEDICINE CLINIC | Age: 54
End: 2019-05-13

## 2019-05-13 VITALS
SYSTOLIC BLOOD PRESSURE: 122 MMHG | DIASTOLIC BLOOD PRESSURE: 64 MMHG | RESPIRATION RATE: 14 BRPM | BODY MASS INDEX: 42.66 KG/M2 | TEMPERATURE: 97.1 F | WEIGHT: 315 LBS | HEIGHT: 72 IN | OXYGEN SATURATION: 95 % | HEART RATE: 70 BPM

## 2019-05-13 DIAGNOSIS — I10 ESSENTIAL HYPERTENSION: ICD-10-CM

## 2019-05-13 DIAGNOSIS — L03.811 CELLULITIS OF HEAD EXCEPT FACE: Primary | ICD-10-CM

## 2019-05-13 DIAGNOSIS — E66.01 MORBID OBESITY (HCC): ICD-10-CM

## 2019-05-13 DIAGNOSIS — E11.9 CONTROLLED TYPE 2 DIABETES MELLITUS WITHOUT COMPLICATION, WITHOUT LONG-TERM CURRENT USE OF INSULIN (HCC): ICD-10-CM

## 2019-05-13 LAB — HBA1C MFR BLD HPLC: 8 %

## 2019-05-13 RX ORDER — CLINDAMYCIN HYDROCHLORIDE 300 MG/1
300 CAPSULE ORAL 3 TIMES DAILY
Qty: 30 CAP | Refills: 0 | Status: SHIPPED | OUTPATIENT
Start: 2019-05-13 | End: 2019-05-23

## 2019-05-13 NOTE — PROGRESS NOTES
Chief Complaint   Patient presents with    Headache    Breathing Problem    Heart Problem    LOW BACK PAIN     1. Have you been to the ER, urgent care clinic since your last visit? Hospitalized since your last visit? No    2. Have you seen or consulted any other health care providers outside of the [address] [place] [address] since your last visit? Include any pap smears or colon screening.  No     Health Maintenance Due   Topic Date Due    Pneumococcal 0-64 years (1 of 1 - PPSV23) 01/05/1973    FOOT EXAM Q1  01/05/1977    MICROALBUMIN Q1  01/05/1977    EYE EXAM RETINAL OR DILATED  01/05/1977    Shingrix Vaccine Age 50> (1 of 2) 01/05/2017    FOBT Q 1 YEAR AGE 50-75  01/05/2017    HEMOGLOBIN A1C Q6M  05/15/2019

## 2019-05-13 NOTE — PROGRESS NOTES
HISTORY OF PRESENT ILLNESS  Sherif Beltre is a 48 y.o. male. HPI in for followup CHF. Breathing has been reasonably well. Slight chest pain when takes a deep breath. Has had a painful bump on the R side of scalp for the last 2 days. No swelling in feet. Takes a breathing treatment occasionally- seems to help. ROS    Physical Exam   Constitutional: He appears well-developed and well-nourished. HENT:   Right Ear: External ear normal.   Left Ear: External ear normal.   Mouth/Throat: Oropharynx is clear and moist.   Neck: No thyromegaly present. Cardiovascular: Normal rate, regular rhythm, normal heart sounds and intact distal pulses. Pulmonary/Chest: Effort normal and breath sounds normal. No respiratory distress. He has no wheezes. Abdominal: Soft. Bowel sounds are normal. He exhibits no distension and no mass. There is no tenderness. There is no guarding. Musculoskeletal: Normal range of motion. He exhibits no edema. Lymphadenopathy:     He has no cervical adenopathy. Skin:   Erythematous, tender 2 x 2 cm area R anterior scalp   Nursing note and vitals reviewed. ASSESSMENT and PLAN  Orders Placed This Encounter    METABOLIC PANEL, COMPREHENSIVE    LIPID PANEL    AMB POC HEMOGLOBIN A1C    clindamycin (CLEOCIN) 300 mg capsule     Diagnoses and all orders for this visit:    1. Cellulitis of head except face    2. Essential hypertension  -     METABOLIC PANEL, COMPREHENSIVE    3. Morbid obesity ([address]    4. Controlled type 2 diabetes mellitus without complication, without long-term current use of insulin ([facility])  -     LIPID PANEL  -     AMB POC HEMOGLOBIN A1C    Other orders  -     clindamycin (CLEOCIN) 300 mg capsule; Take 1 Cap by mouth three (3) times daily for 10 days.

## 2019-05-14 LAB
ALBUMIN SERPL-MCNC: 4 G/DL (ref 3.5–5.5)
ALBUMIN/GLOB SERPL: 1.4 {RATIO} (ref 1.2–2.2)
ALP SERPL-CCNC: 73 IU/L (ref 39–117)
ALT SERPL-CCNC: 26 IU/L (ref 0–44)
AST SERPL-CCNC: 22 IU/L (ref 0–40)
BILIRUB SERPL-MCNC: 0.7 MG/DL (ref 0–1.2)
BUN SERPL-MCNC: 16 MG/DL (ref 6–24)
BUN/CREAT SERPL: 16 (ref 9–20)
CALCIUM SERPL-MCNC: 8.9 MG/DL (ref 8.7–10.2)
CHLORIDE SERPL-SCNC: 96 MMOL/L (ref 96–106)
CHOLEST SERPL-MCNC: 101 MG/DL (ref 100–199)
CO2 SERPL-SCNC: 25 MMOL/L (ref 20–29)
CREAT SERPL-MCNC: 1.03 MG/DL (ref 0.76–1.27)
GLOBULIN SER CALC-MCNC: 2.8 G/DL (ref 1.5–4.5)
GLUCOSE SERPL-MCNC: 251 MG/DL (ref 65–99)
HDLC SERPL-MCNC: 25 MG/DL
INTERPRETATION, 910389: NORMAL
LDLC SERPL CALC-MCNC: 12 MG/DL (ref 0–99)
Lab: NORMAL
POTASSIUM SERPL-SCNC: 3.8 MMOL/L (ref 3.5–5.2)
PROT SERPL-MCNC: 6.8 G/DL (ref 6–8.5)
SODIUM SERPL-SCNC: 139 MMOL/L (ref 134–144)
TRIGL SERPL-MCNC: 322 MG/DL (ref 0–149)
VLDLC SERPL CALC-MCNC: 64 MG/DL (ref 5–40)

## 2019-05-16 ENCOUNTER — APPOINTMENT (OUTPATIENT)
Dept: CT IMAGING | Age: 54
End: 2019-05-16
Attending: EMERGENCY MEDICINE
Payer: MEDICAID

## 2019-05-16 ENCOUNTER — HOSPITAL ENCOUNTER (EMERGENCY)
Age: 54
Discharge: HOME | End: 2019-05-17
Attending: EMERGENCY MEDICINE
Payer: MEDICAID

## 2019-05-16 VITALS
HEART RATE: 71 BPM | RESPIRATION RATE: 20 BRPM | DIASTOLIC BLOOD PRESSURE: 85 MMHG | HEIGHT: 72 IN | OXYGEN SATURATION: 96 % | WEIGHT: 315 LBS | TEMPERATURE: 98 F | SYSTOLIC BLOOD PRESSURE: 157 MMHG | BODY MASS INDEX: 42.66 KG/M2

## 2019-05-16 DIAGNOSIS — M54.2 NECK PAIN: Primary | ICD-10-CM

## 2019-05-16 DIAGNOSIS — B02.30 HERPES ZOSTER OPHTHALMICUS OF RIGHT EYE: ICD-10-CM

## 2019-05-16 PROCEDURE — 99284 EMERGENCY DEPT VISIT MOD MDM: CPT

## 2019-05-16 PROCEDURE — 93005 ELECTROCARDIOGRAM TRACING: CPT

## 2019-05-16 PROCEDURE — 74011250637 HC RX REV CODE- 250/637: Performed by: EMERGENCY MEDICINE

## 2019-05-16 PROCEDURE — 96374 THER/PROPH/DIAG INJ IV PUSH: CPT

## 2019-05-16 PROCEDURE — 74011000250 HC RX REV CODE- 250: Performed by: EMERGENCY MEDICINE

## 2019-05-16 PROCEDURE — 70450 CT HEAD/BRAIN W/O DYE: CPT

## 2019-05-16 PROCEDURE — 74011250636 HC RX REV CODE- 250/636: Performed by: EMERGENCY MEDICINE

## 2019-05-16 RX ORDER — HYDROCODONE BITARTRATE AND ACETAMINOPHEN 5; 325 MG/1; MG/1
1 TABLET ORAL
Status: COMPLETED | OUTPATIENT
Start: 2019-05-16 | End: 2019-05-16

## 2019-05-16 RX ORDER — DIAZEPAM 5 MG/1
5 TABLET ORAL
Status: COMPLETED | OUTPATIENT
Start: 2019-05-16 | End: 2019-05-16

## 2019-05-16 RX ORDER — KETOROLAC TROMETHAMINE 30 MG/ML
30 INJECTION, SOLUTION INTRAMUSCULAR; INTRAVENOUS
Status: COMPLETED | OUTPATIENT
Start: 2019-05-16 | End: 2019-05-16

## 2019-05-16 RX ORDER — TETRACAINE HYDROCHLORIDE 5 MG/ML
1 SOLUTION OPHTHALMIC
Status: COMPLETED | OUTPATIENT
Start: 2019-05-16 | End: 2019-05-16

## 2019-05-16 RX ADMIN — HYDROCODONE BITARTRATE AND ACETAMINOPHEN 1 TABLET: 5; 325 TABLET ORAL at 22:57

## 2019-05-16 RX ADMIN — TETRACAINE HYDROCHLORIDE 1 DROP: 5 SOLUTION OPHTHALMIC at 22:57

## 2019-05-16 RX ADMIN — FLUORESCEIN SODIUM 1 STRIP: 0.6 STRIP OPHTHALMIC at 23:28

## 2019-05-16 RX ADMIN — KETOROLAC TROMETHAMINE 30 MG: 30 INJECTION, SOLUTION INTRAMUSCULAR; INTRAVENOUS at 22:57

## 2019-05-16 RX ADMIN — DIAZEPAM 5 MG: 5 TABLET ORAL at 22:57

## 2019-05-17 LAB
ATRIAL RATE: 500 BPM
CALCULATED R AXIS, ECG10: -125 DEGREES
CALCULATED T AXIS, ECG11: 108 DEGREES
DIAGNOSIS, 93000: NORMAL
Q-T INTERVAL, ECG07: 444 MS
QRS DURATION, ECG06: 150 MS
QTC CALCULATION (BEZET), ECG08: 479 MS
VENTRICULAR RATE, ECG03: 70 BPM

## 2019-05-17 PROCEDURE — 74011250637 HC RX REV CODE- 250/637: Performed by: EMERGENCY MEDICINE

## 2019-05-17 RX ORDER — ACYCLOVIR 800 MG/1
800 TABLET ORAL
Qty: 35 TAB | Refills: 0 | Status: SHIPPED | OUTPATIENT
Start: 2019-05-17 | End: 2019-05-24

## 2019-05-17 RX ORDER — TRAMADOL HYDROCHLORIDE 50 MG/1
50 TABLET ORAL
Qty: 20 TAB | Refills: 0 | Status: SHIPPED | OUTPATIENT
Start: 2019-05-17 | End: 2019-05-27

## 2019-05-17 RX ORDER — DIPHENHYDRAMINE HCL 25 MG
25 CAPSULE ORAL
Status: COMPLETED | OUTPATIENT
Start: 2019-05-17 | End: 2019-05-17

## 2019-05-17 RX ORDER — NAPROXEN 500 MG/1
500 TABLET ORAL 2 TIMES DAILY WITH MEALS
Qty: 20 TAB | Refills: 0 | Status: SHIPPED | OUTPATIENT
Start: 2019-05-17 | End: 2019-05-27

## 2019-05-17 RX ORDER — DIPHENHYDRAMINE HCL 25 MG
25 CAPSULE ORAL
Status: DISCONTINUED | OUTPATIENT
Start: 2019-05-17 | End: 2019-05-17

## 2019-05-17 RX ORDER — DIAZEPAM 5 MG/1
5 TABLET ORAL
Qty: 15 TAB | Refills: 0 | Status: SHIPPED | OUTPATIENT
Start: 2019-05-17 | End: 2019-09-03 | Stop reason: ALTCHOICE

## 2019-05-17 RX ORDER — HYDROXYZINE 25 MG/1
25 TABLET, FILM COATED ORAL
Status: COMPLETED | OUTPATIENT
Start: 2019-05-17 | End: 2019-05-17

## 2019-05-17 RX ADMIN — DIPHENHYDRAMINE HYDROCHLORIDE 25 MG: 25 CAPSULE ORAL at 00:22

## 2019-05-17 RX ADMIN — HYDROXYZINE HYDROCHLORIDE 25 MG: 25 TABLET ORAL at 01:00

## 2019-05-17 NOTE — ED TRIAGE NOTES
Pt comes in with c/o R neck pain that shoots to base of R skull starting on 05/12. Pt reports that around [address] that there was a flash of pain that made him fall to his knee. Pt has hx stroke and pacemaker. Pt has L sided deficit from past storke. Pt denies any new vision changes.

## 2019-05-17 NOTE — DISCHARGE INSTRUCTIONS
Patient Education        Neck Pain: Care Instructions  Your Care Instructions    You can have neck pain anywhere from the bottom of your head to the top of your shoulders. It can spread to the upper back or arms. Injuries, painting a ceiling, sleeping with your neck twisted, staying in one position for too long, and many other activities can cause neck pain. Most neck pain gets better with home care. Your doctor may recommend medicine to relieve pain or relax your muscles. He or she may suggest exercise and physical therapy to increase flexibility and relieve stress. You may need to wear a special (cervical) collar to support your neck for a day or two. Follow-up care is a key part of your treatment and safety. Be sure to make and go to all appointments, and call your doctor if you are having problems. It's also a good idea to know your test results and keep a list of the medicines you take. How can you care for yourself at home? · Try using a heating pad on a low or medium setting for 15 to 20 minutes every 2 or 3 hours. Try a warm shower in place of one session with the heating pad. · You can also try an ice pack for 10 to 15 minutes every 2 to 3 hours. Put a thin cloth between the ice and your skin. · Take pain medicines exactly as directed. ¨ If the doctor gave you a prescription medicine for pain, take it as prescribed. ¨ If you are not taking a prescription pain medicine, ask your doctor if you can take an over-the-counter medicine. · If your doctor recommends a cervical collar, wear it exactly as directed. When should you call for help? Call your doctor now or seek immediate medical care if:  ? · You have new or worsening numbness in your arms, buttocks or legs. ? · You have new or worsening weakness in your arms or legs. (This could make it hard to stand up.)   ? · You lose control of your bladder or bowels. ? Watch closely for changes in your health, and be sure to contact your doctor if:  ? · Your neck pain is getting worse. ? · You are not getting better after 1 week. ? · You do not get better as expected. Where can you learn more? Go to http://maisha-shelli.info/. Enter [id number] in the search box to learn more about \"Neck Pain: Care Instructions. \"  Current as of: March 21, 2017  Content Version: 11.5  © 6082-9570 [employer]. Care instructions adapted under license by [employer] (which disclaims liability or warranty for this information). If you have questions about a medical condition or this instruction, always ask your healthcare professional. [place] [address] any warranty or liability for your use of this information. Patient Education        Shingles: Care Instructions  Your Care Instructions    Shingles (herpes zoster) causes pain and a blistered rash. The rash can appear anywhere on the body but will be on only one side of the body, the left or right. It will be in a band, a strip, or a small area. The pain can be very severe. Shingles can also cause tingling or itching in the area of the rash. The blisters scab over after a few days and heal in 2 to 4 weeks. Medicines can help you feel better and may help prevent more serious problems caused by shingles. Shingles is caused by the same virus that causes chickenpox. When you have chickenpox, the virus gets into your nerve roots and stays there (becomes dormant) long after you get over the chickenpox. If the virus becomes active again, it can cause shingles. Follow-up care is a key part of your treatment and safety. Be sure to make and go to all appointments, and call your doctor if you are having problems. It's also a good idea to know your test results and keep a list of the medicines you take. How can you care for yourself at home? · Be safe with medicines. Take your medicines exactly as prescribed.  Call your doctor if you think you are having a problem with your medicine. Antiviral medicine helps you get better faster. · Try not to scratch or pick at the blisters. They will crust over and fall off on their own if you leave them alone. · Put cool, wet cloths on the area to relieve pain and itching. You can also use calamine lotion. Try not to use so much lotion that it cakes and is hard to get off. · Put cornstarch or baking soda on the sores to help dry them out so they heal faster. · Do not use thick ointment, such as petroleum jelly, on the sores. This will keep them from drying and healing. · To help remove loose crusts, soak them in tap water. This can help decrease oozing, and dry and soothe the skin. · Take an over-the-counter pain medicine, such as acetaminophen (Tylenol), ibuprofen (Advil, Motrin), or naproxen (Aleve). Read and follow all instructions on the label. · Avoid close contact with people until the blisters have healed. It is very important for you to avoid contact with anyone who has never had chickenpox or the chickenpox vaccine. Pregnant women, young babies, and anyone else who has a hard time fighting infection (such as someone with HIV, diabetes, or cancer) is especially at risk. When should you call for help? Call your doctor now or seek immediate medical care if:    · You have a new or higher fever.     · You have a severe headache and a stiff neck.     · You lose the ability to think clearly.     · The rash spreads to your forehead, nose, eyes, or eyelids.     · You have eye pain, or your vision gets worse.     · You have new pain in your face, or you cannot move the muscles in your face.     · Blisters spread to new parts of your body.    Watch closely for changes in your health, and be sure to contact your doctor if:    · The rash has not healed after 2 to 4 weeks.     · You still have pain after the rash has healed. Where can you learn more? Go to http://maisha-shelli.info/.   Leona Bernard in the search box to learn more about \"Shingles: Care Instructions. \"  Current as of: July 30, 2018  Content Version: 11.9  © 4308-1294 [employer]. Care instructions adapted under license by [employer] (which disclaims liability or warranty for this information). If you have questions about a medical condition or this instruction, always ask your healthcare professional. [address] any warranty or liability for your use of this information. Patient Education        Torticollis: Care Instructions  Your Care Instructions  Torticollis is a severe tightness of the muscles on one side of the neck. The tight muscles can make the head turn to one side, lean to one side, or be pulled forward or backward. It is also called wryneck. Your doctor asked questions about your health and examined you. You may also have had X-rays or other tests. If your doctor thinks another medical problem is causing your tight neck muscles, you may need more tests. Torticollis usually gets better with home care. Your doctor may have you take medicine to relieve pain or relax your muscles. He or she may suggest exercise and physical therapy to help increase flexibility and relieve stress. Your doctor may also have you wear a special collar, called a cervical collar, for a day or two. The collar may help make your neck more comfortable. Follow-up care is a key part of your treatment and safety. Be sure to make and go to all appointments, and call your doctor if you are having problems. It's also a good idea to know your test results and keep a list of the medicines you take. How can you care for yourself at home? · Be safe with medicines. Read and follow all instructions on the label. ? If the doctor gave you a prescription medicine for pain, take it as prescribed. ? If you are not taking a prescription pain medicine, ask your doctor if you can take an over-the-counter medicine.   · Try using a heating pad on a low or medium setting for 15 to 20 minutes every 2 or 3 hours. Try a warm shower in place of one session with the heating pad. · Try using an ice pack for 10 to 15 minutes every 2 to 3 hours. Put a thin cloth between the ice and your skin. · If your doctor recommends a cervical collar, wear it exactly as directed. When should you call for help? Call your doctor now or seek immediate medical care if:    · You have new or worse numbness in your arms, buttocks, or legs.     · You have new or worse weakness in your arms or legs.     · Your neck pain gets worse.     · You lose bladder or bowel control.    Watch closely for changes in your health, and be sure to contact your doctor if:    · You do not get better as expected. Where can you learn more? Go to http://maisha-shelli.info/. Enter O392 in the search box to learn more about \"Torticollis: Care Instructions. \"  Current as of: September 20, 2018  Content Version: 11.9  © 5796-3321 [employer]. Care instructions adapted under license by [employer] (which disclaims liability or warranty for this information). If you have questions about a medical condition or this instruction, always ask your healthcare professional. [place] [address] any warranty or liability for your use of this information.

## 2019-05-17 NOTE — ED PROVIDER NOTES
68-year-old male with history of hypertension, CHF, A. fib and CVA 4 years ago at an [facility] facility with some residual left-sided weakness presents with right neck pain radiating up to his right posterior head since Sunday, 5 days ago. He denies injury, paresthesias, history of neck problems. The pain is worse with movement. He states that today it became so bad that he fell to his knees. He is concerned because he did have a headache with his CVA in the past, although he also had neurologic deficits at that time. The pain is still 9 out of 10 despite ibuprofen. Also reports off-and-on dizziness. Right sided neck pain. As I am on my way out of the room he asked to look at a rash on his scalp for which his primary care doctor prescribed him clindamycin. He states it is getting worse. When I asked regarding the onset of his rash, he states first he noticed pain while he was washing his hair and then the rash broke out. Past Medical History:  
Diagnosis Date  A-fib ([facility] 1/6/15 [facility] ED. Pt reported this  Asthma  Atrial fibrillation with RVR ([address] 8/11/2017  Chronic obstructive pulmonary disease ([address]  Diabetes ([address]  Gastrointestinal disorder GERD  Heart failure ([address]  High cholesterol  Hypertension  Restrictive lung disease FVC2.59 (52%), FEV1 1.95 (49)- 2017  S/P cardiac cath Dr. Adria Berry, 2016. minimal cad  Sleep apnea  Stroke ([facility] [address] TIA x 2 Past Surgical History:  
Procedure Laterality Date  CARDIAC SURG PROCEDURE UNLIST  11/14/2018 Implantation of PACEMAKER  
 HX PACEMAKER    
 NC INSJ/RPLCMT PERM DFB W/TRNSVNS LDS 1/DUAL CHMBR N/A 3/11/2019 INSERT ICD BIV MULTI performed by Darlene Olmos MD at [address]  CARDIAC CATH LAB History reviewed. No pertinent family history. Social History Socioeconomic History  Marital status: SINGLE Spouse name: Not on file  Number of children: Not on file  Years of education: Not on file  Highest education level: Not on file Occupational History  Not on file Social Needs  Financial resource strain: Not on file  Food insecurity:  
  Worry: Not on file Inability: Not on file  Transportation needs:  
  Medical: Not on file Non-medical: Not on file Tobacco Use  Smoking status: Former Smoker Packs/day: 1.00 Types: Cigarettes Last attempt to quit: 2/3/2014 Years since quittin.2  Smokeless tobacco: Never Used  Tobacco comment: Quit 3 years ago Substance and Sexual Activity  Alcohol use: No  
  Comment: stopped drinking at age 29  Drug use: No  
 Sexual activity: Not Currently Lifestyle  Physical activity:  
  Days per week: Not on file Minutes per session: Not on file  Stress: Not on file Relationships  Social connections:  
  Talks on phone: Not on file Gets together: Not on file Attends [employer] service: Not on file Active member of club or organization: Not on file Attends meetings of clubs or organizations: Not on file Relationship status: Not on file  Intimate partner violence:  
  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   
 
 
 
ALLERGIES: Codeine; Influenza virus vaccines; Mushroom flavor; Oxycodone; and Pneumococcal 23-jesús ps vaccine Review of Systems Constitutional: Negative. Negative for fever. HENT: Negative. Negative for drooling, facial swelling and trouble swallowing. Eyes: Negative. Negative for discharge and redness. Respiratory: Negative. Negative for chest tightness, shortness of breath and wheezing. Cardiovascular: Negative. Negative for chest pain. Gastrointestinal: Negative. Negative for abdominal distention, abdominal pain, constipation, diarrhea, nausea and vomiting. Endocrine: Negative. Genitourinary: Negative. Negative for difficulty urinating and dysuria. Musculoskeletal: Positive for neck pain. Negative for arthralgias and myalgias. Skin: Positive for rash. Negative for color change. Allergic/Immunologic: Negative. Neurological: Positive for dizziness and headaches. Negative for syncope, facial asymmetry and speech difficulty. Hematological: Negative. Psychiatric/Behavioral: Negative. Negative for agitation and confusion. All other systems reviewed and are negative. Vitals:  
 05/16/19 2223 BP: 157/85 Pulse: 71 Resp: 20 Temp: 98 °F (36.7 °C) SpO2: 96% Weight: 153.8 kg (339 lb) Height: 6' (1.829 m) Physical Exam  
Constitutional: He is oriented to person, place, and time. He appears well-developed and well-nourished. HENT:  
Head: Normocephalic and atraumatic. Eyes: Conjunctivae and EOM are normal.  
No right-sided conjunctival injection. On fluorescein exam there is no uptake, no obvious dendritic lesion or keratitis. Neck: Neck supple. Cardiovascular: Normal rate, regular rhythm and intact distal pulses. Pulmonary/Chest: No accessory muscle usage. No respiratory distress. Abdominal: Soft. Normal appearance. There is no tenderness. Musculoskeletal: Normal range of motion. Reproducible right posterior neck pain. Also pain in that area on flexing and turning head. Neurological: He is alert and oriented to person, place, and time. Skin: Skin is warm and dry. Patches of erythematous rash in the distribution of the ophthalmic branch of the trigeminal nerve with some areas that look to be forming herpetic vesicles. He has some erythema on the tip of his nose. Psychiatric: He has a normal mood and affect.  His behavior is normal. Thought content normal.  
 Nursing note and vitals reviewed. MDM Number of Diagnoses or Management Options Herpes zoster ophthalmicus of right eye:  
Neck pain:  
Diagnosis management comments: Cervical sprain, strain, spasm, herpes zoster ophtalmicus, cellulitis, migraine, vascular headache, tension headache. Given dizziness will CT head as patient is VERY concerned about CVA. ED Course as of May 17 0043 Fri May 17, 2019  
0037 Neck pain significantly improved  
 [SS] 0041 Patient has urticaria following the Norco.  States Benadryl does not work for him.  
 [SS] ED Course User Index 
[SS] Mee Mcelroy MD  
 
 
Procedures LABORATORY TESTS: 
Recent Results (from the past 12 hour(s)) EKG, 12 LEAD, INITIAL Collection Time: 05/16/19 10:36 PM  
Result Value Ref Range Ventricular Rate 70 BPM  
 Atrial Rate 500 BPM  
 QRS Duration 150 ms  
 Q-T Interval 444 ms QTC Calculation (Bezet) 479 ms Calculated R Axis -125 degrees Calculated T Axis 108 degrees Diagnosis Ventricular-paced rhythm Biventricular pacemaker detected Abnormal ECG When compared with ECG of 10-MAR-2019 19:37, 
Vent. rate has decreased BY  12 BPM 
  
 
 
IMAGING RESULTS: 
CT HEAD WO CONT Final Result IMPRESSION: No acute abnormality is identified. MEDICATIONS GIVEN: 
Medications  
ketorolac (TORADOL) injection 30 mg (30 mg IntraVENous Given 5/16/19 2257) diazePAM (VALIUM) tablet 5 mg (5 mg Oral Given 5/16/19 2257) HYDROcodone-acetaminophen (NORCO) 5-325 mg per tablet 1 Tab (1 Tab Oral Given 5/16/19 2257) fluorescein (FLU-MACIEJ) 0.6 mg ophthalmic strip 1 Strip (1 Strip Both Eyes Given 5/16/19 8028)  
tetracaine HCl (PF) (PONTOCAINE) 0.5 % ophthalmic solution 1 Drop (1 Drop Right Eye Given by Provider 5/16/19 2257) diphenhydrAMINE (BENADRYL) capsule 25 mg (25 mg Oral Given 5/17/19 0022) hydrOXYzine HCl (ATARAX) tablet 25 mg (25 mg Oral Given 5/17/19 0100) IMPRESSION: 
1. Neck pain 2. Herpes zoster ophthalmicus of right eye PLAN: 
1. Discharge Medication List as of 5/17/2019 12:47 AM  
  
START taking these medications Details  
diazePAM (VALIUM) 5 mg tablet Take 1 Tab by mouth every eight (8) hours as needed (spasm). Max Daily Amount: 15 mg., Print, Disp-15 Tab, R-0  
  
!! traMADol (ULTRAM) 50 mg tablet Take 1 Tab by mouth every six (6) hours as needed for Pain for up to 10 days. Max Daily Amount: 200 mg., Print, Disp-20 Tab, R-0  
  
naproxen (NAPROSYN) 500 mg tablet Take 1 Tab by mouth two (2) times daily (with meals) for 10 days. , Print, Disp-20 Tab, R-0  
  
acyclovir (ZOVIRAX) 800 mg tablet Take 1 Tab by mouth five (5) times daily for 7 days. , Print, Disp-35 Tab, R-0  
  
 !! - Potential duplicate medications found. Please discuss with provider. CONTINUE these medications which have NOT CHANGED Details  
clindamycin (CLEOCIN) 300 mg capsule Take 1 Cap by mouth three (3) times daily for 10 days. , Normal, Disp-30 Cap, R-0  
  
albuterol-ipratropium (DUO-NEB) 2.5 mg-0.5 mg/3 ml nebu INHALE THREE MILLILITERS (ONE VIAL) VIA NEBULIZATION BY MOUTH EVERY 6 HOURS AS NEEDED, Normal, Disp-50 Nebule, R-10  
  
traZODone (DESYREL) 100 mg tablet Take 2 Tabs by mouth nightly.  For sleep, Normal, Disp-60 Tab, R-5  
  
!! ONETOUCH ULTRA BLUE TEST STRIP strip USE TO TEST BLOOD SUGAR THREE TIMES DAILY BEFORE MEALS., Normal, Disp-100 Strip, R-0  
  
potassium chloride (K-DUR, KLOR-CON) 20 mEq tablet 2 tabs po qam, and 1 tab po qpm, Normal, Disp-90 Tab, R-12  
  
apixaban (ELIQUIS) 5 mg tablet Take 5 mg by mouth two (2) times a day., Historical Med  
  
furosemide (LASIX) 80 mg tablet 2 tabs in am, Normal, Disp-120 Tab, R-11  
  
!! ONETOUCH ULTRA BLUE TEST STRIP strip USE TO TEST BLOOD SUGAR THREE TIMES DAILY BEFORE MEALS., Normal, Disp-100 Strip, R-0  
  
metFORMIN (GLUCOPHAGE) 1,000 mg tablet 1 1/2 pills in am and 1 pill in pm. For diabetes, Normal, Disp-75 Tab, R-12  
  
 lisinopril (PRINIVIL, ZESTRIL) 20 mg tablet Take 1 Tab by mouth daily. For heart, Normal, Disp-30 Tab, R-12  
  
atorvastatin (LIPITOR) 40 mg tablet TAKE ONE TABLET BY MOUTH DAILY FOR CHOLESTEROL, Normal, Disp-30 Tab, R-12  
  
glipiZIDE (GLUCOTROL) 10 mg tablet Take 1 Tab by mouth two (2) times a day., Normal, Disp-60 Tab, R-12  
  
carvedilol (COREG) 12.5 mg tablet Take 1 Tab by mouth two (2) times a day. To slow heart rate down, Normal, Disp-60 Tab, R-12  
  
!! traMADol (ULTRAM) 50 mg tablet TAKE 1 TO 2 TABLETS BY MOUTH EVERY 6 HOURS AS NEEDED FOR PAIN, Print, Disp-60 Tab, R-2 Liraglutide (VICTOZA) 0.6 mg/0.1 mL (18 mg/3 mL) pnij 0.6 mg daily- 1st week, then 1.2 mg daily- 2nd week, then 1.8 mg daily. Please give pt needles also, Normal, Disp-1 Pen, R-12  
  
albuterol (VENTOLIN HFA) 90 mcg/actuation inhaler INHALE TWO PUFFS BY MOUTH EVERY 4 HOURS AS NEEDED FOR FOR WHEEZING AND FOR SHORTNESS OF BREATH, Normal, Disp-1 Inhaler, R-12  
  
 !! - Potential duplicate medications found. Please discuss with provider. 2.  
Follow-up Information Follow up With Specialties Details Why Contact Info Filiberto Araya MD Family Practice Schedule an appointment as soon as possible for a visit  [address] [facility] [phone] [facility] EMERGENCY DEPT Emergency Medicine  As needed, If symptoms worsen 22 Halley Lawler MD Ophthalmology   [address] 
[phone] Return to ED if worse

## 2019-05-17 NOTE — ED NOTES
Pt presents to ED ambulatory complaining of r-sided neck pain. Pt is alert and oriented x 4, RR even and unlabored, skin is warm and dry. Assessment completed and pt updated on plan of care. Emergency Department Nursing Plan of Care The Nursing Plan of Care is developed from the Nursing assessment and Emergency Department Attending provider initial evaluation. The plan of care may be reviewed in the ED Provider note. The Plan of Care was developed with the following considerations:  
Patient / Family readiness to learn indicated by:verbalized understanding Persons(s) to be included in education: patient Barriers to Learning/Limitations:No 
 
Signed Doreen Claudio, BELLE   
5/16/2019   11:15 PM

## 2019-06-10 ENCOUNTER — HOSPITAL ENCOUNTER (EMERGENCY)
Age: 54
Discharge: HOME | End: 2019-06-11
Attending: EMERGENCY MEDICINE
Payer: MEDICAID

## 2019-06-10 VITALS
WEIGHT: 315 LBS | BODY MASS INDEX: 42.66 KG/M2 | HEART RATE: 70 BPM | RESPIRATION RATE: 20 BRPM | DIASTOLIC BLOOD PRESSURE: 81 MMHG | TEMPERATURE: 98.3 F | SYSTOLIC BLOOD PRESSURE: 155 MMHG | HEIGHT: 72 IN | OXYGEN SATURATION: 95 %

## 2019-06-10 DIAGNOSIS — R07.89 CHEST WALL PAIN: Primary | ICD-10-CM

## 2019-06-10 PROCEDURE — 93005 ELECTROCARDIOGRAM TRACING: CPT

## 2019-06-10 PROCEDURE — 99284 EMERGENCY DEPT VISIT MOD MDM: CPT

## 2019-06-11 ENCOUNTER — APPOINTMENT (OUTPATIENT)
Dept: GENERAL RADIOLOGY | Age: 54
End: 2019-06-11
Attending: EMERGENCY MEDICINE
Payer: MEDICAID

## 2019-06-11 LAB
ALBUMIN SERPL-MCNC: 3.3 G/DL (ref 3.5–5)
ALBUMIN/GLOB SERPL: 0.8 {RATIO} (ref 1.1–2.2)
ALP SERPL-CCNC: 77 U/L (ref 45–117)
ALT SERPL-CCNC: 40 U/L (ref 12–78)
ANION GAP SERPL CALC-SCNC: 6 MMOL/L (ref 5–15)
AST SERPL-CCNC: 21 U/L (ref 15–37)
ATRIAL RATE: 394 BPM
BASOPHILS # BLD: 0.1 K/UL (ref 0–0.1)
BASOPHILS NFR BLD: 1 % (ref 0–1)
BILIRUB SERPL-MCNC: 0.7 MG/DL (ref 0.2–1)
BNP SERPL-MCNC: 801 PG/ML (ref 0–125)
BUN SERPL-MCNC: 19 MG/DL (ref 6–20)
BUN/CREAT SERPL: 14 (ref 12–20)
CALCIUM SERPL-MCNC: 9.7 MG/DL (ref 8.5–10.1)
CALCULATED R AXIS, ECG10: -63 DEGREES
CALCULATED T AXIS, ECG11: 84 DEGREES
CHLORIDE SERPL-SCNC: 99 MMOL/L (ref 97–108)
CO2 SERPL-SCNC: 33 MMOL/L (ref 21–32)
CREAT SERPL-MCNC: 1.34 MG/DL (ref 0.7–1.3)
DIAGNOSIS, 93000: NORMAL
DIFFERENTIAL METHOD BLD: ABNORMAL
EOSINOPHIL # BLD: 0.1 K/UL (ref 0–0.4)
EOSINOPHIL NFR BLD: 1 % (ref 0–7)
ERYTHROCYTE [DISTWIDTH] IN BLOOD BY AUTOMATED COUNT: 15.1 % (ref 11.5–14.5)
GLOBULIN SER CALC-MCNC: 3.9 G/DL (ref 2–4)
GLUCOSE SERPL-MCNC: 309 MG/DL (ref 65–100)
HCT VFR BLD AUTO: 41.1 % (ref 36.6–50.3)
HGB BLD-MCNC: 13.5 G/DL (ref 12.1–17)
IMM GRANULOCYTES # BLD AUTO: 0.1 K/UL (ref 0–0.04)
IMM GRANULOCYTES NFR BLD AUTO: 1 % (ref 0–0.5)
LYMPHOCYTES # BLD: 2.6 K/UL (ref 0.8–3.5)
LYMPHOCYTES NFR BLD: 27 % (ref 12–49)
MCH RBC QN AUTO: 28.7 PG (ref 26–34)
MCHC RBC AUTO-ENTMCNC: 32.8 G/DL (ref 30–36.5)
MCV RBC AUTO: 87.3 FL (ref 80–99)
MONOCYTES # BLD: 0.6 K/UL (ref 0–1)
MONOCYTES NFR BLD: 6 % (ref 5–13)
NEUTS SEG # BLD: 6 K/UL (ref 1.8–8)
NEUTS SEG NFR BLD: 64 % (ref 32–75)
NRBC # BLD: 0 K/UL (ref 0–0.01)
NRBC BLD-RTO: 0 PER 100 WBC
PLATELET # BLD AUTO: 158 K/UL (ref 150–400)
PMV BLD AUTO: 10.3 FL (ref 8.9–12.9)
POTASSIUM SERPL-SCNC: 3.1 MMOL/L (ref 3.5–5.1)
PROT SERPL-MCNC: 7.2 G/DL (ref 6.4–8.2)
Q-T INTERVAL, ECG07: 466 MS
QRS DURATION, ECG06: 148 MS
QTC CALCULATION (BEZET), ECG08: 503 MS
RBC # BLD AUTO: 4.71 M/UL (ref 4.1–5.7)
SODIUM SERPL-SCNC: 138 MMOL/L (ref 136–145)
TROPONIN I BLD-MCNC: <0.04 NG/ML (ref 0–0.08)
VENTRICULAR RATE, ECG03: 70 BPM
WBC # BLD AUTO: 9.4 K/UL (ref 4.1–11.1)

## 2019-06-11 PROCEDURE — 83880 ASSAY OF NATRIURETIC PEPTIDE: CPT

## 2019-06-11 PROCEDURE — 36415 COLL VENOUS BLD VENIPUNCTURE: CPT

## 2019-06-11 PROCEDURE — 71046 X-RAY EXAM CHEST 2 VIEWS: CPT

## 2019-06-11 PROCEDURE — 85025 COMPLETE CBC W/AUTO DIFF WBC: CPT

## 2019-06-11 PROCEDURE — 84484 ASSAY OF TROPONIN QUANT: CPT

## 2019-06-11 PROCEDURE — 80053 COMPREHEN METABOLIC PANEL: CPT

## 2019-06-11 RX ORDER — IBUPROFEN 800 MG/1
800 TABLET ORAL
Qty: 30 TAB | Refills: 0 | Status: SHIPPED | OUTPATIENT
Start: 2019-06-11 | End: 2020-02-16

## 2019-06-11 NOTE — ED NOTES
Emergency Department Nursing Plan of Care       The Nursing Plan of Care is developed from the Nursing assessment and Emergency Department Attending provider initial evaluation. The plan of care may be reviewed in the ED Provider note.     The Plan of Care was developed with the following considerations:   Patient / Family readiness to learn indicated by:verbalized understanding  Persons(s) to be included in education: patient  Barriers to Learning/Limitations:No    Signed     Felicitas Hein    6/11/2019   12:04 AM

## 2019-06-11 NOTE — ED NOTES
Patient complains of left sided chest pain x 2 days radiating to left arm. Relays pain is located where his pacemaker is. Denies SOB. States he has an extensive cardiac hx including afib, MI, heart disease, and htn. [facility]. Patient is alert and oriented x 4 and in no acute distress at this time. Respirations are at a regular rate, depth, and pattern. Patient updated on plan of care and has no questions or concerns at this time.

## 2019-06-11 NOTE — ED NOTES
Discharge instructions were given to the patient by PRANEETH Blunt RN. .     The patient left the Emergency Department ambulatory, alert and oriented and in no acute distress with 1 prescription(s). The patient was encouraged to call or return to the ED for worsening symptoms or problems and was encouraged to schedule a follow up appointment for continuing care. Patient leaving ED accompanied by self. The patient verbalized understanding of discharge instructions and prescriptions, all questions were answered. The patient has no further concerns at this time. Patient declined wheelchair transfer upon ED discharge.

## 2019-06-11 NOTE — ED PROVIDER NOTES
EMERGENCY DEPARTMENT HISTORY AND PHYSICAL EXAM      Date: 6/10/2019  Patient Name: Arnaud Stokes    History of Presenting Illness     Chief Complaint   Patient presents with    Chest Pain       History Provided By: Patient    HPI: Arnaud Stokes, 48 y.o. male with PMHx significant for pacemaker with replacement AICD 3 months ago, presents ambulatory to the ED with cc of chest pain overlying the pacemaker in his left chest.  Is a 53-year-old male who has a pacemaker in his left chest that is also an AICD. States he has pain near the scar of the surgical incision. The original pacemaker was replaced with an AICD 3 months ago whenever he had a localized infection at that site. Today there is no redness or drainage to indicate infection and he has no fever. He has no central chest pain no palpitations. No Shortness of breath. There are no other complaints, changes, or physical findings at this time. PCP: Shruthi Kebede MD    Current Outpatient Medications   Medication Sig Dispense Refill    ibuprofen (MOTRIN) 800 mg tablet Take 1 Tab by mouth every eight (8) hours as needed for Pain. 30 Tab 0    diazePAM (VALIUM) 5 mg tablet Take 1 Tab by mouth every eight (8) hours as needed (spasm). Max Daily Amount: 15 mg. 15 Tab 0    albuterol-ipratropium (DUO-NEB) 2.5 mg-0.5 mg/3 ml nebu INHALE THREE MILLILITERS (ONE VIAL) VIA NEBULIZATION BY MOUTH EVERY 6 HOURS AS NEEDED 50 Nebule 10    traZODone (DESYREL) 100 mg tablet Take 2 Tabs by mouth nightly. For sleep 60 Tab 5    ONETOUCH ULTRA BLUE TEST STRIP strip USE TO TEST BLOOD SUGAR THREE TIMES DAILY BEFORE MEALS. 100 Strip 0    potassium chloride (K-DUR, KLOR-CON) 20 mEq tablet 2 tabs po qam, and 1 tab po qpm (Patient taking differently: 20 mEq. 2 tabs po qam, and 1 tab po qpm) 90 Tab 12    apixaban (ELIQUIS) 5 mg tablet Take 5 mg by mouth two (2) times a day.       furosemide (LASIX) 80 mg tablet 2 tabs in am 120 Tab 11    ONETOUCH ULTRA BLUE TEST STRIP strip USE TO TEST BLOOD SUGAR THREE TIMES DAILY BEFORE MEALS. 100 Strip 0    metFORMIN (GLUCOPHAGE) 1,000 mg tablet 1 1/2 pills in am and 1 pill in pm. For diabetes 75 Tab 12    lisinopril (PRINIVIL, ZESTRIL) 20 mg tablet Take 1 Tab by mouth daily. For heart 30 Tab 12    atorvastatin (LIPITOR) 40 mg tablet TAKE ONE TABLET BY MOUTH DAILY FOR CHOLESTEROL 30 Tab 12    glipiZIDE (GLUCOTROL) 10 mg tablet Take 1 Tab by mouth two (2) times a day. 60 Tab 12    carvedilol (COREG) 12.5 mg tablet Take 1 Tab by mouth two (2) times a day. To slow heart rate down (Patient taking differently: Take 12.5 mg by mouth daily. To slow heart rate down) 60 Tab 12    traMADol (ULTRAM) 50 mg tablet TAKE 1 TO 2 TABLETS BY MOUTH EVERY 6 HOURS AS NEEDED FOR PAIN 60 Tab 2    Liraglutide (VICTOZA) 0.6 mg/0.1 mL (18 mg/3 mL) pnij 0.6 mg daily- 1st week, then 1.2 mg daily- 2nd week, then 1.8 mg daily. Please give pt needles also 1 Pen 12    albuterol (VENTOLIN HFA) 90 mcg/actuation inhaler INHALE TWO PUFFS BY MOUTH EVERY 4 HOURS AS NEEDED FOR FOR WHEEZING AND FOR SHORTNESS OF BREATH 1 Inhaler 12       Past History     Past Medical History:  Past Medical History:   Diagnosis Date   Prem Bowser A-fib [facility]) 1/6/15    [facility] ED. Pt reported this    Asthma     Atrial fibrillation with RVR ([address] 8/11/2017    Chronic obstructive pulmonary disease (HCC)     Diabetes (HCC)     Gastrointestinal disorder     GERD    Heart failure (HCC)     High cholesterol     Hypertension     Restrictive lung disease     FVC2.59 (52%), FEV1 1.95 (49)- 2017    S/P cardiac cath     Dr. Kendra Hurtado, 2016.  minimal cad    Sleep apnea     Stroke [facility])     TIA x 2       Past Surgical History:  Past Surgical History:   Procedure Laterality Date    CARDIAC SURG PROCEDURE UNLIST  11/14/2018    Implantation of PACEMAKER    HX PACEMAKER      AK INSJ/RPLCMT PERM DFB W/TRNSVNS LDS 1/DUAL CHMBR N/A 3/11/2019    INSERT ICD BIV MULTI performed by Yash Covington MD at [facility] CARDIAC CATH LAB       Family History:  History reviewed. No pertinent family history. Social History:  Social History     Tobacco Use    Smoking status: Former Smoker     Packs/day: 1.00     Types: Cigarettes     Last attempt to quit: 2/3/2014     Years since quittin.3    Smokeless tobacco: Never Used    Tobacco comment: Quit 3 years ago   Substance Use Topics    Alcohol use: No     Comment: stopped drinking at age 29   [facility] Drug use: No       Allergies: Allergies   Allergen Reactions    Codeine Hives    Hydrocodone Itching    Influenza Virus Vaccines Nausea Only     Fever  diarrhea    Mushroom Flavor Swelling    Oxycodone Rash    Pneumococcal 23-Allison Ps Vaccine Nausea and Vomiting     Vomit           Review of Systems   Review of Systems   Constitutional: Negative for chills and fever. HENT: Negative for congestion, rhinorrhea, sneezing and sore throat. Eyes: Negative for redness and visual disturbance. Respiratory: Negative for chest tightness and shortness of breath. Cardiovascular: Positive for chest pain. Negative for leg swelling. Gastrointestinal: Negative for abdominal pain, nausea and vomiting. Genitourinary: Negative for difficulty urinating and frequency. Musculoskeletal: Negative for back pain, myalgias and neck stiffness. Skin: Negative for rash. Neurological: Negative for dizziness, syncope, weakness and headaches. Hematological: Negative for adenopathy. All other systems reviewed and are negative. Physical Exam   Physical Exam   Constitutional: He is oriented to person, place, and time. He appears well-developed and well-nourished. HENT:   Head: Normocephalic and atraumatic. Mouth/Throat: Oropharynx is clear and moist and mucous membranes are normal.   Eyes: EOM are normal.   Neck: Normal range of motion and full passive range of motion without pain. Neck supple.    Cardiovascular: Normal rate, regular rhythm, normal heart sounds, intact distal pulses and normal pulses. No murmur heard. Pulmonary/Chest: Effort normal and breath sounds normal. No respiratory distress. He exhibits no tenderness. Abdominal: Soft. Normal appearance and bowel sounds are normal. There is no tenderness. There is no rebound and no guarding. Neurological: He is alert and oriented to person, place, and time. He has normal strength. Skin: Skin is warm, dry and intact. No rash noted. No erythema. Psychiatric: He has a normal mood and affect. His speech is normal and behavior is normal. Judgment and thought content normal.   Nursing note and vitals reviewed. Diagnostic Study Results     Labs -     Recent Results (from the past 12 hour(s))   EKG, 12 LEAD, INITIAL    Collection Time: 06/10/19 11:58 PM   Result Value Ref Range    Ventricular Rate 70 BPM    Atrial Rate 394 BPM    QRS Duration 148 ms    Q-T Interval 466 ms    QTC Calculation (Bezet) 503 ms    Calculated R Axis -63 degrees    Calculated T Axis 84 degrees    Diagnosis       Ventricular-paced rhythm  Biventricular pacemaker detected  Abnormal ECG  When compared with ECG of 16-MAY-2019 22:36,  No significant change was found     POC TROPONIN-I    Collection Time: 06/11/19 12:58 AM   Result Value Ref Range    Troponin-I (POC) <0.04 0.00 - 0.08 ng/mL   CBC WITH AUTOMATED DIFF    Collection Time: 06/11/19 12:59 AM   Result Value Ref Range    WBC 9.4 4.1 - 11.1 K/uL    RBC 4.71 4.10 - 5.70 M/uL    HGB 13.5 12.1 - 17.0 g/dL    HCT 41.1 36.6 - 50.3 %    MCV 87.3 80.0 - 99.0 FL    MCH 28.7 26.0 - 34.0 PG    MCHC 32.8 30.0 - 36.5 g/dL    RDW 15.1 (H) 11.5 - 14.5 %    PLATELET 550 998 - 281 K/uL    MPV 10.3 8.9 - 12.9 FL    NRBC 0.0 0  WBC    ABSOLUTE NRBC 0.00 0.00 - 0.01 K/uL    NEUTROPHILS 64 32 - 75 %    LYMPHOCYTES 27 12 - 49 %    MONOCYTES 6 5 - 13 %    EOSINOPHILS 1 0 - 7 %    BASOPHILS 1 0 - 1 %    IMMATURE GRANULOCYTES 1 (H) 0.0 - 0.5 %    ABS. NEUTROPHILS 6.0 1.8 - 8.0 K/UL    ABS.  LYMPHOCYTES 2.6 0.8 - 3.5 K/UL    ABS. MONOCYTES 0.6 0.0 - 1.0 K/UL    ABS. EOSINOPHILS 0.1 0.0 - 0.4 K/UL    ABS. BASOPHILS 0.1 0.0 - 0.1 K/UL    ABS. IMM. GRANS. 0.1 (H) 0.00 - 0.04 K/UL    DF AUTOMATED     METABOLIC PANEL, COMPREHENSIVE    Collection Time: 06/11/19 12:59 AM   Result Value Ref Range    Sodium 138 136 - 145 mmol/L    Potassium 3.1 (L) 3.5 - 5.1 mmol/L    Chloride 99 97 - 108 mmol/L    CO2 33 (H) 21 - 32 mmol/L    Anion gap 6 5 - 15 mmol/L    Glucose 309 (H) 65 - 100 mg/dL    BUN 19 6 - 20 MG/DL    Creatinine 1.34 (H) 0.70 - 1.30 MG/DL    BUN/Creatinine ratio 14 12 - 20      GFR est AA >60 >60 ml/min/1.73m2    GFR est non-AA 56 (L) >60 ml/min/1.73m2    Calcium 9.7 8.5 - 10.1 MG/DL    Bilirubin, total 0.7 0.2 - 1.0 MG/DL    ALT (SGPT) 40 12 - 78 U/L    AST (SGOT) 21 15 - 37 U/L    Alk. phosphatase 77 45 - 117 U/L    Protein, total 7.2 6.4 - 8.2 g/dL    Albumin 3.3 (L) 3.5 - 5.0 g/dL    Globulin 3.9 2.0 - 4.0 g/dL    A-G Ratio 0.8 (L) 1.1 - 2.2     NT-PRO BNP    Collection Time: 06/11/19 12:59 AM   Result Value Ref Range    NT pro- (H) 0 - 125 PG/ML       Radiologic Studies -   XR CHEST PA LAT   Final Result   IMPRESSION: No acute cardiopulmonary disease. CT Results  (Last 48 hours)    None        CXR Results  (Last 48 hours)               06/11/19 0006  XR CHEST PA LAT Final result    Impression:  IMPRESSION: No acute cardiopulmonary disease. Narrative: Indication: Chest pain. Exam: PA and lateral views of the chest.       Direct comparison is made to prior CXR dated March 2019. Findings: Cardiomediastinal silhouette is within normal limits. Lungs are clear   bilaterally. Pleural spaces are normal. Osseous structures are intact. Medical Decision Making   I am the first provider for this patient. I reviewed the vital signs, available nursing notes, past medical history, past surgical history, family history and social history.     Vital Signs-Reviewed the patient's vital signs. Patient Vitals for the past 12 hrs:   Temp Pulse Resp BP SpO2   06/10/19 2233 98.3 °F (36.8 °C) 70 20 155/81 95 %         Records Reviewed: Nursing Notes    Provider Notes (Medical Decision Making):   Chest wall pain at the site of pacemaker versus arrhythmia versus acute coronary syndrome. ED Course:   Initial assessment performed. The patients presenting problems have been discussed, and they are in agreement with the care plan formulated and outlined with them. I have encouraged them to ask questions as they arise throughout their visit. Disposition:  Patient informed of results of workup and is comfortable with discharge to home to follow up with PCP. They are instructed to return as needed for worsening condition. PLAN:  1. Discharge Medication List as of 6/11/2019  1:42 AM      START taking these medications    Details   ibuprofen (MOTRIN) 800 mg tablet Take 1 Tab by mouth every eight (8) hours as needed for Pain., Normal, Disp-30 Tab, R-0         CONTINUE these medications which have NOT CHANGED    Details   diazePAM (VALIUM) 5 mg tablet Take 1 Tab by mouth every eight (8) hours as needed (spasm). Max Daily Amount: 15 mg., Print, Disp-15 Tab, R-0      albuterol-ipratropium (DUO-NEB) 2.5 mg-0.5 mg/3 ml nebu INHALE THREE MILLILITERS (ONE VIAL) VIA NEBULIZATION BY MOUTH EVERY 6 HOURS AS NEEDED, Normal, Disp-50 Nebule, R-10      traZODone (DESYREL) 100 mg tablet Take 2 Tabs by mouth nightly.  For sleep, Normal, Disp-60 Tab, R-5      !! ONETOUCH ULTRA BLUE TEST STRIP strip USE TO TEST BLOOD SUGAR THREE TIMES DAILY BEFORE MEALS., Normal, Disp-100 Strip, R-0      potassium chloride (K-DUR, KLOR-CON) 20 mEq tablet 2 tabs po qam, and 1 tab po qpm, Normal, Disp-90 Tab, R-12      apixaban (ELIQUIS) 5 mg tablet Take 5 mg by mouth two (2) times a day., Historical Med      furosemide (LASIX) 80 mg tablet 2 tabs in am, Normal, Disp-120 Tab, R-11      !! ONETOUCH ULTRA BLUE TEST STRIP strip USE TO TEST BLOOD SUGAR THREE TIMES DAILY BEFORE MEALS., Normal, Disp-100 Strip, R-0      metFORMIN (GLUCOPHAGE) 1,000 mg tablet 1 1/2 pills in am and 1 pill in pm. For diabetes, Normal, Disp-75 Tab, R-12      lisinopril (PRINIVIL, ZESTRIL) 20 mg tablet Take 1 Tab by mouth daily. For heart, Normal, Disp-30 Tab, R-12      atorvastatin (LIPITOR) 40 mg tablet TAKE ONE TABLET BY MOUTH DAILY FOR CHOLESTEROL, Normal, Disp-30 Tab, R-12      glipiZIDE (GLUCOTROL) 10 mg tablet Take 1 Tab by mouth two (2) times a day., Normal, Disp-60 Tab, R-12      carvedilol (COREG) 12.5 mg tablet Take 1 Tab by mouth two (2) times a day. To slow heart rate down, Normal, Disp-60 Tab, R-12      traMADol (ULTRAM) 50 mg tablet TAKE 1 TO 2 TABLETS BY MOUTH EVERY 6 HOURS AS NEEDED FOR PAIN, Print, Disp-60 Tab, R-2      Liraglutide (VICTOZA) 0.6 mg/0.1 mL (18 mg/3 mL) pnij 0.6 mg daily- 1st week, then 1.2 mg daily- 2nd week, then 1.8 mg daily. Please give pt needles also, Normal, Disp-1 Pen, R-12      albuterol (VENTOLIN HFA) 90 mcg/actuation inhaler INHALE TWO PUFFS BY MOUTH EVERY 4 HOURS AS NEEDED FOR FOR WHEEZING AND FOR SHORTNESS OF BREATH, Normal, Disp-1 Inhaler, R-12       !! - Potential duplicate medications found. Please discuss with provider. 2.   Follow-up Information     Follow up With Specialties Details Why Contact Info    Brionna Flores MD [facility] Call  [address]  [address] [facility] [address]  [phone]      [facility] EMERGENCY DEPT Emergency Medicine  As needed, If symptoms worsen [address] [facility]  [phone]        Return to ED if worse     Diagnosis     Clinical Impression:   1.  Chest wall pain

## 2019-06-11 NOTE — ED TRIAGE NOTES
Pt comes in with c/o L sided chest pain since 05/08. Pt says the chest pain started radiating to L arm tonight. Pt has hx pacemaker.

## 2019-07-03 ENCOUNTER — OFFICE VISIT (OUTPATIENT)
Dept: CARDIOLOGY CLINIC | Age: 54
End: 2019-07-03

## 2019-07-03 DIAGNOSIS — Z95.810 ICD (IMPLANTABLE CARDIOVERTER-DEFIBRILLATOR) IN PLACE: Primary | ICD-10-CM

## 2019-07-03 DIAGNOSIS — I42.9 CARDIOMYOPATHY, UNSPECIFIED TYPE (HCC): ICD-10-CM

## 2019-07-09 ENCOUNTER — OFFICE VISIT (OUTPATIENT)
Dept: CARDIOLOGY CLINIC | Age: 54
End: 2019-07-09

## 2019-07-09 ENCOUNTER — CLINICAL SUPPORT (OUTPATIENT)
Dept: CARDIOLOGY CLINIC | Age: 54
End: 2019-07-09

## 2019-07-09 VITALS
HEART RATE: 70 BPM | WEIGHT: 315 LBS | RESPIRATION RATE: 20 BRPM | BODY MASS INDEX: 42.66 KG/M2 | SYSTOLIC BLOOD PRESSURE: 124 MMHG | OXYGEN SATURATION: 97 % | HEIGHT: 72 IN | DIASTOLIC BLOOD PRESSURE: 74 MMHG

## 2019-07-09 DIAGNOSIS — M54.50 CHRONIC BILATERAL LOW BACK PAIN WITHOUT SCIATICA: ICD-10-CM

## 2019-07-09 DIAGNOSIS — I50.22 CHRONIC SYSTOLIC CONGESTIVE HEART FAILURE (HCC): ICD-10-CM

## 2019-07-09 DIAGNOSIS — I42.0 DILATED CARDIOMYOPATHY (HCC): ICD-10-CM

## 2019-07-09 DIAGNOSIS — I10 ESSENTIAL HYPERTENSION: ICD-10-CM

## 2019-07-09 DIAGNOSIS — I48.91 ATRIAL FIBRILLATION WITH RVR (HCC): Primary | ICD-10-CM

## 2019-07-09 DIAGNOSIS — E11.9 CONTROLLED TYPE 2 DIABETES MELLITUS WITHOUT COMPLICATION, WITHOUT LONG-TERM CURRENT USE OF INSULIN (HCC): ICD-10-CM

## 2019-07-09 DIAGNOSIS — I42.9 CARDIOMYOPATHY, UNSPECIFIED TYPE (HCC): ICD-10-CM

## 2019-07-09 DIAGNOSIS — G89.29 CHRONIC BILATERAL LOW BACK PAIN WITHOUT SCIATICA: ICD-10-CM

## 2019-07-09 DIAGNOSIS — Z95.810 ICD (IMPLANTABLE CARDIOVERTER-DEFIBRILLATOR) IN PLACE: Primary | ICD-10-CM

## 2019-07-09 DIAGNOSIS — E66.01 OBESITY, MORBID (HCC): ICD-10-CM

## 2019-07-09 RX ORDER — TRAMADOL HYDROCHLORIDE 50 MG/1
100 TABLET ORAL
Qty: 60 TAB | Refills: 2 | Status: SHIPPED | OUTPATIENT
Start: 2019-07-09 | End: 2019-08-08

## 2019-07-09 NOTE — PROGRESS NOTES
Subjective:      Blake Cordero is a 48 y.o. male is here for EP consult - device f/u post upgrade to biv. The patient denies chest pain/ shortness of breath, orthopnea, PND, LE edema, palpitations, syncope, presyncope or fatigue. Patient Active Problem List    Diagnosis Date Noted    Cardiomyopathy ([facility] 03/11/2019     Priority: 1 - One    Cardiomyopathy, nonischemic ([facility] 11/17/2018    Controlled type 2 diabetes mellitus without complication, without long-term current use of insulin ([facility] 08/15/2018    Sleep apnea     Obesity, morbid ([address] 12/20/2017    Atrial fibrillation with RVR ([facility] 08/11/2017    CHF (congestive heart failure) ([facility] 08/10/2017    Restrictive lung disease     Dyspnea 07/15/2014    Hypertension 02/17/2014      Rosa Villalobos MD  Past Medical History:   Diagnosis Date   [facility]) 1/6/15    [facility] ED. Pt reported this    Asthma     Atrial fibrillation with RVR ([facility] 8/11/2017    Chronic obstructive pulmonary disease (HCC)     Diabetes (HCC)     Gastrointestinal disorder     GERD    Heart failure (HCC)     High cholesterol     Hypertension     Restrictive lung disease     FVC2.59 (52%), FEV1 1.95 (49)- 2017    S/P cardiac cath     Dr. Dany Medina, 2016. minimal cad    Sleep apnea     Stroke [facility])     TIA x 2      Past Surgical History:   Procedure Laterality Date    CARDIAC SURG PROCEDURE UNLIST  11/14/2018    Implantation of PACEMAKER    HX PACEMAKER      ID INSJ/RPLCMT PERM DFB W/TRNSVNS LDS 1/DUAL CHMBR N/A 3/11/2019    INSERT ICD BIV MULTI performed by Elma Choudhary MD at [facility] CARDIAC CATH LAB     Allergies   Allergen Reactions    Codeine Hives    Hydrocodone Itching    Influenza Virus Vaccines Nausea Only     Fever  diarrhea    Mushroom Flavor Swelling    Oxycodone Rash    Pneumococcal 23-Allison Ps Vaccine Nausea and Vomiting     Vomit        History reviewed. No pertinent family history.  negative for cardiac disease  Social History     Socioeconomic History    Marital status: SINGLE     Spouse name: Not on file    Number of children: Not on file    Years of education: Not on file    Highest education level: Not on file   Tobacco Use    Smoking status: Former Smoker     Packs/day: 1.00     Types: Cigarettes     Last attempt to quit: 2/3/2014     Years since quittin.4    Smokeless tobacco: Never Used    Tobacco comment: Quit 3 years ago   Substance and Sexual Activity    Alcohol use: No     Comment: stopped drinking at age 29    Drug use: No    Sexual activity: Not Currently   Social History Narrative    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      Current Outpatient Medications   Medication Sig    ibuprofen (MOTRIN) 800 mg tablet Take 1 Tab by mouth every eight (8) hours as needed for Pain.  albuterol-ipratropium (DUO-NEB) 2.5 mg-0.5 mg/3 ml nebu INHALE THREE MILLILITERS (ONE VIAL) VIA NEBULIZATION BY MOUTH EVERY 6 HOURS AS NEEDED    traZODone (DESYREL) 100 mg tablet Take 2 Tabs by mouth nightly. For sleep    ONETOUCH ULTRA BLUE TEST STRIP strip USE TO TEST BLOOD SUGAR THREE TIMES DAILY BEFORE MEALS.  potassium chloride (K-DUR, KLOR-CON) 20 mEq tablet 2 tabs po qam, and 1 tab po qpm (Patient taking differently: 20 mEq. 2 tabs po qam, and 1 tab po qpm)    apixaban (ELIQUIS) 5 mg tablet Take 5 mg by mouth two (2) times a day.  furosemide (LASIX) 80 mg tablet 2 tabs in am    ONETOUCH ULTRA BLUE TEST STRIP strip USE TO TEST BLOOD SUGAR THREE TIMES DAILY BEFORE MEALS.  metFORMIN (GLUCOPHAGE) 1,000 mg tablet 1 1/2 pills in am and 1 pill in pm. For diabetes    lisinopril (PRINIVIL, ZESTRIL) 20 mg tablet Take 1 Tab by mouth daily. For heart    atorvastatin (LIPITOR) 40 mg tablet TAKE ONE TABLET BY MOUTH DAILY FOR CHOLESTEROL    glipiZIDE (GLUCOTROL) 10 mg tablet Take 1 Tab by mouth two (2) times a day.  carvedilol (COREG) 12.5 mg tablet Take 1 Tab by mouth two (2) times a day.  To slow heart rate down (Patient taking differently: Take 12.5 mg by mouth daily. To slow heart rate down)    traMADol (ULTRAM) 50 mg tablet TAKE 1 TO 2 TABLETS BY MOUTH EVERY 6 HOURS AS NEEDED FOR PAIN    Liraglutide (VICTOZA) 0.6 mg/0.1 mL (18 mg/3 mL) pnij 0.6 mg daily- 1st week, then 1.2 mg daily- 2nd week, then 1.8 mg daily. Please give pt needles also    albuterol (VENTOLIN HFA) 90 mcg/actuation inhaler INHALE TWO PUFFS BY MOUTH EVERY 4 HOURS AS NEEDED FOR FOR WHEEZING AND FOR SHORTNESS OF BREATH    diazePAM (VALIUM) 5 mg tablet Take 1 Tab by mouth every eight (8) hours as needed (spasm). Max Daily Amount: 15 mg. No current facility-administered medications for this visit. Vitals:    07/09/19 1116   BP: 124/74   Pulse: 70   Resp: 20   SpO2: 97%   Weight: 338 lb (153.3 kg)   Height: 6' (1.829 m)       I have reviewed the nurses notes, vitals, problem list, allergy list, medical history, family, social history and medications. Review of Symptoms:    General: Pt denies excessive weight gain or loss. Pt is able to conduct ADL's  HEENT: Denies blurred vision, headaches, epistaxis and difficulty swallowing. Respiratory: Denies shortness of breath, HAMM, wheezing or stridor. Cardiovascular: Denies precordial pain, palpitations, edema or PND  Gastrointestinal: Denies poor appetite, indigestion, abdominal pain or blood in stool  Urinary: Denies dysuria, pyuria  Musculoskeletal: Denies pain or swelling from muscles or joints  Neurologic: Denies tremor, paresthesias, or sensory motor disturbance  Skin: Denies rash, itching or texture change. Psych: Denies depression      Physical Exam:      General: Well developed, in no acute distress. HEENT: Eyes - PERRL, no jvd  Heart:  Normal S1/S2 negative S3 or S4.  Regular, no murmur, gallop or rub.   Respiratory: Clear bilaterally x 4, no wheezing or rales  Abdomen:   Soft, non-tender, bowel sounds are active.   Extremities:  No edema, normal cap refill, no cyanosis. Musculoskeletal: No clubbing  Neuro: A&Ox3, speech clear, gait stable. Skin: Skin color is normal. No rashes or lesions. Non diaphoretic  Vascular: 2+ pulses symmetric in all extremities    Cardiographics    Ekg: biv paced      Results for orders placed or performed during the hospital encounter of 06/10/19   EKG, 12 LEAD, INITIAL   Result Value Ref Range    Ventricular Rate 70 BPM    Atrial Rate 394 BPM    QRS Duration 148 ms    Q-T Interval 466 ms    QTC Calculation (Bezet) 503 ms    Calculated R Axis -63 degrees    Calculated T Axis 84 degrees    Diagnosis       Ventricular-paced rhythm  Biventricular pacemaker detected  background coarse atrial fibrillation  complete AV dissociation with full device dependency  narrower than expected QRS due to dual lead pacing  no significant interval change    Confirmed by Patria Acharya MD, Yamil Hart ([id number]) on 6/11/2019 10:28:22 AM           Lab Results   Component Value Date/Time    WBC 9.4 06/11/2019 12:59 AM    HGB 13.5 06/11/2019 12:59 AM    HCT 41.1 06/11/2019 12:59 AM    PLATELET 836 89/24/2371 12:59 AM    MCV 87.3 06/11/2019 12:59 AM      Lab Results   Component Value Date/Time    Sodium 138 06/11/2019 12:59 AM    Potassium 3.1 (L) 06/11/2019 12:59 AM    Chloride 99 06/11/2019 12:59 AM    CO2 33 (H) 06/11/2019 12:59 AM    Anion gap 6 06/11/2019 12:59 AM    Glucose 309 (H) 06/11/2019 12:59 AM    BUN 19 06/11/2019 12:59 AM    Creatinine 1.34 (H) 06/11/2019 12:59 AM    BUN/Creatinine ratio 14 06/11/2019 12:59 AM    GFR est AA >60 06/11/2019 12:59 AM    GFR est non-AA 56 (L) 06/11/2019 12:59 AM    Calcium 9.7 06/11/2019 12:59 AM    Bilirubin, total 0.7 06/11/2019 12:59 AM    AST (SGOT) 21 06/11/2019 12:59 AM    Alk.  phosphatase 77 06/11/2019 12:59 AM    Protein, total 7.2 06/11/2019 12:59 AM    Albumin 3.3 (L) 06/11/2019 12:59 AM    Globulin 3.9 06/11/2019 12:59 AM    A-G Ratio 0.8 (L) 06/11/2019 12:59 AM    ALT (SGPT) 40 06/11/2019 12:59 AM         Assessment: Assessment:        ICD-10-CM ICD-9-CM    1. Atrial fibrillation with RVR (HCC) I48.91 427.31 AMB POC EKG ROUTINE W/ 12 LEADS, INTER & REP      ECHO ADULT COMPLETE   2. Dilated cardiomyopathy (HCC) I42.0 425.4 ECHO ADULT COMPLETE   3. Chronic systolic congestive heart failure (HCC) I50.22 428.22 ECHO ADULT COMPLETE     428.0    4. Essential hypertension I10 401.9 ECHO ADULT COMPLETE   5. Controlled type 2 diabetes mellitus without complication, without long-term current use of insulin (HCC) E11.9 250.00 ECHO ADULT COMPLETE   6. Obesity, morbid ([facility] E66.01 278.01      Orders Placed This Encounter    AMB POC EKG ROUTINE W/ 12 LEADS, INTER & REP     Order Specific Question:   Reason for Exam:     Answer:   routine        Plan:   Mr Franklin Solano is biv paced for his cardiomyopathy and chb. He is on oac for his af. He is feeling well. Will obtain an echo to reassess his lvef post upgrade. Cont med rx for chf and cardiomyopathy. F/u in one year    Continue medical management for af, cardiomyopathy and chb. .    Thank you for allowing me to participate in Citlali Alvarez 's care.     Milford Libman, MD, [facility]

## 2019-07-09 NOTE — TELEPHONE ENCOUNTER
Patient wants to get the medication for traMADol (ULTRAM) 50 mg tablet .   If any questions please give him a call @ [phone]

## 2019-07-09 NOTE — PROGRESS NOTES
Verified patient with 2 identifiers   Reviewed record in preparation for visit and obtained necessary documentation. Chief Complaint   Patient presents with    Irregular Heart Beat     ICD placed on 7-3-19    Shortness of Breath    Chest Pain     tightness on and off      1. Have you been to the ER, urgent care clinic since your last visit? Hospitalized since your last visit? Yes [facility] a couple of times     2. Have you seen or consulted any other health care providers outside of the [address] [place] [address] since your last visit? Include any pap smears or colon screening.  No

## 2019-07-11 ENCOUNTER — TELEPHONE (OUTPATIENT)
Dept: FAMILY MEDICINE CLINIC | Age: 54
End: 2019-07-11

## 2019-07-11 NOTE — TELEPHONE ENCOUNTER
Left Message for patient to return call to office. Prescription traMADol (ULTRAM) 50 mg tablet is available for pickup at the .

## 2019-07-16 ENCOUNTER — DOCUMENTATION ONLY (OUTPATIENT)
Dept: FAMILY MEDICINE CLINIC | Age: 54
End: 2019-07-16

## 2019-09-03 ENCOUNTER — OFFICE VISIT (OUTPATIENT)
Dept: FAMILY MEDICINE CLINIC | Age: 54
End: 2019-09-03

## 2019-09-03 VITALS
RESPIRATION RATE: 20 BRPM | HEIGHT: 72 IN | SYSTOLIC BLOOD PRESSURE: 114 MMHG | OXYGEN SATURATION: 95 % | BODY MASS INDEX: 42.66 KG/M2 | HEART RATE: 71 BPM | TEMPERATURE: 98.4 F | DIASTOLIC BLOOD PRESSURE: 61 MMHG | WEIGHT: 315 LBS

## 2019-09-03 DIAGNOSIS — E78.00 HYPERCHOLESTEROLEMIA: ICD-10-CM

## 2019-09-03 DIAGNOSIS — F51.01 PRIMARY INSOMNIA: Primary | ICD-10-CM

## 2019-09-03 DIAGNOSIS — E11.9 CONTROLLED TYPE 2 DIABETES MELLITUS WITHOUT COMPLICATION, WITHOUT LONG-TERM CURRENT USE OF INSULIN (HCC): ICD-10-CM

## 2019-09-03 DIAGNOSIS — E11.21 TYPE 2 DIABETES WITH NEPHROPATHY (HCC): ICD-10-CM

## 2019-09-03 LAB — HBA1C MFR BLD HPLC: 8.1 %

## 2019-09-03 RX ORDER — CEPHALEXIN 500 MG/1
500 CAPSULE ORAL 3 TIMES DAILY
Qty: 30 CAP | Refills: 0 | Status: SHIPPED | OUTPATIENT
Start: 2019-09-03 | End: 2019-09-13

## 2019-09-03 RX ORDER — ZOLPIDEM TARTRATE 10 MG/1
10 TABLET ORAL
Qty: 30 TAB | Refills: 5 | Status: SHIPPED | OUTPATIENT
Start: 2019-09-03 | End: 2019-11-05 | Stop reason: SDUPTHER

## 2019-09-03 NOTE — PROGRESS NOTES
Chief Complaint   Patient presents with    Diabetes     F/U on diabetes.  Hypertension     F/U on BP.  Cholesterol Problem     F/U on cholesterol. 1. Have you been to the ER, urgent care clinic since your last visit? Hospitalized since your last visit? No    2. Have you seen or consulted any other health care providers outside of the [address] [place] [address] since your last visit? Include any pap smears or colon screening.  No

## 2019-09-03 NOTE — PROGRESS NOTES
HISTORY OF PRESENT ILLNESS  Zach Poole is a 48 y.o. male. HPI Pt. Comes in for blood pressure, cholesterol, and diabetes check. Had some severe lower back pains a month ago, took tramadol- doing ok now. Says that trazodone causes him to walk around in a fog. Has been off victoza for one week. Taking metformin 1000 mg bid. ROS    Physical Exam   Constitutional: He appears well-developed and well-nourished. HENT:   Right Ear: External ear normal.   Left Ear: External ear normal.   Mouth/Throat: Oropharynx is clear and moist.   Neck: No thyromegaly present. Cardiovascular: Normal rate, regular rhythm, normal heart sounds and intact distal pulses. Pulmonary/Chest: Effort normal and breath sounds normal. No respiratory distress. He has no wheezes. Abdominal: Soft. Bowel sounds are normal. He exhibits no distension and no mass. There is no tenderness. There is no guarding. Musculoskeletal: Normal range of motion. He exhibits no edema. Lymphadenopathy:     He has no cervical adenopathy. Nursing note and vitals reviewed. ASSESSMENT and PLAN  Orders Placed This Encounter    LIPID PANEL    AMB POC HEMOGLOBIN A1C    liraglutide (VICTOZA) 0.6 mg/0.1 mL (18 mg/3 mL) pnij    zolpidem (AMBIEN) 10 mg tablet    cephALEXin (KEFLEX) 500 mg capsule     Diagnoses and all orders for this visit:    1. Primary insomnia  -     zolpidem (AMBIEN) 10 mg tablet; Take 1 Tab by mouth nightly as needed for Sleep. Max Daily Amount: 10 mg.    2. Type 2 diabetes with nephropathy ([address]    3. Controlled type 2 diabetes mellitus without complication, without long-term current use of insulin ([facility])  -     AMB POC HEMOGLOBIN A1C    4. Hypercholesterolemia  -     LIPID PANEL    Other orders  -     liraglutide (VICTOZA) 0.6 mg/0.1 mL (18 mg/3 mL) pnij; 0.6 mg daily- 1st week, then 1.2 mg daily- 2nd week, then 1.8 mg daily. Please give pt needles also  -     cephALEXin (KEFLEX) 500 mg capsule;  Take 1 Cap by mouth three (3) times daily for 10 days. Follow-up and Dispositions    · Return in about 4 months (around 1/3/2020).        Will increase metformin 1000 mg to 1 1/2 in am, and 1 in pm

## 2019-09-04 LAB
CHOLEST SERPL-MCNC: 143 MG/DL (ref 100–199)
HDLC SERPL-MCNC: 28 MG/DL
INTERPRETATION, 910389: NORMAL
LDLC SERPL CALC-MCNC: ABNORMAL MG/DL (ref 0–99)
PDF IMAGE, 910387: NORMAL
TRIGL SERPL-MCNC: 466 MG/DL (ref 0–149)
VLDLC SERPL CALC-MCNC: ABNORMAL MG/DL (ref 5–40)

## 2019-09-19 ENCOUNTER — OFFICE VISIT (OUTPATIENT)
Dept: ENDOCRINOLOGY | Age: 54
End: 2019-09-19

## 2019-09-19 VITALS
BODY MASS INDEX: 42.66 KG/M2 | HEIGHT: 72 IN | HEART RATE: 70 BPM | DIASTOLIC BLOOD PRESSURE: 79 MMHG | SYSTOLIC BLOOD PRESSURE: 137 MMHG | WEIGHT: 315 LBS

## 2019-09-19 DIAGNOSIS — E03.9 HYPOTHYROIDISM, UNSPECIFIED TYPE: ICD-10-CM

## 2019-09-19 DIAGNOSIS — I10 ESSENTIAL HYPERTENSION WITH GOAL BLOOD PRESSURE LESS THAN 130/80: ICD-10-CM

## 2019-09-19 DIAGNOSIS — E78.5 HYPERLIPIDEMIA, UNSPECIFIED HYPERLIPIDEMIA TYPE: ICD-10-CM

## 2019-09-19 DIAGNOSIS — E11.9 TYPE 2 DIABETES MELLITUS WITHOUT COMPLICATION, WITHOUT LONG-TERM CURRENT USE OF INSULIN (HCC): Primary | ICD-10-CM

## 2019-09-19 RX ORDER — TRAZODONE HYDROCHLORIDE 100 MG/1
TABLET ORAL
COMMUNITY
Start: 2019-08-30 | End: 2020-01-14 | Stop reason: ALTCHOICE

## 2019-09-19 RX ORDER — TRAMADOL HYDROCHLORIDE 50 MG/1
TABLET ORAL
COMMUNITY
Start: 2019-08-30 | End: 2019-11-04 | Stop reason: SDUPTHER

## 2019-09-19 NOTE — PROGRESS NOTES
CONSULTATION REQUESTED BY: Coby Harrison MD     REASON FOR CONSULT:  Uncontrolled type 2 diabetes    CHIEF COMPLAINT: evaluation of type 2 diabetes    HISTORY OF PRESENT ILLNESS:   Juani Cruz is a 48 y.o. male with a PMHx as noted below who presents for evaluation of uncontrolled type 2 diabetes. Diabetes History:  Diabetes was diagnosed about 3 years ago,  Family History of diabetes is not certain, adopted,  Last A1c prior to initial visit was 4.9%, recent,  Complications include stroke/TIA x2 per him, last was 2017, mild residual L side weakness,    Current Home Regimen:  - metformin 1500mg AM, 1000mg PM  - glipizide 10mg with breakfast and dinner  - Victoza 1.8mg injection before breakfast (started this about 1 year ago)    Review of home glucose:  No log or meter with him today  AM: reports   Lunch: reports 100-160  Dinner: reports 150-200  Bedtime    Review of most recent diabetes-related labs:  Lab Results   Component Value Date    HBA1C 9.5 (H) 11/15/2018    HBA1C 7.5 (H) 08/11/2017    XIJ2FAVS 8.1 09/03/2019    VQX8DDYZ 8.0 05/13/2019    PXP9WSNO 9.6 08/15/2018    CHOL 143 09/03/2019    LDLC Comment 09/03/2019    GFRAA >60 06/11/2019    GFRNA 56 (L) 06/11/2019    TSH 4.21 (H) 11/15/2018     Lab Key:  [id number] = IA-2 pancreatic islet cell autoantibody  CPEPL = C-peptide level  :EXT = External Lab  GADLT = WING-65 autoantibody   INSUL = Insulin level  MCACR (or MALBEXT) = Urine Microalbumin (or External UM)  B12LT = B12 level    PAST MEDICAL/SURGICAL HISTORY:   Past Medical History:   Diagnosis Date    A-fib [facility]) 1/6/15    [facility].  Pt reported this    Asthma     Atrial fibrillation with RVR ([address] 8/11/2017    Chronic obstructive pulmonary disease (HCC)     Diabetes (HCC)     Gastrointestinal disorder     GERD    Heart failure (HCC)     High cholesterol     Hypertension     Restrictive lung disease     FVC2.59 (52%), FEV1 1.95 (49)- 2017    S/P cardiac cath      Nicholas, 2016. minimal cad    Sleep apnea     Stroke [facility])     TIA x 2     Past Surgical History:   Procedure Laterality Date    CARDIAC SURG PROCEDURE UNLIST  11/14/2018    Implantation of PACEMAKER    HX PACEMAKER      [place] INSJ/RPLCMT PERM DFB W/TRNSVNS LDS 1/DUAL CHMBR N/A 3/11/2019    INSERT ICD BIV MULTI performed by Roni Moya MD at [facility] CARDIAC CATH LAB       ALLERGIES:   Allergies   Allergen Reactions    Codeine Hives    Hydrocodone Itching    Influenza Virus Vaccines Nausea Only     Fever  diarrhea    Mushroom Flavor Swelling    Oxycodone Rash    Pneumococcal 23-Allison Ps Vaccine Nausea and Vomiting     Vomit         MEDICATIONS ON ADMISSION:     Current Outpatient Medications:     traZODone (DESYREL) 100 mg tablet, , Disp: , Rfl:     traMADol (ULTRAM) 50 mg tablet, , Disp: , Rfl:     liraglutide (VICTOZA) 0.6 mg/0.1 mL (18 mg/3 mL) pnij, 0.6 mg daily- 1st week, then 1.2 mg daily- 2nd week, then 1.8 mg daily. Please give pt needles also, Disp: 1 Pen, Rfl: 12    zolpidem (AMBIEN) 10 mg tablet, Take 1 Tab by mouth nightly as needed for Sleep. Max Daily Amount: 10 mg., Disp: 30 Tab, Rfl: 5    albuterol-ipratropium (DUO-NEB) 2.5 mg-0.5 mg/3 ml nebu, INHALE THREE MILLILITERS (ONE VIAL) VIA NEBULIZATION BY MOUTH EVERY 6 HOURS AS NEEDED, Disp: 50 Nebule, Rfl: 10    ONETOUCH ULTRA BLUE TEST STRIP strip, USE TO TEST BLOOD SUGAR THREE TIMES DAILY BEFORE MEALS., Disp: 100 Strip, Rfl: 0    potassium chloride (K-DUR, KLOR-CON) 20 mEq tablet, 2 tabs po qam, and 1 tab po qpm (Patient taking differently: 20 mEq.  2 tabs po qam, and 1 tab po qpm), Disp: 90 Tab, Rfl: 12    apixaban (ELIQUIS) 5 mg tablet, Take 5 mg by mouth two (2) times a day., Disp: , Rfl:     furosemide (LASIX) 80 mg tablet, 2 tabs in am, Disp: 120 Tab, Rfl: 11    metFORMIN (GLUCOPHAGE) 1,000 mg tablet, 1 1/2 pills in am and 1 pill in pm. For diabetes, Disp: 75 Tab, Rfl: 12    lisinopril (PRINIVIL, ZESTRIL) 20 mg tablet, Take 1 Tab by mouth daily. For heart, Disp: 30 Tab, Rfl: 12    atorvastatin (LIPITOR) 40 mg tablet, TAKE ONE TABLET BY MOUTH DAILY FOR CHOLESTEROL, Disp: 30 Tab, Rfl: 12    glipiZIDE (GLUCOTROL) 10 mg tablet, Take 1 Tab by mouth two (2) times a day., Disp: 60 Tab, Rfl: 12    carvedilol (COREG) 12.5 mg tablet, Take 1 Tab by mouth two (2) times a day. To slow heart rate down (Patient taking differently: Take 12.5 mg by mouth daily.  To slow heart rate down), Disp: 60 Tab, Rfl: 12    albuterol (VENTOLIN HFA) 90 mcg/actuation inhaler, INHALE TWO PUFFS BY MOUTH EVERY 4 HOURS AS NEEDED FOR FOR WHEEZING AND FOR SHORTNESS OF BREATH, Disp: 1 Inhaler, Rfl: 12    ibuprofen (MOTRIN) 800 mg tablet, Take 1 Tab by mouth every eight (8) hours as needed for Pain., Disp: 30 Tab, Rfl: 0    SOCIAL HISTORY:   Social History     Socioeconomic History    Marital status: SINGLE     Spouse name: Not on file    Number of children: Not on file    Years of education: Not on file    Highest education level: Not on file   Occupational History    Not on file   Social Needs    Financial resource strain: Not on file    Food insecurity:     Worry: Not on file     Inability: Not on file    Transportation needs:     Medical: Not on file     Non-medical: Not on file   Tobacco Use    Smoking status: Former Smoker     Packs/day: 1.00     Types: Cigarettes     Last attempt to quit: 2/3/2014     Years since quittin.6    Smokeless tobacco: Never Used    Tobacco comment: Quit 3 years ago   Substance and Sexual Activity    Alcohol use: No     Comment: stopped drinking at age 29    Drug use: No    Sexual activity: Not Currently   Lifestyle    Physical activity:     Days per week: Not on file     Minutes per session: Not on file    Stress: Not on file   Relationships    Social connections:     Talks on phone: Not on file     Gets together: Not on file     Attends Christianity service: Not on file     Active member of club or organization: Not on file     Attends meetings of clubs or organizations: Not on file     Relationship status: Not on file    Intimate partner violence:     Fear of current or ex partner: Not on file     Emotionally abused: Not on file     Physically abused: Not on file     Forced sexual activity: Not on file   Other Topics Concern    Not on file   Social History Narrative    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        FAMILY HISTORY:  Family History   Adopted: Yes       REVIEW OF SYSTEMS: Complete ROS assessed and noted for that which is described above, all else are negative. Eyes: normal  ENT: normal  CVS: normal  Resp: normal  GI: normal  : normal  GYN: normal  Endocrine: normal  Integument: normal  Musculoskeletal: normal  Neuro: normal  Psych: normal      PHYSICAL EXAMINATION:    VITAL SIGNS:  Visit Vitals  /79 (BP 1 Location: Left arm, BP Patient Position: Sitting)   Pulse 70   Ht 6' (1.829 m)   Wt 333 lb (151 kg)   BMI 45.16 kg/m²       GENERAL: NCAT, Sitting comfortably, NAD  EYES: EOMI, non-icteric, no proptosis  Ear/Nose/Throat: NCAT, no inflammation, no masses  LYMPH NODES: No LAD  CARDIOVASCULAR: S1 S2, RRR, No murmur, 2+ radial pulses  RESPIRATORY: CTA b/l, no wheeze/rales  GASTROINTESTINAL: NT, ND  MUSCULOSKELETAL: Normal ROM, no atrophy  SKIN: warm, no edema/rash/ or other skin changes  NEUROLOGIC: 5/5 power all extremities, no tremor, AAOx3  PSYCHIATRIC: Normal affect, Normal insight and judgement    Diabetic foot exam:     Left Foot:   Visual Exam: normal    Pulse DP: 2+ (normal)   Filament test: normal sensation    Vibratory sensation: Vibratory sensation: normal       Right Foot:   Visual Exam: normal    Pulse DP: 2+ (normal)   Filament test: normal sensation    Vibratory sensation: Vibratory sensation: normal      REVIEW OF LABORATORY AND RADIOLOGY DATA:   Labs and documentation have been reviewed as described above.      ASSESSMENT AND PLAN:   Ally España is a 48 y.o. male with a PMHx as noted above who presents for evaluation of uncontrolled type 2 diabetes. Problems:  Type 2 diabetes Uncontrolled  Hyperlipidemia  Hypertension    We had the pleasure of reviewing together the basics of diabetes including basic pathophysiology and diabetes care. We further discussed the importance of checking home glucose regularly and takin all of their scheduled medications in order to have the best possible outcome. I was able to answer any questions they had in clinic today and they are invited to reach me if they have any further questions. Based upon our discussion together today we have decided to make the following changes:    Patient at risk for further complications noting his history of CVA x2, MI etc. Today we discussed using an SGLT-2 inhibitor. We discussed the mechanism of action for this medication in the kidney. We also discussed the rare but possible adverse effects however I believe that the benefit of better diabetes control far outweighs the risks of treating with this class of medications. The patient agrees to a trial which I think will produce good results and contribute to the reduction of their cardiovascular risks from uncontrolled diabetes. I advised adequate hydration with water. He denies lower extremity vascular disease. Exam is stable noted also for good capillary refill. PLAN  Type 2 Diabetes  Medications:  Start jardiance 25mg each morning before breakfast  metformin 1500mg AM, 1000mg PM  glipizide 10mg with breakfast and dinner  Victoza 1.8mg injection before breakfast   Provided with glucose log sheets for later review. HTN: BP stable on current medications, no changes today. HLD: Fasting lipids reviewed, on statin    RTC: I would like to see them back in 3 months. 60 minutes spent together with patient today of which >50% of this time was spent in counseling and coordination of care. Laverne Gonzales.  [address] [facility]

## 2019-09-19 NOTE — PATIENT INSTRUCTIONS
Start jardiance 25mg each morning before breakfast 
 
metformin 1500mg AM, 1000mg PM 
 
glipizide 10mg with breakfast and dinner Victoza 1.8mg injection before breakfast  
 
 
Please note our new policy, you must arrive to the clinic 15 minutes before your appointment time to allow enough time for proper check-in, adequate time to spend with your doctor, and also to respect the appointment time of the next patient. Not arriving 15 minutes in advance may result in having your appointment rescheduled for the next available day/time. 
---------------------------------------------------------------------------------------------------------------------- Below you will find a glucose log sheet which you can use to record your blood sugars. Without checking and recording what your home glucose levels are, it will be difficult to make any changes to your medication dose, even when significant changes may be needed. Please feel free to use the log below to record your home glucose levels. At the very least, I would like for you to login the entire 2-3 weeks just before your visit so we can make your visit much more productive and beneficial to you. GLUCOSE LOG SHEET: 
 
Date Breakfast Lunch Dinner Bedtime Comments ? GLUCOSE LOG SHEET: 
 
Date Breakfast Lunch Dinner Bedtime Comments ? GLUCOSE LOG SHEET: 
 
Date Breakfast Lunch Dinner Bedtime Comments ?

## 2019-09-20 LAB
ALBUMIN/CREAT UR: 34.7 MG/G CREAT (ref 0–30)
BUN SERPL-MCNC: 14 MG/DL (ref 6–24)
BUN/CREAT SERPL: 12 (ref 9–20)
CALCIUM SERPL-MCNC: 9.3 MG/DL (ref 8.7–10.2)
CHLORIDE SERPL-SCNC: 97 MMOL/L (ref 96–106)
CO2 SERPL-SCNC: 21 MMOL/L (ref 20–29)
CREAT SERPL-MCNC: 1.17 MG/DL (ref 0.76–1.27)
CREAT UR-MCNC: 108.7 MG/DL
GLUCOSE SERPL-MCNC: 204 MG/DL (ref 65–99)
MICROALBUMIN UR-MCNC: 37.7 UG/ML
POTASSIUM SERPL-SCNC: 3.6 MMOL/L (ref 3.5–5.2)
SODIUM SERPL-SCNC: 140 MMOL/L (ref 134–144)
T4 FREE SERPL-MCNC: 1.7 NG/DL (ref 0.82–1.77)
THYROGLOB AB SERPL-ACNC: <1 IU/ML (ref 0–0.9)
THYROPEROXIDASE AB SERPL-ACNC: <6 IU/ML (ref 0–34)
TSH SERPL DL<=0.005 MIU/L-ACNC: 2.43 UIU/ML (ref 0.45–4.5)

## 2019-09-23 ENCOUNTER — TELEPHONE (OUTPATIENT)
Dept: ENDOCRINOLOGY | Age: 54
End: 2019-09-23

## 2019-09-23 NOTE — PROGRESS NOTES
Xavier Lantigua, could you let patient know his diabetes and thyroid labs all look stable, thanks,     Mario Murphy.  [address] [facility]  [address] [place] [address]

## 2019-09-23 NOTE — TELEPHONE ENCOUNTER
----- Message from Helen Celaya MD sent at 9/23/2019  2:43 PM EDT -----  Anastasia Holland, could you let patient know his diabetes and thyroid labs all look stable, thanks,     Massimo Castillo.  [address] [facility]  [facility]

## 2019-09-23 NOTE — TELEPHONE ENCOUNTER
I called Mr. Mcgraw and relayed the message from Dr. Donna Lynn. He understood this information.   Ranjit Quintana

## 2019-10-02 RX ORDER — PEN NEEDLE, DIABETIC 32GX 5/32"
NEEDLE, DISPOSABLE MISCELLANEOUS
Qty: 100 PEN NEEDLE | Refills: 11 | Status: SHIPPED | OUTPATIENT
Start: 2019-10-02 | End: 2021-01-29 | Stop reason: SDUPTHER

## 2019-10-02 NOTE — TELEPHONE ENCOUNTER
Patient states that he needs a refill on      liraglutide (VICTOZA) 0.6 mg/0.1 mL (18 mg/3 mL) pnij he can be reached @ [phone]

## 2019-10-09 ENCOUNTER — OFFICE VISIT (OUTPATIENT)
Dept: CARDIOLOGY CLINIC | Age: 54
End: 2019-10-09

## 2019-10-09 DIAGNOSIS — I42.0 DILATED CARDIOMYOPATHY (HCC): ICD-10-CM

## 2019-10-09 DIAGNOSIS — Z95.810 ICD (IMPLANTABLE CARDIOVERTER-DEFIBRILLATOR) IN PLACE: Primary | ICD-10-CM

## 2019-11-03 RX ORDER — GLIPIZIDE 10 MG/1
TABLET ORAL
Qty: 60 TAB | Refills: 11 | Status: SHIPPED | OUTPATIENT
Start: 2019-11-03 | End: 2020-10-29

## 2019-11-04 DIAGNOSIS — R52 PAIN: Primary | ICD-10-CM

## 2019-11-04 RX ORDER — ATORVASTATIN CALCIUM 40 MG/1
TABLET, FILM COATED ORAL
Qty: 30 TAB | Refills: 12 | Status: SHIPPED | OUTPATIENT
Start: 2019-11-04 | End: 2020-11-30

## 2019-11-04 RX ORDER — METFORMIN HYDROCHLORIDE 1000 MG/1
TABLET ORAL
Qty: 75 TAB | Refills: 12 | Status: SHIPPED | OUTPATIENT
Start: 2019-11-04 | End: 2020-02-04 | Stop reason: SDUPTHER

## 2019-11-04 RX ORDER — TRAMADOL HYDROCHLORIDE 50 MG/1
50 TABLET ORAL
Qty: 60 TAB | Refills: 2 | Status: SHIPPED | OUTPATIENT
Start: 2019-11-04 | End: 2019-12-04

## 2019-11-04 RX ORDER — LISINOPRIL 20 MG/1
20 TABLET ORAL DAILY
Qty: 30 TAB | Refills: 12 | Status: SHIPPED | OUTPATIENT
Start: 2019-11-04 | End: 2020-02-06 | Stop reason: ALTCHOICE

## 2019-11-04 RX ORDER — FUROSEMIDE 80 MG/1
TABLET ORAL
Qty: 120 TAB | Refills: 11 | Status: SHIPPED | OUTPATIENT
Start: 2019-11-04 | End: 2020-01-14 | Stop reason: DRUGHIGH

## 2019-11-04 NOTE — TELEPHONE ENCOUNTER
----- Message from Brittany Zamora sent at 11/4/2019 12:42 PM EST -----  Regarding: Yohana MEJIA/Telephone  Contact: [phone]  General Message/Vendor Calls    Caller's first and last name: Dashawn Larsen [<[id number]>]       Reason for call: Prescription Consultation      Callback required yes/no and why: Yes      Best contact number(s): [phone]      Details to clarify the request: Please contact patient regarding rx(s) on file.        Quentin Bowser

## 2019-11-05 DIAGNOSIS — F51.01 PRIMARY INSOMNIA: ICD-10-CM

## 2019-11-05 RX ORDER — ZOLPIDEM TARTRATE 10 MG/1
10 TABLET ORAL
Qty: 30 TAB | Refills: 5 | OUTPATIENT
Start: 2019-11-05 | End: 2020-06-01

## 2019-11-05 NOTE — TELEPHONE ENCOUNTER
Called dave . All refill request froom 11/4/19. Were sent to pharmacy. Tramadol called into pharmacy today.  Kenya Kraft pending for refill

## 2019-11-05 NOTE — TELEPHONE ENCOUNTER
Patient called stating that she needs a refill zolpidem (AMBIEN) 10 mg tablet. Patient also stated that [facility] does not have any of his refills that were sent in yesterday. Patient can be reached at [phone].

## 2019-12-05 DIAGNOSIS — I10 ESSENTIAL HYPERTENSION: ICD-10-CM

## 2019-12-05 RX ORDER — CARVEDILOL 12.5 MG/1
TABLET ORAL
Qty: 60 TAB | Refills: 11 | Status: SHIPPED | OUTPATIENT
Start: 2019-12-05 | End: 2020-11-30

## 2020-01-08 ENCOUNTER — OFFICE VISIT (OUTPATIENT)
Dept: CARDIOLOGY CLINIC | Age: 55
End: 2020-01-08

## 2020-01-08 DIAGNOSIS — I42.0 DILATED CARDIOMYOPATHY (HCC): ICD-10-CM

## 2020-01-08 DIAGNOSIS — Z95.810 ICD (IMPLANTABLE CARDIOVERTER-DEFIBRILLATOR) IN PLACE: Primary | ICD-10-CM

## 2020-01-14 ENCOUNTER — OFFICE VISIT (OUTPATIENT)
Dept: FAMILY MEDICINE CLINIC | Age: 55
End: 2020-01-14

## 2020-01-14 VITALS
OXYGEN SATURATION: 97 % | SYSTOLIC BLOOD PRESSURE: 144 MMHG | BODY MASS INDEX: 42.66 KG/M2 | TEMPERATURE: 97.8 F | WEIGHT: 315 LBS | RESPIRATION RATE: 16 BRPM | DIASTOLIC BLOOD PRESSURE: 90 MMHG | HEIGHT: 72 IN | HEART RATE: 70 BPM

## 2020-01-14 DIAGNOSIS — E11.9 CONTROLLED TYPE 2 DIABETES MELLITUS WITHOUT COMPLICATION, WITHOUT LONG-TERM CURRENT USE OF INSULIN (HCC): ICD-10-CM

## 2020-01-14 DIAGNOSIS — E78.00 HYPERCHOLESTEROLEMIA: ICD-10-CM

## 2020-01-14 DIAGNOSIS — I10 ESSENTIAL HYPERTENSION: Primary | ICD-10-CM

## 2020-01-14 LAB — HBA1C MFR BLD HPLC: 6.8 %

## 2020-01-14 RX ORDER — FUROSEMIDE 80 MG/1
80 TABLET ORAL
COMMUNITY
End: 2020-01-14 | Stop reason: DRUGHIGH

## 2020-01-14 RX ORDER — METFORMIN HYDROCHLORIDE 1000 MG/1
1000 TABLET ORAL 2 TIMES DAILY WITH MEALS
Status: ON HOLD | COMMUNITY
End: 2020-09-22 | Stop reason: SDUPTHER

## 2020-01-14 RX ORDER — TRAMADOL HYDROCHLORIDE 50 MG/1
50 TABLET ORAL
COMMUNITY
End: 2020-06-29

## 2020-01-14 RX ORDER — FUROSEMIDE 80 MG/1
TABLET ORAL
Qty: 90 TAB | Refills: 12 | Status: SHIPPED | OUTPATIENT
Start: 2020-01-14 | End: 2020-02-04

## 2020-01-14 NOTE — PROGRESS NOTES
Chief Complaint   Patient presents with    Chest Pain     Cardiologist advised patient that it was not his heart and to follow up with Dr Lizabeth Cortez. 1. Have you been to the ER, urgent care clinic since your last visit? Hospitalized since your last visit? No    2. Have you seen or consulted any other health care providers outside of the [facility] since your last visit? Include any pap smears or colon screening.  No

## 2020-01-14 NOTE — PROGRESS NOTES
HISTORY OF PRESENT ILLNESS  Amberly Bravo is a 50 y.o. male. HPI In for recheck diabetes, hypertension. Occasional chest pain when takes a deep breath. Gets some mild dyspnea when is out walking. Especially has difficulty walking in stores. Says that blood sugars have been ok. Was called up a week ago, told that pacemaker went off. ROS    Physical Exam  Vitals signs and nursing note reviewed. Constitutional:       Appearance: He is well-developed. HENT:      Right Ear: External ear normal.      Left Ear: External ear normal.   Neck:      Thyroid: No thyromegaly. Cardiovascular:      Rate and Rhythm: Normal rate and regular rhythm. Heart sounds: Normal heart sounds. Pulmonary:      Effort: Pulmonary effort is normal. No respiratory distress. Breath sounds: Normal breath sounds. No wheezing. Abdominal:      General: Bowel sounds are normal. There is no distension. Palpations: Abdomen is soft. There is no mass. Tenderness: There is no tenderness. There is no guarding. Musculoskeletal: Normal range of motion. Comments: 1+ edema bilaterally   Lymphadenopathy:      Cervical: No cervical adenopathy. ASSESSMENT and PLAN  Orders Placed This Encounter    METABOLIC PANEL, BASIC    CBC WITH AUTOMATED DIFF    LIPID PANEL    MAGNESIUM    AMB POC HEMOGLOBIN A1C    furosemide (LASIX) 80 mg tablet     Diagnoses and all orders for this visit:    1. Essential hypertension  -     METABOLIC PANEL, BASIC  -     CBC WITH AUTOMATED DIFF  -     MAGNESIUM    2. Controlled type 2 diabetes mellitus without complication, without long-term current use of insulin (HCC)  -     AMB POC HEMOGLOBIN A1C    3. Hypercholesterolemia  -     LIPID PANEL    Other orders  -     furosemide (LASIX) 80 mg tablet; Take 2 tabs in the AM, one tab in the PM      Follow-up and Dispositions    · Return in about 3 months (around 4/14/2020).

## 2020-01-15 LAB
BASOPHILS # BLD AUTO: 0.1 X10E3/UL (ref 0–0.2)
BASOPHILS NFR BLD AUTO: 1 %
BUN SERPL-MCNC: 10 MG/DL (ref 6–24)
BUN/CREAT SERPL: 11 (ref 9–20)
CALCIUM SERPL-MCNC: 9.7 MG/DL (ref 8.7–10.2)
CHLORIDE SERPL-SCNC: 101 MMOL/L (ref 96–106)
CHOLEST SERPL-MCNC: 119 MG/DL (ref 100–199)
CO2 SERPL-SCNC: 21 MMOL/L (ref 20–29)
CREAT SERPL-MCNC: 0.92 MG/DL (ref 0.76–1.27)
EOSINOPHIL # BLD AUTO: 0.1 X10E3/UL (ref 0–0.4)
EOSINOPHIL NFR BLD AUTO: 1 %
ERYTHROCYTE [DISTWIDTH] IN BLOOD BY AUTOMATED COUNT: 15.2 % (ref 11.6–15.4)
GLUCOSE SERPL-MCNC: 102 MG/DL (ref 65–99)
HCT VFR BLD AUTO: 53 % (ref 37.5–51)
HDLC SERPL-MCNC: 29 MG/DL
HGB BLD-MCNC: 17.9 G/DL (ref 13–17.7)
IMM GRANULOCYTES # BLD AUTO: 0 X10E3/UL (ref 0–0.1)
IMM GRANULOCYTES NFR BLD AUTO: 0 %
INTERPRETATION, 910389: NORMAL
LDLC SERPL CALC-MCNC: 46 MG/DL (ref 0–99)
LYMPHOCYTES # BLD AUTO: 2.2 X10E3/UL (ref 0.7–3.1)
LYMPHOCYTES NFR BLD AUTO: 21 %
MAGNESIUM SERPL-MCNC: 2.4 MG/DL (ref 1.6–2.3)
MCH RBC QN AUTO: 28.9 PG (ref 26.6–33)
MCHC RBC AUTO-ENTMCNC: 33.8 G/DL (ref 31.5–35.7)
MCV RBC AUTO: 86 FL (ref 79–97)
MONOCYTES # BLD AUTO: 0.6 X10E3/UL (ref 0.1–0.9)
MONOCYTES NFR BLD AUTO: 6 %
NEUTROPHILS # BLD AUTO: 7.5 X10E3/UL (ref 1.4–7)
NEUTROPHILS NFR BLD AUTO: 71 %
PLATELET # BLD AUTO: 203 X10E3/UL (ref 150–450)
POTASSIUM SERPL-SCNC: 3.9 MMOL/L (ref 3.5–5.2)
RBC # BLD AUTO: 6.2 X10E6/UL (ref 4.14–5.8)
SODIUM SERPL-SCNC: 141 MMOL/L (ref 134–144)
TRIGL SERPL-MCNC: 218 MG/DL (ref 0–149)
VLDLC SERPL CALC-MCNC: 44 MG/DL (ref 5–40)
WBC # BLD AUTO: 10.5 X10E3/UL (ref 3.4–10.8)

## 2020-02-03 RX ORDER — POTASSIUM CHLORIDE 1500 MG/1
TABLET, FILM COATED, EXTENDED RELEASE ORAL
Qty: 90 TAB | Refills: 11 | Status: SHIPPED | OUTPATIENT
Start: 2020-02-03 | End: 2020-04-07

## 2020-02-04 ENCOUNTER — OFFICE VISIT (OUTPATIENT)
Dept: FAMILY MEDICINE CLINIC | Age: 55
End: 2020-02-04

## 2020-02-04 VITALS
OXYGEN SATURATION: 96 % | BODY MASS INDEX: 42.66 KG/M2 | TEMPERATURE: 95.6 F | RESPIRATION RATE: 18 BRPM | DIASTOLIC BLOOD PRESSURE: 87 MMHG | WEIGHT: 315 LBS | HEART RATE: 70 BPM | HEIGHT: 72 IN | SYSTOLIC BLOOD PRESSURE: 154 MMHG

## 2020-02-04 DIAGNOSIS — R06.02 SHORTNESS OF BREATH: Primary | ICD-10-CM

## 2020-02-04 RX ORDER — AZITHROMYCIN 250 MG/1
TABLET, FILM COATED ORAL
Qty: 6 TAB | Refills: 0 | Status: SHIPPED | OUTPATIENT
Start: 2020-02-04 | End: 2020-02-28 | Stop reason: ALTCHOICE

## 2020-02-04 RX ORDER — FUROSEMIDE 80 MG/1
TABLET ORAL
Qty: 120 TAB | Refills: 12 | Status: SHIPPED | OUTPATIENT
Start: 2020-02-04 | End: 2020-11-12

## 2020-02-04 RX ORDER — METOLAZONE 2.5 MG/1
TABLET ORAL
Qty: 30 TAB | Refills: 5 | Status: SHIPPED | OUTPATIENT
Start: 2020-02-04 | End: 2020-06-15

## 2020-02-04 NOTE — PROGRESS NOTES
HISTORY OF PRESENT ILLNESS  Dian Agarwal is a 50 y.o. male. HPI In for diabetes and blood pressure check. Has been having some dyspnea for the last few weeks. Moderate cough, worse at night, nonproductive. Feels warm at times. Occasional chest pains, saw cardiology ijn Dec. They didn't think that his cheat pains were heart related. No swelling in feet. ROS    Physical Exam  Vitals signs and nursing note reviewed. Constitutional:       Appearance: He is well-developed. HENT:      Right Ear: External ear normal.      Left Ear: External ear normal.   Neck:      Thyroid: No thyromegaly. Cardiovascular:      Rate and Rhythm: Normal rate and regular rhythm. Heart sounds: Normal heart sounds. Pulmonary:      Effort: Pulmonary effort is normal. No respiratory distress. Breath sounds: Normal breath sounds. No wheezing. Comments: Basilar rales bilaterally  Abdominal:      General: Bowel sounds are normal. There is no distension. Palpations: Abdomen is soft. There is no mass. Tenderness: There is no abdominal tenderness. There is no guarding. Musculoskeletal: Normal range of motion. Lymphadenopathy:      Cervical: No cervical adenopathy. ASSESSMENT and PLAN  Orders Placed This Encounter    XR CHEST PA LAT    furosemide (LASIX) 80 mg tablet    metOLazone (ZAROXOLYN) 2.5 mg tablet    azithromycin (ZITHROMAX) 250 mg tablet     Diagnoses and all orders for this visit:    1. Shortness of breath  -     XR CHEST PA LAT; Future  -     azithromycin (ZITHROMAX) 250 mg tablet; 2 tabs day 1 , then 1 tab daily next 4 days    Other orders  -     furosemide (LASIX) 80 mg tablet; Take 2 tabs in the AM, one 2 in the PM  -     metOLazone (ZAROXOLYN) 2.5 mg tablet; Take one tab po daily, 30 minutes before lasix. Follow-up and Dispositions    · Return in about 2 days (around 2/6/2020).

## 2020-02-04 NOTE — PROGRESS NOTES
Chief Complaint   Patient presents with    Follow-up     Hypertension       1. Have you been to the ER, urgent care clinic since your last visit? Hospitalized since your last visit? No        2. Have you seen or consulted any other health care providers outside of the [facility] since your last visit? Include any pap smears or colon screening.  No

## 2020-02-06 ENCOUNTER — OFFICE VISIT (OUTPATIENT)
Dept: FAMILY MEDICINE CLINIC | Age: 55
End: 2020-02-06

## 2020-02-06 VITALS
OXYGEN SATURATION: 96 % | WEIGHT: 315 LBS | SYSTOLIC BLOOD PRESSURE: 141 MMHG | RESPIRATION RATE: 18 BRPM | HEIGHT: 72 IN | BODY MASS INDEX: 42.66 KG/M2 | HEART RATE: 70 BPM | TEMPERATURE: 97.7 F | DIASTOLIC BLOOD PRESSURE: 89 MMHG

## 2020-02-06 DIAGNOSIS — I50.21 ACUTE SYSTOLIC CONGESTIVE HEART FAILURE (HCC): Primary | ICD-10-CM

## 2020-02-06 NOTE — PROGRESS NOTES
HISTORY OF PRESENT ILLNESS  Sherif Beltre is a 50 y.o. male. HPI in for followup. Has lost 7 lbs since last seen. Voiding frequently. Has been having pains in both kidneys since taking azithromycin. No real chest pains. Still has moderate cough, productive at times. No fever, chills. Dyspnea on exertion about the same. ROS    Physical Exam  Vitals signs and nursing note reviewed. Constitutional:       Appearance: He is well-developed. HENT:      Right Ear: External ear normal.      Left Ear: External ear normal.   Neck:      Thyroid: No thyromegaly. Cardiovascular:      Rate and Rhythm: Normal rate and regular rhythm. Heart sounds: Normal heart sounds. Pulmonary:      Effort: Pulmonary effort is normal. No respiratory distress. Breath sounds: Normal breath sounds. No wheezing. Abdominal:      General: Bowel sounds are normal. There is no distension. Palpations: Abdomen is soft. There is no mass. Tenderness: There is no abdominal tenderness. There is no guarding. Musculoskeletal: Normal range of motion. Comments: 1+ edema bilaterally   Lymphadenopathy:      Cervical: No cervical adenopathy. ASSESSMENT and PLAN  Orders Placed This Encounter    METABOLIC PANEL, BASIC    sacubitril-valsartan (ENTRESTO) 24 mg/26 mg tablet     Diagnoses and all orders for this visit:    1. Acute systolic congestive heart failure (HCC)  -     METABOLIC PANEL, BASIC    Other orders  -     sacubitril-valsartan (ENTRESTO) 24 mg/26 mg tablet; Take 1 Tab by mouth two (2) times a day. Begin sat am, 2-8-2020. Stop lisinopril on 2-6      Follow-up and Dispositions    · Return in about 6 days (around 2/12/2020). I still favor hospitalization, however, pt is reluctant to do this at present. Recheck next week, to er if worsens.

## 2020-02-06 NOTE — PROGRESS NOTES
Chief Complaint   Patient presents with    Follow-up     Hypertension       1. Have you been to the ER, urgent care clinic since your last visit? Hospitalized since your last visit? No    2. Have you seen or consulted any other health care providers outside of the [address] [place] [address] since your last visit? Include any pap smears or colon screening.  No

## 2020-02-07 LAB
BUN SERPL-MCNC: 28 MG/DL (ref 6–24)
BUN/CREAT SERPL: 26 (ref 9–20)
CALCIUM SERPL-MCNC: 10.2 MG/DL (ref 8.7–10.2)
CHLORIDE SERPL-SCNC: 95 MMOL/L (ref 96–106)
CO2 SERPL-SCNC: 20 MMOL/L (ref 20–29)
CREAT SERPL-MCNC: 1.08 MG/DL (ref 0.76–1.27)
GLUCOSE SERPL-MCNC: 185 MG/DL (ref 65–99)
POTASSIUM SERPL-SCNC: 4.3 MMOL/L (ref 3.5–5.2)
SODIUM SERPL-SCNC: 139 MMOL/L (ref 134–144)

## 2020-02-13 ENCOUNTER — HOSPITAL ENCOUNTER (OUTPATIENT)
Age: 55
Setting detail: OBSERVATION
Discharge: HOME | End: 2020-02-16
Attending: FAMILY MEDICINE | Admitting: FAMILY MEDICINE
Payer: MEDICAID

## 2020-02-13 ENCOUNTER — OFFICE VISIT (OUTPATIENT)
Dept: FAMILY MEDICINE CLINIC | Age: 55
End: 2020-02-13

## 2020-02-13 ENCOUNTER — APPOINTMENT (OUTPATIENT)
Dept: CT IMAGING | Age: 55
End: 2020-02-13
Attending: FAMILY MEDICINE
Payer: MEDICAID

## 2020-02-13 ENCOUNTER — APPOINTMENT (OUTPATIENT)
Dept: CT IMAGING | Age: 55
End: 2020-02-13
Attending: PSYCHIATRY & NEUROLOGY
Payer: MEDICAID

## 2020-02-13 VITALS
OXYGEN SATURATION: 96 % | TEMPERATURE: 98.5 F | WEIGHT: 315 LBS | HEART RATE: 70 BPM | RESPIRATION RATE: 18 BRPM | HEIGHT: 72 IN | DIASTOLIC BLOOD PRESSURE: 72 MMHG | SYSTOLIC BLOOD PRESSURE: 131 MMHG | BODY MASS INDEX: 42.66 KG/M2

## 2020-02-13 DIAGNOSIS — E11.9 CONTROLLED TYPE 2 DIABETES MELLITUS WITHOUT COMPLICATION, WITHOUT LONG-TERM CURRENT USE OF INSULIN (HCC): ICD-10-CM

## 2020-02-13 DIAGNOSIS — I10 ESSENTIAL HYPERTENSION: ICD-10-CM

## 2020-02-13 DIAGNOSIS — D75.1 POLYCYTHEMIA: ICD-10-CM

## 2020-02-13 DIAGNOSIS — E66.01 OBESITY, MORBID (HCC): ICD-10-CM

## 2020-02-13 DIAGNOSIS — I10 BENIGN ESSENTIAL HTN: ICD-10-CM

## 2020-02-13 DIAGNOSIS — J98.4 RESTRICTIVE LUNG DISEASE: ICD-10-CM

## 2020-02-13 DIAGNOSIS — G45.9 TIA (TRANSIENT ISCHEMIC ATTACK): ICD-10-CM

## 2020-02-13 DIAGNOSIS — I42.0 DILATED CARDIOMYOPATHY (HCC): ICD-10-CM

## 2020-02-13 DIAGNOSIS — I10 ESSENTIAL HYPERTENSION: Primary | ICD-10-CM

## 2020-02-13 DIAGNOSIS — I48.91 ATRIAL FIBRILLATION WITH RVR (HCC): ICD-10-CM

## 2020-02-13 DIAGNOSIS — I42.8 CARDIOMYOPATHY, NONISCHEMIC (HCC): ICD-10-CM

## 2020-02-13 LAB
ALBUMIN SERPL-MCNC: 3.8 G/DL (ref 3.5–5)
ALBUMIN/GLOB SERPL: 1 {RATIO} (ref 1.1–2.2)
ALP SERPL-CCNC: 66 U/L (ref 45–117)
ALT SERPL-CCNC: 43 U/L (ref 12–78)
ANION GAP SERPL CALC-SCNC: 6 MMOL/L (ref 5–15)
AST SERPL-CCNC: 22 U/L (ref 15–37)
BASOPHILS # BLD: 0.1 K/UL (ref 0–0.1)
BASOPHILS NFR BLD: 1 % (ref 0–1)
BILIRUB SERPL-MCNC: 1.2 MG/DL (ref 0.2–1)
BUN SERPL-MCNC: 21 MG/DL (ref 6–20)
BUN/CREAT SERPL: 18 (ref 12–20)
CALCIUM SERPL-MCNC: 8.9 MG/DL (ref 8.5–10.1)
CHLORIDE SERPL-SCNC: 105 MMOL/L (ref 97–108)
CO2 SERPL-SCNC: 28 MMOL/L (ref 21–32)
CREAT SERPL-MCNC: 1.19 MG/DL (ref 0.7–1.3)
DIFFERENTIAL METHOD BLD: ABNORMAL
EOSINOPHIL # BLD: 0.1 K/UL (ref 0–0.4)
EOSINOPHIL NFR BLD: 1 % (ref 0–7)
ERYTHROCYTE [DISTWIDTH] IN BLOOD BY AUTOMATED COUNT: 15 % (ref 11.5–14.5)
GLOBULIN SER CALC-MCNC: 3.9 G/DL (ref 2–4)
GLUCOSE BLD STRIP.AUTO-MCNC: 189 MG/DL (ref 65–100)
GLUCOSE SERPL-MCNC: 210 MG/DL (ref 65–100)
HCT VFR BLD AUTO: 50.8 % (ref 36.6–50.3)
HGB BLD-MCNC: 16.9 G/DL (ref 12.1–17)
IMM GRANULOCYTES # BLD AUTO: 0 K/UL (ref 0–0.04)
IMM GRANULOCYTES NFR BLD AUTO: 0 % (ref 0–0.5)
LYMPHOCYTES # BLD: 1.8 K/UL (ref 0.8–3.5)
LYMPHOCYTES NFR BLD: 19 % (ref 12–49)
MCH RBC QN AUTO: 28.5 PG (ref 26–34)
MCHC RBC AUTO-ENTMCNC: 33.3 G/DL (ref 30–36.5)
MCV RBC AUTO: 85.7 FL (ref 80–99)
MONOCYTES # BLD: 0.4 K/UL (ref 0–1)
MONOCYTES NFR BLD: 5 % (ref 5–13)
NEUTS SEG # BLD: 7.1 K/UL (ref 1.8–8)
NEUTS SEG NFR BLD: 74 % (ref 32–75)
NRBC # BLD: 0 K/UL (ref 0–0.01)
NRBC BLD-RTO: 0 PER 100 WBC
PLATELET # BLD AUTO: 175 K/UL (ref 150–400)
PMV BLD AUTO: 10.3 FL (ref 8.9–12.9)
POTASSIUM SERPL-SCNC: 3.6 MMOL/L (ref 3.5–5.1)
PROT SERPL-MCNC: 7.7 G/DL (ref 6.4–8.2)
RBC # BLD AUTO: 5.93 M/UL (ref 4.1–5.7)
SERVICE CMNT-IMP: ABNORMAL
SODIUM SERPL-SCNC: 139 MMOL/L (ref 136–145)
WBC # BLD AUTO: 9.5 K/UL (ref 4.1–11.1)

## 2020-02-13 PROCEDURE — 70450 CT HEAD/BRAIN W/O DYE: CPT

## 2020-02-13 PROCEDURE — 99218 HC RM OBSERVATION: CPT

## 2020-02-13 PROCEDURE — 36415 COLL VENOUS BLD VENIPUNCTURE: CPT

## 2020-02-13 PROCEDURE — 85025 COMPLETE CBC W/AUTO DIFF WBC: CPT

## 2020-02-13 PROCEDURE — 82962 GLUCOSE BLOOD TEST: CPT

## 2020-02-13 PROCEDURE — 80053 COMPREHEN METABOLIC PANEL: CPT

## 2020-02-13 PROCEDURE — 70496 CT ANGIOGRAPHY HEAD: CPT

## 2020-02-13 PROCEDURE — 74011250637 HC RX REV CODE- 250/637: Performed by: FAMILY MEDICINE

## 2020-02-13 PROCEDURE — 74011636320 HC RX REV CODE- 636/320: Performed by: FAMILY MEDICINE

## 2020-02-13 PROCEDURE — 74011000250 HC RX REV CODE- 250: Performed by: FAMILY MEDICINE

## 2020-02-13 PROCEDURE — 94640 AIRWAY INHALATION TREATMENT: CPT

## 2020-02-13 RX ORDER — GUAIFENESIN 100 MG/5ML
81 LIQUID (ML) ORAL DAILY
Status: DISCONTINUED | OUTPATIENT
Start: 2020-02-14 | End: 2020-02-16 | Stop reason: HOSPADM

## 2020-02-13 RX ORDER — CARVEDILOL 12.5 MG/1
12.5 TABLET ORAL 2 TIMES DAILY WITH MEALS
Status: DISCONTINUED | OUTPATIENT
Start: 2020-02-13 | End: 2020-02-16 | Stop reason: HOSPADM

## 2020-02-13 RX ORDER — GLIPIZIDE 5 MG/1
10 TABLET ORAL
Status: DISCONTINUED | OUTPATIENT
Start: 2020-02-13 | End: 2020-02-16 | Stop reason: HOSPADM

## 2020-02-13 RX ORDER — SODIUM CHLORIDE 0.9 % (FLUSH) 0.9 %
5-40 SYRINGE (ML) INJECTION EVERY 8 HOURS
Status: DISCONTINUED | OUTPATIENT
Start: 2020-02-13 | End: 2020-02-16 | Stop reason: HOSPADM

## 2020-02-13 RX ORDER — IPRATROPIUM BROMIDE AND ALBUTEROL SULFATE 2.5; .5 MG/3ML; MG/3ML
3 SOLUTION RESPIRATORY (INHALATION)
Status: DISCONTINUED | OUTPATIENT
Start: 2020-02-13 | End: 2020-02-13

## 2020-02-13 RX ORDER — IPRATROPIUM BROMIDE AND ALBUTEROL SULFATE 2.5; .5 MG/3ML; MG/3ML
3 SOLUTION RESPIRATORY (INHALATION)
Status: DISCONTINUED | OUTPATIENT
Start: 2020-02-13 | End: 2020-02-15

## 2020-02-13 RX ORDER — LISINOPRIL 20 MG/1
40 TABLET ORAL DAILY
Status: DISCONTINUED | OUTPATIENT
Start: 2020-02-14 | End: 2020-02-16 | Stop reason: HOSPADM

## 2020-02-13 RX ORDER — MAGNESIUM SULFATE 100 %
4 CRYSTALS MISCELLANEOUS AS NEEDED
Status: DISCONTINUED | OUTPATIENT
Start: 2020-02-13 | End: 2020-02-16 | Stop reason: HOSPADM

## 2020-02-13 RX ORDER — ZOLPIDEM TARTRATE 5 MG/1
10 TABLET ORAL
Status: DISCONTINUED | OUTPATIENT
Start: 2020-02-13 | End: 2020-02-13

## 2020-02-13 RX ORDER — DEXTROSE 50 % IN WATER (D50W) INTRAVENOUS SYRINGE
12.5-25 AS NEEDED
Status: DISCONTINUED | OUTPATIENT
Start: 2020-02-13 | End: 2020-02-16 | Stop reason: HOSPADM

## 2020-02-13 RX ORDER — ZOLPIDEM TARTRATE 5 MG/1
10 TABLET ORAL
Status: DISCONTINUED | OUTPATIENT
Start: 2020-02-13 | End: 2020-02-16 | Stop reason: HOSPADM

## 2020-02-13 RX ORDER — TRAMADOL HYDROCHLORIDE 50 MG/1
50 TABLET ORAL
Status: DISCONTINUED | OUTPATIENT
Start: 2020-02-13 | End: 2020-02-16 | Stop reason: HOSPADM

## 2020-02-13 RX ORDER — POTASSIUM CHLORIDE 750 MG/1
40 TABLET, FILM COATED, EXTENDED RELEASE ORAL 2 TIMES DAILY WITH MEALS
Status: DISCONTINUED | OUTPATIENT
Start: 2020-02-13 | End: 2020-02-16 | Stop reason: HOSPADM

## 2020-02-13 RX ORDER — LISINOPRIL 20 MG/1
20 TABLET ORAL DAILY
Status: ON HOLD | COMMUNITY
End: 2020-02-14 | Stop reason: SDUPTHER

## 2020-02-13 RX ORDER — SODIUM CHLORIDE 0.9 % (FLUSH) 0.9 %
10 SYRINGE (ML) INJECTION
Status: COMPLETED | OUTPATIENT
Start: 2020-02-13 | End: 2020-02-13

## 2020-02-13 RX ORDER — ACETAMINOPHEN 325 MG/1
650 TABLET ORAL
Status: DISCONTINUED | OUTPATIENT
Start: 2020-02-13 | End: 2020-02-16 | Stop reason: HOSPADM

## 2020-02-13 RX ORDER — INSULIN LISPRO 100 [IU]/ML
INJECTION, SOLUTION INTRAVENOUS; SUBCUTANEOUS
Status: DISCONTINUED | OUTPATIENT
Start: 2020-02-13 | End: 2020-02-16 | Stop reason: HOSPADM

## 2020-02-13 RX ORDER — ATORVASTATIN CALCIUM 40 MG/1
40 TABLET, FILM COATED ORAL DAILY
Status: DISCONTINUED | OUTPATIENT
Start: 2020-02-14 | End: 2020-02-16 | Stop reason: HOSPADM

## 2020-02-13 RX ORDER — SODIUM CHLORIDE 0.9 % (FLUSH) 0.9 %
5-40 SYRINGE (ML) INJECTION AS NEEDED
Status: DISCONTINUED | OUTPATIENT
Start: 2020-02-13 | End: 2020-02-16 | Stop reason: HOSPADM

## 2020-02-13 RX ORDER — FUROSEMIDE 40 MG/1
120 TABLET ORAL 2 TIMES DAILY
Status: DISCONTINUED | OUTPATIENT
Start: 2020-02-13 | End: 2020-02-16 | Stop reason: HOSPADM

## 2020-02-13 RX ORDER — METOLAZONE 2.5 MG/1
2.5 TABLET ORAL DAILY
Status: DISCONTINUED | OUTPATIENT
Start: 2020-02-14 | End: 2020-02-16 | Stop reason: HOSPADM

## 2020-02-13 RX ADMIN — IPRATROPIUM BROMIDE AND ALBUTEROL SULFATE 3 ML: .5; 3 SOLUTION RESPIRATORY (INHALATION) at 21:13

## 2020-02-13 RX ADMIN — GLIPIZIDE 10 MG: 5 TABLET ORAL at 17:05

## 2020-02-13 RX ADMIN — Medication 10 ML: at 17:07

## 2020-02-13 RX ADMIN — CARVEDILOL 12.5 MG: 12.5 TABLET, FILM COATED ORAL at 17:05

## 2020-02-13 RX ADMIN — POTASSIUM CHLORIDE 40 MEQ: 750 TABLET, FILM COATED, EXTENDED RELEASE ORAL at 17:05

## 2020-02-13 RX ADMIN — Medication 10 ML: at 18:09

## 2020-02-13 RX ADMIN — IOPAMIDOL 100 ML: 755 INJECTION, SOLUTION INTRAVENOUS at 18:09

## 2020-02-13 NOTE — PROGRESS NOTES
Chief Complaint   Patient presents with    Follow-up     CHF 1 week     1. Have you been to the ER, urgent care clinic since your last visit? Hospitalized since your last visit? No    2. Have you seen or consulted any other health care providers outside of the [address] [place] [address] since your last visit? Include any pap smears or colon screening.   No

## 2020-02-13 NOTE — ROUTINE PROCESS
-Please complete MRI History and Safety Screening Form for this patient using [employer] only under Orders Requiring a Screening Form: 
 

## 2020-02-13 NOTE — CONSULTS
NEUROLOGY NOTE     Chief complaint: Left-sided numbness    Reason for Consult  I have been asked to see the patient in neurological consultation by Hayes Tay MD to render advice and opinion regarding possible stroke    HPI  Micheal Haas is a 50 y.o. male who presents to the hospital because of left-sided numbness. Patient states that he has had 2 strokes in the past and does have mild left-sided residual weakness but no numbness. On 2/11/2020, he woke up with left-sided numbness. It involves the left face, arm and leg. The symptoms lasted for around 2 hours and then resolved. He did have associated photophobia. He continues to have minimal left-sided numbness. The patient does also have frontal headaches and it is associated with nausea but no vomiting. This has been going on for the last 2 weeks. Patient does have hypertension, diabetes and quit smoking around 7 years ago. The patient does not take any aspirin. The patient does also have history of 2 strokes. ROS  A ten system review of constitutional, cardiovascular, respiratory, musculoskeletal, endocrine, skin, SHEENT, genitourinary, psychiatric and neurologic systems was obtained and is unremarkable except as stated in HPI     PMH  Past Medical History:   Diagnosis Date   Chika Stewart [facility]) 1/6/15    [facility] ED. Pt reported this    Asthma     Atrial fibrillation with RVR ([facility] [address] 8/11/2017    Cardiomyopathy (HCC)     ET 25-30%    Chronic obstructive pulmonary disease (HCC)     Diabetes (HCC)     Gastrointestinal disorder     GERD    Heart failure (HCC)     High cholesterol     Hypertension     Restrictive lung disease     FVC2.59 (52%), FEV1 1.95 (49)- 2017    S/P cardiac cath     Dr. Sana Gomez, 2016.  minimal cad    Sleep apnea     Stroke ([facility] [address]     TIA x 2       FH  Family History   Adopted: Yes       SH  Social History     Socioeconomic History    Marital status: SINGLE     Spouse name: Not on file    Number of children: Not on file    Years of education: Not on file    Highest education level: Not on file   Tobacco Use    Smoking status: Former Smoker     Packs/day: 1.00     Types: Cigarettes     Last attempt to quit: 2/3/2014     Years since quittin.0    Smokeless tobacco: Never Used    Tobacco comment: Quit 3 years ago   Substance and Sexual Activity    Alcohol use: No     Comment: stopped drinking at age 29    Drug use: No    Sexual activity: Not Currently   Social History Narrative    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        ALLERGIES  Allergies   Allergen Reactions    Codeine Hives    Hydrocodone Itching    Influenza Virus Vaccines Nausea Only     Fever  diarrhea    Mushroom Flavor Swelling    Oxycodone Rash    Pneumococcal 23-Allison Ps Vaccine Nausea and Vomiting     Vomit         PHYSICAL EXAMINATION:   Patient Vitals for the past 24 hrs:   Temp Pulse Resp BP SpO2   20 1525 98.2 °F (36.8 °C) 70 18 127/79 97 %        General:   General appearance: Pt is in no acute distress   Distal pulses are preserved  Fundoscopic exam: attempted    Neurological Examination:   Mental Status:  AAO x3. Speech is fluent. Follows commands, has normal fund of knowledge, attention, short term recall, comprehension and insight. Cranial Nerves: Visual fields are full. PERRL, Extraocular movements are full. Facial sensationdecreased on the left. Facial movement intact. Hearing intact to conversation. Palate elevates symmetrically. Shoulder shrug symmetric. Tongue midline. Motor: Strength is 5/5 on the right and 4+ out of 5 in the left upper and lower extremities. Normal tone. No atrophy. Sensation: Light touch -decreased in the left upper and lower extremities    Reflexes: DTRs 2+ throughout. Plantar responses downgoing. Coordination/Cerebellar: Intact to finger-nose-finger     Gait: Casual gait is normal.     Skin: No significant bruising or lacerations.     LAB DATA REVIEWED:    Recent Results (from the past 24 hour(s))   CBC WITH AUTOMATED DIFF    Collection Time: 20  4:35 PM   Result Value Ref Range    WBC 9.5 4.1 - 11.1 K/uL    RBC 5.93 (H) 4.10 - 5.70 M/uL    HGB 16.9 12.1 - 17.0 g/dL    HCT 50.8 (H) 36.6 - 50.3 %    MCV 85.7 80.0 - 99.0 FL    MCH 28.5 26.0 - 34.0 PG    MCHC 33.3 30.0 - 36.5 g/dL    RDW 15.0 (H) 11.5 - 14.5 %    PLATELET 933 377 - 148 K/uL    MPV 10.3 8.9 - 12.9 FL    NRBC 0.0 0  WBC    ABSOLUTE NRBC 0.00 0.00 - 0.01 K/uL    NEUTROPHILS 74 32 - 75 %    LYMPHOCYTES 19 12 - 49 %    MONOCYTES 5 5 - 13 %    EOSINOPHILS 1 0 - 7 %    BASOPHILS 1 0 - 1 %    IMMATURE GRANULOCYTES 0 0.0 - 0.5 %    ABS. NEUTROPHILS 7.1 1.8 - 8.0 K/UL    ABS. LYMPHOCYTES 1.8 0.8 - 3.5 K/UL    ABS. MONOCYTES 0.4 0.0 - 1.0 K/UL    ABS. EOSINOPHILS 0.1 0.0 - 0.4 K/UL    ABS. BASOPHILS 0.1 0.0 - 0.1 K/UL    ABS. IMM. GRANS. 0.0 0.00 - 0.04 K/UL    DF AUTOMATED          HOME MEDS  Prior to Admission Medications   Prescriptions Last Dose Informant Patient Reported? Taking? MARGIE PEN NEEDLE 32 gauge x \" ndle   No No   Sig: USE DAILY WITH [employer] ULTRA BLUE TEST STRIP strip   No No   Sig: USE ONE STRIP TO TEST THREE TIMES A DAY BEFORE MEALS   albuterol (VENTOLIN HFA) 90 mcg/actuation inhaler   No No   Sig: INHALE TWO PUFFS BY MOUTH EVERY 4 HOURS AS NEEDED FOR FOR WHEEZING AND FOR SHORTNESS OF BREATH   albuterol-ipratropium (DUO-NEB) 2.5 mg-0.5 mg/3 ml nebu   No No   Sig: INHALE THREE MILLILITERS (ONE VIAL) VIA NEBULIZATION BY MOUTH EVERY 6 HOURS AS NEEDED   apixaban (ELIQUIS) 5 mg tablet   No No   Sig: Take 1 Tab by mouth two (2) times a day.    atorvastatin (LIPITOR) 40 mg tablet   No No   Sig: TAKE ONE TABLET BY MOUTH DAILY FOR CHOLESTEROL   azithromycin (ZITHROMAX) 250 mg tablet   No No   Si tabs day 1 , then 1 tab daily next 4 days   carvedilol (COREG) 12.5 mg tablet   No No   Sig: TAKE ONE TABLET BY MOUTH TWICE A DAY TO SLOW HEART RATE DOWN.   empagliflozin (JARDIANCE) 25 mg tablet   No No   Sig: Take 1 Tab by mouth Daily (before breakfast). furosemide (LASIX) 80 mg tablet   No No   Sig: Take 2 tabs in the AM, one 2 in the PM   glipiZIDE (GLUCOTROL) 10 mg tablet   No No   Sig: TAKE ONE TABLET BY MOUTH TWICE A DAY   ibuprofen (MOTRIN) 800 mg tablet   No No   Sig: Take 1 Tab by mouth every eight (8) hours as needed for Pain.   liraglutide (VICTOZA) 0.6 mg/0.1 mL (18 mg/3 mL) pnij   No No   Si.6 mg daily- 1st week, then 1.2 mg daily- 2nd week, then 1.8 mg daily. Please give pt needles also   metFORMIN (GLUCOPHAGE) 1,000 mg tablet   Yes No   Sig: Take 1,000 mg by mouth two (2) times daily (with meals). metOLazone (ZAROXOLYN) 2.5 mg tablet   No No   Sig: Take one tab po daily, 30 minutes before lasix. potassium chloride SR (K-TAB) 20 mEq tablet   No No   Sig: TAKE TWO TABLETS BY MOUTH EVERY MORNING TAKE ONE TABLET BY MOUTH EVERY EVENING   sacubitril-valsartan (ENTRESTO) 24 mg/26 mg tablet   No No   Sig: Take 1 Tab by mouth two (2) times a day. Begin sat am, 2020. Stop lisinopril on    traMADol (ULTRAM) 50 mg tablet   Yes No   Sig: Take 50 mg by mouth every eight (8) hours as needed for Pain.   zolpidem (AMBIEN) 10 mg tablet   No No   Sig: Take 1 Tab by mouth nightly as needed for Sleep. Max Daily Amount: 10 mg.       Facility-Administered Medications: None       CURRENT MEDS  Current Facility-Administered Medications   Medication Dose Route Frequency    albuterol-ipratropium (DUO-NEB) 2.5 MG-0.5 MG/3 ML  3 mL Nebulization QID RT    apixaban (ELIQUIS) tablet 5 mg  5 mg Oral BID    [START ON 2020] atorvastatin (LIPITOR) tablet 40 mg  40 mg Oral DAILY    carvediloL (COREG) tablet 12.5 mg  12.5 mg Oral BID WITH MEALS    furosemide (LASIX) tablet 120 mg  120 mg Oral BID    glipiZIDE (GLUCOTROL) tablet 10 mg  10 mg Oral ACB&D    [START ON 2020] metOLazone (ZAROXOLYN) tablet 2.5 mg  2.5 mg Oral DAILY    potassium chloride SR (KLOR-CON 10) tablet 40 mEq  40 mEq Oral BID WITH MEALS    sodium chloride (NS) flush 5-40 mL  5-40 mL IntraVENous Q8H       Stroke workup    MRI Brain  Pending    MRA head  Pending    MRA neck:   Pending    Carotids:   Pending    TTE:   Pending    Stroke labs:  HgBA1c    Lab Results   Component Value Date/Time    Hemoglobin A1c 9.5 (H) 11/15/2018 12:25 AM     LDL   Lab Results   Component Value Date/Time    LDL, calculated 46 01/14/2020 01:40 PM       IMPRESSION:  Lance Jefferson is a 50 y.o. male who presents with new onset left-sided numbness that was more pronounced for 2 hours on 2/11/2020 but is still persisting. Patient does have history of 2 strokes in the past with left-sided weakness. Patient does not take any aspirin. Patient does have hypertension and diabetes. Patient does have a pacemaker and he does have a card but is not sure whether it is MRI compatible or not. We will proceed with stroke work-up. We will start the patient on aspirin 81 mg a day    RECOMMENDATIONS:  - MRI brain w/o C - Pending  - CT angiogram head and neck  - TTE - Pending  - Telemetry  - BP goal is less than 140/90  - Stroke labs (HbA1c, lipid panel)  - Start ASA 81 mg daily and continue home Eliquis  - Start atorvastatin 40 mg daily if LDL more than 70.  - ST/OT/PT artie    Thank you very much for this consultation.       Sammy Banegas MD  Neurologist

## 2020-02-13 NOTE — PROGRESS NOTES
HISTORY OF PRESENT ILLNESS  Evelyn Wylie is a 50 y.o. male. HPI In for followup. Insurance company has denied his entresto thus far, altho I did do a pre auth last night. Says that breathing is doing somewhat better than when last seen. Still has some mild dyspnea. Went back on lisinopril when insurance wouldn't cover Ernst Fosters. Had an episode 2 days ago, had photophobia when got up, then developed numbness of his entire L side that lasted a few hours. Eventually cleared up. Has also been having bad headaches for the last 2 weeks. Aspirin- some relief. Headaches are bifrontal. Has hx TIAs in 2018. Has been taking eliquis bid. ROS    Physical Exam    ASSESSMENT and PLAN  pt admitted for tia workup.

## 2020-02-14 ENCOUNTER — APPOINTMENT (OUTPATIENT)
Dept: NON INVASIVE DIAGNOSTICS | Age: 55
End: 2020-02-14
Attending: PSYCHIATRY & NEUROLOGY
Payer: MEDICAID

## 2020-02-14 LAB
AV PEAK GRADIENT: 8.81 MMHG
AV VELOCITY RATIO: 0.86
ECHO AO ROOT DIAM: 3.99 CM
ECHO AV AREA PEAK VELOCITY: 3.3 CM2
ECHO AV AREA/BSA PEAK VELOCITY: 1.2 CM2/M2
ECHO AV PEAK GRADIENT: 5.9 MMHG
ECHO AV PEAK VELOCITY: 121.25 CM/S
ECHO EST RA PRESSURE: 10 MMHG
ECHO LA AREA 4C: 26.7 CM2
ECHO LA MAJOR AXIS: 5.17 CM
ECHO LA TO AORTIC ROOT RATIO: 1.3
ECHO LA VOL 4C: 80.64 ML (ref 18–58)
ECHO LA VOLUME INDEX A4C: 30.48 ML/M2 (ref 16–28)
ECHO LV EDV TEICHHOLZ: 1.11 ML
ECHO LV ESV TEICHHOLZ: 0.68 ML
ECHO LV INTERNAL DIMENSION DIASTOLIC: 6.33 CM (ref 4.2–5.9)
ECHO LV INTERNAL DIMENSION SYSTOLIC: 5.11 CM
ECHO LV IVSD: 1.97 CM (ref 0.6–1)
ECHO LV MASS 2D: 729.5 G (ref 88–224)
ECHO LV MASS INDEX 2D: 275.7 G/M2 (ref 49–115)
ECHO LV POSTERIOR WALL DIASTOLIC: 1.6 CM (ref 0.6–1)
ECHO LV POSTERIOR WALL SYSTOLIC: 1.36 CM
ECHO LVOT DIAM: 2.21 CM
ECHO LVOT PEAK GRADIENT: 4.3 MMHG
ECHO LVOT PEAK VELOCITY: 104.18 CM/S
ECHO LVOT SV: 67.7 ML
ECHO LVOT VTI: 17.69 CM
ECHO MV REGURGITANT PEAK GRADIENT: 7.5 MMHG
ECHO MV REGURGITANT PEAK VELOCITY: 137.36 CM/S
ECHO PULMONARY ARTERY SYSTOLIC PRESSURE (PASP): 42.1 MMHG
ECHO RA AREA 4C: 24.26 CM2
ECHO RIGHT VENTRICULAR SYSTOLIC PRESSURE (RVSP): 42.1 MMHG
ECHO TV REGURGITANT MAX VELOCITY: 283.37 CM/S
ECHO TV REGURGITANT PEAK GRADIENT: 32.1 MMHG
GLUCOSE BLD STRIP.AUTO-MCNC: 162 MG/DL (ref 65–100)
GLUCOSE BLD STRIP.AUTO-MCNC: 178 MG/DL (ref 65–100)
GLUCOSE BLD STRIP.AUTO-MCNC: 279 MG/DL (ref 65–100)
LVFS 2D: 19.32 %
LVOT MG: 1.91 MMHG
LVOT MV: 0.6 CM/S
LVSV (TEICH): 28.76 ML
PISA AR MAX VEL: 148.43 CM/S
SERVICE CMNT-IMP: ABNORMAL

## 2020-02-14 PROCEDURE — 74011250637 HC RX REV CODE- 250/637: Performed by: FAMILY MEDICINE

## 2020-02-14 PROCEDURE — 93306 TTE W/DOPPLER COMPLETE: CPT

## 2020-02-14 PROCEDURE — 74011250637 HC RX REV CODE- 250/637: Performed by: PSYCHIATRY & NEUROLOGY

## 2020-02-14 PROCEDURE — 94640 AIRWAY INHALATION TREATMENT: CPT

## 2020-02-14 PROCEDURE — 82962 GLUCOSE BLOOD TEST: CPT

## 2020-02-14 PROCEDURE — 74011000250 HC RX REV CODE- 250: Performed by: FAMILY MEDICINE

## 2020-02-14 PROCEDURE — 99218 HC RM OBSERVATION: CPT

## 2020-02-14 PROCEDURE — 74011636637 HC RX REV CODE- 636/637: Performed by: FAMILY MEDICINE

## 2020-02-14 RX ORDER — LISINOPRIL 40 MG/1
40 TABLET ORAL DAILY
Qty: 30 TAB | Refills: 12 | Status: SHIPPED | OUTPATIENT
Start: 2020-02-14 | End: 2020-02-28 | Stop reason: ALTCHOICE

## 2020-02-14 RX ORDER — ACETAMINOPHEN 325 MG/1
650 TABLET ORAL
Qty: 50 TAB | Refills: 1 | Status: SHIPPED
Start: 2020-02-14 | End: 2020-07-23 | Stop reason: ALTCHOICE

## 2020-02-14 RX ADMIN — FUROSEMIDE 40 MG: 40 TABLET ORAL at 17:14

## 2020-02-14 RX ADMIN — ACETAMINOPHEN 650 MG: 325 TABLET ORAL at 00:13

## 2020-02-14 RX ADMIN — CARVEDILOL 12.5 MG: 12.5 TABLET, FILM COATED ORAL at 17:15

## 2020-02-14 RX ADMIN — APIXABAN 5 MG: 5 TABLET, FILM COATED ORAL at 08:34

## 2020-02-14 RX ADMIN — INSULIN LISPRO 2 UNITS: 100 INJECTION, SOLUTION INTRAVENOUS; SUBCUTANEOUS at 08:33

## 2020-02-14 RX ADMIN — INSULIN LISPRO 3 UNITS: 100 INJECTION, SOLUTION INTRAVENOUS; SUBCUTANEOUS at 17:17

## 2020-02-14 RX ADMIN — APIXABAN 5 MG: 5 TABLET, FILM COATED ORAL at 20:27

## 2020-02-14 RX ADMIN — Medication 10 ML: at 08:37

## 2020-02-14 RX ADMIN — IPRATROPIUM BROMIDE AND ALBUTEROL SULFATE 3 ML: .5; 3 SOLUTION RESPIRATORY (INHALATION) at 13:12

## 2020-02-14 RX ADMIN — ASPIRIN 81 MG 81 MG: 81 TABLET ORAL at 08:35

## 2020-02-14 RX ADMIN — GLIPIZIDE 10 MG: 5 TABLET ORAL at 17:16

## 2020-02-14 RX ADMIN — LISINOPRIL 40 MG: 20 TABLET ORAL at 08:35

## 2020-02-14 RX ADMIN — METOLAZONE 2.5 MG: 2.5 TABLET ORAL at 08:35

## 2020-02-14 RX ADMIN — POTASSIUM CHLORIDE 40 MEQ: 750 TABLET, FILM COATED, EXTENDED RELEASE ORAL at 17:15

## 2020-02-14 RX ADMIN — IPRATROPIUM BROMIDE AND ALBUTEROL SULFATE 3 ML: .5; 3 SOLUTION RESPIRATORY (INHALATION) at 21:41

## 2020-02-14 RX ADMIN — ATORVASTATIN CALCIUM 40 MG: 40 TABLET, FILM COATED ORAL at 08:34

## 2020-02-14 RX ADMIN — POTASSIUM CHLORIDE 40 MEQ: 750 TABLET, FILM COATED, EXTENDED RELEASE ORAL at 08:35

## 2020-02-14 RX ADMIN — Medication 10 ML: at 20:28

## 2020-02-14 RX ADMIN — ACETAMINOPHEN 650 MG: 325 TABLET ORAL at 20:21

## 2020-02-14 RX ADMIN — GLIPIZIDE 10 MG: 5 TABLET ORAL at 08:34

## 2020-02-14 RX ADMIN — IPRATROPIUM BROMIDE AND ALBUTEROL SULFATE 3 ML: .5; 3 SOLUTION RESPIRATORY (INHALATION) at 09:06

## 2020-02-14 RX ADMIN — CARVEDILOL 12.5 MG: 12.5 TABLET, FILM COATED ORAL at 08:34

## 2020-02-14 NOTE — PROGRESS NOTES
NEUROLOGY NOTE     Chief complaint: Left-sided numbness    SUBJECTIVE:  Left sided numbness has resolved. HPI  Lise Negrete is a 50 y.o. male who presents to the hospital because of left-sided numbness. Patient states that he has had 2 strokes in the past and does have mild left-sided residual weakness but no numbness. On 2/11/2020, he woke up with left-sided numbness. It involves the left face, arm and leg. The symptoms lasted for around 2 hours and then resolved. He did have associated photophobia. He continues to have minimal left-sided numbness. The patient does also have frontal headaches and it is associated with nausea but no vomiting. This has been going on for the last 2 weeks. Patient does have hypertension, diabetes and quit smoking around 7 years ago. The patient does not take any aspirin. The patient does also have history of 2 strokes. ROS  A ten system review of constitutional, cardiovascular, respiratory, musculoskeletal, endocrine, skin, SHEENT, genitourinary, psychiatric and neurologic systems was obtained and is unremarkable except as stated in HPI     PMH  Past Medical History:   Diagnosis Date   Juan David [facility]) 1/6/15    [facility] ED. Pt reported this    Asthma     Atrial fibrillation with RVR ([facility] [address] 8/11/2017    Cardiomyopathy (HCC)     ET 25-30%    Chronic obstructive pulmonary disease (HCC)     Diabetes (HCC)     Gastrointestinal disorder     GERD    Heart failure (HCC)     High cholesterol     Hypertension     Restrictive lung disease     FVC2.59 (52%), FEV1 1.95 (49)- 2017    S/P cardiac cath     Dr. Gutierrez Mascorro, 2016.  minimal cad    Sleep apnea     Stroke ([facility] [address]     TIA x 2       FH  Family History   Adopted: Yes         Social History     Socioeconomic History    Marital status: SINGLE     Spouse name: Not on file    Number of children: Not on file    Years of education: Not on file    Highest education level: Not on file   Tobacco Use    Smoking status: Former Smoker     Packs/day: 1.00     Types: Cigarettes     Last attempt to quit: 2/3/2014     Years since quittin.0    Smokeless tobacco: Never Used    Tobacco comment: Quit 3 years ago   Substance and Sexual Activity    Alcohol use: No     Comment: stopped drinking at age 29    Drug use: No    Sexual activity: Not Currently   Social History Narrative    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        ALLERGIES  Allergies   Allergen Reactions    Codeine Hives    Hydrocodone Itching    Influenza Virus Vaccines Nausea Only     Fever  diarrhea    Mushroom Flavor Swelling    Oxycodone Rash    Pneumococcal 23-Allison Ps Vaccine Nausea and Vomiting     Vomit         PHYSICAL EXAMINATION:   Patient Vitals for the past 24 hrs:   Temp Pulse Resp BP SpO2   20 0906     95 %   20 0741 97.3 °F (36.3 °C) 70 18 120/77 98 %   20 0422 97.5 °F (36.4 °C) 70 18 123/62 99 %   20 2324 98.4 °F (36.9 °C)  18 129/72 100 %   20 2113     98 %   20 2005 98.9 °F (37.2 °C)  20 121/73 98 %   20 1525 98.2 °F (36.8 °C) 70 18 127/79 97 %        General:   General appearance: Pt is in no acute distress   Distal pulses are preserved    Neurological Examination:   Mental Status:  AAO x3. Speech is fluent. Follows commands, has normal fund of knowledge, attention, short term recall, comprehension and insight. Cranial Nerves: Visual fields are full. PERRL, Extraocular movements are full. Facial sensationdecreased on the left. Facial movement intact. Hearing intact to conversation. Palate elevates symmetrically. Shoulder shrug symmetric. Tongue midline. Motor: Strength is 5/5 on the right and 4+ out of 5 in the left upper and lower extremities. Normal tone. No atrophy. Sensation: Light touch - Intact    Reflexes: DTRs 2+ throughout. Plantar responses downgoing.      Coordination/Cerebellar: Intact to finger-nose-finger     Gait: Casual gait is normal. Skin: No significant bruising or lacerations. LAB DATA REVIEWED:    Recent Results (from the past 24 hour(s))   METABOLIC PANEL, COMPREHENSIVE    Collection Time: 02/13/20  4:35 PM   Result Value Ref Range    Sodium 139 136 - 145 mmol/L    Potassium 3.6 3.5 - 5.1 mmol/L    Chloride 105 97 - 108 mmol/L    CO2 28 21 - 32 mmol/L    Anion gap 6 5 - 15 mmol/L    Glucose 210 (H) 65 - 100 mg/dL    BUN 21 (H) 6 - 20 MG/DL    Creatinine 1.19 0.70 - 1.30 MG/DL    BUN/Creatinine ratio 18 12 - 20      GFR est AA >60 >60 ml/min/1.73m2    GFR est non-AA >60 >60 ml/min/1.73m2    Calcium 8.9 8.5 - 10.1 MG/DL    Bilirubin, total 1.2 (H) 0.2 - 1.0 MG/DL    ALT (SGPT) 43 12 - 78 U/L    AST (SGOT) 22 15 - 37 U/L    Alk. phosphatase 66 45 - 117 U/L    Protein, total 7.7 6.4 - 8.2 g/dL    Albumin 3.8 3.5 - 5.0 g/dL    Globulin 3.9 2.0 - 4.0 g/dL    A-G Ratio 1.0 (L) 1.1 - 2.2     CBC WITH AUTOMATED DIFF    Collection Time: 02/13/20  4:35 PM   Result Value Ref Range    WBC 9.5 4.1 - 11.1 K/uL    RBC 5.93 (H) 4.10 - 5.70 M/uL    HGB 16.9 12.1 - 17.0 g/dL    HCT 50.8 (H) 36.6 - 50.3 %    MCV 85.7 80.0 - 99.0 FL    MCH 28.5 26.0 - 34.0 PG    MCHC 33.3 30.0 - 36.5 g/dL    RDW 15.0 (H) 11.5 - 14.5 %    PLATELET 786 079 - 738 K/uL    MPV 10.3 8.9 - 12.9 FL    NRBC 0.0 0  WBC    ABSOLUTE NRBC 0.00 0.00 - 0.01 K/uL    NEUTROPHILS 74 32 - 75 %    LYMPHOCYTES 19 12 - 49 %    MONOCYTES 5 5 - 13 %    EOSINOPHILS 1 0 - 7 %    BASOPHILS 1 0 - 1 %    IMMATURE GRANULOCYTES 0 0.0 - 0.5 %    ABS. NEUTROPHILS 7.1 1.8 - 8.0 K/UL    ABS. LYMPHOCYTES 1.8 0.8 - 3.5 K/UL    ABS. MONOCYTES 0.4 0.0 - 1.0 K/UL    ABS. EOSINOPHILS 0.1 0.0 - 0.4 K/UL    ABS. BASOPHILS 0.1 0.0 - 0.1 K/UL    ABS. IMM.  GRANS. 0.0 0.00 - 0.04 K/UL    DF AUTOMATED     GLUCOSE, POC    Collection Time: 02/13/20  9:25 PM   Result Value Ref Range    Glucose (POC) 189 (H) 65 - 100 mg/dL    Performed by Sal Boss (PCT)    GLUCOSE, POC    Collection Time: 02/14/20  7:17 AM Result Value Ref Range    Glucose (POC) 162 (H) 65 - 100 mg/dL    Performed by Laura Belcher (PCT)    ECHO ADULT COMPLETE    Collection Time: 20 12:09 PM   Result Value Ref Range    Right Atrial Area 4C 24.26 cm2    Ao Root D 3.99 cm        HOME MEDS  Prior to Admission Medications   Prescriptions Last Dose Informant Patient Reported? Taking? MARGIE PEN NEEDLE 32 gauge x \" ndle   No No   Sig: USE DAILY WITH VICTOZA   ONETOUCH ULTRA BLUE TEST STRIP strip   No No   Sig: USE ONE STRIP TO TEST THREE TIMES A DAY BEFORE MEALS   albuterol (VENTOLIN HFA) 90 mcg/actuation inhaler   No No   Sig: INHALE TWO PUFFS BY MOUTH EVERY 4 HOURS AS NEEDED FOR FOR WHEEZING AND FOR SHORTNESS OF BREATH   albuterol-ipratropium (DUO-NEB) 2.5 mg-0.5 mg/3 ml nebu   No No   Sig: INHALE THREE MILLILITERS (ONE VIAL) VIA NEBULIZATION BY MOUTH EVERY 6 HOURS AS NEEDED   apixaban (ELIQUIS) 5 mg tablet   No No   Sig: Take 1 Tab by mouth two (2) times a day. atorvastatin (LIPITOR) 40 mg tablet   No No   Sig: TAKE ONE TABLET BY MOUTH DAILY FOR CHOLESTEROL   azithromycin (ZITHROMAX) 250 mg tablet   No No   Si tabs day 1 , then 1 tab daily next 4 days   carvedilol (COREG) 12.5 mg tablet   No No   Sig: TAKE ONE TABLET BY MOUTH TWICE A DAY TO SLOW HEART RATE DOWN.   empagliflozin (JARDIANCE) 25 mg tablet   No No   Sig: Take 1 Tab by mouth Daily (before breakfast). furosemide (LASIX) 80 mg tablet   No No   Sig: Take 2 tabs in the AM, one 2 in the PM   glipiZIDE (GLUCOTROL) 10 mg tablet   No No   Sig: TAKE ONE TABLET BY MOUTH TWICE A DAY   ibuprofen (MOTRIN) 800 mg tablet   No No   Sig: Take 1 Tab by mouth every eight (8) hours as needed for Pain.   liraglutide (VICTOZA) 0.6 mg/0.1 mL (18 mg/3 mL) pnij   No No   Si.6 mg daily- 1st week, then 1.2 mg daily- 2nd week, then 1.8 mg daily. Please give pt needles also   metFORMIN (GLUCOPHAGE) 1,000 mg tablet   Yes No   Sig: Take 1,000 mg by mouth two (2) times daily (with meals).    metOLazone (ZAROXOLYN) 2.5 mg tablet   No No   Sig: Take one tab po daily, 30 minutes before lasix. potassium chloride SR (K-TAB) 20 mEq tablet   No No   Sig: TAKE TWO TABLETS BY MOUTH EVERY MORNING TAKE ONE TABLET BY MOUTH EVERY EVENING   sacubitril-valsartan (ENTRESTO) 24 mg/26 mg tablet   No No   Sig: Take 1 Tab by mouth two (2) times a day. Begin sat am, 2-8-2020. Stop lisinopril on 2-6   traMADol (ULTRAM) 50 mg tablet   Yes No   Sig: Take 50 mg by mouth every eight (8) hours as needed for Pain.   zolpidem (AMBIEN) 10 mg tablet   No No   Sig: Take 1 Tab by mouth nightly as needed for Sleep. Max Daily Amount: 10 mg. Facility-Administered Medications: None       CURRENT MEDS  Current Facility-Administered Medications   Medication Dose Route Frequency    apixaban (ELIQUIS) tablet 5 mg  5 mg Oral BID    atorvastatin (LIPITOR) tablet 40 mg  40 mg Oral DAILY    carvediloL (COREG) tablet 12.5 mg  12.5 mg Oral BID WITH MEALS    furosemide (LASIX) tablet 120 mg  120 mg Oral BID    glipiZIDE (GLUCOTROL) tablet 10 mg  10 mg Oral ACB&D    metOLazone (ZAROXOLYN) tablet 2.5 mg  2.5 mg Oral DAILY    potassium chloride SR (KLOR-CON 10) tablet 40 mEq  40 mEq Oral BID WITH MEALS    sodium chloride (NS) flush 5-40 mL  5-40 mL IntraVENous Q8H    albuterol-ipratropium (DUO-NEB) 2.5 MG-0.5 MG/3 ML  3 mL Nebulization QID RT    aspirin chewable tablet 81 mg  81 mg Oral DAILY    lisinopril (PRINIVIL, ZESTRIL) tablet 40 mg  40 mg Oral DAILY    insulin lispro (HUMALOG) injection   SubCUTAneous TIDAC       Stroke workup    CT Head/CTA Head and neck  1. No hemodynamically significant extracranial ICA stenosis (using NASCET  criteria). 2. No large vessel occlusion or significant intracranial stenosis. 3. No acute intracranial process.     TTE:   Pending    Stroke labs:  HgBA1c    Lab Results   Component Value Date/Time    Hemoglobin A1c 9.5 (H) 11/15/2018 12:25 AM     LDL   Lab Results   Component Value Date/Time    LDL, calculated 46 01/14/2020 01:40 PM       IMPRESSION:  Tiffani Olivas is a 50 y.o. male who presents with new onset left-sided numbness that was more pronounced for 2 hours on 2/11/2020 but is still persisting. Patient does have history of 2 strokes in the past with left-sided weakness. Patient does not take any aspirin. Patient does have hypertension and diabetes. Patient does have a pacemaker so cannot get MRI scan. Suspect TIA    1. TIA  2. DM  3. HLD  4. HTN  5. A fib    RECOMMENDATIONS:  - Telemetry  - BP goal is less than 140/90  - Cont ASA 81 mg daily and continue home Eliquis  - Cont atorvastatin 40 mg daily       No further neuro partida. Call with questions.        Carlos Manuel Alberto MD  Neurologist

## 2020-02-14 NOTE — PROGRESS NOTES
* No surgery found *  * No surgery found *  Bedside and Verbal shift change report given to Dara Angel (oncoming nurse) by Miguel Kaur (offgoing nurse). Report included the following information SBAR, Kardex, Recent Results, Med Rec Status and Cardiac Rhythm paced. Zone Phone:   [phone]      Significant changes during shift:  None        Patient Information    Juan Alberto Mcgraw  50 y.o.  2/13/2020  2:47 PM by Eber Hernandez MD. Evelyn Wylie was admitted from Home    Problem List    Patient Active Problem List    Diagnosis Date Noted    Cardiomyopathy [facility]) 03/11/2019     Priority: 1 - One    TIA (transient ischemic attack) 02/13/2020    Type 2 diabetes with nephropathy ([address] 09/03/2019    Cardiomyopathy, nonischemic ([address] 11/17/2018    Controlled type 2 diabetes mellitus without complication, without long-term current use of insulin ([address] 08/15/2018    Sleep apnea     Obesity, morbid ([address] 12/20/2017    Atrial fibrillation with RVR ([address] 08/11/2017    CHF (congestive heart failure) ([address] 08/10/2017    Restrictive lung disease     Dyspnea 07/15/2014    Hypertension 02/17/2014     Past Medical History:   Diagnosis Date    A-fib [facility]) 1/6/15    [facility] ED. Pt reported this    Asthma     Atrial fibrillation with RVR ([address] 8/11/2017    Cardiomyopathy (HCC)     ET 25-30%    Chronic obstructive pulmonary disease (HCC)     Diabetes (HCC)     Gastrointestinal disorder     GERD    Heart failure (HCC)     High cholesterol     Hypertension     Restrictive lung disease     FVC2.59 (52%), FEV1 1.95 (49)- 2017    S/P cardiac cath     Dr. Yonatan Kumari, 2016.  minimal cad    Sleep apnea     Stroke ([address]     TIA x 2         Core Measures:    CVA: Yes Yes  CHF:No No  PNA:No No    Post Op Surgical (If Applicable):     Number times ambulated in hallway past shift:  0  Number of times OOB to chair past shift:   0  NG Tube: No  Incentive Spirometer: No  Drains: No   Volume  0  Dressing Present:  No  Flatus:  Not applicable    Activity Status:    OOB to Chair No  Ambulated this shift No   Bed Rest No    Supplemental O2: (If Applicable)    NC No  NRB No  Venti-mask No  On 0 Liters/min      LINES AND DRAINS:    Central Line? No   PICC LINE? No   Urinary Catheter? No  DVT prophylaxis:    DVT prophylaxis Med- Yes  DVT prophylaxis SCD or AYALA- No     Wounds: (If Applicable)    Wounds- No    Location 0    Patient Safety:    Falls Score Total Score: 2  Safety Level_______  Bed Alarm On? Yes  Sitter?  No    Plan for upcoming shift: safety, neurochecks,         Discharge Plan: Yes TBD    Active Consults:  IP CONSULT TO NEUROLOGY

## 2020-02-14 NOTE — PROGRESS NOTES
* No surgery found *  * No surgery found *  Bedside and Verbal shift change report given to Kenia Nelson (oncoming nurse) by Tino Azar (offgoing nurse). Report included the following information SBAR, Kardex, Recent Results, Med Rec Status and Cardiac Rhythm paced. Zone Phone:   [phone]      Significant changes during shift:  Admission, CT and CTA done        Patient Information    Mahala Moritz Catlett  50 y.o.  2/13/2020  2:47 PM by Cari Manuel MD. Lise Negrete was admitted from Home    Problem List    Patient Active Problem List    Diagnosis Date Noted    Cardiomyopathy [facility]) 03/11/2019     Priority: 1 - One    TIA (transient ischemic attack) 02/13/2020    Type 2 diabetes with nephropathy ([address] 09/03/2019    Cardiomyopathy, nonischemic ([address] 11/17/2018    Controlled type 2 diabetes mellitus without complication, without long-term current use of insulin ([address] 08/15/2018    Sleep apnea     Obesity, morbid ([address] 12/20/2017    Atrial fibrillation with RVR ([address] 08/11/2017    CHF (congestive heart failure) ([address] 08/10/2017    Restrictive lung disease     Dyspnea 07/15/2014    Hypertension 02/17/2014     Past Medical History:   Diagnosis Date    A-fib [facility]) 1/6/15    [facility] ED. Pt reported this    Asthma     Atrial fibrillation with RVR ([address] 8/11/2017    Cardiomyopathy (HCC)     ET 25-30%    Chronic obstructive pulmonary disease (HCC)     Diabetes (HCC)     Gastrointestinal disorder     GERD    Heart failure (HCC)     High cholesterol     Hypertension     Restrictive lung disease     FVC2.59 (52%), FEV1 1.95 (49)- 2017    S/P cardiac cath     Dr. Gutierrez Mascorro, 2016.  minimal cad    Sleep apnea     Stroke ([address]     TIA x 2         Core Measures:    CVA: Yes Yes  CHF:No No  PNA:No No    Post Op Surgical (If Applicable):     Number times ambulated in hallway past shift:  0  Number of times OOB to chair past shift:   0  NG Tube: No  Incentive Spirometer: No  Drains: No   Volume  0  Dressing Present:  No  Flatus:  Not applicable    Activity Status:    OOB to Chair No  Ambulated this shift No   Bed Rest No    Supplemental O2: (If Applicable)    NC No  NRB No  Venti-mask No  On 0 Liters/min      LINES AND DRAINS:    Central Line? No   PICC LINE? No   Urinary Catheter? No  DVT prophylaxis:    DVT prophylaxis Med- Yes  DVT prophylaxis SCD or AYALA- No     Wounds: (If Applicable)    Wounds- No    Location 0    Patient Safety:    Falls Score Total Score: 2  Safety Level_______  Bed Alarm On? Yes  Sitter?  No    Plan for upcoming shift: safety, neurochecks,         Discharge Plan: Yes TBD    Active Consults:  IP CONSULT TO NEUROLOGY

## 2020-02-14 NOTE — H&P
[address]  HISTORY AND PHYSICAL    Name:  Alwin Saint  MR#:  [id number]  :  1965  ACCOUNT #:  [de-identified]  ADMIT DATE:  2020    HISTORY OF PRESENT ILLNESS:  The patient is a very pleasant 61-year-old white male who was admitted for evaluation of a TIA. The patient has had stroke a few years ago leaving just some mild left arm weakness. He has been followed in the office recently, has been treated as an outpatient for heart failure. Two days ago, he had an onset of numbness in both his left arm, left leg, and left side of his tongue, which lasted for some five hours. It eventually cleared up by the next morning. Again, he has had a prior stroke cause and weakness, left side. The patient is admitted for further evaluation of TIA. PAST MEDICAL HISTORY:  Medical problems include sleep apnea, morbid obesity, cardiac cath in 2016 with minimal coronary artery disease, restrictive lung disease, FEV1 49% of predicted 1.95, and FVC 52% in 2017, history of hypertension, history of hypercholesterolemia, history of systolic heart failure, history of diabetes, history of cardiomyopathy with ejection fraction of 25% to 30%, history of atrial fibrillation in 2017. PAST SURGICAL HISTORY:  Includes pacemaker. ALLERGIES:  CODEINE, HYDROCODONE, INFLUENZA VIRUS VACCINE, OXYCODONE, PNEUMOCOCCAL VACCINE. MEDICATIONS ON ADMISSION:  Included, furosemide 80 mg two in the morning and two in the evening; metolazone 2.5 mg one in the morning and 30 minutes before Lasix; potassium chloride 20 mEq three tablets daily, and metformin 1000 mg twice daily. The patient had been on lisinopril 40 mg daily. We will try to get him Entresto; however, he is under preop status right now. Tramadol 50 mg p.r.n., carvedilol 12.5 mg b.i.d., Ambien 10 mg at bedtime, Eliquis 5 mg b.i.d.;  atorvastatin 40 mg daily; glipizide 10 mg twice daily; Victoza 1.8 mg daily;  25 mg daily, DuoNeb every 4 hours p.r.n. SOCIAL HISTORY:  The patient is single. His last job work was as . He is currently on disability with his heart problems. REVIEW OF SYSTEMS:  GENERAL:  No fevers or chills. RESPIRATORY:  History of restrictive lung disease. CARDIOVASCULAR:  He was recently treated in the office with increasing doses of diuretics for systolic heart failure. He was advised admission for this; however, the patient had declined. GI:  Appetite has been fair. No complaints of abdominal pain. :  No difficulty voiding. No hematuria. NEURO:  Past history of stroke as mentioned above. ENDOCRINE:  History of diabetes, on metformin. Remainder of review of systems is negative. PHYSICAL EXAMINATION:  GENERAL:  Currently, the patient is awake, alert, oriented, pleasant, and cooperative. VITAL SIGNS:  Blood pressure 130/80, pulse was 90 and irregular, respiratory rate was 12. LUNGS:  Clear to auscultation and percussion. CARDIOVASCULAR:  Irregularly irregular rhythm. No murmurs, thrills, rubs or gallops. ABDOMEN:  Soft, nontender. EXTREMITIES:  1+ edema bilaterally. NEURO:  Cranial nerves II through XII are grossly intact. Cerebellar, finger to nose is intact. Left arm had mild weakness to hand  particularly compared to the right arm. The patient states this has been present ever since his old stroke two years ago. Gait was normal.    ASSESSMENT:  1. Prolonged episode of numbness, probable transient ischemic attack. 2.  Past history of stroke, leaved mild left-sided weakness. 3.  History of systolic heart failure. 4.  Chronic atrial fibrillation. 5.  History of hypertension. 6.  History of hypercholesterolemia. 7.  Diabetes. 8.  History of restrictive lung disease. 9.  Cardiomyopathy with ejection fraction of 25% to 30%. 10.  History of sleep apnea. 11.  Morbid obesity. PLAN:  Admit the patient to the hospital, under observation.   We will obtain CT scan of the head, carotid Doppler, Neuro consult. If all this is normal and he is not having anymore symptoms, I believe he can probably be discharged home tomorrow.         [facility] MD ADA [id number]  D:  02/13/2020 17:44  T:  02/13/2020 21:32  JOB #:  [id number]

## 2020-02-14 NOTE — PROGRESS NOTES
DANNY:    Observation Letter  Home with Family      EDMUND completed room visit with pt to provide pt with observation letter (signed) placed in chart. Pt will return home with family. EDMUND has completed the need of the pt at this time.       Aristides Verdugo, MSW, [address] [place] [address]

## 2020-02-14 NOTE — PROGRESS NOTES
General Daily Progress Note    Admit Date: 2/13/2020  Hospital day 2    Subjective:     Patient has no complaint of headache. Occasional tingling in L arm. Occasional neck pain. ..   Medication side effects: none    Current Facility-Administered Medications   Medication Dose Route Frequency    apixaban (ELIQUIS) tablet 5 mg  5 mg Oral BID    atorvastatin (LIPITOR) tablet 40 mg  40 mg Oral DAILY    carvediloL (COREG) tablet 12.5 mg  12.5 mg Oral BID WITH MEALS    furosemide (LASIX) tablet 120 mg  120 mg Oral BID    glipiZIDE (GLUCOTROL) tablet 10 mg  10 mg Oral ACB&D    metOLazone (ZAROXOLYN) tablet 2.5 mg  2.5 mg Oral DAILY    potassium chloride SR (KLOR-CON 10) tablet 40 mEq  40 mEq Oral BID WITH MEALS    traMADol (ULTRAM) tablet 50 mg  50 mg Oral Q6H PRN    sodium chloride (NS) flush 5-40 mL  5-40 mL IntraVENous Q8H    sodium chloride (NS) flush 5-40 mL  5-40 mL IntraVENous PRN    zolpidem (AMBIEN) tablet 10 mg  10 mg Oral QHS PRN    albuterol-ipratropium (DUO-NEB) 2.5 MG-0.5 MG/3 ML  3 mL Nebulization QID RT    aspirin chewable tablet 81 mg  81 mg Oral DAILY    lisinopril (PRINIVIL, ZESTRIL) tablet 40 mg  40 mg Oral DAILY    insulin lispro (HUMALOG) injection   SubCUTAneous TIDAC    glucose chewable tablet 16 g  4 Tab Oral PRN    dextrose (D50W) injection syrg 12.5-25 g  12.5-25 g IntraVENous PRN    glucagon (GLUCAGEN) injection 1 mg  1 mg IntraMUSCular PRN    acetaminophen (TYLENOL) tablet 650 mg  650 mg Oral Q6H PRN        Review of Systems  Pertinent items are noted in HPI. Objective:     Patient Vitals for the past 8 hrs:   BP Temp Pulse Resp SpO2   02/14/20 0422 123/62 97.5 °F (36.4 °C) 70 18 99 %     No intake/output data recorded. No intake/output data recorded.     Physical Exam:   Visit Vitals  /62   Pulse 70   Temp 97.5 °F (36.4 °C)   Resp 18   SpO2 99%     Lungs: clear to auscultation bilaterally  Heart: regular rate and rhythm, S1, S2 normal, no murmur, click, rub or gallop  Abdomen: soft, non-tender. Bowel sounds normal. No masses,  no organomegaly  Extremities: edema 1+      ECG: paced rhythmn     Data Review   Recent Results (from the past 24 hour(s))   METABOLIC PANEL, COMPREHENSIVE    Collection Time: 02/13/20  4:35 PM   Result Value Ref Range    Sodium 139 136 - 145 mmol/L    Potassium 3.6 3.5 - 5.1 mmol/L    Chloride 105 97 - 108 mmol/L    CO2 28 21 - 32 mmol/L    Anion gap 6 5 - 15 mmol/L    Glucose 210 (H) 65 - 100 mg/dL    BUN 21 (H) 6 - 20 MG/DL    Creatinine 1.19 0.70 - 1.30 MG/DL    BUN/Creatinine ratio 18 12 - 20      GFR est AA >60 >60 ml/min/1.73m2    GFR est non-AA >60 >60 ml/min/1.73m2    Calcium 8.9 8.5 - 10.1 MG/DL    Bilirubin, total 1.2 (H) 0.2 - 1.0 MG/DL    ALT (SGPT) 43 12 - 78 U/L    AST (SGOT) 22 15 - 37 U/L    Alk. phosphatase 66 45 - 117 U/L    Protein, total 7.7 6.4 - 8.2 g/dL    Albumin 3.8 3.5 - 5.0 g/dL    Globulin 3.9 2.0 - 4.0 g/dL    A-G Ratio 1.0 (L) 1.1 - 2.2     CBC WITH AUTOMATED DIFF    Collection Time: 02/13/20  4:35 PM   Result Value Ref Range    WBC 9.5 4.1 - 11.1 K/uL    RBC 5.93 (H) 4.10 - 5.70 M/uL    HGB 16.9 12.1 - 17.0 g/dL    HCT 50.8 (H) 36.6 - 50.3 %    MCV 85.7 80.0 - 99.0 FL    MCH 28.5 26.0 - 34.0 PG    MCHC 33.3 30.0 - 36.5 g/dL    RDW 15.0 (H) 11.5 - 14.5 %    PLATELET 264 638 - 636 K/uL    MPV 10.3 8.9 - 12.9 FL    NRBC 0.0 0  WBC    ABSOLUTE NRBC 0.00 0.00 - 0.01 K/uL    NEUTROPHILS 74 32 - 75 %    LYMPHOCYTES 19 12 - 49 %    MONOCYTES 5 5 - 13 %    EOSINOPHILS 1 0 - 7 %    BASOPHILS 1 0 - 1 %    IMMATURE GRANULOCYTES 0 0.0 - 0.5 %    ABS. NEUTROPHILS 7.1 1.8 - 8.0 K/UL    ABS. LYMPHOCYTES 1.8 0.8 - 3.5 K/UL    ABS. MONOCYTES 0.4 0.0 - 1.0 K/UL    ABS. EOSINOPHILS 0.1 0.0 - 0.4 K/UL    ABS. BASOPHILS 0.1 0.0 - 0.1 K/UL    ABS. IMM.  GRANS. 0.0 0.00 - 0.04 K/UL    DF AUTOMATED     GLUCOSE, POC    Collection Time: 02/13/20  9:25 PM   Result Value Ref Range    Glucose (POC) 189 (H) 65 - 100 mg/dL    Performed by [facility] ([facility])    GLUCOSE, POC    Collection Time: 02/14/20  7:17 AM   Result Value Ref Range    Glucose (POC) 162 (H) 65 - 100 mg/dL    Performed by [facility] ([facility])            Assessment:     Active Problems:    TIA (transient ischemic attack) (2/13/2020)        Plan:     1. Hx TIA- 5 hour episode of numbness intire L side of body. Past hx midl cva with some mild L sided neuro weakness. CTA shows no major carotid artery stenoses. Does show a congenitally narrowed cervical spinal canal as well as DJD. Will do carotid doppler and echo this am and dc home afterwards. Continue eliquis (a fib),diabetes, bp and cholesterol meds- these are al relatively well controlled.

## 2020-02-15 PROBLEM — E11.21 TYPE 2 DIABETES WITH NEPHROPATHY (HCC): Status: RESOLVED | Noted: 2019-09-03 | Resolved: 2020-02-15

## 2020-02-15 LAB
GLUCOSE BLD STRIP.AUTO-MCNC: 196 MG/DL (ref 65–100)
GLUCOSE BLD STRIP.AUTO-MCNC: 214 MG/DL (ref 65–100)
GLUCOSE BLD STRIP.AUTO-MCNC: 239 MG/DL (ref 65–100)
GLUCOSE BLD STRIP.AUTO-MCNC: 328 MG/DL (ref 65–100)
SERVICE CMNT-IMP: ABNORMAL

## 2020-02-15 PROCEDURE — 99218 HC RM OBSERVATION: CPT

## 2020-02-15 PROCEDURE — 74011250637 HC RX REV CODE- 250/637: Performed by: FAMILY MEDICINE

## 2020-02-15 PROCEDURE — 74011250637 HC RX REV CODE- 250/637: Performed by: PSYCHIATRY & NEUROLOGY

## 2020-02-15 PROCEDURE — 82962 GLUCOSE BLOOD TEST: CPT

## 2020-02-15 PROCEDURE — 74011636637 HC RX REV CODE- 636/637: Performed by: FAMILY MEDICINE

## 2020-02-15 PROCEDURE — 94640 AIRWAY INHALATION TREATMENT: CPT

## 2020-02-15 PROCEDURE — 74011000250 HC RX REV CODE- 250: Performed by: FAMILY MEDICINE

## 2020-02-15 RX ORDER — IPRATROPIUM BROMIDE AND ALBUTEROL SULFATE 2.5; .5 MG/3ML; MG/3ML
3 SOLUTION RESPIRATORY (INHALATION)
Status: DISCONTINUED | OUTPATIENT
Start: 2020-02-15 | End: 2020-02-16 | Stop reason: HOSPADM

## 2020-02-15 RX ORDER — GUAIFENESIN 100 MG/5ML
81 LIQUID (ML) ORAL DAILY
Qty: 30 TAB | Status: SHIPPED | OUTPATIENT
Start: 2020-02-15 | End: 2020-06-15

## 2020-02-15 RX ADMIN — ATORVASTATIN CALCIUM 40 MG: 40 TABLET, FILM COATED ORAL at 08:46

## 2020-02-15 RX ADMIN — CARVEDILOL 12.5 MG: 12.5 TABLET, FILM COATED ORAL at 16:48

## 2020-02-15 RX ADMIN — INSULIN LISPRO 3 UNITS: 100 INJECTION, SOLUTION INTRAVENOUS; SUBCUTANEOUS at 16:47

## 2020-02-15 RX ADMIN — IPRATROPIUM BROMIDE AND ALBUTEROL SULFATE 3 ML: .5; 3 SOLUTION RESPIRATORY (INHALATION) at 07:47

## 2020-02-15 RX ADMIN — INSULIN LISPRO 3 UNITS: 100 INJECTION, SOLUTION INTRAVENOUS; SUBCUTANEOUS at 11:44

## 2020-02-15 RX ADMIN — METOLAZONE 2.5 MG: 2.5 TABLET ORAL at 08:46

## 2020-02-15 RX ADMIN — Medication 10 ML: at 14:38

## 2020-02-15 RX ADMIN — APIXABAN 5 MG: 5 TABLET, FILM COATED ORAL at 17:14

## 2020-02-15 RX ADMIN — APIXABAN 5 MG: 5 TABLET, FILM COATED ORAL at 08:46

## 2020-02-15 RX ADMIN — POTASSIUM CHLORIDE 40 MEQ: 750 TABLET, FILM COATED, EXTENDED RELEASE ORAL at 08:44

## 2020-02-15 RX ADMIN — FUROSEMIDE 120 MG: 40 TABLET ORAL at 17:14

## 2020-02-15 RX ADMIN — GLIPIZIDE 10 MG: 5 TABLET ORAL at 08:45

## 2020-02-15 RX ADMIN — FUROSEMIDE 120 MG: 40 TABLET ORAL at 08:45

## 2020-02-15 RX ADMIN — ASPIRIN 81 MG 81 MG: 81 TABLET ORAL at 08:46

## 2020-02-15 RX ADMIN — GLIPIZIDE 10 MG: 5 TABLET ORAL at 16:48

## 2020-02-15 RX ADMIN — LISINOPRIL 40 MG: 20 TABLET ORAL at 08:46

## 2020-02-15 RX ADMIN — ACETAMINOPHEN 650 MG: 325 TABLET ORAL at 14:37

## 2020-02-15 RX ADMIN — TRAMADOL HYDROCHLORIDE 50 MG: 50 TABLET, FILM COATED ORAL at 10:45

## 2020-02-15 RX ADMIN — INSULIN LISPRO 2 UNITS: 100 INJECTION, SOLUTION INTRAVENOUS; SUBCUTANEOUS at 07:51

## 2020-02-15 RX ADMIN — CARVEDILOL 12.5 MG: 12.5 TABLET, FILM COATED ORAL at 08:46

## 2020-02-15 RX ADMIN — POTASSIUM CHLORIDE 40 MEQ: 750 TABLET, FILM COATED, EXTENDED RELEASE ORAL at 16:48

## 2020-02-15 NOTE — PROGRESS NOTES
Problem: Falls - Risk of  Goal: *Absence of Falls  Description  Document Julius Cabral Fall Risk and appropriate interventions in the flowsheet. Outcome: Progressing Towards Goal  Note: Fall Risk Interventions:  Mobility Interventions: Bed/chair exit alarm, Patient to call before getting OOB         Medication Interventions: Patient to call before getting OOB                   Problem: Falls - Risk of  Goal: *Absence of Falls  Description  Document Julius Cabral Fall Risk and appropriate interventions in the flowsheet.   Outcome: Progressing Towards Goal  Note: Fall Risk Interventions:  Mobility Interventions: Bed/chair exit alarm, Patient to call before getting OOB         Medication Interventions: Patient to call before getting OOB

## 2020-02-15 NOTE — PROGRESS NOTES
ADULT PROTOCOL: JET AEROSOL  REASSESSMENT    Patient  Harmeet Mcgraw     50 y.o.   male     2/15/2020  2:23 PM    Breath Sounds Pre Procedure: Right Breath Sounds: Diminished                               Left Breath Sounds: Diminished    Breath Sounds Post Procedure: Right Breath Sounds: Diminished                                 Left Breath Sounds: Diminished    Breathing pattern: Pre procedure Breathing Pattern: Regular          Post procedure Breathing Pattern: Regular    Heart Rate: Pre procedure Pulse: 70           Post procedure Pulse: 71    Resp Rate: Pre procedure Respirations: 18           Post procedure Respirations: 18      Oxygen: O2 Device: Room air        SpO2: Pre procedure SpO2: 99 %                 Post procedure SpO2: 99 %      Nebulizer Therapy: Current medications Aerosolized Medications: DuoNeb       Problem List:   Patient Active Problem List   Diagnosis Code    Hypertension I10    Dyspnea R06.00    Restrictive lung disease J98.4    CHF (congestive heart failure) ([facility]) I50.9    Atrial fibrillation with RVR ([facility]) I48.91    Obesity, morbid ([facility] E66.01    Sleep apnea G47.30    Controlled type 2 diabetes mellitus without complication, without long-term current use of insulin ([facility]) E11.9    Cardiomyopathy, nonischemic ([facility]) I42.8    Cardiomyopathy ([facility]) I42.9    TIA (transient ischemic attack) G45.9       Respiratory Therapist: Wil Dawn

## 2020-02-15 NOTE — PROGRESS NOTES
No surgery found *  * No surgery found *  Bedside and Verbal shift change report given to Alfreda Dyson (oncoming nurse) by Tess Bustamante (offgoing nurse). Report included the following information SBAR, Kardex, Recent Results, Med Rec Status and Cardiac Rhythm paced.     Zone Phone:   [phone]        Significant changes during shift:  None           Patient Information     Arely Wallace [place]  50 y.o.  2/13/2020  2:47 PM by Anu Ramirez MD. Jimmie Morgan was admitted from Home     Problem List           Patient Active Problem List     Diagnosis Date Noted    Cardiomyopathy [facility]) 03/11/2019       Priority: 1 - One    TIA (transient ischemic attack) 02/13/2020    Type 2 diabetes with nephropathy ([address] 09/03/2019    Cardiomyopathy, nonischemic ([address] 11/17/2018    Controlled type 2 diabetes mellitus without complication, without long-term current use of insulin ([address] 08/15/2018    Sleep apnea      Obesity, morbid ([address] 12/20/2017    Atrial fibrillation with RVR ([address] 08/11/2017    CHF (congestive heart failure) ([address] 08/10/2017    Restrictive lung disease      Dyspnea 07/15/2014    Hypertension 02/17/2014           Past Medical History:   Diagnosis Date    A-fib [facility]) 1/6/15   [facility] ED. Pt reported this    Asthma      Atrial fibrillation with RVR ([address] 8/11/2017    Cardiomyopathy (HCC)       ET 25-30%    Chronic obstructive pulmonary disease (HCC)      Diabetes (HCC)      Gastrointestinal disorder       GERD    Heart failure (HCC)      High cholesterol      Hypertension      Restrictive lung disease       FVC2.59 (52%), FEV1 1.95 (49)- 2017    S/P cardiac cath       Dr. Ryan Robles, 2016.  minimal cad    Sleep apnea      Stroke (HCC)       TIA x 2            Core Measures:     CVA: Yes Yes  CHF:No No  PNA:No No     Post Op Surgical (If Applicable):      Number times ambulated in hallway past shift:  x1  Number of times OOB to chair past shift:   x1  NG Tube: No  Incentive Spirometer: No  Drains: No Volume  0  Dressing Present:  No  Flatus:  Not applicable     Activity Status:     OOB to Chair yes  Ambulated this shift yes   Bed Rest No     Supplemental O2: (If Applicable)     NC No  NRB No  Venti-mask No  On 0 Liters/min        LINES AND DRAINS:     Central Line? No   PICC LINE? No   Urinary Catheter? No  DVT prophylaxis:     DVT prophylaxis Med- Yes  DVT prophylaxis SCD or AYALA- No      Wounds: (If Applicable)     Wounds- No     Location 0     Patient Safety:     Falls Score Total Score: 2  Safety Level_______  Bed Alarm On? Yes  Sitter?  No     Plan for upcoming shift: safety, neurochecks,            Discharge Plan: Yes TBD     Active Consults:  IP CONSULT TO NEUROLOGY

## 2020-02-15 NOTE — PROGRESS NOTES
* No surgery found *  * No surgery found *  Bedside and Verbal shift change report given to Abdelrahman Cespedes (oncoming nurse) by Papo Kam (offgoing nurse). Report included the following information SBAR, Kardex, Recent Results, Med Rec Status and Cardiac Rhythm paced. Zone Phone:   [phone]      Significant changes during shift:  None        Patient Information    Socrates Mcgraw  50 y.o.  2/13/2020  2:47 PM by Alanna Fuller MD. Barak Amaral was admitted from Home    Problem List    Patient Active Problem List    Diagnosis Date Noted    Cardiomyopathy [facility]) 03/11/2019     Priority: 1 - One    TIA (transient ischemic attack) 02/13/2020    Type 2 diabetes with nephropathy ([address] 09/03/2019    Cardiomyopathy, nonischemic ([address] 11/17/2018    Controlled type 2 diabetes mellitus without complication, without long-term current use of insulin ([address] 08/15/2018    Sleep apnea     Obesity, morbid ([address] 12/20/2017    Atrial fibrillation with RVR ([address] 08/11/2017    CHF (congestive heart failure) ([address] 08/10/2017    Restrictive lung disease     Dyspnea 07/15/2014    Hypertension 02/17/2014     Past Medical History:   Diagnosis Date    A-fib [facility]) 1/6/15    [facility] ED. Pt reported this    Asthma     Atrial fibrillation with RVR ([address] 8/11/2017    Cardiomyopathy (HCC)     ET 25-30%    Chronic obstructive pulmonary disease (HCC)     Diabetes (HCC)     Gastrointestinal disorder     GERD    Heart failure (HCC)     High cholesterol     Hypertension     Restrictive lung disease     FVC2.59 (52%), FEV1 1.95 (49)- 2017    S/P cardiac cath     Dr. Adam Arreguin, 2016.  minimal cad    Sleep apnea     Stroke ([address]     TIA x 2         Core Measures:    CVA: Yes Yes  CHF:No No  PNA:No No    Post Op Surgical (If Applicable):     Number times ambulated in hallway past shift:  0  Number of times OOB to chair past shift:   0  NG Tube: No  Incentive Spirometer: No  Drains: No   Volume  0  Dressing Present:  No  Flatus:  Not applicable    Activity Status:    OOB to Chair No  Ambulated this shift No   Bed Rest No    Supplemental O2: (If Applicable)    NC No  NRB No  Venti-mask No  On 0 Liters/min      LINES AND DRAINS:    Central Line? No   PICC LINE? No   Urinary Catheter? No  DVT prophylaxis:    DVT prophylaxis Med- Yes  DVT prophylaxis SCD or AYALA- No     Wounds: (If Applicable)    Wounds- No    Location 0    Patient Safety:    Falls Score Total Score: 1  Safety Level_______  Bed Alarm On? Yes  Sitter?  No    Plan for upcoming shift: safety, neurochecks,         Discharge Plan: Yes TBD    Active Consults:  IP CONSULT TO NEUROLOGY

## 2020-02-16 VITALS
HEART RATE: 70 BPM | RESPIRATION RATE: 16 BRPM | DIASTOLIC BLOOD PRESSURE: 71 MMHG | SYSTOLIC BLOOD PRESSURE: 112 MMHG | OXYGEN SATURATION: 95 % | TEMPERATURE: 98.2 F

## 2020-02-16 LAB
GLUCOSE BLD STRIP.AUTO-MCNC: 239 MG/DL (ref 65–100)
SERVICE CMNT-IMP: ABNORMAL

## 2020-02-16 PROCEDURE — 74011250637 HC RX REV CODE- 250/637: Performed by: PSYCHIATRY & NEUROLOGY

## 2020-02-16 PROCEDURE — 74011250637 HC RX REV CODE- 250/637: Performed by: FAMILY MEDICINE

## 2020-02-16 PROCEDURE — 99218 HC RM OBSERVATION: CPT

## 2020-02-16 PROCEDURE — 82962 GLUCOSE BLOOD TEST: CPT

## 2020-02-16 PROCEDURE — 74011636637 HC RX REV CODE- 636/637: Performed by: FAMILY MEDICINE

## 2020-02-16 RX ADMIN — POTASSIUM CHLORIDE 40 MEQ: 750 TABLET, FILM COATED, EXTENDED RELEASE ORAL at 08:41

## 2020-02-16 RX ADMIN — APIXABAN 5 MG: 5 TABLET, FILM COATED ORAL at 08:42

## 2020-02-16 RX ADMIN — GLIPIZIDE 10 MG: 5 TABLET ORAL at 07:45

## 2020-02-16 RX ADMIN — INSULIN LISPRO 3 UNITS: 100 INJECTION, SOLUTION INTRAVENOUS; SUBCUTANEOUS at 07:44

## 2020-02-16 RX ADMIN — ACETAMINOPHEN 650 MG: 325 TABLET ORAL at 10:03

## 2020-02-16 RX ADMIN — LISINOPRIL 40 MG: 20 TABLET ORAL at 08:42

## 2020-02-16 RX ADMIN — ASPIRIN 81 MG 81 MG: 81 TABLET ORAL at 08:42

## 2020-02-16 RX ADMIN — ATORVASTATIN CALCIUM 40 MG: 40 TABLET, FILM COATED ORAL at 08:42

## 2020-02-16 RX ADMIN — TRAMADOL HYDROCHLORIDE 50 MG: 50 TABLET, FILM COATED ORAL at 07:44

## 2020-02-16 RX ADMIN — FUROSEMIDE 120 MG: 40 TABLET ORAL at 08:41

## 2020-02-16 RX ADMIN — CARVEDILOL 12.5 MG: 12.5 TABLET, FILM COATED ORAL at 08:42

## 2020-02-16 RX ADMIN — METOLAZONE 2.5 MG: 2.5 TABLET ORAL at 07:44

## 2020-02-16 NOTE — DISCHARGE INSTRUCTIONS
[employer] Activation    Thank you for requesting access to [employer]. Please follow the instructions below to securely access and download your online medical record. [employer] allows you to send messages to your doctor, view your test results, renew your prescriptions, schedule appointments, and more. How Do I Sign Up? 1. In your internet browser, go to [email or web address]  2. Click on the First Time User? Click Here link in the Sign In box. You will be redirect to the New Member Sign Up page. 3. Enter your [employer] Access Code exactly as it appears below. You will not need to use this code after youve completed the sign-up process. If you do not sign up before the expiration date, you must request a new code. [employer] Access Code: [email or web address]  Expires: 2020  9:14 AM (This is the date your [employer] access code will )    4. Enter the last four digits of your Social Security Number (xxxx) and Date of Birth (mm/dd/yyyy) as indicated and click Submit. You will be taken to the next sign-up page. 5. Create a [employer] ID. This will be your [employer] login ID and cannot be changed, so think of one that is secure and easy to remember. 6. Create a [employer] password. You can change your password at any time. 7. Enter your Password Reset Question and Answer. This can be used at a later time if you forget your password. 8. Enter your e-mail address. You will receive e-mail notification when new information is available in [address] 9. Click Sign Up. You can now view and download portions of your medical record. 10. Click the Download Summary menu link to download a portable copy of your medical information. Additional Information    If you have questions, please visit the Frequently Asked Questions section of the [employer] website at [email or web address] Remember, [employer] is NOT to be used for urgent needs. For medical emergencies, dial 673. [address], [place], [address] [facility]  [phone]      Patient Discharge Instructions    Juni Barahona / [id number] : 1965    Admitted 2020 Discharged: 2020     Principal Problem:    TIA (transient ischemic attack) (2020)    Active Problems:    Hypertension (2014)      Restrictive lung disease ()      Atrial fibrillation with RVR [facility] (2017)      Obesity, morbid ([address] (2017)      Controlled type 2 diabetes mellitus without complication, without long-term current use of insulin ([facility] (8/15/2018)      Cardiomyopathy, nonischemic (HCC) (2018)          Allergies   Allergen Reactions    Codeine Hives    Hydrocodone Itching    Influenza Virus Vaccines Nausea Only     Fever  diarrhea    Mushroom Flavor Swelling    Oxycodone Rash    Pneumococcal 23-Allison Ps Vaccine Nausea and Vomiting     Vomit         · It is important that you take the medication exactly as they are prescribed. · Do not take other medications without consulting your doctor. What to do at Next Level of Care    Disposition:  Home    Recommended diet: Cardiac    Recommended activity: Usual    Follow up with Dr. Sissy James obtained by :  I understand that if any problems occur once I am at home I am to contact my physician. I understand and acknowledge receipt of the instructions indicated above.                                                                                                                                            Physician's or R.N.'s Signature                                                                  Date/Time                                                                                                                                              Patient or Representative Signature                                                          Date/Time

## 2020-02-16 NOTE — PROGRESS NOTES
No surgery found *  * No surgery found *  Bedside and Verbal shift change report given to UrsulaEdwardo DAVID Cespedes  (oncoming nurse) by Janeth Chadwick (offgoing nurse). Report included the following information SBAR, Kardex, Recent Results, Med Rec Status and Cardiac Rhythm paced.     Zone Phone:   [phone]        Significant changes during shift:  None           Patient Information     Kailey [place]  50 y.o.  2/13/2020  2:47 PM by Kevin Henriquez MD. Jose Rodriguez was admitted from Home     Problem List           Patient Active Problem List     Diagnosis Date Noted    Cardiomyopathy [facility]) 03/11/2019       Priority: 1 - One    TIA (transient ischemic attack) 02/13/2020    Type 2 diabetes with nephropathy ([address] 09/03/2019    Cardiomyopathy, nonischemic ([address] 11/17/2018    Controlled type 2 diabetes mellitus without complication, without long-term current use of insulin ([address] 08/15/2018    Sleep apnea      Obesity, morbid ([address] 12/20/2017    Atrial fibrillation with RVR ([address] 08/11/2017    CHF (congestive heart failure) ([address] 08/10/2017    Restrictive lung disease      Dyspnea 07/15/2014    Hypertension 02/17/2014           Past Medical History:   Diagnosis Date    A-fib [facility]) 1/6/15   [facility] ED. Pt reported this    Asthma      Atrial fibrillation with RVR ([address] 8/11/2017    Cardiomyopathy (HCC)       ET 25-30%    Chronic obstructive pulmonary disease (HCC)      Diabetes (HCC)      Gastrointestinal disorder       GERD    Heart failure (HCC)      High cholesterol      Hypertension      Restrictive lung disease       FVC2.59 (52%), FEV1 1.95 (49)- 2017    S/P cardiac cath       Dr. Benjy Julian, 2016.  minimal cad    Sleep apnea      Stroke (HCC)       TIA x 2            Core Measures:     CVA: Yes Yes  CHF:No No  PNA:No No     Post Op Surgical (If Applicable):      Number times ambulated in hallway past shift:  x1  Number of times OOB to chair past shift:   x1  NG Tube: No  Incentive Spirometer: No  Drains: No Volume  0  Dressing Present:  No  Flatus:  Not applicable     Activity Status:     OOB to Chair yes  Ambulated this shift yes   Bed Rest No     Supplemental O2: (If Applicable)     NC No  NRB No  Venti-mask No  On 0 Liters/min        LINES AND DRAINS:     Central Line? No   PICC LINE? No   Urinary Catheter? No  DVT prophylaxis:     DVT prophylaxis Med- Yes  DVT prophylaxis SCD or AYALA- No      Wounds: (If Applicable)     Wounds- No     Location 0     Patient Safety:     Falls Score Total Score: 2  Safety Level_______  Bed Alarm On? Yes  Sitter?  No     Plan for upcoming shift: safety, neurochecks,            Discharge Plan: Yes TBD     Active Consults:  IP CONSULT TO NEUROLOGY

## 2020-02-16 NOTE — PROGRESS NOTES
[address] SUMMARY    Name:  Fran Barton  MR#:  [id number]  :  1965  ACCOUNT #:  [de-identified]  ADMIT DATE:  2020  DISCHARGE DATE:  2020      DISCHARGE DIAGNOSES:  1. Transient ischemic attack with left-sided weakness, resolved. 2.  Ischemic cardiomyopathy. 3.  Atrial fibrillation, status post pacemaker placement. 4.  Hypertension. 5.  Restrictive lung disease. 6.  Morbid obesity. 7.  Diabetes mellitus. CONSULTATIONS:  Jerri Orosco MD, Neurology. PROCEDURES:  CT angiogram head, head CT scan, TT echocardiogram.    COMPLICATIONS:  None. For details of admission history and physical, please see admit note. HOSPITAL COURSE:  Briefly, the patient is a 53-year-old white male with the above known problems, who presented to the hospital with the complaint of left-sided numbness, which had been present less than 24 hours that did resolve on the day of admission and then returned briefly the following morning and those symptoms have persisted since that time. He has been chronically on Eliquis for his atrial fibrillation and claims compliance with that; however, he also had been recommended to take aspirin 81 mg daily and had clearly not been compliant with that. This morning, he has no further symptoms. He does claim compliance with other medications. It was felt that at this point with CT angiogram of the head and neck showing no acute problems that he can be discharged home and a followup with Dr. Anthony Wilks. DISCHARGE MEDICATIONS:  Consist of aspirin 81 mg daily, which he was supposed to be before, and he is to start taking on a regular basis now. Also, Tylenol 325 two tabs every 6 hours as needed for pain. His lisinopril dose was changed with the current dose of 40 mg daily. He is to stop taking Entresto and ibuprofen. His other discharge medications are the same as per admission, which consisted of albuterol inhaler 2 puffs q.i.d. p.r.n. He will complete a course of Zithromax, which he has been on. Other discharge medicines:  Jardiance 25 mg daily, Victoza 0.6 started and then do up to 1.8 to take on a regular basis, metformin 1000 mg b.i.d., Eliquis 5 mg b.i.d., Ambien 10 mg at bedtime, Lipitor 40 mg daily, Coreg 12.5 mg twice daily, Lasix 80 mg take 2 tablets in the morning and 2 in the evening, Glucotrol XL 10 mg twice daily, Zaroxolyn 2.5 mg 30 minutes before his morning Lasix, potassium 20 mEq 2 tabs by mouth each morning and 1 tablet every evening, tramadol 50 mg q.6 hours p.r.n. DISCHARGE INSTRUCTIONS:  He has had to follow up with Dr. Scotty Lim in 5-7 days.       Susy Johnston MD      [id number]  D:  02/16/2020 8:49  T:  02/17/2020 0:33  JOB #:  [id number]  CC:  MD Elysia Seth MD

## 2020-02-16 NOTE — DISCHARGE SUMMARY
PROGRESS NOTE    NAME:  Flakito Rose   :   1965   MRN:   [id number]     Date/Time:   2/15/2018  8:35 AM  Subjective:   History:  Patient seen and examined and D/W his nurse and with neurology Dr. Brittny Waters and all events noted. He was admitted with a TIA with L sided numbness which resolved on the day of admission  only to briefly return on  AM. He has had no further symptoms since then. He has no other neurologic c/o. There have been no cardiac or respiratory c/o. He has no GI/ c/o. There are no other c/o on complete ROS except occassional HA which isn' new for him.       Medications reviewed:  Current Facility-Administered Medications   Medication Dose Route Frequency    albuterol-ipratropium (DUO-NEB) 2.5 MG-0.5 MG/3 ML  3 mL Nebulization Q6H PRN    apixaban (ELIQUIS) tablet 5 mg  5 mg Oral BID    atorvastatin (LIPITOR) tablet 40 mg  40 mg Oral DAILY    carvediloL (COREG) tablet 12.5 mg  12.5 mg Oral BID WITH MEALS    furosemide (LASIX) tablet 120 mg  120 mg Oral BID    glipiZIDE (GLUCOTROL) tablet 10 mg  10 mg Oral ACB&D    metOLazone (ZAROXOLYN) tablet 2.5 mg  2.5 mg Oral DAILY    potassium chloride SR (KLOR-CON 10) tablet 40 mEq  40 mEq Oral BID WITH MEALS    traMADol (ULTRAM) tablet 50 mg  50 mg Oral Q6H PRN    sodium chloride (NS) flush 5-40 mL  5-40 mL IntraVENous Q8H    sodium chloride (NS) flush 5-40 mL  5-40 mL IntraVENous PRN    zolpidem (AMBIEN) tablet 10 mg  10 mg Oral QHS PRN    aspirin chewable tablet 81 mg  81 mg Oral DAILY    lisinopril (PRINIVIL, ZESTRIL) tablet 40 mg  40 mg Oral DAILY    insulin lispro (HUMALOG) injection   SubCUTAneous TIDAC    glucose chewable tablet 16 g  4 Tab Oral PRN    dextrose (D50W) injection syrg 12.5-25 g  12.5-25 g IntraVENous PRN    glucagon (GLUCAGEN) injection 1 mg  1 mg IntraMUSCular PRN    acetaminophen (TYLENOL) tablet 650 mg  650 mg Oral Q6H PRN        Objective:   Vitals:  Visit Vitals  /71 (BP 1 Location: Left leg, BP Patient Position: At rest)   Pulse 70   Temp 98.2 °F (36.8 °C)   Resp 16   SpO2 95%      O2 Device: Room air Temp (24hrs), Av.3 °F (36.8 °C), Min:98 °F (36.7 °C), Max:98.4 °F (36.9 °C)      Last 24hr Input/Output:    Intake/Output Summary (Last 24 hours) at 2020 0835  Last data filed at 2/15/2020 1715  Gross per 24 hour   Intake 600 ml   Output    Net 600 ml        PHYSICAL EXAM:  General:     Alert, cooperative morbidly obese WM, no distress, appears stated age. Head:    Normocephalic, without obvious abnormality, atraumatic. Eyes:    Conjunctivae/corneas clear. PERRLA  Nose:   Nares normal. No drainage or sinus tenderness. Throat:     Lips, mucosa, and tongue normal.  No Thrush  Neck:   Supple, symmetrical,  no adenopathy, thyroid: non tender     no carotid bruit and no JVD. Back:     Symmetric,  No CVA tenderness. Lungs:    Clear to auscultation bilaterally. No Wheezing or Rhonchi. No rales. Heart:    Regular rate and rhythm,  no murmur, rub or gallop. Abdomen:    Soft, non-tender. Not distended. Bowel sounds normal. No masses  Extremities:  Extremities normal, atraumatic, No cyanosis. No edema. No clubbing  Lymph nodes:  Cervical, supraclavicular normal.  Neurologic:  Normal strength, Alert and oriented X 3. Skin:                 No rash      Lab Data Reviewed:          Assessment/Plan:     Principal Problem:    TIA (transient ischemic attack) (2020)    Active Problems:    Hypertension (2014)      Restrictive lung disease ()      Atrial fibrillation with RVR [facility] (2017)      Obesity, morbid ([facility] (2017)      Controlled type 2 diabetes mellitus without complication, without long-term current use of insulin ([facility] (8/15/2018)      Cardiomyopathy, nonischemic ([facility] (2018)       ___________________________________________________  PLAN:    1. Continue ASA and  Eliquis with TIA  2. Continue Coreg and Lisinopril with Cardiomyopathy  3. Continue Telemetry  4. Reviewed CTA and thus no need for Carotid Dopplers. 5.  Follow BS and treat with SSI as needed  6. Can't do MRI as has pacemaker  7.   Continue Diuretics with history CHF            ___________________________________________________    Attending Physician: Aryan Gao MD

## 2020-02-16 NOTE — PROGRESS NOTES
Discharge instruction explained and given to pt. Instructed pt.that he cannot drive for 3 months due to TIA. Education regarding new meds was discussed,pt. showed understanding.

## 2020-02-18 LAB
BACKGROUND: 480503: NORMAL
JAK2 P.V617F BLD/T QL: NORMAL
LAB DIRECTOR NAME PROVIDER: NORMAL

## 2020-02-28 ENCOUNTER — OFFICE VISIT (OUTPATIENT)
Dept: FAMILY MEDICINE CLINIC | Age: 55
End: 2020-02-28

## 2020-02-28 VITALS
RESPIRATION RATE: 18 BRPM | DIASTOLIC BLOOD PRESSURE: 72 MMHG | HEIGHT: 72 IN | OXYGEN SATURATION: 97 % | HEART RATE: 70 BPM | BODY MASS INDEX: 42.66 KG/M2 | WEIGHT: 315 LBS | SYSTOLIC BLOOD PRESSURE: 120 MMHG | TEMPERATURE: 96.7 F

## 2020-02-28 DIAGNOSIS — R06.02 SHORTNESS OF BREATH: Primary | ICD-10-CM

## 2020-02-28 DIAGNOSIS — I10 ESSENTIAL HYPERTENSION: ICD-10-CM

## 2020-02-28 NOTE — PROGRESS NOTES
HISTORY OF PRESENT ILLNESS  Rennie Ahumada is a 50 y.o. male. HPI In for followup. Only getting occasional numbness in L side of body. Breathing has been doing reasonably well. Having to take breathing treatments every 3-4 hours. No edema. Occasionally wakes up at night short of breath, takes a breathing treatment. ROS    Physical Exam  Vitals signs and nursing note reviewed. Constitutional:       Appearance: He is well-developed. HENT:      Right Ear: External ear normal.      Left Ear: External ear normal.   Neck:      Thyroid: No thyromegaly. Cardiovascular:      Rate and Rhythm: Normal rate and regular rhythm. Heart sounds: Normal heart sounds. Pulmonary:      Effort: Pulmonary effort is normal. No respiratory distress. Breath sounds: Normal breath sounds. No wheezing. Abdominal:      General: Bowel sounds are normal. There is no distension. Palpations: Abdomen is soft. There is no mass. Tenderness: There is no abdominal tenderness. There is no guarding. Musculoskeletal: Normal range of motion. Comments: 1+ edema bilaterally   Lymphadenopathy:      Cervical: No cervical adenopathy. ASSESSMENT and PLAN  Orders Placed This Encounter    sacubitril-valsartan (ENTRESTO) 24 mg/26 mg tablet     Diagnoses and all orders for this visit:    1. Shortness of breath    2. Essential hypertension    Other orders  -     sacubitril-valsartan (ENTRESTO) 24 mg/26 mg tablet; Take 1 Tab by mouth two (2) times a day. Will need to increase entresto dose over the next 2 months.

## 2020-02-28 NOTE — PROGRESS NOTES
Chief Complaint   Patient presents with   [facility] Follow Up     2/13/2020 TIA       1. Have you been to the ER, urgent care clinic since your last visit? Hospitalized since your last visit? Yes  2/13/2020 - 2/16/2020 (3 days)  [facility]   Sathish Donald MD   Last attending  TIA (transient ischemic attack)   Principal problem          2. Have you seen or consulted any other health care providers outside of the [address] [place] [address] since your last visit? Include any pap smears or colon screening.  No

## 2020-03-11 ENCOUNTER — OFFICE VISIT (OUTPATIENT)
Dept: NEUROLOGY | Age: 55
End: 2020-03-11

## 2020-03-11 VITALS
DIASTOLIC BLOOD PRESSURE: 82 MMHG | OXYGEN SATURATION: 98 % | SYSTOLIC BLOOD PRESSURE: 125 MMHG | HEART RATE: 84 BPM | HEIGHT: 72 IN | WEIGHT: 315 LBS | BODY MASS INDEX: 42.66 KG/M2

## 2020-03-11 DIAGNOSIS — E11.9 CONTROLLED TYPE 2 DIABETES MELLITUS WITHOUT COMPLICATION, UNSPECIFIED WHETHER LONG TERM INSULIN USE (HCC): ICD-10-CM

## 2020-03-11 DIAGNOSIS — I10 ESSENTIAL HYPERTENSION: ICD-10-CM

## 2020-03-11 DIAGNOSIS — I48.91 ATRIAL FIBRILLATION, UNSPECIFIED TYPE (HCC): ICD-10-CM

## 2020-03-11 DIAGNOSIS — E78.2 MIXED HYPERLIPIDEMIA: ICD-10-CM

## 2020-03-11 DIAGNOSIS — G43.009 MIGRAINE WITHOUT AURA AND WITHOUT STATUS MIGRAINOSUS, NOT INTRACTABLE: ICD-10-CM

## 2020-03-11 DIAGNOSIS — G45.9 TIA (TRANSIENT ISCHEMIC ATTACK): Primary | ICD-10-CM

## 2020-03-11 RX ORDER — TOPIRAMATE 25 MG/1
TABLET ORAL
Qty: 120 TAB | Refills: 2 | Status: SHIPPED | OUTPATIENT
Start: 2020-03-11 | End: 2020-06-17 | Stop reason: SDUPTHER

## 2020-03-11 NOTE — PATIENT INSTRUCTIONS

## 2020-03-11 NOTE — PROGRESS NOTES
NEUROLOGY NOTE     Chief complaint: Left-sided numbness    SUBJECTIVE:  Moni Serna is a 50 y.o. male who presents to the neurology office for management of TIA. The patient presented to the hospital because of left-sided numbness. Patient states that he has had 2 strokes in the past and does have mild left-sided residual weakness but no numbness. On 2/11/2020, he woke up with left-sided numbness. It involves the left face, arm and leg. The symptoms lasted for around 2 hours and then resolved. He did have associated photophobia. He continues to have minimal left-sided numbness. The patient does also have frontal headaches and it is associated with nausea but no vomiting. This has been going on for the last 2 weeks. Patient does have hypertension, diabetes and quit smoking around 7 years ago. The patient does also have history of 2 strokes. Interval history: There is been no recurrence of symptoms. Patient was started on aspirin 81 mg a day in the hospital in addition to his Eliquis. He  started having headache around 2013 and has gotten worse since Feb 2020. His  headaches are holocepalic. It is 10/10 in severity. They are sharp, dull and pounding in character. It lasts for 2 hrs. It is associated with photophobia and phonophobia. Baseline headache frequency: 4/wk    Risk Factors for headaches  Smoking: quit 2013  Coffee: rarely cups/day  Tea: decaf 2 litres/day  Soda: less than 1 cans/day  Water: 15 glasses/day  Sleeps at 12 am and wakes up at 10 am. He does not know if he does snore. He does have MENDY.      Medications tried  Preventative therapy:  None    Abortive therapy:  Aspirin     ROS  A ten system review of constitutional, cardiovascular, respiratory, musculoskeletal, endocrine, skin, SHEENT, genitourinary, psychiatric and neurologic systems was obtained and is unremarkable except as stated in HPI     PMH  Past Medical History:   Diagnosis Date    [facility] ED. Pt reported this    Asthma     Atrial fibrillation with RVR ([facility] 2017    Cardiomyopathy (HCC)     ET 25-30%    Chronic obstructive pulmonary disease (HCC)     Diabetes (HCC)     Gastrointestinal disorder     GERD    Heart failure (HCC)     High cholesterol     Hypertension     Restrictive lung disease     FVC2.59 (52%), FEV1 1.95 (49)- 2017    S/P cardiac cath     Dr. Sandrita Perez, 2016. minimal cad    Sleep apnea     Stroke ([facility]     TIA x 2       FH  Family History   Adopted: Yes         Social History     Socioeconomic History    Marital status: SINGLE     Spouse name: Not on file    Number of children: Not on file    Years of education: Not on file    Highest education level: Not on file   Tobacco Use    Smoking status: Former Smoker     Packs/day: 1.00     Types: Cigarettes     Last attempt to quit: 2/3/2014     Years since quittin.1    Smokeless tobacco: Never Used    Tobacco comment: Quit 3 years ago   Substance and Sexual Activity    Alcohol use: No     Comment: stopped drinking at age 29    Drug use: No    Sexual activity: Not Currently   Social History Narrative    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        ALLERGIES  Allergies   Allergen Reactions    Codeine Hives    Hydrocodone Itching    Influenza Virus Vaccines Nausea Only     Fever  diarrhea    Mushroom Flavor Swelling    Oxycodone Rash    Pneumococcal 23-Allison Ps Vaccine Nausea and Vomiting     Vomit         PHYSICAL EXAMINATION:   Visit Vitals  /82   Pulse 84   Ht 6' (1.829 m)   Wt 338 lb (153.3 kg)   SpO2 98%   BMI 45.84 kg/m²       General:   General appearance: Pt is in no acute distress   Distal pulses are preserved    Neurological Examination:   Mental Status:  AAO x3. Speech is fluent. Follows commands, has normal fund of knowledge, attention, short term recall, comprehension and insight. Cranial Nerves: Visual fields are full.  PERRL, Extraocular movements are full. Facial sensation-decreased on the left. Facial movement intact. Hearing intact to conversation. Palate elevates symmetrically. Shoulder shrug symmetric. Tongue midline. Motor: Strength is 5/5 on the right and 4+ out of 5 in the left upper and lower extremities. Normal tone. No atrophy. Sensation: Light touch -decreased in the left upper and lower extremity    Reflexes: DTRs 2+ throughout. Plantar responses downgoing. Coordination/Cerebellar: Intact to finger-nose-finger     Gait: Casual gait is normal.     Skin: No significant bruising or lacerations. LAB DATA REVIEWED:    Results for orders placed or performed during the hospital encounter of 23/56/09   METABOLIC PANEL, COMPREHENSIVE   Result Value Ref Range    Sodium 139 136 - 145 mmol/L    Potassium 3.6 3.5 - 5.1 mmol/L    Chloride 105 97 - 108 mmol/L    CO2 28 21 - 32 mmol/L    Anion gap 6 5 - 15 mmol/L    Glucose 210 (H) 65 - 100 mg/dL    BUN 21 (H) 6 - 20 MG/DL    Creatinine 1.19 0.70 - 1.30 MG/DL    BUN/Creatinine ratio 18 12 - 20      GFR est AA >60 >60 ml/min/1.73m2    GFR est non-AA >60 >60 ml/min/1.73m2    Calcium 8.9 8.5 - 10.1 MG/DL    Bilirubin, total 1.2 (H) 0.2 - 1.0 MG/DL    ALT (SGPT) 43 12 - 78 U/L    AST (SGOT) 22 15 - 37 U/L    Alk.  phosphatase 66 45 - 117 U/L    Protein, total 7.7 6.4 - 8.2 g/dL    Albumin 3.8 3.5 - 5.0 g/dL    Globulin 3.9 2.0 - 4.0 g/dL    A-G Ratio 1.0 (L) 1.1 - 2.2     CBC WITH AUTOMATED DIFF   Result Value Ref Range    WBC 9.5 4.1 - 11.1 K/uL    RBC 5.93 (H) 4.10 - 5.70 M/uL    HGB 16.9 12.1 - 17.0 g/dL    HCT 50.8 (H) 36.6 - 50.3 %    MCV 85.7 80.0 - 99.0 FL    MCH 28.5 26.0 - 34.0 PG    MCHC 33.3 30.0 - 36.5 g/dL    RDW 15.0 (H) 11.5 - 14.5 %    PLATELET 533 436 - 727 K/uL    MPV 10.3 8.9 - 12.9 FL    NRBC 0.0 0  WBC    ABSOLUTE NRBC 0.00 0.00 - 0.01 K/uL    NEUTROPHILS 74 32 - 75 %    LYMPHOCYTES 19 12 - 49 %    MONOCYTES 5 5 - 13 %    EOSINOPHILS 1 0 - 7 %    BASOPHILS 1 0 - 1 %    IMMATURE GRANULOCYTES 0 0.0 - 0.5 %    ABS. NEUTROPHILS 7.1 1.8 - 8.0 K/UL    ABS. LYMPHOCYTES 1.8 0.8 - 3.5 K/UL    ABS. MONOCYTES 0.4 0.0 - 1.0 K/UL    ABS. EOSINOPHILS 0.1 0.0 - 0.4 K/UL    ABS. BASOPHILS 0.1 0.0 - 0.1 K/UL    ABS. IMM.  GRANS. 0.0 0.00 - 0.04 K/UL    DF AUTOMATED     GLUCOSE, POC   Result Value Ref Range    Glucose (POC) 189 (H) 65 - 100 mg/dL    Performed by Irma Love (PCT)    GLUCOSE, POC   Result Value Ref Range    Glucose (POC) 162 (H) 65 - 100 mg/dL    Performed by Irma Love (PCT)    GLUCOSE, POC   Result Value Ref Range    Glucose (POC) 178 (H) 65 - 100 mg/dL    Performed by Bozena Salvador (PCT)    GLUCOSE, POC   Result Value Ref Range    Glucose (POC) 279 (H) 65 - 100 mg/dL    Performed by Kristin 28, POC   Result Value Ref Range    Glucose (POC) 196 (H) 65 - 100 mg/dL    Performed by Orien Cord    GLUCOSE, POC   Result Value Ref Range    Glucose (POC) 214 (H) 65 - 100 mg/dL    Performed by TaoTaoSou (PCT)    GLUCOSE, POC   Result Value Ref Range    Glucose (POC) 239 (H) 65 - 100 mg/dL    Performed by TaoTaoSou (PCT)    GLUCOSE, POC   Result Value Ref Range    Glucose (POC) 328 (H) 65 - 100 mg/dL    Performed by Regen LineComeet (PCT)    GLUCOSE, POC   Result Value Ref Range    Glucose (POC) 239 (H) 65 - 100 mg/dL    Performed by Regen Linea (PCT)    ECHO ADULT COMPLETE   Result Value Ref Range    Right Atrial Area 4C 24.26 cm2    Ao Root D 3.99 cm    Aortic Valve Systolic Peak Velocity 936.35 cm/s    Aortic Valve Area by Continuity of Peak Velocity 3.3 cm2    AoV PG 5.9 mmHg    LVIDd 6.33 (A) 4.2 - 5.9 cm    LVPWd 1.60 (A) 0.6 - 1.0 cm    LVIDs 5.11 cm    IVSd 1.97 (A) 0.6 - 1.0 cm    LVOT d 2.21 cm    LVOT Peak Velocity 104.18 cm/s    LVOT Peak Gradient 4.3 mmHg    LVOT VTI 17.69 cm    Left Atrium to Aortic Root Ratio 1.30     LA Vol 4C 80.64 (A) 18 - 58 mL    LA Area 4C 26.7 cm2    LV Mass .5 (A) 88 - 224 g    LV Mass AL Index 275.7 (A) 49 - 115 g/m2    LVPWs 1.36 cm    RVSP 42.1 mmHg    Est. RA Pressure 10.0 mmHg    Mitral Regurgitant Peak Velocity 137.36 cm/s    Left Atrium Major Axis 5.17 cm    Triscuspid Valve Regurgitation Peak Gradient 32.1 mmHg    LVOT SV 67.7 ml    TR Max Velocity 283.37 cm/s    PASP 42.1 mmHg    LA Vol Index 30.48 16 - 28 ml/m2    MR Peak Gradient 7.5 mmHg    YARI/BSA Pk Jose Manuel 1.2 cm2/m2    AR Max Jose Manuel 148.43 cm/s    Left Ventricular Fractional Shortening by 2D 42.768430831 %    Left Ventricular Outflow Tract Mean Gradient 0.2532385688914 mmHg    Left Ventricular Outflow Tract Mean Velocity 0.64704274638514 cm/s    AV Velocity Ratio 0.86     AV peak gradient 6.2752040964 mmHg    Left Ventricular End Diastolic Volume by Teichholz Method 2.58803040983464 mL    Left Ventricular End Systolic Volume by Teichholz Method 7.60400295766046 mL    Left Ventricular Stroke Volume by Rena Balsam Method 09.398217754 mL         Current Outpatient Medications   Medication Sig Dispense Refill    sacubitril-valsartan (ENTRESTO) 24 mg/26 mg tablet Take 1 Tab by mouth two (2) times a day. 60 Tab 1    aspirin 81 mg chewable tablet Take 1 Tab by mouth daily. 30 Tab prn    acetaminophen (TYLENOL) 325 mg tablet Take 2 Tabs by mouth every six (6) hours as needed for Pain. 50 Tab 1    furosemide (LASIX) 80 mg tablet Take 2 tabs in the AM, one 2 in the  Tab 12    metOLazone (ZAROXOLYN) 2.5 mg tablet Take one tab po daily, 30 minutes before lasix. 30 Tab 5    ONETOUCH ULTRA BLUE TEST STRIP strip USE ONE STRIP TO TEST THREE TIMES A DAY BEFORE MEALS 100 Strip 11    potassium chloride SR (K-TAB) 20 mEq tablet TAKE TWO TABLETS BY MOUTH EVERY MORNING TAKE ONE TABLET BY MOUTH EVERY EVENING 90 Tab 11    metFORMIN (GLUCOPHAGE) 1,000 mg tablet Take 1,000 mg by mouth two (2) times daily (with meals).  traMADol (ULTRAM) 50 mg tablet Take 50 mg by mouth every eight (8) hours as needed for Pain.       carvedilol (COREG) 12.5 mg tablet TAKE ONE TABLET BY MOUTH TWICE A DAY TO SLOW HEART RATE DOWN. 60 Tab 11    zolpidem (AMBIEN) 10 mg tablet Take 1 Tab by mouth nightly as needed for Sleep. Max Daily Amount: 10 mg. 30 Tab 5    apixaban (ELIQUIS) 5 mg tablet Take 1 Tab by mouth two (2) times a day. 60 Tab 11    atorvastatin (LIPITOR) 40 mg tablet TAKE ONE TABLET BY MOUTH DAILY FOR CHOLESTEROL 30 Tab 12    glipiZIDE (GLUCOTROL) 10 mg tablet TAKE ONE TABLET BY MOUTH TWICE A DAY 60 Tab 11    MARGIE PEN NEEDLE 32 gauge x 5/32\" ndle USE DAILY WITH VICTOZA 100 Pen Needle 11    liraglutide (VICTOZA) 0.6 mg/0.1 mL (18 mg/3 mL) pnij 0.6 mg daily- 1st week, then 1.2 mg daily- 2nd week, then 1.8 mg daily. Please give pt needles also 1 Pen 12    empagliflozin (JARDIANCE) 25 mg tablet Take 1 Tab by mouth Daily (before breakfast). 90 Tab 3    albuterol-ipratropium (DUO-NEB) 2.5 mg-0.5 mg/3 ml nebu INHALE THREE MILLILITERS (ONE VIAL) VIA NEBULIZATION BY MOUTH EVERY 6 HOURS AS NEEDED 50 Nebule 10    albuterol (VENTOLIN HFA) 90 mcg/actuation inhaler INHALE TWO PUFFS BY MOUTH EVERY 4 HOURS AS NEEDED FOR FOR WHEEZING AND FOR SHORTNESS OF BREATH 1 Inhaler 12       Stroke workup  2/14/2020  CT Head/CTA Head and neck  1. No hemodynamically significant extracranial ICA stenosis (using NASCET  criteria). 2. No large vessel occlusion or significant intracranial stenosis. 3. No acute intracranial process. TTE:   Ejection fraction 25 to 30%    Stroke labs:  HgBA1c    Lab Results   Component Value Date/Time    Hemoglobin A1c 9.5 (H) 11/15/2018 12:25 AM     LDL   Lab Results   Component Value Date/Time    LDL, calculated 46 01/14/2020 01:40 PM       IMPRESSION:  Tiffani Olivas is a 50 y.o. male who presents to the neurology office for management of TIA. Patient came to the hospital in February 2020 with new onset left-sided numbness that was more pronounced for 2 hours. Patient does have history of 2 strokes in the past with left-sided weakness.   Patient was not taking any aspirin but was on Eliquis for atrial fibrillation. .  Patient does have hypertension and diabetes. Patient does have a pacemaker so could not get MRI scan. 1. TIA  2. DM  3. HLD  4. HTN  5. A fib  6.   Patient does have chronic migraines    RECOMMENDATIONS:  - BP goal is less than 140/90  - Cont ASA 81 mg daily and continue Eliquis  - Cont atorvastatin 40 mg daily   -Going to start the patient on Topamax 25 mg to be taken at the start of the headache which will be gradually increased to 50 mg p.o. twice daily for migraines    Follow-up in 2 months      Kortney Sims MD  Neurologist

## 2020-04-07 ENCOUNTER — TELEPHONE (OUTPATIENT)
Dept: FAMILY MEDICINE CLINIC | Age: 55
End: 2020-04-07

## 2020-04-07 RX ORDER — POTASSIUM CHLORIDE 1500 MG/1
TABLET, FILM COATED, EXTENDED RELEASE ORAL
Qty: 90 TAB | Refills: 11 | Status: SHIPPED | OUTPATIENT
Start: 2020-04-07 | End: 2020-07-10

## 2020-04-07 NOTE — TELEPHONE ENCOUNTER
Pt states [phone], states dr Lilo Grigsby was supposed to send in a new medication and dosage to replace his Lipitor , had to [place] appt on Monday due to office being closed, please call and send in new script

## 2020-04-08 ENCOUNTER — OFFICE VISIT (OUTPATIENT)
Dept: CARDIOLOGY CLINIC | Age: 55
End: 2020-04-08

## 2020-04-08 DIAGNOSIS — Z95.810 ICD (IMPLANTABLE CARDIOVERTER-DEFIBRILLATOR) IN PLACE: Primary | ICD-10-CM

## 2020-04-08 DIAGNOSIS — I42.0 DILATED CARDIOMYOPATHY (HCC): ICD-10-CM

## 2020-04-08 RX ORDER — SACUBITRIL AND VALSARTAN 49; 51 MG/1; MG/1
1 TABLET, FILM COATED ORAL 2 TIMES DAILY
Qty: 60 TAB | Refills: 1 | Status: SHIPPED | OUTPATIENT
Start: 2020-04-08 | End: 2020-06-22 | Stop reason: DRUGHIGH

## 2020-04-08 RX ORDER — TRAZODONE HYDROCHLORIDE 100 MG/1
100 TABLET ORAL
Qty: 30 TAB | Refills: 5 | Status: SHIPPED | OUTPATIENT
Start: 2020-04-08 | End: 2021-07-26 | Stop reason: ALTCHOICE

## 2020-04-13 ENCOUNTER — DOCUMENTATION ONLY (OUTPATIENT)
Dept: FAMILY MEDICINE CLINIC | Age: 55
End: 2020-04-13

## 2020-04-14 ENCOUNTER — DOCUMENTATION ONLY (OUTPATIENT)
Dept: FAMILY MEDICINE CLINIC | Age: 55
End: 2020-04-14

## 2020-04-15 RX ORDER — IPRATROPIUM BROMIDE AND ALBUTEROL SULFATE 2.5; .5 MG/3ML; MG/3ML
SOLUTION RESPIRATORY (INHALATION)
Qty: 30 NEBULE | Refills: 9 | Status: SHIPPED | OUTPATIENT
Start: 2020-04-15 | End: 2020-07-23 | Stop reason: ALTCHOICE

## 2020-05-29 DIAGNOSIS — F51.01 PRIMARY INSOMNIA: ICD-10-CM

## 2020-06-01 RX ORDER — ZOLPIDEM TARTRATE 10 MG/1
TABLET ORAL
Qty: 30 TAB | Refills: 4 | Status: SHIPPED | OUTPATIENT
Start: 2020-06-01 | End: 2020-11-30

## 2020-06-02 DIAGNOSIS — F51.01 PRIMARY INSOMNIA: ICD-10-CM

## 2020-06-15 ENCOUNTER — OFFICE VISIT (OUTPATIENT)
Dept: URGENT CARE | Age: 55
End: 2020-06-15

## 2020-06-15 VITALS — RESPIRATION RATE: 16 BRPM | OXYGEN SATURATION: 95 % | TEMPERATURE: 98.8 F | HEART RATE: 71 BPM

## 2020-06-15 DIAGNOSIS — Z20.822 ENCOUNTER FOR LABORATORY TESTING FOR COVID-19 VIRUS: Primary | ICD-10-CM

## 2020-06-17 ENCOUNTER — TELEPHONE (OUTPATIENT)
Dept: FAMILY MEDICINE CLINIC | Age: 55
End: 2020-06-17

## 2020-06-17 DIAGNOSIS — G43.009 MIGRAINE WITHOUT AURA AND WITHOUT STATUS MIGRAINOSUS, NOT INTRACTABLE: ICD-10-CM

## 2020-06-17 LAB — SARS-COV-2, NAA: NOT DETECTED

## 2020-06-17 RX ORDER — TOPIRAMATE 25 MG/1
TABLET ORAL
Qty: 120 TAB | Refills: 0 | Status: SHIPPED | OUTPATIENT
Start: 2020-06-17 | End: 2020-06-18

## 2020-06-17 NOTE — TELEPHONE ENCOUNTER
Patient would like a thirty day supply of  topamax as patient claims he can not get through to neurology for refill. Patient also claims his entresto is supposed to increase but has not. Will check with pcp for review.

## 2020-06-17 NOTE — TELEPHONE ENCOUNTER
----- Message from Rebecca Forte sent at 6/17/2020 11:49 AM EDT -----  Regarding: /telephone  Contact: [phone]  Requesting callback in regards to a few different medication. Names were unavailable.

## 2020-06-18 ENCOUNTER — TELEPHONE (OUTPATIENT)
Dept: NEUROLOGY | Age: 55
End: 2020-06-18

## 2020-06-18 DIAGNOSIS — G43.009 MIGRAINE WITHOUT AURA AND WITHOUT STATUS MIGRAINOSUS, NOT INTRACTABLE: ICD-10-CM

## 2020-06-18 RX ORDER — TOPIRAMATE 50 MG/1
TABLET, FILM COATED ORAL
Qty: 60 TAB | Refills: 5 | Status: SHIPPED | OUTPATIENT
Start: 2020-06-18 | End: 2020-07-23

## 2020-06-18 NOTE — TELEPHONE ENCOUNTER
I received this email from Moises Willingham last night and Denise Harrington this morning. I am forwarding to you for review. Negrita Leesdara,     We had one of your patients call administration out of frustration for not being able to get in touch with your office. I wanted to pass it along so that your team can help him renew his prescription (I believe that both he and his pharmacist has tried calling and today sat on the line for over an hour). Im not sure if you all are experiencing any wait time issues, or if he has an incorrect phone number or what but want to make sure that you all are aware. Miracle Dinh  Physician [address]  [phone]  [email or web address]  [email or web address]       It does not reference a prior auth, only a renewal.  There are no current meds listed prescribed by Dr. Jessica Duvall. There was a topiramate Rx written 3/11/20 by Dr. Jessica Duvall and it was discontinued yesterday by Dr. Lesly Field, PCP.

## 2020-06-22 DIAGNOSIS — Z12.11 COLON CANCER SCREENING: Primary | ICD-10-CM

## 2020-06-22 RX ORDER — SACUBITRIL AND VALSARTAN 97; 103 MG/1; MG/1
1 TABLET, FILM COATED ORAL 2 TIMES DAILY
Qty: 60 TAB | Refills: 5 | Status: SHIPPED | OUTPATIENT
Start: 2020-06-22 | End: 2021-01-29

## 2020-06-22 NOTE — TELEPHONE ENCOUNTER
----- Message from Navin Luna sent at 6/22/2020 12:22 PM EDT -----  Regarding: Dr. Hesham Burk: [phone]  Pt requesting Cologuard exam.       Pt also checking on status of medication to confirm dosage has been updated with prior authorization needs. Pt stated he is requesting to speak with Dr. Herson Naidu personally.

## 2020-06-22 NOTE — TELEPHONE ENCOUNTER
Per patient Riya Lockhart needs to be increased and he also would like a Cologuard ordered, he has an appointment scheduled for 7/10/20

## 2020-06-28 ENCOUNTER — HOSPITAL ENCOUNTER (EMERGENCY)
Age: 55
Discharge: HOME | End: 2020-06-28
Attending: EMERGENCY MEDICINE
Payer: MEDICAID

## 2020-06-28 ENCOUNTER — APPOINTMENT (OUTPATIENT)
Dept: GENERAL RADIOLOGY | Age: 55
End: 2020-06-28
Attending: EMERGENCY MEDICINE
Payer: MEDICAID

## 2020-06-28 VITALS
HEART RATE: 71 BPM | DIASTOLIC BLOOD PRESSURE: 87 MMHG | SYSTOLIC BLOOD PRESSURE: 141 MMHG | OXYGEN SATURATION: 95 % | BODY MASS INDEX: 42.66 KG/M2 | HEIGHT: 72 IN | RESPIRATION RATE: 18 BRPM | TEMPERATURE: 98.1 F | WEIGHT: 315 LBS

## 2020-06-28 DIAGNOSIS — G56.01 CARPAL TUNNEL SYNDROME OF RIGHT WRIST: Primary | ICD-10-CM

## 2020-06-28 DIAGNOSIS — R52 PAIN: Primary | ICD-10-CM

## 2020-06-28 LAB
GLUCOSE BLD STRIP.AUTO-MCNC: 185 MG/DL (ref 65–100)
SERVICE CMNT-IMP: ABNORMAL

## 2020-06-28 PROCEDURE — 82962 GLUCOSE BLOOD TEST: CPT

## 2020-06-28 PROCEDURE — 99283 EMERGENCY DEPT VISIT LOW MDM: CPT

## 2020-06-28 PROCEDURE — 73130 X-RAY EXAM OF HAND: CPT

## 2020-06-28 RX ORDER — GABAPENTIN 300 MG/1
300 CAPSULE ORAL 3 TIMES DAILY
Qty: 21 CAP | Refills: 0 | Status: SHIPPED | OUTPATIENT
Start: 2020-06-28 | End: 2020-09-03

## 2020-06-28 NOTE — ED NOTES
Discharge instructions were given to the patient by PRANEETH Murillo RN. .     The patient left the Emergency Department ambulatory, alert and oriented and in no acute distress with 1 prescription(s). The patient was encouraged to call or return to the ED for worsening symptoms or problems and was encouraged to schedule a follow up appointment for continuing care. Patient leaving ED accompanied by self. The patient verbalized understanding of discharge instructions and prescriptions, all questions were answered. The patient has no further concerns at this time. Patient declined wheelchair transfer upon ED discharge.

## 2020-06-28 NOTE — ED NOTES
Emergency Department Nursing Plan of Care       The Nursing Plan of Care is developed from the Nursing assessment and Emergency Department Attending provider initial evaluation. The plan of care may be reviewed in the ED Provider note. The Plan of Care was developed with the following considerations:   Patient / Family readiness to learn indicated by:verbalized understanding  Persons(s) to be included in education: patient  Barriers to Learning/Limitations:No    Signed     Lamond Lines    6/28/2020   4:48 PM    Patient is alert and oriented x 4 and in no acute distress at this time. Respirations are at a regular rate, depth, and pattern. Patient updated on plan of care and has no questions or concerns at this time. Call bell within reach. Will continue to monitor. Please reference nursing assessment.

## 2020-06-28 NOTE — DISCHARGE INSTRUCTIONS
Patient Education        Carpal Tunnel Syndrome: Care Instructions  Overview     Carpal tunnel syndrome is numbness, tingling, weakness, and pain in your hand, wrist, and sometimes forearm. It is caused by pressure on the median nerve. This nerve and several tough tissues called tendons run through a space in the wrist. This space is called the carpal tunnel. The repeated hand motions used in work and some hobbies and sports can put pressure on the median nerve. Pregnancy can cause carpal tunnel syndrome. Several conditions, such as diabetes, arthritis, and an underactive thyroid, can also cause it. You may be able to limit an activity or change the way you do it to reduce your symptoms. You also can take other steps to feel better. If your symptoms are mild, 1 to 2 weeks of home treatment are likely to ease your pain. Surgery is needed only if other treatments do not work. Follow-up care is a key part of your treatment and safety. Be sure to make and go to all appointments, and call your doctor if you are having problems. It's also a good idea to know your test results and keep a list of the medicines you take. How can you care for yourself at home? · If possible, stop or reduce the activity that causes your symptoms. If you cannot stop the activity, take frequent breaks to rest and stretch or change hand positions to do a task. Try switching hands, such as when using a computer mouse. · Try to avoid bending or twisting your wrists. · Ask your doctor if you can take an over-the-counter pain medicine, such as acetaminophen (Tylenol), ibuprofen (Advil, Motrin), or naproxen (Aleve). Be safe with medicines. Read and follow all instructions on the label. · If your doctor prescribes corticosteroid medicine to help reduce pain and swelling, take it exactly as prescribed. Call your doctor if you think you are having a problem with your medicine.   · Put ice or a cold pack on your wrist for 10 to 20 minutes at a time to ease pain. Put a thin cloth between the ice and your skin. · If your doctor or your physical or occupational therapist tells you to wear a wrist splint, wear it as directed to keep your wrist in a neutral position. This also eases pressure on your median nerve. · Ask your doctor whether you should have physical or occupational therapy to learn how to do tasks differently. · Try a yoga class to stretch your muscles and build strength in your hands and wrists. Yoga has been shown to ease carpal tunnel symptoms. To prevent carpal tunnel  · When working at a computer, keep your hands and wrists in line with your forearms. Hold your elbows close to your sides. Take a break every 10 to 15 minutes. · Try these exercises:  ? Warm up: Rotate your wrist up, down, and from side to side. Repeat this 4 times. Stretch your fingers far apart, relax them, then stretch them again. Repeat 4 times. Stretch your thumb by pulling it back gently, holding it, and then releasing it. Repeat 4 times. ? Prayer stretch: Start with your palms together in front of your chest just below your chin. Slowly lower your hands toward your waistline while keeping your hands close to your stomach and your palms together until you feel a mild to moderate stretch under your forearms. Hold for 10 to 20 seconds. Repeat 4 times. ? Wrist flexor stretch: Hold your arm in front of you with your palm up. Bend your wrist, pointing your hand toward the floor. With your other hand, gently bend your wrist further until you feel a mild to moderate stretch in your forearm. Hold for 10 to 20 seconds. Repeat 4 times. ? Wrist extensor stretch: Repeat the steps for the wrist flexor stretch, but begin with your extended hand palm down. · Squeeze a rubber exercise ball several times a day to keep your hands and fingers strong. · Avoid holding objects (such as a book) in one position for a long time. When possible, use your whole hand to grasp an object.  Using just the thumb and index finger can put stress on the wrist.  · Do not smoke. It can make this condition worse by reducing blood flow to the median nerve. If you need help quitting, talk to your doctor about stop-smoking programs and medicines. These can increase your chances of quitting for good. When should you call for help? Watch closely for changes in your health, and be sure to contact your doctor if:  · Your pain or other problems do not get better with home care. · You want more information about physical or occupational therapy. · You have side effects of your corticosteroid medicine, such as:  ? Weight gain. ? Mood changes. ? Trouble sleeping. ? Bruising easily. · You have any other problems with your medicine. Where can you learn more? Go to http://www.gray.com/  Enter R432 in the search box to learn more about \"Carpal Tunnel Syndrome: Care Instructions. \"  Current as of: March 2, 2020               Content Version: 12.5  © 9289-0798 [employer]. Care instructions adapted under license by [employer] (which disclaims liability or warranty for this information). If you have questions about a medical condition or this instruction, always ask your healthcare professional. [address] any warranty or liability for your use of this information.

## 2020-06-28 NOTE — ED PROVIDER NOTES
EMERGENCY DEPARTMENT HISTORY AND PHYSICAL EXAM    Date: 6/28/2020  Patient Name: Wenceslao Rockwell    History of Presenting Illness     Chief Complaint   Patient presents with    Hand Pain     right hand pain         History Provided By: Patient      HPI: Wenceslao Rockwell is a 50 y.o. male with a PMH of , COPD, diabetes, asthma, cardiomyopathy, A. fib who presents hand pain. Onset 2 days ago, worsened today stating he dropped his glass because he was in pain. Located to right hand. Associated with numbness, tingling in wrist and fingers. Denies any hand injury or swelling. Reports history of arthritis. Has not taken anything for pain. History of diabetes and states he has not taken his blood sugar in weeks due to lack of test strips. States he is taking his glipizide, metformin and Victoza. PCP: Kim Hubbard MD    Current Outpatient Medications   Medication Sig Dispense Refill    gabapentin (NEURONTIN) 300 mg capsule Take 1 Cap by mouth three (3) times daily. Max Daily Amount: 900 mg. 21 Cap 0    sacubitriL-valsartan (Entresto)  mg tablet Take 1 Tab by mouth two (2) times a day. 60 Tab 5    topiramate (TOPAMAX) 50 mg tablet 50 mg bid 60 Tab 5    zolpidem (AMBIEN) 10 mg tablet TAKE ONE TABLET BY MOUTH ONCE NIGHTLY AS NEEDED FOR SLEEP 30 Tab 4    albuterol-ipratropium (DUO-NEB) 2.5 mg-0.5 mg/3 ml nebu INHALE ONE VIAL  VIA NEBULIZATION BY MOUTH EVERY 6 HOURS AS NEEDED 30 Nebule 9    traZODone (DESYREL) 100 mg tablet Take 1 Tab by mouth nightly. 30 Tab 5    potassium chloride SR (K-TAB) 20 mEq tablet TAKE TWO TABLETS BY MOUTH EVERY MORNING TAKE ONE TABLET BY MOUTH EVERY EVENING 90 Tab 11    acetaminophen (TYLENOL) 325 mg tablet Take 2 Tabs by mouth every six (6) hours as needed for Pain.  50 Tab 1    furosemide (LASIX) 80 mg tablet Take 2 tabs in the AM, one 2 in the  Tab 12    ONETOUCH ULTRA BLUE TEST STRIP strip USE ONE STRIP TO TEST THREE TIMES A DAY BEFORE MEALS 100 Strip 11    metFORMIN (GLUCOPHAGE) 1,000 mg tablet Take 1,000 mg by mouth two (2) times daily (with meals).  traMADol (ULTRAM) 50 mg tablet Take 50 mg by mouth every eight (8) hours as needed for Pain.  carvedilol (COREG) 12.5 mg tablet TAKE ONE TABLET BY MOUTH TWICE A DAY TO SLOW HEART RATE DOWN. 60 Tab 11    apixaban (ELIQUIS) 5 mg tablet Take 1 Tab by mouth two (2) times a day. 60 Tab 11    atorvastatin (LIPITOR) 40 mg tablet TAKE ONE TABLET BY MOUTH DAILY FOR CHOLESTEROL 30 Tab 12    glipiZIDE (GLUCOTROL) 10 mg tablet TAKE ONE TABLET BY MOUTH TWICE A DAY 60 Tab 11    MARGIE PEN NEEDLE 32 gauge x 5/32\" ndle USE DAILY WITH VICTOZA 100 Pen Needle 11    liraglutide (VICTOZA) 0.6 mg/0.1 mL (18 mg/3 mL) pnij 0.6 mg daily- 1st week, then 1.2 mg daily- 2nd week, then 1.8 mg daily. Please give pt needles also 1 Pen 12    empagliflozin (JARDIANCE) 25 mg tablet Take 1 Tab by mouth Daily (before breakfast). 90 Tab 3    albuterol (VENTOLIN HFA) 90 mcg/actuation inhaler INHALE TWO PUFFS BY MOUTH EVERY 4 HOURS AS NEEDED FOR FOR WHEEZING AND FOR SHORTNESS OF BREATH 1 Inhaler 12       Past History     Past Medical History:  Past Medical History:   Diagnosis Date   [facility] 1/6/15    [place] [id number] ED. Pt reported this    Asthma     Atrial fibrillation with RVR ([address] 8/11/2017    Cardiomyopathy (HCC)     ET 25-30%    Chronic obstructive pulmonary disease (HCC)     Diabetes (HCC)     Gastrointestinal disorder     GERD    GERD (gastroesophageal reflux disease)     Heart failure (HCC)     High cholesterol     Hypertension     Restrictive lung disease     FVC2.59 (52%), FEV1 1.95 (49)- 2017    S/P cardiac cath     Dr. Mackenzie Avendaño, 2016.  minimal cad    Sleep apnea     No CPAP    Stroke (HCC)     TIA x 4       Past Surgical History:  Past Surgical History:   Procedure Laterality Date    CARDIAC SURG PROCEDURE UNLIST  11/14/2018    Implantation of PACEMAKER    HX PACEMAKER Left     defib,     IN INSJ/RPLCMT PERM DFB W/TRNSVNS LDS 1/DUAL CHMBR N/A 3/11/2019    INSERT ICD BIV MULTI performed by Higiniojose luis Coffey MD at [facility] CARDIAC CATH LAB       Family History:  Family History   Adopted: Yes   Problem Relation Age of Onset    No Known Problems Mother     No Known Problems Father        Social History:  Social History     Tobacco Use    Smoking status: Former Smoker     Packs/day: 1.00     Years: 20.00     Pack years: 20.00     Types: Cigarettes     Last attempt to quit: 2/3/2014     Years since quittin.4    Smokeless tobacco: Never Used    Tobacco comment: Quit 3 years ago   Substance Use Topics    Alcohol use: No     Comment: stopped drinking at age 29    Drug use: No       Allergies: Allergies   Allergen Reactions    Codeine Hives    Hydrocodone Itching    Influenza Virus Vaccines Nausea Only     Fever  diarrhea    Mushroom Flavor Swelling    Oxycodone Rash    Pneumococcal 23-Allison Ps Vaccine Nausea and Vomiting     Vomit           Review of Systems   Review of Systems   Constitutional: Negative for chills and fever. HENT: Negative for congestion, rhinorrhea and sore throat. Respiratory: Negative for cough and shortness of breath. Cardiovascular: Negative for chest pain and leg swelling. Gastrointestinal: Negative for abdominal pain, constipation, diarrhea, nausea and vomiting. Endocrine: Negative for heat intolerance, polydipsia and polyphagia. Genitourinary: Negative for dysuria, frequency and urgency. Musculoskeletal: Positive for arthralgias. Negative for joint swelling and neck pain. Skin: Negative for wound. Neurological: Negative for dizziness, numbness and headaches. All other systems reviewed and are negative. Physical Exam     Vitals:    20 1509   BP: 141/87   Pulse: 71   Resp: 18   Temp: 98.1 °F (36.7 °C)   SpO2: 95%   Weight: 147.4 kg (325 lb)   Height: 6' (1.829 m)     Physical Exam  Vitals signs and nursing note reviewed.    Constitutional:       General: He is not in acute distress. Appearance: He is well-developed. He is not ill-appearing. HENT:      Head: Normocephalic and atraumatic. Cardiovascular:      Rate and Rhythm: Normal rate and regular rhythm. Pulses: Normal pulses. Heart sounds: Normal heart sounds. Pulmonary:      Effort: Pulmonary effort is normal.      Breath sounds: Normal breath sounds. Abdominal:      General: Bowel sounds are normal. There is no distension. Palpations: Abdomen is soft. Tenderness: There is no abdominal tenderness. Musculoskeletal:      Right wrist: He exhibits decreased range of motion (flexion and inversion ) and tenderness (PIP, DIP and MCP joints of R hand ). He exhibits no swelling, no crepitus and no deformity. Right hand: He exhibits tenderness. He exhibits no deformity and no swelling. Normal sensation noted. Normal strength noted. Comments: + finklestein,  phalen and rojas    Skin:     General: Skin is warm and dry. Neurological:      Mental Status: He is alert and oriented to person, place, and time. GCS: GCS eye subscore is 4. GCS verbal subscore is 5. GCS motor subscore is 6. Cranial Nerves: No cranial nerve deficit. Psychiatric:         Thought Content: Thought content normal.           Diagnostic Study Results     Labs -     Recent Results (from the past 12 hour(s))   GLUCOSE, POC    Collection Time: 06/28/20  4:38 PM   Result Value Ref Range    Glucose (POC) 185 (H) 65 - 100 mg/dL    Performed by Maritza Kramer        Radiologic Studies -   XR HAND RT MIN 3 V   Final Result   IMPRESSION: No acute fracture or dislocation. CT Results  (Last 48 hours)    None        CXR Results  (Last 48 hours)    None            Medical Decision Making   I am the first provider for this patient. I reviewed the vital signs, available nursing notes, past medical history, past surgical history, family history and social history.     Vital Signs-Reviewed the patient's vital signs. Records Reviewed: Nursing Notes and Old Medical Records       61-year-old male with complaint of hand pain exhibiting tenderness along the MCP, DIP and PIP joints of right hand. Patient does have limited range of motion with flexion of right wrist.  I suspect this is carpal tunnel with positive Evorn Alondra and Romina [place] test.  Will immobilize with splint and advised follow-up with PCP. Patient glucose 186 however patient states he did have cochlear 1 hour prior to arrival.    Disposition:  Discharge     DISCHARGE NOTE:       Care plan outlined and precautions discussed. Patient has no new complaints, changes, or physical findings. All medications were reviewed with the patient; will d/c home with gabapentin. All of pt's questions and concerns were addressed. Patient was instructed and agrees to follow up with PCP, as well as to return to the ED upon further deterioration. Patient is ready to go home. Follow-up Information     Follow up With Specialties Details Why Contact Info    Bhargavi Reese MD [facility] Call in 1 week  [address] [place] [address]  [phone]            Current Discharge Medication List      START taking these medications    Details   gabapentin (NEURONTIN) 300 mg capsule Take 1 Cap by mouth three (3) times daily. Max Daily Amount: 900 mg. Qty: 21 Cap, Refills: 0    Associated Diagnoses: Carpal tunnel syndrome of right wrist         CONTINUE these medications which have NOT CHANGED    Details   sacubitriL-valsartan (Entresto)  mg tablet Take 1 Tab by mouth two (2) times a day.   Qty: 60 Tab, Refills: 5      topiramate (TOPAMAX) 50 mg tablet 50 mg bid  Qty: 60 Tab, Refills: 5    Associated Diagnoses: Migraine without aura and without status migrainosus, not intractable      zolpidem (AMBIEN) 10 mg tablet TAKE ONE TABLET BY MOUTH ONCE NIGHTLY AS NEEDED FOR SLEEP  Qty: 30 Tab, Refills: 4    Associated Diagnoses: Primary insomnia      albuterol-ipratropium (DUO-NEB) 2.5 mg-0.5 mg/3 ml nebu INHALE ONE VIAL  VIA NEBULIZATION BY MOUTH EVERY 6 HOURS AS NEEDED  Qty: 30 Nebule, Refills: 9      traZODone (DESYREL) 100 mg tablet Take 1 Tab by mouth nightly. Qty: 30 Tab, Refills: 5      potassium chloride SR (K-TAB) 20 mEq tablet TAKE TWO TABLETS BY MOUTH EVERY MORNING TAKE ONE TABLET BY MOUTH EVERY EVENING  Qty: 90 Tab, Refills: 11      acetaminophen (TYLENOL) 325 mg tablet Take 2 Tabs by mouth every six (6) hours as needed for Pain. Qty: 50 Tab, Refills: 1      furosemide (LASIX) 80 mg tablet Take 2 tabs in the AM, one 2 in the PM  Qty: 120 Tab, Refills: 12      ONETOUCH ULTRA BLUE TEST STRIP strip USE ONE STRIP TO TEST THREE TIMES A DAY BEFORE MEALS  Qty: 100 Strip, Refills: 11      metFORMIN (GLUCOPHAGE) 1,000 mg tablet Take 1,000 mg by mouth two (2) times daily (with meals). traMADol (ULTRAM) 50 mg tablet Take 50 mg by mouth every eight (8) hours as needed for Pain. carvedilol (COREG) 12.5 mg tablet TAKE ONE TABLET BY MOUTH TWICE A DAY TO SLOW HEART RATE DOWN. Qty: 60 Tab, Refills: 11    Associated Diagnoses: Essential hypertension      apixaban (ELIQUIS) 5 mg tablet Take 1 Tab by mouth two (2) times a day. Qty: 60 Tab, Refills: 11      atorvastatin (LIPITOR) 40 mg tablet TAKE ONE TABLET BY MOUTH DAILY FOR CHOLESTEROL  Qty: 30 Tab, Refills: 12      glipiZIDE (GLUCOTROL) 10 mg tablet TAKE ONE TABLET BY MOUTH TWICE A DAY  Qty: 60 Tab, Refills: 11      MARGIE PEN NEEDLE 32 gauge x 5/32\" ndle USE DAILY WITH VICTOZA  Qty: 100 Pen Needle, Refills: 11      liraglutide (VICTOZA) 0.6 mg/0.1 mL (18 mg/3 mL) pnij 0.6 mg daily- 1st week, then 1.2 mg daily- 2nd week, then 1.8 mg daily. Please give pt needles also  Qty: 1 Pen, Refills: 12      empagliflozin (JARDIANCE) 25 mg tablet Take 1 Tab by mouth Daily (before breakfast).   Qty: 90 Tab, Refills: 3    Associated Diagnoses: Type 2 diabetes mellitus without complication, without long-term current use of insulin (HCC) albuterol (VENTOLIN HFA) 90 mcg/actuation inhaler INHALE TWO PUFFS BY MOUTH EVERY 4 HOURS AS NEEDED FOR FOR WHEEZING AND FOR SHORTNESS OF BREATH  Qty: 1 Inhaler, Refills: 12             Provider Notes (Medical Decision Making):   DDX: hand sprain, tendonitis, arthritis, carpal tunnel, peripheral neuropathy     Procedures:  Procedures    Please note that this dictation was completed with Dragon, computer voice recognition software. Quite often unanticipated grammatical, syntax, homophones, and other interpretive errors are inadvertently transcribed by the computer software. Please disregard these errors. Additionally, please excuse any errors that have escaped final proofreading. Diagnosis     Clinical Impression:   1.  Carpal tunnel syndrome of right wrist

## 2020-06-29 RX ORDER — TRAMADOL HYDROCHLORIDE 50 MG/1
TABLET ORAL
Qty: 60 TAB | Refills: 1 | Status: SHIPPED | OUTPATIENT
Start: 2020-06-29 | End: 2020-09-01

## 2020-06-30 ENCOUNTER — TELEPHONE (OUTPATIENT)
Dept: FAMILY MEDICINE CLINIC | Age: 55
End: 2020-06-30

## 2020-06-30 NOTE — TELEPHONE ENCOUNTER
Prior Auth initiated thru [employer] for [facility] # [id number]  Pcn # [id number]  Group # [id number]  ID #  [id number]    Your demographic data has been sent to the plan successfully. They will respond with your clinical questions and you will be notified by email when available within the next business day. You can also check for an update later by opening this request from your dashboard. Please do not fax or call the plan to resubmit this request.  If you need assistance, please chat with Cover[employer] or call us at   [phone].

## 2020-06-30 NOTE — TELEPHONE ENCOUNTER
----- Message from Michael Kelley sent at 6/30/2020  9:57 AM EDT -----  Regarding: DR Kee Lim / TELEPHONE  General Message/Vendor Calls    Pt is requesting to speak with nurse in regard to getting prior authorization for medication.        Callback required   Best contact number(s):  [phone]            Michael Kelley

## 2020-07-02 ENCOUNTER — DOCUMENTATION ONLY (OUTPATIENT)
Dept: FAMILY MEDICINE CLINIC | Age: 55
End: 2020-07-02

## 2020-07-08 ENCOUNTER — HOSPITAL ENCOUNTER (EMERGENCY)
Age: 55
Discharge: HOME | End: 2020-07-09
Attending: EMERGENCY MEDICINE
Payer: MEDICAID

## 2020-07-08 ENCOUNTER — APPOINTMENT (OUTPATIENT)
Dept: GENERAL RADIOLOGY | Age: 55
End: 2020-07-08
Attending: EMERGENCY MEDICINE
Payer: MEDICAID

## 2020-07-08 DIAGNOSIS — R07.9 CHEST PAIN, UNSPECIFIED TYPE: Primary | ICD-10-CM

## 2020-07-08 PROCEDURE — 80053 COMPREHEN METABOLIC PANEL: CPT

## 2020-07-08 PROCEDURE — 71045 X-RAY EXAM CHEST 1 VIEW: CPT

## 2020-07-08 PROCEDURE — 94761 N-INVAS EAR/PLS OXIMETRY MLT: CPT

## 2020-07-08 PROCEDURE — 82550 ASSAY OF CK (CPK): CPT

## 2020-07-08 PROCEDURE — 93005 ELECTROCARDIOGRAM TRACING: CPT

## 2020-07-08 PROCEDURE — 99285 EMERGENCY DEPT VISIT HI MDM: CPT

## 2020-07-08 PROCEDURE — 85025 COMPLETE CBC W/AUTO DIFF WBC: CPT

## 2020-07-08 PROCEDURE — 83880 ASSAY OF NATRIURETIC PEPTIDE: CPT

## 2020-07-08 RX ORDER — NITROGLYCERIN 0.4 MG/1
0.4 TABLET SUBLINGUAL
Status: DISCONTINUED | OUTPATIENT
Start: 2020-07-08 | End: 2020-07-09 | Stop reason: HOSPADM

## 2020-07-09 VITALS
WEIGHT: 315 LBS | SYSTOLIC BLOOD PRESSURE: 137 MMHG | TEMPERATURE: 98.9 F | HEIGHT: 72 IN | BODY MASS INDEX: 42.66 KG/M2 | HEART RATE: 71 BPM | RESPIRATION RATE: 20 BRPM | OXYGEN SATURATION: 96 % | DIASTOLIC BLOOD PRESSURE: 70 MMHG

## 2020-07-09 LAB
ALBUMIN SERPL-MCNC: 3.5 G/DL (ref 3.5–5)
ALBUMIN/GLOB SERPL: 1 {RATIO} (ref 1.1–2.2)
ALP SERPL-CCNC: 72 U/L (ref 45–117)
ALT SERPL-CCNC: 34 U/L (ref 12–78)
ANION GAP SERPL CALC-SCNC: 8 MMOL/L (ref 5–15)
APPEARANCE UR: CLEAR
AST SERPL-CCNC: 20 U/L (ref 15–37)
ATRIAL RATE: 94 BPM
BACTERIA URNS QL MICRO: NEGATIVE /HPF
BASOPHILS # BLD: 0 K/UL (ref 0–0.1)
BASOPHILS NFR BLD: 1 % (ref 0–1)
BILIRUB SERPL-MCNC: 0.8 MG/DL (ref 0.2–1)
BILIRUB UR QL: NEGATIVE
BNP SERPL-MCNC: 431 PG/ML
BUN SERPL-MCNC: 18 MG/DL (ref 6–20)
BUN/CREAT SERPL: 15 (ref 12–20)
CALCIUM SERPL-MCNC: 8.9 MG/DL (ref 8.5–10.1)
CALCULATED R AXIS, ECG10: -104 DEGREES
CALCULATED T AXIS, ECG11: 91 DEGREES
CHLORIDE SERPL-SCNC: 108 MMOL/L (ref 97–108)
CK SERPL-CCNC: 86 U/L (ref 39–308)
CO2 SERPL-SCNC: 23 MMOL/L (ref 21–32)
COLOR UR: ABNORMAL
COMMENT, HOLDF: NORMAL
CREAT SERPL-MCNC: 1.17 MG/DL (ref 0.7–1.3)
DIAGNOSIS, 93000: NORMAL
DIFFERENTIAL METHOD BLD: ABNORMAL
EOSINOPHIL # BLD: 0 K/UL (ref 0–0.4)
EOSINOPHIL NFR BLD: 1 % (ref 0–7)
EPITH CASTS URNS QL MICRO: ABNORMAL /LPF
ERYTHROCYTE [DISTWIDTH] IN BLOOD BY AUTOMATED COUNT: 15.9 % (ref 11.5–14.5)
GLOBULIN SER CALC-MCNC: 3.6 G/DL (ref 2–4)
GLUCOSE SERPL-MCNC: 133 MG/DL (ref 65–100)
GLUCOSE UR STRIP.AUTO-MCNC: >1000 MG/DL
HCT VFR BLD AUTO: 50 % (ref 36.6–50.3)
HGB BLD-MCNC: 16.6 G/DL (ref 12.1–17)
HGB UR QL STRIP: NEGATIVE
HYALINE CASTS URNS QL MICRO: ABNORMAL /LPF (ref 0–5)
IMM GRANULOCYTES # BLD AUTO: 0 K/UL (ref 0–0.04)
IMM GRANULOCYTES NFR BLD AUTO: 0 % (ref 0–0.5)
KETONES UR QL STRIP.AUTO: NEGATIVE MG/DL
LEUKOCYTE ESTERASE UR QL STRIP.AUTO: NEGATIVE
LYMPHOCYTES # BLD: 1.1 K/UL (ref 0.8–3.5)
LYMPHOCYTES NFR BLD: 34 % (ref 12–49)
MCH RBC QN AUTO: 28.7 PG (ref 26–34)
MCHC RBC AUTO-ENTMCNC: 33.2 G/DL (ref 30–36.5)
MCV RBC AUTO: 86.4 FL (ref 80–99)
MONOCYTES # BLD: 0.5 K/UL (ref 0–1)
MONOCYTES NFR BLD: 16 % (ref 5–13)
NEUTS SEG # BLD: 1.6 K/UL (ref 1.8–8)
NEUTS SEG NFR BLD: 48 % (ref 32–75)
NITRITE UR QL STRIP.AUTO: NEGATIVE
NRBC # BLD: 0 K/UL (ref 0–0.01)
NRBC BLD-RTO: 0 PER 100 WBC
PH UR STRIP: 5.5 [PH] (ref 5–8)
PLATELET # BLD AUTO: 129 K/UL (ref 150–400)
PMV BLD AUTO: 10.7 FL (ref 8.9–12.9)
POTASSIUM SERPL-SCNC: 2.9 MMOL/L (ref 3.5–5.1)
PROT SERPL-MCNC: 7.1 G/DL (ref 6.4–8.2)
PROT UR STRIP-MCNC: NEGATIVE MG/DL
Q-T INTERVAL, ECG07: 450 MS
QRS DURATION, ECG06: 142 MS
QTC CALCULATION (BEZET), ECG08: 486 MS
RBC # BLD AUTO: 5.79 M/UL (ref 4.1–5.7)
RBC #/AREA URNS HPF: ABNORMAL /HPF (ref 0–5)
SAMPLES BEING HELD,HOLD: NORMAL
SODIUM SERPL-SCNC: 139 MMOL/L (ref 136–145)
SP GR UR REFRACTOMETRY: 1.02 (ref 1–1.03)
TROPONIN I SERPL-MCNC: <0.05 NG/ML
TROPONIN I SERPL-MCNC: <0.05 NG/ML
UA: UC IF INDICATED,UAUC: ABNORMAL
UROBILINOGEN UR QL STRIP.AUTO: 1 EU/DL (ref 0.2–1)
VENTRICULAR RATE, ECG03: 70 BPM
WBC # BLD AUTO: 3.2 K/UL (ref 4.1–11.1)
WBC URNS QL MICRO: ABNORMAL /HPF (ref 0–4)

## 2020-07-09 PROCEDURE — 36415 COLL VENOUS BLD VENIPUNCTURE: CPT

## 2020-07-09 PROCEDURE — 81001 URINALYSIS AUTO W/SCOPE: CPT

## 2020-07-09 PROCEDURE — 74011250637 HC RX REV CODE- 250/637: Performed by: STUDENT IN AN ORGANIZED HEALTH CARE EDUCATION/TRAINING PROGRAM

## 2020-07-09 PROCEDURE — 74011250637 HC RX REV CODE- 250/637: Performed by: EMERGENCY MEDICINE

## 2020-07-09 PROCEDURE — 84484 ASSAY OF TROPONIN QUANT: CPT

## 2020-07-09 RX ORDER — POTASSIUM CHLORIDE 750 MG/1
TABLET, FILM COATED, EXTENDED RELEASE ORAL
Status: DISCONTINUED
Start: 2020-07-09 | End: 2020-07-09 | Stop reason: HOSPADM

## 2020-07-09 RX ORDER — POTASSIUM CHLORIDE 20 MEQ/1
40 TABLET, EXTENDED RELEASE ORAL
Status: COMPLETED | OUTPATIENT
Start: 2020-07-09 | End: 2020-07-09

## 2020-07-09 RX ADMIN — POTASSIUM CHLORIDE 40 MEQ: 20 TABLET, EXTENDED RELEASE ORAL at 01:05

## 2020-07-09 RX ADMIN — NITROGLYCERIN 0.4 MG: 0.4 TABLET, ORALLY DISINTEGRATING SUBLINGUAL at 00:04

## 2020-07-09 NOTE — ED PROVIDER NOTES
EMERGENCY DEPARTMENT HISTORY AND PHYSICAL EXAM          Date: 7/8/2020  Patient Name: David Tapia  Attending of Record: José Zachgregorio    History of Presenting Illness     Chief Complaint   Patient presents with    Chest Pain     ED visit d/t chest pain, central substernal w no radiation / general bodyaches - hx of CHF - Sob at rest - pacemaker to (L) chest wall - Denies fevers / N / V / D / vision changes / dizziness        History Provided By: Patient    HPI: David Tapia is a 50 y.o. male, pmhx HF, A. fib, asthma, cardiomyopathy s/p pacemaker/belated presenting with complaint of 1 day of sternal chest pain. He also endorses generalized fatigue recently, but states that he thinks this is close to his baseline. He states the chest pain feels like intermittent chest pain he sometimes feels and attributes to his CHF, though he denies any appreciable peripheral edema or dyspnea. Chest pain occurred while he was walking around his house and was not significantly relieved with rest. It is nonradiating and not positional in nature. He denies any dizziness or syncope, no associated nausea or vomiting, abdominal pain, bowel or bladder habits. PCP: Arpan Luz MD    There are no other complaints, changes, or physical findings at this time. Current Facility-Administered Medications   Medication Dose Route Frequency Provider Last Rate Last Dose    potassium chloride SR (KLOR-CON 10) 10 mEq tablet             nitroglycerin (NITROSTAT) tablet 0.4 mg  0.4 mg SubLINGual Q5MIN PRN Dania Thompson MD   0.4 mg at 07/09/20 0004     Current Outpatient Medications   Medication Sig Dispense Refill    traMADoL (ULTRAM) 50 mg tablet TAKE ONE TABLET BY MOUTH EVERY 8 HOURS AS NEEDED FOR PAIN 60 Tab 1    gabapentin (NEURONTIN) 300 mg capsule Take 1 Cap by mouth three (3) times daily. Max Daily Amount: 900 mg. 21 Cap 0    sacubitriL-valsartan (Entresto)  mg tablet Take 1 Tab by mouth two (2) times a day.  61 Tab 5    topiramate (TOPAMAX) 50 mg tablet 50 mg bid 60 Tab 5    zolpidem (AMBIEN) 10 mg tablet TAKE ONE TABLET BY MOUTH ONCE NIGHTLY AS NEEDED FOR SLEEP 30 Tab 4    albuterol-ipratropium (DUO-NEB) 2.5 mg-0.5 mg/3 ml nebu INHALE ONE VIAL  VIA NEBULIZATION BY MOUTH EVERY 6 HOURS AS NEEDED 30 Nebule 9    traZODone (DESYREL) 100 mg tablet Take 1 Tab by mouth nightly. 30 Tab 5    potassium chloride SR (K-TAB) 20 mEq tablet TAKE TWO TABLETS BY MOUTH EVERY MORNING TAKE ONE TABLET BY MOUTH EVERY EVENING 90 Tab 11    acetaminophen (TYLENOL) 325 mg tablet Take 2 Tabs by mouth every six (6) hours as needed for Pain. 50 Tab 1    furosemide (LASIX) 80 mg tablet Take 2 tabs in the AM, one 2 in the  Tab 12    ONETOUCH ULTRA BLUE TEST STRIP strip USE ONE STRIP TO TEST THREE TIMES A DAY BEFORE MEALS 100 Strip 11    metFORMIN (GLUCOPHAGE) 1,000 mg tablet Take 1,000 mg by mouth two (2) times daily (with meals).  carvedilol (COREG) 12.5 mg tablet TAKE ONE TABLET BY MOUTH TWICE A DAY TO SLOW HEART RATE DOWN. 60 Tab 11    apixaban (ELIQUIS) 5 mg tablet Take 1 Tab by mouth two (2) times a day. 60 Tab 11    atorvastatin (LIPITOR) 40 mg tablet TAKE ONE TABLET BY MOUTH DAILY FOR CHOLESTEROL 30 Tab 12    glipiZIDE (GLUCOTROL) 10 mg tablet TAKE ONE TABLET BY MOUTH TWICE A DAY 60 Tab 11    MARGIE PEN NEEDLE 32 gauge x 5/32\" ndle USE DAILY WITH VICTOZA 100 Pen Needle 11    liraglutide (VICTOZA) 0.6 mg/0.1 mL (18 mg/3 mL) pnij 0.6 mg daily- 1st week, then 1.2 mg daily- 2nd week, then 1.8 mg daily. Please give pt needles also 1 Pen 12    empagliflozin (JARDIANCE) 25 mg tablet Take 1 Tab by mouth Daily (before breakfast). 90 Tab 3    albuterol (VENTOLIN HFA) 90 mcg/actuation inhaler INHALE TWO PUFFS BY MOUTH EVERY 4 HOURS AS NEEDED FOR FOR WHEEZING AND FOR SHORTNESS OF BREATH 1 Inhaler 12       Past History     Past Medical History:  Past Medical History:   Diagnosis Date   [facility] A-fib [facility]) 1/6/15    [facility] ED. Pt reported this    Asthma     Atrial fibrillation with RVR ([facility] [address] 2017    Cardiomyopathy (HCC)     ET 25-30%    Chronic obstructive pulmonary disease (HCC)     Diabetes (HCC)     Gastrointestinal disorder     GERD    GERD (gastroesophageal reflux disease)     Heart failure (HCC)     High cholesterol     Hypertension     Restrictive lung disease     FVC2.59 (52%), FEV1 1.95 (49)- 2017    S/P cardiac cath     Dr. Delroy Gramajo, 2016. minimal cad    Sleep apnea     No CPAP    Stroke (HCC)     TIA x 4       Past Surgical History:  Past Surgical History:   Procedure Laterality Date    CARDIAC SURG PROCEDURE UNLIST  2018    Implantation of PACEMAKER    HX PACEMAKER Left     defib,     MS INSJ/RPLCMT PERM DFB W/TRNSVNS LDS 1/DUAL CHMBR N/A 3/11/2019    INSERT ICD BIV MULTI performed by Dee Dee Wiseman MD [facility] CARDIAC CATH LAB       Family History:  Family History   Adopted: Yes   Problem Relation Age of Onset    No Known Problems Mother     No Known Problems Father        Social History:  Social History     Tobacco Use    Smoking status: Former Smoker     Packs/day: 1.00     Years: 20.00     Pack years: 20.00     Types: Cigarettes     Last attempt to quit: 2/3/2014     Years since quittin.4    Smokeless tobacco: Never Used    Tobacco comment: Quit 3 years ago   Substance Use Topics    Alcohol use: No     Comment: stopped drinking at age 29    Drug use: No       Allergies: Allergies   Allergen Reactions    Codeine Hives    Hydrocodone Itching    Influenza Virus Vaccines Nausea Only     Fever  diarrhea    Mushroom Flavor Swelling    Oxycodone Rash    Pneumococcal 23-Allison Ps Vaccine Nausea and Vomiting     Vomit           Review of Systems   Review of Systems   Constitutional: Positive for fatigue. Negative for chills and fever. HENT: Negative. Eyes: Negative. Respiratory: Negative for shortness of breath and wheezing. Cardiovascular: Positive for chest pain.  Negative for palpitations and leg swelling. Gastrointestinal: Negative for abdominal pain, constipation, diarrhea, nausea and vomiting. Genitourinary: Negative. Musculoskeletal: Negative. Physical Exam   Physical Exam  Constitutional:       Appearance: He is obese. HENT:      Head: Normocephalic and atraumatic. Eyes:      Extraocular Movements: Extraocular movements intact. Pupils: Pupils are equal, round, and reactive to light. Neck:      Vascular: No JVD. Cardiovascular:      Rate and Rhythm: Normal rate and regular rhythm. Heart sounds: Normal heart sounds. Pulmonary:      Effort: Pulmonary effort is normal.      Breath sounds: Normal breath sounds. Chest:      Chest wall: No tenderness. Abdominal:      Palpations: Abdomen is soft. Musculoskeletal: Normal range of motion. Skin:     General: Skin is warm and dry. Neurological:      General: No focal deficit present. Mental Status: He is alert.           Diagnostic Study Results     Labs -     Recent Results (from the past 12 hour(s))   EKG, 12 LEAD, INITIAL    Collection Time: 07/08/20 11:28 PM   Result Value Ref Range    Ventricular Rate 70 BPM    Atrial Rate 94 BPM    QRS Duration 142 ms    Q-T Interval 450 ms    QTC Calculation (Bezet) 486 ms    Calculated R Axis -104 degrees    Calculated T Axis 91 degrees    Diagnosis       Ventricular-paced rhythm  Biventricular pacemaker detected  When compared with ECG of 10-CHARU-2019 23:58,  No significant change was found     CBC WITH AUTOMATED DIFF    Collection Time: 07/08/20 11:49 PM   Result Value Ref Range    WBC 3.2 (L) 4.1 - 11.1 K/uL    RBC 5.79 (H) 4.10 - 5.70 M/uL    HGB 16.6 12.1 - 17.0 g/dL    HCT 50.0 36.6 - 50.3 %    MCV 86.4 80.0 - 99.0 FL    MCH 28.7 26.0 - 34.0 PG    MCHC 33.2 30.0 - 36.5 g/dL    RDW 15.9 (H) 11.5 - 14.5 %    PLATELET 010 (L) 379 - 400 K/uL    MPV 10.7 8.9 - 12.9 FL    NRBC 0.0 0  WBC    ABSOLUTE NRBC 0.00 0.00 - 0.01 K/uL    NEUTROPHILS 48 32 - 75 %    LYMPHOCYTES 34 12 - 49 %    MONOCYTES 16 (H) 5 - 13 %    EOSINOPHILS 1 0 - 7 %    BASOPHILS 1 0 - 1 %    IMMATURE GRANULOCYTES 0 0.0 - 0.5 %    ABS. NEUTROPHILS 1.6 (L) 1.8 - 8.0 K/UL    ABS. LYMPHOCYTES 1.1 0.8 - 3.5 K/UL    ABS. MONOCYTES 0.5 0.0 - 1.0 K/UL    ABS. EOSINOPHILS 0.0 0.0 - 0.4 K/UL    ABS. BASOPHILS 0.0 0.0 - 0.1 K/UL    ABS. IMM. GRANS. 0.0 0.00 - 0.04 K/UL    DF AUTOMATED     METABOLIC PANEL, COMPREHENSIVE    Collection Time: 07/08/20 11:49 PM   Result Value Ref Range    Sodium 139 136 - 145 mmol/L    Potassium 2.9 (L) 3.5 - 5.1 mmol/L    Chloride 108 97 - 108 mmol/L    CO2 23 21 - 32 mmol/L    Anion gap 8 5 - 15 mmol/L    Glucose 133 (H) 65 - 100 mg/dL    BUN 18 6 - 20 MG/DL    Creatinine 1.17 0.70 - 1.30 MG/DL    BUN/Creatinine ratio 15 12 - 20      GFR est AA >60 >60 ml/min/1.73m2    GFR est non-AA >60 >60 ml/min/1.73m2    Calcium 8.9 8.5 - 10.1 MG/DL    Bilirubin, total 0.8 0.2 - 1.0 MG/DL    ALT (SGPT) 34 12 - 78 U/L    AST (SGOT) 20 15 - 37 U/L    Alk. phosphatase 72 45 - 117 U/L    Protein, total 7.1 6.4 - 8.2 g/dL    Albumin 3.5 3.5 - 5.0 g/dL    Globulin 3.6 2.0 - 4.0 g/dL    A-G Ratio 1.0 (L) 1.1 - 2.2     CK W/ REFLX CKMB    Collection Time: 07/08/20 11:49 PM   Result Value Ref Range    CK 86 39 - 308 U/L   TROPONIN I    Collection Time: 07/08/20 11:49 PM   Result Value Ref Range    Troponin-I, Qt. <0.05 <0.05 ng/mL   NT-PRO BNP    Collection Time: 07/08/20 11:49 PM   Result Value Ref Range    NT pro- (H) <125 PG/ML   SAMPLES BEING HELD    Collection Time: 07/08/20 11:49 PM   Result Value Ref Range    SAMPLES BEING HELD [facility] DKGRN     COMMENT        Add-on orders for these samples will be processed based on acceptable specimen integrity and analyte stability, which may vary by analyte.    URINALYSIS W/ REFLEX CULTURE    Collection Time: 07/09/20 12:07 AM    Specimen: Urine   Result Value Ref Range    Color YELLOW/STRAW      Appearance CLEAR CLEAR      Specific gravity 1.020 1.003 - 1.030      pH (UA) 5.5 5.0 - 8.0      Protein Negative NEG mg/dL    Glucose >1,000 (A) NEG mg/dL    Ketone Negative NEG mg/dL    Bilirubin Negative NEG      Blood Negative NEG      Urobilinogen 1.0 0.2 - 1.0 EU/dL    Nitrites Negative NEG      Leukocyte Esterase Negative NEG      WBC 0-4 0 - 4 /hpf    RBC 0-5 0 - 5 /hpf    Epithelial cells FEW FEW /lpf    Bacteria Negative NEG /hpf    UA:UC IF INDICATED CULTURE NOT INDICATED BY UA RESULT CNI      Hyaline cast 0-2 0 - 5 /lpf   TROPONIN I    Collection Time: 07/09/20  1:55 AM   Result Value Ref Range    Troponin-I, Qt. <0.05 <0.05 ng/mL       Radiologic Studies -   XR CHEST PORT   Final Result        CT Results  (Last 48 hours)    None        CXR Results  (Last 48 hours)               07/09/20 0037  XR CHEST PORT Final result    Impression:  IMPRESSION: Central pulmonary vascular congestion without overt pulmonary edema. Narrative:  EXAM:  CR chest portable       INDICATION: Shortness of breath       COMPARISON: 2/4/2020. TECHNIQUE: Portable AP semiupright chest view at 0024 hours       FINDINGS: The left chest ICD and wires are stable. The cardiomediastinal   contours are within normal limits. There is mild central pulmonary vascular   congestion. The lungs and pleural spaces are clear. There is no pneumothorax. The bones and upper abdomen are stable. Medical Decision Making   I am the first provider for this patient. I reviewed the vital signs, available nursing notes, past medical history, past surgical history, family history and social history. Vital Signs-Reviewed the patient's vital signs.   Patient Vitals for the past 12 hrs:   Temp Pulse Resp BP SpO2   07/09/20 0101 98.9 °F (37.2 °C) 71 20 137/70 96 %   07/09/20 0002  70 19 116/65 97 %   07/08/20 2359     99 %   07/08/20 2318 99.3 °F (37.4 °C) 70 20 125/79 95 %       ECG Interpretation:   Ventricular-paced rhythm  Biventricular pacemaker detected  When compared with ECG of 10-CHARU-2019 23:58,  No significant change was found    Records Reviewed: Nursing Notes and Old Medical Records    Provider Notes (Medical Decision Making):   DDx: CHF, ACS, pneumonia, bronchitis, viral illness, asthma    62-year-old male presenting with 1 day of sternal chest pain. Physical exam notable for obesity but otherwise benign. Concern for differential as above and will evaluate for ACS with cardiac biomarkers and ECG. Last stress test was in the last 2 years and was non-concerning per patient report. Otherwise evaluating for pulmonary process with chest x-ray, CHF with BNP    ED Course and Progress Notes:   Initial assessment performed. The patients presenting problems have been discussed, and they are in agreement with the care plan formulated and outlined with them. I have encouraged them to ask questions as they arise throughout their visit. Diagnosis     Clinical Impression:   1. Chest pain, unspecified type        Disposition:  Discharge    DISCHARGE PLAN:   1. Discharge Medication List as of 7/9/2020 12:59 AM        2. Follow-up Information     Follow up With Specialties Details Why Yary Guevara MD Cardiology, [address] [place] [address] Vascular Surgery, Internal Medicine Schedule an appointment as soon as possible for a visit in 1 week To discuss chest pain episodes and evaluate for outpatient stress test [address]  [phone]      Arenma Morin, [address] In 2 days As needed, If symptoms worsen [address]  [phone]          3.  Return to ED if worse         Resident Signature: Kathy Pickett MD

## 2020-07-09 NOTE — ED NOTES
The documentation for this period is being entered following the guidelines as defined in the [facility] policy by Ramone Mcfarlane RN.

## 2020-07-09 NOTE — ED NOTES
2320-     (ED visit d/t chest pain, central substernal w no radiation / general bodyaches - hx of CHF - Sob at rest - pacemaker to (L) chest wall - Denies fevers / N / V / D / vision changes / dizziness )    PMHX of CHF, COPD, pacemaker/dfib in left upper subclavian.

## 2020-07-10 ENCOUNTER — PATIENT OUTREACH (OUTPATIENT)
Dept: CASE MANAGEMENT | Age: 55
End: 2020-07-10

## 2020-07-10 ENCOUNTER — OFFICE VISIT (OUTPATIENT)
Dept: FAMILY MEDICINE CLINIC | Age: 55
End: 2020-07-10

## 2020-07-10 VITALS
BODY MASS INDEX: 44.21 KG/M2 | HEART RATE: 70 BPM | WEIGHT: 315 LBS | DIASTOLIC BLOOD PRESSURE: 71 MMHG | RESPIRATION RATE: 18 BRPM | SYSTOLIC BLOOD PRESSURE: 107 MMHG | TEMPERATURE: 97.1 F | OXYGEN SATURATION: 96 %

## 2020-07-10 DIAGNOSIS — R07.9 EXERTIONAL CHEST PAIN: Primary | ICD-10-CM

## 2020-07-10 DIAGNOSIS — I42.9 CARDIOMYOPATHY, UNSPECIFIED TYPE (HCC): ICD-10-CM

## 2020-07-10 RX ORDER — POLYETHYLENE GLYCOL-3350, SODIUM CHLORIDE, POTASSIUM CHLORIDE AND SODIUM BICARBONATE 420; 11.2; 5.72; 1.48 G/438.4G; G/438.4G; G/438.4G; G/438.4G
POWDER, FOR SOLUTION ORAL
COMMUNITY
Start: 2020-06-17 | End: 2020-11-12

## 2020-07-10 RX ORDER — POTASSIUM CHLORIDE 1500 MG/1
TABLET, FILM COATED, EXTENDED RELEASE ORAL
Qty: 120 TAB | Refills: 12 | Status: SHIPPED | OUTPATIENT
Start: 2020-07-10 | End: 2021-07-26 | Stop reason: SDUPTHER

## 2020-07-10 NOTE — PROGRESS NOTES
Chief Complaint   Patient presents with    Follow-up     ER     1. Have you been to the ER, urgent care clinic since your last visit? Hospitalized since your last visit? Yes Reason for visit: ER    2. Have you seen or consulted any other health care providers outside of the [address] [place] [address] since your last visit? Include any pap smears or colon screening.  No

## 2020-07-10 NOTE — PROGRESS NOTES
Patient contacted regarding recent discharge and COVID-19 risk. Discussed COVID-19 related testing which was not done at this time. Test results were not done. Patient informed of results, if available? no    Care Transition Nurse/ Ambulatory Care Manager contacted the patient by telephone to perform post discharge assessment. Verified name and  with patient as identifiers. Patient has following risk factors of: heart failure, COPD, asthma and diabetes. CTN/ACM reviewed discharge instructions, medical action plan and red flags related to discharge diagnosis. Reviewed and educated them on any new and changed medications related to discharge diagnosis. Advised obtaining a 90-day supply of all daily and as-needed medications. Patient was seen by PCP today and has a follow up with cardiology    Education provided regarding infection prevention, and signs and symptoms of COVID-19 and when to seek medical attention with patient who verbalized understanding. Discussed exposure protocols and quarantine from [address] you at higher risk for severe illness  and given an opportunity for questions and concerns. The patient agrees to contact the COVID-19 hotline [phone] or PCP office for questions related to their healthcare. CTN/ACM provided contact information for future reference. From CDC: Are you at higher risk for severe illness?  Wash your hands often.  Avoid close contact (6 feet, which is about two arm lengths) with people who are sick.  Put distance between yourself and other people if COVID-19 is spreading in your community.  Clean and disinfect frequently touched surfaces.  Avoid all cruise travel and non-essential air travel.  Call your healthcare professional if you have concerns about COVID-19 and your underlying condition or if you are sick.     For more information on steps you can take to protect yourself, see CDC's How to Protect Yourself Patient/family/caregiver given information for [employer] and agrees to enroll no  Patient's preferred e-mail:  n/a  Patient's preferred phone number: n/a  Based on Loop alert triggers, patient will be contacted by nurse care manager for worsening symptoms. Plan for follow-up call in 7-14 days based on severity of symptoms and risk factors.

## 2020-07-10 NOTE — PROGRESS NOTES
HISTORY OF PRESENT ILLNESS  Kristen Vázquez is a 50 y.o. male. HPI continues to have intermittent chest pains and shortness of breath. Was seen in ER 2 days ago, potassium was 2.9. no co pedal edema. EF is 25-30%. CXR showed central pulmonary vascular congestion. Breathing is doing somewhat better today. Has been taking meds regularly. Minimal cough. It was recommended to have a stress test. Is doing better at present. ROS    Physical Exam  Vitals signs and nursing note reviewed. Constitutional:       Appearance: He is well-developed. HENT:      Right Ear: External ear normal.      Left Ear: External ear normal.   Neck:      Thyroid: No thyromegaly. Cardiovascular:      Rate and Rhythm: Normal rate and regular rhythm. Heart sounds: Normal heart sounds. Pulmonary:      Effort: Pulmonary effort is normal. No respiratory distress. Breath sounds: Normal breath sounds. No wheezing. Abdominal:      General: Bowel sounds are normal. There is no distension. Palpations: Abdomen is soft. There is no mass. Tenderness: There is no abdominal tenderness. There is no guarding. Musculoskeletal: Normal range of motion. Lymphadenopathy:      Cervical: No cervical adenopathy. ASSESSMENT and PLAN  Orders Placed This Encounter    [facility]    potassium chloride SR (K-TAB) 20 mEq tablet     Diagnoses and all orders for this visit:    1. Exertional chest pain  -     REFERRAL TO CARDIOLOGY    2. Cardiomyopathy, unspecified type ([address]  -     REFERRAL TO CARDIOLOGY    Other orders  -     potassium chloride SR (K-TAB) 20 mEq tablet; 2 tabs po bid      Follow-up and Dispositions    · Return in about 4 weeks (around 8/7/2020).

## 2020-07-21 ENCOUNTER — TELEPHONE (OUTPATIENT)
Dept: FAMILY MEDICINE CLINIC | Age: 55
End: 2020-07-21

## 2020-07-21 NOTE — TELEPHONE ENCOUNTER
----- Message from Berenice Bajwa sent at 7/21/2020  3:38 PM EDT -----  Regarding: Dr. Ana Barrett first and last name:Gaston Mcgraw      Reason for call: medication tramadol      Callback required yes/no and why:  yes    Best contact number(s):[phone]      Details to clarify the request: please call patient about tramadol      Berenice Bajwa

## 2020-07-23 ENCOUNTER — OFFICE VISIT (OUTPATIENT)
Dept: NEUROLOGY | Age: 55
End: 2020-07-23

## 2020-07-23 VITALS
SYSTOLIC BLOOD PRESSURE: 122 MMHG | HEIGHT: 72 IN | DIASTOLIC BLOOD PRESSURE: 78 MMHG | BODY MASS INDEX: 42.66 KG/M2 | WEIGHT: 315 LBS | HEART RATE: 72 BPM

## 2020-07-23 DIAGNOSIS — I10 ESSENTIAL HYPERTENSION: ICD-10-CM

## 2020-07-23 DIAGNOSIS — G45.9 TIA (TRANSIENT ISCHEMIC ATTACK): Primary | ICD-10-CM

## 2020-07-23 DIAGNOSIS — E11.9 CONTROLLED TYPE 2 DIABETES MELLITUS WITHOUT COMPLICATION, UNSPECIFIED WHETHER LONG TERM INSULIN USE (HCC): ICD-10-CM

## 2020-07-23 DIAGNOSIS — I48.91 ATRIAL FIBRILLATION, UNSPECIFIED TYPE (HCC): ICD-10-CM

## 2020-07-23 DIAGNOSIS — E78.2 MIXED HYPERLIPIDEMIA: ICD-10-CM

## 2020-07-23 DIAGNOSIS — G43.009 MIGRAINE WITHOUT AURA AND WITHOUT STATUS MIGRAINOSUS, NOT INTRACTABLE: ICD-10-CM

## 2020-07-23 RX ORDER — TOPIRAMATE 50 MG/1
TABLET, FILM COATED ORAL
Qty: 120 TAB | Refills: 2 | Status: SHIPPED | OUTPATIENT
Start: 2020-07-23 | End: 2020-10-29

## 2020-07-23 NOTE — TELEPHONE ENCOUNTER
Original PA denied for Tramadol    Initiated another PA by calling Munira at [phone]    [phone] turn round time 24 hours but was marked urgent

## 2020-07-23 NOTE — PROGRESS NOTES
NEUROLOGY NOTE     Chief complaint: Left-sided numbness    SUBJECTIVE:  Bobby Temple is a 50 y.o. male who presents to the neurology office for management of TIA. The patient presented to the hospital because of left-sided numbness. Patient states that he has had 2 strokes in the past and does have mild left-sided residual weakness but no numbness. On 2/11/2020, he woke up with left-sided numbness. It involves the left face, arm and leg. The symptoms lasted for around 2 hours and then resolved. He did have associated photophobia. He continues to have minimal left-sided numbness. The patient does also have frontal headaches and it is associated with nausea but no vomiting. This has been going on for the last 2 weeks. Patient does have hypertension, diabetes and quit smoking around 7 years ago. The patient does also have history of 2 strokes. He  started having headache around 2013 and has gotten worse since Feb 2020. His  headaches are holocepalic. It is 10/10 in severity. They are sharp, dull and pounding in character. It lasts for 2 hrs. It is associated with photophobia and phonophobia. Interval history: There is been no recurrence of symptoms. Patient was started on aspirin 81 mg a day in the hospital in addition to his Eliquis. His headaches are about the same. No improvement on Topamax 50 mg p.o. twice daily      Baseline headache frequency: 4/wk  Headache frequency now: 4/wk    Risk Factors for headaches  Smoking: quit 2013  Coffee: rarely cups/day  Tea: decaf 2 litres/day  Soda: less than 1 cans/day  Water: 15 glasses/day  Sleeps at 12 am and wakes up at 10 am. He does not know if he does snore. He does have MENDY.      Medications tried  Preventative therapy:  None    Abortive therapy:  Aspirin     ROS  A ten system review of constitutional, cardiovascular, respiratory, musculoskeletal, endocrine, skin, SHEENT, genitourinary, psychiatric and neurologic systems was obtained and is unremarkable except as stated in HPI     PMH  Past Medical History:   Diagnosis Date   Meghan [facility]) 1/6/15    [facility] ED. Pt reported this    Asthma     Atrial fibrillation with RVR ([facility] [address] 2017    Cardiomyopathy (HCC)     ET 25-30%    Carpal tunnel syndrome     Chronic obstructive pulmonary disease (HCC)     Diabetes (HCC)     Gastrointestinal disorder     GERD    GERD (gastroesophageal reflux disease)     Heart failure (HCC)     High cholesterol     Hypertension     Restrictive lung disease     FVC2.59 (52%), FEV1 1.95 (49)- 2017    S/P cardiac cath     Dr. Saida Vallecillo, 2016.  minimal cad    Sleep apnea     No CPAP    Stroke ([facility] [address]     TIA x 4       FH  Family History   Adopted: Yes   Problem Relation Age of Onset    No Known Problems Mother     No Known Problems Father        SH  Social History     Socioeconomic History    Marital status: SINGLE     Spouse name: Not on file    Number of children: Not on file    Years of education: Not on file    Highest education level: Not on file   Tobacco Use    Smoking status: Former Smoker     Packs/day: 1.00     Years: 20.00     Pack years: 20.00     Types: Cigarettes     Last attempt to quit: 2/3/2014     Years since quittin.4    Smokeless tobacco: Never Used    Tobacco comment: Quit 3 years ago   Substance and Sexual Activity    Alcohol use: No     Comment: stopped drinking at age 29    Drug use: No    Sexual activity: Not Currently   Social History Narrative    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        ALLERGIES  Allergies   Allergen Reactions    Codeine Hives    Hydrocodone Itching    Influenza Virus Vaccines Nausea Only     Fever  diarrhea    Mushroom Flavor Swelling    Oxycodone Rash    Pneumococcal 23-Allison Ps Vaccine Nausea and Vomiting     Vomit         PHYSICAL EXAMINATION:   Visit Vitals  /78   Pulse 72   Ht 6' (1.829 m)   Wt 324 lb (147 kg)   BMI 43.94 kg/m²       General:   General appearance: Pt is in no acute distress   Distal pulses are preserved    Neurological Examination:   Mental Status:  AAO x3. Speech is fluent. Follows commands, has normal fund of knowledge, attention, short term recall, comprehension and insight. Cranial Nerves: Visual fields are full. PERRL, Extraocular movements are full. Facial sensationdecreased on the left. Facial movement intact. Hearing intact to conversation. Palate elevates symmetrically. Shoulder shrug symmetric. Tongue midline. Motor: Strength is 5/5 on the right and 4+ out of 5 in the left upper and lower extremities. Normal tone. No atrophy. Sensation: Light touch -decreased in the left upper and lower extremity    Reflexes: DTRs 2+ throughout. Plantar responses downgoing. Coordination/Cerebellar: Intact to finger-nose-finger     Gait: Casual gait is normal.     Skin: No significant bruising or lacerations. LAB DATA REVIEWED:    Results for orders placed or performed during the hospital encounter of 07/08/20   CBC WITH AUTOMATED DIFF   Result Value Ref Range    WBC 3.2 (L) 4.1 - 11.1 K/uL    RBC 5.79 (H) 4.10 - 5.70 M/uL    HGB 16.6 12.1 - 17.0 g/dL    HCT 50.0 36.6 - 50.3 %    MCV 86.4 80.0 - 99.0 FL    MCH 28.7 26.0 - 34.0 PG    MCHC 33.2 30.0 - 36.5 g/dL    RDW 15.9 (H) 11.5 - 14.5 %    PLATELET 024 (L) 431 - 400 K/uL    MPV 10.7 8.9 - 12.9 FL    NRBC 0.0 0  WBC    ABSOLUTE NRBC 0.00 0.00 - 0.01 K/uL    NEUTROPHILS 48 32 - 75 %    LYMPHOCYTES 34 12 - 49 %    MONOCYTES 16 (H) 5 - 13 %    EOSINOPHILS 1 0 - 7 %    BASOPHILS 1 0 - 1 %    IMMATURE GRANULOCYTES 0 0.0 - 0.5 %    ABS. NEUTROPHILS 1.6 (L) 1.8 - 8.0 K/UL    ABS. LYMPHOCYTES 1.1 0.8 - 3.5 K/UL    ABS. MONOCYTES 0.5 0.0 - 1.0 K/UL    ABS. EOSINOPHILS 0.0 0.0 - 0.4 K/UL    ABS. BASOPHILS 0.0 0.0 - 0.1 K/UL    ABS. IMM.  GRANS. 0.0 0.00 - 0.04 K/UL    DF AUTOMATED     METABOLIC PANEL, COMPREHENSIVE   Result Value Ref Range    Sodium 139 136 - 145 mmol/L    Potassium 2.9 (L) 3.5 - 5.1 mmol/L    Chloride 108 97 - 108 mmol/L    CO2 23 21 - 32 mmol/L    Anion gap 8 5 - 15 mmol/L    Glucose 133 (H) 65 - 100 mg/dL    BUN 18 6 - 20 MG/DL    Creatinine 1.17 0.70 - 1.30 MG/DL    BUN/Creatinine ratio 15 12 - 20      GFR est AA >60 >60 ml/min/1.73m2    GFR est non-AA >60 >60 ml/min/1.73m2    Calcium 8.9 8.5 - 10.1 MG/DL    Bilirubin, total 0.8 0.2 - 1.0 MG/DL    ALT (SGPT) 34 12 - 78 U/L    AST (SGOT) 20 15 - 37 U/L    Alk. phosphatase 72 45 - 117 U/L    Protein, total 7.1 6.4 - 8.2 g/dL    Albumin 3.5 3.5 - 5.0 g/dL    Globulin 3.6 2.0 - 4.0 g/dL    A-G Ratio 1.0 (L) 1.1 - 2.2     CK W/ REFLX CKMB   Result Value Ref Range    CK 86 39 - 308 U/L   TROPONIN I   Result Value Ref Range    Troponin-I, Qt. <0.05 <0.05 ng/mL   NT-PRO BNP   Result Value Ref Range    NT pro- (H) <125 PG/ML   URINALYSIS W/ REFLEX CULTURE    Specimen: Urine   Result Value Ref Range    Color YELLOW/STRAW      Appearance CLEAR CLEAR      Specific gravity 1.020 1.003 - 1.030      pH (UA) 5.5 5.0 - 8.0      Protein Negative NEG mg/dL    Glucose >1,000 (A) NEG mg/dL    Ketone Negative NEG mg/dL    Bilirubin Negative NEG      Blood Negative NEG      Urobilinogen 1.0 0.2 - 1.0 EU/dL    Nitrites Negative NEG      Leukocyte Esterase Negative NEG      WBC 0-4 0 - 4 /hpf    RBC 0-5 0 - 5 /hpf    Epithelial cells FEW FEW /lpf    Bacteria Negative NEG /hpf    UA:UC IF INDICATED CULTURE NOT INDICATED BY UA RESULT CNI      Hyaline cast 0-2 0 - 5 /lpf   SAMPLES BEING HELD   Result Value Ref Range    [facility]     COMMENT        Add-on orders for these samples will be processed based on acceptable specimen integrity and analyte stability, which may vary by analyte.    TROPONIN I   Result Value Ref Range    Troponin-I, Qt. <0.05 <0.05 ng/mL   EKG, 12 LEAD, INITIAL   Result Value Ref Range    Ventricular Rate 70 BPM    Atrial Rate 94 BPM    QRS Duration 142 ms    Q-T Interval 450 ms    QTC Calculation (Bezet) 486 ms Calculated R Axis -104 degrees    Calculated T Axis 91 degrees    Diagnosis       Ventricular-paced rhythm  Biventricular pacemaker detected  When compared with ECG of 10-CHARU-2019 23:58,  No significant change was found  Confirmed by KOBE Purcell ([id number]) on 7/9/2020 11:46:37 AM           Current Outpatient Medications   Medication Sig Dispense Refill    GaviLyte-N 420 gram solution       potassium chloride SR (K-TAB) 20 mEq tablet 2 tabs po bid 120 Tab 12    traMADoL (ULTRAM) 50 mg tablet TAKE ONE TABLET BY MOUTH EVERY 8 HOURS AS NEEDED FOR PAIN 60 Tab 1    gabapentin (NEURONTIN) 300 mg capsule Take 1 Cap by mouth three (3) times daily. Max Daily Amount: 900 mg. 21 Cap 0    sacubitriL-valsartan (Entresto)  mg tablet Take 1 Tab by mouth two (2) times a day. 60 Tab 5    topiramate (TOPAMAX) 50 mg tablet 50 mg bid 60 Tab 5    zolpidem (AMBIEN) 10 mg tablet TAKE ONE TABLET BY MOUTH ONCE NIGHTLY AS NEEDED FOR SLEEP 30 Tab 4    traZODone (DESYREL) 100 mg tablet Take 1 Tab by mouth nightly. 30 Tab 5    furosemide (LASIX) 80 mg tablet Take 2 tabs in the AM, one 2 in the  Tab 12    ONETOUCH ULTRA BLUE TEST STRIP strip USE ONE STRIP TO TEST THREE TIMES A DAY BEFORE MEALS 100 Strip 11    metFORMIN (GLUCOPHAGE) 1,000 mg tablet Take 1,000 mg by mouth two (2) times daily (with meals).  carvedilol (COREG) 12.5 mg tablet TAKE ONE TABLET BY MOUTH TWICE A DAY TO SLOW HEART RATE DOWN. 60 Tab 11    apixaban (ELIQUIS) 5 mg tablet Take 1 Tab by mouth two (2) times a day. 60 Tab 11    atorvastatin (LIPITOR) 40 mg tablet TAKE ONE TABLET BY MOUTH DAILY FOR CHOLESTEROL 30 Tab 12    glipiZIDE (GLUCOTROL) 10 mg tablet TAKE ONE TABLET BY MOUTH TWICE A DAY 60 Tab 11    MARGIE PEN NEEDLE 32 gauge x 5/32\" ndle USE DAILY WITH VICTOZA 100 Pen Needle 11    liraglutide (VICTOZA) 0.6 mg/0.1 mL (18 mg/3 mL) pnij 0.6 mg daily- 1st week, then 1.2 mg daily- 2nd week, then 1.8 mg daily.  Please give pt needles also 1 Pen 12  empagliflozin (JARDIANCE) 25 mg tablet Take 1 Tab by mouth Daily (before breakfast). 90 Tab 3    albuterol-ipratropium (DUO-NEB) 2.5 mg-0.5 mg/3 ml nebu INHALE ONE VIAL  VIA NEBULIZATION BY MOUTH EVERY 6 HOURS AS NEEDED 30 Nebule 9    acetaminophen (TYLENOL) 325 mg tablet Take 2 Tabs by mouth every six (6) hours as needed for Pain. 50 Tab 1    albuterol (VENTOLIN HFA) 90 mcg/actuation inhaler INHALE TWO PUFFS BY MOUTH EVERY 4 HOURS AS NEEDED FOR FOR WHEEZING AND FOR SHORTNESS OF BREATH 1 Inhaler 12       Stroke workup  2/14/2020  CT Head/CTA Head and neck  1. No hemodynamically significant extracranial ICA stenosis (using NASCET  criteria). 2. No large vessel occlusion or significant intracranial stenosis. 3. No acute intracranial process. TTE:   Ejection fraction 25 to 30%    Stroke labs:  HgBA1c    Lab Results   Component Value Date/Time    Hemoglobin A1c 9.5 (H) 11/15/2018 12:25 AM     LDL   Lab Results   Component Value Date/Time    LDL, calculated 46 01/14/2020 01:40 PM       IMPRESSION:  Kayley Evans is a 50 y.o. male who presents to the neurology office for management of TIA. Patient came to the hospital in February 2020 with new onset left-sided numbness that was more pronounced for 2 hours. Patient does have history of 2 strokes in the past with left-sided weakness. Patient was not taking any aspirin but was on Eliquis for atrial fibrillation. .  Patient does have hypertension and diabetes. Patient does have a pacemaker so could not get MRI scan. 1. TIA  2. DM  3. HLD  4. HTN  5. A fib  6. Patient does have chronic migraines    RECOMMENDATIONS:  - BP goal is less than 140/90  - Cont ASA 81 mg daily and continue Eliquis  - Cont atorvastatin 40 mg daily   -Patient is on Topamax 50 mg p.o. twice daily and has not had any improvement in his headaches.   We will increase it to 75 mg twice a day for 1 week and then 50 mg p.o. twice daily    Follow-up in 3 months      Pam Liz MD  Neurologist

## 2020-07-24 ENCOUNTER — PATIENT OUTREACH (OUTPATIENT)
Dept: CASE MANAGEMENT | Age: 55
End: 2020-07-24

## 2020-07-24 ENCOUNTER — DOCUMENTATION ONLY (OUTPATIENT)
Dept: FAMILY MEDICINE CLINIC | Age: 55
End: 2020-07-24

## 2020-07-24 NOTE — PROGRESS NOTES
Patient resolved from Transition of Care episode on 7/24/20  Discussed COVID-19 related testing which was not done at this time. Test results were not done. Patient informed of results, if available? no     Patient/family has been provided the following resources and education related to COVID-19:                         Signs, symptoms and red flags related to COVID-19            CDC exposure and quarantine guidelines            [employer] exposure contact - [phone]            Contact for their local Department of Health                 Patient currently reports that the following symptoms have improved:  no new symptoms. No further outreach scheduled with this CTN/ACM/LPN/HC/ MA. Episode of Care resolved. Patient has this CTN/ACM/LPN/HC/MA contact information if future needs arise.

## 2020-08-14 ENCOUNTER — OFFICE VISIT (OUTPATIENT)
Dept: FAMILY MEDICINE CLINIC | Age: 55
End: 2020-08-14
Payer: MEDICAID

## 2020-08-14 ENCOUNTER — TELEPHONE (OUTPATIENT)
Dept: CARDIOLOGY CLINIC | Age: 55
End: 2020-08-14

## 2020-08-14 VITALS
HEIGHT: 72 IN | OXYGEN SATURATION: 98 % | DIASTOLIC BLOOD PRESSURE: 64 MMHG | SYSTOLIC BLOOD PRESSURE: 110 MMHG | RESPIRATION RATE: 16 BRPM | HEART RATE: 73 BPM | TEMPERATURE: 97.3 F | WEIGHT: 315 LBS | BODY MASS INDEX: 42.66 KG/M2

## 2020-08-14 DIAGNOSIS — R06.02 SHORTNESS OF BREATH: Primary | ICD-10-CM

## 2020-08-14 DIAGNOSIS — E11.9 CONTROLLED TYPE 2 DIABETES MELLITUS WITHOUT COMPLICATION, WITHOUT LONG-TERM CURRENT USE OF INSULIN (HCC): ICD-10-CM

## 2020-08-14 DIAGNOSIS — E78.00 HYPERCHOLESTEROLEMIA: ICD-10-CM

## 2020-08-14 LAB — HBA1C MFR BLD HPLC: 6.7 %

## 2020-08-14 PROCEDURE — 83036 HEMOGLOBIN GLYCOSYLATED A1C: CPT | Performed by: FAMILY MEDICINE

## 2020-08-14 PROCEDURE — 36415 COLL VENOUS BLD VENIPUNCTURE: CPT | Performed by: FAMILY MEDICINE

## 2020-08-14 PROCEDURE — 99213 OFFICE O/P EST LOW 20 MIN: CPT | Performed by: FAMILY MEDICINE

## 2020-08-14 NOTE — PROGRESS NOTES
Chief Complaint   Patient presents with    Follow Up Chronic Condition     ,1. Have you been to the ER, urgent care clinic since your last visit? Hospitalized since your last visit? No    2. Have you seen or consulted any other health care providers outside of the [address] [place] [address] since your last visit? Include any pap smears or colon screening. No     Patient here for routine follow up.

## 2020-08-14 NOTE — PROGRESS NOTES
HISTORY OF PRESENT ILLNESS  Tigist Waller is a 50 y.o. male. HPI In for followup. STill having some dyspnea. Made worse by wearing a mask. Minimal cough. Clears throat a lot. Not too much chest tightness. Slight chest tightness and dyspnea when walks into office. Overall feels a little bit better than one month ago. Still having headaches , generalized, followed by Dr. Shameka Puente. Is on topamax for headache prevention. Has appt with cardiology next week. ROS    Physical Exam  Vitals signs and nursing note reviewed. Constitutional:       Appearance: He is well-developed. HENT:      Right Ear: External ear normal.      Left Ear: External ear normal.   Neck:      Thyroid: No thyromegaly. Cardiovascular:      Rate and Rhythm: Normal rate and regular rhythm. Heart sounds: Normal heart sounds. Pulmonary:      Effort: Pulmonary effort is normal. No respiratory distress. Breath sounds: Normal breath sounds. No wheezing. Abdominal:      General: Bowel sounds are normal. There is no distension. Palpations: Abdomen is soft. There is no mass. Tenderness: There is no abdominal tenderness. There is no guarding. Musculoskeletal: Normal range of motion. Lymphadenopathy:      Cervical: No cervical adenopathy. ASSESSMENT and PLAN  Orders Placed This Encounter    METABOLIC PANEL, BASIC    LIPID PANEL    AMB POC HEMOGLOBIN A1C    COLLECTION VENOUS BLOOD,VENIPUNCTURE     Diagnoses and all orders for this visit:    1. Shortness of breath  -     COLLECTION VENOUS BLOOD,VENIPUNCTURE    2. Controlled type 2 diabetes mellitus without complication, without long-term current use of insulin (HCC)  -     METABOLIC PANEL, BASIC  -     AMB POC HEMOGLOBIN A1C  -     COLLECTION VENOUS BLOOD,VENIPUNCTURE    3. Hypercholesterolemia  -     LIPID PANEL  -     COLLECTION VENOUS BLOOD,VENIPUNCTURE      Follow-up and Dispositions    · Return in about 6 weeks (around 9/25/2020).

## 2020-08-15 LAB
BUN SERPL-MCNC: 15 MG/DL (ref 6–24)
BUN/CREAT SERPL: 16 (ref 9–20)
CALCIUM SERPL-MCNC: 9.2 MG/DL (ref 8.7–10.2)
CHLORIDE SERPL-SCNC: 105 MMOL/L (ref 96–106)
CHOLEST SERPL-MCNC: 92 MG/DL (ref 100–199)
CO2 SERPL-SCNC: 22 MMOL/L (ref 20–29)
CREAT SERPL-MCNC: 0.93 MG/DL (ref 0.76–1.27)
GLUCOSE SERPL-MCNC: 145 MG/DL (ref 65–99)
HDLC SERPL-MCNC: 25 MG/DL
INTERPRETATION, 910389: NORMAL
LDLC SERPL CALC-MCNC: 34 MG/DL (ref 0–99)
Lab: NORMAL
POTASSIUM SERPL-SCNC: 3.7 MMOL/L (ref 3.5–5.2)
SODIUM SERPL-SCNC: 142 MMOL/L (ref 134–144)
TRIGL SERPL-MCNC: 167 MG/DL (ref 0–149)
VLDLC SERPL CALC-MCNC: 33 MG/DL (ref 5–40)

## 2020-08-19 NOTE — PROGRESS NOTES
Subjective:      Lory Packer is a 50 y.o. male is here for EP follow up. The patient denies shortness of breath, orthopnea, PND, LE edema, palpitations, syncope, presyncope or fatigue. He had chest pain 2 mo ago and was evaluated at [facility]. trops negative. He has had some chest pain that doesn't last since that time. He would like SL NTG as that helped in ED. Lory Packer  is on [address], reports no melena, hematuria, or obvious signs of bleeding. No falls. Patient Active Problem List    Diagnosis Date Noted    Cardiomyopathy [facility]) 03/11/2019     Priority: 1 - One    TIA (transient ischemic attack) 02/13/2020    Cardiomyopathy, nonischemic ([address] 11/17/2018    Controlled type 2 diabetes mellitus without complication, without long-term current use of insulin ([address] 08/15/2018    Sleep apnea     Obesity, morbid ([address] 12/20/2017    Atrial fibrillation with RVR ([address] 08/11/2017    CHF (congestive heart failure) ([address] 08/10/2017    Restrictive lung disease     Dyspnea 07/15/2014    Hypertension 02/17/2014      Donnell Mclean MD  Past Medical History:   Diagnosis Date   [facility]) 1/6/15    [facility] ED. Pt reported this    Asthma     Atrial fibrillation with RVR ([address] 8/11/2017    Cardiomyopathy (HCC)     ET 25-30%    Carpal tunnel syndrome     Chronic obstructive pulmonary disease (HCC)     Diabetes (HCC)     Gastrointestinal disorder     GERD    GERD (gastroesophageal reflux disease)     Heart failure (HCC)     High cholesterol     Hypertension     Restrictive lung disease     FVC2.59 (52%), FEV1 1.95 (49)- 2017    S/P cardiac cath     Dr. Alejandro Dykes, 2016.  minimal cad    Sleep apnea     No CPAP    Stroke [facility])     TIA x 4      Past Surgical History:   Procedure Laterality Date    CARDIAC SURG PROCEDURE UNLIST  11/14/2018    Implantation of PACEMAKER    HX PACEMAKER Left     defib,     VT INSJ/RPLCMT PERM DFB W/TRNSVNS LDS 1/DUAL CHMBR N/A 3/11/2019 INSERT ICD BIV MULTI performed by Paddy Bansal MD at [facility] CARDIAC CATH LAB     Allergies   Allergen Reactions    Codeine Hives    Hydrocodone Itching    Influenza Virus Vaccines Nausea Only     Fever  diarrhea    Mushroom Flavor Swelling    Oxycodone Rash    Pneumococcal 23-Allison Ps Vaccine Nausea and Vomiting     Vomit        Family History   Adopted: Yes   Problem Relation Age of Onset    No Known Problems Mother     No Known Problems Father     negative for cardiac disease  Social History     Socioeconomic History    Marital status: SINGLE     Spouse name: Not on file    Number of children: Not on file    Years of education: Not on file    Highest education level: Not on file   Tobacco Use    Smoking status: Former Smoker     Packs/day: 1.00     Years: 20.00     Pack years: 20.00     Types: Cigarettes     Last attempt to quit: 2/3/2014     Years since quittin.5    Smokeless tobacco: Never Used    Tobacco comment: Quit 3 years ago   Substance and Sexual Activity    Alcohol use: No     Comment: stopped drinking at age 29    Drug use: No    Sexual activity: Not Currently   Social History Narrative    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      Current Outpatient Medications   Medication Sig    topiramate (TOPAMAX) 50 mg tablet 75 mg p.o. twice daily for 1 week and then 100 mg p.o. twice daily    potassium chloride SR (K-TAB) 20 mEq tablet 2 tabs po bid    sacubitriL-valsartan (Entresto)  mg tablet Take 1 Tab by mouth two (2) times a day.  zolpidem (AMBIEN) 10 mg tablet TAKE ONE TABLET BY MOUTH ONCE NIGHTLY AS NEEDED FOR SLEEP    traZODone (DESYREL) 100 mg tablet Take 1 Tab by mouth nightly.  furosemide (LASIX) 80 mg tablet Take 2 tabs in the AM, one 2 in the PM    ONETOUCH ULTRA BLUE TEST STRIP strip USE ONE STRIP TO TEST THREE TIMES A DAY BEFORE MEALS    metFORMIN (GLUCOPHAGE) 1,000 mg tablet Take 1,000 mg by mouth two (2) times daily (with meals).     carvedilol (COREG) 12.5 mg tablet TAKE ONE TABLET BY MOUTH TWICE A DAY TO SLOW HEART RATE DOWN.  apixaban (ELIQUIS) 5 mg tablet Take 1 Tab by mouth two (2) times a day.  atorvastatin (LIPITOR) 40 mg tablet TAKE ONE TABLET BY MOUTH DAILY FOR CHOLESTEROL    glipiZIDE (GLUCOTROL) 10 mg tablet TAKE ONE TABLET BY MOUTH TWICE A DAY    MARGIE PEN NEEDLE 32 gauge x 5/32\" ndle USE DAILY WITH VICTOZA    liraglutide (VICTOZA) 0.6 mg/0.1 mL (18 mg/3 mL) pnij 0.6 mg daily- 1st week, then 1.2 mg daily- 2nd week, then 1.8 mg daily. Please give pt needles also    empagliflozin (JARDIANCE) 25 mg tablet Take 1 Tab by mouth Daily (before breakfast).  GaviLyte-N 420 gram solution     gabapentin (NEURONTIN) 300 mg capsule Take 1 Cap by mouth three (3) times daily. Max Daily Amount: 900 mg. No current facility-administered medications for this visit. Vitals:    08/20/20 1104   BP: 98/68   Pulse: 70   Resp: 16   SpO2: 98%   Weight: 323 lb (146.5 kg)   Height: 6' (1.829 m)       I have reviewed the nurses notes, vitals, problem list, allergy list, medical history, family, social history and medications. Review of Symptoms:    General: Pt denies excessive weight gain or loss. Pt is able to conduct ADL's  HEENT: Denies blurred vision, headaches, epistaxis and difficulty swallowing. Cardiovascular:  Reports precordial pain, Denies palpitations, edema or PND  Gastrointestinal: Denies poor appetite, indigestion, abdominal pain or blood in stool  Urinary: Denies dysuria, pyuria  Musculoskeletal: Denies pain or swelling from muscles or joints  Neurologic: Denies tremor, paresthesias, or sensory motor disturbance  Skin: Denies rash, itching or texture change. Psych: Denies depression      Physical Exam:      General: Well developed, in no acute distress. Obese  HEENT: Eyes - PERRL, no jvd  Heart:  Normal S1/S2 negative S3 or S4. Regular, no murmur, gallop or rub.    Respiratory: Clear bilaterally x 4, no wheezing or rales  Extremities:  No edema, normal cap refill, no cyanosis. Musculoskeletal: No clubbing  Neuro: A&Ox3, speech clear, gait stable. Skin: Skin color is normal. No rashes or lesions. Non diaphoretic. no ulcers  Vascular: 2+ pulses symmetric in all extremities  Psych - judgement intact and orientation is wnl       Cardiographics    Ekg: V paced    Results for orders placed or performed during the hospital encounter of 07/08/20   EKG, 12 LEAD, INITIAL   Result Value Ref Range    Ventricular Rate 70 BPM    Atrial Rate 94 BPM    QRS Duration 142 ms    Q-T Interval 450 ms    QTC Calculation (Bezet) 486 ms    Calculated R Axis -104 degrees    Calculated T Axis 91 degrees    Diagnosis       Ventricular-paced rhythm  Biventricular pacemaker detected  When compared with ECG of 10-CHARU-2019 23:58,  No significant change was found  Confirmed by KOBE Sanchez ([id number]) on 7/9/2020 11:46:37 AM       Echo 2/2020:  · Moderately dilated left ventricle. Mild concentric hypertrophy. Moderate-to-severe systolic dysfunction. Estimated left ventricular ejection fraction is 25 - 30%. · Moderately to severely reduced systolic function. · Mild tricuspid valve regurgitation is present. Lab Results   Component Value Date/Time    WBC 3.2 (L) 07/08/2020 11:49 PM    HGB 16.6 07/08/2020 11:49 PM    HCT 50.0 07/08/2020 11:49 PM    PLATELET 413 (L) 27/98/5581 11:49 PM    MCV 86.4 07/08/2020 11:49 PM      Lab Results   Component Value Date/Time    Sodium 142 08/14/2020 12:25 PM    Potassium 3.7 08/14/2020 12:25 PM    Chloride 105 08/14/2020 12:25 PM    CO2 22 08/14/2020 12:25 PM    Anion gap 8 07/08/2020 11:49 PM    Glucose 145 (H) 08/14/2020 12:25 PM    BUN 15 08/14/2020 12:25 PM    Creatinine 0.93 08/14/2020 12:25 PM    BUN/Creatinine ratio 16 08/14/2020 12:25 PM    GFR est  08/14/2020 12:25 PM    GFR est non-AA 93 08/14/2020 12:25 PM    Calcium 9.2 08/14/2020 12:25 PM    Bilirubin, total 0.8 07/08/2020 11:49 PM    Alk. phosphatase 72 07/08/2020 11:49 PM    Protein, total 7.1 07/08/2020 11:49 PM    Albumin 3.5 07/08/2020 11:49 PM    Globulin 3.6 07/08/2020 11:49 PM    A-G Ratio 1.0 (L) 07/08/2020 11:49 PM    ALT (SGPT) 34 07/08/2020 11:49 PM      Lab Results   Component Value Date/Time    TSH 2.430 09/19/2019 02:17 PM        Assessment:           ICD-10-CM ICD-9-CM    1. Atrial fibrillation with RVR (HCC)  I48.91 427.31 AMB POC EKG ROUTINE W/ 12 LEADS, INTER & REP   2. Dilated cardiomyopathy (HCC)  I42.0 425.4    3. Chronic systolic congestive heart failure (HCC)  I50.22 428.22      428.0    4. ICD (implantable cardioverter-defibrillator) in place  Z95.810 V45.02    5. Essential hypertension  I10 401.9      Orders Placed This Encounter    AMB POC EKG ROUTINE W/ 12 LEADS, INTER & REP     Order Specific Question:   Reason for Exam:     Answer:   routine        Plan:     Wally Mcgraw is s/p AV node ablation and PPM 11/16/18. Device interrogation with 98% RVP, 11 years remaining. EF 25-30% per echo in Feb 2020. Hx of neg cardiac cath at [facility] 4/2016. Poss tachycardia induced cardiomyopathy. He is on [address] [place] [address]. Had some chest pain in June - evaluated at ED. Troponins negative. He has had brief episodes since that time. I have given rx for SL NTG and reviewed how to use it. I ordered a lexiscan and have him establishing with Dr Perlita Pace. He is enrolled in remote monitoring and I will see him back in 1 year.      Continue medical management for DM, BMI >40 and AF. Thank you for allowing me to participate in Estefania Decker 's care.       Laura Menendez NP

## 2020-08-20 ENCOUNTER — CLINICAL SUPPORT (OUTPATIENT)
Dept: CARDIOLOGY CLINIC | Age: 55
End: 2020-08-20
Payer: MEDICAID

## 2020-08-20 ENCOUNTER — OFFICE VISIT (OUTPATIENT)
Dept: CARDIOLOGY CLINIC | Age: 55
End: 2020-08-20
Payer: MEDICAID

## 2020-08-20 VITALS
SYSTOLIC BLOOD PRESSURE: 98 MMHG | WEIGHT: 315 LBS | DIASTOLIC BLOOD PRESSURE: 68 MMHG | HEART RATE: 70 BPM | RESPIRATION RATE: 16 BRPM | BODY MASS INDEX: 42.66 KG/M2 | HEIGHT: 72 IN | OXYGEN SATURATION: 98 %

## 2020-08-20 DIAGNOSIS — I42.0 DILATED CARDIOMYOPATHY (HCC): ICD-10-CM

## 2020-08-20 DIAGNOSIS — I10 ESSENTIAL HYPERTENSION: ICD-10-CM

## 2020-08-20 DIAGNOSIS — Z95.810 ICD (IMPLANTABLE CARDIOVERTER-DEFIBRILLATOR) IN PLACE: ICD-10-CM

## 2020-08-20 DIAGNOSIS — Z95.810 ICD (IMPLANTABLE CARDIOVERTER-DEFIBRILLATOR) IN PLACE: Primary | ICD-10-CM

## 2020-08-20 DIAGNOSIS — I48.91 ATRIAL FIBRILLATION WITH RVR (HCC): Primary | ICD-10-CM

## 2020-08-20 DIAGNOSIS — R07.89 OTHER CHEST PAIN: ICD-10-CM

## 2020-08-20 DIAGNOSIS — I50.22 CHRONIC SYSTOLIC CONGESTIVE HEART FAILURE (HCC): ICD-10-CM

## 2020-08-20 PROCEDURE — 93283 PRGRMG EVAL IMPLANTABLE DFB: CPT | Performed by: INTERNAL MEDICINE

## 2020-08-20 PROCEDURE — 99215 OFFICE O/P EST HI 40 MIN: CPT | Performed by: NURSE PRACTITIONER

## 2020-08-20 PROCEDURE — 93000 ELECTROCARDIOGRAM COMPLETE: CPT | Performed by: NURSE PRACTITIONER

## 2020-08-20 RX ORDER — NITROGLYCERIN 0.4 MG/1
0.4 TABLET SUBLINGUAL
Qty: 25 TAB | Refills: 1 | Status: SHIPPED | OUTPATIENT
Start: 2020-08-20 | End: 2020-11-12

## 2020-08-20 NOTE — PROGRESS NOTES
1. Have you been to the ER, urgent care clinic since your last visit? Hospitalized since your last visit? Yes When: on 7/8/20 for chest pain    2. Have you seen or consulted any other health care providers outside of the [address] [place] [address] since your last visit? Include any pap smears or colon screening. No     Chief Complaint   Patient presents with    Irregular Heart Beat     follow up    Pacemaker Check     annual device check    Chest Pain    Shortness of Breath     at rest and on exertion; hx of asthma and copd      Chest pain/discomfort: On and off now.   Last chest pain episode on 7/8/20    Onset of pain:  6/28/20    Quality of pain:  Sharp and stabbing pain   Pain intensity:  8-9/10  Duration:  30-40 mins  With radiation:  Left arm  Activity upon onset:  no  Worsened with exertion: patient rests with chest pain

## 2020-08-21 ENCOUNTER — OFFICE VISIT (OUTPATIENT)
Dept: URGENT CARE | Age: 55
End: 2020-08-21
Payer: MEDICAID

## 2020-08-21 VITALS — TEMPERATURE: 99.5 F | HEART RATE: 71 BPM | RESPIRATION RATE: 15 BRPM | OXYGEN SATURATION: 96 %

## 2020-08-21 DIAGNOSIS — I42.0 DILATED CARDIOMYOPATHY (HCC): ICD-10-CM

## 2020-08-21 DIAGNOSIS — I10 ESSENTIAL HYPERTENSION: ICD-10-CM

## 2020-08-21 DIAGNOSIS — I50.22 CHRONIC SYSTOLIC CONGESTIVE HEART FAILURE (HCC): ICD-10-CM

## 2020-08-21 DIAGNOSIS — Z20.822 ENCOUNTER FOR LABORATORY TESTING FOR COVID-19 VIRUS: Primary | ICD-10-CM

## 2020-08-21 DIAGNOSIS — R07.89 OTHER CHEST PAIN: ICD-10-CM

## 2020-08-21 DIAGNOSIS — I48.91 ATRIAL FIBRILLATION WITH RVR (HCC): ICD-10-CM

## 2020-08-21 DIAGNOSIS — Z95.810 ICD (IMPLANTABLE CARDIOVERTER-DEFIBRILLATOR) IN PLACE: ICD-10-CM

## 2020-08-21 PROCEDURE — 99211 OFF/OP EST MAY X REQ PHY/QHP: CPT | Performed by: FAMILY MEDICINE

## 2020-08-23 LAB — SARS-COV-2, NAA: NOT DETECTED

## 2020-08-30 DIAGNOSIS — R52 PAIN: ICD-10-CM

## 2020-09-01 RX ORDER — TRAMADOL HYDROCHLORIDE 50 MG/1
TABLET ORAL
Qty: 60 TAB | Refills: 0 | Status: SHIPPED | OUTPATIENT
Start: 2020-09-01 | End: 2020-09-28

## 2020-09-03 ENCOUNTER — HOSPITAL ENCOUNTER (OUTPATIENT)
Dept: PREADMISSION TESTING | Age: 55
Discharge: HOME | End: 2020-09-03
Payer: MEDICAID

## 2020-09-03 PROCEDURE — 87635 SARS-COV-2 COVID-19 AMP PRB: CPT

## 2020-09-04 ENCOUNTER — HOSPITAL ENCOUNTER (OUTPATIENT)
Dept: PREADMISSION TESTING | Age: 55
Discharge: HOME | End: 2020-09-04

## 2020-09-04 LAB
HEALTH STATUS, XMCV2T: NORMAL
SARS-COV-2, COV2NT: NOT DETECTED
SOURCE, COVRS: NORMAL
SPECIMEN SOURCE, FCOV2M: NORMAL
SPECIMEN TYPE, XMCV1T: NORMAL

## 2020-09-10 DIAGNOSIS — E11.9 TYPE 2 DIABETES MELLITUS WITHOUT COMPLICATION, WITHOUT LONG-TERM CURRENT USE OF INSULIN (HCC): ICD-10-CM

## 2020-09-10 NOTE — TELEPHONE ENCOUNTER
Pt is requesting refills on  Jardiance as well as a f/u appt. Pt stated that he as been calling and leaving messages for return phone calls but no one has returned any of his calls     Refills can be sent to [address] [place] [address] on Lucille Walker. Pt is requesting to be reached after 9 am on tomorrow. Pt can be reached at [phone].

## 2020-09-14 ENCOUNTER — ANCILLARY PROCEDURE (OUTPATIENT)
Dept: CARDIOLOGY CLINIC | Age: 55
End: 2020-09-14
Payer: MEDICAID

## 2020-09-14 PROCEDURE — 93015 CV STRESS TEST SUPVJ I&R: CPT | Performed by: INTERNAL MEDICINE

## 2020-09-14 PROCEDURE — A9502 TC99M TETROFOSMIN: HCPCS

## 2020-09-14 PROCEDURE — 78452 HT MUSCLE IMAGE SPECT MULT: CPT | Performed by: INTERNAL MEDICINE

## 2020-09-16 ENCOUNTER — OFFICE VISIT (OUTPATIENT)
Dept: CARDIOLOGY CLINIC | Age: 55
End: 2020-09-16
Payer: MEDICAID

## 2020-09-16 ENCOUNTER — APPOINTMENT (OUTPATIENT)
Dept: CARDIOLOGY CLINIC | Age: 55
End: 2020-09-16

## 2020-09-16 VITALS
HEIGHT: 72 IN | WEIGHT: 315 LBS | DIASTOLIC BLOOD PRESSURE: 70 MMHG | BODY MASS INDEX: 42.66 KG/M2 | SYSTOLIC BLOOD PRESSURE: 110 MMHG

## 2020-09-16 VITALS
HEART RATE: 72 BPM | HEIGHT: 72 IN | DIASTOLIC BLOOD PRESSURE: 80 MMHG | SYSTOLIC BLOOD PRESSURE: 100 MMHG | BODY MASS INDEX: 42.66 KG/M2 | WEIGHT: 315 LBS | OXYGEN SATURATION: 97 % | RESPIRATION RATE: 18 BRPM

## 2020-09-16 DIAGNOSIS — I42.0 DILATED CARDIOMYOPATHY (HCC): ICD-10-CM

## 2020-09-16 DIAGNOSIS — I48.21 PERMANENT ATRIAL FIBRILLATION (HCC): ICD-10-CM

## 2020-09-16 DIAGNOSIS — E66.01 OBESITY, MORBID (HCC): ICD-10-CM

## 2020-09-16 DIAGNOSIS — Z95.810 BIVENTRICULAR ICD (IMPLANTABLE CARDIOVERTER-DEFIBRILLATOR) IN PLACE: ICD-10-CM

## 2020-09-16 DIAGNOSIS — I50.22 CHRONIC SYSTOLIC CONGESTIVE HEART FAILURE (HCC): ICD-10-CM

## 2020-09-16 DIAGNOSIS — I10 ESSENTIAL HYPERTENSION: ICD-10-CM

## 2020-09-16 DIAGNOSIS — I20.9 ANGINA, CLASS III (HCC): Primary | ICD-10-CM

## 2020-09-16 DIAGNOSIS — I25.9 MYOCARDIAL ISCHEMIA: ICD-10-CM

## 2020-09-16 DIAGNOSIS — G47.33 OBSTRUCTIVE SLEEP APNEA SYNDROME: ICD-10-CM

## 2020-09-16 DIAGNOSIS — R07.2 PRECORDIAL PAIN: ICD-10-CM

## 2020-09-16 DIAGNOSIS — E78.2 MIXED HYPERLIPIDEMIA: ICD-10-CM

## 2020-09-16 LAB
STRESS BASELINE DIAS BP: 70 MMHG
STRESS BASELINE HR: 70 BPM
STRESS BASELINE SYS BP: 110 MMHG
STRESS PEAK DIAS BP: 70 MMHG
STRESS PEAK SYS BP: 110 MMHG
STRESS PERCENT HR ACHIEVED: 45 %
STRESS POST PEAK HR: 75 BPM
STRESS RATE PRESSURE PRODUCT: 8250 BPM*MMHG
STRESS ST DEPRESSION: 0 MM
STRESS ST ELEVATION: 0 MM
STRESS TARGET HR: 167 BPM

## 2020-09-16 PROCEDURE — 99214 OFFICE O/P EST MOD 30 MIN: CPT | Performed by: INTERNAL MEDICINE

## 2020-09-16 RX ORDER — ISOSORBIDE MONONITRATE 30 MG/1
30 TABLET, EXTENDED RELEASE ORAL DAILY
Qty: 30 TAB | Refills: 5 | Status: SHIPPED | OUTPATIENT
Start: 2020-09-16 | End: 2020-11-12

## 2020-09-16 RX ORDER — ASPIRIN 81 MG/1
81 TABLET ORAL DAILY
Qty: 30 TAB | Refills: 0
Start: 2020-09-16 | End: 2020-11-12

## 2020-09-16 RX ORDER — IPRATROPIUM BROMIDE AND ALBUTEROL SULFATE 2.5; .5 MG/3ML; MG/3ML
3 SOLUTION RESPIRATORY (INHALATION)
COMMUNITY
Start: 2020-08-30 | End: 2021-08-02

## 2020-09-16 NOTE — LETTER
9/16/20 Patient: Nataliya Lord YOB: 1965 Date of Visit: 9/16/2020 Som Gonzales MD 
[address] [place] [id number] VIA In Basket Dear Som Gonzales MD, Thank you for referring Mr. Gin Guerrero to [address] [place] [address] for evaluation. My notes for this consultation are attached. If you have questions, please do not hesitate to call me. I look forward to following your patient along with you.  
 
 
Sincerely, 
 
Rocco Ahumada, MD

## 2020-09-16 NOTE — PROGRESS NOTES
1. Have you been to the ER, urgent care clinic since your last visit? Hospitalized since your last visit? No.    2. Have you seen or consulted any other health care providers outside of the [address] [place] [address] since your last visit? Include any pap smears or colon screening.    No.      Chief Complaint   Patient presents with    Follow-up     had stress today- chest pain and heart flutters

## 2020-09-16 NOTE — PROGRESS NOTES
Subjective/HPI:     Mr. [place] Perdue is a 50 y.o. male is here for new patient consultation. He has a PMHx of HTN, CHF with ICD, AF, MENDY, DM, and TIA. He was seen by JET De Oliveira NP in August for EP f/u and reported ED visit in July for CP. In ED acute w/u was neg for MI and he was d/c with plans for outpt f/u. At that office appt he reported he had continued to have CP symptoms and a stress test was ordered. He was given NTG to take prn. He reports since that appt he continues to have episodes of CP and heart beat fluttering. The NTG relieves the pain. He last took NTG a week ago. He reports the episodes are not exertionally related. He has found no trigger for the CP. He is also having episodes of a heart fluttering on occasion. He reports he had been having CP for awhile, but in July it had increased in frequency. He denies SOB/HAMM, orthopnea, PND, edema, lightheadedness or syncope.          PCP Provider  Bhargavi Reese MD    Patient Active Problem List   Diagnosis Code    Hypertension I10    Dyspnea R06.00    Restrictive lung disease J98.4    CHF (congestive heart failure) ([address] I50.9    Atrial fibrillation with RVR (HCC) I48.91    Obesity, morbid ([address] E66.01    Sleep apnea G47.30    Controlled type 2 diabetes mellitus without complication, without long-term current use of insulin (HCC) E11.9    Cardiomyopathy, nonischemic (HCC) I42.8    Cardiomyopathy (HCC) I42.9    TIA (transient ischemic attack) G45.9       Past Surgical History:   Procedure Laterality Date    CARDIAC SURG PROCEDURE UNLIST  11/14/2018    Implantation of PACEMAKER    HX PACEMAKER Left     defib,     MS INSJ/RPLCMT PERM DFB W/TRNSVNS LDS 1/DUAL CHMBR N/A 3/11/2019    INSERT ICD BIV MULTI performed by Berwyn Bloch, MD at [facility] CARDIAC CATH LAB       Family History   Adopted: Yes   Problem Relation Age of Onset    No Known Problems Mother     No Known Problems Father        Social History     Tobacco Use    Smoking status: Former Smoker     Packs/day: 1.00     Years: 20.00     Pack years: 20.00     Types: Cigarettes     Last attempt to quit: 2/3/2014     Years since quittin.6    Smokeless tobacco: Never Used   Substance Use Topics    Alcohol use: No     Comment: stopped drinking at age 29       Current Outpatient Medications   Medication Sig    albuterol-ipratropium (DUO-NEB) 2.5 mg-0.5 mg/3 ml nebu 3 mL every six (6) hours as needed.  aspirin delayed-release 81 mg tablet Take 1 Tab by mouth daily.  isosorbide mononitrate ER (IMDUR) 30 mg tablet Take 1 Tab by mouth daily.  nitroglycerin (NITROSTAT) 0.4 mg SL tablet 1 Tab by SubLINGual route every five (5) minutes as needed for Chest Pain.  topiramate (TOPAMAX) 50 mg tablet 75 mg p.o. twice daily for 1 week and then 100 mg p.o. twice daily (Patient taking differently: Take 50 mg by mouth. 2 tabs in am and 2 tabs in pm)    potassium chloride SR (K-TAB) 20 mEq tablet 2 tabs po bid    sacubitriL-valsartan (Entresto)  mg tablet Take 1 Tab by mouth two (2) times a day.  furosemide (LASIX) 80 mg tablet Take 2 tabs in the AM, one 2 in the PM    ONETOUCH ULTRA BLUE TEST STRIP strip USE ONE STRIP TO TEST THREE TIMES A DAY BEFORE MEALS    metFORMIN (GLUCOPHAGE) 1,000 mg tablet Take 1,000 mg by mouth two (2) times daily (with meals).  carvedilol (COREG) 12.5 mg tablet TAKE ONE TABLET BY MOUTH TWICE A DAY TO SLOW HEART RATE DOWN.  apixaban (ELIQUIS) 5 mg tablet Take 1 Tab by mouth two (2) times a day.  atorvastatin (LIPITOR) 40 mg tablet TAKE ONE TABLET BY MOUTH DAILY FOR CHOLESTEROL    glipiZIDE (GLUCOTROL) 10 mg tablet TAKE ONE TABLET BY MOUTH TWICE A DAY    MARGIE PEN NEEDLE 32 gauge x \" ndle USE DAILY WITH VICTOZA    liraglutide (VICTOZA) 0.6 mg/0.1 mL (18 mg/3 mL) pnij 0.6 mg daily- 1st week, then 1.2 mg daily- 2nd week, then 1.8 mg daily.  Please give pt needles also    empagliflozin (JARDIANCE) 25 mg tablet Take 1 Tab by mouth Daily (before breakfast).  traMADoL (ULTRAM) 50 mg tablet TAKE ONE TABLET BY MOUTH EVERY 8 HOUR AS NEEDED FOR PAIN    GaviLyte-N 420 gram solution     zolpidem (AMBIEN) 10 mg tablet TAKE ONE TABLET BY MOUTH ONCE NIGHTLY AS NEEDED FOR SLEEP    traZODone (DESYREL) 100 mg tablet Take 1 Tab by mouth nightly. No current facility-administered medications for this visit. Allergies   Allergen Reactions    Codeine Hives    Hydrocodone Itching    Influenza Virus Vaccines Nausea Only     Fever  diarrhea    Mushroom Flavor Swelling    Oxycodone Rash    Pneumococcal 23-Allison Ps Vaccine Nausea and Vomiting     Vomit          I have reviewed the problem list, allergy list, medical history, family, social history and medications. Review of Symptoms:    Review of Systems   Constitutional: Negative for malaise/fatigue. Respiratory: Negative for shortness of breath. Cardiovascular: Positive for chest pain and palpitations. Negative for orthopnea, claudication, leg swelling and PND. Gastrointestinal: Negative for abdominal pain. Musculoskeletal: Negative for joint pain and myalgias. Neurological: Negative for dizziness, sensory change and headaches. Physical Exam:      General: Well developed, in no acute distress, cooperative and alert  HEENT: No carotid bruits, no JVD, trach is midline. Neck Supple, PEERL, EOM intact. Heart:  reg rate and rhythm; normal S1/S2; no murmurs, no gallops or rubs. Respiratory: Clear bilaterally x 4, no wheezing or rales  Abdomen:   Soft, non-tender, no distention, no masses. + BS. Extremities:  Normal cap refill, no cyanosis, atraumatic. No edema. Neuro: A&Ox3, speech clear, gait stable. Skin: Skin color is normal. No rashes or lesions.  Non diaphoretic  Vascular: 2+ pulses symmetric in all extremities    Visit Vitals  /80 (BP 1 Location: Right arm, BP Patient Position: Sitting)   Pulse 72   Resp 18   Ht 6' (1.829 m)   Wt 322 lb 8 oz (146.3 kg)   SpO2 97% BMI 43.74 kg/m²       Cardiographics    EC20 V paced    Cardiology Labs:    Lab Results   Component Value Date/Time    Cholesterol, total 92 (L) 2020 12:25 PM    HDL Cholesterol 25 (L) 2020 12:25 PM    VLDL, calculated 33 2020 12:25 PM    LDL, calculated 34 2020 12:25 PM    LDL, calculated 46 2020 01:40 PM    LDL, calculated Comment 2019 12:14 PM       Lab Results   Component Value Date/Time    Hemoglobin A1c 9.5 (H) 11/15/2018 12:25 AM    Hemoglobin A1c (POC) 6.7 2020 12:25 PM       Lab Results   Component Value Date/Time    Sodium 142 2020 12:25 PM    Potassium 3.7 2020 12:25 PM    Chloride 105 2020 12:25 PM    CO2 22 2020 12:25 PM    Glucose 145 (H) 2020 12:25 PM    BUN 15 2020 12:25 PM    Creatinine 0.93 2020 12:25 PM    BUN/Creatinine ratio 16 2020 12:25 PM    GFR est  2020 12:25 PM    GFR est non-AA 93 2020 12:25 PM    Calcium 9.2 2020 12:25 PM    Anion gap 8 2020 11:49 PM    Bilirubin, total 0.8 2020 11:49 PM    ALT (SGPT) 34 2020 11:49 PM    Alk. phosphatase 72 2020 11:49 PM    Protein, total 7.1 2020 11:49 PM    Albumin 3.5 2020 11:49 PM    Globulin 3.6 2020 11:49 PM    A-G Ratio 1.0 (L) 2020 11:49 PM       Orders Placed This Encounter    METABOLIC PANEL, COMPREHENSIVE    CBC WITH AUTOMATED DIFF    PROTHROMBIN TIME + INR    albuterol-ipratropium (DUO-NEB) 2.5 mg-0.5 mg/3 ml nebu     Sig: 3 mL every six (6) hours as needed.  aspirin delayed-release 81 mg tablet     Sig: Take 1 Tab by mouth daily. Dispense:  30 Tab     Refill:  0    isosorbide mononitrate ER (IMDUR) 30 mg tablet     Sig: Take 1 Tab by mouth daily. Dispense:  30 Tab     Refill:  5        Assessment:     Assessment:       ICD-10-CM ICD-9-CM    1. Angina, class III (HCC)  I20.9 413.9    2. Myocardial ischemia  I25.9 414.8    3.  Precordial pain  R07.2 786.51 ECHO ADULT COMPLETE      METABOLIC PANEL, COMPREHENSIVE      CBC WITH AUTOMATED DIFF      PROTHROMBIN TIME + INR   4. Chronic systolic congestive heart failure (HCC)  I50.22 428.22 ECHO ADULT COMPLETE     989.6 METABOLIC PANEL, COMPREHENSIVE      CBC WITH AUTOMATED DIFF      PROTHROMBIN TIME + INR   5. Essential hypertension  I10 401.9 ECHO ADULT COMPLETE      METABOLIC PANEL, COMPREHENSIVE      CBC WITH AUTOMATED DIFF      PROTHROMBIN TIME + INR   6. Mixed hyperlipidemia  E78.2 272.2 ECHO ADULT COMPLETE      METABOLIC PANEL, COMPREHENSIVE      CBC WITH AUTOMATED DIFF      PROTHROMBIN TIME + INR   7. Permanent atrial fibrillation (HCC)  I48.21 427.31 ECHO ADULT COMPLETE      METABOLIC PANEL, COMPREHENSIVE      CBC WITH AUTOMATED DIFF      PROTHROMBIN TIME + INR   8. Biventricular ICD (implantable cardioverter-defibrillator) in place  Z95.810 V45.02 ECHO ADULT COMPLETE      METABOLIC PANEL, COMPREHENSIVE      CBC WITH AUTOMATED DIFF      PROTHROMBIN TIME + INR   9. Obesity, morbid ([facility]  E66.01 278.01    10. Dilated cardiomyopathy (HCC)  I42.0 425.4    11. Obstructive sleep apnea syndrome  G47.33 327.23         Plan:     Angina class III/high risk myocardial ischemia:  Patient having episodes of CP relieved by NTG, most recently last week. He is a 50 with multiple cardiac risk factors and ischemia on stress lexiscan. I have discussed the risks and benefits of cardiac cath +/- PCI- the patient expressed understanding and wishes to proceed. Start Imdur 30mg daily, ASA 81mg daily. The patient knows to go to the ER if any symptoms not relieved promptly by rest.CPT code 34184,94814. Chronic systolic congestive heart failure ([facility]  2/20 EF 25-30%. BIV ICD in place. Appears compensated. Repeat echo now prior to catheterization, especially since ejection fraction on nuclear stress test was in the 46s. Cont BB, Entresto, and Lasix    Essential hypertension  Well controlled.  Continue current regimen    Mixed hyperlipidemia  On statin. LDL 34 in August. Labs managed by PCP    Permanent afib s/p AV node ablation  No events of afib with rvr noted on device interrogation in August. On Eliquis and BB. Hold Eliquis the day before and the day of catheterization. Managed by Dr Monisha Sarmiento.     Gume Sloan interrogation in August showed appropriate function. 1 NSVT x 10 beats. V rate 200. F/u after cath.

## 2020-09-17 DIAGNOSIS — I50.22 CHRONIC SYSTOLIC CONGESTIVE HEART FAILURE (HCC): ICD-10-CM

## 2020-09-17 DIAGNOSIS — E78.2 MIXED HYPERLIPIDEMIA: ICD-10-CM

## 2020-09-17 DIAGNOSIS — Z95.810 BIVENTRICULAR ICD (IMPLANTABLE CARDIOVERTER-DEFIBRILLATOR) IN PLACE: ICD-10-CM

## 2020-09-17 DIAGNOSIS — I48.21 PERMANENT ATRIAL FIBRILLATION (HCC): ICD-10-CM

## 2020-09-17 DIAGNOSIS — R07.2 PRECORDIAL PAIN: ICD-10-CM

## 2020-09-17 DIAGNOSIS — I10 ESSENTIAL HYPERTENSION: ICD-10-CM

## 2020-09-17 NOTE — PERIOP NOTES
Patient denied any COVID-19 symptoms or possible exposure that would require a  pre-procedural COVID-19 test; no COVID-19 test scheduled for the 9/22 Cath Lab procedure.

## 2020-09-18 LAB
ALBUMIN SERPL-MCNC: 4.3 G/DL (ref 3.8–4.9)
ALBUMIN/GLOB SERPL: 2 {RATIO} (ref 1.2–2.2)
ALP SERPL-CCNC: 72 IU/L (ref 39–117)
ALT SERPL-CCNC: 32 IU/L (ref 0–44)
AST SERPL-CCNC: 17 IU/L (ref 0–40)
BASOPHILS # BLD AUTO: 0.1 X10E3/UL (ref 0–0.2)
BASOPHILS NFR BLD AUTO: 1 %
BILIRUB SERPL-MCNC: 0.7 MG/DL (ref 0–1.2)
BUN SERPL-MCNC: 22 MG/DL (ref 6–24)
BUN/CREAT SERPL: 18 (ref 9–20)
CALCIUM SERPL-MCNC: 9 MG/DL (ref 8.7–10.2)
CHLORIDE SERPL-SCNC: 107 MMOL/L (ref 96–106)
CO2 SERPL-SCNC: 20 MMOL/L (ref 20–29)
CREAT SERPL-MCNC: 1.23 MG/DL (ref 0.76–1.27)
EOSINOPHIL # BLD AUTO: 0.1 X10E3/UL (ref 0–0.4)
EOSINOPHIL NFR BLD AUTO: 1 %
ERYTHROCYTE [DISTWIDTH] IN BLOOD BY AUTOMATED COUNT: 14.7 % (ref 11.6–15.4)
GLOBULIN SER CALC-MCNC: 2.2 G/DL (ref 1.5–4.5)
GLUCOSE SERPL-MCNC: 159 MG/DL (ref 65–99)
HCT VFR BLD AUTO: 53.7 % (ref 37.5–51)
HGB BLD-MCNC: 17.7 G/DL (ref 13–17.7)
IMM GRANULOCYTES # BLD AUTO: 0 X10E3/UL (ref 0–0.1)
IMM GRANULOCYTES NFR BLD AUTO: 0 %
INR PPP: 1 (ref 0.8–1.2)
LYMPHOCYTES # BLD AUTO: 2.2 X10E3/UL (ref 0.7–3.1)
LYMPHOCYTES NFR BLD AUTO: 27 %
MCH RBC QN AUTO: 28.3 PG (ref 26.6–33)
MCHC RBC AUTO-ENTMCNC: 33 G/DL (ref 31.5–35.7)
MCV RBC AUTO: 86 FL (ref 79–97)
MONOCYTES # BLD AUTO: 0.6 X10E3/UL (ref 0.1–0.9)
MONOCYTES NFR BLD AUTO: 7 %
NEUTROPHILS # BLD AUTO: 5.2 X10E3/UL (ref 1.4–7)
NEUTROPHILS NFR BLD AUTO: 64 %
PLATELET # BLD AUTO: 178 X10E3/UL (ref 150–450)
POTASSIUM SERPL-SCNC: 4 MMOL/L (ref 3.5–5.2)
PROT SERPL-MCNC: 6.5 G/DL (ref 6–8.5)
PROTHROMBIN TIME: 11 SEC (ref 9.1–12)
RBC # BLD AUTO: 6.25 X10E6/UL (ref 4.14–5.8)
SODIUM SERPL-SCNC: 142 MMOL/L (ref 134–144)
WBC # BLD AUTO: 8.1 X10E3/UL (ref 3.4–10.8)

## 2020-09-22 ENCOUNTER — HOSPITAL ENCOUNTER (OUTPATIENT)
Age: 55
Discharge: HOME | End: 2020-09-22
Attending: INTERNAL MEDICINE | Admitting: INTERNAL MEDICINE
Payer: MEDICAID

## 2020-09-22 VITALS
HEART RATE: 70 BPM | TEMPERATURE: 98.5 F | SYSTOLIC BLOOD PRESSURE: 112 MMHG | RESPIRATION RATE: 15 BRPM | WEIGHT: 315 LBS | OXYGEN SATURATION: 97 % | DIASTOLIC BLOOD PRESSURE: 73 MMHG | BODY MASS INDEX: 42.66 KG/M2 | HEIGHT: 72 IN

## 2020-09-22 DIAGNOSIS — R07.9 CHEST PAIN, UNSPECIFIED TYPE: ICD-10-CM

## 2020-09-22 PROBLEM — I25.9 MYOCARDIAL ISCHEMIA: Status: ACTIVE | Noted: 2020-09-22

## 2020-09-22 PROBLEM — I20.9 ANGINA, CLASS III (HCC): Status: ACTIVE | Noted: 2020-09-22

## 2020-09-22 LAB
GLUCOSE BLD STRIP.AUTO-MCNC: 196 MG/DL (ref 65–100)
SERVICE CMNT-IMP: ABNORMAL

## 2020-09-22 PROCEDURE — 77030015766: Performed by: INTERNAL MEDICINE

## 2020-09-22 PROCEDURE — C1894 INTRO/SHEATH, NON-LASER: HCPCS | Performed by: INTERNAL MEDICINE

## 2020-09-22 PROCEDURE — 74011000250 HC RX REV CODE- 250: Performed by: INTERNAL MEDICINE

## 2020-09-22 PROCEDURE — 77010033678 HC OXYGEN DAILY

## 2020-09-22 PROCEDURE — 77030010221 HC SPLNT WR POS TELE -B: Performed by: INTERNAL MEDICINE

## 2020-09-22 PROCEDURE — 77030008543 HC TBNG MON PRSS MRTM -A: Performed by: INTERNAL MEDICINE

## 2020-09-22 PROCEDURE — 99153 MOD SED SAME PHYS/QHP EA: CPT | Performed by: INTERNAL MEDICINE

## 2020-09-22 PROCEDURE — C1769 GUIDE WIRE: HCPCS | Performed by: INTERNAL MEDICINE

## 2020-09-22 PROCEDURE — 77030004549 HC CATH ANGI DX PRF MRTM -A: Performed by: INTERNAL MEDICINE

## 2020-09-22 PROCEDURE — 99152 MOD SED SAME PHYS/QHP 5/>YRS: CPT | Performed by: INTERNAL MEDICINE

## 2020-09-22 PROCEDURE — 77030019569 HC BND COMPR RAD TERU -B: Performed by: INTERNAL MEDICINE

## 2020-09-22 PROCEDURE — 74011000636 HC RX REV CODE- 636: Performed by: INTERNAL MEDICINE

## 2020-09-22 PROCEDURE — 74011250636 HC RX REV CODE- 250/636: Performed by: INTERNAL MEDICINE

## 2020-09-22 PROCEDURE — 93458 L HRT ARTERY/VENTRICLE ANGIO: CPT | Performed by: INTERNAL MEDICINE

## 2020-09-22 PROCEDURE — 74011250637 HC RX REV CODE- 250/637: Performed by: INTERNAL MEDICINE

## 2020-09-22 PROCEDURE — 82962 GLUCOSE BLOOD TEST: CPT

## 2020-09-22 PROCEDURE — 77030019698 HC SYR ANGI MDLON MRTM -A: Performed by: INTERNAL MEDICINE

## 2020-09-22 RX ORDER — HEPARIN SODIUM 200 [USP'U]/100ML
INJECTION, SOLUTION INTRAVENOUS
Status: COMPLETED | OUTPATIENT
Start: 2020-09-22 | End: 2020-09-22

## 2020-09-22 RX ORDER — MIDAZOLAM HYDROCHLORIDE 1 MG/ML
INJECTION, SOLUTION INTRAMUSCULAR; INTRAVENOUS AS NEEDED
Status: DISCONTINUED | OUTPATIENT
Start: 2020-09-22 | End: 2020-09-22 | Stop reason: HOSPADM

## 2020-09-22 RX ORDER — GUAIFENESIN 100 MG/5ML
LIQUID (ML) ORAL AS NEEDED
Status: DISCONTINUED | OUTPATIENT
Start: 2020-09-22 | End: 2020-09-22 | Stop reason: HOSPADM

## 2020-09-22 RX ORDER — LIDOCAINE HYDROCHLORIDE 10 MG/ML
INJECTION, SOLUTION EPIDURAL; INFILTRATION; INTRACAUDAL; PERINEURAL AS NEEDED
Status: DISCONTINUED | OUTPATIENT
Start: 2020-09-22 | End: 2020-09-22 | Stop reason: HOSPADM

## 2020-09-22 RX ORDER — FENTANYL CITRATE 50 UG/ML
INJECTION, SOLUTION INTRAMUSCULAR; INTRAVENOUS AS NEEDED
Status: DISCONTINUED | OUTPATIENT
Start: 2020-09-22 | End: 2020-09-22 | Stop reason: HOSPADM

## 2020-09-22 RX ORDER — HEPARIN SODIUM 1000 [USP'U]/ML
INJECTION, SOLUTION INTRAVENOUS; SUBCUTANEOUS AS NEEDED
Status: DISCONTINUED | OUTPATIENT
Start: 2020-09-22 | End: 2020-09-22 | Stop reason: HOSPADM

## 2020-09-22 RX ORDER — METFORMIN HYDROCHLORIDE 1000 MG/1
1000 TABLET ORAL 2 TIMES DAILY WITH MEALS
Qty: 1 TAB | Refills: 0 | Status: SHIPPED | OUTPATIENT
Start: 2020-09-24 | End: 2020-11-03 | Stop reason: SDUPTHER

## 2020-09-22 RX ORDER — VERAPAMIL HYDROCHLORIDE 2.5 MG/ML
INJECTION, SOLUTION INTRAVENOUS AS NEEDED
Status: DISCONTINUED | OUTPATIENT
Start: 2020-09-22 | End: 2020-09-22 | Stop reason: HOSPADM

## 2020-09-22 NOTE — DISCHARGE INSTRUCTIONS
[address] [place] [address]  [phone]        Patient ID:  Ayde Back  [id number]  85 y.o.  1965    Admit Date: 9/22/2020    Discharge Date: 9/22/2020     Admitting Physician: Sudhir Angel MD     Discharge Physician: Sudhir Angel MD    Admission Diagnoses:   Chest pain, unspecified type [R07.9]    Discharge Diagnoses:   Principal Problem:    Angina, class III ([address] (9/22/2020)    Active Problems:    Cardiomyopathy ([address] (3/11/2019)      Dyspnea (7/15/2014)      Myocardial ischemia (9/22/2020)      Adult BMI 45.0-49.9 kg/sq m ([facility] (9/22/2020)        Discharge Condition: Good    Cardiology Procedures this Admission:  Diagnostic left heart catheterization    Disposition: home    Reference discharge instructions provided by nursing for diet and activity. Signed:  Sudhir Angel MD  9/22/2020  12:32 PM        Radial Cardiac Catheterization/Angiography Discharge Instructions    It is normal to feel tired the first couple days. Take it easy and follow the physicians instructions. CHECK THE CATHETER INSERTION SITE DAILY:    Remove the wrist dressing 24 hours after the procedure. You may shower 24 hours after the procedure. Wash with soap and water and pat dry. Gentle cleaning of the site with soap and water is sufficient, cover with a dry clean dressing or bandage. Do not apply creams or powders to the area. No soaking the wrist for 3 days. Leave the puncture site open to air after 24 hours post-procedure. CALL THE PHYSICIANS:     If the site becomes red, swollen or feels warm to the touch  If there is bleeding or drainage or if there is unusual pain at the radial site. If there is any minor oozing, you may apply a band-aid and remove after 12 hours.    If the bleeding continues, hold pressure with the middle finger against the puncture site and the thumb against the back of the wrist,call 911 to be transported to the hospital.  DO NOT DRIVE YOURSELF, OR HAVE ANYONE ELSE DRIVE YOU - CALL [phone]. ACTIVITY:   For the first 24 hours do not manipulate the wrist.  No lifting, pushing or pulling over 3-5 pounds with the affected wrist for 7 daysand no straining the insertion site. Do not life grocery bags or the garbage can, do not run the vacuum  or  for 7 days. Start with short walks as in the hospital and gradually increase as tolerated each day. It is recommended to walk 30 minutes 5-7 days per week. Follow your physicians instructions on activity. Avoid walking outside in extremes of heat or cold. Walk inside when it is cold and windy or hot and humid. Things to keep in mind:  No driving for at least 24 hours, or as designated by your physician. Limit the number of times you go up and down the stairs  Take rests and pace yourself with activity. Be careful and do not strain with bowel movements. MEDICATIONS:    Take all medications as prescribed  Call your physician if you have any questions  Keep an updated list of your medications with you at all times and give a list to your physician and pharmacist    SIGNS AND SYMPTOMS:   Be cautious of symptoms of angina or recurrent symptoms such as chest discomfort, unusual shortness of breath or fatigue. These could be symptoms of restenosis, a new blockage or a heart attack. If your symptoms are relieved with rest it is still recommended that you notify your physician of recurrent chest pain or discomfort. For CHEST PAIN or symptoms of angina not relieved with rest:  If the discomfort is not relieved with rest, and you have been prescribed Nitroglycerin, take as directed (taken under the tongue, one at a time 5 minutes apart for a total of 3 doses). If the discomfort is not relieved after the 3rd nitroglycerin, call 911. If you have not been prescribed Nitroglycerin  and your chest discomfort is not relieved with rest, call 911.      AFTER CARE:   Follow up with your physician as instructed. Follow a heart healthy diet with proper portion control, daily stress management, daily exercise, blood pressure and cholesterol control , and smoking cessation.

## 2020-09-22 NOTE — Clinical Note
TRANSFER - OUT REPORT:     Verbal report given to: [place]. Report consisted of patient's Situation, Background, Assessment and   Recommendations(SBAR). Opportunity for questions and clarification was provided. Patient transported with a Registered Nurse and [address] [place] [address] / [facility]. Patient transported to: [facility].

## 2020-09-28 DIAGNOSIS — R52 PAIN: ICD-10-CM

## 2020-09-28 RX ORDER — TRAMADOL HYDROCHLORIDE 50 MG/1
TABLET ORAL
Qty: 60 TAB | Refills: 2 | Status: SHIPPED | OUTPATIENT
Start: 2020-09-28 | End: 2020-10-28

## 2020-10-09 ENCOUNTER — OFFICE VISIT (OUTPATIENT)
Dept: FAMILY MEDICINE CLINIC | Age: 55
End: 2020-10-09
Payer: MEDICAID

## 2020-10-09 DIAGNOSIS — G56.21 ULNAR NEUROPATHY OF RIGHT UPPER EXTREMITY: ICD-10-CM

## 2020-10-09 DIAGNOSIS — M79.641 PAIN OF RIGHT HAND: Primary | ICD-10-CM

## 2020-10-09 PROCEDURE — 99213 OFFICE O/P EST LOW 20 MIN: CPT | Performed by: FAMILY MEDICINE

## 2020-10-09 NOTE — PROGRESS NOTES
HISTORY OF PRESENT ILLNESS  Greg Ponce is a 50 y.o. male. HPI has been having sharp pains in L 5th finger and ulnar side of l wrist for a few months. Pain is worse when tries to lift objects. Occ neck pain. Had an asthma attack in [employer] last week. Went to truck used his inhaler- helped some. Going for an echo in a few weeks. Had a cardiac cath 3 weeks ago, showing nonobsrtuctive cad. Taking atorvastatin regularly. ROS    Physical Exam  Vitals signs and nursing note reviewed. Constitutional:       Appearance: He is well-developed. HENT:      Right Ear: External ear normal.      Left Ear: External ear normal.   Neck:      Thyroid: No thyromegaly. Cardiovascular:      Rate and Rhythm: Normal rate and regular rhythm. Heart sounds: Normal heart sounds. Pulmonary:      Effort: Pulmonary effort is normal. No respiratory distress. Breath sounds: Normal breath sounds. No wheezing. Abdominal:      General: Bowel sounds are normal. There is no distension. Palpations: Abdomen is soft. There is no mass. Tenderness: There is no abdominal tenderness. There is no guarding. Musculoskeletal: Normal range of motion. Comments: Neck- full rom   R hand- tendernss 5th MT, also is tender over distal 1/3 of ulnar bone. Some weakness in spreading fingers of R hand   Lymphadenopathy:      Cervical: No cervical adenopathy. ASSESSMENT and PLAN  Orders Placed This Encounter    XR HAND RT AP/LAT    XR WRIST RT AP/LAT    REFERRAL TO HAND SURGERY     Diagnoses and all orders for this visit:    1. Pain of right hand  -     XR HAND RT AP/LAT; Future  -     XR WRIST RT AP/LAT; Future    2. Ulnar neuropathy of right upper extremity  -     REFERRAL TO HAND SURGERY      Follow-up and Dispositions    · Return in about 3 months (around 1/9/2021).

## 2020-10-09 NOTE — PROGRESS NOTES
Chief Complaint   Patient presents with    Follow Up Chronic Condition     1. Have you been to the ER, urgent care clinic since your last visit? Hospitalized since your last visit? No    2. Have you seen or consulted any other health care providers outside of the [address] [place] [address] since your last visit? Include any pap smears or colon screening. Yes Reason for visit: GI for upcoming colonoscopy     Patient here for routine follow up.

## 2020-10-21 ENCOUNTER — TELEPHONE (OUTPATIENT)
Dept: FAMILY MEDICINE CLINIC | Age: 55
End: 2020-10-21

## 2020-10-21 ENCOUNTER — OFFICE VISIT (OUTPATIENT)
Dept: NEUROLOGY | Age: 55
End: 2020-10-21
Payer: MEDICAID

## 2020-10-21 VITALS
WEIGHT: 315 LBS | TEMPERATURE: 97.6 F | SYSTOLIC BLOOD PRESSURE: 110 MMHG | BODY MASS INDEX: 43.81 KG/M2 | DIASTOLIC BLOOD PRESSURE: 65 MMHG

## 2020-10-21 DIAGNOSIS — I10 ESSENTIAL HYPERTENSION: ICD-10-CM

## 2020-10-21 DIAGNOSIS — G45.9 TIA (TRANSIENT ISCHEMIC ATTACK): Primary | ICD-10-CM

## 2020-10-21 DIAGNOSIS — G43.009 MIGRAINE WITHOUT AURA AND WITHOUT STATUS MIGRAINOSUS, NOT INTRACTABLE: ICD-10-CM

## 2020-10-21 DIAGNOSIS — I48.91 ATRIAL FIBRILLATION, UNSPECIFIED TYPE (HCC): ICD-10-CM

## 2020-10-21 DIAGNOSIS — E11.9 CONTROLLED TYPE 2 DIABETES MELLITUS WITHOUT COMPLICATION, UNSPECIFIED WHETHER LONG TERM INSULIN USE (HCC): ICD-10-CM

## 2020-10-21 DIAGNOSIS — E78.2 MIXED HYPERLIPIDEMIA: ICD-10-CM

## 2020-10-21 PROCEDURE — 99214 OFFICE O/P EST MOD 30 MIN: CPT | Performed by: PSYCHIATRY & NEUROLOGY

## 2020-10-21 RX ORDER — DIVALPROEX SODIUM 500 MG/1
500 TABLET, DELAYED RELEASE ORAL DAILY
Qty: 30 TAB | Refills: 2 | Status: ON HOLD | OUTPATIENT
Start: 2020-10-21 | End: 2022-06-20

## 2020-10-21 NOTE — TELEPHONE ENCOUNTER
----- Message from [place] sent at 10/21/2020 12:57 PM EDT -----  Regarding: Dr. Gerardo Horn Message/Vendor Calls    Caller's first and last name: Jacquelyn Calles      Reason for call: Pt is requesting a call back to check status of an appt that was to be made with a hand specialist that Dr. Ludy Dow was recommending.        Callback required yes/no and why: yes      Best contact number(s):[phone]      Details to clarify the request: [facility]

## 2020-10-21 NOTE — PROGRESS NOTES
NEUROLOGY NOTE     Chief complaint: Left-sided numbness    SUBJECTIVE:  Rima Van is a 50 y.o. male who presents to the neurology office for management of TIA. The patient presented to the hospital because of left-sided numbness. Patient states that he has had 2 strokes in the past and does have mild left-sided residual weakness but no numbness. On 2/11/2020, he woke up with left-sided numbness. It involves the left face, arm and leg. The symptoms lasted for around 2 hours and then resolved. He did have associated photophobia. He continues to have minimal left-sided numbness. The patient does also have frontal headaches and it is associated with nausea but no vomiting. This has been going on for the last 2 weeks. He  started having headache around 2013 and has gotten worse since Feb 2020. His  headaches are holocepalic. It is 10/10 in severity. They are sharp, dull and pounding in character. It lasts for 2 hrs. It is associated with photophobia and phonophobia. Patient does have hypertension, diabetes and quit smoking around 7 years ago. The patient does also have history of 2 strokes. Interval history: There is been no recurrence of symptoms. Patient was started on aspirin 81 mg a day in the hospital in addition to his Eliquis. His headaches are about the same. Patient is taking Topamax 100 mg p.o. twice daily with improvement. Baseline headache frequency: 4/wk  Headache frequency now: 4/wk    Risk Factors for headaches  Smoking: quit 2013  Coffee: rarely cups/day  Tea: decaf 2 litres/day  Soda: less than 1 cans/day  Water: 15 glasses/day  Sleeps at 12 am and wakes up at 10 am. He does not know if he does snore. He does have MENDY.      Medications tried  Preventative therapy:  Topamax    Abortive therapy:  Aspirin     ROS  A ten system review of constitutional, cardiovascular, respiratory, musculoskeletal, endocrine, skin, SHEENT, genitourinary, psychiatric and neurologic systems was obtained and is unremarkable except as stated in HPI     PMH  Past Medical History:   Diagnosis Date   [facility]) 1/6/15    [facility] ED. Pt reported this    Arthritis     hands    Asthma     Atrial fibrillation with RVR ([address] 2017    Cardiomyopathy (HCC)     ET 25-30%    Carpal tunnel syndrome     Chronic obstructive pulmonary disease (HCC)     Diabetes ([facility])     Type II    Gastrointestinal disorder     GERD    GERD (gastroesophageal reflux disease)     Heart failure ([facility])     High cholesterol     Hypertension     Restrictive lung disease     FVC2.59 (52%), FEV1 1.95 (49)- 2017    S/P cardiac cath     Dr. [place], 2016.  minimal cad    Sleep apnea     No CPAP    Stroke ([facility])     TIA x 4 - weak on left side       FH  Family History   Adopted: Yes   Problem Relation Age of Onset    No Known Problems Mother     No Known Problems Father        SH  Social History     Socioeconomic History    Marital status: SINGLE     Spouse name: Not on file    Number of children: Not on file    Years of education: Not on file    Highest education level: Not on file   Tobacco Use    Smoking status: Former Smoker     Packs/day: 1.00     Years: 20.00     Pack years: 20.00     Types: Cigarettes     Last attempt to quit: 2/3/2014     Years since quittin.7    Smokeless tobacco: Never Used   Substance and Sexual Activity    Alcohol use: No     Comment: stopped drinking at age 29    Drug use: No    Sexual activity: Not Currently   Social History Narrative    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        ALLERGIES  Allergies   Allergen Reactions    Codeine Hives    Hydrocodone Itching    Influenza Virus Vaccines Nausea Only     Fever  diarrhea    Mushroom Flavor Swelling    Oxycodone Rash    Pneumococcal 23-Allison Ps Vaccine Nausea and Vomiting     Vomit         PHYSICAL EXAMINATION:   Visit Vitals  /65   Temp 97.6 °F (36.4 °C)   Wt 323 lb (146.5 kg)   BMI 43.81 kg/m² General:   General appearance: Pt is in no acute distress   Distal pulses are preserved    Neurological Examination:   Mental Status:  AAO x3. Speech is fluent. Follows commands, has normal fund of knowledge, attention, short term recall, comprehension and insight. Cranial Nerves: Visual fields are full. PERRL, Extraocular movements are full. Facial sensationdecreased on the left. Facial movement intact. Hearing intact to conversation. Palate elevates symmetrically. Shoulder shrug symmetric. Tongue midline. Motor: Strength is 5/5 on the right and 4+ out of 5 in the left upper and lower extremities. Normal tone. No atrophy. Sensation: Light touch -decreased in the left upper and lower extremity    Reflexes: DTRs 2+ throughout. Plantar responses downgoing. Coordination/Cerebellar: Intact to finger-nose-finger     Gait: Casual gait is normal.     Skin: No significant bruising or lacerations. LAB DATA REVIEWED:    Results for orders placed or performed during the hospital encounter of 09/22/20   GLUCOSE, POC   Result Value Ref Range    Glucose (POC) 196 (H) 65 - 100 mg/dL    Performed by Marely Angel          Current Outpatient Medications   Medication Sig Dispense Refill    divalproex DR (DEPAKOTE) 500 mg tablet Take 1 Tab by mouth daily. 30 Tab 2    traMADoL (ULTRAM) 50 mg tablet TAKE ONE TABLET BY MOUTH EVERY 8 HOURS AS NEEDED FOR PAIN 60 Tab 2    metFORMIN (GLUCOPHAGE) 1,000 mg tablet Take 1 Tab by mouth two (2) times daily (with meals). Resume as previously prescribed on 9/24/2020 1 Tab 0    albuterol-ipratropium (DUO-NEB) 2.5 mg-0.5 mg/3 ml nebu 3 mL every six (6) hours as needed.  aspirin delayed-release 81 mg tablet Take 1 Tab by mouth daily. 30 Tab 0    isosorbide mononitrate ER (IMDUR) 30 mg tablet Take 1 Tab by mouth daily. 30 Tab 5    nitroglycerin (NITROSTAT) 0.4 mg SL tablet 1 Tab by SubLINGual route every five (5) minutes as needed for Chest Pain.  25 Tab 1  topiramate (TOPAMAX) 50 mg tablet 75 mg p.o. twice daily for 1 week and then 100 mg p.o. twice daily (Patient taking differently: Take 100 mg by mouth. Taking 100 mg twice daily) 120 Tab 2    GaviLyte-N 420 gram solution       potassium chloride SR (K-TAB) 20 mEq tablet 2 tabs po bid 120 Tab 12    sacubitriL-valsartan (Entresto)  mg tablet Take 1 Tab by mouth two (2) times a day. 60 Tab 5    zolpidem (AMBIEN) 10 mg tablet TAKE ONE TABLET BY MOUTH ONCE NIGHTLY AS NEEDED FOR SLEEP 30 Tab 4    traZODone (DESYREL) 100 mg tablet Take 1 Tab by mouth nightly. 30 Tab 5    furosemide (LASIX) 80 mg tablet Take 2 tabs in the AM, one 2 in the  Tab 12    ONETOUCH ULTRA BLUE TEST STRIP strip USE ONE STRIP TO TEST THREE TIMES A DAY BEFORE MEALS 100 Strip 11    carvedilol (COREG) 12.5 mg tablet TAKE ONE TABLET BY MOUTH TWICE A DAY TO SLOW HEART RATE DOWN. 60 Tab 11    apixaban (ELIQUIS) 5 mg tablet Take 1 Tab by mouth two (2) times a day. 60 Tab 11    atorvastatin (LIPITOR) 40 mg tablet TAKE ONE TABLET BY MOUTH DAILY FOR CHOLESTEROL 30 Tab 12    glipiZIDE (GLUCOTROL) 10 mg tablet TAKE ONE TABLET BY MOUTH TWICE A DAY 60 Tab 11    MARGIE PEN NEEDLE 32 gauge x 5/32\" ndle USE DAILY WITH VICTOZA 100 Pen Needle 11    liraglutide (VICTOZA) 0.6 mg/0.1 mL (18 mg/3 mL) pnij 0.6 mg daily- 1st week, then 1.2 mg daily- 2nd week, then 1.8 mg daily. Please give pt needles also 1 Pen 12    empagliflozin (JARDIANCE) 25 mg tablet Take 1 Tab by mouth Daily (before breakfast). 90 Tab 3       Stroke workup  2/14/2020  CT Head/CTA Head and neck  1. No hemodynamically significant extracranial ICA stenosis (using NASCET  criteria). 2. No large vessel occlusion or significant intracranial stenosis. 3. No acute intracranial process.     TTE:   Ejection fraction 25 to 30%    Stroke labs:  HgBA1c    Lab Results   Component Value Date/Time    Hemoglobin A1c 9.5 (H) 11/15/2018 12:25 AM     LDL   Lab Results   Component Value Date/Time    LDL, calculated 34 08/14/2020 12:25 PM       IMPRESSION:  Dc Varma is a 50 y.o. male who presents to the neurology office for management of TIA. Patient came to the hospital in February 2020 with new onset left-sided numbness that was more pronounced for 2 hours. Patient does have history of 2 strokes in the past with left-sided weakness. Patient was not taking any aspirin but was on Eliquis for atrial fibrillation. .  Patient does have hypertension and diabetes. Patient does have a pacemaker so could not get MRI scan. 1. TIA  2. DM  3. HLD  4. HTN  5. A fib  6. Patient does have chronic migraines    RECOMMENDATIONS:  - BP goal is less than 140/90  - Cont ASA 81 mg daily and continue Eliquis  - Cont atorvastatin 40 mg daily   - Patient is on Topamax 100 mg p.o. twice daily. No improvement in her headaches. We will add Depakote 500 mg daily. If drowsy, will decrease Topamax to 50 mg p.o. twice daily. If no improvement overall, will discuss about Botox and CGRP inhibitors.     Follow-up in 3 months      Da Piedra MD  Neurologist

## 2020-10-22 ENCOUNTER — ANCILLARY PROCEDURE (OUTPATIENT)
Dept: CARDIOLOGY CLINIC | Age: 55
End: 2020-10-22
Payer: MEDICAID

## 2020-10-22 VITALS
BODY MASS INDEX: 42.66 KG/M2 | SYSTOLIC BLOOD PRESSURE: 110 MMHG | DIASTOLIC BLOOD PRESSURE: 65 MMHG | WEIGHT: 315 LBS | HEIGHT: 72 IN

## 2020-10-22 LAB
ECHO AO ASC DIAM: 3.48 CM
ECHO AO ROOT DIAM: 3.72 CM
ECHO AV PEAK GRADIENT: 5.61 MMHG
ECHO AV PEAK VELOCITY: 118.4 CM/S
ECHO EST RA PRESSURE: 3 MMHG
ECHO LA AREA 4C: 29.94 CM2
ECHO LA MAJOR AXIS: 6.1 CM
ECHO LA MINOR AXIS: 2.34 CM
ECHO LA VOL 2C: 145.49 ML (ref 18–58)
ECHO LA VOL 4C: 105.87 ML (ref 18–58)
ECHO LA VOL BP: 134.47 ML (ref 18–58)
ECHO LA VOL/BSA BIPLANE: 51.49 ML/M2 (ref 16–28)
ECHO LA VOLUME INDEX A2C: 55.71 ML/M2 (ref 16–28)
ECHO LA VOLUME INDEX A4C: 40.54 ML/M2 (ref 16–28)
ECHO LV E' LATERAL VELOCITY: 7.73 CM/S
ECHO LV E' SEPTAL VELOCITY: 5.27 CM/S
ECHO LV INTERNAL DIMENSION DIASTOLIC: 5.64 CM (ref 4.2–5.9)
ECHO LV INTERNAL DIMENSION SYSTOLIC: 4.29 CM
ECHO LV ISOVOLUMETRIC RELAXATION TIME (IVRT): 79.92 MS
ECHO LV IVSD: 1.88 CM (ref 0.6–1)
ECHO LV MASS 2D: 549 G (ref 88–224)
ECHO LV MASS INDEX 2D: 210.2 G/M2 (ref 49–115)
ECHO LV POSTERIOR WALL DIASTOLIC: 1.91 CM (ref 0.6–1)
ECHO LVOT PEAK GRADIENT: 2.96 MMHG
ECHO LVOT PEAK VELOCITY: 85.96 CM/S
ECHO MV "A" WAVE DURATION: 100.86 MS
ECHO MV A VELOCITY: 26.17 CM/S
ECHO MV AREA PHT: 3.32 CM2
ECHO MV E DECELERATION TIME (DT): 228.66 MS
ECHO MV E VELOCITY: 84.2 CM/S
ECHO MV E/A RATIO: 3.22
ECHO MV E/E' LATERAL: 10.89
ECHO MV E/E' RATIO (AVERAGED): 13.43
ECHO MV E/E' SEPTAL: 15.98
ECHO MV PRESSURE HALF TIME (PHT): 66.31 MS
ECHO RIGHT VENTRICULAR SYSTOLIC PRESSURE (RVSP): 19.55 MMHG
ECHO TV REGURGITANT MAX VELOCITY: 203.44 CM/S
ECHO TV REGURGITANT PEAK GRADIENT: 16.55 MMHG

## 2020-10-22 PROCEDURE — 93306 TTE W/DOPPLER COMPLETE: CPT | Performed by: INTERNAL MEDICINE

## 2020-10-23 ENCOUNTER — TELEPHONE (OUTPATIENT)
Dept: CARDIOLOGY CLINIC | Age: 55
End: 2020-10-23

## 2020-10-23 NOTE — PROGRESS NOTES
Spoke to patient using 2 identifiers. Per Hiram Bell NP, patient was made aware that echocardiogram is normal, ejection fraction 55-60%. No concern for heart failure at this time. Valves working appropriately. Patient verbalized understanding.

## 2020-10-23 NOTE — TELEPHONE ENCOUNTER
----- Message from Michael Jansen NP sent at 10/23/2020  9:56 AM EDT -----  Beronica Covarrubias,    Please call the patient and inform that echocardiogram is normal, ejection fraction 55-60%. No concern for heart failure at this time. Valves working appropriately.     Thanks,  [employer]

## 2020-10-23 NOTE — PROGRESS NOTES
Francisca,    Please call the patient and inform that echocardiogram is normal, ejection fraction 55-60%. No concern for heart failure at this time. Valves working appropriately.     Thanks,  [employer]

## 2020-10-27 ENCOUNTER — TELEPHONE (OUTPATIENT)
Dept: FAMILY MEDICINE CLINIC | Age: 55
End: 2020-10-27

## 2020-10-27 ENCOUNTER — TELEPHONE (OUTPATIENT)
Dept: ENDOCRINOLOGY | Age: 55
End: 2020-10-27

## 2020-10-27 NOTE — TELEPHONE ENCOUNTER
----- Message from Stiven Lacy sent at 10/27/2020 12:47 PM EDT -----  Regarding: Dr. Onofre Eugene telephone  General Message/Vendor Calls    Caller's first and last name: Pt      Reason for call: Pt requesting new diabetes specialist NOT located at [facility] required yes/no and why: yes, consult and refer      Best contact number(s): [phone]      Details to clarify the request: n/a       Stiven Lacy

## 2020-10-28 ENCOUNTER — HOSPITAL ENCOUNTER (EMERGENCY)
Age: 55
Discharge: HOME | End: 2020-10-28
Attending: EMERGENCY MEDICINE
Payer: MEDICAID

## 2020-10-28 ENCOUNTER — TELEPHONE (OUTPATIENT)
Dept: FAMILY MEDICINE CLINIC | Age: 55
End: 2020-10-28

## 2020-10-28 ENCOUNTER — APPOINTMENT (OUTPATIENT)
Dept: CT IMAGING | Age: 55
End: 2020-10-28
Attending: EMERGENCY MEDICINE
Payer: MEDICAID

## 2020-10-28 VITALS
RESPIRATION RATE: 21 BRPM | WEIGHT: 315 LBS | HEIGHT: 72 IN | OXYGEN SATURATION: 98 % | TEMPERATURE: 98.6 F | SYSTOLIC BLOOD PRESSURE: 128 MMHG | BODY MASS INDEX: 42.66 KG/M2 | DIASTOLIC BLOOD PRESSURE: 88 MMHG | HEART RATE: 70 BPM

## 2020-10-28 DIAGNOSIS — E11.21 TYPE 2 DIABETES WITH NEPHROPATHY (HCC): Primary | ICD-10-CM

## 2020-10-28 DIAGNOSIS — R31.0 GROSS HEMATURIA: ICD-10-CM

## 2020-10-28 DIAGNOSIS — R10.9 ACUTE RIGHT FLANK PAIN: ICD-10-CM

## 2020-10-28 DIAGNOSIS — N32.89 MASS OF URINARY BLADDER: Primary | ICD-10-CM

## 2020-10-28 DIAGNOSIS — E11.9 CONTROLLED TYPE 2 DIABETES MELLITUS WITHOUT COMPLICATION, WITHOUT LONG-TERM CURRENT USE OF INSULIN (HCC): ICD-10-CM

## 2020-10-28 LAB
ALBUMIN SERPL-MCNC: 3.6 G/DL (ref 3.5–5)
ALBUMIN/GLOB SERPL: 1 {RATIO} (ref 1.1–2.2)
ALP SERPL-CCNC: 82 U/L (ref 45–117)
ALT SERPL-CCNC: 37 U/L (ref 12–78)
ANION GAP SERPL CALC-SCNC: 8 MMOL/L (ref 5–15)
APPEARANCE UR: CLEAR
AST SERPL-CCNC: 17 U/L (ref 15–37)
BACTERIA URNS QL MICRO: NEGATIVE /HPF
BASOPHILS # BLD: 0.1 K/UL (ref 0–0.1)
BASOPHILS NFR BLD: 1 % (ref 0–1)
BILIRUB SERPL-MCNC: 0.6 MG/DL (ref 0.2–1)
BILIRUB UR QL: NEGATIVE
BUN SERPL-MCNC: 21 MG/DL (ref 6–20)
BUN/CREAT SERPL: 17 (ref 12–20)
CALCIUM SERPL-MCNC: 9.1 MG/DL (ref 8.5–10.1)
CHLORIDE SERPL-SCNC: 108 MMOL/L (ref 97–108)
CO2 SERPL-SCNC: 26 MMOL/L (ref 21–32)
COLOR UR: NORMAL
CREAT SERPL-MCNC: 1.26 MG/DL (ref 0.7–1.3)
DIFFERENTIAL METHOD BLD: ABNORMAL
EOSINOPHIL # BLD: 0.1 K/UL (ref 0–0.4)
EOSINOPHIL NFR BLD: 1 % (ref 0–7)
EPITH CASTS URNS QL MICRO: NORMAL /LPF
ERYTHROCYTE [DISTWIDTH] IN BLOOD BY AUTOMATED COUNT: 15.6 % (ref 11.5–14.5)
GLOBULIN SER CALC-MCNC: 3.7 G/DL (ref 2–4)
GLUCOSE SERPL-MCNC: 131 MG/DL (ref 65–100)
GLUCOSE UR STRIP.AUTO-MCNC: NEGATIVE MG/DL
HCT VFR BLD AUTO: 48.5 % (ref 36.6–50.3)
HGB BLD-MCNC: 16.2 G/DL (ref 12.1–17)
HGB UR QL STRIP: NEGATIVE
HYALINE CASTS URNS QL MICRO: NORMAL /LPF (ref 0–5)
IMM GRANULOCYTES # BLD AUTO: 0 K/UL (ref 0–0.04)
IMM GRANULOCYTES NFR BLD AUTO: 0 % (ref 0–0.5)
KETONES UR QL STRIP.AUTO: NEGATIVE MG/DL
LEUKOCYTE ESTERASE UR QL STRIP.AUTO: NEGATIVE
LYMPHOCYTES # BLD: 2.4 K/UL (ref 0.8–3.5)
LYMPHOCYTES NFR BLD: 23 % (ref 12–49)
MCH RBC QN AUTO: 28.9 PG (ref 26–34)
MCHC RBC AUTO-ENTMCNC: 33.4 G/DL (ref 30–36.5)
MCV RBC AUTO: 86.5 FL (ref 80–99)
MONOCYTES # BLD: 0.7 K/UL (ref 0–1)
MONOCYTES NFR BLD: 7 % (ref 5–13)
NEUTS SEG # BLD: 6.9 K/UL (ref 1.8–8)
NEUTS SEG NFR BLD: 68 % (ref 32–75)
NITRITE UR QL STRIP.AUTO: NEGATIVE
NRBC # BLD: 0 K/UL (ref 0–0.01)
NRBC BLD-RTO: 0 PER 100 WBC
PH UR STRIP: 6.5 [PH] (ref 5–8)
PLATELET # BLD AUTO: 171 K/UL (ref 150–400)
PMV BLD AUTO: 10.5 FL (ref 8.9–12.9)
POTASSIUM SERPL-SCNC: 3.2 MMOL/L (ref 3.5–5.1)
PROT SERPL-MCNC: 7.3 G/DL (ref 6.4–8.2)
PROT UR STRIP-MCNC: NEGATIVE MG/DL
RBC # BLD AUTO: 5.61 M/UL (ref 4.1–5.7)
RBC #/AREA URNS HPF: NORMAL /HPF (ref 0–5)
SODIUM SERPL-SCNC: 142 MMOL/L (ref 136–145)
SP GR UR REFRACTOMETRY: 1.01 (ref 1–1.03)
UA: UC IF INDICATED,UAUC: NORMAL
UROBILINOGEN UR QL STRIP.AUTO: 1 EU/DL (ref 0.2–1)
WBC # BLD AUTO: 10.1 K/UL (ref 4.1–11.1)
WBC URNS QL MICRO: NORMAL /HPF (ref 0–4)

## 2020-10-28 PROCEDURE — 99284 EMERGENCY DEPT VISIT MOD MDM: CPT

## 2020-10-28 PROCEDURE — 36415 COLL VENOUS BLD VENIPUNCTURE: CPT

## 2020-10-28 PROCEDURE — 80053 COMPREHEN METABOLIC PANEL: CPT

## 2020-10-28 PROCEDURE — 81001 URINALYSIS AUTO W/SCOPE: CPT

## 2020-10-28 PROCEDURE — 74176 CT ABD & PELVIS W/O CONTRAST: CPT

## 2020-10-28 PROCEDURE — 85025 COMPLETE CBC W/AUTO DIFF WBC: CPT

## 2020-10-28 RX ORDER — TRAMADOL HYDROCHLORIDE 50 MG/1
50 TABLET ORAL
Qty: 10 TAB | Refills: 0 | Status: SHIPPED | OUTPATIENT
Start: 2020-10-28 | End: 2020-10-28

## 2020-10-28 RX ORDER — NAPROXEN 250 MG/1
500 TABLET ORAL
Status: DISCONTINUED | OUTPATIENT
Start: 2020-10-28 | End: 2020-10-28 | Stop reason: HOSPADM

## 2020-10-28 RX ORDER — ACETAMINOPHEN 500 MG
1000 TABLET ORAL ONCE
Status: DISCONTINUED | OUTPATIENT
Start: 2020-10-28 | End: 2020-10-28 | Stop reason: HOSPADM

## 2020-10-28 RX ORDER — TRAMADOL HYDROCHLORIDE 50 MG/1
50 TABLET ORAL
Qty: 10 TAB | Refills: 0 | Status: SHIPPED | OUTPATIENT
Start: 2020-10-28 | End: 2020-10-31

## 2020-10-28 NOTE — ED TRIAGE NOTES
Pt arrives via ems ambulatory with c/o R lower flank pain starting today. Pt has hx of kidney stones and feels this is similar.

## 2020-10-28 NOTE — TELEPHONE ENCOUNTER
----- Message from Skipper Layer sent at 10/28/2020 12:39 PM EDT -----  Regarding: Dr. Dale Bear telephone  Patient return call    Caller's first and last name and relationship (if not the patient): pt       Best contact number(s): [phone]      Whose call is being returned: Jean Hannah       Details to clarify the request: referral to see a diabetes specialist.       Severo Ortiz

## 2020-10-28 NOTE — ED PROVIDER NOTES
EMERGENCY DEPARTMENT HISTORY AND PHYSICAL EXAM     ------------------------------------------------------------------------------------------------------  Please note that this dictation was completed with [employer], the [employer] voice recognition software. Quite often unanticipated grammatical, syntax, homophones, and other interpretive errors are inadvertently transcribed by the computer software. Please disregard these errors. Please excuse any errors that have escaped final proofreading.  -----------------------------------------------------------------------------------------------------------------    Date: 10/28/2020  Patient Name: Bonny Castro    History of Presenting Illness     Chief Complaint   Patient presents with    Abdominal Pain     R L abd pain. Pt reports pain with urination and has blood in his urine. History Provided By: Patient    HPI: Bonny Castro is a 50 y.o. male, with significant pmhx of previous kidney stones, HTN, DM, XOL, afib (eliquis) , AICD ([address]) who presents via EMS to the ED with c/o right-sided flank pain. Patient is a symptoms have been waxing waning over the last several weeks with intermittent blood in his urine. Patient reports having acute exacerbation of his pain earlier this evening with more blood in his urine than earlier prompting him to come the emergency department for further evaluation. Pt also specifically denies any recent fevers, chills, CP, SOB, nausea, vomiting, diarrhea, changes in BM, urinary sxs, or headache. PCP: Angelica Vela MD    Social Hx: denies tobacco, denies EtOH, denies Illicit Drugs     There are no other complaints, changes, or physical findings at this time.      Allergies   Allergen Reactions    Codeine Hives    Hydrocodone Itching    Influenza Virus Vaccines Nausea Only     Fever  diarrhea    Mushroom Flavor Swelling    Oxycodone Rash    Pneumococcal 23-Allison Ps Vaccine Nausea and Vomiting     Vomit Current Facility-Administered Medications   Medication Dose Route Frequency Provider Last Rate Last Dose    naproxen (NAPROSYN) tablet 500 mg  500 mg Oral NOW Jameel Mac MD        acetaminophen (TYLENOL) tablet 1,000 mg  1,000 mg Oral ONCE Jameel Mac MD         Current Outpatient Medications   Medication Sig Dispense Refill    traMADoL (ULTRAM) 50 mg tablet Take 1 Tab by mouth every six (6) hours as needed for Pain for up to 3 days. Max Daily Amount: 200 mg. Indications: pain 10 Tab 0    divalproex DR (DEPAKOTE) 500 mg tablet Take 1 Tab by mouth daily. 30 Tab 2    traMADoL (ULTRAM) 50 mg tablet TAKE ONE TABLET BY MOUTH EVERY 8 HOURS AS NEEDED FOR PAIN 60 Tab 2    metFORMIN (GLUCOPHAGE) 1,000 mg tablet Take 1 Tab by mouth two (2) times daily (with meals). Resume as previously prescribed on 9/24/2020 1 Tab 0    albuterol-ipratropium (DUO-NEB) 2.5 mg-0.5 mg/3 ml nebu 3 mL every six (6) hours as needed.  aspirin delayed-release 81 mg tablet Take 1 Tab by mouth daily. 30 Tab 0    isosorbide mononitrate ER (IMDUR) 30 mg tablet Take 1 Tab by mouth daily. 30 Tab 5    nitroglycerin (NITROSTAT) 0.4 mg SL tablet 1 Tab by SubLINGual route every five (5) minutes as needed for Chest Pain. 25 Tab 1    topiramate (TOPAMAX) 50 mg tablet 75 mg p.o. twice daily for 1 week and then 100 mg p.o. twice daily (Patient taking differently: Take 100 mg by mouth. Taking 100 mg twice daily) 120 Tab 2    GaviLyte-N 420 gram solution       potassium chloride SR (K-TAB) 20 mEq tablet 2 tabs po bid 120 Tab 12    sacubitriL-valsartan (Entresto)  mg tablet Take 1 Tab by mouth two (2) times a day. 60 Tab 5    zolpidem (AMBIEN) 10 mg tablet TAKE ONE TABLET BY MOUTH ONCE NIGHTLY AS NEEDED FOR SLEEP 30 Tab 4    traZODone (DESYREL) 100 mg tablet Take 1 Tab by mouth nightly.  30 Tab 5    furosemide (LASIX) 80 mg tablet Take 2 tabs in the AM, one 2 in the  Tab 12    ONETOUCH ULTRA BLUE TEST STRIP strip USE ONE STRIP TO TEST THREE TIMES A DAY BEFORE MEALS 100 Strip 11    carvedilol (COREG) 12.5 mg tablet TAKE ONE TABLET BY MOUTH TWICE A DAY TO SLOW HEART RATE DOWN. 60 Tab 11    apixaban (ELIQUIS) 5 mg tablet Take 1 Tab by mouth two (2) times a day. 60 Tab 11    atorvastatin (LIPITOR) 40 mg tablet TAKE ONE TABLET BY MOUTH DAILY FOR CHOLESTEROL 30 Tab 12    glipiZIDE (GLUCOTROL) 10 mg tablet TAKE ONE TABLET BY MOUTH TWICE A DAY 60 Tab 11    AMRGIE PEN NEEDLE 32 gauge x 5/32\" ndle USE DAILY WITH VICTOZA 100 Pen Needle 11    liraglutide (VICTOZA) 0.6 mg/0.1 mL (18 mg/3 mL) pnij 0.6 mg daily- 1st week, then 1.2 mg daily- 2nd week, then 1.8 mg daily. Please give pt needles also 1 Pen 12    empagliflozin (JARDIANCE) 25 mg tablet Take 1 Tab by mouth Daily (before breakfast). 80 Tab 3       Past History     Past Medical History:  Past Medical History:   Diagnosis Date    [facility]    Arthritis     hands    Asthma     Atrial fibrillation with RVR ([address] 8/11/2017    Cardiomyopathy (HCC)     ET 25-30%    Carpal tunnel syndrome     Chronic obstructive pulmonary disease (HCC)     Diabetes (HCC)     Type II    Gastrointestinal disorder     GERD    GERD (gastroesophageal reflux disease)     Heart failure (HCC)     High cholesterol     Hypertension     Restrictive lung disease     FVC2.59 (52%), FEV1 1.95 (49)- 2017    S/P cardiac cath     Dr. [place], 2016.  minimal cad    Sleep apnea     No CPAP    Stroke (HCC)     TIA x 4 - weak on left side       Past Surgical History:  Past Surgical History:   Procedure Laterality Date    CARDIAC SURG PROCEDURE UNLIST  11/14/2018    Implantation of PACEMAKER    HX PACEMAKER Left     defib,     TN INSJ/RPLCMT PERM DFB W/TRNSVNS LDS 1/DUAL CHMBR N/A 3/11/2019    INSERT ICD BIV MULTI performed by Trudi Dodge MD at [facility] CARDIAC CATH LAB       Family History:  Family History   Adopted: Yes   Problem Relation Age of Onset    No Known Problems Mother     No Known Problems Father        Social History:  Social History     Tobacco Use    Smoking status: Former Smoker     Packs/day: 1.00     Years: 20.00     Pack years: 20.00     Types: Cigarettes     Last attempt to quit: 2/3/2014     Years since quittin.7    Smokeless tobacco: Never Used   Substance Use Topics    Alcohol use: No     Comment: stopped drinking at age 29   Atha Sergio Drug use: No       Allergies: Allergies   Allergen Reactions    Codeine Hives    Hydrocodone Itching    Influenza Virus Vaccines Nausea Only     Fever  diarrhea    Mushroom Flavor Swelling    Oxycodone Rash    Pneumococcal 23-Allison Ps Vaccine Nausea and Vomiting     Vomit           Review of Systems   Review of Systems   Constitutional: Negative for chills and fever. HENT: Negative. Eyes: Negative. Respiratory: Negative for cough, chest tightness and shortness of breath. Cardiovascular: Negative for chest pain and leg swelling. Gastrointestinal: Negative for abdominal pain, diarrhea, nausea and vomiting. Endocrine: Negative. Genitourinary: Positive for flank pain and hematuria. Negative for difficulty urinating and dysuria. Musculoskeletal: Negative for myalgias. Skin: Negative. Neurological: Negative. Psychiatric/Behavioral: Negative. All other systems reviewed and are negative. Physical Exam   Physical Exam  Vitals signs and nursing note reviewed. Constitutional:       General: He is not in acute distress. Appearance: He is well-developed. He is obese. He is not diaphoretic. HENT:      Head: Normocephalic and atraumatic. Nose: Nose normal.      Mouth/Throat:      Pharynx: No oropharyngeal exudate. Eyes:      Conjunctiva/sclera: Conjunctivae normal.      Pupils: Pupils are equal, round, and reactive to light. Neck:      Musculoskeletal: Normal range of motion and neck supple. Vascular: No JVD.    Cardiovascular:      Rate and Rhythm: Normal rate and regular rhythm. Heart sounds: Normal heart sounds. No murmur. No friction rub. Pulmonary:      Effort: Pulmonary effort is normal. No respiratory distress. Breath sounds: Normal breath sounds. No stridor. No wheezing or rales. Abdominal:      General: Bowel sounds are normal. There is no distension. Palpations: Abdomen is soft. Tenderness: There is no abdominal tenderness. There is right CVA tenderness. There is no rebound. Musculoskeletal: Normal range of motion. Lumbar back: He exhibits tenderness and pain. Back:    Skin:     General: Skin is warm and dry. Findings: No rash. Neurological:      Mental Status: He is alert and oriented to person, place, and time. Cranial Nerves: No cranial nerve deficit. Psychiatric:         Speech: Speech normal.         Behavior: Behavior normal.         Thought Content: Thought content normal.         Judgment: Judgment normal.           Diagnostic Study Results     Labs -     Recent Results (from the past 12 hour(s))   CBC WITH AUTOMATED DIFF    Collection Time: 10/28/20 12:53 AM   Result Value Ref Range    WBC 10.1 4.1 - 11.1 K/uL    RBC 5.61 4.10 - 5.70 M/uL    HGB 16.2 12.1 - 17.0 g/dL    HCT 48.5 36.6 - 50.3 %    MCV 86.5 80.0 - 99.0 FL    MCH 28.9 26.0 - 34.0 PG    MCHC 33.4 30.0 - 36.5 g/dL    RDW 15.6 (H) 11.5 - 14.5 %    PLATELET 277 451 - 168 K/uL    MPV 10.5 8.9 - 12.9 FL    NRBC 0.0 0  WBC    ABSOLUTE NRBC 0.00 0.00 - 0.01 K/uL    NEUTROPHILS 68 32 - 75 %    LYMPHOCYTES 23 12 - 49 %    MONOCYTES 7 5 - 13 %    EOSINOPHILS 1 0 - 7 %    BASOPHILS 1 0 - 1 %    IMMATURE GRANULOCYTES 0 0.0 - 0.5 %    ABS. NEUTROPHILS 6.9 1.8 - 8.0 K/UL    ABS. LYMPHOCYTES 2.4 0.8 - 3.5 K/UL    ABS. MONOCYTES 0.7 0.0 - 1.0 K/UL    ABS. EOSINOPHILS 0.1 0.0 - 0.4 K/UL    ABS. BASOPHILS 0.1 0.0 - 0.1 K/UL    ABS. IMM.  GRANS. 0.0 0.00 - 0.04 K/UL    DF AUTOMATED     METABOLIC PANEL, COMPREHENSIVE    Collection Time: 10/28/20 12:53 AM   Result Value Ref Range    Sodium 142 136 - 145 mmol/L    Potassium 3.2 (L) 3.5 - 5.1 mmol/L    Chloride 108 97 - 108 mmol/L    CO2 26 21 - 32 mmol/L    Anion gap 8 5 - 15 mmol/L    Glucose 131 (H) 65 - 100 mg/dL    BUN 21 (H) 6 - 20 MG/DL    Creatinine 1.26 0.70 - 1.30 MG/DL    BUN/Creatinine ratio 17 12 - 20      GFR est AA >60 >60 ml/min/1.73m2    GFR est non-AA 60 (L) >60 ml/min/1.73m2    Calcium 9.1 8.5 - 10.1 MG/DL    Bilirubin, total 0.6 0.2 - 1.0 MG/DL    ALT (SGPT) 37 12 - 78 U/L    AST (SGOT) 17 15 - 37 U/L    Alk. phosphatase 82 45 - 117 U/L    Protein, total 7.3 6.4 - 8.2 g/dL    Albumin 3.6 3.5 - 5.0 g/dL    Globulin 3.7 2.0 - 4.0 g/dL    A-G Ratio 1.0 (L) 1.1 - 2.2     URINALYSIS W/ REFLEX CULTURE    Collection Time: 10/28/20 12:53 AM    Specimen: Urine   Result Value Ref Range    Color YELLOW/STRAW      Appearance CLEAR CLEAR      Specific gravity 1.007 1.003 - 1.030      pH (UA) 6.5 5.0 - 8.0      Protein Negative NEG mg/dL    Glucose Negative NEG mg/dL    Ketone Negative NEG mg/dL    Bilirubin Negative NEG      Blood Negative NEG      Urobilinogen 1.0 0.2 - 1.0 EU/dL    Nitrites Negative NEG      Leukocyte Esterase Negative NEG      WBC 0-4 0 - 4 /hpf    RBC 0-5 0 - 5 /hpf    Epithelial cells FEW FEW /lpf    Bacteria Negative NEG /hpf    UA:UC IF INDICATED CULTURE NOT INDICATED BY UA RESULT CNI      Hyaline cast 0-2 0 - 5 /lpf       Radiologic Studies -   CT ABD PELV WO CONT   Final Result   IMPRESSION:   No acute intraperitoneal process is identified. Incidental and/or nonemergent findings are as described in detail above. Nonspecific soft tissue density at the anterior right corner of the bladder. Nonemergent outpatient neurology consultation recommended. CT Results  (Last 48 hours)               10/28/20 0139  CT ABD PELV WO CONT Final result    Impression:  IMPRESSION:   No acute intraperitoneal process is identified.     Incidental and/or nonemergent findings are as described in detail above. Nonspecific soft tissue density at the anterior right corner of the bladder. Nonemergent outpatient neurology consultation recommended. Narrative:  CLINICAL HISTORY: R flank pain    INDICATION: R flank pain   COMPARISON: 2/27/2019   CONTRAST:  None. TECHNIQUE:    Thin axial images were obtained through the abdomen and pelvis. Coronal and   sagittal reformats were generated. Oral contrast was not administered. CT dose   reduction was achieved through use of a standardized protocol tailored for this   examination and automatic exposure control for dose modulation. The absence of intravenous contrast material reduces the sensitivity for   evaluation of visceral organs and vasculature including presence of small mass   lesions, hemodynamically significant stenoses, dissections, mucosal   abnormalities etc.       FINDINGS:    LOWER THORAX: Cardiac pacer in place. Elevation the right hemidiaphragm. Scattered granulomas. LIVER/GALLBLADDER: Hepatic steatosis Gallbladder is within normal limits. CBD is   not dilated. SPLEEN/PANCREAS:  within normal limits. ADRENALS/KIDNEYS: Left adrenal adenoma No calculus or hydronephrosis. STOMACH: Unremarkable. SMALL BOWEL/COLON: No dilatation or wall thickening. No dilatation or wall   thickening. APPENDIX: Unremarkable. PERITONEUM: No ascites or pneumoperitoneum. RETROPERITONEUM: No lymphadenopathy or aortic aneurysm. REPRODUCTIVE ORGANS: Prostate calcifications. URINARY BLADDER: Nodular bladder density is demonstrated 602-85 approximately 7   mm in size. BONES: Multilevel degenerative change most pronounced at L1-L2. ADDITIONAL COMMENTS: N/A               CXR Results  (Last 48 hours)    None            Medical Decision Making   I am the first provider for this patient. I reviewed the vital signs, available nursing notes, past medical history, past surgical history, family history and social history.     Vital Signs-Reviewed the patient's vital signs. Patient Vitals for the past 12 hrs:   Temp Pulse Resp BP SpO2   10/28/20 0007 98.6 °F (37 °C) 70 21 (!) 129/90 98 %       Pulse Oximetry Analysis - 98% on RA    Records Reviewed/Interpretted: Nursing Notes from triage and Old Medical Records, noting previous cardiology, primary care neurology evaluations in office    Provider Notes (Medical Decision Making):     DDX:  Kidney stone, UTI, pyelonephritis, appendicitis    Plan:  Labs, UA, CT scan    Impression:  Bladder mass, flank pain    ED Course:   Initial assessment performed. The patients presenting problems have been discussed, and they are in agreement with the care plan formulated and outlined with them. I have encouraged them to ask questions as they arise throughout their visit. I reviewed our electronic medical record system for any past medical records that were available that may contribute to the patients current condition, the nursing notes and and vital signs from today's visit  Nursing notes will be reviewed as they become available in realtime while the pt has been in the ED. Darrell Brown MD    HYPERTENSION COUNSELING:  Patient made aware of their elevated blood pressure and is instructed to follow up this week with their Primary Care or [address] [address] for a recheck (should they be discharged.) Patient is counseled regarding consequences of chronic, uncontrolled hypertension including kidney disease, heart disease, stroke or even death. Patient states their understanding    I personally reviewed/interpreted pt's imaging. Agree with official read by radiology as noted above. Darrell Brown MD      2:31 AM  Progress note:  Pt noted to be feeling better, ready for discharge. Discussed lab and imaging findings with pt, specifically noting bladder mass requiring further w/u. Pt will follow up with urology and pcp as instructed.  All questions have been answered, pt voiced understanding and agreement with plan. Specific return precautions provided in addition to instructions for pt to return to the ED immediately should sx worsen at any time. Estrella Byers MD             Critical Care Time:     none      Diagnosis     Clinical Impression:   1. Mass of urinary bladder    2. Acute right flank pain    3. Gross hematuria        PLAN:  1. Current Discharge Medication List      START taking these medications    Details   !! traMADoL (ULTRAM) 50 mg tablet Take 1 Tab by mouth every six (6) hours as needed for Pain for up to 3 days. Max Daily Amount: 200 mg. Indications: pain  Qty: 10 Tab, Refills: 0    Associated Diagnoses: Acute right flank pain       !! - Potential duplicate medications found. Please discuss with provider. CONTINUE these medications which have NOT CHANGED    Details   !! traMADoL (ULTRAM) 50 mg tablet TAKE ONE TABLET BY MOUTH EVERY 8 HOURS AS NEEDED FOR PAIN  Qty: 60 Tab, Refills: 2    Associated Diagnoses: Pain       !! - Potential duplicate medications found. Please discuss with provider. 2.   Follow-up Information     Follow up With Specialties Details Why Contact Info    Loida Duran MD Urology Call today  [address] [place] [address]  [phone]      Emily Wayne MD Family Medicine Schedule an appointment as soon as possible for a visit in 2 days  [address]  [phone]          Return to ED if worse     Disposition:    2:31 AM   The patient's results have been reviewed with family and/or caregiver. They verbally convey their understanding and agreement of the patient's signs, symptoms, diagnosis, treatment and prognosis and additionally agree to follow up as recommended in the discharge instructions or to return to the Emergency Room should the patient's condition change prior to their follow-up appointment.  The family and/or caregiver verbally agrees with the care-plan and all of their questions have been answered. The discharge instructions have also been provided to the them with educational information regarding the patient's diagnosis as well a list of reasons why the patient would want to return to the ER prior to their follow-up appointment should their condition change.   Flores Rose MD

## 2020-10-28 NOTE — DISCHARGE INSTRUCTIONS
Patient Education        Flank Pain: Care Instructions  Your Care Instructions  Flank pain is pain on the side of the back just below the rib cage and above the waist. It can be on one or both sides. Flank pain has many possible causes, including a kidney stone, a urinary tract infection, or back strain. Flank pain may get better on its own. But don't ignore new symptoms, such as fever, nausea and vomiting, urination problems, pain that gets worse, and dizziness. These may be signs of a more serious problem. You may have to have tests or other treatment. Follow-up care is a key part of your treatment and safety. Be sure to make and go to all appointments, and call your doctor if you are having problems. It's also a good idea to know your test results and keep a list of the medicines you take. How can you care for yourself at home? · Rest until you feel better. · Take pain medicines exactly as directed. ? If the doctor gave you a prescription medicine for pain, take it as prescribed. ? If you are not taking a prescription pain medicine, ask your doctor if you can take an over-the-counter pain medicine, such as acetaminophen (Tylenol), ibuprofen (Advil, Motrin), or naproxen (Aleve). Read and follow all instructions on the label. · If your doctor prescribed antibiotics, take them as directed. Do not stop taking them just because you feel better. You need to take the full course of antibiotics. · To apply heat, put a warm water bottle, a heating pad set on low, or a warm cloth on the painful area. Do not go to sleep with a heating pad on your skin. · To prevent dehydration, drink plenty of fluids, enough so that your urine is light yellow or clear like water. Choose water and other caffeine-free clear liquids until you feel better. If you have kidney, heart, or liver disease and have to limit fluids, talk with your doctor before you increase the amount of fluids you drink. When should you call for help? Call your doctor now or seek immediate medical care if:    · Your flank pain gets worse.     · You have new symptoms, such as fever, nausea, or vomiting.     · You have symptoms of a urinary problem. For example:  ? You have blood or pus in your urine. ? You have chills or body aches. ? It hurts to urinate. ? You have groin or belly pain. Watch closely for changes in your health, and be sure to contact your doctor if you do not get better as expected. Where can you learn more? Go to http://www.phillip.com/  Enter S191 in the search box to learn more about \"Flank Pain: Care Instructions. \"  Current as of: June 26, 2019               Content Version: 12.6  © 0507-4956 [employer] Care instructions adapted under license by [employer] (which disclaims liability or warranty for this information). If you have questions about a medical condition or this instruction, always ask your healthcare professional. [place] [address] any warranty or liability for your use of this information.            Bladder Biopsy: About This Test  What is a bladder biopsy? A bladder biopsy is a test that allows a doctor to look at tissue from your bladder. The doctor removes a small sample of tissue from the lining of your bladder. Then another doctor looks at cells from the tissue under a microscope. Why is it done? A bladder biopsy is done to look for the cause of your symptoms. Symptoms may include blood in your urine, pain when you urinate, or needing to urinate often. This test is often done to check for cancer. How do you prepare for the test?  · Follow your doctor's instructions about drinking plenty of fluids before the test.  · You may be asked to empty your bladder right before the test.  · If your doctor prescribed antibiotics or other medicines to take before the test, take them as directed. How is the test done?   · The test may be done in your doctor's office or in a hospital.  · You will be kept comfortable and safe by your doctor or anesthesia provider. The anesthesia may make you sleep. Or it may just numb the area being worked on. · The doctor will put a thin, lighted tool into your urethra. This tool is called a cystoscope or scope. The urethra is the tube that carries urine from the bladder to the outside of the body. · The doctor will gently thread the scope into your bladder. · Your bladder will then be filled with fluid. This stretches the bladder so that your doctor can clearly see the inside of your bladder. · Your doctor will use small tools through the scope to take out a sample of tissue from your bladder. · Another doctor looks at the sample under a microscope. How long does the test take? The test will take about 30 minutes. What happens after the test?  · Your doctor will want to make sure you can urinate before you go home. · You may see some blood in your urine for a day or two. · Your doctor may prescribe medicine for pain or discomfort. · You will probably be able to go home the same day. · Be sure you have someone to take you home. Anesthesia and pain medicine will make it unsafe for you to drive or get home on your own. Follow-up care is a key part of your treatment and safety. Be sure to make and go to all appointments, and call your doctor if you are having problems. Ask your doctor when you can expect to have your test results. Current as of: April 29, 2020               Content Version: 12.6  © 2006-2020 [employer]. Care instructions adapted under license by [employer] (which disclaims liability or warranty for this information).  If you have questions about a medical condition or this instruction, always ask your healthcare professional. [place] [address] any warranty or liability for your use of this information.          Patient Education        Blood in the Urine: Care Instructions  Your Care Instructions     Blood in the urine, or hematuria, may make the urine look red, brown, or pink. There may be blood every time you urinate or just from time to time. You cannot always see blood in the urine, but it will show up in a urine test.  Blood in the urine may be serious. It should always be checked by a doctor. Your doctor may recommend more tests, including an X-ray, a CT scan, or a cystoscopy (which lets a doctor look inside the urethra and bladder). Blood in the urine can be a sign of another problem. Common causes are bladder infections and kidney stones. An injury to your groin or your genital area can also cause bleeding in the urinary tract. Very hard exercise--such as running a marathon--can cause blood in the urine. Blood in the urine can also be a sign of kidney disease or cancer in the bladder or kidney. Many cases of blood in the urine are caused by a harmless condition that runs in families. This is called benign familial hematuria. It does not need any treatment. Sometimes your urine may look red or brown even though it does not contain blood. For example, not getting enough fluids (dehydration), taking certain medicines, or having a liver problem can change the color of your urine. Eating foods such as beets, rhubarb, or blackberries or foods with red food coloring can make your urine look red or pink. Follow-up care is a key part of your treatment and safety. Be sure to make and go to all appointments, and call your doctor if you are having problems. It's also a good idea to know your test results and keep a list of the medicines you take. When should you call for help? Call your doctor now or seek immediate medical care if:    · You have symptoms of a urinary infection. For example:  ? You have pus in your urine. ? You have pain in your back just below your rib cage. This is called flank pain. ? You have a fever, chills, or body aches.   ? It hurts to urinate. ? You have groin or belly pain.     · You have more blood in your urine. Watch closely for changes in your health, and be sure to contact your doctor if:    · You have new urination problems.     · You do not get better as expected. Where can you learn more? Go to http://www.gray.com/  Enter J532 in the search box to learn more about \"Blood in the Urine: Care Instructions. \"  Current as of: June 29, 2020               Content Version: 12.6  © 2006-2020 [employer]. Care instructions adapted under license by [employer] (which disclaims liability or warranty for this information). If you have questions about a medical condition or this instruction, always ask your healthcare professional. [address] any warranty or liability for your use of this information.

## 2020-10-28 NOTE — TELEPHONE ENCOUNTER
Patient needs endocrinology that can see patient in office. NO INTERNET therefore cannot do virtual.  Will call patient back once set up.

## 2020-10-29 ENCOUNTER — PATIENT OUTREACH (OUTPATIENT)
Dept: CASE MANAGEMENT | Age: 55
End: 2020-10-29

## 2020-10-29 ENCOUNTER — VIRTUAL VISIT (OUTPATIENT)
Dept: ENDOCRINOLOGY | Age: 55
End: 2020-10-29
Payer: MEDICAID

## 2020-10-29 DIAGNOSIS — E78.5 HYPERLIPIDEMIA, UNSPECIFIED HYPERLIPIDEMIA TYPE: ICD-10-CM

## 2020-10-29 DIAGNOSIS — E11.9 TYPE 2 DIABETES MELLITUS WITHOUT COMPLICATION, WITHOUT LONG-TERM CURRENT USE OF INSULIN (HCC): Primary | ICD-10-CM

## 2020-10-29 DIAGNOSIS — G43.009 MIGRAINE WITHOUT AURA AND WITHOUT STATUS MIGRAINOSUS, NOT INTRACTABLE: ICD-10-CM

## 2020-10-29 DIAGNOSIS — I10 ESSENTIAL HYPERTENSION WITH GOAL BLOOD PRESSURE LESS THAN 130/80: ICD-10-CM

## 2020-10-29 DIAGNOSIS — E03.9 HYPOTHYROIDISM, UNSPECIFIED TYPE: ICD-10-CM

## 2020-10-29 PROCEDURE — 99443 PR PHYS/QHP TELEPHONE EVALUATION 21-30 MIN: CPT | Performed by: INTERNAL MEDICINE

## 2020-10-29 RX ORDER — TOPIRAMATE 100 MG/1
100 TABLET, FILM COATED ORAL 2 TIMES DAILY
Qty: 180 TAB | Refills: 1 | Status: SHIPPED | OUTPATIENT
Start: 2020-10-29 | End: 2021-05-03

## 2020-10-29 RX ORDER — GLIPIZIDE 10 MG/1
TABLET ORAL
Qty: 60 TAB | Refills: 10 | Status: SHIPPED | OUTPATIENT
Start: 2020-10-29 | End: 2021-10-08 | Stop reason: SDUPTHER

## 2020-10-29 NOTE — ACP (ADVANCE CARE PLANNING)
Advance Care Planning:   Does patient have an Advance Directive: currently not on file; education provided     Patient verified healthcare decision makers as correctly listed in chart: Morales Shaw (sister) [phone] and patient states Jian Chiang (friend) [phone] is going to bring to colonoscopy appointment, but is not actually an emergency contact. Patient requested to leave him on for now and he will request him to be removed after 11/4/20 colonoscopy.    Jade Galvan, RN  Ambulatory Care Manager

## 2020-10-29 NOTE — PROGRESS NOTES
Patient contacted regarding recent discharge and COVID-19 risk. Did not discuss COVID-19 related testing which was not done at this time. Ambulatory Care Manager contacted the patient by telephone to perform post discharge assessment. Verified name and  with patient as identifiers. Patient has following risk factors of: heart failure, diabetes and ED visit  10/28/20. ACM reviewed discharge instructions, medical action plan and red flags related to discharge diagnosis. Patient denied questions or concerns regarding discharge instructions or medications. Advance Care Planning:   Does patient have an Advance Directive: currently not on file; education provided     Patient verified healthcare decision makers as correctly listed in chart: Jimmie Longo (sister) [phone] and patient states Elías Lemus (friend) [phone] is going to bring to colonoscopy appointment, but is not actually an emergency contact. Patient requested to leave him on for now and he will request him to be removed after 20 colonoscopy. Patient had virtual visit with Dr. Otilio Walden today, has a colonoscopy scheduled 20, an appointment with Dr. Mayte Marcus 20 with a bladder biopsy scheduled 12/15/20 and will follow up with [facility] 20    Education provided regarding infection prevention, and signs and symptoms of COVID-19 and when to seek medical attention with patient who verbalized understanding. Discussed exposure protocols and quarantine from [facility] you at higher risk for severe illness  and given an opportunity for questions and concerns. The patient was supplied the COVID-19 hotline [phone] and local [facility] department hotline [phone]. ACM recommended patient follow up with PCP and provided AC contact information for future reference. From CDC: Are you at higher risk for severe illness?      Wash your hands often and avoid touching eyes, nose and mouth.   Avoid close contact (6 feet, which is about two arm lengths) with people who are sick.  Put distance between yourself and other people if COVID-19 is spreading in your community.  Clean and disinfect frequently touched surfaces.  Avoid all cruise travel and non-essential air travel.  Call your healthcare professional if you have concerns about COVID-19 and your underlying condition or if you are sick. For more information on steps you can take to protect yourself, see CDC's How to Protect Yourself      Patient/family/caregiver given information for GetWell Loop and agrees to enroll yes  Patient's preferred e-mail:  [email or web address]  Patient's preferred phone number: [phone]  Based on Loop alert triggers, patient will be contacted by nurse care manager for worsening symptoms. Pt will be further monitored by COVID Loop Team based on severity of symptoms and risk factors.     Marcellus Vela RN  Ambulatory Care Manager

## 2020-10-29 NOTE — PROGRESS NOTES
**DUE TO PANDEMIC AND HEALTH CONCERNS IN THE COMMUNITY, THIS PATIENT WAS EITHER ILL OR FOUND TO BE HIGH RISK FOR IN-PERSON EVALUATION WITHIN THE CLINIC. THE FOLLOWING IS A VIRTUAL VISIT VIA A TELEPHONE ENCOUNTER TO WHICH THE PATIENT AGREED. THE PURPOSE IS TO LIMIT INTERRUPTIONS IN HEALTHCARE AND TO PROVIDE FOR ONGOING URGENT NEEDS UNDER THE CURRENT CONDITIONS. THE PATIENT CONFIRMS THEY ARE AWARE OF THE LIMITATIONS OF THE TELEPHONE VISIT. CHIEF COMPLAINT: evaluation of type 2 diabetes    HISTORY OF PRESENT ILLNESS:   Yvonne Card is a 50 y.o. male with a PMHx as noted below who presents for evaluation of uncontrolled type 2 diabetes. Seen for initial visit >1 year ago, did not return as planned but is presenting today for follow up.      Diabetes was diagnosed about 4 years ago,   Reported Hx of stroke/TIA x2   Family History of diabetes is not certain, adopted,  Last A1c prior to initial visit was 8.1%,   In Aug of this year his A1c was 6.7%,     Current Home Regimen:  Jardiance 25mg once each morning, has been off for 6-7 months,   metformin 1500mg AM, 1000mg PM  glipizide 10mg with breakfast and dinner  Victoza 1.8mg injection before breakfast    Review of home glucose:  He is not at home and so does not have his log with him  Report checking sugars 3x/day,  Describes sugars are all <150    Review of most recent diabetes-related labs:  Lab Results   Component Value Date    HBA1C 9.5 (H) 11/15/2018    HBA1C 7.5 (H) 08/11/2017    LBH5OQNT 6.7 08/14/2020    OXN9DSVS 6.8 01/14/2020    HEB2XQDK 8.1 09/03/2019    CHOL 92 (L) 08/14/2020    LDLC 34 08/14/2020    GFRAA >60 10/28/2020    GFRNA 60 (L) 10/28/2020    MCACR 34.7 (H) 09/19/2019    TSH 2.430 09/19/2019     Lab Key:  [id number] = IA-2 pancreatic islet cell autoantibody  CPEPL = C-peptide level  :EXT = External Lab  GADLT = WING-65 autoantibody   INSUL = Insulin level  MCACR (or MALBEXT) = Urine Microalbumin (or External UM)  B12LT = B12 level    PAST MEDICAL/SURGICAL HISTORY:   Past Medical History:   Diagnosis Date   [facility]) 1/6/15    [facility] ED. Pt reported this    Arthritis     hands    Asthma     Atrial fibrillation with RVR ([address] 8/11/2017    Cardiomyopathy (HCC)     ET 25-30%    Carpal tunnel syndrome     Chronic obstructive pulmonary disease (HCC)     Diabetes ([facility])     Type II    Gastrointestinal disorder     GERD    GERD (gastroesophageal reflux disease)     Heart failure (HCC)     High cholesterol     Hypertension     Restrictive lung disease     FVC2.59 (52%), FEV1 1.95 (49)- 2017    S/P cardiac cath     Dr. Tobin Esteban, 2016. minimal cad    Sleep apnea     No CPAP    Stroke ([facility])     TIA x 4 - weak on left side     Past Surgical History:   Procedure Laterality Date    CARDIAC SURG PROCEDURE UNLIST  11/14/2018    Implantation of PACEMAKER    HX PACEMAKER Left     defib,     AK INSJ/RPLCMT PERM DFB W/TRNSVNS LDS 1/DUAL CHMBR N/A 3/11/2019    INSERT ICD BIV MULTI performed by Tia Rogers MD at [facility] CARDIAC CATH LAB       ALLERGIES:   Allergies   Allergen Reactions    Codeine Hives    Hydrocodone Itching    Influenza Virus Vaccines Nausea Only     Fever  diarrhea    Mushroom Flavor Swelling    Oxycodone Rash    Pneumococcal 23-Allison Ps Vaccine Nausea and Vomiting     Vomit         MEDICATIONS ON ADMISSION:     Current Outpatient Medications:     traMADoL (ULTRAM) 50 mg tablet, Take 1 Tab by mouth every six (6) hours as needed for Pain for up to 3 days. Max Daily Amount: 200 mg. Indications: pain, Disp: 10 Tab, Rfl: 0    divalproex DR (DEPAKOTE) 500 mg tablet, Take 1 Tab by mouth daily. , Disp: 30 Tab, Rfl: 2    metFORMIN (GLUCOPHAGE) 1,000 mg tablet, Take 1 Tab by mouth two (2) times daily (with meals). Resume as previously prescribed on 9/24/2020, Disp: 1 Tab, Rfl: 0    albuterol-ipratropium (DUO-NEB) 2.5 mg-0.5 mg/3 ml nebu, 3 mL every six (6) hours as needed. , Disp: , Rfl:     aspirin delayed-release 81 mg tablet, Take 1 Tab by mouth daily. , Disp: 30 Tab, Rfl: 0    topiramate (TOPAMAX) 50 mg tablet, 75 mg p.o. twice daily for 1 week and then 100 mg p.o. twice daily (Patient taking differently: Take 100 mg by mouth. Taking 100 mg twice daily), Disp: 120 Tab, Rfl: 2    potassium chloride SR (K-TAB) 20 mEq tablet, 2 tabs po bid, Disp: 120 Tab, Rfl: 12    sacubitriL-valsartan (Entresto)  mg tablet, Take 1 Tab by mouth two (2) times a day., Disp: 60 Tab, Rfl: 5    zolpidem (AMBIEN) 10 mg tablet, TAKE ONE TABLET BY MOUTH ONCE NIGHTLY AS NEEDED FOR SLEEP, Disp: 30 Tab, Rfl: 4    traZODone (DESYREL) 100 mg tablet, Take 1 Tab by mouth nightly., Disp: 30 Tab, Rfl: 5    furosemide (LASIX) 80 mg tablet, Take 2 tabs in the AM, one 2 in the PM, Disp: 120 Tab, Rfl: 12    carvedilol (COREG) 12.5 mg tablet, TAKE ONE TABLET BY MOUTH TWICE A DAY TO SLOW HEART RATE DOWN., Disp: 60 Tab, Rfl: 11    apixaban (ELIQUIS) 5 mg tablet, Take 1 Tab by mouth two (2) times a day., Disp: 60 Tab, Rfl: 11    atorvastatin (LIPITOR) 40 mg tablet, TAKE ONE TABLET BY MOUTH DAILY FOR CHOLESTEROL, Disp: 30 Tab, Rfl: 12    glipiZIDE (GLUCOTROL) 10 mg tablet, TAKE ONE TABLET BY MOUTH TWICE A DAY, Disp: 60 Tab, Rfl: 11    liraglutide (VICTOZA) 0.6 mg/0.1 mL (18 mg/3 mL) pnij, 0.6 mg daily- 1st week, then 1.2 mg daily- 2nd week, then 1.8 mg daily. Please give pt needles also, Disp: 1 Pen, Rfl: 12    empagliflozin (JARDIANCE) 25 mg tablet, Take 1 Tab by mouth Daily (before breakfast). , Disp: 90 Tab, Rfl: 3    isosorbide mononitrate ER (IMDUR) 30 mg tablet, Take 1 Tab by mouth daily. , Disp: 30 Tab, Rfl: 5    nitroglycerin (NITROSTAT) 0.4 mg SL tablet, 1 Tab by SubLINGual route every five (5) minutes as needed for Chest Pain., Disp: 25 Tab, Rfl: 1    GaviLyte-N 420 gram solution, , Disp: , Rfl:     ONETOUCH ULTRA BLUE TEST STRIP strip, USE ONE STRIP TO TEST THREE TIMES A DAY BEFORE MEALS, Disp: 100 Strip, Rfl: 11    MARGIE PEN NEEDLE 32 gauge x \" ndle, USE DAILY WITH VICTOZA, Disp: 100 Pen Needle, Rfl: 11    SOCIAL HISTORY:   Social History     Socioeconomic History    Marital status: SINGLE     Spouse name: Not on file    Number of children: Not on file    Years of education: Not on file    Highest education level: Not on file   Occupational History    Not on file   Social Needs    Financial resource strain: Not on file    Food insecurity     Worry: Not on file     Inability: Not on file    Transportation needs     Medical: Not on file     Non-medical: Not on file   Tobacco Use    Smoking status: Former Smoker     Packs/day: 1.00     Years: 20.00     Pack years: 20.00     Types: Cigarettes     Last attempt to quit: 2/3/2014     Years since quittin.7    Smokeless tobacco: Never Used   Substance and Sexual Activity    Alcohol use: No     Comment: stopped drinking at age 29   Daniel.Lax Drug use: No    Sexual activity: Not Currently   Lifestyle    Physical activity     Days per week: Not on file     Minutes per session: Not on file    Stress: Not on file   Relationships    Social connections     Talks on phone: Not on file     Gets together: Not on file     Attends Evangelical service: Not on file     Active member of club or organization: Not on file     Attends meetings of clubs or organizations: Not on file     Relationship status: Not on file    Intimate partner violence     Fear of current or ex partner: Not on file     Emotionally abused: Not on file     Physically abused: Not on file     Forced sexual activity: Not on file   Other Topics Concern    Not on file   Social History Narrative    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        FAMILY HISTORY:  Family History   Adopted: Yes   Problem Relation Age of Onset    No Known Problems Mother     No Known Problems Father        REVIEW OF SYSTEMS: Complete ROS assessed and noted for that which is described above, all else are negative.   Eyes: normal  ENT: normal  CVS: normal  Resp: normal  GI: normal  : normal  GYN: normal  Endocrine: normal  Integument: normal  Musculoskeletal: normal  Neuro: normal  Psych: normal    PHYSICAL EXAMINATION:  Telephone visit    REVIEW OF LABORATORY AND RADIOLOGY DATA:   Labs and documentation have been reviewed as described above. ASSESSMENT AND PLAN:   Lauren Lennon is a 50 y.o. male with a PMHx as noted above who presents for evaluation of uncontrolled type 2 diabetes. Problems:  Type 2 diabetes Uncontrolled  Hyperlipidemia  Hypertension    Diabetes is controlled without jardiance which we started previously. Since he notes it has been over 6 months since it was used, and noting also that his A1c in Aug was < 7%, while his sugars checked 3x/day lately has all been <150, there is no need to refill/restart jardiance. Advised to let me know if his sugars do rise however. He was a bit frustrated and upset today because he did not receive a refill through the pharmacy for the jardiance previously. While I am not sure why as refills were provided, either way, he does not need it at the present time. I did apologize for any confusion. We had not seen him in one year, though he reported attending a  during expected follow up visit, and then another visit had been cancelled for uncertain reasons. Either way I did advise him that I am desiring for him to keep good health and that I am happy to accommodate his circumstances. PLAN  Type 2 Diabetes  Medications:  Jardiance 25mg each morning before breakfast  metformin 1500mg AM, 1000mg PM  glipizide 10mg with breakfast and dinner  Victoza 1.8mg injection before breakfast   Provided with glucose log sheets for later review. HTN: BP stable per his report, telephone visit today  HLD: Fasting lipids reviewed, stable    RTC:  at 3:30 PM,    25 minutes spent toward telephone visit today of which >50% of this time was spent in counseling and coordination of care.     Bryon Stratton. [address] [facility]

## 2020-10-30 ENCOUNTER — TELEPHONE (OUTPATIENT)
Dept: ENDOCRINOLOGY | Age: 55
End: 2020-10-30

## 2020-10-30 ENCOUNTER — HOSPITAL ENCOUNTER (OUTPATIENT)
Dept: PREADMISSION TESTING | Age: 55
Discharge: HOME | End: 2020-10-30
Payer: MEDICAID

## 2020-10-30 PROCEDURE — 87635 SARS-COV-2 COVID-19 AMP PRB: CPT

## 2020-10-30 NOTE — TELEPHONE ENCOUNTER
I returned this call and spoke with Mr. Flaca Henriquez. He had a virtual appointment yesterday and was all set from that.   Sonam Hamilton

## 2020-10-30 NOTE — TELEPHONE ENCOUNTER
----- Message from Nilson Morse sent at 10/30/2020  2:44 PM EDT -----  Regarding: Dr Riley Feliciano  General Message/Vendor Calls    Caller's first and last name: Dawnamalu Nagy from Dr Brett Hyde office      Reason for call:pt  was referred back  by  his PCP and requesting he get a in pt appt due to possible medication insulin adjustment needed last seen on 9/19/2019      Callback required yes/no and why:yes please contact pt at number below to schedule in office appt       Best contact number(s):[phone]      Details to clarify the request:can see office notes from pcp in [address]

## 2020-10-31 ENCOUNTER — HOSPITAL ENCOUNTER (OUTPATIENT)
Dept: PREADMISSION TESTING | Age: 55
Discharge: HOME | End: 2020-10-31

## 2020-11-02 ENCOUNTER — TELEPHONE (OUTPATIENT)
Dept: CARDIOLOGY CLINIC | Age: 55
End: 2020-11-02

## 2020-11-02 NOTE — TELEPHONE ENCOUNTER
Pt called very irate asking why Dr. Emeka Stearns was messing w/ his insulin medication, it has nothing to do w/ his heart.  Please look into it and give the pt a call back    thanks

## 2020-11-03 DIAGNOSIS — M79.641 PAIN OF RIGHT HAND: ICD-10-CM

## 2020-11-03 DIAGNOSIS — E11.9 CONTROLLED TYPE 2 DIABETES MELLITUS WITHOUT COMPLICATION, WITHOUT LONG-TERM CURRENT USE OF INSULIN (HCC): Primary | ICD-10-CM

## 2020-11-03 RX ORDER — TRAMADOL HYDROCHLORIDE 50 MG/1
50 TABLET ORAL
Qty: 90 TAB | Refills: 1 | Status: SHIPPED | OUTPATIENT
Start: 2020-11-03 | End: 2021-01-02

## 2020-11-03 RX ORDER — TRAMADOL HYDROCHLORIDE 50 MG/1
50 TABLET ORAL
COMMUNITY
End: 2020-11-03 | Stop reason: SDUPTHER

## 2020-11-03 RX ORDER — METFORMIN HYDROCHLORIDE 1000 MG/1
1000 TABLET ORAL 2 TIMES DAILY WITH MEALS
Qty: 1 TAB | Refills: 0 | Status: SHIPPED | OUTPATIENT
Start: 2020-11-03 | End: 2020-12-30

## 2020-11-03 NOTE — TELEPHONE ENCOUNTER
----- Message from Pockit sent at 11/3/2020  1:41 PM EST -----  Regarding: /Telephone    General Message/Vendor Calls    Caller's first and last name: Self      Reason for call: Requesting to speak w/PCP or nurse      Callback required yes/no and why: Yes to assist      Best contact number(s): [phone]      Details to clarify the request: Pt is requesting a call back to discuss his medications. Pt advised there is an issue but he would rather discuss with PCP or nurse due to it's all of his medications.        Pockit

## 2020-11-04 ENCOUNTER — ANESTHESIA EVENT (OUTPATIENT)
Dept: ENDOSCOPY | Age: 55
End: 2020-11-04
Payer: MEDICAID

## 2020-11-04 ENCOUNTER — ANESTHESIA (OUTPATIENT)
Dept: ENDOSCOPY | Age: 55
End: 2020-11-04
Payer: MEDICAID

## 2020-11-04 ENCOUNTER — HOSPITAL ENCOUNTER (OUTPATIENT)
Age: 55
Setting detail: OUTPATIENT SURGERY
Discharge: HOME | End: 2020-11-04
Attending: INTERNAL MEDICINE | Admitting: INTERNAL MEDICINE
Payer: MEDICAID

## 2020-11-04 VITALS
SYSTOLIC BLOOD PRESSURE: 120 MMHG | RESPIRATION RATE: 18 BRPM | HEART RATE: 70 BPM | HEIGHT: 72 IN | BODY MASS INDEX: 42.66 KG/M2 | WEIGHT: 315 LBS | DIASTOLIC BLOOD PRESSURE: 69 MMHG | TEMPERATURE: 98.6 F | OXYGEN SATURATION: 98 %

## 2020-11-04 LAB
GLUCOSE BLD STRIP.AUTO-MCNC: 118 MG/DL (ref 65–100)
SERVICE CMNT-IMP: ABNORMAL

## 2020-11-04 PROCEDURE — 74011250636 HC RX REV CODE- 250/636: Performed by: INTERNAL MEDICINE

## 2020-11-04 PROCEDURE — 82962 GLUCOSE BLOOD TEST: CPT

## 2020-11-04 PROCEDURE — 77030010936 HC CLP LIG BSC -C: Performed by: INTERNAL MEDICINE

## 2020-11-04 PROCEDURE — 76040000007: Performed by: INTERNAL MEDICINE

## 2020-11-04 PROCEDURE — 2709999900 HC NON-CHARGEABLE SUPPLY: Performed by: INTERNAL MEDICINE

## 2020-11-04 PROCEDURE — 74011250636 HC RX REV CODE- 250/636: Performed by: NURSE ANESTHETIST, CERTIFIED REGISTERED

## 2020-11-04 PROCEDURE — 88305 TISSUE EXAM BY PATHOLOGIST: CPT

## 2020-11-04 PROCEDURE — 77030019988 HC FCPS ENDOSC DISP BSC -B: Performed by: INTERNAL MEDICINE

## 2020-11-04 PROCEDURE — 76060000032 HC ANESTHESIA 0.5 TO 1 HR: Performed by: INTERNAL MEDICINE

## 2020-11-04 PROCEDURE — 74011000250 HC RX REV CODE- 250: Performed by: NURSE ANESTHETIST, CERTIFIED REGISTERED

## 2020-11-04 PROCEDURE — 77030013992 HC SNR POLYP ENDOSC BSC -B: Performed by: INTERNAL MEDICINE

## 2020-11-04 RX ORDER — LIDOCAINE HYDROCHLORIDE 20 MG/ML
INJECTION, SOLUTION EPIDURAL; INFILTRATION; INTRACAUDAL; PERINEURAL AS NEEDED
Status: DISCONTINUED | OUTPATIENT
Start: 2020-11-04 | End: 2020-11-04 | Stop reason: HOSPADM

## 2020-11-04 RX ORDER — PHENOL/SODIUM PHENOLATE
40 AEROSOL, SPRAY (ML) MUCOUS MEMBRANE DAILY
Qty: 60 TAB | Refills: 3 | Status: SHIPPED | OUTPATIENT
Start: 2020-11-04 | End: 2021-01-03

## 2020-11-04 RX ORDER — MIDAZOLAM HYDROCHLORIDE 1 MG/ML
.25-5 INJECTION, SOLUTION INTRAMUSCULAR; INTRAVENOUS
Status: DISCONTINUED | OUTPATIENT
Start: 2020-11-04 | End: 2020-11-04 | Stop reason: HOSPADM

## 2020-11-04 RX ORDER — EPINEPHRINE 0.1 MG/ML
1 INJECTION INTRACARDIAC; INTRAVENOUS
Status: DISCONTINUED | OUTPATIENT
Start: 2020-11-04 | End: 2020-11-04 | Stop reason: HOSPADM

## 2020-11-04 RX ORDER — SODIUM CHLORIDE 9 MG/ML
75 INJECTION, SOLUTION INTRAVENOUS CONTINUOUS
Status: DISCONTINUED | OUTPATIENT
Start: 2020-11-04 | End: 2020-11-04 | Stop reason: HOSPADM

## 2020-11-04 RX ORDER — SODIUM CHLORIDE 0.9 % (FLUSH) 0.9 %
5-40 SYRINGE (ML) INJECTION EVERY 8 HOURS
Status: DISCONTINUED | OUTPATIENT
Start: 2020-11-04 | End: 2020-11-04 | Stop reason: HOSPADM

## 2020-11-04 RX ORDER — SODIUM CHLORIDE 0.9 % (FLUSH) 0.9 %
5-40 SYRINGE (ML) INJECTION AS NEEDED
Status: DISCONTINUED | OUTPATIENT
Start: 2020-11-04 | End: 2020-11-04 | Stop reason: HOSPADM

## 2020-11-04 RX ORDER — PROPOFOL 10 MG/ML
INJECTION, EMULSION INTRAVENOUS AS NEEDED
Status: DISCONTINUED | OUTPATIENT
Start: 2020-11-04 | End: 2020-11-04 | Stop reason: HOSPADM

## 2020-11-04 RX ORDER — DEXTROMETHORPHAN/PSEUDOEPHED 2.5-7.5/.8
1.2 DROPS ORAL
Status: DISCONTINUED | OUTPATIENT
Start: 2020-11-04 | End: 2020-11-04 | Stop reason: HOSPADM

## 2020-11-04 RX ORDER — NALOXONE HYDROCHLORIDE 0.4 MG/ML
0.4 INJECTION, SOLUTION INTRAMUSCULAR; INTRAVENOUS; SUBCUTANEOUS
Status: DISCONTINUED | OUTPATIENT
Start: 2020-11-04 | End: 2020-11-04 | Stop reason: HOSPADM

## 2020-11-04 RX ORDER — FLUMAZENIL 0.1 MG/ML
0.2 INJECTION INTRAVENOUS
Status: DISCONTINUED | OUTPATIENT
Start: 2020-11-04 | End: 2020-11-04 | Stop reason: HOSPADM

## 2020-11-04 RX ORDER — ATROPINE SULFATE 0.1 MG/ML
0.5 INJECTION INTRAVENOUS
Status: DISCONTINUED | OUTPATIENT
Start: 2020-11-04 | End: 2020-11-04 | Stop reason: HOSPADM

## 2020-11-04 RX ADMIN — PROPOFOL 50 MG: 10 INJECTION, EMULSION INTRAVENOUS at 10:32

## 2020-11-04 RX ADMIN — PROPOFOL 50 MG: 10 INJECTION, EMULSION INTRAVENOUS at 10:25

## 2020-11-04 RX ADMIN — PROPOFOL 50 MG: 10 INJECTION, EMULSION INTRAVENOUS at 10:46

## 2020-11-04 RX ADMIN — PROPOFOL 50 MG: 10 INJECTION, EMULSION INTRAVENOUS at 10:30

## 2020-11-04 RX ADMIN — PROPOFOL 50 MG: 10 INJECTION, EMULSION INTRAVENOUS at 10:51

## 2020-11-04 RX ADMIN — PROPOFOL 50 MG: 10 INJECTION, EMULSION INTRAVENOUS at 10:42

## 2020-11-04 RX ADMIN — LIDOCAINE HYDROCHLORIDE 40 MG: 20 INJECTION, SOLUTION EPIDURAL; INFILTRATION; INTRACAUDAL; PERINEURAL at 10:25

## 2020-11-04 RX ADMIN — PROPOFOL 50 MG: 10 INJECTION, EMULSION INTRAVENOUS at 10:50

## 2020-11-04 RX ADMIN — SODIUM CHLORIDE 75 ML/HR: 900 INJECTION, SOLUTION INTRAVENOUS at 10:01

## 2020-11-04 RX ADMIN — PROPOFOL 50 MG: 10 INJECTION, EMULSION INTRAVENOUS at 10:36

## 2020-11-04 RX ADMIN — PROPOFOL 50 MG: 10 INJECTION, EMULSION INTRAVENOUS at 10:38

## 2020-11-04 RX ADMIN — PROPOFOL 50 MG: 10 INJECTION, EMULSION INTRAVENOUS at 10:47

## 2020-11-04 NOTE — ANESTHESIA PREPROCEDURE EVALUATION
Anesthetic History   No history of anesthetic complications            Review of Systems / Medical History  Patient summary reviewed, nursing notes reviewed and pertinent labs reviewed    Pulmonary    COPD    Sleep apnea: CPAP  Shortness of breath and smoker (EX 20 pk yr smoker, quit 2-3-14)  Asthma     Comments: Restrictive Lung Disease   Neuro/Psych         TIA     Cardiovascular    Hypertension      CHF: NYHA Classification II  Dysrhythmias : atrial fibrillation  Pacemaker and hyperlipidemia    Exercise tolerance: <4 METS  Comments: 7-2019 EKG: Pacemaker     ECHO: 20-25% EF   GI/Hepatic/Renal     GERD          Comments: Screening colonoscopy/GERD Endo/Other    Diabetes: type 2    Morbid obesity     Other Findings              Physical Exam    Airway  Mallampati: II  TM Distance: > 6 cm  Neck ROM: normal range of motion   Mouth opening: Normal     Cardiovascular          Murmur: Grade 2, Mitral area     Dental    Dentition: Edentulous     Pulmonary    Rhonchi:bilateral             Abdominal  GI exam deferred       Other Findings            Anesthetic Plan    ASA: 3  Anesthesia type: total IV anesthesia and general          Induction: Intravenous  Anesthetic plan and risks discussed with: Patient      Propofol MAC/Supernova

## 2020-11-04 NOTE — H&P
Pre-endoscopy H and P    The patient was seen and examined in the room/pre-op holding area. The airway was assessed and documented. The problem list, past medical history, and medications were reviewed.      Patient Active Problem List   Diagnosis Code    Hypertension I10    Dyspnea R06.00    Restrictive lung disease J98.4    CHF (congestive heart failure) ([facility]) I50.9    Atrial fibrillation with RVR ([facility]) I48.91    Obesity, morbid ([facility]) E66.01    Sleep apnea G47.30    Controlled type 2 diabetes mellitus without complication, without long-term current use of insulin ([facility]) E11.9    Cardiomyopathy, nonischemic ([facility]) I42.8    Cardiomyopathy ([facility]) I42.9    TIA (transient ischemic attack) G45.9    Angina, class III ([facility]) I20.9    Myocardial ischemia I25.9    Adult BMI 45.0-49.9 kg/sq m [facility]) Z68.42     Social History     Socioeconomic History    Marital status: SINGLE     Spouse name: Not on file    Number of children: Not on file    Years of education: Not on file    Highest education level: Not on file   Occupational History    Not on file   Social Needs    Financial resource strain: Not on file    Food insecurity     Worry: Not on file     Inability: Not on file    Transportation needs     Medical: Not on file     Non-medical: Not on file   Tobacco Use    Smoking status: Former Smoker     Packs/day: 1.00     Years: 20.00     Pack years: 20.00     Types: Cigarettes     Last attempt to quit: 2/3/2014     Years since quittin.7    Smokeless tobacco: Never Used   Substance and Sexual Activity    Alcohol use: No     Comment: stopped drinking at age 29    Drug use: No    Sexual activity: Not Currently   Lifestyle    Physical activity     Days per week: Not on file     Minutes per session: Not on file    Stress: Not on file   Relationships    Social connections     Talks on phone: Not on file     Gets together: Not on file     Attends Voodoo service: Not on file     Active member of club or organization: Not on file     Attends meetings of clubs or organizations: Not on file     Relationship status: Not on file    Intimate partner violence     Fear of current or ex partner: Not on file     Emotionally abused: Not on file     Physically abused: Not on file     Forced sexual activity: Not on file   Other Topics Concern    Not on file   Social History Narrative    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      Past Medical History:   Diagnosis Date   Meenu Pry [facility] [address] 1/6/15    [facility] ED. Pt reported this    Arthritis     hands    Asthma     Atrial fibrillation with RVR ([facility] [address] 8/11/2017    Cardiomyopathy (HCC)     ET 25-30%    Carpal tunnel syndrome     Chronic obstructive pulmonary disease (HCC)     Diabetes ([facility])     Type II    Gastrointestinal disorder     GERD    GERD (gastroesophageal reflux disease)     Heart failure ([facility])     High cholesterol     Hypertension     Restrictive lung disease     FVC2.59 (52%), FEV1 1.95 (49)- 2017    S/P cardiac cath     Dr. Charlie Ellis, 2016. minimal cad    Sleep apnea     No CPAP    Stroke ([facility])     TIA x 4 - weak on left side         Prior to Admission Medications   Prescriptions Last Dose Informant Patient Reported? Taking? GaviLyte-N 420 gram solution 11/3/2020 at Unknown time  Yes Yes   MARGIE PEN NEEDLE 32 gauge x 5/32\" ndle 11/3/2020 at Unknown time  No Yes   Sig: USE DAILY WITH ToVieForTOUCH ULTRA BLUE TEST STRIP strip 11/3/2020 at Unknown time  No Yes   Sig: USE ONE STRIP TO TEST THREE TIMES A DAY BEFORE MEALS   albuterol-ipratropium (DUO-NEB) 2.5 mg-0.5 mg/3 ml nebu Unknown at Unknown time  Yes No   Sig: 3 mL every six (6) hours as needed. apixaban (ELIQUIS) 5 mg tablet 10/31/2020  No Yes   Sig: Take 1 Tab by mouth two (2) times a day. aspirin delayed-release 81 mg tablet 11/3/2020 at Unknown time  No Yes   Sig: Take 1 Tab by mouth daily.    atorvastatin (LIPITOR) 40 mg tablet 11/3/2020 at Unknown time  No Yes   Sig: TAKE ONE TABLET BY MOUTH DAILY FOR CHOLESTEROL   carvedilol (COREG) 12.5 mg tablet 11/3/2020 at Unknown time  No Yes   Sig: TAKE ONE TABLET BY MOUTH TWICE A DAY TO SLOW HEART RATE DOWN.   divalproex DR (DEPAKOTE) 500 mg tablet Unknown at Unknown time  No No   Sig: Take 1 Tab by mouth daily. empagliflozin (JARDIANCE) 25 mg tablet 11/3/2020 at Unknown time  No Yes   Sig: Take 1 Tab by mouth Daily (before breakfast). furosemide (LASIX) 80 mg tablet 11/3/2020 at Unknown time  No Yes   Sig: Take 2 tabs in the AM, one 2 in the PM   glipiZIDE (GLUCOTROL) 10 mg tablet 11/3/2020 at Unknown time  No Yes   Sig: TAKE ONE TABLET BY MOUTH TWICE A DAY   isosorbide mononitrate ER (IMDUR) 30 mg tablet 11/3/2020 at Unknown time  No Yes   Sig: Take 1 Tab by mouth daily. liraglutide (VICTOZA) 0.6 mg/0.1 mL (18 mg/3 mL) pnij 10/28/2020 at Unknown time  No Yes   Si.6 mg daily- 1st week, then 1.2 mg daily- 2nd week, then 1.8 mg daily. Please give pt needles also   metFORMIN (GLUCOPHAGE) 1,000 mg tablet 11/3/2020 at Unknown time  No Yes   Sig: Take 1 Tab by mouth two (2) times daily (with meals). Resume as previously prescribed on 2020   nitroglycerin (NITROSTAT) 0.4 mg SL tablet 10/4/2020 at Unknown time  No Yes   Si Tab by SubLINGual route every five (5) minutes as needed for Chest Pain.   potassium chloride SR (K-TAB) 20 mEq tablet 11/3/2020 at Unknown time  No Yes   Si tabs po bid   sacubitriL-valsartan (Entresto)  mg tablet 11/3/2020 at Unknown time  No Yes   Sig: Take 1 Tab by mouth two (2) times a day. topiramate (TOPAMAX) 100 mg tablet 11/3/2020 at Unknown time  No Yes   Sig: Take 1 Tab by mouth two (2) times a day. Taking 100 mg twice daily   traMADoL (ULTRAM) 50 mg tablet 11/3/2020 at Unknown time  No Yes   Sig: Take 1 Tab by mouth every eight (8) hours as needed for Pain for up to 60 days.  Max Daily Amount: 150 mg.   traZODone (DESYREL) 100 mg tablet 11/3/2020 at Unknown time  No Yes   Sig: Take 1 Tab by mouth nightly. zolpidem (AMBIEN) 10 mg tablet 11/3/2020 at Unknown time  No Yes   Sig: TAKE ONE TABLET BY MOUTH ONCE NIGHTLY AS NEEDED FOR SLEEP      Facility-Administered Medications: None           The review of systems is:  Negative  for shortness of breath or chest pain      The heart, lungs, and mental status were satisfactory for the administration of deep sedation and for the procedure. I discussed with the patient the objectives, risks, consequences and alternatives to the procedure.       Chavo Ryder MD  11/4/2020  10:12 AM

## 2020-11-04 NOTE — PROCEDURES
Colonoscopy Procedure Note    Prabha Corado [facility]  1965  [id number]    Indications:  Please see below. Pre-operative Diagnosis: SCREENING for CRC    Post-operative Diagnosis: cecal polyps,ascending colon polyp,descending colon polyp      : Everardo Pretty MD    Referring Provider: Phuong Tillman MD    Sedation:  MAC anesthesia Propofol        Procedure Details:    After detailed informed consent was obtained with all risks and benefits of procedure explained and preoperative exam completed, the patient was taken to the endoscopy suite and placed in the left lateral decubitus position. Upon sequential sedation as per above, a digital rectal exam was performed  And was normal.  The Olympus videocolonoscope  was inserted in the rectum and carefully advanced to the cecum, which was identified by the ileocecal valve and appendiceal orifice. The quality of preparation was good. The colonoscope was slowly withdrawn with careful evaluation between folds. Retroflexion in the rectum was performed. Findings:   · A single 1 cm cecal pedunculated polyp was removed with a hot snare. · A long sessile 1.5 cm polyp on 2 folds noted in the ascending colon and removed with a hot snare. A single  Resolution clip was applied to polypectomy site. · A small 7 mm sessile descending colon polyps was removed with a hot snare. · No other lesions noted          Therapies:  3 complete polypectomies were performed using hot snare  and the polyps were  retrieved    Specimen:  Specimens were collected as described above and sent to pathology. Complications: None were encountered during the procedure. EBL:  None. Recommendations:    -Await pathology results  -If adenoma is present, repeat colonoscopy in 2-3 years.    -Naturally, for new bleeding, unexplained weight loss,change in bowel habits and anemia, an earlier colonoscopy should be considered. Everardo Pretty MD  11/4/2020  10:50 AM

## 2020-11-04 NOTE — PROGRESS NOTES
Vanda Turner  1965  [id number]    Situation:  Verbal report received from: Pma Gomes RN  Procedure: Procedure(s):  COLONOSCOPY  ESOPHAGOGASTRODUODENOSCOPY (EGD)  ESOPHAGOGASTRODUODENAL (EGD) BIOPSY  RESOLUTION CLIP  ENDOSCOPIC POLYPECTOMY    Background:    Preoperative diagnosis: SCREENING/GERD  Postoperative diagnosis: EGD - Gastritis, Grade C Esophagitis, Small Hiatal Hernia  COLON - Colon Polyps    :  Dr. Mina Umanzor  Assistant(s): Endoscopy Technician-1: Victor Hugo Zuñiga  Endoscopy RN-1: Olga CHAVARRIA RN    Specimens:   ID Type Source Tests Collected by Time Destination   1 : Duodenum bx Preservative Duodenum  Ela Noble MD 11/4/2020 1036 Pathology   2 : Gastric bx Preservative Gastric  Ela Noble MD 11/4/2020 1036 Pathology   3 : Cecal Polyp Preservative Cecum  Ela Noble MD 11/4/2020 1045 Pathology   4 : Ascending Colon Polyps Preservative Colon, Ascending  Ela Noble MD 11/4/2020 1046 Pathology   5 : Descending Colon Polyp Preservative Colon, Descending  Ela Noble MD 11/4/2020 1051 Pathology     H. Pylori  no    Assessment:  Intra-procedure medications     Anesthesia gave intra-procedure sedation and medications, see anesthesia flow sheet yes    Intravenous fluids: NS@ KVO     Vital signs stable      Abdominal assessment: round and soft      Recommendation:  Discharge patient per MD order .   Family or Friend    Permission to share finding with family or friend yes

## 2020-11-04 NOTE — ANESTHESIA POSTPROCEDURE EVALUATION
Procedure(s):  COLONOSCOPY  ESOPHAGOGASTRODUODENOSCOPY (EGD)  ESOPHAGOGASTRODUODENAL (EGD) BIOPSY  RESOLUTION CLIP  ENDOSCOPIC POLYPECTOMY. total IV anesthesia, general    Anesthesia Post Evaluation        Patient location during evaluation: PACU  Note status: Adequate. Level of consciousness: responsive to verbal stimuli and sleepy but conscious  Pain management: satisfactory to patient  Airway patency: patent  Anesthetic complications: no  Cardiovascular status: acceptable  Respiratory status: acceptable  Hydration status: acceptable  Comments: +Post-Anesthesia Evaluation and Assessment    Patient: Kiersten Calix MRN: [id number]  SSN: [id number]   YOB: 1965  Age: 50 y.o. Sex: male      Cardiovascular Function/Vital Signs    /69   Pulse 70   Temp 37 °C (98.6 °F)   Resp 18   Ht 6' (1.829 m)   Wt 145.6 kg (321 lb)   SpO2 98%   BMI 43.54 kg/m²     Patient is status post Procedure(s):  COLONOSCOPY  ESOPHAGOGASTRODUODENOSCOPY (EGD)  ESOPHAGOGASTRODUODENAL (EGD) BIOPSY  RESOLUTION CLIP  ENDOSCOPIC POLYPECTOMY. Nausea/Vomiting: Controlled. Postoperative hydration reviewed and adequate. Pain:  Pain Scale 1: Numeric (0 - 10) (11/04/20 1105)  Pain Intensity 1: 0 (11/04/20 1105)   Managed. Neurological Status: At baseline. Mental Status and Level of Consciousness: Arousable. Pulmonary Status:   O2 Device: Room air (11/04/20 1105)   Adequate oxygenation and airway patent. Complications related to anesthesia: None    Post-anesthesia assessment completed. No concerns. Signed By: Odella Koyanagi, MD    11/4/2020  Post anesthesia nausea and vomiting:  controlled      INITIAL Post-op Vital signs:   Vitals Value Taken Time   /69 11/4/2020 11:22 AM   Temp 37 °C (98.6 °F) 11/4/2020 11:05 AM   Pulse 70 11/4/2020 11:23 AM   Resp 22 11/4/2020 11:23 AM   SpO2 99 % 11/4/2020 11:23 AM   Vitals shown include unvalidated device data.

## 2020-11-04 NOTE — DISCHARGE INSTRUCTIONS
[place] Office: [phone]    Kiersten Calix  [id number]  1965    EGD/COLONOSCOPY DISCHARGE INSTRUCTIONS  Discomfort:  Sore throat- throat lozenges or warm salt water gargle  redness at IV site- apply warm compress to area; if redness or soreness persist- contact your physician  Gaseous discomfort- walking, belching will help relieve any discomfort  You may not operate a vehicle for 12 hours  You may not engage in an occupation involving machinery or appliances for rest of today. You may not drink alcoholic beverages for at least 12 hours  Avoid making any critical decisions for at least 24 hour  DIET  You may resume your regular diet - however -  remember your colon is empty and a heavy meal will produce gas. Avoid these foods:  fried / greasy foods, excessive carbonated drinks or too much caffeine  MEDICATIONS   Regarding Aspirin or Nonsteroidal medications specifically, please see below. ACTIVITY  You may resume your normal daily activities. Spend the remainder of the day resting -  avoid any strenuous activity. CALL M.D. ANY SIGN OF   Increasing pain, nausea, vomiting  Abdominal distension (swelling)  New increased bleeding (oral or rectal)  Fever (chills)  Pain in chest area  Bloody discharge from nose or mouth  Shortness of breath    You may not take any Advil, Aspirin, Ibuprofen, Motrin, Aleve, or Goodys for 5 days, ONLY  Tylenol as needed for pain. Start Eliquis tomorrow    Follow-up Instructions:   Call  Everardo Rosario MD for any questions or concerns  Results of procedure / biopsy in 7 days   Telephone # [phone]      Follow-up Information    None

## 2020-11-04 NOTE — PROCEDURES
[place] Office: [phone]      Esophagogastroduodenoscopy Procedure Note      Angely Smallwood  1965  [id number]    Indication:  GERD     : Tracy Huerta MD    Referring Provider:  Anuradha Perez MD    Sedation:  MAC anesthesia Propofol    Procedure Details:  After detailed informed consent was obtained for the procedure, with all risks and benefits of procedure explained the patient was taken to the endoscopy suite and placed in the left lateral decubitus position. Following sequential administration of sedation as per above, the endoscope was inserted into the mouth and advanced under direct vision to second portion of the duodenum. A careful inspection was made as the gastroscope was withdrawn, including a retroflexed view of the proximal stomach; findings and interventions are described below. Findings:     Esophagus: The esophageal mucosa in the proximal, mid and distal esophagus is normal.   There is increased esophageal spasm. LA grade C distal esophagitis is noted. The squamo-columnar junction is at 40 cm where the Z-line was noted. There is a small hiatal hernia. Stomach: The gastric mucosa has severe, linear, spoke-like erythema in the body> antrum. I took multiple biopsies. The fundus was found to be normal with no lesions noted on retroflexion. The angularis is normal as well. Duodenum:   The bulb and post bulbar mucosa is normal in appearance. The duodenal folds are normal. Biopsies taken. Therapies:    biopsy of stomach body, antrum  biopsy of duodenal second portion    Specimen:  Specimens were collected as described and send to the laboratory. Complications:   None were encountered during the procedure. EBL:  None. Recommendations:     -Continue acid suppression. ,   -Await pathology. ,   -Follow symptoms. ,   -GERD diet: avoid fried and fatty foods.  peppermint, chocolate, alcohol, coffee, citrus fruits and juices, tomoato products; avoid lying down for 2 to 3 hours after eating. Everardo Rosario MD  11/4/2020  10:32 AM

## 2020-11-09 ENCOUNTER — TELEPHONE (OUTPATIENT)
Dept: FAMILY MEDICINE CLINIC | Age: 55
End: 2020-11-09

## 2020-11-09 NOTE — TELEPHONE ENCOUNTER
----- Message from Lavinia Najera sent at 11/9/2020  9:39 AM EST -----  Regarding: Dr. Mackenzie Jones first and last name: yes       Reason for call: Lexus Mariano required yes/no and why: [phone]      Best contact number(s): [phone]      Details to clarify the request: Patient would like a callback to discuss tramadol rx, he is having issues getting it filled, please call him at [phone].        Lavinia Najera

## 2020-11-10 ENCOUNTER — ANESTHESIA EVENT (OUTPATIENT)
Dept: ENDOSCOPY | Age: 55
DRG: 810 | End: 2020-11-10
Payer: MEDICAID

## 2020-11-10 ENCOUNTER — HOSPITAL ENCOUNTER (INPATIENT)
Age: 55
LOS: 2 days | Discharge: HOME | DRG: 810 | End: 2020-11-12
Attending: STUDENT IN AN ORGANIZED HEALTH CARE EDUCATION/TRAINING PROGRAM | Admitting: INTERNAL MEDICINE
Payer: MEDICAID

## 2020-11-10 ENCOUNTER — ANESTHESIA (OUTPATIENT)
Dept: ENDOSCOPY | Age: 55
DRG: 810 | End: 2020-11-10
Payer: MEDICAID

## 2020-11-10 DIAGNOSIS — I48.91 ATRIAL FIBRILLATION WITH RVR (HCC): ICD-10-CM

## 2020-11-10 DIAGNOSIS — K92.2 GASTROINTESTINAL HEMORRHAGE, UNSPECIFIED GASTROINTESTINAL HEMORRHAGE TYPE: Primary | ICD-10-CM

## 2020-11-10 DIAGNOSIS — I42.9 CARDIOMYOPATHY, UNSPECIFIED TYPE (HCC): ICD-10-CM

## 2020-11-10 DIAGNOSIS — E66.01 OBESITY, MORBID (HCC): ICD-10-CM

## 2020-11-10 DIAGNOSIS — K62.5 RECTAL BLEED: ICD-10-CM

## 2020-11-10 PROBLEM — K92.1 HEMATOCHEZIA: Status: ACTIVE | Noted: 2020-11-10

## 2020-11-10 LAB
ALBUMIN SERPL-MCNC: 3.6 G/DL (ref 3.5–5)
ALBUMIN/GLOB SERPL: 1 {RATIO} (ref 1.1–2.2)
ALP SERPL-CCNC: 75 U/L (ref 45–117)
ALT SERPL-CCNC: 42 U/L (ref 12–78)
ANION GAP SERPL CALC-SCNC: 5 MMOL/L (ref 5–15)
AST SERPL-CCNC: 19 U/L (ref 15–37)
BASOPHILS # BLD: 0.1 K/UL (ref 0–0.1)
BASOPHILS NFR BLD: 1 % (ref 0–1)
BILIRUB SERPL-MCNC: 0.7 MG/DL (ref 0.2–1)
BUN SERPL-MCNC: 20 MG/DL (ref 6–20)
BUN/CREAT SERPL: 15 (ref 12–20)
CALCIUM SERPL-MCNC: 9.2 MG/DL (ref 8.5–10.1)
CHLORIDE SERPL-SCNC: 112 MMOL/L (ref 97–108)
CO2 SERPL-SCNC: 24 MMOL/L (ref 21–32)
COLONOSCOPY, EXTERNAL: NORMAL
CREAT SERPL-MCNC: 1.34 MG/DL (ref 0.7–1.3)
DIFFERENTIAL METHOD BLD: ABNORMAL
EOSINOPHIL # BLD: 0.1 K/UL (ref 0–0.4)
EOSINOPHIL NFR BLD: 1 % (ref 0–7)
ERYTHROCYTE [DISTWIDTH] IN BLOOD BY AUTOMATED COUNT: 15.8 % (ref 11.5–14.5)
GLOBULIN SER CALC-MCNC: 3.7 G/DL (ref 2–4)
GLUCOSE BLD STRIP.AUTO-MCNC: 155 MG/DL (ref 65–100)
GLUCOSE BLD STRIP.AUTO-MCNC: 157 MG/DL (ref 65–100)
GLUCOSE SERPL-MCNC: 214 MG/DL (ref 65–100)
HCT VFR BLD AUTO: 42.2 % (ref 36.6–50.3)
HCT VFR BLD AUTO: 46.7 % (ref 36.6–50.3)
HGB BLD-MCNC: 14 G/DL (ref 12.1–17)
HGB BLD-MCNC: 15.6 G/DL (ref 12.1–17)
IMM GRANULOCYTES # BLD AUTO: 0 K/UL (ref 0–0.04)
IMM GRANULOCYTES NFR BLD AUTO: 0 % (ref 0–0.5)
LYMPHOCYTES # BLD: 1.8 K/UL (ref 0.8–3.5)
LYMPHOCYTES NFR BLD: 19 % (ref 12–49)
MCH RBC QN AUTO: 29.2 PG (ref 26–34)
MCHC RBC AUTO-ENTMCNC: 33.4 G/DL (ref 30–36.5)
MCV RBC AUTO: 87.5 FL (ref 80–99)
MONOCYTES # BLD: 0.5 K/UL (ref 0–1)
MONOCYTES NFR BLD: 5 % (ref 5–13)
NEUTS SEG # BLD: 7.4 K/UL (ref 1.8–8)
NEUTS SEG NFR BLD: 74 % (ref 32–75)
NRBC # BLD: 0 K/UL (ref 0–0.01)
NRBC BLD-RTO: 0 PER 100 WBC
PLATELET # BLD AUTO: 186 K/UL (ref 150–400)
PMV BLD AUTO: 10.9 FL (ref 8.9–12.9)
POTASSIUM SERPL-SCNC: 3.5 MMOL/L (ref 3.5–5.1)
PROT SERPL-MCNC: 7.3 G/DL (ref 6.4–8.2)
RBC # BLD AUTO: 5.34 M/UL (ref 4.1–5.7)
SERVICE CMNT-IMP: ABNORMAL
SERVICE CMNT-IMP: ABNORMAL
SODIUM SERPL-SCNC: 141 MMOL/L (ref 136–145)
WBC # BLD AUTO: 9.8 K/UL (ref 4.1–11.1)

## 2020-11-10 PROCEDURE — 0W3P8ZZ CONTROL BLEEDING IN GASTROINTESTINAL TRACT, VIA NATURAL OR ARTIFICIAL OPENING ENDOSCOPIC: ICD-10-PCS | Performed by: INTERNAL MEDICINE

## 2020-11-10 PROCEDURE — 76060000032 HC ANESTHESIA 0.5 TO 1 HR: Performed by: INTERNAL MEDICINE

## 2020-11-10 PROCEDURE — C9113 INJ PANTOPRAZOLE SODIUM, VIA: HCPCS | Performed by: INTERNAL MEDICINE

## 2020-11-10 PROCEDURE — 74011250636 HC RX REV CODE- 250/636: Performed by: NURSE ANESTHETIST, CERTIFIED REGISTERED

## 2020-11-10 PROCEDURE — 85018 HEMOGLOBIN: CPT

## 2020-11-10 PROCEDURE — 77030003657 HC NDL SCLER BSC -B: Performed by: INTERNAL MEDICINE

## 2020-11-10 PROCEDURE — 74011250636 HC RX REV CODE- 250/636: Performed by: INTERNAL MEDICINE

## 2020-11-10 PROCEDURE — 65270000029 HC RM PRIVATE

## 2020-11-10 PROCEDURE — 36415 COLL VENOUS BLD VENIPUNCTURE: CPT

## 2020-11-10 PROCEDURE — 99284 EMERGENCY DEPT VISIT MOD MDM: CPT

## 2020-11-10 PROCEDURE — 74011636637 HC RX REV CODE- 636/637: Performed by: INTERNAL MEDICINE

## 2020-11-10 PROCEDURE — 80053 COMPREHEN METABOLIC PANEL: CPT

## 2020-11-10 PROCEDURE — 93005 ELECTROCARDIOGRAM TRACING: CPT

## 2020-11-10 PROCEDURE — 74011250637 HC RX REV CODE- 250/637: Performed by: INTERNAL MEDICINE

## 2020-11-10 PROCEDURE — 85025 COMPLETE CBC W/AUTO DIFF WBC: CPT

## 2020-11-10 PROCEDURE — 77030010936 HC CLP LIG BSC -C: Performed by: INTERNAL MEDICINE

## 2020-11-10 PROCEDURE — 76040000007: Performed by: INTERNAL MEDICINE

## 2020-11-10 PROCEDURE — 2709999900 HC NON-CHARGEABLE SUPPLY: Performed by: INTERNAL MEDICINE

## 2020-11-10 PROCEDURE — 82962 GLUCOSE BLOOD TEST: CPT

## 2020-11-10 RX ORDER — SODIUM CHLORIDE 0.9 % (FLUSH) 0.9 %
5-40 SYRINGE (ML) INJECTION AS NEEDED
Status: DISCONTINUED | OUTPATIENT
Start: 2020-11-10 | End: 2020-11-12 | Stop reason: HOSPADM

## 2020-11-10 RX ORDER — ATROPINE SULFATE 0.1 MG/ML
0.5 INJECTION INTRAVENOUS
Status: DISCONTINUED | OUTPATIENT
Start: 2020-11-10 | End: 2020-11-10 | Stop reason: HOSPADM

## 2020-11-10 RX ORDER — ONDANSETRON 2 MG/ML
4 INJECTION INTRAMUSCULAR; INTRAVENOUS
Status: DISCONTINUED | OUTPATIENT
Start: 2020-11-10 | End: 2020-11-12 | Stop reason: HOSPADM

## 2020-11-10 RX ORDER — SODIUM CHLORIDE 0.9 % (FLUSH) 0.9 %
5-40 SYRINGE (ML) INJECTION EVERY 8 HOURS
Status: DISCONTINUED | OUTPATIENT
Start: 2020-11-10 | End: 2020-11-12 | Stop reason: HOSPADM

## 2020-11-10 RX ORDER — PANTOPRAZOLE SODIUM 40 MG/10ML
40 INJECTION, POWDER, LYOPHILIZED, FOR SOLUTION INTRAVENOUS EVERY 12 HOURS
Status: DISCONTINUED | OUTPATIENT
Start: 2020-11-10 | End: 2020-11-12 | Stop reason: HOSPADM

## 2020-11-10 RX ORDER — ATORVASTATIN CALCIUM 40 MG/1
40 TABLET, FILM COATED ORAL
Status: DISCONTINUED | OUTPATIENT
Start: 2020-11-10 | End: 2020-11-12 | Stop reason: HOSPADM

## 2020-11-10 RX ORDER — TRAMADOL HYDROCHLORIDE 50 MG/1
50 TABLET ORAL
Status: DISCONTINUED | OUTPATIENT
Start: 2020-11-10 | End: 2020-11-12 | Stop reason: HOSPADM

## 2020-11-10 RX ORDER — IPRATROPIUM BROMIDE AND ALBUTEROL SULFATE 2.5; .5 MG/3ML; MG/3ML
3 SOLUTION RESPIRATORY (INHALATION)
Status: DISCONTINUED | OUTPATIENT
Start: 2020-11-10 | End: 2020-11-12 | Stop reason: HOSPADM

## 2020-11-10 RX ORDER — ZOLPIDEM TARTRATE 5 MG/1
5 TABLET ORAL
Status: DISCONTINUED | OUTPATIENT
Start: 2020-11-10 | End: 2020-11-12 | Stop reason: HOSPADM

## 2020-11-10 RX ORDER — TOPIRAMATE 25 MG/1
100 TABLET ORAL 2 TIMES DAILY
Status: DISCONTINUED | OUTPATIENT
Start: 2020-11-10 | End: 2020-11-12 | Stop reason: HOSPADM

## 2020-11-10 RX ORDER — DEXTROMETHORPHAN/PSEUDOEPHED 2.5-7.5/.8
1.2 DROPS ORAL
Status: DISCONTINUED | OUTPATIENT
Start: 2020-11-10 | End: 2020-11-10 | Stop reason: HOSPADM

## 2020-11-10 RX ORDER — POLYETHYLENE GLYCOL 3350 17 G/17G
17 POWDER, FOR SOLUTION ORAL DAILY PRN
Status: DISCONTINUED | OUTPATIENT
Start: 2020-11-10 | End: 2020-11-12 | Stop reason: HOSPADM

## 2020-11-10 RX ORDER — PROMETHAZINE HYDROCHLORIDE 25 MG/1
12.5 TABLET ORAL
Status: DISCONTINUED | OUTPATIENT
Start: 2020-11-10 | End: 2020-11-12 | Stop reason: HOSPADM

## 2020-11-10 RX ORDER — SODIUM CHLORIDE 9 MG/ML
INJECTION, SOLUTION INTRAVENOUS
Status: DISCONTINUED | OUTPATIENT
Start: 2020-11-10 | End: 2020-11-10 | Stop reason: HOSPADM

## 2020-11-10 RX ORDER — INSULIN LISPRO 100 [IU]/ML
INJECTION, SOLUTION INTRAVENOUS; SUBCUTANEOUS
Status: DISCONTINUED | OUTPATIENT
Start: 2020-11-10 | End: 2020-11-12 | Stop reason: HOSPADM

## 2020-11-10 RX ORDER — FUROSEMIDE 40 MG/1
40 TABLET ORAL DAILY
Status: DISCONTINUED | OUTPATIENT
Start: 2020-11-11 | End: 2020-11-11

## 2020-11-10 RX ORDER — SODIUM CHLORIDE 9 MG/ML
75 INJECTION, SOLUTION INTRAVENOUS CONTINUOUS
Status: DISPENSED | OUTPATIENT
Start: 2020-11-10 | End: 2020-11-10

## 2020-11-10 RX ORDER — DIVALPROEX SODIUM 500 MG/1
500 TABLET, DELAYED RELEASE ORAL DAILY
Status: DISCONTINUED | OUTPATIENT
Start: 2020-11-11 | End: 2020-11-12 | Stop reason: HOSPADM

## 2020-11-10 RX ORDER — ISOSORBIDE MONONITRATE 30 MG/1
30 TABLET, EXTENDED RELEASE ORAL DAILY
Status: DISCONTINUED | OUTPATIENT
Start: 2020-11-11 | End: 2020-11-11

## 2020-11-10 RX ORDER — NALOXONE HYDROCHLORIDE 0.4 MG/ML
0.4 INJECTION, SOLUTION INTRAMUSCULAR; INTRAVENOUS; SUBCUTANEOUS
Status: DISCONTINUED | OUTPATIENT
Start: 2020-11-10 | End: 2020-11-10 | Stop reason: HOSPADM

## 2020-11-10 RX ORDER — ACETAMINOPHEN 325 MG/1
650 TABLET ORAL
Status: DISCONTINUED | OUTPATIENT
Start: 2020-11-10 | End: 2020-11-12 | Stop reason: HOSPADM

## 2020-11-10 RX ORDER — ACETAMINOPHEN 650 MG/1
650 SUPPOSITORY RECTAL
Status: DISCONTINUED | OUTPATIENT
Start: 2020-11-10 | End: 2020-11-12 | Stop reason: HOSPADM

## 2020-11-10 RX ORDER — DEXTROSE 50 % IN WATER (D50W) INTRAVENOUS SYRINGE
12.5-25 AS NEEDED
Status: DISCONTINUED | OUTPATIENT
Start: 2020-11-10 | End: 2020-11-12 | Stop reason: HOSPADM

## 2020-11-10 RX ORDER — CARVEDILOL 12.5 MG/1
12.5 TABLET ORAL 2 TIMES DAILY WITH MEALS
Status: DISCONTINUED | OUTPATIENT
Start: 2020-11-10 | End: 2020-11-11

## 2020-11-10 RX ORDER — MIDAZOLAM HYDROCHLORIDE 1 MG/ML
.25-5 INJECTION, SOLUTION INTRAMUSCULAR; INTRAVENOUS
Status: DISCONTINUED | OUTPATIENT
Start: 2020-11-10 | End: 2020-11-10 | Stop reason: HOSPADM

## 2020-11-10 RX ORDER — FLUMAZENIL 0.1 MG/ML
0.2 INJECTION INTRAVENOUS
Status: DISCONTINUED | OUTPATIENT
Start: 2020-11-10 | End: 2020-11-10 | Stop reason: HOSPADM

## 2020-11-10 RX ORDER — MAGNESIUM SULFATE 100 %
4 CRYSTALS MISCELLANEOUS AS NEEDED
Status: DISCONTINUED | OUTPATIENT
Start: 2020-11-10 | End: 2020-11-12 | Stop reason: HOSPADM

## 2020-11-10 RX ORDER — EPINEPHRINE 0.1 MG/ML
1 INJECTION INTRACARDIAC; INTRAVENOUS
Status: COMPLETED | OUTPATIENT
Start: 2020-11-10 | End: 2020-11-10

## 2020-11-10 RX ORDER — PROPOFOL 10 MG/ML
INJECTION, EMULSION INTRAVENOUS AS NEEDED
Status: DISCONTINUED | OUTPATIENT
Start: 2020-11-10 | End: 2020-11-10 | Stop reason: HOSPADM

## 2020-11-10 RX ORDER — TRAZODONE HYDROCHLORIDE 50 MG/1
100 TABLET ORAL
Status: DISCONTINUED | OUTPATIENT
Start: 2020-11-10 | End: 2020-11-12 | Stop reason: HOSPADM

## 2020-11-10 RX ADMIN — SODIUM CHLORIDE 80 MCG: 900 INJECTION, SOLUTION INTRAVENOUS at 16:13

## 2020-11-10 RX ADMIN — ATORVASTATIN CALCIUM 40 MG: 40 TABLET, FILM COATED ORAL at 23:18

## 2020-11-10 RX ADMIN — TRAMADOL HYDROCHLORIDE 50 MG: 50 TABLET ORAL at 23:18

## 2020-11-10 RX ADMIN — SODIUM CHLORIDE: 900 INJECTION, SOLUTION INTRAVENOUS at 15:47

## 2020-11-10 RX ADMIN — SACUBITRIL AND VALSARTAN 1 TABLET: 97; 103 TABLET, FILM COATED ORAL at 17:55

## 2020-11-10 RX ADMIN — PROPOFOL 450 MG: 10 INJECTION, EMULSION INTRAVENOUS at 16:22

## 2020-11-10 RX ADMIN — INSULIN LISPRO 2 UNITS: 100 INJECTION, SOLUTION INTRAVENOUS; SUBCUTANEOUS at 17:57

## 2020-11-10 RX ADMIN — TOPIRAMATE 100 MG: 100 TABLET, FILM COATED ORAL at 17:55

## 2020-11-10 RX ADMIN — PANTOPRAZOLE SODIUM 40 MG: 40 INJECTION, POWDER, FOR SOLUTION INTRAVENOUS at 20:15

## 2020-11-10 RX ADMIN — CARVEDILOL 12.5 MG: 12.5 TABLET, FILM COATED ORAL at 18:02

## 2020-11-10 RX ADMIN — Medication 10 ML: at 23:19

## 2020-11-10 RX ADMIN — EPINEPHRINE 0.9 MG: 0.1 INJECTION, SOLUTION ENDOTRACHEAL; INTRACARDIAC; INTRAVENOUS at 16:32

## 2020-11-10 RX ADMIN — SODIUM CHLORIDE 40 MCG: 900 INJECTION, SOLUTION INTRAVENOUS at 15:59

## 2020-11-10 NOTE — H&P
Pre-endoscopy H and P    The patient was seen and examined in the room/pre-op holding area. The airway was assessed and documented. The problem list, past medical history, and medications were reviewed.      Patient Active Problem List   Diagnosis Code    Hypertension I10    Dyspnea R06.00    Restrictive lung disease J98.4    CHF (congestive heart failure) ([facility]) I50.9    Atrial fibrillation with RVR ([facility]) I48.91    Obesity, morbid ([facility]) E66.01    Sleep apnea G47.30    Controlled type 2 diabetes mellitus without complication, without long-term current use of insulin ([facility]) E11.9    Cardiomyopathy, nonischemic ([facility]) I42.8    Cardiomyopathy ([facility]) I42.9    TIA (transient ischemic attack) G45.9    Angina, class III ([facility]) I20.9    Myocardial ischemia I25.9    Adult BMI 45.0-49.9 kg/sq m ([facility]) Z68.42    Hematochezia K92.1    Rectal bleed K62.5     Social History     Socioeconomic History    Marital status: SINGLE     Spouse name: Not on file    Number of children: Not on file    Years of education: Not on file    Highest education level: Not on file   Occupational History    Not on file   Social Needs    Financial resource strain: Not on file    Food insecurity     Worry: Not on file     Inability: Not on file    Transportation needs     Medical: Not on file     Non-medical: Not on file   Tobacco Use    Smoking status: Former Smoker     Packs/day: 1.00     Years: 20.00     Pack years: 20.00     Types: Cigarettes     Last attempt to quit: 2/3/2014     Years since quittin.7    Smokeless tobacco: Never Used   Substance and Sexual Activity    Alcohol use: No     Comment: stopped drinking at age 29    Drug use: No    Sexual activity: Not Currently   Lifestyle    Physical activity     Days per week: Not on file     Minutes per session: Not on file    Stress: Not on file   Relationships    Social connections     Talks on phone: Not on file     Gets together: Not on file     Attends [employer] service: Not on file     Active member of club or organization: Not on file     Attends meetings of clubs or organizations: Not on file     Relationship status: Not on file    Intimate partner violence     Fear of current or ex partner: Not on file     Emotionally abused: Not on file     Physically abused: Not on file     Forced sexual activity: Not on file   Other Topics Concern    Not on file   Social History Narrative    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      Past Medical History:   Diagnosis Date   Annmarie Rahman [facility] [address] 1/6/15    [facility] ED. Pt reported this    Arthritis     hands    Asthma     Atrial fibrillation with RVR ([facility] [address] 8/11/2017    Cardiomyopathy ([facility])     ET 25-30%    Carpal tunnel syndrome     Chronic obstructive pulmonary disease ([facility])     Diabetes ([facility])     Type II    Gastrointestinal disorder     GERD    GERD (gastroesophageal reflux disease)     Heart failure ([facility])     High cholesterol     Hypertension     Restrictive lung disease     FVC2.59 (52%), FEV1 1.95 (49)- 2017    S/P cardiac cath     Dr. Jeremy Jones, 2016. minimal cad    Sleep apnea     No CPAP    Stroke ([facility])     TIA x 4 - weak on left side         Prior to Admission Medications   Prescriptions Last Dose Informant Patient Reported? Taking? GaviLyte-N 420 gram solution   Yes No   MARGIE PEN NEEDLE 32 gauge x 5/32\" ndle   No No   Sig: USE DAILY WITH Proenza SchouerTOUCH ULTRA BLUE TEST STRIP strip   No No   Sig: USE ONE STRIP TO TEST THREE TIMES A DAY BEFORE MEALS   Omeprazole delayed release (PRILOSEC D/R) 20 mg tablet 10/10/2020 at Unknown time  No Yes   Sig: Take 2 Tabs by mouth daily for 60 days. albuterol-ipratropium (DUO-NEB) 2.5 mg-0.5 mg/3 ml nebu 11/3/2020 at Unknown time  Yes Yes   Sig: 3 mL every six (6) hours as needed. apixaban (ELIQUIS) 5 mg tablet 11/10/2020 at Unknown time  No Yes   Sig: Take 1 Tab by mouth two (2) times a day.    aspirin delayed-release 81 mg tablet 11/10/2020 at Unknown time  No Yes   Sig: Take 1 Tab by mouth daily. atorvastatin (LIPITOR) 40 mg tablet 11/10/2020 at Unknown time  No Yes   Sig: TAKE ONE TABLET BY MOUTH DAILY FOR CHOLESTEROL   carvedilol (COREG) 12.5 mg tablet 11/10/2020 at Unknown time  No Yes   Sig: TAKE ONE TABLET BY MOUTH TWICE A DAY TO SLOW HEART RATE DOWN.   divalproex DR (DEPAKOTE) 500 mg tablet   No No   Sig: Take 1 Tab by mouth daily. empagliflozin (JARDIANCE) 25 mg tablet 11/10/2020 at Unknown time  No Yes   Sig: Take 1 Tab by mouth Daily (before breakfast). furosemide (LASIX) 80 mg tablet 11/10/2020 at Unknown time  No Yes   Sig: Take 2 tabs in the AM, one 2 in the PM   glipiZIDE (GLUCOTROL) 10 mg tablet 11/10/2020 at Unknown time  No Yes   Sig: TAKE ONE TABLET BY MOUTH TWICE A DAY   isosorbide mononitrate ER (IMDUR) 30 mg tablet 10/10/2020 at Unknown time  No Yes   Sig: Take 1 Tab by mouth daily. liraglutide (VICTOZA) 0.6 mg/0.1 mL (18 mg/3 mL) pnij   No No   Si.6 mg daily- 1st week, then 1.2 mg daily- 2nd week, then 1.8 mg daily. Please give pt needles also   metFORMIN (GLUCOPHAGE) 1,000 mg tablet 11/10/2020 at Unknown time  No Yes   Sig: Take 1 Tab by mouth two (2) times daily (with meals). Resume as previously prescribed on 2020   nitroglycerin (NITROSTAT) 0.4 mg SL tablet 10/10/2020 at Unknown time  No Yes   Si Tab by SubLINGual route every five (5) minutes as needed for Chest Pain.   potassium chloride SR (K-TAB) 20 mEq tablet 11/10/2020 at Unknown time  No Yes   Si tabs po bid   sacubitriL-valsartan (Entresto)  mg tablet 11/10/2020 at Unknown time  No Yes   Sig: Take 1 Tab by mouth two (2) times a day. topiramate (TOPAMAX) 100 mg tablet 11/10/2020 at Unknown time  No Yes   Sig: Take 1 Tab by mouth two (2) times a day. Taking 100 mg twice daily   traMADoL (ULTRAM) 50 mg tablet 10/10/2020 at Unknown time  No Yes   Sig: Take 1 Tab by mouth every eight (8) hours as needed for Pain for up to 60 days.  Max Daily Amount: 150 mg.   traZODone (DESYREL) 100 mg tablet 11/3/2020 at Unknown time  No Yes   Sig: Take 1 Tab by mouth nightly. zolpidem (AMBIEN) 10 mg tablet 11/3/2020 at Unknown time  No Yes   Sig: TAKE ONE TABLET BY MOUTH ONCE NIGHTLY AS NEEDED FOR SLEEP      Facility-Administered Medications: None           The review of systems is:  Negative  for shortness of breath or chest pain      The heart, lungs, and mental status were satisfactory for the administration of deep sedation and for the procedure. I discussed with the patient the objectives, risks, consequences and alternatives to the procedure.       Rachel Mcbride MD  11/10/2020  3:48 PM

## 2020-11-10 NOTE — ANESTHESIA PREPROCEDURE EVALUATION
Anesthetic History   No history of anesthetic complications            Review of Systems / Medical History  Patient summary reviewed, nursing notes reviewed and pertinent labs reviewed    Pulmonary    COPD    Sleep apnea: CPAP  Shortness of breath and smoker (EX 20 pk yr smoker, quit 2-3-14)  Asthma     Comments: Restrictive Lung Disease   Neuro/Psych         TIA     Cardiovascular    Hypertension      CHF: NYHA Classification II  Dysrhythmias : atrial fibrillation  Pacemaker, CAD and hyperlipidemia    Exercise tolerance: <4 METS  Comments: 7-2019 EKG: Pacemaker       · LV: Estimated LVEF is 55 - 60%. Normal cavity size, systolic function (ejection fraction normal) and diastolic function. Severe concentric hypertrophy. Wall motion: normal.  · LA: Severely dilated left atrium. Left Atrium volume index is 51.52 mL/m2. · RV: Dilated right ventricle. Pacer/ICD present.     9-2020 Cath:  Mild nonobstructive CAD   GI/Hepatic/Renal     GERD    Renal disease      Comments: Screening colonoscopy/GERD Endo/Other    Diabetes: type 2    Morbid obesity and arthritis     Other Findings            Physical Exam    Airway  Mallampati: II  TM Distance: > 6 cm  Neck ROM: normal range of motion   Mouth opening: Normal     Cardiovascular          Murmur: Grade 2, Mitral area     Dental    Dentition: Edentulous     Pulmonary    Rhonchi:bilateral             Abdominal  GI exam deferred       Other Findings            Anesthetic Plan    ASA: 3  Anesthesia type: total IV anesthesia          Induction: Intravenous  Anesthetic plan and risks discussed with: Patient      Propofol MAC/Supernova

## 2020-11-10 NOTE — ANESTHESIA POSTPROCEDURE EVALUATION
Procedure(s):  COLONOSCOPY  RESOLUTION CLIP x 8  INJECTION epi.    total IV anesthesia    Anesthesia Post Evaluation      Multimodal analgesia: multimodal analgesia used between 6 hours prior to anesthesia start to PACU discharge  Patient location during evaluation: bedside  Patient participation: complete - patient participated  Level of consciousness: awake  Pain management: adequate  Airway patency: patent  Anesthetic complications: no  Cardiovascular status: acceptable  Respiratory status: acceptable  Hydration status: acceptable  Post anesthesia nausea and vomiting:  controlled  Final Post Anesthesia Temperature Assessment:  Normothermia (36.0-37.5 degrees C)      INITIAL Post-op Vital signs: No vitals data found for the desired time range.

## 2020-11-10 NOTE — H&P
Hospitalist Admission Note    NAME: Lori Romano   :  1965   MRN:  [id number]     Date/Time:  11/10/2020 3:23 PM    Patient PCP: Rose Parker MD  ______________________________________________________________________  Given the patient's current clinical presentation, I have a high level of concern for decompensation if discharged from the emergency department. Complex decision making was performed, which includes reviewing the patient's available past medical records, laboratory results, and x-ray films. My assessment of this patient's clinical condition and my plan of care is as follows. Assessment / Plan:  Bright red blood per rectum. Recent colonoscopy with multiple polyps removed  History of A. Fib    -Patient evaluated by GI in the ED, likely bleeding from post polypectomy bleed with resumption of his Eliquis recently  -Plan for colonoscopy noted  -Hold Eliquis and aspirin  -Keep n.p.o.  -PPI twice daily  -H&H every 8 hour, transfuse if hemoglobin less than 7. Currently hemoglobin 15.6      History of hypertension, HLD  History of CHF with ICD, BiV  History of A. fib, permanent  History of MENDY  History of diabetes mellitus  History of TIA  History of diabetes mellitus type 2    Echo 10/22-EF 55 to 60%, severe LVH, severely dilated LA and dilated right ventricle  Resume home medication  SSI    Code Status: Full code  Surrogate Decision Maker:    DVT Prophylaxis: SCD  GI Prophylaxis: not indicated          Subjective:   CHIEF COMPLAINT: Rectal bleeding    HISTORY OF PRESENT ILLNESS:     Gretta Boucher is a 50 y.o.  male who presents with past medical history of A. fib, recent colonoscopy , presented to ED with a chief complaint of bleeding per rectum. Patient reported 5 episode of large dark red stool. Patient reports dizziness. Denies any abdominal pain, nausea or vomiting. He had a colonoscopy on  several polyps were removed.   Resumed his Eliquis couple of days ago. He denies any fever, chills, shortness of breath, chest pain  Patient was evaluated by GI, plan for colonoscopy    We were asked to admit for work up and evaluation of the above problems. Past Medical History:   Diagnosis Date   [facility]) 1/6/15    [facility] ED. Pt reported this    Arthritis     hands    Asthma     Atrial fibrillation with RVR ([address] 2017    Cardiomyopathy (HCC)     ET 25-30%    Carpal tunnel syndrome     Chronic obstructive pulmonary disease ([facility]CC)     Diabetes ([facility])     Type II    Gastrointestinal disorder     GERD    GERD (gastroesophageal reflux disease)     Heart failure ([facility])     High cholesterol     Hypertension     Restrictive lung disease     FVC2.59 (52%), FEV1 1.95 (49)- 2017    S/P cardiac cath     Dr. Christos Guillen, 2016.  minimal cad    Sleep apnea     No CPAP    Stroke ([facility])     TIA x 4 - weak on left side        Past Surgical History:   Procedure Laterality Date    CARDIAC SURG PROCEDURE UNLIST  2018    Implantation of PACEMAKER    COLONOSCOPY N/A 2020    COLONOSCOPY performed by Romy Rutherford MD at [facility] ENDOSCOPY    HX PACEMAKER Left     defib,     PA INSJ/RPLCMT PERM DFB W/TRNSVNS LDS 1/DUAL CHMBR N/A 3/11/2019    INSERT ICD BIV MULTI performed by Lucia Calabrese MD at [facility] CARDIAC CATH LAB       Social History     Tobacco Use    Smoking status: Former Smoker     Packs/day: 1.00     Years: 20.00     Pack years: 20.00     Types: Cigarettes     Last attempt to quit: 2/3/2014     Years since quittin.7    Smokeless tobacco: Never Used   Substance Use Topics    Alcohol use: No     Comment: stopped drinking at age 29        Family History   Adopted: Yes   Problem Relation Age of Onset    No Known Problems Mother     No Known Problems Father      Allergies   Allergen Reactions    Codeine Hives    Hydrocodone Itching    Influenza Virus Vaccines Nausea Only     Fever  diarrhea    Mushroom Flavor Swelling    Oxycodone Rash    Pneumococcal 23-Allison Ps Vaccine Nausea and Vomiting     Vomit          Prior to Admission medications    Medication Sig Start Date End Date Taking? Authorizing Provider   Omeprazole delayed release (PRILOSEC D/R) 20 mg tablet Take 2 Tabs by mouth daily for 60 days. 11/4/20 1/3/21 Yes Cordelia Salazar MD   metFORMIN (GLUCOPHAGE) 1,000 mg tablet Take 1 Tab by mouth two (2) times daily (with meals). Resume as previously prescribed on 9/24/2020 11/3/20  Yes Rose Parker MD   traMADoL [place]) 50 mg tablet Take 1 Tab by mouth every eight (8) hours as needed for Pain for up to 60 days. Max Daily Amount: 150 mg. 11/3/20 1/2/21 Yes Rose Parker MD   glipiZIDE (GLUCOTROL) 10 mg tablet TAKE ONE TABLET BY MOUTH TWICE A DAY 10/29/20  Yes Rose Parker MD   topiramate (TOPAMAX) 100 mg tablet Take 1 Tab by mouth two (2) times a day. Taking 100 mg twice daily 10/29/20  Yes Michael Ochoa MD   albuterol-ipratropium (DUO-NEB) 2.5 mg-0.5 mg/3 ml nebu 3 mL every six (6) hours as needed. 8/30/20  Yes Provider, Historical   aspirin delayed-release 81 mg tablet Take 1 Tab by mouth daily. 9/16/20  Yes Sage PERDUE NP   isosorbide mononitrate ER (IMDUR) 30 mg tablet Take 1 Tab by mouth daily. 9/16/20  Yes Sage PERDUE NP   nitroglycerin (NITROSTAT) 0.4 mg SL tablet 1 Tab by SubLINGual route every five (5) minutes as needed for Chest Pain. 8/20/20  Yes Geetha Artis ANP   potassium chloride SR (K-TAB) 20 mEq tablet 2 tabs po bid 7/10/20  Yes Rose Parker MD   sacubitriL-valsartan [place])  mg tablet Take 1 Tab by mouth two (2) times a day. 6/22/20  Yes Rose Parker MD   zolpidem (AMBIEN) 10 mg tablet TAKE ONE TABLET BY MOUTH ONCE NIGHTLY AS NEEDED FOR SLEEP 6/1/20  Yes Rose Parker MD   traZODone (DESYREL) 100 mg tablet Take 1 Tab by mouth nightly.  4/8/20  Yes Rose Parker MD   furosemide (LASIX) 80 mg tablet Take 2 tabs in the AM, one 2 in the PM 2/4/20 Yes Julian Santamaria MD   carvedilol (COREG) 12.5 mg tablet TAKE ONE TABLET BY MOUTH TWICE A DAY TO SLOW HEART RATE DOWN. 12/5/19  Yes Julian Santamaria MD   apixaban (ELIQUIS) 5 mg tablet Take 1 Tab by mouth two (2) times a day. 11/4/19  Yes Julian Santamaria MD   atorvastatin (LIPITOR) 40 mg tablet TAKE ONE TABLET BY MOUTH DAILY FOR CHOLESTEROL 11/4/19  Yes uJlian Santamaria MD   empagliflozin (JARDIANCE) 25 mg tablet Take 1 Tab by mouth Daily (before breakfast). 9/19/19  Yes Eduardo Torres MD   liraglutide (VICTOZA) 0.6 mg/0.1 mL (18 mg/3 mL) pnij 0.6 mg daily- 1st week, then 1.2 mg daily- 2nd week, then 1.8 mg daily. Please give pt needles also 11/3/20   Julian Santamaria MD   divalproex DR (DEPAKOTE) 500 mg tablet Take 1 Tab by mouth daily. 10/21/20   Emeka Ash MD   GaviLyte-N 420 gram solution  6/17/20   Provider, Historical   ONETOUCH ULTRA BLUE TEST STRIP strip USE ONE STRIP TO TEST THREE TIMES A DAY BEFORE MEALS 2/4/20   Julian Santamaria MD   MARGIE PEN NEEDLE 32 gauge x 5/32\" ndle USE DAILY WITH VICTOZA 10/2/19   Julian Santamaria MD       REVIEW OF SYSTEMS:     I am not able to complete the review of systems because:    The patient is intubated and sedated    The patient has altered mental status due to his acute medical problems    The patient has baseline aphasia from prior stroke(s)    The patient has baseline dementia and is not reliable historian    The patient is in acute medical distress and unable to provide information           Total of 12 systems reviewed as follows:       POSITIVE= underlined text  Negative = text not underlined  General:  fever, chills, sweats, generalized weakness, weight loss/gain,      loss of appetite   Eyes:    blurred vision, eye pain, loss of vision, double vision  ENT:    rhinorrhea, pharyngitis   Respiratory:   cough, sputum production, SOB, HAMM, wheezing, pleuritic pain   Cardiology:   chest pain, palpitations, orthopnea, PND, edema, syncope Gastrointestinal:  abdominal pain , N/V, diarrhea, dysphagia, constipation, bleeding   Genitourinary:  frequency, urgency, dysuria, hematuria, incontinence   Muskuloskeletal :  arthralgia, myalgia, back pain  Hematology:  easy bruising, nose or gum bleeding, lymphadenopathy   Dermatological: rash, ulceration, pruritis, color change / jaundice  Endocrine:   hot flashes or polydipsia   Neurological:  headache, dizziness, confusion, focal weakness, paresthesia,     Speech difficulties, memory loss, gait difficulty  Psychological: Feelings of anxiety, depression, agitation    Objective:   VITALS:    Visit Vitals  BP (!) 146/77 (BP 1 Location: Left arm, BP Patient Position: At rest)   Pulse 80   Temp 98.1 °F (36.7 °C)   Resp 16   Ht 6' (1.829 m)   Wt 145.6 kg (320 lb 15.8 oz)   SpO2 99%   BMI 43.53 kg/m²       PHYSICAL EXAM:    General:    Alert, cooperative, no distress, appears stated age. HEENT: Atraumatic, anicteric sclerae, pink conjunctivae  Neck:  Supple, symmetrical,  thyroid: non tender  Lungs:   Clear to auscultation bilaterally. No Wheezing or Rhonchi. No rales. Chest wall:  No tenderness  No Accessory muscle use. Heart:   Regular  rhythm,  No  murmur   No edema  Abdomen:   Soft, non-tender. Not distended. Bowel sounds normal  Extremities: No cyanosis. No clubbing,      Skin turgor normal, Capillary refill normal, Radial dial pulse 2+  Skin:     Not pale. Not Jaundiced  No rashes   Psych:  Good insight. Not depressed. Not anxious or agitated. Neurologic: EOMs intact. No facial asymmetry. No aphasia or slurred speech. Symmetrical strength, Sensation grossly intact.  Alert and oriented X 4.     _______________________________________________________________________  Care Plan discussed with:    Comments   Patient x    Family      RN x    Care Manager                    Consultant:      _______________________________________________________________________  Expected  Disposition:   Home with Family x   HH/PT/OT/RN    SNF/LTC    MIKAYLA    ________________________________________________________________________  TOTAL TIME:  40 Minutes    Critical Care Provided     Minutes non procedure based      Comments     Reviewed previous records   >50% of visit spent in counseling and coordination of care  Discussion with patient and/or family and questions answered       ________________________________________________________________________  Signed: Sissy Cardenas MD    Procedures: see electronic medical records for all procedures/Xrays and details which were not copied into this note but were reviewed prior to creation of Plan. LAB DATA REVIEWED:    Recent Results (from the past 24 hour(s))   CBC WITH AUTOMATED DIFF    Collection Time: 11/10/20 11:01 AM   Result Value Ref Range    WBC 9.8 4.1 - 11.1 K/uL    RBC 5.34 4.10 - 5.70 M/uL    HGB 15.6 12.1 - 17.0 g/dL    HCT 46.7 36.6 - 50.3 %    MCV 87.5 80.0 - 99.0 FL    MCH 29.2 26.0 - 34.0 PG    MCHC 33.4 30.0 - 36.5 g/dL    RDW 15.8 (H) 11.5 - 14.5 %    PLATELET 986 266 - 836 K/uL    MPV 10.9 8.9 - 12.9 FL    NRBC 0.0 0  WBC    ABSOLUTE NRBC 0.00 0.00 - 0.01 K/uL    NEUTROPHILS 74 32 - 75 %    LYMPHOCYTES 19 12 - 49 %    MONOCYTES 5 5 - 13 %    EOSINOPHILS 1 0 - 7 %    BASOPHILS 1 0 - 1 %    IMMATURE GRANULOCYTES 0 0.0 - 0.5 %    ABS. NEUTROPHILS 7.4 1.8 - 8.0 K/UL    ABS. LYMPHOCYTES 1.8 0.8 - 3.5 K/UL    ABS. MONOCYTES 0.5 0.0 - 1.0 K/UL    ABS. EOSINOPHILS 0.1 0.0 - 0.4 K/UL    ABS. BASOPHILS 0.1 0.0 - 0.1 K/UL    ABS. IMM.  GRANS. 0.0 0.00 - 0.04 K/UL    DF AUTOMATED     METABOLIC PANEL, COMPREHENSIVE    Collection Time: 11/10/20 11:01 AM   Result Value Ref Range    Sodium 141 136 - 145 mmol/L    Potassium 3.5 3.5 - 5.1 mmol/L    Chloride 112 (H) 97 - 108 mmol/L    CO2 24 21 - 32 mmol/L    Anion gap 5 5 - 15 mmol/L    Glucose 214 (H) 65 - 100 mg/dL    BUN 20 6 - 20 MG/DL    Creatinine 1.34 (H) 0.70 - 1.30 MG/DL    BUN/Creatinine ratio 15 12 - 20      GFR est AA >60 >60 ml/min/1.73m2    GFR est non-AA 56 (L) >60 ml/min/1.73m2    Calcium 9.2 8.5 - 10.1 MG/DL    Bilirubin, total 0.7 0.2 - 1.0 MG/DL    ALT (SGPT) 42 12 - 78 U/L    AST (SGOT) 19 15 - 37 U/L    Alk.  phosphatase 75 45 - 117 U/L    Protein, total 7.3 6.4 - 8.2 g/dL    Albumin 3.6 3.5 - 5.0 g/dL    Globulin 3.7 2.0 - 4.0 g/dL    A-G Ratio 1.0 (L) 1.1 - 2.2

## 2020-11-10 NOTE — PROCEDURES
Colonoscopy Procedure Note    Peyton Holden [facility]  1965  [id number]    Indications:  Please see below. Pre-operative Diagnosis: LGI bleed post  polypectomy    Post-operative Diagnosis: Cecal post polypectomy ulcer related GI bleed      : Austin Hairston. Antonio Tabares MD    Referring Provider: Qian Galvez MD    Sedation:  MAC anesthesia Propofol        Procedure Details:    After detailed informed consent was obtained with all risks and benefits of procedure explained and preoperative exam completed, the patient was taken to the endoscopy suite and placed in the left lateral decubitus position. Upon sequential sedation as per above, a digital rectal exam was performed  And was normal.  The Olympus videocolonoscope  was inserted in the rectum and carefully advanced to the cecum, which was identified by the ileocecal valve and appendiceal orifice. The quality of preparation was poor. The colonoscope was slowly withdrawn with careful evaluation between folds. Retroflexion in the rectum was performed. Findings:   · The colon was full of fresh blood and clots. Scope passed with some manipulation to the cecum. · A large fresh clot with lots of blood was seen in the cecum. This was cleared. · A large ulcer with a large vessel was noted at the recent polypectomy site in the cecum. I had to apply 8  Resolution hemoclips on the ulcer and injected it with 10 ml of 1:10,000 SM Epinephrine with final cessation of bleeding. · Rest of the mucosa is difficult to see 2/2 poor prep. Therapies:    injection of 10 cc cc of 1: 93082 epinephrine  endoclip  X 8 placed for hemorrhage    Specimen:  none       Complications: None were encountered during the procedure. EBL:  None. Recommendations:     -Follow hgb. -CLD only. -For recurring bleeding he will need IR embolization.  -Hold Eliquis. Consult cardiology non urgently regarding its re initiation. Everardo Tabares MD  11/10/2020  4:23 PM

## 2020-11-10 NOTE — ED PROVIDER NOTES
EMERGENCY DEPARTMENT HISTORY AND PHYSICAL EXAM      Date: 11/10/2020  Patient Name: Hermann Barthel    History of Presenting Illness     Chief Complaint   Patient presents with    Rectal Bleeding     pt had colonoscopy on 11/4. Started with rectal bleeding this morning at 0700 x 3 episodes         HPI: Hermann Barthel, 50 y.o. male presents to the ED with cc of rectal bleeding. He reports 5 episodes of large dark red stools, the last stool was black. This started this morning. He now states that he feels lightheaded. He denies any abdominal pain, no nausea or vomiting, no chest pain or shortness of breath. Denies any dysuria or hematuria. He had a colonoscopy on 11/4, several polyps removed, everything had been normal until today when the bloody stool started. He does take Eliquis, and restarted this several days ago. There are no other complaints, changes, or physical findings at this time. PCP: Elicia Delatorre MD    No current facility-administered medications on file prior to encounter. Current Outpatient Medications on File Prior to Encounter   Medication Sig Dispense Refill    Omeprazole delayed release (PRILOSEC D/R) 20 mg tablet Take 2 Tabs by mouth daily for 60 days. 60 Tab 3    metFORMIN (GLUCOPHAGE) 1,000 mg tablet Take 1 Tab by mouth two (2) times daily (with meals). Resume as previously prescribed on 9/24/2020 1 Tab 0    liraglutide (VICTOZA) 0.6 mg/0.1 mL (18 mg/3 mL) pnij 0.6 mg daily- 1st week, then 1.2 mg daily- 2nd week, then 1.8 mg daily. Please give pt needles also 3 Adjustable Dose Pre-filled Pen Syringe 3    traMADoL (ULTRAM) 50 mg tablet Take 1 Tab by mouth every eight (8) hours as needed for Pain for up to 60 days. Max Daily Amount: 150 mg. 90 Tab 1    glipiZIDE (GLUCOTROL) 10 mg tablet TAKE ONE TABLET BY MOUTH TWICE A DAY 60 Tab 10    topiramate (TOPAMAX) 100 mg tablet Take 1 Tab by mouth two (2) times a day.  Taking 100 mg twice daily 180 Tab 1    divalproex DR (DEPAKOTE) 500 mg tablet Take 1 Tab by mouth daily. 30 Tab 2    albuterol-ipratropium (DUO-NEB) 2.5 mg-0.5 mg/3 ml nebu 3 mL every six (6) hours as needed.  aspirin delayed-release 81 mg tablet Take 1 Tab by mouth daily. 30 Tab 0    isosorbide mononitrate ER (IMDUR) 30 mg tablet Take 1 Tab by mouth daily. 30 Tab 5    nitroglycerin (NITROSTAT) 0.4 mg SL tablet 1 Tab by SubLINGual route every five (5) minutes as needed for Chest Pain. 25 Tab 1    GaviLyte-N 420 gram solution       potassium chloride SR (K-TAB) 20 mEq tablet 2 tabs po bid 120 Tab 12    sacubitriL-valsartan (Entresto)  mg tablet Take 1 Tab by mouth two (2) times a day. 60 Tab 5    zolpidem (AMBIEN) 10 mg tablet TAKE ONE TABLET BY MOUTH ONCE NIGHTLY AS NEEDED FOR SLEEP 30 Tab 4    traZODone (DESYREL) 100 mg tablet Take 1 Tab by mouth nightly. 30 Tab 5    furosemide (LASIX) 80 mg tablet Take 2 tabs in the AM, one 2 in the  Tab 12    ONETOUCH ULTRA BLUE TEST STRIP strip USE ONE STRIP TO TEST THREE TIMES A DAY BEFORE MEALS 100 Strip 11    carvedilol (COREG) 12.5 mg tablet TAKE ONE TABLET BY MOUTH TWICE A DAY TO SLOW HEART RATE DOWN. 60 Tab 11    apixaban (ELIQUIS) 5 mg tablet Take 1 Tab by mouth two (2) times a day. 60 Tab 11    atorvastatin (LIPITOR) 40 mg tablet TAKE ONE TABLET BY MOUTH DAILY FOR CHOLESTEROL 30 Tab 12    MARGIE PEN NEEDLE 32 gauge x 5/32\" ndle USE DAILY WITH VICTOZA 100 Pen Needle 11    empagliflozin (JARDIANCE) 25 mg tablet Take 1 Tab by mouth Daily (before breakfast). 80 Tab 3       Past History     Past Medical History:  Past Medical History:   Diagnosis Date    [facility]-fib [facility]) 1/6/15    [facility] ED.  Pt reported this    Arthritis     hands    Asthma     Atrial fibrillation with RVR ([facility] [address] 8/11/2017    Cardiomyopathy (HCC)     ET 25-30%    Carpal tunnel syndrome     Chronic obstructive pulmonary disease (HCC)     Diabetes (HCC)     Type II    Gastrointestinal disorder     GERD    GERD (gastroesophageal reflux disease)     Heart failure (HCC)     High cholesterol     Hypertension     Restrictive lung disease     FVC2.59 (52%), FEV1 1.95 (49)- 2017    S/P cardiac cath     Dr. Andrew Horn, 2016. minimal cad    Sleep apnea     No CPAP    Stroke (HCC)     TIA x 4 - weak on left side       Past Surgical History:  Past Surgical History:   Procedure Laterality Date    CARDIAC SURG PROCEDURE UNLIST  2018    Implantation of PACEMAKER    COLONOSCOPY N/A 2020    COLONOSCOPY performed by Chela García [facility] at [facility] ENDOSCOPY    HX PACEMAKER Left     defib,     [facility] INSJ/RPLCMT PERM DFB W/TRNSVNS LDS 1/DUAL CHMBR N/A 3/11/2019    INSERT ICD BIV MULTI performed by Juhi Matias MD at [facility] CARDIAC CATH LAB       Family History:  Family History   Adopted: Yes   Problem Relation Age of Onset    No Known Problems Mother     No Known Problems Father        Social History:  Social History     Tobacco Use    Smoking status: Former Smoker     Packs/day: 1.00     Years: 20.00     Pack years: 20.00     Types: Cigarettes     Last attempt to quit: 2/3/2014     Years since quittin.7    Smokeless tobacco: Never Used   Substance Use Topics    Alcohol use: No     Comment: stopped drinking at age 29    Drug use: No       Allergies: Allergies   Allergen Reactions    Codeine Hives    Hydrocodone Itching    Influenza Virus Vaccines Nausea Only     Fever  diarrhea    Mushroom Flavor Swelling    Oxycodone Rash    Pneumococcal 23-Allison Ps Vaccine Nausea and Vomiting     Vomit           Review of Systems   no fever  No eye pain  No ear pain  no shortness of breath  no chest pain  no abdominal pain  no dysuria  no leg pain  No rash  No lymphadenopathy  No weight loss    Physical Exam   Physical Exam  Constitutional:       Appearance: Normal appearance. He is obese. HENT:      Head: Normocephalic and atraumatic.       Nose: Nose normal.      Mouth/Throat:      Mouth: Mucous membranes are moist. Eyes:      Extraocular Movements: Extraocular movements intact. Neck:      Musculoskeletal: Neck supple. Cardiovascular:      Rate and Rhythm: Normal rate and regular rhythm. Pulmonary:      Effort: Pulmonary effort is normal.      Breath sounds: Normal breath sounds. Abdominal:      Palpations: Abdomen is soft. Tenderness: There is no abdominal tenderness. Musculoskeletal:         General: No swelling. Skin:     General: Skin is warm and dry. Neurological:      General: No focal deficit present. Mental Status: He is alert and oriented to person, place, and time. Psychiatric:         Mood and Affect: Mood normal.         Diagnostic Study Results     Labs -     Recent Results (from the past 24 hour(s))   CBC WITH AUTOMATED DIFF    Collection Time: 11/10/20 11:01 AM   Result Value Ref Range    WBC 9.8 4.1 - 11.1 K/uL    RBC 5.34 4.10 - 5.70 M/uL    HGB 15.6 12.1 - 17.0 g/dL    HCT 46.7 36.6 - 50.3 %    MCV 87.5 80.0 - 99.0 FL    MCH 29.2 26.0 - 34.0 PG    MCHC 33.4 30.0 - 36.5 g/dL    RDW 15.8 (H) 11.5 - 14.5 %    PLATELET 213 750 - 020 K/uL    MPV 10.9 8.9 - 12.9 FL    NRBC 0.0 0  WBC    ABSOLUTE NRBC 0.00 0.00 - 0.01 K/uL    NEUTROPHILS 74 32 - 75 %    LYMPHOCYTES 19 12 - 49 %    MONOCYTES 5 5 - 13 %    EOSINOPHILS 1 0 - 7 %    BASOPHILS 1 0 - 1 %    IMMATURE GRANULOCYTES 0 0.0 - 0.5 %    ABS. NEUTROPHILS 7.4 1.8 - 8.0 K/UL    ABS. LYMPHOCYTES 1.8 0.8 - 3.5 K/UL    ABS. MONOCYTES 0.5 0.0 - 1.0 K/UL    ABS. EOSINOPHILS 0.1 0.0 - 0.4 K/UL    ABS. BASOPHILS 0.1 0.0 - 0.1 K/UL    ABS. IMM.  GRANS. 0.0 0.00 - 0.04 K/UL    DF AUTOMATED     METABOLIC PANEL, COMPREHENSIVE    Collection Time: 11/10/20 11:01 AM   Result Value Ref Range    Sodium 141 136 - 145 mmol/L    Potassium 3.5 3.5 - 5.1 mmol/L    Chloride 112 (H) 97 - 108 mmol/L    CO2 24 21 - 32 mmol/L    Anion gap 5 5 - 15 mmol/L    Glucose 214 (H) 65 - 100 mg/dL    BUN 20 6 - 20 MG/DL    Creatinine 1.34 (H) 0.70 - 1.30 MG/DL BUN/Creatinine ratio 15 12 - 20      GFR est AA >60 >60 ml/min/1.73m2    GFR est non-AA 56 (L) >60 ml/min/1.73m2    Calcium 9.2 8.5 - 10.1 MG/DL    Bilirubin, total 0.7 0.2 - 1.0 MG/DL    ALT (SGPT) 42 12 - 78 U/L    AST (SGOT) 19 15 - 37 U/L    Alk. phosphatase 75 45 - 117 U/L    Protein, total 7.3 6.4 - 8.2 g/dL    Albumin 3.6 3.5 - 5.0 g/dL    Globulin 3.7 2.0 - 4.0 g/dL    A-G Ratio 1.0 (L) 1.1 - 2.2         Radiologic Studies -   No orders to display     CT Results  (Last 48 hours)    None        CXR Results  (Last 48 hours)    None            Medical Decision Making   I am the first provider for this patient. I reviewed the vital signs, available nursing notes, past medical history, past surgical history, family history and social history. Vital Signs-Reviewed the patient's vital signs. Patient Vitals for the past 24 hrs:   Temp Pulse Resp BP SpO2   11/10/20 1051 98.1 °F (36.7 °C) 80 16 (!) 146/77 99 %         Provider Notes (Medical Decision Making):   61-year-old male presenting with rectal bleeding after colonoscopy. He did have some polyps removed, concern for lower GI bleed. He does now report some associated lightheadedness, concern for symptomatic anemia. His abdominal exam is benign, no concern for any acute intra-abdominal infection or obstruction. Given his also now black stools, cannot rule out upper GI bleed as well. Laboratory work will be obtained. ED Course:     Initial assessment performed. The patients presenting problems have been discussed, and they are in agreement with the care plan formulated and outlined with them. I have encouraged them to ask questions as they arise throughout their visit. CBC is negative for leukocytosis or anemia, basic metabolic panel shows creatinine slightly elevated at 1.34, otherwise negative for worrisome electrolyte abnormalities. His hemoglobin is reassuring at 15.6, however I suspect that most of this blood loss is acute.   No transaminitis. Gastroenterology will be contacted, the patient will be admitted. Critical Care Time:         Disposition:  Admit    PLAN:  1. Current Discharge Medication List        2.    Follow-up Information    None       Return to ED if worse     Diagnosis     Clinical Impression: Acute GI bleed after colonoscopy

## 2020-11-10 NOTE — ED NOTES
Patient given warm water enema. Patient instructed on usage. No questions. Patient up to bedside toilet with call bell in reach.

## 2020-11-10 NOTE — CONSULTS
Gastroenterology Consultation Note  MOISE Lunsford   for Dr. Carlos Winkler     NAME: Rima Van : 1965 MRN: [id number]   ATTG: Dr. Virginia Petit PCP: Luther Pineda MD  Date/Time:  11/10/2020 1:47 PM  Subjective:   REASON FOR CONSULT:      Rima Van is a 50 y.o.  male who I was asked to see for BRBPR. He reports that bleeding started this morning around 7 AM, most recent episode in ED. Total of five maroon red stools thus far. No abdominal pain or N/V. He did take his meds this morning after initial episode including his Eliquis for A fib. He had recently underwent an EGD and colonoscopy by Dr. Carlos Winkler on 2020:   EGD for GERD: Findings:      Esophagus: The esophageal mucosa in the proximal, mid and distal esophagus is normal.   There is increased esophageal spasm. LA grade C distal esophagitis is noted. The squamo-columnar junction is at 40 cm where the Z-line was noted. There is a small hiatal hernia.     Stomach: The gastric mucosa has severe, linear, spoke-like erythema in the body> antrum. I took multiple biopsies. The fundus was found to be normal with no lesions noted on retroflexion. The angularis is normal as well.     Duodenum:   The bulb and post bulbar mucosa is normal in appearance. The duodenal folds are normal. Biopsies taken. Colonoscopy for screening Findings:   · A single 1 cm cecal pedunculated polyp was removed with a hot snare. · A long sessile 1.5 cm polyp on 2 folds noted in the ascending colon and removed with a hot snare. A single  Resolution clip was applied to polypectomy site. · A small 7 mm sessile descending colon polyps was removed with a hot snare. · No other lesions noted    Hgb in the ED stable at 15.6 (down from 17.7 on ), BUN 20, Cr 1.34. Has not had anything PO aside from water today. Past Medical History:   Diagnosis Date   [facility]) 1/6/15    [facility] ED.  Pt reported this    Arthritis hands    Asthma     Atrial fibrillation with RVR ([facility]) 2017    Cardiomyopathy (HCC)     ET 25-30%    Carpal tunnel syndrome     Chronic obstructive pulmonary disease (HCC)     Diabetes ([facility])     Type II    Gastrointestinal disorder     GERD    GERD (gastroesophageal reflux disease)     Heart failure ([facility])     High cholesterol     Hypertension     Restrictive lung disease     FVC2.59 (52%), FEV1 1.95 (49)-     S/P cardiac cath     Dr. Viraj Sue, 2016. minimal cad    Sleep apnea     No CPAP    Stroke ([facility])     TIA x 4 - weak on left side      Past Surgical History:   Procedure Laterality Date    CARDIAC SURG PROCEDURE UNLIST  2018    Implantation of PACEMAKER    COLONOSCOPY N/A 2020    COLONOSCOPY performed by Gilles Lauren MD at [facility] ENDOSCOPY    HX PACEMAKER Left     defib,     HI INSJ/RPLCMT PERM DFB W/TRNSVNS LDS 1/DUAL CHMBR N/A 3/11/2019    INSERT ICD BIV MULTI performed by Remington Blandon MD at [facility] CARDIAC CATH LAB     Social History     Tobacco Use    Smoking status: Former Smoker     Packs/day: 1.00     Years: 20.00     Pack years: 20.00     Types: Cigarettes     Last attempt to quit: 2/3/2014     Years since quittin.7    Smokeless tobacco: Never Used   Substance Use Topics    Alcohol use: No     Comment: stopped drinking at age 29      Family History   Adopted: Yes   Problem Relation Age of Onset    No Known Problems Mother     No Known Problems Father       Allergies   Allergen Reactions    Codeine Hives    Hydrocodone Itching    Influenza Virus Vaccines Nausea Only     Fever  diarrhea    Mushroom Flavor Swelling    Oxycodone Rash    Pneumococcal 23-Allison Ps Vaccine Nausea and Vomiting     Vomit        Home Medications:  Prior to Admission Medications   Prescriptions Last Dose Informant Patient Reported? Taking?    GaviLyte-N 420 gram solution   Yes No   MARGIE PEN NEEDLE 32 gauge x \" ndle   No No   Sig: USE DAILY WITH TDXUCH ULTRA BLUE TEST STRIP strip   No No   Sig: USE ONE STRIP TO TEST THREE TIMES A DAY BEFORE MEALS   Omeprazole delayed release (PRILOSEC D/R) 20 mg tablet 10/10/2020 at Unknown time  No Yes   Sig: Take 2 Tabs by mouth daily for 60 days. albuterol-ipratropium (DUO-NEB) 2.5 mg-0.5 mg/3 ml nebu 11/3/2020 at Unknown time  Yes Yes   Sig: 3 mL every six (6) hours as needed. apixaban (ELIQUIS) 5 mg tablet 11/10/2020 at Unknown time  No Yes   Sig: Take 1 Tab by mouth two (2) times a day. aspirin delayed-release 81 mg tablet 11/10/2020 at Unknown time  No Yes   Sig: Take 1 Tab by mouth daily. atorvastatin (LIPITOR) 40 mg tablet 11/10/2020 at Unknown time  No Yes   Sig: TAKE ONE TABLET BY MOUTH DAILY FOR CHOLESTEROL   carvedilol (COREG) 12.5 mg tablet 11/10/2020 at Unknown time  No Yes   Sig: TAKE ONE TABLET BY MOUTH TWICE A DAY TO SLOW HEART RATE DOWN.   divalproex DR (DEPAKOTE) 500 mg tablet   No No   Sig: Take 1 Tab by mouth daily. empagliflozin (JARDIANCE) 25 mg tablet 11/10/2020 at Unknown time  No Yes   Sig: Take 1 Tab by mouth Daily (before breakfast). furosemide (LASIX) 80 mg tablet 11/10/2020 at Unknown time  No Yes   Sig: Take 2 tabs in the AM, one 2 in the PM   glipiZIDE (GLUCOTROL) 10 mg tablet 11/10/2020 at Unknown time  No Yes   Sig: TAKE ONE TABLET BY MOUTH TWICE A DAY   isosorbide mononitrate ER (IMDUR) 30 mg tablet 10/10/2020 at Unknown time  No Yes   Sig: Take 1 Tab by mouth daily. liraglutide (VICTOZA) 0.6 mg/0.1 mL (18 mg/3 mL) pnij   No No   Si.6 mg daily- 1st week, then 1.2 mg daily- 2nd week, then 1.8 mg daily. Please give pt needles also   metFORMIN (GLUCOPHAGE) 1,000 mg tablet 11/10/2020 at Unknown time  No Yes   Sig: Take 1 Tab by mouth two (2) times daily (with meals).  Resume as previously prescribed on 2020   nitroglycerin (NITROSTAT) 0.4 mg SL tablet 10/10/2020 at Unknown time  No Yes   Si Tab by SubLINGual route every five (5) minutes as needed for Chest Pain.   potassium chloride SR (K-TAB) 20 mEq tablet 11/10/2020 at Unknown time  No Yes   Si tabs po bid   sacubitriL-valsartan (Entresto)  mg tablet 11/10/2020 at Unknown time  No Yes   Sig: Take 1 Tab by mouth two (2) times a day. topiramate (TOPAMAX) 100 mg tablet 11/10/2020 at Unknown time  No Yes   Sig: Take 1 Tab by mouth two (2) times a day. Taking 100 mg twice daily   traMADoL (ULTRAM) 50 mg tablet 10/10/2020 at Unknown time  No Yes   Sig: Take 1 Tab by mouth every eight (8) hours as needed for Pain for up to 60 days. Max Daily Amount: 150 mg.   traZODone (DESYREL) 100 mg tablet 11/3/2020 at Unknown time  No Yes   Sig: Take 1 Tab by mouth nightly. zolpidem (AMBIEN) 10 mg tablet 11/3/2020 at Unknown time  No Yes   Sig: TAKE ONE TABLET BY MOUTH ONCE NIGHTLY AS NEEDED FOR SLEEP      Facility-Administered Medications: None     Hospital medications:  No current facility-administered medications for this encounter. Current Outpatient Medications   Medication Sig    Omeprazole delayed release (PRILOSEC D/R) 20 mg tablet Take 2 Tabs by mouth daily for 60 days.  metFORMIN (GLUCOPHAGE) 1,000 mg tablet Take 1 Tab by mouth two (2) times daily (with meals). Resume as previously prescribed on 2020    traMADoL (ULTRAM) 50 mg tablet Take 1 Tab by mouth every eight (8) hours as needed for Pain for up to 60 days. Max Daily Amount: 150 mg.    glipiZIDE (GLUCOTROL) 10 mg tablet TAKE ONE TABLET BY MOUTH TWICE A DAY    topiramate (TOPAMAX) 100 mg tablet Take 1 Tab by mouth two (2) times a day. Taking 100 mg twice daily    albuterol-ipratropium (DUO-NEB) 2.5 mg-0.5 mg/3 ml nebu 3 mL every six (6) hours as needed.  aspirin delayed-release 81 mg tablet Take 1 Tab by mouth daily.  isosorbide mononitrate ER (IMDUR) 30 mg tablet Take 1 Tab by mouth daily.  nitroglycerin (NITROSTAT) 0.4 mg SL tablet 1 Tab by SubLINGual route every five (5) minutes as needed for Chest Pain.     potassium chloride SR (K-TAB) 20 mEq tablet 2 tabs po bid    sacubitriL-valsartan (Entresto)  mg tablet Take 1 Tab by mouth two (2) times a day.  zolpidem (AMBIEN) 10 mg tablet TAKE ONE TABLET BY MOUTH ONCE NIGHTLY AS NEEDED FOR SLEEP    traZODone (DESYREL) 100 mg tablet Take 1 Tab by mouth nightly.  furosemide (LASIX) 80 mg tablet Take 2 tabs in the AM, one 2 in the PM    carvedilol (COREG) 12.5 mg tablet TAKE ONE TABLET BY MOUTH TWICE A DAY TO SLOW HEART RATE DOWN.  apixaban (ELIQUIS) 5 mg tablet Take 1 Tab by mouth two (2) times a day.  atorvastatin (LIPITOR) 40 mg tablet TAKE ONE TABLET BY MOUTH DAILY FOR CHOLESTEROL    empagliflozin (JARDIANCE) 25 mg tablet Take 1 Tab by mouth Daily (before breakfast).  liraglutide (VICTOZA) 0.6 mg/0.1 mL (18 mg/3 mL) pnij 0.6 mg daily- 1st week, then 1.2 mg daily- 2nd week, then 1.8 mg daily. Please give pt needles also    divalproex DR (DEPAKOTE) 500 mg tablet Take 1 Tab by mouth daily.     GaviLyte-N 420 gram solution     ONETOUCH ULTRA BLUE TEST STRIP strip USE ONE STRIP TO TEST THREE TIMES A DAY BEFORE MEALS    MARGIE PEN NEEDLE 32 gauge x 5/32\" ndle USE DAILY WITH VICTOZA     REVIEW OF SYSTEMS:     []     Unable to obtain  ROS due to  []    mental status change  []    sedated   []    intubated  Review of Systems -   History obtained from the patient  General ROS: negative for - chills or fever  Psychological ROS: negative for - anxiety  Hematological and Lymphatic ROS: positive for - bleeding problems  Respiratory ROS: no cough, shortness of breath, or wheezing  Cardiovascular ROS: no chest pain or dyspnea on exertion  Gastrointestinal ROS: See HPI  Genito-Urinary ROS: no dysuria, trouble voiding, or hematuria  Musculoskeletal ROS: negative for - joint pain  Neurological ROS: no TIA or stroke symptoms  Dermatological ROS: negative    Objective:   VITALS:    Visit Vitals  BP (!) 146/77 (BP 1 Location: Left arm, BP Patient Position: At rest)   Pulse 80   Temp 98.1 °F (36.7 °C)   Resp 16   Ht 6' (1.829 m)   Wt 145.6 kg (320 lb 15.8 oz)   SpO2 99%   BMI 43.53 kg/m²     Temp (24hrs), Av.1 °F (36.7 °C), Min:98.1 °F (36.7 °C), Max:98.1 °F (36.7 °C)    PHYSICAL EXAM:   General:    Alert, cooperative, no distress, appears stated age. Head:   Normocephalic, without obvious abnormality, atraumatic. Eyes:   Conjunctivae clear, anicteric sclerae. Pupils are equal  Nose:  Nares normal. No drainage or sinus tenderness. Throat:    Lips, mucosa, and tongue normal.  No Thrush  Neck:  Supple, symmetrical,  no adenopathy, thyroid: non tender  Back:    Symmetric,  No CVA tenderness. Lungs:   CTA bilaterally. No wheezing/rhonchi/rales. Heart:   Regular rate and rhythm,  no murmur, rub or gallop. Abdomen:   Obese, soft, non-tender. Not distended. Bowel sounds normal. No masses. No                            hepatosplenomegaly. No shifting dullness. Rectal:  Deferred  Extremities: No cyanosis. No edema. No clubbing  Skin:     Texture, turgor normal. No rashes/lesions/jaundice  Lymph: Cervical, supraclavicular normal.  Psych:  Good insight. Not depressed. Not anxious or agitated. Neurologic: EOMs intact. No facial asymmetry. No aphasia or slurred speech normal strength, A/O X 3. LAB DATA REVIEWED:    Lab Results   Component Value Date/Time    WBC 9.8 11/10/2020 11:01 AM    HGB 15.6 11/10/2020 11:01 AM    HCT 46.7 11/10/2020 11:01 AM    PLATELET 982 10/72/8454 11:01 AM    MCV 87.5 11/10/2020 11:01 AM     Lab Results   Component Value Date/Time    ALT (SGPT) 42 11/10/2020 11:01 AM    Alk.  phosphatase 75 11/10/2020 11:01 AM    Bilirubin, total 0.7 11/10/2020 11:01 AM     Lab Results   Component Value Date/Time    Sodium 141 11/10/2020 11:01 AM    Potassium 3.5 11/10/2020 11:01 AM    Chloride 112 (H) 11/10/2020 11:01 AM    CO2 24 11/10/2020 11:01 AM    Anion gap 5 11/10/2020 11:01 AM    Glucose 214 (H) 11/10/2020 11:01 AM    BUN 20 11/10/2020 11:01 AM    Creatinine 1.34 (H) 11/10/2020 11:01 AM BUN/Creatinine ratio 15 11/10/2020 11:01 AM    GFR est AA >60 11/10/2020 11:01 AM    GFR est non-AA 56 (L) 11/10/2020 11:01 AM    Calcium 9.2 11/10/2020 11:01 AM     Lab Results   Component Value Date/Time    Lipase 191 12/30/2017 11:26 AM     Lab Results   Component Value Date/Time    INR 1.0 09/17/2020 11:37 AM    INR 1.1 03/08/2019 10:34 AM    INR 1.1 02/06/2019 11:43 PM    Prothrombin time 11.0 09/17/2020 11:37 AM    Prothrombin time 11.2 03/08/2019 10:34 AM    Prothrombin time 10.8 02/06/2019 11:43 PM       IMAGING RESULTS:   []      I have personally reviewed the actual   []    CXR  []    CT  []     US    Impression:  Active Problems:    Atrial fibrillation with RVR ([facility] [address] (8/11/2017)      Obesity, morbid ([facility] [address] (12/20/2017)      Hematochezia (11/10/2020)         Plan:  Patient is presenting with maroon red blood x5 since 7 AM today. Last episode in the ED, he had a 1 cm cecal polyp as well as possible ascending and descending colon polyps removed. May have postpolypectomy bleed given he resumed his Eliquis. Will prep with enema now and proceed with colonoscopy this afternoon with Dr. José Hicks with possible control of bleeding. Recommend holding Eliquis. Keep NPO. Procedure including risks, benefits and alternatives reviewed with the patient and he is in agreement with proceeding. The patient was counseled at length about the risks of jaxon Covid-19 during their perioperative period and any recovery window from their procedure. The patient was made aware that jaxon Covid-19  may worsen their prognosis for recovering from their procedure and lend to a higher morbidity and/or mortality risk. All material risks, benefits, and reasonable alternatives including postponing the procedure were discussed. The patient does  wish to proceed with the procedure at this time.              ___________________________________________________  Care Plan discussed with:    [x]    Patient   []    Family   [x] Nursing   []    Attending  Total Time : 30  minutes   ___________________________________________________  GI: MOISE Lunsford       The patient was seen and examined independently by me. I have discussed the case with the mid-level provider in detail. I have reviewed the patient's chart and the note. I personally performed all components of the history, physical, and medical decision making and agree with the assessment and plan, with minor modifications as noted. Please see APPs note for full details. Physical exam:  General:AAO x 3, obese  HEENT:  EOMI,   Chest:  CTA,Heart: S1, S2, RRR  GI: Soft, NT, ND + bowel sounds  Extremities: No edema    Data Review:  reviewed     Impression:   LGI bleed  colonoscopy w/ polypectomies 11.4. On Eliquis      Plan and discussion:  Emergent colonoscopy with enema prep only today. The patient appeared to understand. I discussed with the patient the objectives, risks, consequences and alternatives to colonoscopy with  treatment of bleeding. Follow hgb. Signed By: Senait Mckeon.  Carlos Winkler MD    11/10/2020  3:49 PM

## 2020-11-11 ENCOUNTER — DOCUMENTATION ONLY (OUTPATIENT)
Dept: FAMILY MEDICINE CLINIC | Age: 55
End: 2020-11-11

## 2020-11-11 LAB
ANION GAP SERPL CALC-SCNC: 7 MMOL/L (ref 5–15)
ATRIAL RATE: 56 BPM
BASOPHILS # BLD: 0.1 K/UL (ref 0–0.1)
BASOPHILS NFR BLD: 1 % (ref 0–1)
BUN SERPL-MCNC: 20 MG/DL (ref 6–20)
BUN/CREAT SERPL: 17 (ref 12–20)
CALCIUM SERPL-MCNC: 8.2 MG/DL (ref 8.5–10.1)
CALCULATED R AXIS, ECG10: -99 DEGREES
CALCULATED T AXIS, ECG11: 103 DEGREES
CHLORIDE SERPL-SCNC: 110 MMOL/L (ref 97–108)
CO2 SERPL-SCNC: 25 MMOL/L (ref 21–32)
CREAT SERPL-MCNC: 1.18 MG/DL (ref 0.7–1.3)
DIAGNOSIS, 93000: NORMAL
DIFFERENTIAL METHOD BLD: ABNORMAL
EOSINOPHIL # BLD: 0.1 K/UL (ref 0–0.4)
EOSINOPHIL NFR BLD: 1 % (ref 0–7)
ERYTHROCYTE [DISTWIDTH] IN BLOOD BY AUTOMATED COUNT: 15.7 % (ref 11.5–14.5)
GLUCOSE BLD STRIP.AUTO-MCNC: 119 MG/DL (ref 65–100)
GLUCOSE BLD STRIP.AUTO-MCNC: 167 MG/DL (ref 65–100)
GLUCOSE BLD STRIP.AUTO-MCNC: 177 MG/DL (ref 65–100)
GLUCOSE BLD STRIP.AUTO-MCNC: 209 MG/DL (ref 65–100)
GLUCOSE SERPL-MCNC: 98 MG/DL (ref 65–100)
HCT VFR BLD AUTO: 37.4 % (ref 36.6–50.3)
HCT VFR BLD AUTO: 38.2 % (ref 36.6–50.3)
HGB BLD-MCNC: 12.4 G/DL (ref 12.1–17)
HGB BLD-MCNC: 12.8 G/DL (ref 12.1–17)
IMM GRANULOCYTES # BLD AUTO: 0.1 K/UL (ref 0–0.04)
IMM GRANULOCYTES NFR BLD AUTO: 1 % (ref 0–0.5)
LYMPHOCYTES # BLD: 2.7 K/UL (ref 0.8–3.5)
LYMPHOCYTES NFR BLD: 29 % (ref 12–49)
MCH RBC QN AUTO: 29.4 PG (ref 26–34)
MCHC RBC AUTO-ENTMCNC: 33.2 G/DL (ref 30–36.5)
MCV RBC AUTO: 88.6 FL (ref 80–99)
MONOCYTES # BLD: 0.5 K/UL (ref 0–1)
MONOCYTES NFR BLD: 5 % (ref 5–13)
NEUTS SEG # BLD: 5.8 K/UL (ref 1.8–8)
NEUTS SEG NFR BLD: 63 % (ref 32–75)
NRBC # BLD: 0 K/UL (ref 0–0.01)
NRBC BLD-RTO: 0 PER 100 WBC
PLATELET # BLD AUTO: 134 K/UL (ref 150–400)
PMV BLD AUTO: 10.6 FL (ref 8.9–12.9)
POTASSIUM SERPL-SCNC: 3.2 MMOL/L (ref 3.5–5.1)
Q-T INTERVAL, ECG07: 468 MS
QRS DURATION, ECG06: 138 MS
QTC CALCULATION (BEZET), ECG08: 505 MS
RBC # BLD AUTO: 4.22 M/UL (ref 4.1–5.7)
RBC MORPH BLD: ABNORMAL
SERVICE CMNT-IMP: ABNORMAL
SODIUM SERPL-SCNC: 142 MMOL/L (ref 136–145)
VENTRICULAR RATE, ECG03: 70 BPM
WBC # BLD AUTO: 9.3 K/UL (ref 4.1–11.1)

## 2020-11-11 PROCEDURE — 74011250637 HC RX REV CODE- 250/637: Performed by: INTERNAL MEDICINE

## 2020-11-11 PROCEDURE — 74011636637 HC RX REV CODE- 636/637: Performed by: INTERNAL MEDICINE

## 2020-11-11 PROCEDURE — 36415 COLL VENOUS BLD VENIPUNCTURE: CPT

## 2020-11-11 PROCEDURE — 74011250637 HC RX REV CODE- 250/637: Performed by: FAMILY MEDICINE

## 2020-11-11 PROCEDURE — 82962 GLUCOSE BLOOD TEST: CPT

## 2020-11-11 PROCEDURE — C9113 INJ PANTOPRAZOLE SODIUM, VIA: HCPCS | Performed by: INTERNAL MEDICINE

## 2020-11-11 PROCEDURE — 85018 HEMOGLOBIN: CPT

## 2020-11-11 PROCEDURE — 80048 BASIC METABOLIC PNL TOTAL CA: CPT

## 2020-11-11 PROCEDURE — 99233 SBSQ HOSP IP/OBS HIGH 50: CPT | Performed by: FAMILY MEDICINE

## 2020-11-11 PROCEDURE — 74011250636 HC RX REV CODE- 250/636: Performed by: FAMILY MEDICINE

## 2020-11-11 PROCEDURE — 65270000029 HC RM PRIVATE

## 2020-11-11 PROCEDURE — 74011250636 HC RX REV CODE- 250/636: Performed by: INTERNAL MEDICINE

## 2020-11-11 PROCEDURE — 85025 COMPLETE CBC W/AUTO DIFF WBC: CPT

## 2020-11-11 RX ORDER — POTASSIUM CHLORIDE 750 MG/1
10 TABLET, FILM COATED, EXTENDED RELEASE ORAL 2 TIMES DAILY
Status: DISCONTINUED | OUTPATIENT
Start: 2020-11-11 | End: 2020-11-12 | Stop reason: HOSPADM

## 2020-11-11 RX ORDER — SODIUM CHLORIDE 9 MG/ML
50 INJECTION, SOLUTION INTRAVENOUS CONTINUOUS
Status: DISCONTINUED | OUTPATIENT
Start: 2020-11-11 | End: 2020-11-12 | Stop reason: HOSPADM

## 2020-11-11 RX ADMIN — INSULIN LISPRO 3 UNITS: 100 INJECTION, SOLUTION INTRAVENOUS; SUBCUTANEOUS at 12:26

## 2020-11-11 RX ADMIN — Medication 10 ML: at 07:04

## 2020-11-11 RX ADMIN — CARVEDILOL 12.5 MG: 12.5 TABLET, FILM COATED ORAL at 08:17

## 2020-11-11 RX ADMIN — Medication 10 ML: at 14:31

## 2020-11-11 RX ADMIN — PANTOPRAZOLE SODIUM 40 MG: 40 INJECTION, POWDER, FOR SOLUTION INTRAVENOUS at 21:41

## 2020-11-11 RX ADMIN — Medication 10 ML: at 21:44

## 2020-11-11 RX ADMIN — TOPIRAMATE 100 MG: 100 TABLET, FILM COATED ORAL at 18:14

## 2020-11-11 RX ADMIN — SODIUM CHLORIDE 500 ML: 900 INJECTION, SOLUTION INTRAVENOUS at 13:50

## 2020-11-11 RX ADMIN — INSULIN LISPRO 2 UNITS: 100 INJECTION, SOLUTION INTRAVENOUS; SUBCUTANEOUS at 16:30

## 2020-11-11 RX ADMIN — DIVALPROEX SODIUM 500 MG: 500 TABLET, DELAYED RELEASE ORAL at 08:15

## 2020-11-11 RX ADMIN — ACETAMINOPHEN 650 MG: 325 TABLET ORAL at 13:50

## 2020-11-11 RX ADMIN — SODIUM CHLORIDE 50 ML/HR: 900 INJECTION, SOLUTION INTRAVENOUS at 15:33

## 2020-11-11 RX ADMIN — TOPIRAMATE 100 MG: 100 TABLET, FILM COATED ORAL at 08:15

## 2020-11-11 RX ADMIN — SACUBITRIL AND VALSARTAN 1 TABLET: 97; 103 TABLET, FILM COATED ORAL at 08:35

## 2020-11-11 RX ADMIN — ISOSORBIDE MONONITRATE 30 MG: 30 TABLET, EXTENDED RELEASE ORAL at 08:14

## 2020-11-11 RX ADMIN — FUROSEMIDE 40 MG: 40 TABLET ORAL at 08:17

## 2020-11-11 RX ADMIN — POTASSIUM CHLORIDE 10 MEQ: 750 TABLET, FILM COATED, EXTENDED RELEASE ORAL at 08:34

## 2020-11-11 RX ADMIN — ATORVASTATIN CALCIUM 40 MG: 40 TABLET, FILM COATED ORAL at 21:41

## 2020-11-11 RX ADMIN — Medication 10 ML: at 21:43

## 2020-11-11 RX ADMIN — PANTOPRAZOLE SODIUM 40 MG: 40 INJECTION, POWDER, FOR SOLUTION INTRAVENOUS at 08:20

## 2020-11-11 RX ADMIN — POTASSIUM CHLORIDE 10 MEQ: 750 TABLET, FILM COATED, EXTENDED RELEASE ORAL at 18:13

## 2020-11-11 NOTE — PROGRESS NOTES
Chart reviewed and discussed with pts nurse Radha. I had wanted to send pt home, however, systolic bp has been in the low 80s all morning. Will bolus pt with 500cc fluid. Hold lasix, carvedilol and isosorbide.  Observe until tomorrow am.

## 2020-11-11 NOTE — PROGRESS NOTES
Bedside and Verbal shift change report given to RN (oncoming nurse) by Kenneth Mireles RN (offgoing nurse). Report included the following information SBAR, Kardex, Intake/Output and MAR.

## 2020-11-11 NOTE — PROGRESS NOTES
General Daily Progress Note    Admit Date: 11/10/2020  Hospital day 2    Subjective:     Patient has no complaint of abdominal pain or further bleeding. Had 2 bms last night, minimal amount of blood.  ..   Medication side effects: none    Current Facility-Administered Medications   Medication Dose Route Frequency    sodium chloride (NS) flush 5-40 mL  5-40 mL IntraVENous Q8H    sodium chloride (NS) flush 5-40 mL  5-40 mL IntraVENous PRN    acetaminophen (TYLENOL) tablet 650 mg  650 mg Oral Q6H PRN    Or    acetaminophen (TYLENOL) suppository 650 mg  650 mg Rectal Q6H PRN    polyethylene glycol (MIRALAX) packet 17 g  17 g Oral DAILY PRN    promethazine (PHENERGAN) tablet 12.5 mg  12.5 mg Oral Q6H PRN    Or    ondansetron (ZOFRAN) injection 4 mg  4 mg IntraVENous Q6H PRN    albuterol-ipratropium (DUO-NEB) 2.5 MG-0.5 MG/3 ML  3 mL Nebulization Q4H PRN    atorvastatin (LIPITOR) tablet 40 mg  40 mg Oral QHS    carvediloL (COREG) tablet 12.5 mg  12.5 mg Oral BID WITH MEALS    zolpidem (AMBIEN) tablet 5 mg  5 mg Oral QHS PRN    traZODone (DESYREL) tablet 100 mg  100 mg Oral QHS    traMADoL (ULTRAM) tablet 50 mg  50 mg Oral Q6H PRN    topiramate (TOPAMAX) tablet 100 mg  100 mg Oral BID    sacubitriL-valsartan (ENTRESTO)  mg tablet 1 Tab  1 Tab Oral BID    isosorbide mononitrate ER (IMDUR) tablet 30 mg  30 mg Oral DAILY    furosemide (LASIX) tablet 40 mg  40 mg Oral DAILY    divalproex DR (DEPAKOTE) tablet 500 mg  500 mg Oral DAILY    pantoprazole (PROTONIX) injection 40 mg  40 mg IntraVENous Q12H    insulin lispro (HUMALOG) injection   SubCUTAneous AC&HS    glucose chewable tablet 16 g  4 Tab Oral PRN    dextrose (D50W) injection syrg 12.5-25 g  12.5-25 g IntraVENous PRN    glucagon (GLUCAGEN) injection 1 mg  1 mg IntraMUSCular PRN    sodium chloride (NS) flush 5-40 mL  5-40 mL IntraVENous Q8H    sodium chloride (NS) flush 5-40 mL  5-40 mL IntraVENous PRN        Review of Systems  A comprehensive review of systems was negative except for that written in the HPI. Objective:     Patient Vitals for the past 8 hrs:   BP Temp Pulse Resp SpO2   11/11/20 0742 109/86 97.2 °F (36.2 °C) 69 16 98 %   11/11/20 0503 (!) 87/58 98.4 °F (36.9 °C) 79 15 98 %   11/11/20 0448 (!) 82/51 98 °F (36.7 °C) 70 17 99 %     No intake/output data recorded. 11/09 1901 - 11/11 0700  In: 650 [I.V.:650]  Out: -     Physical Exam:   Visit Vitals  /86   Pulse 69   Temp 97.2 °F (36.2 °C)   Resp 16   Ht 6' (1.829 m)   Wt 320 lb 15.8 oz (145.6 kg)   SpO2 98%   BMI 43.53 kg/m²     Lungs: clear to auscultation bilaterally  Heart: regular rate and rhythm, S1, S2 normal, no murmur, click, rub or gallop  Abdomen: soft, non-tender. Bowel sounds normal. No masses,  no organomegaly  Extremities: edema , 1+ edema bilaterally      ECG: normal sinus rhythm     Data Review   Recent Results (from the past 24 hour(s))   CBC WITH AUTOMATED DIFF    Collection Time: 11/10/20 11:01 AM   Result Value Ref Range    WBC 9.8 4.1 - 11.1 K/uL    RBC 5.34 4.10 - 5.70 M/uL    HGB 15.6 12.1 - 17.0 g/dL    HCT 46.7 36.6 - 50.3 %    MCV 87.5 80.0 - 99.0 FL    MCH 29.2 26.0 - 34.0 PG    MCHC 33.4 30.0 - 36.5 g/dL    RDW 15.8 (H) 11.5 - 14.5 %    PLATELET 808 215 - 972 K/uL    MPV 10.9 8.9 - 12.9 FL    NRBC 0.0 0  WBC    ABSOLUTE NRBC 0.00 0.00 - 0.01 K/uL    NEUTROPHILS 74 32 - 75 %    LYMPHOCYTES 19 12 - 49 %    MONOCYTES 5 5 - 13 %    EOSINOPHILS 1 0 - 7 %    BASOPHILS 1 0 - 1 %    IMMATURE GRANULOCYTES 0 0.0 - 0.5 %    ABS. NEUTROPHILS 7.4 1.8 - 8.0 K/UL    ABS. LYMPHOCYTES 1.8 0.8 - 3.5 K/UL    ABS. MONOCYTES 0.5 0.0 - 1.0 K/UL    ABS. EOSINOPHILS 0.1 0.0 - 0.4 K/UL    ABS. BASOPHILS 0.1 0.0 - 0.1 K/UL    ABS. IMM.  GRANS. 0.0 0.00 - 0.04 K/UL    DF AUTOMATED     METABOLIC PANEL, COMPREHENSIVE    Collection Time: 11/10/20 11:01 AM   Result Value Ref Range    Sodium 141 136 - 145 mmol/L    Potassium 3.5 3.5 - 5.1 mmol/L    Chloride 112 (H) 97 - 108 mmol/L    CO2 24 21 - 32 mmol/L    Anion gap 5 5 - 15 mmol/L    Glucose 214 (H) 65 - 100 mg/dL    BUN 20 6 - 20 MG/DL    Creatinine 1.34 (H) 0.70 - 1.30 MG/DL    BUN/Creatinine ratio 15 12 - 20      GFR est AA >60 >60 ml/min/1.73m2    GFR est non-AA 56 (L) >60 ml/min/1.73m2    Calcium 9.2 8.5 - 10.1 MG/DL    Bilirubin, total 0.7 0.2 - 1.0 MG/DL    ALT (SGPT) 42 12 - 78 U/L    AST (SGOT) 19 15 - 37 U/L    Alk.  phosphatase 75 45 - 117 U/L    Protein, total 7.3 6.4 - 8.2 g/dL    Albumin 3.6 3.5 - 5.0 g/dL    Globulin 3.7 2.0 - 4.0 g/dL    A-G Ratio 1.0 (L) 1.1 - 2.2     GLUCOSE, POC    Collection Time: 11/10/20  5:26 PM   Result Value Ref Range    Glucose (POC) 157 (H) 65 - 100 mg/dL    Performed by Perfect[place]    HGB & HCT    Collection Time: 11/10/20  5:38 PM   Result Value Ref Range    HGB 14.0 12.1 - 17.0 g/dL    HCT 42.2 36.6 - 50.3 %   EKG, 12 LEAD, INITIAL    Collection Time: 11/10/20  6:06 PM   Result Value Ref Range    Ventricular Rate 70 BPM    Atrial Rate 56 BPM    QRS Duration 138 ms    Q-T Interval 468 ms    QTC Calculation (Bezet) 505 ms    Calculated R Axis -99 degrees    Calculated T Axis 103 degrees    Diagnosis       Ventricular-paced rhythm  Biventricular pacemaker detected  When compared with ECG of 08-JUL-2020 23:28,  No significant change was found     GLUCOSE, POC    Collection Time: 11/10/20 11:29 PM   Result Value Ref Range    Glucose (POC) 155 (H) 65 - 100 mg/dL    Performed by Steve Valdovinos LPN    METABOLIC PANEL, BASIC    Collection Time: 11/11/20  5:05 AM   Result Value Ref Range    Sodium 142 136 - 145 mmol/L    Potassium 3.2 (L) 3.5 - 5.1 mmol/L    Chloride 110 (H) 97 - 108 mmol/L    CO2 25 21 - 32 mmol/L    Anion gap 7 5 - 15 mmol/L    Glucose 98 65 - 100 mg/dL    BUN 20 6 - 20 MG/DL    Creatinine 1.18 0.70 - 1.30 MG/DL    BUN/Creatinine ratio 17 12 - 20      GFR est AA >60 >60 ml/min/1.73m2    GFR est non-AA >60 >60 ml/min/1.73m2    Calcium 8.2 (L) 8.5 - 10.1 MG/DL   CBC WITH AUTOMATED DIFF    Collection Time: 11/11/20  5:05 AM   Result Value Ref Range    WBC 9.3 4.1 - 11.1 K/uL    RBC 4.22 4.10 - 5.70 M/uL    HGB 12.4 12.1 - 17.0 g/dL    HCT 37.4 36.6 - 50.3 %    MCV 88.6 80.0 - 99.0 FL    MCH 29.4 26.0 - 34.0 PG    MCHC 33.2 30.0 - 36.5 g/dL    RDW 15.7 (H) 11.5 - 14.5 %    PLATELET 843 (L) 897 - 400 K/uL    MPV 10.6 8.9 - 12.9 FL    NRBC 0.0 0  WBC    ABSOLUTE NRBC 0.00 0.00 - 0.01 K/uL    NEUTROPHILS 63 32 - 75 %    LYMPHOCYTES 29 12 - 49 %    MONOCYTES 5 5 - 13 %    EOSINOPHILS 1 0 - 7 %    BASOPHILS 1 0 - 1 %    IMMATURE GRANULOCYTES 1 (H) 0.0 - 0.5 %    ABS. NEUTROPHILS 5.8 1.8 - 8.0 K/UL    ABS. LYMPHOCYTES 2.7 0.8 - 3.5 K/UL    ABS. MONOCYTES 0.5 0.0 - 1.0 K/UL    ABS. EOSINOPHILS 0.1 0.0 - 0.4 K/UL    ABS. BASOPHILS 0.1 0.0 - 0.1 K/UL    ABS. IMM. GRANS. 0.1 (H) 0.00 - 0.04 K/UL    DF SMEAR SCANNED      RBC COMMENTS ANISOCYTOSIS  1+       GLUCOSE, POC    Collection Time: 11/11/20  7:00 AM   Result Value Ref Range    Glucose (POC) 119 (H) 65 - 100 mg/dL    Performed by Cristy Simon (PCT)            Assessment:     Active Problems:    Atrial fibrillation with RVR ([facility] [address] (8/11/2017)      Obesity, morbid ([facility] [address] (12/20/2017)      Hematochezia (11/10/2020)      Rectal bleed (11/10/2020)        Plan:     1. post colonoscopy bleeding- seems to have resolved. Hgb 15.6 on admission. 12.4 this am.   2. Hx atrial fibrillation- eliquis on hold at present. 3. Hypokalemia on admission- will replace  4. Hx of systolic heart failure- stable. 5. Diabetes- stable  6. Disposition - I think he can go home today. Would favor holding Eliquis until early next week- but will see what Dr. Tree Menendez is thinking.

## 2020-11-11 NOTE — ED NOTES
TRANSFER - OUT REPORT:    Verbal report given to Katelyn Licona on Arianna Gruber  being transferred to ED 25(unit) for routine progression of care       Report consisted of patients Situation, Background, Assessment and   Recommendations(SBAR). Information from the following report(s) SBAR, ED Summary, Intake/Output, MAR and Med Rec Status was reviewed with the receiving nurse. Lines:   Peripheral IV 11/10/20 Right Antecubital (Active)   Site Assessment Clean, dry, & intact 11/10/20 1102   Phlebitis Assessment 0 11/10/20 1102   Infiltration Assessment 0 11/10/20 1102   Dressing Status Clean, dry, & intact 11/10/20 1102   Dressing Type Tape;Transparent 11/10/20 1102        Opportunity for questions and clarification was provided.       Patient transported with:   Registered Nurse

## 2020-11-11 NOTE — PROGRESS NOTES
RAPID RESPONSE TEAM     0500: Rounded on patient per request of primary RN for hypotension. Repositioned cuff. 87/58 (64) L arm; patient flat and supine. Asymptomatic; denies dizziness, lightheadedness, SOB, CP, fevers, chills, or N/V. Alert and oriented. Last received coreg 12.5mg 11/10 @ [phone]. S/p colonoscopy 11/10 for LGIB with evacuation of fresh blood and clots. Patient denies abdominal pain/bloody stools overnight. Morning CBC being drawn now. Primary RN to reach out to [facility] NP for further orders. Cautious with fluid boluses given hx HF with severe LVH and dilated RV. EF 55-60%. Patient has BiV AICD. Please call with further questions or concerns.    Ada Conrad RN  RRT  Ext. [phone]

## 2020-11-11 NOTE — PROGRESS NOTES
1240: Call made to Dr. Belinda Ly regarding this patients blood pressure. 500cc NS fluid bolus ordered and given. Will reassess. 1515: Call made to Dr. Belinda Ly regarding patients blood pressure. Normal saline continuous fluids ordered. Ordered to go at 50 ml/hr. Given. Will continue to monitor. General Surgery End of Shift Nursing Note    Bedside shift change report given to Tre Lanza (oncoming nurse) by Elma Murillo (offgoing nurse). Report included the following information SBAR, Kardex and MAR. Shift worked:   7a-7p   Summary of shift:    Patient was hypotensive parts of the day; See previous notes patient was asymptomatic. Patient rested most of the day. No complaints of pain. Issues for physician to address:   hypotension     Number times ambulated in hallway past shift: 0    Number of times OOB to chair past shift: 2        GI:    Current diet:  DIET GI LITE (POST SURGICAL)    Tolerating current diet: YES  Passing flatus: YES  Last Bowel Movement: yesterday     Respiratory:    Incentive Spirometer at bedside: YES  Patient instructed on use: YES    Patient Safety:    Falls Score: 1  Bed Alarm On? No  Sitter?  No    Viv Garcia

## 2020-11-11 NOTE — PROGRESS NOTES
Reason for Admission:   Rectal Bleeding                    RUR Score:    18%                 Plan for utilizing home health:     No recommendation at this time     PCP: First and Last name:  Dmitri Ruff     Name of Practice:    Are you a current patient: Yes/No: Yes    Approximate date of last visit: Oct 2020    Can you participate in a virtual visit with your PCP: Yes, if recommended                     Current Advanced Directive/Advance Care Plan: FULL-DOES NOT WISH TO PROVIDE NAME AT THIS TIME                          Transition of Care Plan:                      CM completed room visit with pt, by bedside. Pt reported that he is currently residing in a rooming house, with other residents (one [place] home). Pt is known to be active with PCP: seen Oct 2020 and uses [facility] on [address]. Pt is known to be independent with ADLs, and she drives. Pt reported that he has transported himself to [address] [facility] and will be transporting himself back home at the time of d/c. CM will inform pt's nurse of the following transport. Pt reported that he has DME at home: cane and Neb machine. Pt reported [address] in the past, but no reports of SNf in the past.    Pt does not wish to disclose person to help medical decisions at this time when discussing ACP. Pt stated \"my family is in the [place] area, and I dont talk to them often, I dont trust my friends to make decisions for me\". Pt denies needing additional services at this time. CM will continue to follow. Care Management Interventions  PCP Verified by CM: Yes  Mode of Transport at Discharge: Other (see comment)  Transition of Care Consult (CM Consult): Discharge Planning  Discharge Durable Medical Equipment: No  Physical Therapy Consult: No  Occupational Therapy Consult: No  Speech Therapy Consult: No  Current Support Network:  Other, Own Home(rooming house )  Confirm Follow Up Transport: Family  Discharge Location  Discharge Placement: JEANETTE Mendoza, MSW, CM

## 2020-11-11 NOTE — PROGRESS NOTES
TRANSFER - OUT REPORT:    Verbal report given to BELLE Kitchen(name) on [place]  being transferred to [place](unit) for routine progression of care       Report consisted of patients Situation, Background, Assessment and   Recommendations(SBAR). Information from the following report(s) SBAR, Kardex and Cardiac Rhythm Paced was reviewed with the receiving nurse. Lines:   Peripheral IV 11/10/20 Right Antecubital (Active)   Site Assessment Clean, dry, & intact 11/10/20 1102   Phlebitis Assessment 0 11/10/20 1102   Infiltration Assessment 0 11/10/20 1102   Dressing Status Clean, dry, & intact 11/10/20 1102   Dressing Type Tape;Transparent 11/10/20 1102        Opportunity for questions and clarification was provided.       Patient transported with:   [employer]

## 2020-11-11 NOTE — PROGRESS NOTES
Mr. Adriana Marino is followed by Dr. Mario Watt, Cardiology   He has a hx of afib with AV cheryl ablation with pacemaker. He continued on Eliquis for management of stroke risk for afib. GI evaluation noted and agree with recommendation of holding Eliquis until 11/17/2020. The patient will f/u with Dr. Roya Miranda next week. The patient can f/u with Dr. Mario Watt within next month.

## 2020-11-11 NOTE — PROGRESS NOTES
Prior auth submitted 11/10/20. Received decision by fax on 11/11/20 with denial notice. Reviewed initial submit and corrected incorrect answer. Resubmitted. Second request denied on 11/11/20 by Rande Severin.

## 2020-11-12 VITALS
HEIGHT: 72 IN | HEART RATE: 70 BPM | TEMPERATURE: 98.1 F | RESPIRATION RATE: 18 BRPM | BODY MASS INDEX: 42.66 KG/M2 | DIASTOLIC BLOOD PRESSURE: 71 MMHG | WEIGHT: 315 LBS | OXYGEN SATURATION: 99 % | SYSTOLIC BLOOD PRESSURE: 106 MMHG

## 2020-11-12 LAB
GLUCOSE BLD STRIP.AUTO-MCNC: 142 MG/DL (ref 65–100)
SERVICE CMNT-IMP: ABNORMAL

## 2020-11-12 PROCEDURE — 99239 HOSP IP/OBS DSCHRG MGMT >30: CPT | Performed by: FAMILY MEDICINE

## 2020-11-12 PROCEDURE — 74011636637 HC RX REV CODE- 636/637: Performed by: INTERNAL MEDICINE

## 2020-11-12 PROCEDURE — C9113 INJ PANTOPRAZOLE SODIUM, VIA: HCPCS | Performed by: INTERNAL MEDICINE

## 2020-11-12 PROCEDURE — 74011250637 HC RX REV CODE- 250/637: Performed by: FAMILY MEDICINE

## 2020-11-12 PROCEDURE — 74011250637 HC RX REV CODE- 250/637: Performed by: INTERNAL MEDICINE

## 2020-11-12 PROCEDURE — 82962 GLUCOSE BLOOD TEST: CPT

## 2020-11-12 PROCEDURE — 74011250636 HC RX REV CODE- 250/636: Performed by: INTERNAL MEDICINE

## 2020-11-12 RX ADMIN — Medication 10 ML: at 05:34

## 2020-11-12 RX ADMIN — SACUBITRIL AND VALSARTAN 1 TABLET: 97; 103 TABLET, FILM COATED ORAL at 10:40

## 2020-11-12 RX ADMIN — POTASSIUM CHLORIDE 10 MEQ: 750 TABLET, FILM COATED, EXTENDED RELEASE ORAL at 08:44

## 2020-11-12 RX ADMIN — PANTOPRAZOLE SODIUM 40 MG: 40 INJECTION, POWDER, FOR SOLUTION INTRAVENOUS at 08:50

## 2020-11-12 RX ADMIN — INSULIN LISPRO 2 UNITS: 100 INJECTION, SOLUTION INTRAVENOUS; SUBCUTANEOUS at 08:03

## 2020-11-12 RX ADMIN — TOPIRAMATE 100 MG: 100 TABLET, FILM COATED ORAL at 08:47

## 2020-11-12 RX ADMIN — DIVALPROEX SODIUM 500 MG: 500 TABLET, DELAYED RELEASE ORAL at 08:48

## 2020-11-12 NOTE — PROGRESS NOTES
PIV removed; by Susie Sethi LPN. Patient left with all belongings. Discharge instructions given and reviewed to the patient by Susie Sethi LPN. Patient understood instructions. Patient was taken down to the front entrance by a staff member.

## 2020-11-12 NOTE — PROGRESS NOTES
Gastroenterology Daily Progress Note     [address] [facility] [address] [place] Date: 11/10/2020       Subjective:    He is eating breakfast and ready to be d/honorio. Hgb stable. Repeat colonoscopy by Dr. Jadon Castanon on 11/10 reviewed Findings:   · The colon was full of fresh blood and clots. Scope passed with some manipulation to the cecum. · A large fresh clot with lots of blood was seen in the cecum. This was cleared. · A large ulcer with a large vessel was noted at the recent polypectomy site in the cecum. I had to apply 8  Resolution hemoclips on the ulcer and injected it with 10 ml of 1:10,000 SM Epinephrine with final cessation of bleeding. · Rest of the mucosa is difficult to see 2/2 poor prep.     Current Facility-Administered Medications   Medication Dose Route Frequency    potassium chloride SR (KLOR-CON 10) tablet 10 mEq  10 mEq Oral BID    0.9% sodium chloride infusion  50 mL/hr IntraVENous CONTINUOUS    sodium chloride (NS) flush 5-40 mL  5-40 mL IntraVENous Q8H    sodium chloride (NS) flush 5-40 mL  5-40 mL IntraVENous PRN    acetaminophen (TYLENOL) tablet 650 mg  650 mg Oral Q6H PRN    Or    acetaminophen (TYLENOL) suppository 650 mg  650 mg Rectal Q6H PRN    polyethylene glycol (MIRALAX) packet 17 g  17 g Oral DAILY PRN    promethazine (PHENERGAN) tablet 12.5 mg  12.5 mg Oral Q6H PRN    Or    ondansetron (ZOFRAN) injection 4 mg  4 mg IntraVENous Q6H PRN    albuterol-ipratropium (DUO-NEB) 2.5 MG-0.5 MG/3 ML  3 mL Nebulization Q4H PRN    atorvastatin (LIPITOR) tablet 40 mg  40 mg Oral QHS    zolpidem (AMBIEN) tablet 5 mg  5 mg Oral QHS PRN    traZODone (DESYREL) tablet 100 mg  100 mg Oral QHS    traMADoL (ULTRAM) tablet 50 mg  50 mg Oral Q6H PRN    topiramate (TOPAMAX) tablet 100 mg  100 mg Oral BID    sacubitriL-valsartan (ENTRESTO)  mg tablet 1 Tab  1 Tab Oral BID    divalproex DR (DEPAKOTE) tablet 500 mg  500 mg Oral DAILY    pantoprazole (PROTONIX) injection 40 mg  40 mg IntraVENous Q12H    insulin lispro (HUMALOG) injection   SubCUTAneous AC&HS    glucose chewable tablet 16 g  4 Tab Oral PRN    dextrose (D50W) injection syrg 12.5-25 g  12.5-25 g IntraVENous PRN    glucagon (GLUCAGEN) injection 1 mg  1 mg IntraMUSCular PRN    sodium chloride (NS) flush 5-40 mL  5-40 mL IntraVENous Q8H    sodium chloride (NS) flush 5-40 mL  5-40 mL IntraVENous PRN        Objective:     Visit Vitals  /71   Pulse 70   Temp 98.1 °F (36.7 °C)   Resp 18   Ht 6' (1.829 m)   Wt 143.7 kg (316 lb 12.8 oz)   SpO2 99%   BMI 42.97 kg/m²   Blood pressure 106/71, pulse 70, temperature 98.1 °F (36.7 °C), resp. rate 18, height 6' (1.829 m), weight 143.7 kg (316 lb 12.8 oz), SpO2 99 %. 11/12 0701 - 11/12 1900  In: 200 [P.O.:200]  Out: 300 [Urine:300]    11/10 1901 - 11/12 0700  In: 1443.3 [P.O.:790;  I.V.:653.3]  Out: 1000 [Urine:1000]      Intake/Output Summary (Last 24 hours) at 11/12/2020 0835  Last data filed at 11/12/2020 0804  Gross per 24 hour   Intake 1443.34 ml   Output 1300 ml   Net 143.34 ml         Physical Exam:       General: Pleasant obese, WM, NAD  Chest:  CTA,  Heart: S1, S2, RRR  GI: Soft, NT, ND + bowel sounds  CNS: CN II-XII normal.      Labs:       Recent Results (from the past 24 hour(s))   GLUCOSE, POC    Collection Time: 11/11/20 10:59 AM   Result Value Ref Range    Glucose (POC) 209 (H) 65 - 100 mg/dL    Performed by Joanne Perez \"Raymond\" PCT    HGB & HCT    Collection Time: 11/11/20  1:45 PM   Result Value Ref Range    HGB 12.8 12.1 - 17.0 g/dL    HCT 38.2 36.6 - 50.3 %   GLUCOSE, POC    Collection Time: 11/11/20  4:16 PM   Result Value Ref Range    Glucose (POC) 177 (H) 65 - 100 mg/dL    Performed by Joanne Perez \"Raymond\" PCT    GLUCOSE, POC    Collection Time: 11/11/20  9:39 PM   Result Value Ref Range    Glucose (POC) 167 (H) 65 - 100 mg/dL    Performed by Tiana Simmons LPN    GLUCOSE, POC    Collection Time: 11/12/20  7:20 AM   Result Value Ref Range Glucose (POC) 142 (H) 65 - 100 mg/dL    Performed by Carmen Llanos PCT    LABRCNT(wbc:2,hgb:2,hct:2,plt:2,)  Recent Labs     11/11/20  0505 11/10/20  1101    141   K 3.2* 3.5   * 112*   CO2 25 24   BUN 20 20   CREA 1.18 1.34*   GLU 98 214*   CA 8.2* 9.2   LABRCNT(sgot:3,gpt:3,ap:3,tbiL:3,TP:3,ALB:3,GLOB:3,ggt:3,aml:3,amyp:3,lpse:3,hlpse:3)No results for input(s): INR, PTP, APTT, INREXT, INREXT in the last 72 hours. Recent Labs     11/10/20  1101   AP 75   TP 7.3   ALB 3.6   GLOB 3.7   BRIEFLAB(B12,FOL,FOLAT,RBCF)No results found for: FOL, RBCFLABRCNT(CPK:3,CpKMB:3,ckndx:3,troiq:3)No components found for: GLPOCBRIEFLAB(CHOL,CHOLX,CHOLP,CHLST,CHOLV,HDL,HDLC,HDLP,LDL,DLDL,LDLC,DLDLP,TGL,TGLX,TRIGL,TRIGP,CHHD,CHHDX)No results for input(s): PH, PCO2, PO2 in the last 72 hours. LABRCNT(CPK:3,CpKMB:3,ckndx:3,troiq:3)Everardo Son MD  No results for input(s): CPK, CKNDX, TROIQ in the last 72 hours. No lab exists for component: Lamin Sosa MD      Problem List:     Active Problems:    Atrial fibrillation with RVR ([facility] (8/11/2017)      Obesity, morbid ([facility] (12/20/2017)      Hematochezia (11/10/2020)      Rectal bleed (11/10/2020)        Impression:  Postpolypectomy bleed  A. Fib- previously on Eliquis  Personal history of colon polyps         Plan:  ·  Hgb is stable. · Agree with holding Eliquis on D/C as recommended by Dr. Arabella Garcia at least 5 more days. · Patient is to call our office if he has any recurrent episodes of bleeding or return to ED.    · D/C plans per primary care team.        Morgan Chappell MD    11/12/2020  [address], [address] [place] [address] [phone]  [address] [place] [address] [place]: [phone]

## 2020-11-12 NOTE — PROGRESS NOTES
Problem: Falls - Risk of  Goal: *Absence of Falls  Description: Document Corina Swartz Fall Risk and appropriate interventions in the flowsheet. Outcome: Progressing Towards Goal  Note: Fall Risk Interventions:            Medication Interventions: Teach patient to arise slowly                   Problem: Patient Education: Go to Patient Education Activity  Goal: Patient/Family Education  Outcome: Progressing Towards Goal   Patient remains alert and oriented; able to voice needs. B/P continues to run low and patient is asymptomatic. No acute distress noted.

## 2020-11-12 NOTE — DISCHARGE SUMMARY
bp up. Will dc home this am. Recheck in office Monday. Resume eliquis and furosemide at that time if stable.

## 2020-11-13 ENCOUNTER — TELEPHONE (OUTPATIENT)
Dept: FAMILY MEDICINE CLINIC | Age: 55
End: 2020-11-13

## 2020-11-13 ENCOUNTER — TELEPHONE (OUTPATIENT)
Dept: CASE MANAGEMENT | Age: 55
End: 2020-11-13

## 2020-11-13 NOTE — DISCHARGE SUMMARY
[address]    Name:  Miladys Adams  MR#:  [id number]  :  1965  ACCOUNT #:  [de-identified]  ADMIT DATE:  11/10/2020  DISCHARGE DATE:  2020    FINAL DIAGNOSES:  1. Rectal bleeding. 2.  Cecal ulcer. 3.  Hypotension. 4.  Diabetes. 5.  History of systolic heart failure. CONSULTATION:  Dr. Thereasa Alpers:  Colonoscopy. HISTORY OF PRESENT ILLNESS:  The patient is a very pleasant 47-year-old white male who came to the emergency room complaining of rectal bleeding. He had 5 episodes of a large dark red stool, this has started the morning of admission. He was feeling lightheaded when he came to the emergency room. He denied any abdominal pain, nausea, vomiting, chest pain or shortness of breath. No dysuria or hematuria. The patient had a colonoscopy on 2020 and had several polyps removed. He had held his Eliquis for 2 days after the colonoscopy. CURRENT MEDICATIONS:  Prilosec 20 mg 2 tabs daily, metformin 1000 mg twice daily, Victoza 1.8 mg daily, tramadol 50 mg p.r.n., glipizide 10 mg b.i.d., Topamax 100 mg b.i.d., Depakote 500 mg daily, DuoNeb q.6 hours p.r.n., aspirin 81 mg daily, isosorbide 35 mg daily, nitroglycerin 0.4 mg p.r.n., potassium chloride 20 mEq two tabs b.i.d., Entresto 97/103 one tab b.i.d., Ambien 10 mg daily, trazodone 100 mg daily, furosemide 80 mg, 2 in the morning and 2 in the evening, carvedilol 12.5 mg twice daily, Eliquis 5 mg twice daily, atorvastatin 40 mg daily.     PAST MEDICAL HISTORY:  Significant for chronic atrial fibrillation, arthritis, asthma, cardiomyopathy with ejection fraction 25%-30%, chronic heart failure, COPD, carpal tunnel syndrome, diabetes, gastroesophageal reflux disease, hypertension, hypercholesterolemia, restrictive lung disease, FVC 2.59, FEV1 of 1.95 in 2017, history of cardiac cath with minimal coronary artery disease, had one in 2016,  , history of sleep apnea, no CPAP; history of TIA with transient weakness of left side. PAST SURGICAL HISTORY:  Includes pacemaker, recent colonoscopy 6 days prior to admission by Dr. Danton Peabody with removal of several polyps one to cecum, defibrillator placement. SOCIAL HISTORY:  The patient lives by himself, former , currently on disability. Smoking quit in 2014. Alcohol use is negative. ALLERGIES:  CODEINE, HYDROCODONE, INFLUENZA VACCINE, MUSHROOMS, OXYCODONE, PNEUMOCOCCAL VACCINE. REVIEW OF SYSTEMS:  Please see HPI. PHYSICAL EXAMINATION ON ADMISSION:  GENERAL:  The patient awake, alert, oriented, pleasant and cooperative. VITAL SIGNS:  Temperature 98.1, pulse 80, respiratory rate 16, blood pressure 146/87. LUNGS:  Clear to auscultation and percussion. CARDIOVASCULAR:  Regular rhythm and rate. No murmurs, thrills, rubs or gallops. ABDOMEN:  Soft without mass, tenderness or organomegaly. RECTAL:  Stool was bloody. EXTREMITIES:  1+ edema bilaterally. HOSPITAL COURSE:  On admission, the patient's hemoglobin was 15.6, it did drop to 12.8 prior to discharge. The patient was seen in consultation by Dr. Danton Peabody. Repeat colonoscopy on the day of admission showed colon full of fresh blood and clots. Large fresh clot with lots of blood was seen in the cecum. This was cleared and large vessel was noted at the recent polypectomy site of the cecum. Eight  Resolution hemoclips placed, injected with 10 mL of epinephrine. The patient did not have any further bleeding afterwards. The following day, we had hoped to discharge him, but his blood pressure was a problem running in the 80s. He was given boluses of IV fluids. We also held his isosorbide, Lasix, and carvedilol  pressures up to 90. He was feeling well. He was up and walking. No further bleeding. He was stable and discharged home on 11/12/2020. Follow up with me in my office on Monday.   He is to hold his Eliquis, isosorbide, carvedilol until I see him on Monday.         Ayden Hoffman MD [id number]  D:  11/12/2020 17:50  T:  11/13/2020 9:41  JOB #:  [id number]

## 2020-11-13 NOTE — TELEPHONE ENCOUNTER
----- Message from [place] sent at 11/13/2020 11:43 AM EST -----  Regarding: Dr. Rafaela Reed Message/Vendor Calls    Caller's first and last name: Dian Omer      Reason for call: Requesting a call back from Dr. Talmage Eisenmenger to discuss two the medications that he was supposed to stop taking. Pt has additional questions.        Callback required yes/no and why: yes      Best contact number(s):[phone]      Details to clarify the request: [facility]

## 2020-11-13 NOTE — TELEPHONE ENCOUNTER
20 11:51 AM     Patient contacted regarding recent discharge and COVID-19 risk. Discussed COVID-19 related testing which was available at this time. Test results were negative. Patient informed of results, if available? yes, shared today on phone call     Care Transition Nurse/ Ambulatory Care Manager/ LPN Care Coordinator contacted the patient by telephone to perform post discharge assessment. Verified name and  with patient as identifiers. Patient has following risk factors of: heart failure, COPD, asthma, pneumonia and diabetes. CTN/ACM/LPN reviewed discharge instructions, medical action plan and red flags related to discharge diagnosis. Reviewed and educated them on any new and changed medications related to discharge diagnosis. Pt confirms he was told to stop taking the medications listed. He isn't sure why his Lasix was stopped and has a call out to his PCP to ask as he is starting to feel SOB. I offered to call his PCP's office also to ask them to call him but unable to get through to someone using the provider option. He states they are generally good about getting back with him and that he will call them back if he doesn't get a call. I'm forwarding this telephone encounter to Dr. Montgomery Burkitt to inform. Advance Care Planning:   Does patient have an Advance Directive: currently not on file; education provided        Primary Decision Maker: Scottie Avilez - [facility] - [phone]    Education not provided regarding infection prevention, and signs and symptoms of COVID-19 and when to seek medical attention because pt declines additional information at this time. From CDC: Are you at higher risk for severe illness?  Wash your hands often.  Avoid close contact (6 feet, which is about two arm lengths) with people who are sick.  Put distance between yourself and other people if COVID-19 is spreading in your community.  Clean and disinfect frequently touched surfaces.    Avoid all cruise travel and non-essential air travel.  Call your healthcare professional if you have concerns about COVID-19 and your underlying condition or if you are sick. For more information on steps you can take to protect yourself, see CDC's How to Protect Yourself        Encouraged pt to activate his MyChart acct but he states he does not have computer access or access to free apps on his Android phone. Patient/family/caregiver given information for [employer] and agrees to enroll no, declined  Patient's preferred e-mail:  N/A  Patient's preferred phone number: N/A  Based on Loop alert triggers, patient will be contacted by nurse care manager for worsening symptoms. Plan for follow-up call in 7 days based on severity of symptoms and risk factors.

## 2020-11-16 ENCOUNTER — OFFICE VISIT (OUTPATIENT)
Dept: FAMILY MEDICINE CLINIC | Age: 55
End: 2020-11-16
Payer: MEDICAID

## 2020-11-16 VITALS
HEIGHT: 72 IN | OXYGEN SATURATION: 98 % | SYSTOLIC BLOOD PRESSURE: 113 MMHG | RESPIRATION RATE: 20 BRPM | BODY MASS INDEX: 42.66 KG/M2 | DIASTOLIC BLOOD PRESSURE: 73 MMHG | HEART RATE: 71 BPM | WEIGHT: 315 LBS | TEMPERATURE: 97.1 F

## 2020-11-16 DIAGNOSIS — K92.2 GASTROINTESTINAL HEMORRHAGE, UNSPECIFIED GASTROINTESTINAL HEMORRHAGE TYPE: Primary | ICD-10-CM

## 2020-11-16 DIAGNOSIS — I50.22 CHRONIC SYSTOLIC CONGESTIVE HEART FAILURE (HCC): ICD-10-CM

## 2020-11-16 DIAGNOSIS — M79.641 PAIN OF RIGHT HAND: ICD-10-CM

## 2020-11-16 DIAGNOSIS — G56.21 ULNAR NEUROPATHY OF RIGHT UPPER EXTREMITY: ICD-10-CM

## 2020-11-16 PROCEDURE — G0439 PPPS, SUBSEQ VISIT: HCPCS | Performed by: FAMILY MEDICINE

## 2020-11-16 PROCEDURE — 85025 COMPLETE CBC W/AUTO DIFF WBC: CPT | Performed by: FAMILY MEDICINE

## 2020-11-16 NOTE — PROGRESS NOTES
HISTORY OF PRESENT ILLNESS  Kaiden Howard is a 50 y.o. male. HPI Was hospitalized 11-10 to 11-13 for rectal bleeding, thought to be secondary to a recent removal of a polyp on colonsocopy. No recent rectal bleeding. Had problems with hypotension whhile in hospital. Lasix was held. Did get short of breath last night, took an 80 mg dose of lasix, voided all night, after 1 1/2 hours started breathing much better. We had held his lasix due to hypotension in the hospital.     ROS    Physical Exam  Vitals signs and nursing note reviewed. Constitutional:       Appearance: He is well-developed. HENT:      Right Ear: External ear normal.      Left Ear: External ear normal.   Neck:      Thyroid: No thyromegaly. Cardiovascular:      Rate and Rhythm: Normal rate and regular rhythm. Heart sounds: Normal heart sounds. Pulmonary:      Effort: Pulmonary effort is normal. No respiratory distress. Breath sounds: Normal breath sounds. No wheezing. Abdominal:      General: Bowel sounds are normal. There is no distension. Palpations: Abdomen is soft. There is no mass. Tenderness: There is no abdominal tenderness. There is no guarding. Musculoskeletal: Normal range of motion. Lymphadenopathy:      Cervical: No cervical adenopathy. ASSESSMENT and PLAN  Orders Placed This Encounter    AMB POC COMPLETE CBC, AUTOMATED    apixaban (ELIQUIS) 5 mg tablet     Diagnoses and all orders for this visit:    1. Gastrointestinal hemorrhage, unspecified gastrointestinal hemorrhage type  -     AMB POC COMPLETE CBC, AUTOMATED    2. Chronic systolic congestive heart failure (HCC)    Other orders  -     apixaban (ELIQUIS) 5 mg tablet; Take 1 Tab by mouth two (2) times a day.           Resume eliquis in one week

## 2020-11-16 NOTE — PROGRESS NOTES
Chief Complaint   Patient presents with   [facility] Follow Up     F/U from [facility].     1. Have you been to the ER, urgent care clinic since your last visit? Hospitalized since your last visit? Yes, [facility].    2. Have you seen or consulted any other health care providers outside of the [address] [place] [address] since your last visit? Include any pap smears or colon screening.  No

## 2020-11-18 ENCOUNTER — TELEPHONE (OUTPATIENT)
Dept: FAMILY MEDICINE CLINIC | Age: 55
End: 2020-11-18

## 2020-11-18 NOTE — TELEPHONE ENCOUNTER
Patient notified that insurance has denied tramadol request and states tramadol not on formulary. Patient to contact insurance to see what is on formulary.

## 2020-11-18 NOTE — TELEPHONE ENCOUNTER
----- Message from Kenton Goodwin sent at 11/18/2020  3:51 PM EST -----  Regarding: Dr. Stevo Becerra first and last name: Arpan Ovalles       Reason for call: F/up      Callback required yes/no and why: yes       Best contact number(s): [phone]      Details to clarify the request: The pt is requesting a call back from Dr. Lucas Linn nurse regarding Andry Jerilyn was just talked about over the phone\".       Kenton Goodwin

## 2020-11-19 ENCOUNTER — OFFICE VISIT (OUTPATIENT)
Dept: CARDIOLOGY CLINIC | Age: 55
End: 2020-11-19
Payer: MEDICAID

## 2020-11-19 DIAGNOSIS — I50.22 CHRONIC SYSTOLIC CONGESTIVE HEART FAILURE (HCC): ICD-10-CM

## 2020-11-19 DIAGNOSIS — Z95.810 BIVENTRICULAR ICD (IMPLANTABLE CARDIOVERTER-DEFIBRILLATOR) IN PLACE: Primary | ICD-10-CM

## 2020-11-19 PROCEDURE — 93295 DEV INTERROG REMOTE 1/2/MLT: CPT | Performed by: INTERNAL MEDICINE

## 2020-11-19 PROCEDURE — 93296 REM INTERROG EVL PM/IDS: CPT | Performed by: INTERNAL MEDICINE

## 2020-11-19 NOTE — TELEPHONE ENCOUNTER
Per patient Kingsley Mackay acknowledges they have been covering this medication and service rep not sure why it is being denied now. [employer] to [facility] [employer] a list of formulary med list for patient but will take 7-10 days. In the [facility] [employer] states that pcp can call and do a PEER TO PEER for patient. Name ,  and Member ID is needed. Patient's membership ID is [id number]. Insurance phone number [phone]. Message to be given to provider.

## 2020-11-21 ENCOUNTER — TELEPHONE (OUTPATIENT)
Dept: CASE MANAGEMENT | Age: 55
End: 2020-11-21

## 2020-11-21 NOTE — TELEPHONE ENCOUNTER
11/21/20 5:08 PM--contacted pt for 1-wk F/U call and he tells me he is back on Lasix now and feeling better. I thanked him for the update and advised I'll place a final call to him in the next couple of weeks to check in.

## 2020-11-27 DIAGNOSIS — F51.01 PRIMARY INSOMNIA: ICD-10-CM

## 2020-11-27 DIAGNOSIS — I10 ESSENTIAL HYPERTENSION: ICD-10-CM

## 2020-11-30 RX ORDER — APIXABAN 5 MG/1
TABLET, FILM COATED ORAL
Qty: 60 TAB | Refills: 10 | Status: SHIPPED | OUTPATIENT
Start: 2020-11-30 | End: 2021-10-31

## 2020-11-30 RX ORDER — CARVEDILOL 12.5 MG/1
TABLET ORAL
Qty: 60 TAB | Refills: 10 | Status: SHIPPED | OUTPATIENT
Start: 2020-11-30 | End: 2021-10-31

## 2020-11-30 RX ORDER — ATORVASTATIN CALCIUM 40 MG/1
TABLET, FILM COATED ORAL
Qty: 30 TAB | Refills: 11 | Status: SHIPPED | OUTPATIENT
Start: 2020-11-30 | End: 2021-12-01

## 2020-11-30 RX ORDER — ZOLPIDEM TARTRATE 10 MG/1
TABLET ORAL
Qty: 30 TAB | Refills: 3 | Status: SHIPPED | OUTPATIENT
Start: 2020-11-30 | End: 2021-05-03

## 2020-12-03 ENCOUNTER — PATIENT OUTREACH (OUTPATIENT)
Dept: CASE MANAGEMENT | Age: 55
End: 2020-12-03

## 2020-12-03 NOTE — PROGRESS NOTES
Patient resolved from Transition of Care episode on 12/3/20. ACM/CTN was unsuccessful at contacting this patient today. Patient/family was provided the following resources and education related to COVID-19 during the initial call:                         Signs, symptoms and red flags related to COVID-19            CDC exposure and quarantine guidelines            [facility] exposure contact - [phone]            Contact for their local Department of Health                 Patient has not had any additional ED or hospital visits. No further outreach scheduled with this CTN/ACM. Episode of Care resolved. Patient has this CTN/ACM contact information if future needs arise.

## 2020-12-17 ENCOUNTER — TRANSCRIBE ORDER (OUTPATIENT)
Dept: SCHEDULING | Age: 55
End: 2020-12-17

## 2020-12-17 DIAGNOSIS — R31.0 GROSS HEMATURIA: Primary | ICD-10-CM

## 2020-12-18 ENCOUNTER — TELEPHONE (OUTPATIENT)
Dept: FAMILY MEDICINE CLINIC | Age: 55
End: 2020-12-18

## 2020-12-18 NOTE — TELEPHONE ENCOUNTER
Tramadol not covered by insurance. Three prior authorizations done insurance will not cover. Will give message to pcp for review.

## 2020-12-18 NOTE — TELEPHONE ENCOUNTER
Patient called regarding medication. traMADoL (ULTRAM) 50 mg tablet. Tramadol has been stopped, and he wants to know what can be given for pain. Please call @[phone].

## 2021-01-12 ENCOUNTER — OFFICE VISIT (OUTPATIENT)
Dept: FAMILY MEDICINE CLINIC | Age: 56
End: 2021-01-12
Payer: MEDICAID

## 2021-01-12 VITALS
OXYGEN SATURATION: 98 % | DIASTOLIC BLOOD PRESSURE: 78 MMHG | TEMPERATURE: 96.8 F | SYSTOLIC BLOOD PRESSURE: 106 MMHG | HEIGHT: 72 IN | RESPIRATION RATE: 18 BRPM | HEART RATE: 70 BPM | WEIGHT: 315 LBS | BODY MASS INDEX: 42.66 KG/M2

## 2021-01-12 DIAGNOSIS — E11.21 TYPE 2 DIABETES WITH NEPHROPATHY (HCC): ICD-10-CM

## 2021-01-12 DIAGNOSIS — E78.00 HYPERCHOLESTEROLEMIA: ICD-10-CM

## 2021-01-12 DIAGNOSIS — I10 ESSENTIAL HYPERTENSION: Primary | ICD-10-CM

## 2021-01-12 PROCEDURE — 99213 OFFICE O/P EST LOW 20 MIN: CPT | Performed by: FAMILY MEDICINE

## 2021-01-12 RX ORDER — ISOSORBIDE MONONITRATE 30 MG/1
30 TABLET, EXTENDED RELEASE ORAL DAILY
COMMUNITY
Start: 2020-09-16 | End: 2022-05-13 | Stop reason: SDUPTHER

## 2021-01-12 RX ORDER — OMEPRAZOLE 20 MG/1
20 CAPSULE, DELAYED RELEASE ORAL DAILY
Status: ON HOLD | COMMUNITY
Start: 2020-11-04 | End: 2022-06-20

## 2021-01-12 RX ORDER — NITROGLYCERIN 0.4 MG/1
0.4 TABLET SUBLINGUAL
COMMUNITY
Start: 2020-08-20

## 2021-01-12 RX ORDER — FUROSEMIDE 80 MG/1
TABLET ORAL
COMMUNITY
Start: 2020-02-04 | End: 2021-01-29

## 2021-01-12 NOTE — PROGRESS NOTES
Chief Complaint   Patient presents with    Follow-up     1. Have you been to the ER, urgent care clinic since your last visit? Hospitalized since your last visit? No    2. Have you seen or consulted any other health care providers outside of the [address] [place] [address] since your last visit? Include any pap smears or colon screening.  Yes urology - follow up 1/14/21 - upcoming cat scan for kidneys

## 2021-01-12 NOTE — PROGRESS NOTES
HISTORY OF PRESENT ILLNESS  Augustine Mccullough is a 49 y.o. male. HPI In for followup. No further rectal bleeding. Going back in 2 years for another colonoscopy. Occasional wheezing, uses nebulizer. NO edema. Still gets short of breath easily. ROS    Physical Exam  Vitals signs and nursing note reviewed. Constitutional:       Appearance: He is well-developed. HENT:      Right Ear: External ear normal.      Left Ear: External ear normal.   Neck:      Thyroid: No thyromegaly. Cardiovascular:      Rate and Rhythm: Normal rate and regular rhythm. Heart sounds: Normal heart sounds. Pulmonary:      Effort: Pulmonary effort is normal. No respiratory distress. Breath sounds: Normal breath sounds. No wheezing. Abdominal:      General: Bowel sounds are normal. There is no distension. Palpations: Abdomen is soft. There is no mass. Tenderness: There is no abdominal tenderness. There is no guarding. Musculoskeletal: Normal range of motion. Comments: Trace edema bilaterally   Lymphadenopathy:      Cervical: No cervical adenopathy. ASSESSMENT and PLAN  Orders Placed This Encounter    CBC WITH AUTOMATED DIFF    HEMOGLOBIN A1C WITH EAG    METABOLIC PANEL, BASIC    LIPID PANEL     Diagnoses and all orders for this visit:    1. Essential hypertension  -     CBC WITH AUTOMATED DIFF; Future  -     METABOLIC PANEL, BASIC; Future    2. Type 2 diabetes with nephropathy (HCC)  -     HEMOGLOBIN A1C WITH EAG; Future    3. Hypercholesterolemia  -     LIPID PANEL; Future      Follow-up and Dispositions    · Return in about 3 months (around 4/12/2021).

## 2021-01-13 LAB
BASOPHILS # BLD AUTO: 0.1 X10E3/UL (ref 0–0.2)
BASOPHILS NFR BLD AUTO: 1 %
BUN SERPL-MCNC: 18 MG/DL (ref 6–24)
BUN/CREAT SERPL: 17 (ref 9–20)
CALCIUM SERPL-MCNC: 9.3 MG/DL (ref 8.7–10.2)
CHLORIDE SERPL-SCNC: 102 MMOL/L (ref 96–106)
CHOLEST SERPL-MCNC: 127 MG/DL (ref 100–199)
CO2 SERPL-SCNC: 24 MMOL/L (ref 20–29)
CREAT SERPL-MCNC: 1.09 MG/DL (ref 0.76–1.27)
EOSINOPHIL # BLD AUTO: 0.1 X10E3/UL (ref 0–0.4)
EOSINOPHIL NFR BLD AUTO: 1 %
ERYTHROCYTE [DISTWIDTH] IN BLOOD BY AUTOMATED COUNT: 13.9 % (ref 11.6–15.4)
EST. AVERAGE GLUCOSE BLD GHB EST-MCNC: 146 MG/DL
GLUCOSE SERPL-MCNC: 200 MG/DL (ref 65–99)
HBA1C MFR BLD: 6.7 % (ref 4.8–5.6)
HCT VFR BLD AUTO: 47.6 % (ref 37.5–51)
HDLC SERPL-MCNC: 31 MG/DL
HGB BLD-MCNC: 15.9 G/DL (ref 13–17.7)
IMM GRANULOCYTES # BLD AUTO: 0.1 X10E3/UL (ref 0–0.1)
IMM GRANULOCYTES NFR BLD AUTO: 1 %
INTERPRETATION, 910389: NORMAL
LDLC SERPL CALC-MCNC: 56 MG/DL (ref 0–99)
LYMPHOCYTES # BLD AUTO: 2.3 X10E3/UL (ref 0.7–3.1)
LYMPHOCYTES NFR BLD AUTO: 24 %
Lab: NORMAL
MCH RBC QN AUTO: 30.3 PG (ref 26.6–33)
MCHC RBC AUTO-ENTMCNC: 33.4 G/DL (ref 31.5–35.7)
MCV RBC AUTO: 91 FL (ref 79–97)
MONOCYTES # BLD AUTO: 0.5 X10E3/UL (ref 0.1–0.9)
MONOCYTES NFR BLD AUTO: 5 %
NEUTROPHILS # BLD AUTO: 6.4 X10E3/UL (ref 1.4–7)
NEUTROPHILS NFR BLD AUTO: 68 %
PLATELET # BLD AUTO: 189 X10E3/UL (ref 150–450)
POTASSIUM SERPL-SCNC: 3.6 MMOL/L (ref 3.5–5.2)
RBC # BLD AUTO: 5.25 X10E6/UL (ref 4.14–5.8)
SODIUM SERPL-SCNC: 141 MMOL/L (ref 134–144)
TRIGL SERPL-MCNC: 248 MG/DL (ref 0–149)
VLDLC SERPL CALC-MCNC: 40 MG/DL (ref 5–40)
WBC # BLD AUTO: 9.4 X10E3/UL (ref 3.4–10.8)

## 2021-01-14 ENCOUNTER — HOSPITAL ENCOUNTER (OUTPATIENT)
Dept: CT IMAGING | Age: 56
Discharge: HOME | End: 2021-01-14
Attending: UROLOGY
Payer: MEDICAID

## 2021-01-14 DIAGNOSIS — R31.0 GROSS HEMATURIA: ICD-10-CM

## 2021-01-14 PROCEDURE — 74178 CT ABD&PLV WO CNTR FLWD CNTR: CPT

## 2021-01-14 PROCEDURE — 74011000636 HC RX REV CODE- 636: Performed by: UROLOGY

## 2021-01-14 RX ADMIN — IOPAMIDOL 100 ML: 755 INJECTION, SOLUTION INTRAVENOUS at 12:40

## 2021-01-20 ENCOUNTER — OFFICE VISIT (OUTPATIENT)
Dept: NEUROLOGY | Age: 56
End: 2021-01-20

## 2021-01-20 DIAGNOSIS — Z91.199 NO-SHOW FOR APPOINTMENT: Primary | ICD-10-CM

## 2021-01-29 DIAGNOSIS — E11.9 CONTROLLED TYPE 2 DIABETES MELLITUS WITHOUT COMPLICATION, WITHOUT LONG-TERM CURRENT USE OF INSULIN (HCC): ICD-10-CM

## 2021-01-29 RX ORDER — SACUBITRIL AND VALSARTAN 97; 103 MG/1; MG/1
TABLET, FILM COATED ORAL
Qty: 60 TAB | Refills: 4 | Status: SHIPPED | OUTPATIENT
Start: 2021-01-29 | End: 2021-07-01

## 2021-01-29 RX ORDER — PEN NEEDLE, DIABETIC 32GX 5/32"
NEEDLE, DISPOSABLE MISCELLANEOUS
Qty: 100 PEN NEEDLE | Refills: 10 | Status: SHIPPED | OUTPATIENT
Start: 2021-01-29

## 2021-01-29 RX ORDER — FUROSEMIDE 80 MG/1
TABLET ORAL
Qty: 90 TAB | Refills: 11 | Status: SHIPPED | OUTPATIENT
Start: 2021-01-29 | End: 2021-04-01 | Stop reason: SDUPTHER

## 2021-01-29 RX ORDER — PEN NEEDLE, DIABETIC 31 GX3/16"
NEEDLE, DISPOSABLE MISCELLANEOUS
Qty: 100 PEN NEEDLE | Refills: 11 | Status: SHIPPED | OUTPATIENT
Start: 2021-01-29 | End: 2022-05-16 | Stop reason: SDUPTHER

## 2021-01-29 NOTE — TELEPHONE ENCOUNTER
Patient would like a call from [address] or nurse regarding his medication he can be reached @ [phone]

## 2021-02-01 DIAGNOSIS — E11.9 CONTROLLED TYPE 2 DIABETES MELLITUS WITHOUT COMPLICATION, WITHOUT LONG-TERM CURRENT USE OF INSULIN (HCC): ICD-10-CM

## 2021-02-01 NOTE — TELEPHONE ENCOUNTER
----- Message from General Folk sent at 2/1/2021 11:42 AM EST -----  Regarding: Dr. Ashley Hewitt: [phone]  Medication Refill    Caller (if not patient): N/A      Relationship of caller (if not patient): N/A      Best contact number(s):[phone]       Name of medication and dosage if known: \"Victoza\"      Is patient out of this medication (yes/no): Yes      Pharmacy name: [address] listed in chart? (yes/no): Yes  Pharmacy phone number: N/A      Details to clarify the request: Patient called on Thursday for this prescription refill along with another refill. When he went to pick his mediations up at the pharmacy his \"victoza\" was not filled.         General Folk

## 2021-02-18 ENCOUNTER — OFFICE VISIT (OUTPATIENT)
Dept: CARDIOLOGY CLINIC | Age: 56
End: 2021-02-18
Payer: MEDICAID

## 2021-02-18 DIAGNOSIS — Z95.810 BIVENTRICULAR ICD (IMPLANTABLE CARDIOVERTER-DEFIBRILLATOR) IN PLACE: Primary | ICD-10-CM

## 2021-02-18 DIAGNOSIS — I50.22 CHRONIC SYSTOLIC CONGESTIVE HEART FAILURE (HCC): ICD-10-CM

## 2021-02-18 PROCEDURE — 93296 REM INTERROG EVL PM/IDS: CPT | Performed by: INTERNAL MEDICINE

## 2021-02-18 PROCEDURE — 93295 DEV INTERROG REMOTE 1/2/MLT: CPT | Performed by: INTERNAL MEDICINE

## 2021-03-08 ENCOUNTER — CLINICAL SUPPORT (OUTPATIENT)
Dept: CARDIOLOGY CLINIC | Age: 56
End: 2021-03-08

## 2021-03-08 ENCOUNTER — OFFICE VISIT (OUTPATIENT)
Dept: CARDIOLOGY CLINIC | Age: 56
End: 2021-03-08

## 2021-03-08 VITALS
DIASTOLIC BLOOD PRESSURE: 62 MMHG | HEART RATE: 70 BPM | OXYGEN SATURATION: 97 % | SYSTOLIC BLOOD PRESSURE: 110 MMHG | HEIGHT: 72 IN | BODY MASS INDEX: 42.66 KG/M2 | WEIGHT: 315 LBS | RESPIRATION RATE: 18 BRPM

## 2021-03-08 DIAGNOSIS — I48.21 PERMANENT ATRIAL FIBRILLATION (HCC): ICD-10-CM

## 2021-03-08 DIAGNOSIS — I50.22 CHRONIC SYSTOLIC CONGESTIVE HEART FAILURE (HCC): Primary | ICD-10-CM

## 2021-03-08 DIAGNOSIS — Z95.810 BIVENTRICULAR ICD (IMPLANTABLE CARDIOVERTER-DEFIBRILLATOR) IN PLACE: Primary | ICD-10-CM

## 2021-03-08 DIAGNOSIS — I42.0 DILATED CARDIOMYOPATHY (HCC): ICD-10-CM

## 2021-03-08 DIAGNOSIS — E66.01 OBESITY, MORBID (HCC): ICD-10-CM

## 2021-03-08 DIAGNOSIS — Z95.810 BIVENTRICULAR ICD (IMPLANTABLE CARDIOVERTER-DEFIBRILLATOR) IN PLACE: ICD-10-CM

## 2021-03-08 PROCEDURE — 93000 ELECTROCARDIOGRAM COMPLETE: CPT | Performed by: NURSE PRACTITIONER

## 2021-03-08 PROCEDURE — 99214 OFFICE O/P EST MOD 30 MIN: CPT | Performed by: NURSE PRACTITIONER

## 2021-03-08 RX ORDER — TRAMADOL HYDROCHLORIDE 50 MG/1
50 TABLET ORAL
COMMUNITY
Start: 2021-01-29 | End: 2021-06-01

## 2021-03-08 NOTE — PROGRESS NOTES
ELECTROPHYSIOLOGY        Subjective:      Marti Spencer is a 49 y.o. male is here for EP follow up. He denies any change in chest pain, pressure, tightness, dizziness or lower extremity edema. Marti Spencer  is on [facility], reports no melena, hematuria, or obvious signs of bleeding. No falls. Patient Active Problem List    Diagnosis Date Noted    Cardiomyopathy ([address] 03/11/2019     Priority: 1 - One    Hematochezia 11/10/2020    Rectal bleed 11/10/2020    Angina, class III ([address] 09/22/2020    Myocardial ischemia 09/22/2020    Adult BMI 45.0-49.9 kg/sq m ([address] 09/22/2020    TIA (transient ischemic attack) 02/13/2020    Cardiomyopathy, nonischemic ([address] 11/17/2018    Controlled type 2 diabetes mellitus without complication, without long-term current use of insulin ([address] 08/15/2018    Sleep apnea     Obesity, morbid ([address] 12/20/2017    Atrial fibrillation with RVR ([address] 08/11/2017    CHF (congestive heart failure) ([address] 08/10/2017    Restrictive lung disease     Dyspnea 07/15/2014    Hypertension 02/17/2014      Antolin Ding MD  Past Medical History:   Diagnosis Date   [facility]) 1/6/15    [facility] ED. Pt reported this    Arthritis     hands    Asthma     Atrial fibrillation with RVR ([address] 8/11/2017    Cardiomyopathy (HCC)     ET 25-30%    Carpal tunnel syndrome     Chronic obstructive pulmonary disease (HCC)     Diabetes (HCC)     Type II    Gastrointestinal disorder     GERD    GERD (gastroesophageal reflux disease)     Heart failure (HCC)     High cholesterol     Hypertension     Restrictive lung disease     FVC2.59 (52%), FEV1 1.95 (49)- 2017    S/P cardiac cath     Dr. Lizy Matos, 2016.  minimal cad    Sleep apnea     No CPAP    Stroke (HCC)     TIA x 4 - weak on left side      Past Surgical History:   Procedure Laterality Date    COLONOSCOPY N/A 11/4/2020    COL[facility]    COLONOSCOPY N/A 11/10/2020    COLONOSCOPY performed by Horace Jeong MD at [facility] ENDOSCOPY    HX PACEMAKER Left     defib,     OR CARDIAC SURG PROCEDURE UNLIST  2018    Implantation of PACEMAKER    OR INSJ/RPLCMT PERM DFB W/TRNSVNS LDS 1/DUAL CHMBR N/A 3/11/2019    INSERT ICD BIV MULTI performed by Zoey Perez MD at [facility] CARDIAC CATH LAB     Allergies   Allergen Reactions    Codeine Hives    Hydrocodone Itching    Influenza Virus Vaccines Nausea Only     Fever  diarrhea    Mushroom Flavor Swelling    Oxycodone Rash    Pneumococcal 23-Allison Ps Vaccine Nausea and Vomiting     Vomit        Family History   Adopted: Yes   Problem Relation Age of Onset    No Known Problems Mother     No Known Problems Father     negative for cardiac disease  Social History     Socioeconomic History    Marital status: SINGLE     Spouse name: Not on file    Number of children: Not on file    Years of education: Not on file    Highest education level: Not on file   Tobacco Use    Smoking status: Former Smoker     Packs/day: 1.00     Years: 20.00     Pack years: 20.00     Types: Cigarettes     Quit date: 2/3/2014     Years since quittin.0    Smokeless tobacco: Never Used   Substance and Sexual Activity    Alcohol use: No     Comment: stopped drinking at age 29    Drug use: No    Sexual activity: Not Currently   Social History Narrative    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      Current Outpatient Medications   Medication Sig    traMADoL (ULTRAM) 50 mg tablet Take 50 mg by mouth every eight (8) hours as needed.  liraglutide (VICTOZA) 0.6 mg/0.1 mL (18 mg/3 mL) pnij 1.8 mg by SubCUTAneous route daily.     Entresto  mg tablet TAKE ONE TABLET BY MOUTH TWICE A DAY    furosemide (LASIX) 80 mg tablet TAKE TWO TABLETS BY MOUTH EVERY MORNING AND TAKE ONE TABLET BY MOUTH EVERY EVENING    OneTouch Ultra Blue Test Strip strip USE TO TEST BLOOD SUGAR THREE TIMES A DAY BEFORE MEALS    Pamela Pen Needle 32 gauge x 5/32\" ndle USE DAILY WITH VICTOZA    glucose blood VI test strips (OneTouch Ultra Blue Test Strip) strip Use one strip to test three times a day before meals    Insulin Needles, Disposable, (Pamela Pen Needle) 32 gauge x 5/32\" ndle Use with Victoza daily    isosorbide mononitrate ER (IMDUR) 30 mg tablet Take 30 mg by mouth daily.  nitroglycerin (NITROSTAT) 0.4 mg SL tablet 0.4 mg by SubLINGual route.  omeprazole (PRILOSEC) 20 mg capsule Take 20 mg by mouth daily.  metFORMIN (GLUCOPHAGE) 1,000 mg tablet TAKE ONE TABLET BY MOUTH TWICE A DAY WITH MEALS    atorvastatin (LIPITOR) 40 mg tablet TAKE ONE TABLET BY MOUTH DAILY    zolpidem (AMBIEN) 10 mg tablet TAKE ONE TABLET BY MOUTH ONCE NIGHTLY AS NEEDED FOR SLEEP    carvediloL (COREG) 12.5 mg tablet TAKE ONE TABLET BY MOUTH TWICE A DAY TO SLOW HEART RATE DOWN    Eliquis 5 mg tablet TAKE ONE TABLET BY MOUTH TWICE A DAY    glipiZIDE (GLUCOTROL) 10 mg tablet TAKE ONE TABLET BY MOUTH TWICE A DAY    topiramate (TOPAMAX) 100 mg tablet Take 1 Tab by mouth two (2) times a day. Taking 100 mg twice daily    albuterol-ipratropium (DUO-NEB) 2.5 mg-0.5 mg/3 ml nebu 3 mL every six (6) hours as needed.  potassium chloride SR (K-TAB) 20 mEq tablet 2 tabs po bid    traZODone (DESYREL) 100 mg tablet Take 1 Tab by mouth nightly.  empagliflozin (JARDIANCE) 25 mg tablet Take 1 Tab by mouth Daily (before breakfast).  divalproex DR (DEPAKOTE) 500 mg tablet Take 1 Tab by mouth daily. No current facility-administered medications for this visit. Vitals:    03/08/21 1317   BP: 110/62   Pulse: 70   Resp: 18   SpO2: 97%   Weight: 340 lb 3.2 oz (154.3 kg)   Height: 6' (1.829 m)       I have reviewed the nurses notes, vitals, problem list, allergy list, medical history, family, social history and medications. Review of Symptoms:    General: Pt denies excessive weight gain or loss.  Pt is able to conduct ADL's  HEENT: Denies blurred vision, headaches, epistaxis and difficulty swallowing. Respiratory: Denies shortness of breath, HAMM, wheezing or stridor. Cardiovascular: Denies precordial pain, palpitations, edema or PND  Gastrointestinal: Denies poor appetite, indigestion, abdominal pain or blood in stool  Urinary: Denies dysuria, pyuria  Musculoskeletal: Denies pain or swelling from muscles or joints  Neurologic: Denies tremor, paresthesias, or sensory motor disturbance  Skin: Denies rash, itching or texture change. Psych: Denies depression      Physical Exam:      General: Well developed, in no acute distress. HEENT: Eyes - PERRL  Heart:  Normal S1/S2 negative S3 or S4. Regular, no murmur  Respiratory: Clear bilaterally x 4, no wheezing or rales  Extremities:  No edema, no cyanosis. Musculoskeletal: No clubbing  Neuro: A&Ox3, speech clear  Skin: No visible rashes or lesions. Non diaphoretic.  No visible ulcers  Vascular: 2+ pulses symmetric in all extremities  Psych - judgement intact and orientation is wnl       Cardiographics    Ek21  V paced    Results for orders placed or performed during the hospital encounter of 11/10/20   EKG, 12 LEAD, INITIAL   Result Value Ref Range    Ventricular Rate 70 BPM    Atrial Rate 56 BPM    QRS Duration 138 ms    Q-T Interval 468 ms    QTC Calculation (Bezet) 505 ms    Calculated R Axis -99 degrees    Calculated T Axis 103 degrees    Diagnosis       Ventricular-paced rhythm  Biventricular pacemaker detected  When compared with ECG of 2020 23:28,  No significant change was found  Confirmed by Aidan Luis ([id number]) on 2020 10:46:12 AM           Lab Results   Component Value Date/Time    WBC 9.4 2021 01:16 PM    HGB 15.9 2021 01:16 PM    HCT 47.6 2021 01:16 PM    PLATELET 143 4032 01:16 PM    MCV 91 2021 01:16 PM      Lab Results   Component Value Date/Time    Sodium 141 2021 01:16 PM    Potassium 3.6 2021 01:16 PM    Chloride 102 2021 01:16 PM    CO2 24 01/12/2021 01:16 PM    Anion gap 7 11/11/2020 05:05 AM    Glucose 200 (H) 01/12/2021 01:16 PM    BUN 18 01/12/2021 01:16 PM    Creatinine 1.09 01/12/2021 01:16 PM    BUN/Creatinine ratio 17 01/12/2021 01:16 PM    GFR est AA 88 01/12/2021 01:16 PM    GFR est non-AA 77 01/12/2021 01:16 PM    Calcium 9.3 01/12/2021 01:16 PM    Bilirubin, total 0.7 11/10/2020 11:01 AM    Alk. phosphatase 75 11/10/2020 11:01 AM    Protein, total 7.3 11/10/2020 11:01 AM    Albumin 3.6 11/10/2020 11:01 AM    Globulin 3.7 11/10/2020 11:01 AM    A-G Ratio 1.0 (L) 11/10/2020 11:01 AM    ALT (SGPT) 42 11/10/2020 11:01 AM      Lab Results   Component Value Date/Time    TSH 2.430 09/19/2019 02:17 PM           Assessment:           ICD-10-CM ICD-9-CM    1. Chronic systolic congestive heart failure (HCC)  I50.22 428.22 AMB POC EKG ROUTINE W/ 12 LEADS, INTER & REP     428.0    2. Dilated cardiomyopathy (HCC)  I42.0 425.4 AMB POC EKG ROUTINE W/ 12 LEADS, INTER & REP   3. Permanent atrial fibrillation (HCC)  I48.21 427.31 AMB POC EKG ROUTINE W/ 12 LEADS, INTER & REP   4. Biventricular ICD (implantable cardioverter-defibrillator) in place  Z95.810 V45.02 AMB POC EKG ROUTINE W/ 12 LEADS, INTER & REP   5. Obesity, morbid (HCC)  E66.01 278.01 AMB POC EKG ROUTINE W/ 12 LEADS, INTER & REP     Orders Placed This Encounter    AMB POC EKG ROUTINE W/ 12 LEADS, INTER & REP     Order Specific Question:   Reason for Exam:     Answer:   routine    traMADoL (ULTRAM) 50 mg tablet     Sig: Take 50 mg by mouth every eight (8) hours as needed. Plan:       José Claire Mcgraw is here for annual follow up and device interrogation. he is s/p AV node ablation and PPM 11/16/18. Device interrogation with 99% BiVP, 10 years remaining.  EF 25-30% per echo in Feb 2020. 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 Today he is V paced on EKG and normtensive. He is enrolled in remote monitoring and I will see him back in 1 year.    Continue medical management for DM, BMI >40 and AF. Addressed all patient questions and concerns at this visit. During this visit,  the patient and I had a comprehensive discussion of device management using principles of shared decision making. we reviewed device therapy, including the potential risks and benefits of device management. These risks include death, myocardial infarction, stroke, cardiac perforation, vascular injury, injury, pacing induced cardiomyopathy, inappropriate shocks (defibrillator) and other less severe complications. The patient demonstrated a clear understanding of these issues during out discussion. Our plans, determined together after thorough consideration, are outlined else where in this note. Continue medical management for cardiomyopathy. Discussed side effects, efficacy and good medication compliance with pt re:  [facility],  and [facility]. Thank you for allowing me to participate in Jackie Jefferson 's care. Sena Tavarez NP          Patient was made aware during visit today that all testing completed would be instantaneously available on their MyChart for review. Discussed that these results will be made available to the provider at the same time. They were advised to wait at least 3 business days to allow for provider's interpretation of results with follow-up before calling our office with concerns about their results.

## 2021-03-08 NOTE — PROGRESS NOTES
1. Have you been to the ER, urgent care clinic since your last visit? Hospitalized since your last visit? No.    2. Have you seen or consulted any other health care providers outside of the [address] [place] [address] since your last visit? Include any pap smears or colon screening.    No.        Chief Complaint   Patient presents with    Irregular Heart Beat    Annual Exam     device check- heart racing that is new, soboe, chest discomfort that is the same

## 2021-04-01 ENCOUNTER — OFFICE VISIT (OUTPATIENT)
Dept: FAMILY MEDICINE CLINIC | Age: 56
End: 2021-04-01
Payer: MEDICAID

## 2021-04-01 VITALS
OXYGEN SATURATION: 96 % | HEIGHT: 72 IN | TEMPERATURE: 96.9 F | BODY MASS INDEX: 42.66 KG/M2 | SYSTOLIC BLOOD PRESSURE: 105 MMHG | WEIGHT: 315 LBS | DIASTOLIC BLOOD PRESSURE: 72 MMHG | RESPIRATION RATE: 16 BRPM | HEART RATE: 70 BPM

## 2021-04-01 DIAGNOSIS — I10 ESSENTIAL HYPERTENSION: Primary | ICD-10-CM

## 2021-04-01 DIAGNOSIS — E11.21 TYPE 2 DIABETES WITH NEPHROPATHY (HCC): ICD-10-CM

## 2021-04-01 DIAGNOSIS — E78.00 HYPERCHOLESTEROLEMIA: ICD-10-CM

## 2021-04-01 LAB
ANION GAP SERPL CALC-SCNC: 9 MMOL/L (ref 5–15)
BUN SERPL-MCNC: 20 MG/DL (ref 6–20)
BUN/CREAT SERPL: 16 (ref 12–20)
CALCIUM SERPL-MCNC: 8.5 MG/DL (ref 8.5–10.1)
CHLORIDE SERPL-SCNC: 107 MMOL/L (ref 97–108)
CO2 SERPL-SCNC: 24 MMOL/L (ref 21–32)
CREAT SERPL-MCNC: 1.26 MG/DL (ref 0.7–1.3)
EST. AVERAGE GLUCOSE BLD GHB EST-MCNC: 166 MG/DL
GLUCOSE SERPL-MCNC: 183 MG/DL (ref 65–100)
HBA1C MFR BLD: 7.4 % (ref 4–5.6)
POTASSIUM SERPL-SCNC: 3.4 MMOL/L (ref 3.5–5.1)
SODIUM SERPL-SCNC: 140 MMOL/L (ref 136–145)

## 2021-04-01 PROCEDURE — 99213 OFFICE O/P EST LOW 20 MIN: CPT | Performed by: FAMILY MEDICINE

## 2021-04-01 RX ORDER — FUROSEMIDE 80 MG/1
TABLET ORAL
Qty: 90 TAB | Refills: 11 | Status: SHIPPED | OUTPATIENT
Start: 2021-04-01 | End: 2022-05-13 | Stop reason: SDUPTHER

## 2021-04-19 RX ORDER — ALBUTEROL SULFATE 90 UG/1
1 AEROSOL, METERED RESPIRATORY (INHALATION)
Qty: 1 INHALER | Refills: 11 | Status: SHIPPED | OUTPATIENT
Start: 2021-04-19 | End: 2022-05-13 | Stop reason: SDUPTHER

## 2021-04-19 NOTE — TELEPHONE ENCOUNTER
Last OV: 4/1/21  Next Appt: 7/7/21  Last Refill: 2018    Requested Prescriptions     Pending Prescriptions Disp Refills    albuterol (PROVENTIL HFA, VENTOLIN HFA, PROAIR HFA) 90 mcg/actuation inhaler 1 Inhaler      Sig: Take 1 Puff by inhalation.

## 2021-05-01 DIAGNOSIS — F51.01 PRIMARY INSOMNIA: ICD-10-CM

## 2021-05-03 RX ORDER — ZOLPIDEM TARTRATE 10 MG/1
TABLET ORAL
Qty: 30 TAB | Refills: 2 | Status: SHIPPED | OUTPATIENT
Start: 2021-05-03 | End: 2021-10-31

## 2021-06-08 NOTE — TELEPHONE ENCOUNTER
Patient would like to speak with you regarding the medication traMADoL (ULTRAM) 50 mg tablet .   Please give him a call after 11:00 @ [phone]

## 2021-06-08 NOTE — TELEPHONE ENCOUNTER
Insurance allows 14 days supply of Tramadol in a 60 day period. Tried to do a PA but denied.       Key:[id number]

## 2021-06-10 ENCOUNTER — OFFICE VISIT (OUTPATIENT)
Dept: CARDIOLOGY CLINIC | Age: 56
End: 2021-06-10
Payer: MEDICAID

## 2021-06-10 DIAGNOSIS — Z95.810 BIVENTRICULAR ICD (IMPLANTABLE CARDIOVERTER-DEFIBRILLATOR) IN PLACE: Primary | ICD-10-CM

## 2021-06-10 DIAGNOSIS — I42.0 DILATED CARDIOMYOPATHY (HCC): ICD-10-CM

## 2021-06-10 PROCEDURE — 93295 DEV INTERROG REMOTE 1/2/MLT: CPT | Performed by: INTERNAL MEDICINE

## 2021-06-10 PROCEDURE — 93296 REM INTERROG EVL PM/IDS: CPT | Performed by: INTERNAL MEDICINE

## 2021-06-14 ENCOUNTER — TELEPHONE (OUTPATIENT)
Dept: FAMILY MEDICINE CLINIC | Age: 56
End: 2021-06-14

## 2021-06-14 NOTE — TELEPHONE ENCOUNTER
----- Message from [facility] sent at 6/14/2021  3:22 PM EDT -----  Regarding: MD Houser/telephone  General Message/Vendor Calls    Caller's first and last name:N/A      Reason for call:Medication forms       Callback required yes/no and why: Y      Best contact number(s):[phone]      Details to clarify the request:Patient stated that they denied his medication due to paper work being filled out incompletely.  Patient would like a call back at the earliest.      [facility]

## 2021-06-22 ENCOUNTER — OFFICE VISIT (OUTPATIENT)
Dept: NEUROLOGY | Age: 56
End: 2021-06-22
Payer: MEDICAID

## 2021-06-22 VITALS
HEART RATE: 70 BPM | DIASTOLIC BLOOD PRESSURE: 80 MMHG | HEIGHT: 72 IN | RESPIRATION RATE: 18 BRPM | BODY MASS INDEX: 42.66 KG/M2 | SYSTOLIC BLOOD PRESSURE: 118 MMHG | OXYGEN SATURATION: 98 % | WEIGHT: 315 LBS

## 2021-06-22 DIAGNOSIS — R42 VERTIGO: Primary | ICD-10-CM

## 2021-06-22 PROCEDURE — 99214 OFFICE O/P EST MOD 30 MIN: CPT | Performed by: PSYCHIATRY & NEUROLOGY

## 2021-06-22 NOTE — PROGRESS NOTES
Chief Complaint   Patient presents with    Follow-up     migraines have been ok, feeling lightheaded and dizzy which is new

## 2021-06-22 NOTE — PROGRESS NOTES
NEUROLOGY NOTE     Chief complaint: Left-sided numbness    SUBJECTIVE:  Demond Pham is a 49 y.o. male who presents to the neurology office for management of TIA. The patient presented to the hospital because of left-sided numbness. Patient states that he has had 2 strokes in the past and does have mild left-sided residual weakness but no numbness. On 2/11/2020, he woke up with left-sided numbness. It involves the left face, arm and leg. The symptoms lasted for around 2 hours and then resolved. He did have associated photophobia. He continues to have minimal left-sided numbness. The patient does also have frontal headaches and it is associated with nausea but no vomiting. This has been going on for the last 2 weeks. He  started having headache around 2013 and has gotten worse since Feb 2020. His  headaches are holocepalic. It is 10/10 in severity. They are sharp, dull and pounding in character. It lasts for 2 hrs. It is associated with photophobia and phonophobia. Patient does have hypertension, diabetes and quit smoking around 7 years ago. The patient does also have history of 2 strokes. Interval history: There is been no recurrence of symptoms. Patient was started on aspirin 81 mg a day in the hospital in addition to his Eliquis. His headaches are much better on Topamax 100 mg p.o. twice daily and Depakote 500 mg daily. Baseline headache frequency: 4/wk  Headache frequency now: 1/wk    Risk Factors for headaches  Smoking: quit 2013  Coffee: rarely cups/day  Tea: decaf 2 litres/day  Soda: less than 1 cans/day  Water: 15 glasses/day  Sleeps at 12 am and wakes up at 10 am. He does not know if he does snore. He does have MENDY.      Medications tried  Preventative therapy:  Topamax    Abortive therapy:  Aspirin     ROS  A ten system review of constitutional, cardiovascular, respiratory, musculoskeletal, endocrine, skin, SHEENT, genitourinary, psychiatric and neurologic systems was obtained and is unremarkable except as stated in HPI     PMH  Past Medical History:   Diagnosis Date   [facility]) 1/6/15    [facility] ED. Pt reported this    Arthritis     hands    Asthma     Atrial fibrillation with RVR ([address] 2017    Cardiomyopathy (HCC)     ET 25-30%    Carpal tunnel syndrome     Chronic obstructive pulmonary disease (HCC)     Diabetes ([facility])     Type II    Gastrointestinal disorder     GERD    GERD (gastroesophageal reflux disease)     Heart failure (HCC)     High cholesterol     Hypertension     Restrictive lung disease     FVC2.59 (52%), FEV1 1.95 (49)- 2017    S/P cardiac cath     Dr. Donna Champion, 2016. minimal cad    Sleep apnea     No CPAP    Stroke (HCC)     TIA x 4 - weak on left side       FH  Family History   Adopted: Yes   Problem Relation Age of Onset    No Known Problems Mother     No Known Problems Father        SH  Social History     Socioeconomic History    Marital status: SINGLE     Spouse name: Not on file    Number of children: Not on file    Years of education: Not on file    Highest education level: Not on file   Tobacco Use    Smoking status: Former Smoker     Packs/day: 1.00     Years: 20.00     Pack years: 20.00     Types: Cigarettes     Quit date: 2/3/2014     Years since quittin.3    Smokeless tobacco: Never Used   Substance and Sexual Activity    Alcohol use: No     Comment: stopped drinking at age 29    Drug use: No    Sexual activity: Not Currently   Social History Narrative    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      Social Determinants of Health     Financial Resource Strain:     Difficulty of Paying Living Expenses:    Food Insecurity:     Worried About Running Out of Food in the Last Year:     [address] in the Last Year:    Transportation Needs:     Lack of Transportation (Medical):      Lack of Transportation (Non-Medical):    Physical Activity:     Days of Exercise per Week:     Minutes of Exercise per Session:    Stress:     Feeling of Stress :    Social Connections:     Frequency of Communication with Friends and Family:     Frequency of Social Gatherings with Friends and Family:     Attends [employer] Services:     Active Member of Clubs or Organizations:     Attends Club or Organization Meetings:     Marital Status:        ALLERGIES  Allergies   Allergen Reactions    Codeine Hives    Hydrocodone Itching    Influenza Virus Vaccines Nausea Only     Fever  diarrhea    Mushroom Flavor Swelling    Oxycodone Rash    Pneumococcal 23-Allison Ps Vaccine Nausea and Vomiting     Vomit         PHYSICAL EXAMINATION:   Visit Vitals  /80 (BP 1 Location: Left arm, BP Patient Position: Sitting, BP Cuff Size: Adult)   Pulse 70   Resp 18   Ht 6' (1.829 m)   Wt 343 lb (155.6 kg)   SpO2 98%   BMI 46.52 kg/m²       General:   General appearance: Pt is in no acute distress   Distal pulses are preserved    Neurological Examination:   Mental Status:  AAO x3. Speech is fluent. Follows commands, has normal fund of knowledge, attention, short term recall, comprehension and insight. Cranial Nerves: Visual fields are full. PERRL, Extraocular movements are full. Facial sensationdecreased on the left. Facial movement intact. Hearing intact to conversation. Palate elevates symmetrically. Shoulder shrug symmetric. Tongue midline. Motor: Strength is 5/5 on the right and 4+ out of 5 in the left upper and lower extremities. Normal tone. No atrophy. Sensation: Light touch -decreased in the left upper and lower extremity    Reflexes: DTRs 2+ throughout. Plantar responses downgoing. Coordination/Cerebellar: Intact to finger-nose-finger     Gait: Casual gait is normal.     Skin: No significant bruising or lacerations.     LAB DATA REVIEWED:    Results for orders placed or performed in visit on 04/01/21   HEMOGLOBIN A1C WITH EAG   Result Value Ref Range    Hemoglobin A1c 7.4 (H) 4.0 - 5.6 %    Est. average glucose 166 mg/dL   METABOLIC PANEL, BASIC   Result Value Ref Range    Sodium 140 136 - 145 mmol/L    Potassium 3.4 (L) 3.5 - 5.1 mmol/L    Chloride 107 97 - 108 mmol/L    CO2 24 21 - 32 mmol/L    Anion gap 9 5 - 15 mmol/L    Glucose 183 (H) 65 - 100 mg/dL    BUN 20 6 - 20 MG/DL    Creatinine 1.26 0.70 - 1.30 MG/DL    BUN/Creatinine ratio 16 12 - 20      GFR est AA >60 >60 ml/min/1.73m2    GFR est non-AA 60 (L) >60 ml/min/1.73m2    Calcium 8.5 8.5 - 10.1 MG/DL         Current Outpatient Medications   Medication Sig Dispense Refill    traMADoL (ULTRAM) 50 mg tablet TAKE ONE TABLET BY MOUTH EVERY 8 HOURS AS NEEDED FOR PAIN FOR UP TO 60 DAYS 90 Tablet 3    topiramate (TOPAMAX) 100 mg tablet TAKE ONE TABLET BY MOUTH TWICE A DAY 60 Tab 5    zolpidem (AMBIEN) 10 mg tablet TAKE ONE TABLET BY MOUTH EVERY NIGHT AT BEDTIME AS NEEDED FOR SLEEP 30 Tab 2    albuterol (PROVENTIL HFA, VENTOLIN HFA, PROAIR HFA) 90 mcg/actuation inhaler Take 1 Puff by inhalation every six (6) hours as needed for Wheezing or Shortness of Breath. 1 Inhaler 11    furosemide (LASIX) 80 mg tablet TAKE TWO TABLETS BY MOUTH EVERY MORNING AND TAKE ONE TABLET BY MOUTH EVERY EVENING 90 Tab 11    liraglutide (VICTOZA) 0.6 mg/0.1 mL (18 mg/3 mL) pnij 1.8 mg by SubCUTAneous route daily. 3 mL 11    Entresto  mg tablet TAKE ONE TABLET BY MOUTH TWICE A DAY 60 Tab 4    OneTouch Ultra Blue Test Strip strip USE TO TEST BLOOD SUGAR THREE TIMES A DAY BEFORE MEALS 100 Strip 10    Pamela Pen Needle 32 gauge x 5/32\" ndle USE DAILY WITH VICTOZA 100 Pen Needle 10    glucose blood VI test strips (OneTouch Ultra Blue Test Strip) strip Use one strip to test three times a day before meals 100 Strip 11    Insulin Needles, Disposable, (Pamela Pen Needle) 32 gauge x 5/32\" ndle Use with Victoza daily 100 Pen Needle 11    isosorbide mononitrate ER (IMDUR) 30 mg tablet Take 30 mg by mouth daily.  nitroglycerin (NITROSTAT) 0.4 mg SL tablet 0.4 mg by SubLINGual route.       omeprazole (PRILOSEC) 20 mg capsule Take 20 mg by mouth daily.  metFORMIN (GLUCOPHAGE) 1,000 mg tablet TAKE ONE TABLET BY MOUTH TWICE A DAY WITH MEALS 60 Tab 12    atorvastatin (LIPITOR) 40 mg tablet TAKE ONE TABLET BY MOUTH DAILY 30 Tab 11    carvediloL (COREG) 12.5 mg tablet TAKE ONE TABLET BY MOUTH TWICE A DAY TO SLOW HEART RATE DOWN 60 Tab 10    Eliquis 5 mg tablet TAKE ONE TABLET BY MOUTH TWICE A DAY 60 Tab 10    glipiZIDE (GLUCOTROL) 10 mg tablet TAKE ONE TABLET BY MOUTH TWICE A DAY 60 Tab 10    divalproex DR (DEPAKOTE) 500 mg tablet Take 1 Tab by mouth daily. 30 Tab 2    albuterol-ipratropium (DUO-NEB) 2.5 mg-0.5 mg/3 ml nebu 3 mL every six (6) hours as needed.  potassium chloride SR (K-TAB) 20 mEq tablet 2 tabs po bid 120 Tab 12    traZODone (DESYREL) 100 mg tablet Take 1 Tab by mouth nightly. 30 Tab 5    empagliflozin (JARDIANCE) 25 mg tablet Take 1 Tab by mouth Daily (before breakfast). 90 Tab 3       Stroke workup  2/14/2020  CT Head/CTA Head and neck  1. No hemodynamically significant extracranial ICA stenosis (using NASCET  criteria). 2. No large vessel occlusion or significant intracranial stenosis. 3. No acute intracranial process. TTE:   Ejection fraction 25 to 30%    Stroke labs:  HgBA1c    Lab Results   Component Value Date/Time    Hemoglobin A1c 7.4 (H) 04/01/2021 03:00 PM     LDL   Lab Results   Component Value Date/Time    LDL, calculated 56 01/12/2021 01:16 PM    LDL, calculated 34 08/14/2020 12:25 PM       IMPRESSION:  Michell Sterling is a 49 y.o. male who presents to the neurology office for management of TIA. Patient came to the hospital in February 2020 with new onset left-sided numbness that was more pronounced for 2 hours. Patient does have history of 2 strokes in the past with left-sided weakness. Patient was not taking any aspirin but was on Eliquis for atrial fibrillation. .  Patient does have hypertension and diabetes.     Patient does have a pacemaker so could not get MRI scan. 1. TIA  2. DM  3. HLD  4. HTN  5. A fib  6. Patient does have chronic migraines. Better on present regimen. RECOMMENDATIONS:  - BP goal is less than 140/90  - Cont ASA 81 mg daily and continue Eliquis  - Cont atorvastatin 40 mg daily   - Patient is on Topamax 100 mg p.o. twice daily and Depakote 500 mg daily. We will continue the same. Headaches are much better.       Follow-up in 3 months      Enzo Chaidez MD  Neurologist

## 2021-07-01 RX ORDER — SACUBITRIL AND VALSARTAN 97; 103 MG/1; MG/1
TABLET, FILM COATED ORAL
Qty: 60 TABLET | Refills: 3 | Status: SHIPPED | OUTPATIENT
Start: 2021-07-01 | End: 2021-10-31

## 2021-07-21 ENCOUNTER — TELEPHONE (OUTPATIENT)
Dept: FAMILY MEDICINE CLINIC | Age: 56
End: 2021-07-21

## 2021-07-26 ENCOUNTER — OFFICE VISIT (OUTPATIENT)
Dept: FAMILY MEDICINE CLINIC | Age: 56
End: 2021-07-26
Payer: MEDICAID

## 2021-07-26 VITALS
WEIGHT: 315 LBS | HEIGHT: 72 IN | BODY MASS INDEX: 42.66 KG/M2 | SYSTOLIC BLOOD PRESSURE: 116 MMHG | OXYGEN SATURATION: 96 % | TEMPERATURE: 97.4 F | RESPIRATION RATE: 16 BRPM | HEART RATE: 73 BPM | DIASTOLIC BLOOD PRESSURE: 68 MMHG

## 2021-07-26 DIAGNOSIS — E11.9 CONTROLLED TYPE 2 DIABETES MELLITUS WITHOUT COMPLICATION, WITHOUT LONG-TERM CURRENT USE OF INSULIN (HCC): ICD-10-CM

## 2021-07-26 DIAGNOSIS — F11.90 OPIOID USE: Primary | ICD-10-CM

## 2021-07-26 PROCEDURE — 99213 OFFICE O/P EST LOW 20 MIN: CPT | Performed by: FAMILY MEDICINE

## 2021-07-26 PROCEDURE — 3051F HG A1C>EQUAL 7.0%<8.0%: CPT | Performed by: FAMILY MEDICINE

## 2021-07-26 RX ORDER — TRAZODONE HYDROCHLORIDE 100 MG/1
100 TABLET ORAL
Qty: 30 TABLET | Refills: 5
Start: 2021-07-26 | End: 2022-01-12 | Stop reason: SDUPTHER

## 2021-07-26 RX ORDER — DICLOFENAC SODIUM 10 MG/G
GEL TOPICAL
COMMUNITY
Start: 2021-06-03

## 2021-07-26 RX ORDER — POTASSIUM CHLORIDE 1500 MG/1
TABLET, FILM COATED, EXTENDED RELEASE ORAL
Qty: 120 TABLET | Refills: 12 | Status: SHIPPED | OUTPATIENT
Start: 2021-07-26 | End: 2022-01-17

## 2021-07-26 NOTE — PROGRESS NOTES
HISTORY OF PRESENT ILLNESS  Albaro Reyes is a 49 y.o. male. HPI In for refill of tramadol. Also trying to get a free style denis machine. Checks sugars 3-4 times a day. Blood sugars were in the 100s when was checking it. Takes metformin and daily Victoza shots. Otherwise doing ok. Breathing has been doing reasonable. We dont have a urine drug  Screen on record. This might be why insurance isnt filling his tramadol    ROS    Physical Exam  Vitals and nursing note reviewed. Constitutional:       Appearance: He is well-developed. HENT:      Right Ear: External ear normal.      Left Ear: External ear normal.   Neck:      Thyroid: No thyromegaly. Cardiovascular:      Rate and Rhythm: Normal rate and regular rhythm. Heart sounds: Normal heart sounds. Pulmonary:      Effort: Pulmonary effort is normal. No respiratory distress. Breath sounds: Normal breath sounds. No wheezing. Abdominal:      General: Bowel sounds are normal. There is no distension. Palpations: Abdomen is soft. There is no mass. Tenderness: There is no abdominal tenderness. There is no guarding. Musculoskeletal:         General: Normal range of motion. Comments: Knees- marked crepitus bilaterally   Lymphadenopathy:      Cervical: No cervical adenopathy. ASSESSMENT and PLAN  Orders Placed This Encounter    10-DRUG SCREEN, URINE W RFLX CONFIRMATION    potassium chloride SR (K-TAB) 20 mEq tablet    traZODone (DESYREL) 100 mg tablet     Diagnoses and all orders for this visit:    1. Opioid use  -     10-DRUG SCREEN, URINE W RFLX CONFIRMATION; Future    2. Controlled type 2 diabetes mellitus without complication, without long-term current use of insulin (HCC)    Other orders  -     potassium chloride SR (K-TAB) 20 mEq tablet; 2 tabs po bid  -     traZODone (DESYREL) 100 mg tablet; Take 1 Tablet by mouth nightly.

## 2021-08-02 RX ORDER — IPRATROPIUM BROMIDE AND ALBUTEROL SULFATE 2.5; .5 MG/3ML; MG/3ML
SOLUTION RESPIRATORY (INHALATION)
Qty: 30 NEBULE | Refills: 8 | Status: SHIPPED | OUTPATIENT
Start: 2021-08-02 | End: 2022-05-13 | Stop reason: SDUPTHER

## 2021-08-05 ENCOUNTER — TELEPHONE (OUTPATIENT)
Dept: FAMILY MEDICINE CLINIC | Age: 56
End: 2021-08-05

## 2021-08-05 LAB
ALPRAZ UR QL: NEGATIVE
AMPHETAMINES UR QL SCN: NEGATIVE NG/ML
BARBITURATES UR QL SCN: NEGATIVE NG/ML
BENZODIAZ UR QL: NEGATIVE NG/ML
BZE UR QL SCN: NEGATIVE NG/ML
CANNABINOIDS UR QL SCN: NEGATIVE NG/ML
CLONAZEPAM UR QL: NEGATIVE
CREAT UR-MCNC: 70 MG/DL (ref 20–300)
FLURAZEPAM UR QL: NEGATIVE
LORAZEPAM UR QL: NEGATIVE
METHADONE UR QL SCN: NEGATIVE NG/ML
MIDAZOLAM UR QL CFM: NEGATIVE
NORDIAZEPAM UR QL: NEGATIVE
OPIATES UR QL SCN: NEGATIVE NG/ML
OXAZEPAM UR QL: NEGATIVE
OXYCODONE+OXYMORPHONE UR QL SCN: NEGATIVE NG/ML
PCP UR QL: NEGATIVE NG/ML
PH UR: 5.9 [PH] (ref 4.5–8.9)
PLEASE NOTE:, 733157: NORMAL
PROPOXYPH UR QL SCN: NEGATIVE NG/ML
SP GR UR: 1.01
TEMAZEPAM UR QL CFM: NEGATIVE
TRIAZOLAM UR QL: NEGATIVE

## 2021-08-05 NOTE — TELEPHONE ENCOUNTER
Patient would like a call from [address] or nurse regarding his medication he can be reached @ [phone]

## 2021-08-08 ENCOUNTER — APPOINTMENT (OUTPATIENT)
Dept: GENERAL RADIOLOGY | Age: 56
End: 2021-08-08
Attending: EMERGENCY MEDICINE
Payer: MEDICAID

## 2021-08-08 ENCOUNTER — HOSPITAL ENCOUNTER (EMERGENCY)
Age: 56
Discharge: HOME | End: 2021-08-08
Attending: EMERGENCY MEDICINE
Payer: MEDICAID

## 2021-08-08 VITALS
TEMPERATURE: 98.1 F | RESPIRATION RATE: 21 BRPM | BODY MASS INDEX: 45.1 KG/M2 | DIASTOLIC BLOOD PRESSURE: 68 MMHG | HEIGHT: 70 IN | WEIGHT: 315 LBS | SYSTOLIC BLOOD PRESSURE: 116 MMHG | OXYGEN SATURATION: 98 % | HEART RATE: 70 BPM

## 2021-08-08 DIAGNOSIS — R06.2 WHEEZING: ICD-10-CM

## 2021-08-08 DIAGNOSIS — R06.02 SOB (SHORTNESS OF BREATH): Primary | ICD-10-CM

## 2021-08-08 LAB
ALBUMIN SERPL-MCNC: 3.4 G/DL (ref 3.5–5)
ALBUMIN/GLOB SERPL: 0.9 {RATIO} (ref 1.1–2.2)
ALP SERPL-CCNC: 76 U/L (ref 45–117)
ALT SERPL-CCNC: 39 U/L (ref 12–78)
ANION GAP SERPL CALC-SCNC: 8 MMOL/L (ref 5–15)
AST SERPL-CCNC: 19 U/L (ref 15–37)
ATRIAL RATE: 468 BPM
BASOPHILS # BLD: 0.1 K/UL (ref 0–0.1)
BASOPHILS NFR BLD: 1 % (ref 0–1)
BILIRUB SERPL-MCNC: 0.7 MG/DL (ref 0.2–1)
BNP SERPL-MCNC: 501 PG/ML
BUN SERPL-MCNC: 18 MG/DL (ref 6–20)
BUN/CREAT SERPL: 13 (ref 12–20)
CALCIUM SERPL-MCNC: 8.5 MG/DL (ref 8.5–10.1)
CALCULATED R AXIS, ECG10: -76 DEGREES
CALCULATED T AXIS, ECG11: 85 DEGREES
CHLORIDE SERPL-SCNC: 105 MMOL/L (ref 97–108)
CO2 SERPL-SCNC: 25 MMOL/L (ref 21–32)
CREAT SERPL-MCNC: 1.37 MG/DL (ref 0.7–1.3)
DIAGNOSIS, 93000: NORMAL
DIFFERENTIAL METHOD BLD: ABNORMAL
EOSINOPHIL # BLD: 0.1 K/UL (ref 0–0.4)
EOSINOPHIL NFR BLD: 1 % (ref 0–7)
ERYTHROCYTE [DISTWIDTH] IN BLOOD BY AUTOMATED COUNT: 15.2 % (ref 11.5–14.5)
GLOBULIN SER CALC-MCNC: 3.8 G/DL (ref 2–4)
GLUCOSE SERPL-MCNC: 332 MG/DL (ref 65–100)
HCT VFR BLD AUTO: 44.1 % (ref 36.6–50.3)
HGB BLD-MCNC: 14.6 G/DL (ref 12.1–17)
IMM GRANULOCYTES # BLD AUTO: 0.1 K/UL (ref 0–0.04)
IMM GRANULOCYTES NFR BLD AUTO: 1 % (ref 0–0.5)
LYMPHOCYTES # BLD: 2.4 K/UL (ref 0.8–3.5)
LYMPHOCYTES NFR BLD: 27 % (ref 12–49)
MCH RBC QN AUTO: 30.2 PG (ref 26–34)
MCHC RBC AUTO-ENTMCNC: 33.1 G/DL (ref 30–36.5)
MCV RBC AUTO: 91.1 FL (ref 80–99)
MONOCYTES # BLD: 0.5 K/UL (ref 0–1)
MONOCYTES NFR BLD: 6 % (ref 5–13)
NEUTS SEG # BLD: 5.8 K/UL (ref 1.8–8)
NEUTS SEG NFR BLD: 64 % (ref 32–75)
NRBC # BLD: 0 K/UL (ref 0–0.01)
NRBC BLD-RTO: 0 PER 100 WBC
PLATELET # BLD AUTO: 187 K/UL (ref 150–400)
PMV BLD AUTO: 10.9 FL (ref 8.9–12.9)
POTASSIUM SERPL-SCNC: 3.5 MMOL/L (ref 3.5–5.1)
PROT SERPL-MCNC: 7.2 G/DL (ref 6.4–8.2)
Q-T INTERVAL, ECG07: 446 MS
QRS DURATION, ECG06: 126 MS
QTC CALCULATION (BEZET), ECG08: 481 MS
RBC # BLD AUTO: 4.84 M/UL (ref 4.1–5.7)
SODIUM SERPL-SCNC: 138 MMOL/L (ref 136–145)
TROPONIN I SERPL-MCNC: <0.05 NG/ML
VENTRICULAR RATE, ECG03: 70 BPM
WBC # BLD AUTO: 8.9 K/UL (ref 4.1–11.1)

## 2021-08-08 PROCEDURE — 85025 COMPLETE CBC W/AUTO DIFF WBC: CPT

## 2021-08-08 PROCEDURE — 74011000250 HC RX REV CODE- 250: Performed by: EMERGENCY MEDICINE

## 2021-08-08 PROCEDURE — 94640 AIRWAY INHALATION TREATMENT: CPT

## 2021-08-08 PROCEDURE — 83880 ASSAY OF NATRIURETIC PEPTIDE: CPT

## 2021-08-08 PROCEDURE — 93005 ELECTROCARDIOGRAM TRACING: CPT

## 2021-08-08 PROCEDURE — 96374 THER/PROPH/DIAG INJ IV PUSH: CPT

## 2021-08-08 PROCEDURE — 99284 EMERGENCY DEPT VISIT MOD MDM: CPT

## 2021-08-08 PROCEDURE — 80053 COMPREHEN METABOLIC PANEL: CPT

## 2021-08-08 PROCEDURE — 84484 ASSAY OF TROPONIN QUANT: CPT

## 2021-08-08 PROCEDURE — 74011250636 HC RX REV CODE- 250/636: Performed by: EMERGENCY MEDICINE

## 2021-08-08 PROCEDURE — 36415 COLL VENOUS BLD VENIPUNCTURE: CPT

## 2021-08-08 PROCEDURE — 71045 X-RAY EXAM CHEST 1 VIEW: CPT

## 2021-08-08 RX ORDER — SODIUM CHLORIDE 0.9 % (FLUSH) 0.9 %
5-40 SYRINGE (ML) INJECTION EVERY 8 HOURS
Status: DISCONTINUED | OUTPATIENT
Start: 2021-08-08 | End: 2021-08-08 | Stop reason: HOSPADM

## 2021-08-08 RX ORDER — PREDNISONE 50 MG/1
50 TABLET ORAL DAILY
Qty: 3 TABLET | Refills: 0 | Status: SHIPPED | OUTPATIENT
Start: 2021-08-08 | End: 2021-08-11

## 2021-08-08 RX ORDER — IPRATROPIUM BROMIDE AND ALBUTEROL SULFATE 2.5; .5 MG/3ML; MG/3ML
3 SOLUTION RESPIRATORY (INHALATION)
Status: COMPLETED | OUTPATIENT
Start: 2021-08-08 | End: 2021-08-08

## 2021-08-08 RX ORDER — SODIUM CHLORIDE 0.9 % (FLUSH) 0.9 %
5-40 SYRINGE (ML) INJECTION AS NEEDED
Status: DISCONTINUED | OUTPATIENT
Start: 2021-08-08 | End: 2021-08-08 | Stop reason: HOSPADM

## 2021-08-08 RX ORDER — AZITHROMYCIN 250 MG/1
TABLET, FILM COATED ORAL
Qty: 6 TABLET | Refills: 0 | Status: SHIPPED | OUTPATIENT
Start: 2021-08-08 | End: 2021-08-19 | Stop reason: ALTCHOICE

## 2021-08-08 RX ADMIN — METHYLPREDNISOLONE SODIUM SUCCINATE 125 MG: 125 INJECTION, POWDER, FOR SOLUTION INTRAMUSCULAR; INTRAVENOUS at 16:51

## 2021-08-08 RX ADMIN — Medication 10 ML: at 16:57

## 2021-08-08 RX ADMIN — IPRATROPIUM BROMIDE AND ALBUTEROL SULFATE 3 ML: .5; 3 SOLUTION RESPIRATORY (INHALATION) at 16:51

## 2021-08-08 NOTE — ED PROVIDER NOTES
EMERGENCY DEPARTMENT HISTORY AND PHYSICAL EXAM      Date: 8/8/2021  Patient Name: Colleen Torres  Patient Age and Sex: 49 y.o. male     History of Presenting Illness     Chief Complaint   Patient presents with    Shortness of Breath     Pt c/o shortness of breath that is worse with exertion. Also c/o chest congestion, cough. Symptoms started about 2 weeks ago.  Cough       History Provided By: Patient    HPI: Colleen Torres is a 42-year-old male with a history of asthma, COPD presenting for shortness of breath and cough. Patient states that 2 weeks ago began having chest congestion, cough as well as wheezing. Has also been having nasal congestion. Denies any fevers, nausea, vomiting. Patient states that he has been using his albuterol a lot as well as has an inhaler with steroid in it. Not currently on any oral steroids. Patient denies any chest pain, nausea, vomiting, diarrhea. Thought it was a URI so has been taking Mucinex with minimal improvement    There are no other complaints, changes, or physical findings at this time. PCP: Trace Waldrop MD    No current facility-administered medications on file prior to encounter. Current Outpatient Medications on File Prior to Encounter   Medication Sig Dispense Refill    albuterol-ipratropium (DUO-NEB) 2.5 mg-0.5 mg/3 ml nebu INHALE THREE MILLILITERS VIA NEBULIZATION BY MOUTH EVERY 6 HOURS AS NEEDED 30 Nebule 8    diclofenac (VOLTAREN) 1 % gel Apply  to affected area.  potassium chloride SR (K-TAB) 20 mEq tablet 2 tabs po bid 120 Tablet 12    traZODone (DESYREL) 100 mg tablet Take 1 Tablet by mouth nightly.  30 Tablet 5    Entresto  mg tablet TAKE ONE TABLET BY MOUTH TWICE A DAY 60 Tablet 3    topiramate (TOPAMAX) 100 mg tablet TAKE ONE TABLET BY MOUTH TWICE A DAY 60 Tab 5    zolpidem (AMBIEN) 10 mg tablet TAKE ONE TABLET BY MOUTH EVERY NIGHT AT BEDTIME AS NEEDED FOR SLEEP 30 Tab 2    albuterol (PROVENTIL HFA, VENTOLIN HFA, PROAIR HFA) 90 mcg/actuation inhaler Take 1 Puff by inhalation every six (6) hours as needed for Wheezing or Shortness of Breath. 1 Inhaler 11    furosemide (LASIX) 80 mg tablet TAKE TWO TABLETS BY MOUTH EVERY MORNING AND TAKE ONE TABLET BY MOUTH EVERY EVENING 90 Tab 11    liraglutide (VICTOZA) 0.6 mg/0.1 mL (18 mg/3 mL) pnij 1.8 mg by SubCUTAneous route daily. 3 mL 11    OneTouch Ultra Blue Test Strip strip USE TO TEST BLOOD SUGAR THREE TIMES A DAY BEFORE MEALS 100 Strip 10    Pamela Pen Needle 32 gauge x 5/32\" ndle USE DAILY WITH VICTOZA 100 Pen Needle 10    glucose blood VI test strips (OneTouch Ultra Blue Test Strip) strip Use one strip to test three times a day before meals 100 Strip 11    Insulin Needles, Disposable, (Pamela Pen Needle) 32 gauge x 5/32\" ndle Use with Victoza daily 100 Pen Needle 11    isosorbide mononitrate ER (IMDUR) 30 mg tablet Take 30 mg by mouth daily.  nitroglycerin (NITROSTAT) 0.4 mg SL tablet 0.4 mg by SubLINGual route.  omeprazole (PRILOSEC) 20 mg capsule Take 20 mg by mouth daily.  metFORMIN (GLUCOPHAGE) 1,000 mg tablet TAKE ONE TABLET BY MOUTH TWICE A DAY WITH MEALS 60 Tab 12    atorvastatin (LIPITOR) 40 mg tablet TAKE ONE TABLET BY MOUTH DAILY 30 Tab 11    carvediloL (COREG) 12.5 mg tablet TAKE ONE TABLET BY MOUTH TWICE A DAY TO SLOW HEART RATE DOWN 60 Tab 10    Eliquis 5 mg tablet TAKE ONE TABLET BY MOUTH TWICE A DAY 60 Tab 10    glipiZIDE (GLUCOTROL) 10 mg tablet TAKE ONE TABLET BY MOUTH TWICE A DAY 60 Tab 10    divalproex DR (DEPAKOTE) 500 mg tablet Take 1 Tab by mouth daily. 30 Tab 2       Past History     Past Medical History:  Past Medical History:   Diagnosis Date    A-fib [facility]) 1/6/15    [facility] ED.  Pt reported this    Arthritis     hands    Asthma     Atrial fibrillation with RVR ([facility] [address] 8/11/2017    Cardiomyopathy ([facility] [address]     ET 25-30%    Carpal tunnel syndrome     Chronic obstructive pulmonary disease (HCC)     Diabetes (HCC)     Type II  Gastrointestinal disorder     GERD    GERD (gastroesophageal reflux disease)     Heart failure (HCC)     High cholesterol     Hypertension     Restrictive lung disease     FVC2.59 (52%), FEV1 1.95 (49)- 2017    S/P cardiac cath     Dr. Allison Marino, 2016. minimal cad    Sleep apnea     No CPAP    Stroke (HCC)     TIA x 4 - weak on left side       Past Surgical History:  Past Surgical History:   Procedure Laterality Date    COLONOSCOPY N/A 2020    COLONOSCOPY performed by Ghanshyam Luna MD at [facility] ENDOSCOPY    COLONOSCOPY N/A 11/10/2020    COLONOSCOPY performed by Ghanshyam Luna MD at [facility] ENDOSCOPY    HX PACEMAKER Left     defib,     [place] CARDIAC SURG PROCEDURE UNLIST  2018    Implantation of PACEMAKER    IA INSJ/RPLCMT PERM DFB W/TRNSVNS LDS 1/DUAL CHMBR N/A 3/11/2019    INSERT ICD BIV MULTI performed by Mar Horta MD at [facility] CARDIAC CATH LAB       Family History:  Family History   Adopted: Yes   Problem Relation Age of Onset    No Known Problems Mother     No Known Problems Father        Social History:  Social History     Tobacco Use    Smoking status: Former Smoker     Packs/day: 1.00     Years: 20.00     Pack years: 20.00     Types: Cigarettes     Quit date: 2/3/2014     Years since quittin.5    Smokeless tobacco: Never Used   Substance Use Topics    Alcohol use: No     Comment: stopped drinking at age 29    Drug use: No       Allergies: Allergies   Allergen Reactions    Codeine Hives    Hydrocodone Itching    Influenza Virus Vaccines Nausea Only     Fever  diarrhea    Mushroom Flavor Swelling    Oxycodone Rash    Pneumococcal 23-Allison Ps Vaccine Nausea and Vomiting     Vomit           Review of Systems   Review of Systems   Constitutional: Negative for chills and fever. HENT: Positive for congestion. Respiratory: Positive for cough, shortness of breath and wheezing. Cardiovascular: Negative for chest pain.    Gastrointestinal: Negative for abdominal pain, constipation, diarrhea, nausea and vomiting. Genitourinary: Negative for dysuria, frequency and hematuria. Neurological: Negative for weakness and numbness. All other systems reviewed and are negative. Physical Exam   Physical Exam  Vitals and nursing note reviewed. Constitutional:       Appearance: He is well-developed. HENT:      Head: Normocephalic and atraumatic. Nose: Nose normal.      Mouth/Throat:      Mouth: Mucous membranes are moist.   Eyes:      Extraocular Movements: Extraocular movements intact. Conjunctiva/sclera: Conjunctivae normal.   Cardiovascular:      Rate and Rhythm: Normal rate and regular rhythm. Pulmonary:      Effort: Pulmonary effort is normal. No respiratory distress. Breath sounds: Examination of the right-upper field reveals wheezing. Examination of the left-upper field reveals wheezing. Examination of the right-middle field reveals wheezing. Examination of the left-middle field reveals wheezing. Examination of the right-lower field reveals wheezing. Examination of the left-lower field reveals wheezing. Wheezing present. Comments: Patient ambulatory in the room. Not in any respiratory distress with no tachypnea. Saturating well on the monitor. He does have wheezing diffusely. Speaking full sentences. Abdominal:      General: There is no distension. Palpations: Abdomen is soft. Tenderness: There is no abdominal tenderness. Musculoskeletal:         General: Normal range of motion. Cervical back: Normal range of motion and neck supple. Skin:     General: Skin is warm and dry. Neurological:      General: No focal deficit present. Mental Status: He is alert and oriented to person, place, and time. Mental status is at baseline.    Psychiatric:         Mood and Affect: Mood normal.          Diagnostic Study Results     Labs -     Recent Results (from the past 12 hour(s))   EKG, 12 LEAD, INITIAL    Collection Time: 08/08/21 3:58 PM   Result Value Ref Range    Ventricular Rate 70 BPM    Atrial Rate 468 BPM    QRS Duration 126 ms    Q-T Interval 446 ms    QTC Calculation (Bezet) 481 ms    Calculated R Axis -76 degrees    Calculated T Axis 85 degrees    Diagnosis       Ventricular-paced rhythm  Biventricular pacemaker detected  When compared with ECG of 10-NOV-2020 18:06,  No significant change was found     METABOLIC PANEL, COMPREHENSIVE    Collection Time: 08/08/21  4:03 PM   Result Value Ref Range    Sodium 138 136 - 145 mmol/L    Potassium 3.5 3.5 - 5.1 mmol/L    Chloride 105 97 - 108 mmol/L    CO2 25 21 - 32 mmol/L    Anion gap 8 5 - 15 mmol/L    Glucose 332 (H) 65 - 100 mg/dL    BUN 18 6 - 20 MG/DL    Creatinine 1.37 (H) 0.70 - 1.30 MG/DL    BUN/Creatinine ratio 13 12 - 20      GFR est AA >60 >60 ml/min/1.73m2    GFR est non-AA 54 (L) >60 ml/min/1.73m2    Calcium 8.5 8.5 - 10.1 MG/DL    Bilirubin, total 0.7 0.2 - 1.0 MG/DL    ALT (SGPT) 39 12 - 78 U/L    AST (SGOT) 19 15 - 37 U/L    Alk. phosphatase 76 45 - 117 U/L    Protein, total 7.2 6.4 - 8.2 g/dL    Albumin 3.4 (L) 3.5 - 5.0 g/dL    Globulin 3.8 2.0 - 4.0 g/dL    A-G Ratio 0.9 (L) 1.1 - 2.2     CBC WITH AUTOMATED DIFF    Collection Time: 08/08/21  4:03 PM   Result Value Ref Range    WBC 8.9 4.1 - 11.1 K/uL    RBC 4.84 4.10 - 5.70 M/uL    HGB 14.6 12.1 - 17.0 g/dL    HCT 44.1 36.6 - 50.3 %    MCV 91.1 80.0 - 99.0 FL    MCH 30.2 26.0 - 34.0 PG    MCHC 33.1 30.0 - 36.5 g/dL    RDW 15.2 (H) 11.5 - 14.5 %    PLATELET 694 456 - 249 K/uL    MPV 10.9 8.9 - 12.9 FL    NRBC 0.0 0  WBC    ABSOLUTE NRBC 0.00 0.00 - 0.01 K/uL    NEUTROPHILS 64 32 - 75 %    LYMPHOCYTES 27 12 - 49 %    MONOCYTES 6 5 - 13 %    EOSINOPHILS 1 0 - 7 %    BASOPHILS 1 0 - 1 %    IMMATURE GRANULOCYTES 1 (H) 0.0 - 0.5 %    ABS. NEUTROPHILS 5.8 1.8 - 8.0 K/UL    ABS. LYMPHOCYTES 2.4 0.8 - 3.5 K/UL    ABS. MONOCYTES 0.5 0.0 - 1.0 K/UL    ABS. EOSINOPHILS 0.1 0.0 - 0.4 K/UL    ABS.  BASOPHILS 0.1 0.0 - 0.1 K/UL ABS. IMM. GRANS. 0.1 (H) 0.00 - 0.04 K/UL    DF AUTOMATED         Radiologic Studies -   XR CHEST PORT   Final Result      Limited study. Probable mild pulmonary edema. Recommend PA and lateral chest   views when the patient can better tolerate. CT Results  (Last 48 hours)    None        CXR Results  (Last 48 hours)               08/08/21 1624  XR CHEST PORT Final result    Impression:      Limited study. Probable mild pulmonary edema. Recommend PA and lateral chest   views when the patient can better tolerate. Narrative:  EXAM: XR CHEST PORT       INDICATION: Shortness of breath and congestion. Cardiomyopathy, COPD. COMPARISON: Chest views on 7/8/2020 and 2/4/2020. TECHNIQUE: Upright portable chest AP view       FINDINGS: Battery pack and cardiac leads are grossly unchanged. Cardiomegaly is   decreased. The pulmonary vasculature is within normal limits. Reticular interstitial opacities are mild. No focal airspace opacity. The   visualized bones and upper abdomen are age-appropriate. Medical Decision Making   I am the first provider for this patient. I reviewed the vital signs, available nursing notes, past medical history, past surgical history, family history and social history. Vital Signs-Reviewed the patient's vital signs. Patient Vitals for the past 12 hrs:   Temp Pulse Resp BP SpO2   08/08/21 1647     98 %   08/08/21 1645  70 21 116/68 97 %   08/08/21 1554 98.1 °F (36.7 °C) 70 24 135/82 97 %       Records Reviewed: Nursing Notes and Old Medical Records    Provider Notes (Medical Decision Making):   Patient presents with SOB and wheezing. DDx: asthma, acute bronchitis, copd, pulmonary edema, pna. Will treat with nebs and steroids for now and only obtain labs, EKG and CXR PRN    The patient has been re-examined after nebulizer treatments and states that they are feeling better and have no new complaints.   On auscultation, wheezing is significantly improved. Laboratory tests, medications, x-rays, diagnosis, follow up plan and return instructions have been reviewed and discussed with the patient. The patient has had the opportunity to ask questions about their care. The patient expresses understanding and agreement with diagnosis, care plan; including oral steroids, nebulizer or inhaler use, follow up and return instructions. The patient agrees to return in 24 hours if their symptoms are not improving or immediately if they have any change in their condition including worsening wheezing or any signs of increasing work of breathing. ED Course:   Initial assessment performed. The patients presenting problems have been discussed, and they are in agreement with the care plan formulated and outlined with them. I have encouraged them to ask questions as they arise throughout their visit. ED Course as of Aug 08 1655   Sun Aug 08, 2021   1653 Patient's chest x-ray was read as possible mild pulmonary edema however patient did not take a good enough breath in and has no signs of fluid overload. He has no leg swelling, no JVD. He is already on furosemide. I have a higher suspicion that this is bronchitis and COPD/asthma exacerbation so we will go ahead with prednisone, Z-Rick. We will hold off on any more furosemide. Patient agreeable to this. [JS]      ED Course User Index  [JS] Keo Abreu MD     Critical Care Time:   0    Disposition:  Discharge Note:  The patient has been re-evaluated and is ready for discharge. Reviewed available results with patient. Counseled patient on diagnosis and care plan. Patient has expressed understanding, and all questions have been answered. Patient agrees with plan and agrees to follow up as recommended, or to return to the ED if their symptoms worsen. Discharge instructions have been provided and explained to the patient, along with reasons to return to the ED.       PLAN:  Current Discharge Medication List      START taking these medications    Details   azithromycin (Zithromax Z-Rick) 250 mg tablet Take 2 tablets on first day, and then 1 tablet daily for next 4 days. Qty: 6 Tablet, Refills: 0  Start date: 8/8/2021      predniSONE (DELTASONE) 50 mg tablet Take 1 Tablet by mouth daily for 3 days. Qty: 3 Tablet, Refills: 0  Start date: 8/8/2021, End date: 8/11/2021           2. Follow-up Information     Follow up With Specialties Details Why Contact Info    Nga Brown, [address]   [place] [address] [phone]          3. Return to ED if worse     Diagnosis     Clinical Impression:   1. SOB (shortness of breath)    2. Wheezing        Attestations:    Eleanor Siemens, M.D. Please note that this dictation was completed with [employer], the [employer] voice recognition software. Quite often unanticipated grammatical, syntax, homophones, and other interpretive errors are inadvertently transcribed by the computer software. Please disregard these errors. Please excuse any errors that have escaped final proofreading. Thank you.

## 2021-08-08 NOTE — DISCHARGE INSTRUCTIONS
You were seen for shortness of breath and found to be wheezing diffusely. In addition to taking your albuterol every 4-6 hours, please start the prednisone daily. Please also make sure to start the Z-Rick as that is proven to help in bronchitis when patients have asthma or COPD. Follow-up with your primary care doctor as needed. If you start to become even more short of breath and noticed that your oxygen levels are low, return to the emergency department.

## 2021-08-08 NOTE — ED TRIAGE NOTES
Pt c/o shortness of breath that is worse with exertion. Also c/o chest congestion, cough. Symptoms started about 2 weeks ago.

## 2021-08-19 ENCOUNTER — OFFICE VISIT (OUTPATIENT)
Dept: FAMILY MEDICINE CLINIC | Age: 56
End: 2021-08-19
Payer: MEDICAID

## 2021-08-19 VITALS
OXYGEN SATURATION: 93 % | WEIGHT: 315 LBS | HEIGHT: 72 IN | HEART RATE: 74 BPM | SYSTOLIC BLOOD PRESSURE: 112 MMHG | DIASTOLIC BLOOD PRESSURE: 69 MMHG | RESPIRATION RATE: 16 BRPM | BODY MASS INDEX: 42.66 KG/M2

## 2021-08-19 DIAGNOSIS — E78.00 HYPERCHOLESTEROLEMIA: ICD-10-CM

## 2021-08-19 DIAGNOSIS — E11.9 CONTROLLED TYPE 2 DIABETES MELLITUS WITHOUT COMPLICATION, WITHOUT LONG-TERM CURRENT USE OF INSULIN (HCC): Primary | ICD-10-CM

## 2021-08-19 LAB
CHOLEST SERPL-MCNC: 118 MG/DL
EST. AVERAGE GLUCOSE BLD GHB EST-MCNC: 194 MG/DL
HBA1C MFR BLD: 8.4 % (ref 4–5.6)
HDLC SERPL-MCNC: 32 MG/DL
HDLC SERPL: 3.7 {RATIO} (ref 0–5)
LDLC SERPL CALC-MCNC: 27 MG/DL (ref 0–100)
TRIGL SERPL-MCNC: 295 MG/DL (ref ?–150)
VLDLC SERPL CALC-MCNC: 59 MG/DL

## 2021-08-19 PROCEDURE — 99213 OFFICE O/P EST LOW 20 MIN: CPT | Performed by: FAMILY MEDICINE

## 2021-08-19 PROCEDURE — 3051F HG A1C>EQUAL 7.0%<8.0%: CPT | Performed by: FAMILY MEDICINE

## 2021-08-19 NOTE — PROGRESS NOTES
HISTORY OF PRESENT ILLNESS  Shila Johnson is a 49 y.o. male. HPI In for followup. Was seen in ER 10 days ago with bronchitis. Feels like is doing somewhat better. feels somewhat weak. Using a nebulizer 3-4 times a day. Was having a productive cough, that is doing better now. No fever, chills. Did have a lot of wheezing, doing better now. No edema. Has been having some intermittent chest pains, that he attributes to the food he is eating. NO palpitations. ROS    Physical Exam  Vitals and nursing note reviewed. Constitutional:       Appearance: He is well-developed. HENT:      Right Ear: External ear normal.      Left Ear: External ear normal.   Neck:      Thyroid: No thyromegaly. Cardiovascular:      Rate and Rhythm: Normal rate and regular rhythm. Heart sounds: Normal heart sounds. Pulmonary:      Effort: Pulmonary effort is normal. No respiratory distress. Breath sounds: Normal breath sounds. No wheezing. Abdominal:      General: Bowel sounds are normal. There is no distension. Palpations: Abdomen is soft. There is no mass. Tenderness: There is no abdominal tenderness. There is no guarding. Musculoskeletal:         General: Normal range of motion. Comments: No edema     Lymphadenopathy:      Cervical: No cervical adenopathy. ASSESSMENT and PLAN  Orders Placed This Encounter    LIPID PANEL     Standing Status:   Future     Number of Occurrences:   1     Standing Expiration Date:   2/19/2023    HEMOGLOBIN A1C WITH EAG     Standing Status:   Future     Number of Occurrences:   1     Standing Expiration Date:   8/19/2022     Orders Placed This Encounter    LIPID PANEL    HEMOGLOBIN A1C WITH EAG     Diagnoses and all orders for this visit:    1. Controlled type 2 diabetes mellitus without complication, without long-term current use of insulin (HCC)  -     HEMOGLOBIN A1C WITH EAG; Future    2. Hypercholesterolemia  -     LIPID PANEL;  Future          Bronchitis appears to have resolved

## 2021-08-19 NOTE — PROGRESS NOTES
Met with patient about CGM, per request from Dr. Chloe Michel. Discussed coverage criteria with patient. (see below)  Also reviewed out of pocket cost if pt is interested in future; also coverage could get better in the future so check back next visits as he is not currently eligible as he's not on insulin. [address] [place] [address]  considers the long-term (greater than 1 week) therapeutic use of continuous glucose monitoring devices medically necessary in adults aged 25 years and older with type 1 or type 2 diabetes using intensive insulin regimens (multiple (3 or more) daily injections or insulin pump therapy) who are either not meeting glycemic targets or experiencing hypoglycemia (including hypoglycemic unawareness).      Jerel Magaña, Hattie Harris

## 2021-08-19 NOTE — PROGRESS NOTES
Chief Complaint   Patient presents with    Follow-up     [id number] [facility] - ER 8/8/21     1. Have you been to the ER, urgent care clinic since your last visit? Hospitalized since your last visit? Yes [id number] [facility] 8/8/21    2. Have you seen or consulted any other health care providers outside of the [address] [place] [address] since your last visit? Include any pap smears or colon screening.  No

## 2021-08-26 ENCOUNTER — DOCUMENTATION ONLY (OUTPATIENT)
Dept: FAMILY MEDICINE CLINIC | Age: 56
End: 2021-08-26

## 2021-09-09 ENCOUNTER — OFFICE VISIT (OUTPATIENT)
Dept: CARDIOLOGY CLINIC | Age: 56
End: 2021-09-09
Payer: MEDICAID

## 2021-09-09 DIAGNOSIS — I42.0 DILATED CARDIOMYOPATHY (HCC): ICD-10-CM

## 2021-09-09 DIAGNOSIS — Z95.810 BIVENTRICULAR ICD (IMPLANTABLE CARDIOVERTER-DEFIBRILLATOR) IN PLACE: Primary | ICD-10-CM

## 2021-09-09 PROCEDURE — 93296 REM INTERROG EVL PM/IDS: CPT | Performed by: INTERNAL MEDICINE

## 2021-09-09 PROCEDURE — 93295 DEV INTERROG REMOTE 1/2/MLT: CPT | Performed by: INTERNAL MEDICINE

## 2021-10-07 DIAGNOSIS — E11.9 CONTROLLED TYPE 2 DIABETES MELLITUS WITHOUT COMPLICATION, WITHOUT LONG-TERM CURRENT USE OF INSULIN (HCC): Primary | ICD-10-CM

## 2021-10-07 NOTE — TELEPHONE ENCOUNTER
Patient called requesting a refill for Glipizide called into [facility] [address]. I checked his medication list Glipizide is not listed on his current medication list that he is still taking. Patient can be reached @ [phone]. I also called Son[facility] Morfin if the patient is requesting this medication it requires an authorization.

## 2021-10-09 RX ORDER — GLIPIZIDE 10 MG/1
10 TABLET ORAL 2 TIMES DAILY
Qty: 120 TABLET | Refills: 1 | Status: SHIPPED | OUTPATIENT
Start: 2021-10-09 | End: 2022-01-03

## 2021-10-26 ENCOUNTER — OFFICE VISIT (OUTPATIENT)
Dept: NEUROLOGY | Age: 56
End: 2021-10-26
Payer: MEDICAID

## 2021-10-26 VITALS
TEMPERATURE: 97.5 F | WEIGHT: 315 LBS | HEIGHT: 72 IN | DIASTOLIC BLOOD PRESSURE: 64 MMHG | HEART RATE: 71 BPM | OXYGEN SATURATION: 97 % | BODY MASS INDEX: 42.66 KG/M2 | RESPIRATION RATE: 16 BRPM | SYSTOLIC BLOOD PRESSURE: 94 MMHG

## 2021-10-26 DIAGNOSIS — I10 ESSENTIAL HYPERTENSION: ICD-10-CM

## 2021-10-26 DIAGNOSIS — G43.009 MIGRAINE WITHOUT AURA AND WITHOUT STATUS MIGRAINOSUS, NOT INTRACTABLE: Primary | ICD-10-CM

## 2021-10-26 DIAGNOSIS — G45.9 TIA (TRANSIENT ISCHEMIC ATTACK): ICD-10-CM

## 2021-10-26 DIAGNOSIS — I48.91 ATRIAL FIBRILLATION, UNSPECIFIED TYPE (HCC): ICD-10-CM

## 2021-10-26 DIAGNOSIS — E11.9 CONTROLLED TYPE 2 DIABETES MELLITUS WITHOUT COMPLICATION, UNSPECIFIED WHETHER LONG TERM INSULIN USE (HCC): ICD-10-CM

## 2021-10-26 PROCEDURE — 3052F HG A1C>EQUAL 8.0%<EQUAL 9.0%: CPT | Performed by: PSYCHIATRY & NEUROLOGY

## 2021-10-26 PROCEDURE — 99214 OFFICE O/P EST MOD 30 MIN: CPT | Performed by: PSYCHIATRY & NEUROLOGY

## 2021-10-26 NOTE — PROGRESS NOTES
Follow-up Visit    Name Marti Spencer Age 49 y.o. MRN [id number]  1965       Chief Complaint:  headaches    Gets headaches when he wears his glasses. He uses them for reading. He is also diabetic and his last A1C 6.7. He continues aspirin and eliquis. He uses a cane for left side weakness. He has a pacemaker and cant get an MRI    Assesment and Plan  1. Migraine without aura and without status migrainosus, not intractable  Continue topamax and depakote    2. TIA (transient ischemic attack)  On eliquis and aspirn    3. Controlled type 2 diabetes mellitus without complication, unspecified whether long term insulin use ([facility]  Continue victoz    4. Atrial fibrillation, unspecified type ([facility]  Continue eliquis  S/p AICD    5. Heart failure  Continue ajnnette      Allergies  Codeine, Hydrocodone, Influenza virus vaccines, Mushroom flavor, Oxycodone, and Pneumococcal 23-jesús ps vaccine     Medications  Current Outpatient Medications   Medication Sig    glipiZIDE (GLUCOTROL) 10 mg tablet Take 1 Tablet by mouth two (2) times a day.  empagliflozin (Jardiance) 10 mg tablet Take 1 Tablet by mouth daily. For diabetes    albuterol-ipratropium (DUO-NEB) 2.5 mg-0.5 mg/3 ml nebu INHALE THREE MILLILITERS VIA NEBULIZATION BY MOUTH EVERY 6 HOURS AS NEEDED    diclofenac (VOLTAREN) 1 % gel Apply  to affected area four (4) times daily as needed.  potassium chloride SR (K-TAB) 20 mEq tablet 2 tabs po bid    traZODone (DESYREL) 100 mg tablet Take 1 Tablet by mouth nightly.  Entresto  mg tablet TAKE ONE TABLET BY MOUTH TWICE A DAY    topiramate (TOPAMAX) 100 mg tablet TAKE ONE TABLET BY MOUTH TWICE A DAY    zolpidem (AMBIEN) 10 mg tablet TAKE ONE TABLET BY MOUTH EVERY NIGHT AT BEDTIME AS NEEDED FOR SLEEP    albuterol (PROVENTIL HFA, VENTOLIN HFA, PROAIR HFA) 90 mcg/actuation inhaler Take 1 Puff by inhalation every six (6) hours as needed for Wheezing or Shortness of Breath.     furosemide (LASIX) 80 mg tablet TAKE TWO TABLETS BY MOUTH EVERY MORNING AND TAKE ONE TABLET BY MOUTH EVERY EVENING    liraglutide (VICTOZA) 0.6 mg/0.1 mL (18 mg/3 mL) pnij 1.8 mg by SubCUTAneous route daily.  OneTouch Ultra Blue Test Strip strip USE TO TEST BLOOD SUGAR THREE TIMES A DAY BEFORE MEALS    Pamela Pen Needle 32 gauge x 5/32\" ndle USE DAILY WITH VICTOZA    glucose blood VI test strips (OneTouch Ultra Blue Test Strip) strip Use one strip to test three times a day before meals    Insulin Needles, Disposable, (Pamela Pen Needle) 32 gauge x 5/32\" ndle Use with Victoza daily    isosorbide mononitrate ER (IMDUR) 30 mg tablet Take 30 mg by mouth daily.  nitroglycerin (NITROSTAT) 0.4 mg SL tablet 0.4 mg by SubLINGual route.  omeprazole (PRILOSEC) 20 mg capsule Take 20 mg by mouth daily.  metFORMIN (GLUCOPHAGE) 1,000 mg tablet TAKE ONE TABLET BY MOUTH TWICE A DAY WITH MEALS    atorvastatin (LIPITOR) 40 mg tablet TAKE ONE TABLET BY MOUTH DAILY    carvediloL (COREG) 12.5 mg tablet TAKE ONE TABLET BY MOUTH TWICE A DAY TO SLOW HEART RATE DOWN    Eliquis 5 mg tablet TAKE ONE TABLET BY MOUTH TWICE A DAY    divalproex DR (DEPAKOTE) 500 mg tablet Take 1 Tab by mouth daily. No current facility-administered medications for this visit. Medical History  Past Medical History:   Diagnosis Date   [facility]) 1/6/15    [facility] ED. Pt reported this    Arthritis     hands    Asthma     Atrial fibrillation with RVR ([facility] 8/11/2017    Cardiomyopathy (HCC)     ET 25-30%    Carpal tunnel syndrome     Chronic obstructive pulmonary disease (HCC)     Diabetes (HCC)     Type II    Gastrointestinal disorder     GERD    GERD (gastroesophageal reflux disease)     Heart failure (HCC)     High cholesterol     Hypertension     Restrictive lung disease     FVC2.59 (52%), FEV1 1.95 (49)- 2017    S/P cardiac cath     Dr. Lan Chan, 2016.  minimal cad    Sleep apnea     No CPAP    Stroke (HCC)     TIA x 4 - weak on left side Review of Systems   Eyes: Negative for blurred vision and double vision. Respiratory: Negative for cough and shortness of breath. Cardiovascular: Negative for chest pain, palpitations and orthopnea. Gastrointestinal: Negative for nausea and vomiting. Musculoskeletal: Positive for myalgias. Neurological: Negative for dizziness and headaches. Psychiatric/Behavioral: Negative for depression. The patient is nervous/anxious. Exam:  Visit Vitals  BP 94/64 (BP 1 Location: Left arm, BP Patient Position: Sitting, BP Cuff Size: Large adult)   Pulse 71   Temp 97.5 °F (36.4 °C) (Temporal)   Resp 16   Ht 6' (1.829 m)   Wt 344 lb (156 kg)   SpO2 97%   BMI 46.65 kg/m²        General: Well developed, well nourished. Patient in no apparent distress   Head: Normocephalic, atraumatic, anicteric sclera   Neck Normal ROM, No thyromegally   Lungs:  Clear to auscultation    Cardiac: Regular rate and rhythm with no murmurs. Abd: Bowel sounds were audible. Ext: No pedal edema   Skin: Supple no rash     NeurologicExam:  Mental Status: Alert and oriented to person place and time   Speech: Fluent no aphasia or dysarthria. Cranial Nerves:  II - XII Intact   Motor:  Full and symmetric strength of upper and lower extremities. Normal bulk and tone. Reflexes:   Deep tendon reflexes 2+/4 and symmetric. Sensory:   Symmetric and intact with no perceived deficits . Gait:  Gait is balanced  With cane   Tremor:   No tremor noted. Cerebellar:  Coordination intact. Neurovascular: No carotid bruits.  No JVD          Lab Review  Lab Results   Component Value Date/Time    WBC 8.9 08/08/2021 04:03 PM    HCT 44.1 08/08/2021 04:03 PM    HGB 14.6 08/08/2021 04:03 PM    PLATELET 191 23/27/6831 04:03 PM       Lab Results   Component Value Date/Time    Sodium 138 08/08/2021 04:03 PM    Potassium 3.5 08/08/2021 04:03 PM    Chloride 105 08/08/2021 04:03 PM    CO2 25 08/08/2021 04:03 PM    Glucose 332 (H) 08/08/2021 04:03 PM BUN 18 08/08/2021 04:03 PM    Creatinine 1.37 (H) 08/08/2021 04:03 PM    Calcium 8.5 08/08/2021 04:03 PM       No components found for: TROPQUANT    No results found for: DESHAWN      Lab Results   Component Value Date/Time    Hemoglobin A1c 8.4 (H) 08/19/2021 12:26 PM    Hemoglobin A1c (POC) 6.7 08/14/2020 12:25 PM          Lab Results   Component Value Date/Time    Cholesterol, total 118 08/19/2021 12:26 PM    HDL Cholesterol 32 08/19/2021 12:26 PM    LDL, calculated 27 08/19/2021 12:26 PM    VLDL, calculated 59 08/19/2021 12:26 PM    Triglyceride 295 (H) 08/19/2021 12:26 PM    CHOL/HDL Ratio 3.7 08/19/2021 12:26 PM

## 2021-10-29 NOTE — PROGRESS NOTES
HISTORY OF PRESENT ILLNESS Sophie Araiza is a 48 y.o. male. HPI Infor recheck. Breathing has been doing reasonably well. Sees Dr. Vinny Sam 2-19. Going for echo. Was seen in ER a few nights ago for L flank pain. Still having some pain. No cough, hematuria. Pain still present , but better. Not sleeping well. Had been off potassium pills, but is back on them now. ROS Physical Exam  
Constitutional: He appears well-developed and well-nourished. HENT:  
Right Ear: External ear normal.  
Left Ear: External ear normal.  
Mouth/Throat: Oropharynx is clear and moist.  
Neck: No thyromegaly present. Cardiovascular: Normal rate, regular rhythm, normal heart sounds and intact distal pulses. Pulmonary/Chest: Effort normal and breath sounds normal. No respiratory distress. He has no wheezes. Abdominal: Soft. Bowel sounds are normal. He exhibits no distension and no mass. There is no tenderness. There is no guarding. Musculoskeletal: Normal range of motion. He exhibits edema (1+ bilaterally). Lymphadenopathy:  
  He has no cervical adenopathy. Nursing note and vitals reviewed. ASSESSMENT and PLAN Orders Placed This Encounter  CBC WITH AUTOMATED DIFF  
 METABOLIC PANEL, BASIC  
 traZODone (DESYREL) 100 mg tablet Diagnoses and all orders for this visit: 1. Pain of upper abdomen 
-     CBC WITH AUTOMATED DIFF 2. Hypokalemia -     METABOLIC PANEL, BASIC 3. Primary insomnia Other orders 
-     traZODone (DESYREL) 100 mg tablet; Take 2 Tabs by mouth nightly. For sleep Follow-up Disposition: 
Return in about 3 months (around 5/11/2019). s/p OD, intubation with activated charcoal via OGT

## 2021-10-30 DIAGNOSIS — I10 ESSENTIAL HYPERTENSION: ICD-10-CM

## 2021-10-30 DIAGNOSIS — F51.01 PRIMARY INSOMNIA: ICD-10-CM

## 2021-10-31 RX ORDER — APIXABAN 5 MG/1
TABLET, FILM COATED ORAL
Qty: 60 TABLET | Refills: 10 | Status: SHIPPED | OUTPATIENT
Start: 2021-10-31 | End: 2022-10-03 | Stop reason: SDUPTHER

## 2021-10-31 RX ORDER — SACUBITRIL AND VALSARTAN 97; 103 MG/1; MG/1
TABLET, FILM COATED ORAL
Qty: 60 TABLET | Refills: 3 | Status: SHIPPED | OUTPATIENT
Start: 2021-10-31 | End: 2022-03-06

## 2021-10-31 RX ORDER — ZOLPIDEM TARTRATE 10 MG/1
TABLET ORAL
Qty: 30 TABLET | Refills: 5 | Status: SHIPPED | OUTPATIENT
Start: 2021-10-31 | End: 2022-05-13 | Stop reason: SDUPTHER

## 2021-10-31 RX ORDER — CARVEDILOL 12.5 MG/1
TABLET ORAL
Qty: 60 TABLET | Refills: 10 | Status: SHIPPED | OUTPATIENT
Start: 2021-10-31

## 2021-11-02 DIAGNOSIS — G43.009 MIGRAINE WITHOUT AURA AND WITHOUT STATUS MIGRAINOSUS, NOT INTRACTABLE: ICD-10-CM

## 2021-11-02 RX ORDER — TOPIRAMATE 100 MG/1
100 TABLET, FILM COATED ORAL 2 TIMES DAILY
Qty: 60 TABLET | Refills: 5 | Status: CANCELLED | OUTPATIENT
Start: 2021-11-02

## 2021-11-03 DIAGNOSIS — G43.009 MIGRAINE WITHOUT AURA AND WITHOUT STATUS MIGRAINOSUS, NOT INTRACTABLE: ICD-10-CM

## 2021-11-04 RX ORDER — TOPIRAMATE 100 MG/1
100 TABLET, FILM COATED ORAL 2 TIMES DAILY
Qty: 60 TABLET | Refills: 5 | Status: SHIPPED | OUTPATIENT
Start: 2021-11-04 | End: 2022-05-04 | Stop reason: SDUPTHER

## 2021-12-01 RX ORDER — ATORVASTATIN CALCIUM 40 MG/1
TABLET, FILM COATED ORAL
Qty: 30 TABLET | Refills: 11 | Status: SHIPPED | OUTPATIENT
Start: 2021-12-01

## 2021-12-16 ENCOUNTER — OFFICE VISIT (OUTPATIENT)
Dept: CARDIOLOGY CLINIC | Age: 56
End: 2021-12-16
Payer: MEDICAID

## 2021-12-16 DIAGNOSIS — Z95.810 BIVENTRICULAR ICD (IMPLANTABLE CARDIOVERTER-DEFIBRILLATOR) IN PLACE: Primary | ICD-10-CM

## 2021-12-16 DIAGNOSIS — I42.0 DILATED CARDIOMYOPATHY (HCC): ICD-10-CM

## 2021-12-16 PROCEDURE — 93296 REM INTERROG EVL PM/IDS: CPT | Performed by: INTERNAL MEDICINE

## 2021-12-16 PROCEDURE — 93295 DEV INTERROG REMOTE 1/2/MLT: CPT | Performed by: INTERNAL MEDICINE

## 2022-01-12 ENCOUNTER — OFFICE VISIT (OUTPATIENT)
Dept: FAMILY MEDICINE CLINIC | Age: 57
End: 2022-01-12
Payer: MEDICAID

## 2022-01-12 VITALS
HEIGHT: 72 IN | DIASTOLIC BLOOD PRESSURE: 74 MMHG | TEMPERATURE: 97.3 F | WEIGHT: 315 LBS | BODY MASS INDEX: 42.66 KG/M2 | OXYGEN SATURATION: 94 % | RESPIRATION RATE: 18 BRPM | HEART RATE: 70 BPM | SYSTOLIC BLOOD PRESSURE: 112 MMHG

## 2022-01-12 DIAGNOSIS — E11.9 CONTROLLED TYPE 2 DIABETES MELLITUS WITHOUT COMPLICATION, WITHOUT LONG-TERM CURRENT USE OF INSULIN (HCC): Primary | ICD-10-CM

## 2022-01-12 DIAGNOSIS — E78.00 HYPERCHOLESTEROLEMIA: ICD-10-CM

## 2022-01-12 DIAGNOSIS — I10 ESSENTIAL HYPERTENSION: ICD-10-CM

## 2022-01-12 PROCEDURE — 99213 OFFICE O/P EST LOW 20 MIN: CPT | Performed by: FAMILY MEDICINE

## 2022-01-12 RX ORDER — TRAZODONE HYDROCHLORIDE 100 MG/1
100 TABLET ORAL
Qty: 30 TABLET | Refills: 5 | Status: SHIPPED | OUTPATIENT
Start: 2022-01-12

## 2022-01-12 NOTE — PROGRESS NOTES
HISTORY OF PRESENT ILLNESS  Marjorie Huerta is a 56 y.o. male. HPI Has been having problems with bruising since November. Taking eliquis. Needs blood pressure checked. No complaints of chest pain, shortness of breath, TIAs, claudication or edema. ROS    Physical Exam  Vitals and nursing note reviewed. Constitutional:       Appearance: He is well-developed. HENT:      Right Ear: External ear normal.      Left Ear: External ear normal.   Neck:      Thyroid: No thyromegaly. Cardiovascular:      Rate and Rhythm: Normal rate. Rhythm irregular. Heart sounds: Normal heart sounds. Pulmonary:      Effort: Pulmonary effort is normal. No respiratory distress. Breath sounds: Normal breath sounds. No wheezing. Abdominal:      General: Bowel sounds are normal. There is no distension. Palpations: Abdomen is soft. There is no mass. Tenderness: There is no abdominal tenderness. There is no guarding. Musculoskeletal:         General: Normal range of motion. Lymphadenopathy:      Cervical: No cervical adenopathy. Skin:     Comments: No bruising noted today. ASSESSMENT and PLAN  Orders Placed This Encounter    HEMOGLOBIN A1C WITH EAG     Standing Status:   Future     Number of Occurrences:   1     Standing Expiration Date:   1/12/2023    CBC WITH AUTOMATED DIFF     Standing Status:   Future     Number of Occurrences:   1     Standing Expiration Date:   7/12/2023    LIPID PANEL     Standing Status:   Future     Number of Occurrences:   1     Standing Expiration Date:   5/70/8381    METABOLIC PANEL, COMPREHENSIVE     Standing Status:   Future     Number of Occurrences:   1     Standing Expiration Date:   7/12/2023    traZODone (DESYREL) 100 mg tablet     Sig: Take 1 Tablet by mouth nightly.      Dispense:  30 Tablet     Refill:  5     Orders Placed This Encounter    HEMOGLOBIN A1C WITH EAG    CBC WITH AUTOMATED DIFF    LIPID PANEL    METABOLIC PANEL, COMPREHENSIVE    traZODone (DESYREL) 100 mg tablet     Diagnoses and all orders for this visit:    1. Controlled type 2 diabetes mellitus without complication, without long-term current use of insulin (HCC)  -     HEMOGLOBIN A1C WITH EAG; Future    2. Essential hypertension  -     CBC WITH AUTOMATED DIFF; Future  -     METABOLIC PANEL, COMPREHENSIVE; Future    3. Hypercholesterolemia  -     LIPID PANEL; Future    Other orders  -     traZODone (DESYREL) 100 mg tablet; Take 1 Tablet by mouth nightly. Follow-up and Dispositions    · Return in about 4 months (around 5/12/2022). bruising probably related to eliquis. Reassured.

## 2022-01-12 NOTE — PROGRESS NOTES
Identified pt with two pt identifiers(name and ). Reviewed record in preparation for visit and have obtained necessary documentation. Chief Complaint   Patient presents with    Bleeding/Bruising     bruising around site of insulin injections; also had bruising around left nipple that he is unsure of cause        Vitals:    22 1557   BP: 112/74   Pulse: 70   Resp: 18   Temp: 97.3 °F (36.3 °C)   TempSrc: Oral   SpO2: 94%   Weight: 341 lb (154.7 kg)   Height: 6' (1.829 m)   PainSc:   0 - No pain       Health Maintenance Due   Topic    Hepatitis C Screening     COVID-19 Vaccine (1)    Eye Exam Retinal or Dilated     Shingrix Vaccine Age 50> (1 of 2)    Low dose CT lung screening     Foot Exam Q1     MICROALBUMIN Q1        Coordination of Care Questionnaire:  :   1) Have you been to an emergency room, urgent care, or hospitalized since your last visit? If yes, where when, and reason for visit? no       2. Have seen or consulted any other health care provider since your last visit? If yes, where when, and reason for visit? NO      Patient is accompanied by self I have received verbal consent from [address] [place] [address],[address] to discuss any/all medical information while they are present in the room.

## 2022-01-13 LAB
ALBUMIN SERPL-MCNC: 3.8 G/DL (ref 3.5–5)
ALBUMIN/GLOB SERPL: 1.2 {RATIO} (ref 1.1–2.2)
ALP SERPL-CCNC: 85 U/L (ref 45–117)
ALT SERPL-CCNC: 38 U/L (ref 12–78)
ANION GAP SERPL CALC-SCNC: 6 MMOL/L (ref 5–15)
AST SERPL-CCNC: 14 U/L (ref 15–37)
BASOPHILS # BLD: 0.1 K/UL (ref 0–0.1)
BASOPHILS NFR BLD: 1 % (ref 0–1)
BILIRUB SERPL-MCNC: 0.6 MG/DL (ref 0.2–1)
BUN SERPL-MCNC: 20 MG/DL (ref 6–20)
BUN/CREAT SERPL: 17 (ref 12–20)
CALCIUM SERPL-MCNC: 9.4 MG/DL (ref 8.5–10.1)
CHLORIDE SERPL-SCNC: 103 MMOL/L (ref 97–108)
CHOLEST SERPL-MCNC: 123 MG/DL
CO2 SERPL-SCNC: 29 MMOL/L (ref 21–32)
CREAT SERPL-MCNC: 1.17 MG/DL (ref 0.7–1.3)
DIFFERENTIAL METHOD BLD: ABNORMAL
EOSINOPHIL # BLD: 0.1 K/UL (ref 0–0.4)
EOSINOPHIL NFR BLD: 1 % (ref 0–7)
ERYTHROCYTE [DISTWIDTH] IN BLOOD BY AUTOMATED COUNT: 15.5 % (ref 11.5–14.5)
EST. AVERAGE GLUCOSE BLD GHB EST-MCNC: 143 MG/DL
GLOBULIN SER CALC-MCNC: 3.3 G/DL (ref 2–4)
GLUCOSE SERPL-MCNC: 179 MG/DL (ref 65–100)
HBA1C MFR BLD: 6.6 % (ref 4–5.6)
HCT VFR BLD AUTO: 52.3 % (ref 36.6–50.3)
HDLC SERPL-MCNC: 28 MG/DL
HDLC SERPL: 4.4 {RATIO} (ref 0–5)
HGB BLD-MCNC: 16.6 G/DL (ref 12.1–17)
IMM GRANULOCYTES # BLD AUTO: 0.1 K/UL (ref 0–0.04)
IMM GRANULOCYTES NFR BLD AUTO: 1 % (ref 0–0.5)
LDLC SERPL CALC-MCNC: 30.2 MG/DL (ref 0–100)
LYMPHOCYTES # BLD: 2.5 K/UL (ref 0.8–3.5)
LYMPHOCYTES NFR BLD: 22 % (ref 12–49)
MCH RBC QN AUTO: 29.3 PG (ref 26–34)
MCHC RBC AUTO-ENTMCNC: 31.7 G/DL (ref 30–36.5)
MCV RBC AUTO: 92.2 FL (ref 80–99)
MONOCYTES # BLD: 0.6 K/UL (ref 0–1)
MONOCYTES NFR BLD: 5 % (ref 5–13)
NEUTS SEG # BLD: 8.1 K/UL (ref 1.8–8)
NEUTS SEG NFR BLD: 70 % (ref 32–75)
NRBC # BLD: 0 K/UL (ref 0–0.01)
NRBC BLD-RTO: 0 PER 100 WBC
PLATELET # BLD AUTO: 209 K/UL (ref 150–400)
PMV BLD AUTO: 10.6 FL (ref 8.9–12.9)
POTASSIUM SERPL-SCNC: 3.2 MMOL/L (ref 3.5–5.1)
PROT SERPL-MCNC: 7.1 G/DL (ref 6.4–8.2)
RBC # BLD AUTO: 5.67 M/UL (ref 4.1–5.7)
SODIUM SERPL-SCNC: 138 MMOL/L (ref 136–145)
TRIGL SERPL-MCNC: 324 MG/DL (ref ?–150)
VLDLC SERPL CALC-MCNC: 64.8 MG/DL
WBC # BLD AUTO: 11.5 K/UL (ref 4.1–11.1)

## 2022-01-17 RX ORDER — POTASSIUM CHLORIDE 1500 MG/1
TABLET, FILM COATED, EXTENDED RELEASE ORAL
Qty: 180 TABLET | Refills: 12 | Status: SHIPPED | OUTPATIENT
Start: 2022-01-17 | End: 2022-05-18

## 2022-02-03 NOTE — MR AVS SNAPSHOT
[facility] 
 
 
 [address] [facility] [address] 
[phone] Patient: Erika Novak MRN: [id number] :1965 Visit Information Date & Time Provider Department Dept. Phone Encounter #  
 2018 11:45 AM Mel Reyes MD [facility] [phone] [id number] Upcoming Health Maintenance Date Due FOBT Q 1 YEAR AGE 50-75 2017 DTaP/Tdap/Td series (2 - Td) 2027 Allergies as of 2018  Review Complete On: 2018 By: Mel Reyes MD  
  
 Severity Noted Reaction Type Reactions Influenza Virus Vaccines  2017    Nausea Only Fever  diarrhea Mushroom Flavor  2014   Systemic Swelling Oxycodone  08/10/2017    Rash Pneumococcal 23-jesús Ps Vaccine  2017    Nausea and Vomiting Vomit Current Immunizations  Reviewed on 2017 Name Date Td, Adsorbed PF 2016 Not reviewed this visit You Were Diagnosed With   
  
 Codes Comments Orthopnea    -  Primary ICD-10-CM: R06.01 
ICD-9-CM: 786.02 Controlled type 2 diabetes mellitus without complication, without long-term current use of insulin ([facility]     ICD-10-CM: E11.9 ICD-9-CM: 250.00 Vitals BP Pulse Temp Resp Height(growth percentile) Weight(growth percentile) 127/84 99 98.1 °F (36.7 °C) (Oral) 14 6' 1\" (1.854 m) (!) 359 lb 9.6 oz (163.1 kg) SpO2 BMI Smoking Status 91% 47.44 kg/m2 Former Smoker Vitals History BMI and BSA Data Body Mass Index Body Surface Area  
 47.44 kg/m 2 2.9 m 2 Preferred Pharmacy Pharmacy Name Phone [facility] [address], [address] [facility] [phone] Your Updated Medication List  
  
   
This list is accurate as of 18  1:14 PM.  Always use your most recent med list.  
  
  
  
  
 albuterol-ipratropium 2.5 mg-0.5 mg/3 ml Nebu Commonly known as:  Berto Sosa  
 3 mL by Nebulization route every six (6) hours as needed. atorvastatin 40 mg tablet Commonly known as:  LIPITOR  
TAKE ONE TABLET BY MOUTH DAILY FOR CHOLESTEROL Blood-Glucose Meter Misc Use to check BS twice a day. Dx code E11.9  
  
 carvedilol 25 mg tablet Commonly known as:  Francenia Elders Take 1 Tab by mouth two (2) times a day. To slow heart rate down ELIQUIS 5 mg tablet Generic drug:  apixaban TAKE ONE TABLET BY MOUTH TWICE A DAY TO PREVENT STROKE  
  
 furosemide 80 mg tablet Commonly known as:  LASIX  
3 tabs every am for fluid  
  
 glipiZIDE 10 mg tablet Commonly known as:  Rulon Bread Take 1 Tab by mouth two (2) times a day. * glucose blood VI test strips strip Commonly known as:  FREESTYLE LITE STRIPS As directed * glucose blood VI test strips strip Commonly known as:  ONETOUCH ULTRA TEST Use to check BS twice a day. Dx code E11.9 HYDROcodone-homatropine 5-1.5 mg/5 mL (5 mL) syrup Commonly known as:  HYCODAN Take 5 mL by mouth three (3) times daily as needed. Max Daily Amount: 15 mL. lisinopril 20 mg tablet Commonly known as:  Ramses Barrington Take 1 Tab by mouth daily. For heart  
  
 metFORMIN 1,000 mg tablet Commonly known as:  GLUCOPHAGE Take 1 Tab by mouth two (2) times daily (with meals). For diabetes  
  
 potassium chloride 20 mEq tablet Commonly known as:  K-DUR, KLOR-CON Take 1 Tab by mouth two (2) times a day. traMADol 50 mg tablet Commonly known as:  ULTRAM  
1-2 tabs every 6 hours as needed for back pain  
  
 umeclidinium-vilanterol 62.5-25 mcg/actuation inhaler Commonly known as:  Cristiane Schooling Take 1 Puff by inhalation daily. VENTOLIN HFA 90 mcg/actuation inhaler Generic drug:  albuterol INHALE TWO PUFFS BY MOUTH EVERY 4 HOURS AS NEEDED FOR FOR WHEEZING AND FOR SHORTNESS OF BREATH * Notice:   This list has 2 medication(s) that are the same as other medications prescribed for you. Read the directions carefully, and ask your doctor or other care provider to review them with you. Prescriptions Sent to Pharmacy Refills  
 lisinopril (PRINIVIL, ZESTRIL) 20 mg tablet 12 Sig: Take 1 Tab by mouth daily. For heart Class: Normal  
 Pharmacy: [address] Ph #: [phone] Route: Oral  
 furosemide (LASIX) 80 mg tablet 12 Sig: 3 tabs every am for fluid Class: Normal  
 Pharmacy: [address], [address] [place] [address] Ph #: [phone] We Performed the Following AMB POC COMPLETE CBC, AUTOMATED [02686 CPT(R)] AMB POC HEMOGLOBIN A1C [29578 CPT(R)] METABOLIC PANEL, BASIC [63801 CPT(R)] Introducing [facility]! [facility] introduces [employer] patient portal. Now you can access parts of your medical record, email your doctor's office, and request medication refills online. 1. In your internet browser, go to [email or web address] 2. Click on the First Time User? Click Here link in the Sign In box. You will see the New Member Sign Up page. 3. Enter your [employer] Access Code exactly as it appears below. You will not need to use this code after youve completed the sign-up process. If you do not sign up before the expiration date, you must request a new code. · [employer] Access Code: [email or web address] Expires: 7/3/2018 11:11 AM 
 
4. Enter the last four digits of your Social Security Number (xxxx) and Date of Birth (mm/dd/yyyy) as indicated and click Submit. You will be taken to the next sign-up page. 5. Create a Tantalinet ID. This will be your [employer] login ID and cannot be changed, so think of one that is secure and easy to remember. 6. Create a [employer] password. You can change your password at any time. 7. Enter your Password Reset Question and Answer. This can be used at a later time if you forget your password. 8. Enter your e-mail address. You will receive e-mail notification when new information is available in [address] 9. Click Sign Up. You can now view and download portions of your medical record. 10. Click the Download Summary menu link to download a portable copy of your medical information. If you have questions, please visit the Frequently Asked Questions section of the [employer] website. Remember, [employer] is NOT to be used for urgent needs. For medical emergencies, dial 911. Now available from your iPhone and Android! Please provide this summary of care documentation to your next provider. Your primary care clinician is listed as Hardeep Alexander. If you have any questions after today's visit, please call [phone]. Quality 110: Preventive Care And Screening: Influenza Immunization: Influenza Immunization not Administered because Patient Refused. Detail Level: Detailed

## 2022-02-07 DIAGNOSIS — E11.9 CONTROLLED TYPE 2 DIABETES MELLITUS WITHOUT COMPLICATION, WITHOUT LONG-TERM CURRENT USE OF INSULIN (HCC): ICD-10-CM

## 2022-02-07 RX ORDER — LIRAGLUTIDE 6 MG/ML
INJECTION SUBCUTANEOUS
Qty: 9 ML | Refills: 5 | Status: SHIPPED | OUTPATIENT
Start: 2022-02-07 | End: 2022-08-12 | Stop reason: SDUPTHER

## 2022-02-11 DIAGNOSIS — E11.9 CONTROLLED TYPE 2 DIABETES MELLITUS WITHOUT COMPLICATION, WITHOUT LONG-TERM CURRENT USE OF INSULIN (HCC): ICD-10-CM

## 2022-02-11 RX ORDER — BLOOD SUGAR DIAGNOSTIC
STRIP MISCELLANEOUS
Qty: 100 STRIP | Refills: 10 | Status: SHIPPED | OUTPATIENT
Start: 2022-02-11

## 2022-02-14 RX ORDER — BLOOD SUGAR DIAGNOSTIC
STRIP MISCELLANEOUS
Qty: 100 STRIP | Refills: 11 | Status: SHIPPED | OUTPATIENT
Start: 2022-02-14

## 2022-03-06 RX ORDER — SACUBITRIL AND VALSARTAN 97; 103 MG/1; MG/1
TABLET, FILM COATED ORAL
Qty: 60 TABLET | Refills: 3 | Status: SHIPPED | OUTPATIENT
Start: 2022-03-06 | End: 2022-07-26 | Stop reason: SDUPTHER

## 2022-03-18 PROBLEM — K92.1 HEMATOCHEZIA: Status: ACTIVE | Noted: 2020-11-10

## 2022-03-18 PROBLEM — I48.91 ATRIAL FIBRILLATION WITH RVR (HCC): Status: ACTIVE | Noted: 2017-08-11

## 2022-03-18 PROBLEM — I20.9 ANGINA, CLASS III (HCC): Status: ACTIVE | Noted: 2020-09-22

## 2022-03-19 PROBLEM — G45.9 TIA (TRANSIENT ISCHEMIC ATTACK): Status: ACTIVE | Noted: 2020-02-13

## 2022-03-19 PROBLEM — I42.8 CARDIOMYOPATHY, NONISCHEMIC (HCC): Status: ACTIVE | Noted: 2018-11-17

## 2022-03-19 PROBLEM — I25.9 MYOCARDIAL ISCHEMIA: Status: ACTIVE | Noted: 2020-09-22

## 2022-03-19 PROBLEM — E11.9 CONTROLLED TYPE 2 DIABETES MELLITUS WITHOUT COMPLICATION, WITHOUT LONG-TERM CURRENT USE OF INSULIN (HCC): Status: ACTIVE | Noted: 2018-08-15

## 2022-03-19 PROBLEM — I50.9 CHF (CONGESTIVE HEART FAILURE) (HCC): Status: ACTIVE | Noted: 2017-08-10

## 2022-03-19 PROBLEM — E66.01 OBESITY, MORBID (HCC): Status: ACTIVE | Noted: 2017-12-20

## 2022-03-20 PROBLEM — K62.5 RECTAL BLEED: Status: ACTIVE | Noted: 2020-11-10

## 2022-03-20 PROBLEM — I42.9 CARDIOMYOPATHY (HCC): Status: ACTIVE | Noted: 2019-03-11

## 2022-03-29 NOTE — PROGRESS NOTES
Subjective:   Isabel Courser DNP, ANP-BC     Electrophysiology  Page Aguero is a 56 y.o. male history of nonischemic cardiomyopathy, atrial fibrillation, AV node ablation pacemaker BiV ICD, hypertension. Patient reports he has had a few episodes of left-sided chest pain that occurs at rest, feels quick sharp pains in the left chest but does admit it has involved the left arm from time to time. Denies any worsening of his baseline dyspnea with exertional activities. He has been compliant with medications. Echocardiogram 2020  Interpretation Summary    · LV: Estimated LVEF is 55 - 60%. Normal cavity size, systolic function (ejection fraction normal) and diastolic function. Severe concentric hypertrophy. Wall motion: normal.  · LA: Severely dilated left atrium. Left Atrium volume index is 51.52 mL/m2. · RV: Dilated right ventricle. Pacer/ICD present. Cardiac Cath 9/2020  Coronary Findings      Diagnostic  Dominance: Right    Left Main   The vessel is angiographically normal.   Left Anterior Descending   Mid LAD lesion, 30% stenosed. The lesion is smooth. First Diagonal Branch   The vessel exhibits minimal luminal irregularities. Second Diagonal Branch   The vessel exhibits minimal luminal irregularities. Left Circumflex   The vessel is angiographically normal.   First Obtuse Marginal Branch   The vessel exhibits minimal luminal irregularities. Second Obtuse Marginal Branch   The vessel exhibits minimal luminal irregularities. Third Obtuse Marginal Branch   The vessel exhibits minimal luminal irregularities. Right Coronary Artery   The vessel exhibits minimal luminal irregularities. Right Posterior Descending Artery   The vessel exhibits minimal luminal irregularities. Right Posterior Atrioventricular Artery   The vessel exhibits minimal luminal irregularities. First Right Posterolateral Branch   The vessel exhibits minimal luminal irregularities.        Patient Active Problem List Diagnosis Date Noted    Cardiomyopathy ([facility] 03/11/2019    Hematochezia 11/10/2020    Rectal bleed 11/10/2020    Angina, class III ([address] 09/22/2020    Myocardial ischemia 09/22/2020    Adult BMI 45.0-49.9 kg/sq m ([facility] 09/22/2020    TIA (transient ischemic attack) 02/13/2020    Cardiomyopathy, nonischemic ([address] 11/17/2018    Controlled type 2 diabetes mellitus without complication, without long-term current use of insulin ([facility] 08/15/2018    Sleep apnea     Obesity, morbid ([address] 12/20/2017    Atrial fibrillation with RVR ([address] 08/11/2017    CHF (congestive heart failure) ([facility] 08/10/2017    Restrictive lung disease     Dyspnea 07/15/2014    Hypertension 02/17/2014      Domingo Martinez MD  Past Medical History:   Diagnosis Date   [place] [facility]) 1/6/15    [facility] ED. Pt reported this    Arthritis     hands    Asthma     Atrial fibrillation with RVR ([facility] 8/11/2017    Cancer (HCC)     Cardiomyopathy (HCC)     ET 25-30%    Carpal tunnel syndrome     Chronic obstructive pulmonary disease (HCC)     Congestive heart failure (HCC)     Diabetes (HCC)     Type II    Gastrointestinal disorder     GERD    GERD (gastroesophageal reflux disease)     Heart failure (HCC)     High cholesterol     Hypertension     Long term current use of anticoagulant therapy     Myocardial infarction [facility])     Pacemaker     Restrictive lung disease     FVC2.59 (52%), FEV1 1.95 (49)- 2017    S/P cardiac cath     Dr. Michelet Ravi, 2016.  minimal cad    Sleep apnea     No CPAP    Stroke [facility])     TIA x 4 - weak on left side      Past Surgical History:   Procedure Laterality Date    COLONOSCOPY N/A 11/4/2020    COLONOSCOPY performed by Mary Walker MD at [facility] ENDOSCOPY    COLONOSCOPY N/A 11/10/2020    COLONOSCOPY performed by Mary Walker MD at [facility] ENDOSCOPY    HX PACEMAKER Left     defib,     [place] CARDIAC SURG PROCEDURE UNLIST  11/14/2018    Implantation of PACEMAKER    TN INSJ/RPLCMT PERM DFB W/TRNSVNS LDS 1/DUAL CHMBR N/A 3/11/2019    INSERT ICD BIV MULTI performed by Earl Almodovar MD [facility] CARDIAC CATH LAB     Allergies   Allergen Reactions    Codeine Hives    Hydrocodone Itching    Influenza Virus Vaccines Nausea Only     Fever  diarrhea    Mushroom Flavor Swelling    Oxycodone Rash    Pneumococcal 23-Allison Ps Vaccine Nausea and Vomiting     Vomit        Family History   Adopted: Yes   Problem Relation Age of Onset    No Known Problems Mother     No Known Problems Father     negative for cardiac disease  Social History     Socioeconomic History    Marital status: SINGLE   Tobacco Use    Smoking status: Former Smoker     Packs/day: 1.00     Years: 20.00     Pack years: 20.00     Types: Cigarettes     Quit date: 2/3/2014     Years since quittin.1    Smokeless tobacco: Never Used   Substance and Sexual Activity    Alcohol use: No     Comment: stopped drinking at age 29    Drug use: No    Sexual activity: Not Currently   Social History Narrative    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      Current Outpatient Medications   Medication Sig    Entresto  mg tablet TAKE ONE TABLET BY MOUTH TWICE A DAY    glucose blood VI test strips (OneTouch Ultra Test) strip USE ONE STRIP TO TEST THREE TIMES A DAY BEFORE MEALS    Victoza 3-Rick 0.6 mg/0.1 mL (18 mg/3 mL) pnij DIAL AND INJECT UNDER THE SKIN 1.8 MG DAILY    potassium chloride SR (K-TAB) 20 mEq tablet 3 tabs po bid    traZODone (DESYREL) 100 mg tablet Take 1 Tablet by mouth nightly.  glipiZIDE (GLUCOTROL) 10 mg tablet TAKE ONE TABLET BY MOUTH TWICE A DAY    atorvastatin (LIPITOR) 40 mg tablet TAKE ONE TABLET BY MOUTH DAILY    topiramate (TOPAMAX) 100 mg tablet Take 1 Tablet by mouth two (2) times a day.     carvediloL (COREG) 12.5 mg tablet TAKE ONE TABLET BY MOUTH TWICE A DAY TO SLOW HEART RATE DOWN    Eliquis 5 mg tablet TAKE ONE TABLET BY MOUTH TWICE A DAY    zolpidem (AMBIEN) 10 mg tablet TAKE ONE TABLET BY MOUTH EVERY NIGHT AT BEDTIME AS NEEDED FOR SLEEP    empagliflozin (Jardiance) 10 mg tablet Take 1 Tablet by mouth daily. For diabetes    albuterol-ipratropium (DUO-NEB) 2.5 mg-0.5 mg/3 ml nebu INHALE THREE MILLILITERS VIA NEBULIZATION BY MOUTH EVERY 6 HOURS AS NEEDED    diclofenac (VOLTAREN) 1 % gel Apply  to affected area four (4) times daily as needed.  albuterol (PROVENTIL HFA, VENTOLIN HFA, PROAIR HFA) 90 mcg/actuation inhaler Take 1 Puff by inhalation every six (6) hours as needed for Wheezing or Shortness of Breath.  furosemide (LASIX) 80 mg tablet TAKE TWO TABLETS BY MOUTH EVERY MORNING AND TAKE ONE TABLET BY MOUTH EVERY EVENING    Pamela Pen Needle 32 gauge x 5/32\" ndle USE DAILY WITH VICTOZA    Insulin Needles, Disposable, (Pamela Pen Needle) 32 gauge x 5/32\" ndle Use with Victoza daily    isosorbide mononitrate ER (IMDUR) 30 mg tablet Take 30 mg by mouth daily.  nitroglycerin (NITROSTAT) 0.4 mg SL tablet 0.4 mg by SubLINGual route.  omeprazole (PRILOSEC) 20 mg capsule Take 20 mg by mouth daily.  OneTouch Ultra Test strip USE ONE STRIP TO TEST THREE TIMES A DAY BEFORE MEALS (Patient not taking: Reported on 3/30/2022)    metFORMIN (GLUCOPHAGE) 1,000 mg tablet TAKE ONE TABLET BY MOUTH TWICE A DAY WITH MEALS    divalproex DR (DEPAKOTE) 500 mg tablet Take 1 Tab by mouth daily. (Patient not taking: Reported on 3/30/2022)     No current facility-administered medications for this visit. Vitals:    03/30/22 1251   BP: 102/60   Pulse: 71   Resp: 18   SpO2: 96%   Weight: 334 lb (151.5 kg)   Height: 6' (1.829 m)       I have reviewed the nurses notes, vitals, problem list, allergy list, medical history, family, social history and medications. Review of Symptoms:    General: Pt denies excessive weight gain or loss. Pt is able to conduct ADL's  HEENT: Denies blurred vision, headaches, hearing loss, epistaxis and difficulty swallowing.   Respiratory: Denies cough, congestion, shortness of breath, HAMM, wheezing or stridor. Cardiovascular: + Chest pain, palpitations, edema or PND  Gastrointestinal: Denies poor appetite, indigestion, abdominal pain or blood in stool  Genitourinary: Denies hematuria, dysuria, increased urinary frequency  Musculoskeletal: Denies joint pain or swelling from muscles or joints  Neurologic: Denies tremor, paresthesias, headache, or sensory motor disturbance  Psychiatric: Denies confusion, insomnia, depression  Integumentray: Denies rash, itching or ulcers. Hematologic: Denies easy bruising, bleeding    Physical Exam:      General: Well developed, in no acute distress. HEENT: Eyes - PERRL, no jvd  Heart:  Normal S1/S2 negative S3 or S4. Regular, no murmur, gallop or rub. Respiratory: Clear bilaterally x 4, no wheezing or rales  Abdomen:   Soft, non-tender, bowel sounds are active. Extremities:  No edema, normal cap refill, no cyanosis. Musculoskeletal: No clubbing  Neuro: A&Ox3, speech clear, gait stable. Skin: Skin color is normal. No rashes or lesions.  Non diaphoretic, no ulcers or subcutaneous nodule  Vascular: 2+ pulses symmetric in all extremities  Psych - judgement intact and orientation is wnl     Cardiographics    Ekg: V paced     Results for orders placed or performed during the hospital encounter of 08/08/21   EKG, 12 LEAD, INITIAL   Result Value Ref Range    Ventricular Rate 70 BPM    Atrial Rate 468 BPM    QRS Duration 126 ms    Q-T Interval 446 ms    QTC Calculation (Bezet) 481 ms    Calculated R Axis -76 degrees    Calculated T Axis 85 degrees    Diagnosis       Ventricular-paced rhythm  Biventricular pacemaker detected  When compared with ECG of 10-NOV-2020 18:06,  No significant change was found  Confirmed by Janene Vega, P.V. ([id number]) on 8/8/2021 10:01:47 PM           Lab Results   Component Value Date/Time    WBC 11.5 (H) 01/12/2022 04:25 PM    HGB 16.6 01/12/2022 04:25 PM    HCT 52.3 (H) 01/12/2022 04:25 PM    PLATELET 323 10/75/8067 04:25 PM    MCV 92.2 01/12/2022 04:25 PM      Lab Results   Component Value Date/Time    Sodium 138 01/12/2022 04:25 PM    Potassium 3.2 (L) 01/12/2022 04:25 PM    Chloride 103 01/12/2022 04:25 PM    CO2 29 01/12/2022 04:25 PM    Anion gap 6 01/12/2022 04:25 PM    Glucose 179 (H) 01/12/2022 04:25 PM    BUN 20 01/12/2022 04:25 PM    Creatinine 1.17 01/12/2022 04:25 PM    BUN/Creatinine ratio 17 01/12/2022 04:25 PM    GFR est AA >60 01/12/2022 04:25 PM    GFR est non-AA >60 01/12/2022 04:25 PM    Calcium 9.4 01/12/2022 04:25 PM    Bilirubin, total 0.6 01/12/2022 04:25 PM    Alk. phosphatase 85 01/12/2022 04:25 PM    Protein, total 7.1 01/12/2022 04:25 PM    Albumin 3.8 01/12/2022 04:25 PM    Globulin 3.3 01/12/2022 04:25 PM    A-G Ratio 1.2 01/12/2022 04:25 PM    ALT (SGPT) 38 01/12/2022 04:25 PM         Assessment:     Assessment:        ICD-10-CM ICD-9-CM    1. Cardiomyopathy, nonischemic (HCC)  I42.8 425.4 AMB POC EKG ROUTINE W/ 12 LEADS, INTER & REP      ECHO ADULT COMPLETE      NUCLEAR CARDIAC STRESS TEST   2. Chest pain at rest  R07.9 786.50 NUCLEAR CARDIAC STRESS TEST     Orders Placed This Encounter    AMB POC EKG ROUTINE W/ 12 LEADS, INTER & REP     Order Specific Question:   Reason for Exam:     Answer:   routine        Plan:   Jade Wood is a 74-year-old male history of nonischemic cardiomyopathy, EF recovered 55-60% 2020 with guideline directed medical therapy, atrial fibrillation with AV node ablation BiV pacer ICD. Tolerating Eliquis. Interrogation today shows 9-year battery life, 99% by ventricularly paced. No events. Left-sided chest pain: 30% mid LAD lesion on cath in 2020 multiple CAD risk factors , will evaluate for ischemia with Lexiscan nuclear stress test have patient follow-up with me after stress test.  Currently on dual antianginal therapy in the form of isosorbide and beta-blocker. If recurrent symptoms and/or ischemia with then proceed with left heart cath.     Continue medical management for nonischemic cardiomyopathy, diabetes, hypertension, hyperlipidemia and anticoagulation for atrial fibrillation. Echocardiogram ordered for later this year. During this visit,  the patient and I had a comprehensive discussion of device management using principles of shared decision making. We reviewed device therapy, including the potential risks and benefits of device management. These risks include death, myocardial infarction, stroke, cardiac perforation, vascular injury, injury, pacing induced cardiomyopathy, inappropriate shocks (defibrillator) and other less severe complications. The patient demonstrated a clear understanding of these issues during out discussion. Our plans, determined together after thorough consideration, are outlined else where in this note. Thank you for allowing me to participate in Manjit Butler 's care. Follow-up with electrophysiology in 1 year  Follow-up with me in 1 month after stress test completed. If marked abnormality will then proceed with coronary angiography.   Ariel Alcocer DNP, ANP-BC

## 2022-03-30 ENCOUNTER — OFFICE VISIT (OUTPATIENT)
Dept: CARDIOLOGY CLINIC | Age: 57
End: 2022-03-30

## 2022-03-30 ENCOUNTER — OFFICE VISIT (OUTPATIENT)
Dept: CARDIOLOGY CLINIC | Age: 57
End: 2022-03-30
Payer: MEDICAID

## 2022-03-30 VITALS
HEIGHT: 72 IN | RESPIRATION RATE: 18 BRPM | SYSTOLIC BLOOD PRESSURE: 102 MMHG | OXYGEN SATURATION: 96 % | WEIGHT: 315 LBS | HEART RATE: 71 BPM | DIASTOLIC BLOOD PRESSURE: 60 MMHG | BODY MASS INDEX: 42.66 KG/M2

## 2022-03-30 DIAGNOSIS — R07.9 CHEST PAIN AT REST: ICD-10-CM

## 2022-03-30 DIAGNOSIS — Z95.810 BIVENTRICULAR ICD (IMPLANTABLE CARDIOVERTER-DEFIBRILLATOR) IN PLACE: Primary | ICD-10-CM

## 2022-03-30 DIAGNOSIS — I50.22 CHRONIC SYSTOLIC CONGESTIVE HEART FAILURE (HCC): ICD-10-CM

## 2022-03-30 DIAGNOSIS — I42.8 CARDIOMYOPATHY, NONISCHEMIC (HCC): Primary | ICD-10-CM

## 2022-03-30 PROCEDURE — 99214 OFFICE O/P EST MOD 30 MIN: CPT | Performed by: NURSE PRACTITIONER

## 2022-03-30 PROCEDURE — 93289 INTERROG DEVICE EVAL HEART: CPT | Performed by: INTERNAL MEDICINE

## 2022-03-30 NOTE — PROGRESS NOTES
1. Have you been to the ER, urgent care clinic since your last visit? Hospitalized since your last visit? Aug 2020 ED [facility] ER SOB. 2. Have you seen or consulted any other health care providers outside of the [address] [place] [address] since your last visit? Include any pap smears or colon screening.  No

## 2022-05-04 DIAGNOSIS — G43.009 MIGRAINE WITHOUT AURA AND WITHOUT STATUS MIGRAINOSUS, NOT INTRACTABLE: ICD-10-CM

## 2022-05-04 NOTE — TELEPHONE ENCOUNTER
Patient called stating that he needs a refill of:    Requested Prescriptions     Pending Prescriptions Disp Refills    topiramate (TOPAMAX) 100 mg tablet 60 Tablet 5     Sig: Take 1 Tablet by mouth two (2) times a day.

## 2022-05-05 RX ORDER — TOPIRAMATE 100 MG/1
100 TABLET, FILM COATED ORAL 2 TIMES DAILY
Qty: 180 TABLET | Refills: 3 | Status: SHIPPED | OUTPATIENT
Start: 2022-05-05

## 2022-05-13 ENCOUNTER — OFFICE VISIT (OUTPATIENT)
Dept: FAMILY MEDICINE CLINIC | Age: 57
End: 2022-05-13
Payer: MEDICAID

## 2022-05-13 VITALS
WEIGHT: 315 LBS | RESPIRATION RATE: 18 BRPM | OXYGEN SATURATION: 96 % | SYSTOLIC BLOOD PRESSURE: 105 MMHG | HEART RATE: 60 BPM | DIASTOLIC BLOOD PRESSURE: 74 MMHG | HEIGHT: 72 IN | TEMPERATURE: 97.6 F | BODY MASS INDEX: 42.66 KG/M2

## 2022-05-13 DIAGNOSIS — E11.9 CONTROLLED TYPE 2 DIABETES MELLITUS WITHOUT COMPLICATION, WITHOUT LONG-TERM CURRENT USE OF INSULIN (HCC): ICD-10-CM

## 2022-05-13 DIAGNOSIS — I48.0 PAROXYSMAL ATRIAL FIBRILLATION (HCC): ICD-10-CM

## 2022-05-13 DIAGNOSIS — F51.01 PRIMARY INSOMNIA: ICD-10-CM

## 2022-05-13 DIAGNOSIS — I10 ESSENTIAL HYPERTENSION: Primary | ICD-10-CM

## 2022-05-13 PROCEDURE — 3044F HG A1C LEVEL LT 7.0%: CPT | Performed by: FAMILY MEDICINE

## 2022-05-13 PROCEDURE — 99214 OFFICE O/P EST MOD 30 MIN: CPT | Performed by: FAMILY MEDICINE

## 2022-05-13 RX ORDER — ALBUTEROL SULFATE 90 UG/1
1 AEROSOL, METERED RESPIRATORY (INHALATION)
Qty: 1 EACH | Refills: 12 | Status: SHIPPED | OUTPATIENT
Start: 2022-05-13

## 2022-05-13 RX ORDER — FUROSEMIDE 80 MG/1
TABLET ORAL
Qty: 90 TABLET | Refills: 11 | Status: SHIPPED | OUTPATIENT
Start: 2022-05-13 | End: 2022-05-18

## 2022-05-13 RX ORDER — ZOLPIDEM TARTRATE 10 MG/1
TABLET ORAL
Qty: 30 TABLET | Refills: 5 | Status: SHIPPED | OUTPATIENT
Start: 2022-05-13

## 2022-05-13 RX ORDER — GLIPIZIDE 10 MG/1
10 TABLET ORAL 2 TIMES DAILY
Qty: 120 TABLET | Refills: 12 | Status: SHIPPED | OUTPATIENT
Start: 2022-05-13

## 2022-05-13 RX ORDER — TRAMADOL HYDROCHLORIDE 50 MG/1
50 TABLET ORAL
COMMUNITY
Start: 2022-05-06 | End: 2022-09-01 | Stop reason: SDUPTHER

## 2022-05-13 RX ORDER — IPRATROPIUM BROMIDE AND ALBUTEROL SULFATE 2.5; .5 MG/3ML; MG/3ML
SOLUTION RESPIRATORY (INHALATION)
Qty: 30 NEBULE | Refills: 12 | Status: SHIPPED | OUTPATIENT
Start: 2022-05-13

## 2022-05-13 RX ORDER — ISOSORBIDE MONONITRATE 30 MG/1
30 TABLET, EXTENDED RELEASE ORAL DAILY
Qty: 90 TABLET | Refills: 3 | Status: SHIPPED | OUTPATIENT
Start: 2022-05-13 | End: 2022-05-18 | Stop reason: ALTCHOICE

## 2022-05-13 NOTE — PROGRESS NOTES
HISTORY OF PRESENT ILLNESS  Susana Sethi is a 56 y.o. male. HPI Pt. Comes in for blood pressure, cholesterol, and diabetes check. Not checking blood sugars that much. Needs potassium checked. Has only been taking 2 pills a day. Breathing has been doing fair. Does a fair amount of walking. Going for a stress test next month. Occasional exertional chest pains. Some mild exertional dyspnea also. Past Medical History:   Diagnosis Date   [facility]) 1/6/15    [facility] ED. Pt reported this    Arthritis     hands    Asthma     Atrial fibrillation with RVR ([facility] 2017    Cancer (HCC)     Cardiomyopathy ([facility])     ET 25-30%    Carpal tunnel syndrome     Chronic obstructive pulmonary disease (HCC)     Congestive heart failure (HCC)     Diabetes ([facility])     Type II    Gastrointestinal disorder     GERD    GERD (gastroesophageal reflux disease)     Heart failure ([facility]     High cholesterol     Hypertension     Long term current use of anticoagulant therapy     Myocardial infarction [facility])     Pacemaker     Restrictive lung disease     FVC2.59 (52%), FEV1 1.95 (49)- 2017    S/P cardiac cath     Dr. Hay Grant, 2016. 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        Current Outpatient Medications   Medication Sig Dispense Refill    traMADoL (ULTRAM) 50 mg tablet Take 50 mg by mouth every six (6) hours as needed.       glipiZIDE (GLUCOTROL) 10 mg tablet Take 1 Tablet by mouth two (2) times a day. For diabetes 120 Tablet 12    zolpidem (AMBIEN) 10 mg tablet TAKE ONE TABLET BY MOUTH EVERY NIGHT AT BEDTIME AS NEEDED FOR SLEEP 30 Tablet 5    albuterol-ipratropium (DUO-NEB) 2.5 mg-0.5 mg/3 ml nebu INHALE THREE MILLILITERS VIA NEBULIZATION BY MOUTH EVERY 6 HOURS AS NEEDED 30 Nebule 12    albuterol (PROVENTIL HFA, VENTOLIN HFA, PROAIR HFA) 90 mcg/actuation inhaler Take 1 Puff by inhalation every six (6) hours as needed for Wheezing or Shortness of Breath. 1 Each 12    furosemide (LASIX) 80 mg tablet TAKE TWO TABLETS BY MOUTH EVERY MORNING AND TAKE ONE TABLET BY MOUTH EVERY EVENING 90 Tablet 11    isosorbide mononitrate ER (IMDUR) 30 mg tablet Take 1 Tablet by mouth daily. 90 Tablet 3    topiramate (TOPAMAX) 100 mg tablet Take 1 Tablet by mouth two (2) times a day. 180 Tablet 3    Entresto  mg tablet TAKE ONE TABLET BY MOUTH TWICE A DAY 60 Tablet 3    glucose blood VI test strips (OneTouch Ultra Test) strip USE ONE STRIP TO TEST THREE TIMES A DAY BEFORE MEALS 100 Strip 11    Victoza 3-Rick 0.6 mg/0.1 mL (18 mg/3 mL) pnij DIAL AND INJECT UNDER THE SKIN 1.8 MG DAILY 9 mL 5    potassium chloride SR (K-TAB) 20 mEq tablet 3 tabs po bid 180 Tablet 12    traZODone (DESYREL) 100 mg tablet Take 1 Tablet by mouth nightly. 30 Tablet 5    metFORMIN (GLUCOPHAGE) 1,000 mg tablet TAKE ONE TABLET BY MOUTH TWICE A DAY WITH MEALS 60 Tablet 12    atorvastatin (LIPITOR) 40 mg tablet TAKE ONE TABLET BY MOUTH DAILY 30 Tablet 11    carvediloL (COREG) 12.5 mg tablet TAKE ONE TABLET BY MOUTH TWICE A DAY TO SLOW HEART RATE DOWN 60 Tablet 10    Eliquis 5 mg tablet TAKE ONE TABLET BY MOUTH TWICE A DAY 60 Tablet 10    empagliflozin (Jardiance) 10 mg tablet Take 1 Tablet by mouth daily. For diabetes 90 Tablet 3    diclofenac (VOLTAREN) 1 % gel Apply  to affected area four (4) times daily as needed.       Pamela Pen Needle 32 gauge x 5/32\" ndle USE DAILY WITH VICTOZA 100 Pen Needle 10    Insulin Needles, Disposable, (Pamela Pen Needle) 32 gauge x 5/32\" ndle Use with Victoza daily 100 Pen Needle 11    nitroglycerin (NITROSTAT) 0.4 mg SL tablet 0.4 mg by SubLINGual route.  omeprazole (PRILOSEC) 20 mg capsule Take 20 mg by mouth daily.  OneTouch Ultra Test strip USE ONE STRIP TO TEST THREE TIMES A DAY BEFORE MEALS (Patient not taking: Reported on 3/30/2022) 100 Strip 10    divalproex DR (DEPAKOTE) 500 mg tablet Take 1 Tab by mouth daily. (Patient not taking: Reported on 3/30/2022) 30 Tab 2         ROS    Physical Exam  Vitals and nursing note reviewed. Constitutional:       Appearance: He is well-developed. HENT:      Right Ear: External ear normal.      Left Ear: External ear normal.   Neck:      Thyroid: No thyromegaly. Cardiovascular:      Rate and Rhythm: Normal rate and regular rhythm. Heart sounds: Normal heart sounds. Pulmonary:      Effort: Pulmonary effort is normal. No respiratory distress. Breath sounds: Normal breath sounds. No wheezing. Abdominal:      General: Bowel sounds are normal. There is no distension. Palpations: Abdomen is soft. There is no mass. Tenderness: There is no abdominal tenderness. There is no guarding. Musculoskeletal:         General: Normal range of motion. Comments: 1+ edema bilaterally   Lymphadenopathy:      Cervical: No cervical adenopathy. ASSESSMENT and PLAN  Orders Placed This Encounter    HEMOGLOBIN A1C WITH EAG    METABOLIC PANEL, BASIC    glipiZIDE (GLUCOTROL) 10 mg tablet    zolpidem (AMBIEN) 10 mg tablet    albuterol-ipratropium (DUO-NEB) 2.5 mg-0.5 mg/3 ml nebu    albuterol (PROVENTIL HFA, VENTOLIN HFA, PROAIR HFA) 90 mcg/actuation inhaler    furosemide (LASIX) 80 mg tablet    isosorbide mononitrate ER (IMDUR) 30 mg tablet     Diagnoses and all orders for this visit:    1. Essential hypertension  -     METABOLIC PANEL, BASIC; Future    2.  Controlled type 2 diabetes mellitus without complication, without long-term current use of insulin (HCC)  -     HEMOGLOBIN A1C WITH EAG; Future  -     glipiZIDE (GLUCOTROL) 10 mg tablet; Take 1 Tablet by mouth two (2) times a day. For diabetes    3. Paroxysmal atrial fibrillation (HCC)    4. Primary insomnia  -     zolpidem (AMBIEN) 10 mg tablet; TAKE ONE TABLET BY MOUTH EVERY NIGHT AT BEDTIME AS NEEDED FOR SLEEP    Other orders  -     albuterol-ipratropium (DUO-NEB) 2.5 mg-0.5 mg/3 ml nebu; INHALE THREE MILLILITERS VIA NEBULIZATION BY MOUTH EVERY 6 HOURS AS NEEDED  -     albuterol (PROVENTIL HFA, VENTOLIN HFA, PROAIR HFA) 90 mcg/actuation inhaler; Take 1 Puff by inhalation every six (6) hours as needed for Wheezing or Shortness of Breath. -     furosemide (LASIX) 80 mg tablet; TAKE TWO TABLETS BY MOUTH EVERY MORNING AND TAKE ONE TABLET BY MOUTH EVERY EVENING  -     isosorbide mononitrate ER (IMDUR) 30 mg tablet; Take 1 Tablet by mouth daily. Follow-up and Dispositions    · Return in about 4 months (around 9/13/2022).

## 2022-05-13 NOTE — PROGRESS NOTES
Chief Complaint   Patient presents with    Hypertension    Diabetes    Follow Up Chronic Condition       1. \"Have you been to the ER, urgent care clinic since your last visit? Hospitalized since your last visit? \" No    2. \"Have you seen or consulted any other health care providers outside of the [address] [place] [address] since your last visit? \" Yes Orthopedic and cardiology     3. For patients aged 39-70: Has the patient had a colonoscopy / FIT/ Cologuard? Yes - no Care Gap present      If the patient is female:    4. For patients aged 41-77: Has the patient had a mammogram within the past 2 years? NA - based on age or sex      11. For patients aged 21-65: Has the patient had a pap smear?  NA - based on age or sex    Health Maintenance Due   Topic Date Due    Hepatitis C Screening  Never done    COVID-19 Vaccine (1) Never done    Eye Exam Retinal or Dilated  Never done    Shingrix Vaccine Age 50> (1 of 2) Never done    Low dose CT lung screening  Never done    Foot Exam Q1  09/19/2020    MICROALBUMIN Q1  09/19/2020

## 2022-05-14 LAB
ANION GAP SERPL CALC-SCNC: 6 MMOL/L (ref 5–15)
BUN SERPL-MCNC: 23 MG/DL (ref 6–20)
BUN/CREAT SERPL: 15 (ref 12–20)
CALCIUM SERPL-MCNC: 8.6 MG/DL (ref 8.5–10.1)
CHLORIDE SERPL-SCNC: 106 MMOL/L (ref 97–108)
CO2 SERPL-SCNC: 28 MMOL/L (ref 21–32)
CREAT SERPL-MCNC: 1.52 MG/DL (ref 0.7–1.3)
EST. AVERAGE GLUCOSE BLD GHB EST-MCNC: 131 MG/DL
GLUCOSE SERPL-MCNC: 137 MG/DL (ref 65–100)
HBA1C MFR BLD: 6.2 % (ref 4–5.6)
POTASSIUM SERPL-SCNC: 3.7 MMOL/L (ref 3.5–5.1)
SODIUM SERPL-SCNC: 140 MMOL/L (ref 136–145)

## 2022-05-16 DIAGNOSIS — E11.9 CONTROLLED TYPE 2 DIABETES MELLITUS WITHOUT COMPLICATION, WITHOUT LONG-TERM CURRENT USE OF INSULIN (HCC): ICD-10-CM

## 2022-05-16 RX ORDER — PEN NEEDLE, DIABETIC 31 GX3/16"
NEEDLE, DISPOSABLE MISCELLANEOUS
Qty: 100 PEN NEEDLE | Refills: 11 | Status: SHIPPED | OUTPATIENT
Start: 2022-05-16

## 2022-05-16 NOTE — TELEPHONE ENCOUNTER
Patient is calling, because he needs a refill on his:Pamela Pen Needle 32 gauge x 5/32\" ndle.   Please call @[phone]

## 2022-05-18 RX ORDER — FUROSEMIDE 80 MG/1
TABLET ORAL
Qty: 90 TABLET | Refills: 11
Start: 2022-05-18

## 2022-05-18 RX ORDER — POTASSIUM CHLORIDE 1500 MG/1
TABLET, FILM COATED, EXTENDED RELEASE ORAL
Qty: 180 TABLET | Refills: 12
Start: 2022-05-18

## 2022-06-01 ENCOUNTER — HOSPITAL ENCOUNTER (OUTPATIENT)
Dept: NON INVASIVE DIAGNOSTICS | Age: 57
Discharge: HOME | End: 2022-06-01
Attending: NURSE PRACTITIONER
Payer: MEDICAID

## 2022-06-01 ENCOUNTER — HOSPITAL ENCOUNTER (OUTPATIENT)
Dept: NUCLEAR MEDICINE | Age: 57
Discharge: HOME | End: 2022-06-01
Attending: NURSE PRACTITIONER
Payer: MEDICAID

## 2022-06-01 DIAGNOSIS — I42.8 CARDIOMYOPATHY, NONISCHEMIC (HCC): ICD-10-CM

## 2022-06-01 DIAGNOSIS — R07.9 CHEST PAIN AT REST: ICD-10-CM

## 2022-06-01 PROCEDURE — 74011250636 HC RX REV CODE- 250/636: Performed by: INTERNAL MEDICINE

## 2022-06-01 PROCEDURE — 93017 CV STRESS TEST TRACING ONLY: CPT

## 2022-06-01 PROCEDURE — 74011000250 HC RX REV CODE- 250: Performed by: INTERNAL MEDICINE

## 2022-06-01 PROCEDURE — 78452 HT MUSCLE IMAGE SPECT MULT: CPT

## 2022-06-01 RX ORDER — TETRAKIS(2-METHOXYISOBUTYLISOCYANIDE)COPPER(I) TETRAFLUOROBORATE 1 MG/ML
32.6 INJECTION, POWDER, LYOPHILIZED, FOR SOLUTION INTRAVENOUS
Status: COMPLETED | OUTPATIENT
Start: 2022-06-01 | End: 2022-06-01

## 2022-06-01 RX ORDER — SODIUM CHLORIDE 0.9 % (FLUSH) 0.9 %
10 SYRINGE (ML) INJECTION AS NEEDED
Status: DISCONTINUED | OUTPATIENT
Start: 2022-06-01 | End: 2022-06-05 | Stop reason: HOSPADM

## 2022-06-01 RX ORDER — CALCIUM CARBONATE 200(500)MG
1 TABLET,CHEWABLE ORAL DAILY
COMMUNITY

## 2022-06-01 RX ADMIN — TETRAKIS(2-METHOXYISOBUTYLISOCYANIDE)COPPER(I) TETRAFLUOROBORATE 32.6 MILLICURIE: 1 INJECTION, POWDER, LYOPHILIZED, FOR SOLUTION INTRAVENOUS at 10:00

## 2022-06-01 RX ADMIN — SODIUM CHLORIDE, PRESERVATIVE FREE 10 ML: 5 INJECTION INTRAVENOUS at 08:59

## 2022-06-01 RX ADMIN — REGADENOSON 0.4 MG: 0.08 INJECTION, SOLUTION INTRAVENOUS at 08:59

## 2022-06-02 ENCOUNTER — HOSPITAL ENCOUNTER (OUTPATIENT)
Dept: NUCLEAR MEDICINE | Age: 57
Discharge: HOME | End: 2022-06-02
Attending: NURSE PRACTITIONER
Payer: MEDICAID

## 2022-06-02 LAB
STRESS BASELINE DIAS BP: 79 MMHG
STRESS BASELINE HR: 70 BPM
STRESS BASELINE ST DEPRESSION: 0 MM
STRESS BASELINE SYS BP: 135 MMHG
STRESS ESTIMATED WORKLOAD: 1 METS
STRESS EXERCISE DUR MIN: 0 MIN
STRESS EXERCISE DUR SEC: 55 SEC
STRESS O2 SAT PEAK: 98 %
STRESS O2 SAT REST: 95 %
STRESS PEAK DIAS BP: 79 MMHG
STRESS PEAK SYS BP: 135 MMHG
STRESS PERCENT HR ACHIEVED: 50 %
STRESS POST PEAK HR: 83 BPM
STRESS RATE PRESSURE PRODUCT: NORMAL BPM*MMHG
STRESS TARGET HR: 165 BPM

## 2022-06-02 RX ORDER — TETRAKIS(2-METHOXYISOBUTYLISOCYANIDE)COPPER(I) TETRAFLUOROBORATE 1 MG/ML
30.4 INJECTION, POWDER, LYOPHILIZED, FOR SOLUTION INTRAVENOUS
Status: COMPLETED | OUTPATIENT
Start: 2022-06-02 | End: 2022-06-02

## 2022-06-02 RX ADMIN — TETRAKIS(2-METHOXYISOBUTYLISOCYANIDE)COPPER(I) TETRAFLUOROBORATE 30.4 MILLICURIE: 1 INJECTION, POWDER, LYOPHILIZED, FOR SOLUTION INTRAVENOUS at 12:00

## 2022-06-20 ENCOUNTER — DOCUMENTATION ONLY (OUTPATIENT)
Dept: FAMILY MEDICINE CLINIC | Age: 57
End: 2022-06-20

## 2022-06-20 ENCOUNTER — HOSPITAL ENCOUNTER (OUTPATIENT)
Age: 57
Discharge: HOME | End: 2022-06-20
Attending: INTERNAL MEDICINE | Admitting: INTERNAL MEDICINE
Payer: MEDICAID

## 2022-06-20 VITALS
TEMPERATURE: 98 F | SYSTOLIC BLOOD PRESSURE: 122 MMHG | OXYGEN SATURATION: 96 % | WEIGHT: 315 LBS | HEART RATE: 71 BPM | RESPIRATION RATE: 18 BRPM | DIASTOLIC BLOOD PRESSURE: 77 MMHG | HEIGHT: 72 IN | BODY MASS INDEX: 42.66 KG/M2

## 2022-06-20 DIAGNOSIS — R07.9 CHEST PAIN, UNSPECIFIED TYPE: ICD-10-CM

## 2022-06-20 DIAGNOSIS — E11.9 CONTROLLED TYPE 2 DIABETES MELLITUS WITHOUT COMPLICATION, WITHOUT LONG-TERM CURRENT USE OF INSULIN (HCC): ICD-10-CM

## 2022-06-20 LAB
GLUCOSE BLD STRIP.AUTO-MCNC: 235 MG/DL (ref 65–117)
SERVICE CMNT-IMP: ABNORMAL

## 2022-06-20 PROCEDURE — 77030015766: Performed by: INTERNAL MEDICINE

## 2022-06-20 PROCEDURE — 99152 MOD SED SAME PHYS/QHP 5/>YRS: CPT | Performed by: INTERNAL MEDICINE

## 2022-06-20 PROCEDURE — 77030019698 HC SYR ANGI MDLON MRTM -A: Performed by: INTERNAL MEDICINE

## 2022-06-20 PROCEDURE — 74011000636 HC RX REV CODE- 636: Performed by: INTERNAL MEDICINE

## 2022-06-20 PROCEDURE — 2709999900 HC NON-CHARGEABLE SUPPLY: Performed by: INTERNAL MEDICINE

## 2022-06-20 PROCEDURE — 93458 L HRT ARTERY/VENTRICLE ANGIO: CPT | Performed by: INTERNAL MEDICINE

## 2022-06-20 PROCEDURE — C1769 GUIDE WIRE: HCPCS | Performed by: INTERNAL MEDICINE

## 2022-06-20 PROCEDURE — 77030042317 HC BND COMPR HEMSTAT -B: Performed by: INTERNAL MEDICINE

## 2022-06-20 PROCEDURE — 74011000250 HC RX REV CODE- 250: Performed by: INTERNAL MEDICINE

## 2022-06-20 PROCEDURE — 82962 GLUCOSE BLOOD TEST: CPT

## 2022-06-20 PROCEDURE — 77030008543 HC TBNG MON PRSS MRTM -A: Performed by: INTERNAL MEDICINE

## 2022-06-20 PROCEDURE — 74011250636 HC RX REV CODE- 250/636: Performed by: INTERNAL MEDICINE

## 2022-06-20 PROCEDURE — 77030004549 HC CATH ANGI DX PRF MRTM -A: Performed by: INTERNAL MEDICINE

## 2022-06-20 PROCEDURE — 99153 MOD SED SAME PHYS/QHP EA: CPT | Performed by: INTERNAL MEDICINE

## 2022-06-20 PROCEDURE — 74011250637 HC RX REV CODE- 250/637: Performed by: INTERNAL MEDICINE

## 2022-06-20 PROCEDURE — C1894 INTRO/SHEATH, NON-LASER: HCPCS | Performed by: INTERNAL MEDICINE

## 2022-06-20 PROCEDURE — 77030010221 HC SPLNT WR POS TELE -B: Performed by: INTERNAL MEDICINE

## 2022-06-20 RX ORDER — HEPARIN SODIUM 200 [USP'U]/100ML
INJECTION, SOLUTION INTRAVENOUS
Status: COMPLETED | OUTPATIENT
Start: 2022-06-20 | End: 2022-06-20

## 2022-06-20 RX ORDER — HEPARIN SODIUM 1000 [USP'U]/ML
INJECTION, SOLUTION INTRAVENOUS; SUBCUTANEOUS AS NEEDED
Status: DISCONTINUED | OUTPATIENT
Start: 2022-06-20 | End: 2022-06-20 | Stop reason: HOSPADM

## 2022-06-20 RX ORDER — MIDAZOLAM HYDROCHLORIDE 1 MG/ML
INJECTION, SOLUTION INTRAMUSCULAR; INTRAVENOUS AS NEEDED
Status: DISCONTINUED | OUTPATIENT
Start: 2022-06-20 | End: 2022-06-20 | Stop reason: HOSPADM

## 2022-06-20 RX ORDER — VERAPAMIL HYDROCHLORIDE 2.5 MG/ML
INJECTION, SOLUTION INTRAVENOUS AS NEEDED
Status: DISCONTINUED | OUTPATIENT
Start: 2022-06-20 | End: 2022-06-20 | Stop reason: HOSPADM

## 2022-06-20 RX ORDER — GUAIFENESIN 100 MG/5ML
81 LIQUID (ML) ORAL
Status: COMPLETED | OUTPATIENT
Start: 2022-06-20 | End: 2022-06-20

## 2022-06-20 RX ORDER — FENTANYL CITRATE 50 UG/ML
INJECTION, SOLUTION INTRAMUSCULAR; INTRAVENOUS AS NEEDED
Status: DISCONTINUED | OUTPATIENT
Start: 2022-06-20 | End: 2022-06-20 | Stop reason: HOSPADM

## 2022-06-20 RX ORDER — METFORMIN HYDROCHLORIDE 1000 MG/1
1000 TABLET ORAL 2 TIMES DAILY WITH MEALS
Qty: 60 TABLET | Refills: 12 | Status: SHIPPED
Start: 2022-06-20

## 2022-06-20 RX ORDER — LIDOCAINE HYDROCHLORIDE 10 MG/ML
INJECTION, SOLUTION EPIDURAL; INFILTRATION; INTRACAUDAL; PERINEURAL AS NEEDED
Status: DISCONTINUED | OUTPATIENT
Start: 2022-06-20 | End: 2022-06-20 | Stop reason: HOSPADM

## 2022-06-20 RX ADMIN — ASPIRIN 81 MG: 81 TABLET, CHEWABLE ORAL at 12:57

## 2022-06-20 NOTE — PROGRESS NOTES
Cardiac Cath Lab Recovery Arrival Note:      Hanny Zunigaos arrived to Cardiac Cath Lab, Recovery Area. Staff introduced to patient. Patient identifiers verified with NAME and DATE OF BIRTH. Procedure verified with patient. Consent forms reviewed and signed by patient or authorized representative and verified. Allergies verified. Patient and family oriented to department. Patient and family informed of procedure and plan of care. Questions answered with review. Patient prepped for procedure, per orders from physician, prior to arrival.    Patient on cardiac monitor, non-invasive blood pressure, SPO2 monitor. On RA. Patient is A&Ox 4. Patient reports generalized chronic pain . Patient in stretcher, in low position, with side rails up, call bell within reach, patient instructed to call if assistance as needed. Patient prep in: [address], [place] [address]. Patient family has pager # none  Family in: friend in lobby.    Prep by: Nikhil Rogers

## 2022-06-20 NOTE — PROGRESS NOTES
Primary Nurse Janee Monroy and Clarissa Eng RN performed a dual skin assessment on this patient No impairment noted  Alvaro score is 23; meplix on sacrum

## 2022-06-20 NOTE — DISCHARGE INSTRUCTIONS
[facility]  [address]  [phone]  [employer]. [employer]          Patient ID:  Neville Centeno  [id number]  85 y.o.  1965    Admit Date: 6/20/2022    Discharge Date: 6/20/2022     Admitting Physician: Rohit Montes MD     Discharge Physician: Rohit Montes MD    Admission Diagnoses:   Chest pain, unspecified type [R07.9]    Discharge Diagnoses: Active Problems:    * No active hospital problems. *      Discharge Condition: Good    Cardiology Procedures this Admission:  Diagnostic left heart catheterization    Disposition: home    Reference discharge instructions provided by nursing for diet and activity. Signed:  Rohit Montes MD  6/20/2022  4:44 PM        Radial Cardiac Catheterization/Angiography Discharge Instructions    It is normal to feel tired the first couple days. Take it easy and follow the physicians instructions. CHECK THE CATHETER INSERTION SITE DAILY:    Remove the wrist dressing 24 hours after the procedure. You may shower 24 hours after the procedure. Wash with soap and water and pat dry. Gentle cleaning of the site with soap and water is sufficient, cover with a dry clean dressing or bandage. Do not apply creams or powders to the area. No soaking the wrist for 3 days. Leave the puncture site open to air after 24 hours post-procedure. CALL THE PHYSICIANS:     If the site becomes red, swollen or feels warm to the touch  If there is bleeding or drainage or if there is unusual pain at the radial site. If there is any minor oozing, you may apply a band-aid and remove after 12 hours. If the bleeding continues, hold pressure with the middle finger against the puncture site and the thumb against the back of the wrist,call 911 to be transported to the hospital.  DO NOT DRIVE YOURSELF, [facility].     ACTIVITY:   For the first 24 hours do not manipulate the wrist.  No lifting, pushing or pulling over 3-5 pounds with the affected wrist for 7 daysand no straining the insertion site. Do not life grocery bags or the garbage can, do not run the vacuum  or  for 7 days. Start with short walks as in the hospital and gradually increase as tolerated each day. It is recommended to walk 30 minutes 5-7 days per week. Follow your physicians instructions on activity. Avoid walking outside in extremes of heat or cold. Walk inside when it is cold and windy or hot and humid. Things to keep in mind:  No driving for at least 24 hours, or as designated by your physician. Limit the number of times you go up and down the stairs  Take rests and pace yourself with activity. Be careful and do not strain with bowel movements. MEDICATIONS:    Take all medications as prescribed  Call your physician if you have any questions  Keep an updated list of your medications with you at all times and give a list to your physician and pharmacist    SIGNS AND SYMPTOMS:   Be cautious of symptoms of angina or recurrent symptoms such as chest discomfort, unusual shortness of breath or fatigue. These could be symptoms of restenosis, a new blockage or a heart attack. If your symptoms are relieved with rest it is still recommended that you notify your physician of recurrent chest pain or discomfort. For CHEST PAIN or symptoms of angina not relieved with rest:  If the discomfort is not relieved with rest, and you have been prescribed Nitroglycerin, take as directed (taken under the tongue, one at a time 5 minutes apart for a total of 3 doses). If the discomfort is not relieved after the 3rd nitroglycerin, call 911. If you have not been prescribed Nitroglycerin  and your chest discomfort is not relieved with rest, call 911. AFTER CARE:   Follow up with your physician as instructed.   Follow a heart healthy diet with proper portion control, daily stress management, daily exercise, blood pressure and cholesterol control , and smoking cessation.

## 2022-06-20 NOTE — PROGRESS NOTES
1635 - TR removed, no complications. Vitals stable. 1835 - patient ambulated in hallway, vitals stable, right radial cath site CDI. Discharge and follow-up information given, site care and medication changes reviewed. Patient verbalized understanding. Patient discharged to home with .

## 2022-06-20 NOTE — Clinical Note
TRANSFER - OUT REPORT:     Verbal report given to: Franklyn Currie RN. Report consisted of patient's Situation, Background, Assessment and   Recommendations(SBAR). Opportunity for questions and clarification was provided. Patient transported with a Registered Nurse and [address] [place] [address] / [facility]. Patient transported to: [facility].

## 2022-06-20 NOTE — PROGRESS NOTES
[facility]  [address] [facility], [address] [place] [address]  [phone]  [employer]. [email or web address]    Brief Procedure Note    Patient: Josue Adan MRN: [id number]  SSN: [id number]    YOB: 1965  Age: 56 y.o. Sex: male      Date of Procedure: 6/20/2022     Pre-procedure Diagnosis: angina    Post-procedure Diagnosis:non cardiac chest pain    Procedure: LHC    Performed By: Jl Delgado MD     Anesthesia: Moderate Sedation    Estimated Blood Loss: Less than 10 mL      Specimens:  None    Findings: insignificant CAD, LVEF 96-20%    Complications: None    Implants: None    Recommendations: Continue medical therapy.     Signed By: Jl Delgado MD     June 20, 2022

## 2022-06-21 NOTE — CARDIO/PULMONARY
Cardiac Rehab Note: chart review/referral     Cardiac cath without intervention 6/20/22    Smoking history assessed. Patient is a former smoker.    Smoking Cessation Program link has not been added to the AVS.

## 2022-07-26 RX ORDER — SACUBITRIL AND VALSARTAN 97; 103 MG/1; MG/1
1 TABLET, FILM COATED ORAL 2 TIMES DAILY
Qty: 180 TABLET | Refills: 1 | Status: SHIPPED | OUTPATIENT
Start: 2022-07-26

## 2022-07-31 ENCOUNTER — HOSPITAL ENCOUNTER (EMERGENCY)
Age: 57
Discharge: HOME | End: 2022-07-31
Attending: EMERGENCY MEDICINE
Payer: MEDICAID

## 2022-07-31 ENCOUNTER — APPOINTMENT (OUTPATIENT)
Dept: GENERAL RADIOLOGY | Age: 57
End: 2022-07-31
Attending: EMERGENCY MEDICINE
Payer: MEDICAID

## 2022-07-31 VITALS
HEART RATE: 76 BPM | TEMPERATURE: 98.9 F | BODY MASS INDEX: 42.66 KG/M2 | SYSTOLIC BLOOD PRESSURE: 158 MMHG | DIASTOLIC BLOOD PRESSURE: 104 MMHG | HEIGHT: 72 IN | WEIGHT: 315 LBS | RESPIRATION RATE: 20 BRPM | OXYGEN SATURATION: 93 %

## 2022-07-31 DIAGNOSIS — R07.89 ATYPICAL CHEST PAIN: Primary | ICD-10-CM

## 2022-07-31 LAB
ALBUMIN SERPL-MCNC: 3.6 G/DL (ref 3.5–5)
ALBUMIN/GLOB SERPL: 1 {RATIO} (ref 1.1–2.2)
ALP SERPL-CCNC: 75 U/L (ref 45–117)
ALT SERPL-CCNC: 38 U/L (ref 12–78)
ANION GAP SERPL CALC-SCNC: 7 MMOL/L (ref 5–15)
AST SERPL-CCNC: 15 U/L (ref 15–37)
ATRIAL RATE: 70 BPM
BASOPHILS # BLD: 0.1 K/UL (ref 0–0.1)
BASOPHILS NFR BLD: 1 % (ref 0–1)
BILIRUB SERPL-MCNC: 0.8 MG/DL (ref 0.2–1)
BNP SERPL-MCNC: 782 PG/ML
BUN SERPL-MCNC: 19 MG/DL (ref 6–20)
BUN/CREAT SERPL: 13 (ref 12–20)
CALCIUM SERPL-MCNC: 8.9 MG/DL (ref 8.5–10.1)
CALCULATED R AXIS, ECG10: -88 DEGREES
CALCULATED T AXIS, ECG11: 88 DEGREES
CHLORIDE SERPL-SCNC: 106 MMOL/L (ref 97–108)
CO2 SERPL-SCNC: 27 MMOL/L (ref 21–32)
COMMENT, HOLDF: NORMAL
CREAT SERPL-MCNC: 1.43 MG/DL (ref 0.7–1.3)
D DIMER PPP FEU-MCNC: 0.29 MG/L FEU (ref 0–0.65)
DIAGNOSIS, 93000: NORMAL
DIFFERENTIAL METHOD BLD: ABNORMAL
EOSINOPHIL # BLD: 0.1 K/UL (ref 0–0.4)
EOSINOPHIL NFR BLD: 1 % (ref 0–7)
ERYTHROCYTE [DISTWIDTH] IN BLOOD BY AUTOMATED COUNT: 15.4 % (ref 11.5–14.5)
GLOBULIN SER CALC-MCNC: 3.5 G/DL (ref 2–4)
GLUCOSE SERPL-MCNC: 182 MG/DL (ref 65–100)
HCT VFR BLD AUTO: 49.4 % (ref 36.6–50.3)
HGB BLD-MCNC: 16.1 G/DL (ref 12.1–17)
IMM GRANULOCYTES # BLD AUTO: 0.1 K/UL (ref 0–0.04)
IMM GRANULOCYTES NFR BLD AUTO: 1 % (ref 0–0.5)
LYMPHOCYTES # BLD: 2.5 K/UL (ref 0.8–3.5)
LYMPHOCYTES NFR BLD: 23 % (ref 12–49)
MCH RBC QN AUTO: 29.2 PG (ref 26–34)
MCHC RBC AUTO-ENTMCNC: 32.6 G/DL (ref 30–36.5)
MCV RBC AUTO: 89.7 FL (ref 80–99)
MONOCYTES # BLD: 0.6 K/UL (ref 0–1)
MONOCYTES NFR BLD: 6 % (ref 5–13)
NEUTS SEG # BLD: 7.3 K/UL (ref 1.8–8)
NEUTS SEG NFR BLD: 68 % (ref 32–75)
NRBC # BLD: 0 K/UL (ref 0–0.01)
NRBC BLD-RTO: 0 PER 100 WBC
PLATELET # BLD AUTO: 166 K/UL (ref 150–400)
PMV BLD AUTO: 9.8 FL (ref 8.9–12.9)
POTASSIUM SERPL-SCNC: 3 MMOL/L (ref 3.5–5.1)
PROT SERPL-MCNC: 7.1 G/DL (ref 6.4–8.2)
Q-T INTERVAL, ECG07: 478 MS
QRS DURATION, ECG06: 144 MS
QTC CALCULATION (BEZET), ECG08: 516 MS
RBC # BLD AUTO: 5.51 M/UL (ref 4.1–5.7)
SAMPLES BEING HELD,HOLD: NORMAL
SODIUM SERPL-SCNC: 140 MMOL/L (ref 136–145)
TROPONIN-HIGH SENSITIVITY: 20 NG/L (ref 0–76)
TROPONIN-HIGH SENSITIVITY: 21 NG/L (ref 0–76)
VENTRICULAR RATE, ECG03: 70 BPM
WBC # BLD AUTO: 10.7 K/UL (ref 4.1–11.1)

## 2022-07-31 PROCEDURE — 74011250636 HC RX REV CODE- 250/636: Performed by: EMERGENCY MEDICINE

## 2022-07-31 PROCEDURE — 85379 FIBRIN DEGRADATION QUANT: CPT

## 2022-07-31 PROCEDURE — 36415 COLL VENOUS BLD VENIPUNCTURE: CPT

## 2022-07-31 PROCEDURE — 84484 ASSAY OF TROPONIN QUANT: CPT

## 2022-07-31 PROCEDURE — 96374 THER/PROPH/DIAG INJ IV PUSH: CPT

## 2022-07-31 PROCEDURE — 71046 X-RAY EXAM CHEST 2 VIEWS: CPT

## 2022-07-31 PROCEDURE — 99285 EMERGENCY DEPT VISIT HI MDM: CPT

## 2022-07-31 PROCEDURE — 83880 ASSAY OF NATRIURETIC PEPTIDE: CPT

## 2022-07-31 PROCEDURE — 93005 ELECTROCARDIOGRAM TRACING: CPT

## 2022-07-31 PROCEDURE — 80053 COMPREHEN METABOLIC PANEL: CPT

## 2022-07-31 PROCEDURE — 85025 COMPLETE CBC W/AUTO DIFF WBC: CPT

## 2022-07-31 RX ORDER — FENTANYL CITRATE 50 UG/ML
50 INJECTION, SOLUTION INTRAMUSCULAR; INTRAVENOUS
Status: COMPLETED | OUTPATIENT
Start: 2022-07-31 | End: 2022-07-31

## 2022-07-31 RX ORDER — SODIUM CHLORIDE 0.9 % (FLUSH) 0.9 %
5-40 SYRINGE (ML) INJECTION EVERY 8 HOURS
Status: CANCELLED | OUTPATIENT
Start: 2022-07-31

## 2022-07-31 RX ORDER — SODIUM CHLORIDE 0.9 % (FLUSH) 0.9 %
5-40 SYRINGE (ML) INJECTION AS NEEDED
Status: CANCELLED | OUTPATIENT
Start: 2022-07-31

## 2022-07-31 RX ADMIN — FENTANYL CITRATE 50 MCG: 50 INJECTION, SOLUTION INTRAMUSCULAR; INTRAVENOUS at 05:50

## 2022-07-31 NOTE — ED NOTES
[facility] called, spoke with [facility], she stated that ETA for transportation  is 0900. Discharge paperwork reviewed with patient, no questions at this time. Patient will be waiting in ED Lobby for [facility].

## 2022-07-31 NOTE — DISCHARGE INSTRUCTIONS
It was a pleasure taking care of you in our Emergency Department today. We know that when you come to [facility], you are entrusting us with your health, comfort, and safety. Our physicians and nurses honor that trust, and truly appreciate the opportunity to care for you and your loved ones. We also value your feedback. If you receive a survey about your Emergency Department experience today, please fill it out. We care about our patients' feedback, and we listen to what you have to say. Thank you!       Dr. Dylon Sylvester MD.

## 2022-07-31 NOTE — ED PROVIDER NOTES
EMERGENCY DEPARTMENT HISTORY AND PHYSICAL EXAM     ------------------------------------------------------------------------------------------------------  Please note that this dictation was completed with BeiZ, the [employer] voice recognition software. Quite often unanticipated grammatical, syntax, homophones, and other interpretive errors are inadvertently transcribed by the computer software. Please disregard these errors. Please excuse any errors that have escaped final proofreading.  -----------------------------------------------------------------------------------------------------------------    Date: 7/31/2022  Patient Name: Morenita Grove    History of Presenting Illness     Chief Complaint   Patient presents with    Chest Pain     Per ems chest pain under left breast non radiating on and off all day, feels the same as a month ago when he got a cardiac cath, also endorses shortness of breath hx of copd, took 2 nitro, ems gave 324 aspirin, pt has a pace maker [email or web address]       History Provided By: Patient    HPI: Morenita Grove is a 56 y.o. male, with significant pmhx of hypertension, restrictive lung disease, COPD, cholesterol, cardiomyopathy with an EF of 45-50%, atrial fibrillation on Eliquis, pacemaker, sleep apnea, diabetes, chronic back pain, who presents via EMS to the ED with c/o left-sided chest pain that started yesterday afternoon is been intermittent throughout the day. Initially took a nitro which relieved some of his pain and then subsequent nitro at midnight. Continue to have discomfort radiating down the left side of his chest that started while taking a shower. This that the pain was keeping him from sleeping so he decided come Emergency Department for further evaluation. Follows with [facility] cardiology. Pt also specifically denies any recent fevers, chills, SOB, nausea, vomiting, diarrhea, abd pain, changes in BM, urinary sxs, or headache.      PCP: Anita Valencia, MD    Social Hx: denies tobacco, denies EtOH, denies recreational/ Illicit Drugs     There are no other complaints, changes, or physical findings at this time. Allergies   Allergen Reactions    Codeine Hives    Hydrocodone Itching    Influenza Virus Vaccines Nausea Only     Fever  diarrhea    Mushroom Flavor Swelling    Oxycodone Rash    Pneumococcal 23-Allison Ps Vaccine Nausea and Vomiting     Vomit           Current Outpatient Medications   Medication Sig Dispense Refill    sacubitriL-valsartan (Entresto)  mg tablet Take 1 Tablet by mouth two (2) times a day. 180 Tablet 1    metFORMIN (GLUCOPHAGE) 1,000 mg tablet Take 1 Tablet by mouth two (2) times daily (with meals). Start taking from Wednesday 6/22/22. 60 Tablet 12    calcium carbonate (TUMS) 200 mg calcium (500 mg) chew Take 1 Tablet by mouth daily. furosemide (LASIX) 80 mg tablet TAKE  One- TABLETS BY MOUTH EVERY MORNING AND TAKE ONE TABLET BY MOUTH EVERY EVENING 90 Tablet 11    potassium chloride SR (K-TAB) 20 mEq tablet 1 tabs po bid 180 Tablet 12    Insulin Needles, Disposable, (Pamela Pen Needle) 32 gauge x 5/32\" ndle Use with Victoza daily 100 Pen Needle 11    traMADoL (ULTRAM) 50 mg tablet Take 50 mg by mouth every six (6) hours as needed. glipiZIDE (GLUCOTROL) 10 mg tablet Take 1 Tablet by mouth two (2) times a day. For diabetes 120 Tablet 12    zolpidem (AMBIEN) 10 mg tablet TAKE ONE TABLET BY MOUTH EVERY NIGHT AT BEDTIME AS NEEDED FOR SLEEP 30 Tablet 5    albuterol-ipratropium (DUO-NEB) 2.5 mg-0.5 mg/3 ml nebu INHALE THREE MILLILITERS VIA NEBULIZATION BY MOUTH EVERY 6 HOURS AS NEEDED 30 Nebule 12    albuterol (PROVENTIL HFA, VENTOLIN HFA, PROAIR HFA) 90 mcg/actuation inhaler Take 1 Puff by inhalation every six (6) hours as needed for Wheezing or Shortness of Breath. 1 Each 12    topiramate (TOPAMAX) 100 mg tablet Take 1 Tablet by mouth two (2) times a day.  180 Tablet 3    glucose blood VI test strips (OneTouch Ultra Test) strip USE ONE STRIP TO TEST THREE TIMES A DAY BEFORE MEALS 100 Strip 11    OneTouch Ultra Test strip USE ONE STRIP TO TEST THREE TIMES A DAY BEFORE MEALS 100 Strip 10    Victoza 3-Rick 0.6 mg/0.1 mL (18 mg/3 mL) pnij DIAL AND INJECT UNDER THE SKIN 1.8 MG DAILY 9 mL 5    traZODone (DESYREL) 100 mg tablet Take 1 Tablet by mouth nightly. 30 Tablet 5    atorvastatin (LIPITOR) 40 mg tablet TAKE ONE TABLET BY MOUTH DAILY 30 Tablet 11    carvediloL (COREG) 12.5 mg tablet TAKE ONE TABLET BY MOUTH TWICE A DAY TO SLOW HEART RATE DOWN 60 Tablet 10    Eliquis 5 mg tablet TAKE ONE TABLET BY MOUTH TWICE A DAY 60 Tablet 10    empagliflozin (Jardiance) 10 mg tablet Take 1 Tablet by mouth daily. For diabetes 90 Tablet 3    diclofenac (VOLTAREN) 1 % gel Apply  to affected area four (4) times daily as needed. Pamela Pen Needle 32 gauge x 5/32\" ndle USE DAILY WITH VICTOZA 100 Pen Needle 10    nitroglycerin (NITROSTAT) 0.4 mg SL tablet 0.4 mg by SubLINGual route. Past History     Past Medical History:  Past Medical History:   Diagnosis Date    A-fib [facility]) 1/6/15    [facility] ED. Pt reported this    Arthritis     hands    Asthma     Atrial fibrillation with RVR ([address] 8/11/2017    Cardiomyopathy [address] [address]     ET 25-30%    Carpal tunnel syndrome     Chronic obstructive pulmonary disease (HCC)     Chronic pain     back    Congestive heart failure (HCC)     Diabetes (HCC)     Type II    Gastrointestinal disorder     GERD    GERD (gastroesophageal reflux disease)     Heart failure (HCC)     High cholesterol     Hypertension     Long term current use of anticoagulant therapy     Myocardial infarction [facility])     Pacemaker     Restrictive lung disease     FVC2.59 (52%), FEV1 1.95 (49)- 2017    S/P cardiac cath     Dr. Lacey Perez, 2016.  minimal cad    Sleep apnea     No CPAP    Stroke ([address]     TIA x 4 - weak on left side       Past Surgical History:  Past Surgical History:   Procedure Laterality Date    COLONOSCOPY N/A 11/4/2020 COLONOSCOPY performed by Santosh Perez MD at [facility] ENDOSCOPY    COLONOSCOPY N/A 11/10/2020    COLONOSCOPY performed by Santosh Perez MD at [facility] ENDOSCOPY    HX PACEMAKER Left     defib,     [facility] CARDIAC SURG PROCEDURE UNLIST  2018    Implantation of PACEMAKER    [facility] INSJ/RPLCMT PERM DFB W/TRNSVNS LDS 1/DUAL CHMBR N/A 3/11/2019    INSERT ICD BIV MULTI performed by Ana Resendiz MD at [facility] CARDIAC CATH LAB    [facility] LEFT HEART CATH,PERCUTANEOUS      cardiac cath       Family History:  Family History   Adopted: Yes   Problem Relation Age of Onset    No Known Problems Mother     No Known Problems Father        Social History:  Social History     Tobacco Use    Smoking status: Former     Packs/day: 1.00     Years: 20.00     Pack years: 20.00     Types: Cigarettes     Quit date: 2/3/2014     Years since quittin.4    Smokeless tobacco: Never   Substance Use Topics    Alcohol use: No     Comment: stopped drinking at age 29    Drug use: No       Allergies: Allergies   Allergen Reactions    Codeine Hives    Hydrocodone Itching    Influenza Virus Vaccines Nausea Only     Fever  diarrhea    Mushroom Flavor Swelling    Oxycodone Rash    Pneumococcal 23-Allison Ps Vaccine Nausea and Vomiting     Vomit           Review of Systems   Review of Systems   Constitutional:  Negative for chills and fever. HENT: Negative. Eyes: Negative. Respiratory:  Negative for cough, chest tightness and shortness of breath. Cardiovascular:  Positive for chest pain. Negative for leg swelling. Gastrointestinal:  Negative for abdominal pain, diarrhea, nausea and vomiting. Endocrine: Negative. Genitourinary:  Negative for difficulty urinating and dysuria. Musculoskeletal:  Negative for myalgias. Skin: Negative. Neurological: Negative. Psychiatric/Behavioral: Negative. All other systems reviewed and are negative. Physical Exam   Physical Exam  Vitals and nursing note reviewed.    Constitutional:       General: He is not in acute distress. Appearance: He is well-developed. He is obese. He is not diaphoretic. HENT:      Head: Normocephalic and atraumatic. Nose: Nose normal.      Mouth/Throat:      Pharynx: No oropharyngeal exudate. Eyes:      Conjunctiva/sclera: Conjunctivae normal.      Pupils: Pupils are equal, round, and reactive to light. Neck:      Vascular: No JVD. Cardiovascular:      Rate and Rhythm: Normal rate and regular rhythm. Heart sounds: Normal heart sounds. No murmur heard. No friction rub. Pulmonary:      Effort: Pulmonary effort is normal. No respiratory distress. Breath sounds: Normal breath sounds. No stridor. No wheezing or rales. Chest:      Chest wall: No tenderness or crepitus. Abdominal:      General: Bowel sounds are normal. There is no distension. Palpations: Abdomen is soft. Tenderness: There is no abdominal tenderness. There is no rebound. Musculoskeletal:         General: No tenderness. Normal range of motion. Cervical back: Normal range of motion and neck supple. Skin:     General: Skin is warm and dry. Findings: No rash. Neurological:      Mental Status: He is alert and oriented to person, place, and time. Cranial Nerves: No cranial nerve deficit. Psychiatric:         Speech: Speech normal.         Behavior: Behavior normal.         Thought Content:  Thought content normal.         Judgment: Judgment normal.         Diagnostic Study Results     Labs -     Recent Results (from the past 12 hour(s))   EKG, 12 LEAD, INITIAL    Collection Time: 07/31/22  4:55 AM   Result Value Ref Range    Ventricular Rate 70 BPM    Atrial Rate 70 BPM    QRS Duration 144 ms    Q-T Interval 478 ms    QTC Calculation (Bezet) 516 ms    Calculated R Axis -88 degrees    Calculated T Axis 88 degrees    Diagnosis       Ventricular-paced rhythm  Biventricular pacemaker detected  When compared with ECG of 08-AUG-2021 15:58,  No significant change was found     CBC WITH AUTOMATED DIFF    Collection Time: 07/31/22  4:58 AM   Result Value Ref Range    WBC 10.7 4.1 - 11.1 K/uL    RBC 5.51 4.10 - 5.70 M/uL    HGB 16.1 12.1 - 17.0 g/dL    HCT 49.4 36.6 - 50.3 %    MCV 89.7 80.0 - 99.0 FL    MCH 29.2 26.0 - 34.0 PG    MCHC 32.6 30.0 - 36.5 g/dL    RDW 15.4 (H) 11.5 - 14.5 %    PLATELET 100 593 - 758 K/uL    MPV 9.8 8.9 - 12.9 FL    NRBC 0.0 0  WBC    ABSOLUTE NRBC 0.00 0.00 - 0.01 K/uL    NEUTROPHILS 68 32 - 75 %    LYMPHOCYTES 23 12 - 49 %    MONOCYTES 6 5 - 13 %    EOSINOPHILS 1 0 - 7 %    BASOPHILS 1 0 - 1 %    IMMATURE GRANULOCYTES 1 (H) 0.0 - 0.5 %    ABS. NEUTROPHILS 7.3 1.8 - 8.0 K/UL    ABS. LYMPHOCYTES 2.5 0.8 - 3.5 K/UL    ABS. MONOCYTES 0.6 0.0 - 1.0 K/UL    ABS. EOSINOPHILS 0.1 0.0 - 0.4 K/UL    ABS. BASOPHILS 0.1 0.0 - 0.1 K/UL    ABS. IMM. GRANS. 0.1 (H) 0.00 - 0.04 K/UL    DF AUTOMATED     METABOLIC PANEL, COMPREHENSIVE    Collection Time: 07/31/22  4:58 AM   Result Value Ref Range    Sodium 140 136 - 145 mmol/L    Potassium 3.0 (L) 3.5 - 5.1 mmol/L    Chloride 106 97 - 108 mmol/L    CO2 27 21 - 32 mmol/L    Anion gap 7 5 - 15 mmol/L    Glucose 182 (H) 65 - 100 mg/dL    BUN 19 6 - 20 MG/DL    Creatinine 1.43 (H) 0.70 - 1.30 MG/DL    BUN/Creatinine ratio 13 12 - 20      GFR est AA >60 >60 ml/min/1.73m2    GFR est non-AA 51 (L) >60 ml/min/1.73m2    Calcium 8.9 8.5 - 10.1 MG/DL    Bilirubin, total 0.8 0.2 - 1.0 MG/DL    ALT (SGPT) 38 12 - 78 U/L    AST (SGOT) 15 15 - 37 U/L    Alk.  phosphatase 75 45 - 117 U/L    Protein, total 7.1 6.4 - 8.2 g/dL    Albumin 3.6 3.5 - 5.0 g/dL    Globulin 3.5 2.0 - 4.0 g/dL    A-G Ratio 1.0 (L) 1.1 - 2.2     TROPONIN-HIGH SENSITIVITY    Collection Time: 07/31/22  4:58 AM   Result Value Ref Range    Troponin-High Sensitivity 21 0 - 76 ng/L   SAMPLES BEING HELD    Collection Time: 07/31/22  4:58 AM   Result Value Ref Range    SAMPLES BEING HELD BLUE     COMMENT        Add-on orders for these samples will be processed based on acceptable specimen integrity and analyte stability, which may vary by analyte. D DIMER    Collection Time: 07/31/22  5:45 AM   Result Value Ref Range    D-dimer 0.29 0.00 - 0.65 mg/L FEU   NT-PRO BNP    Collection Time: 07/31/22  5:45 AM   Result Value Ref Range    NT pro- (H) <125 PG/ML   TROPONIN-HIGH SENSITIVITY    Collection Time: 07/31/22  7:03 AM   Result Value Ref Range    Troponin-High Sensitivity 20 0 - 76 ng/L       Radiologic Studies -   XR CHEST PA LAT   Final Result   No acute cardiopulmonary process. Patchy airspace opacity in the   left lung base may represent atelectasis. CT Results  (Last 48 hours)      None          CXR Results  (Last 48 hours)                 07/31/22 0547  XR CHEST PA LAT Final result    Impression:  No acute cardiopulmonary process. Patchy airspace opacity in the   left lung base may represent atelectasis. Narrative:  EXAM: XR CHEST PA LAT       INDICATION: cp       COMPARISON: 8/8/2021. FINDINGS: PA and lateral radiographs of the chest demonstrate left subclavian   pacemaker. Heart size enlarged. Minimal patchy opacity in the left lung base. .   The bones and soft tissues are within normal limits. Medical Decision Making   I am the first provider for this patient. I reviewed the vital signs, available nursing notes, past medical history, past surgical history, family history and social history. Vital Signs-Reviewed the patient's vital signs.   Patient Vitals for the past 12 hrs:   Temp Pulse Resp BP SpO2   07/31/22 0641 -- 76 20 (!) 158/104 93 %   07/31/22 0507 98.9 °F (37.2 °C) 70 18 (!) 155/99 95 %       Pulse Oximetry Analysis - 95% on RA Normal    Records Reviewed/Interpretted: Nursing Notes from triage and Old Medical Records any previous cardiology evaluation in June for chest pain    Provider Notes (Medical Decision Making):     DDX:  ACS, arrhythmia, PE, musculoskeletal pain, pneumothorax, pneumonia, pleurisy, pleural effusion    Plan:  EKG, labs, analgesia, chest x-ray, D-dimer, BNP    Impression:  Atypical chest pain    ED Course:   Initial assessment performed. The patients presenting problems have been discussed, and they are in agreement with the care plan formulated and outlined with them. I have encouraged them to ask questions as they arise throughout their visit. I reviewed our electronic medical record system for any past medical records that were available that may contribute to the patients current condition, the nursing notes and and vital signs from today's visit  Nursing notes will be reviewed as they become available in realtime while the pt has been in the ED. Luci Negrete MD      I personally reviewed/interpreted pt's imaging. Agree with official read by radiology as noted above. Luci Negrete MD      7:45 AM  Progress note:  Pt noted to be feeling better, ready for discharge. Discussed lab and imaging findings with pt, specifically noting no significant change in troponin. Pt will follow up with cardiology as instructed. All questions have been answered, pt voiced understanding and agreement with plan. Specific return precautions provided in addition to instructions for pt to return to the ED immediately should sx worsen at any time. Luci Negrete MD             Critical Care Time:     none      Diagnosis     Clinical Impression:   1. Atypical chest pain        PLAN:  1. Current Discharge Medication List        2.    Follow-up Information       Follow up With Specialties Details Why Contact Info    Luciano Koenig MD [facility] Medicine Schedule an appointment as soon as possible for a visit in 2 days  [address]  [phone]      Naldo Rock MD Cardiovascular Disease Physician, [address] [facility] Vascular Surgery, Internal Medicine Physician Schedule an appointment as soon as possible for a visit in 2 days  [address] [place] [address] [phone]  [phone]      [facility] EMERGENCY DEPT Emergency Medicine  As needed [address]  [address] [facility]  [phone]          Return to ED if worse     Disposition:    7:46 AM   The patient's results have been reviewed with family and/or caregiver. They verbally convey their understanding and agreement of the patient's signs, symptoms, diagnosis, treatment and prognosis and additionally agree to follow up as recommended in the discharge instructions or to return to the Emergency Room should the patient's condition change prior to their follow-up appointment. The family and/or caregiver verbally agrees with the care-plan and all of their questions have been answered. The discharge instructions have also been provided to the them with educational information regarding the patient's diagnosis as well a list of reasons why the patient would want to return to the ER prior to their follow-up appointment should their condition change.   Carolin Magana MD

## 2022-08-07 ENCOUNTER — HOSPITAL ENCOUNTER (EMERGENCY)
Age: 57
Discharge: HOME | End: 2022-08-07
Attending: EMERGENCY MEDICINE
Payer: MEDICAID

## 2022-08-07 VITALS
WEIGHT: 315 LBS | SYSTOLIC BLOOD PRESSURE: 129 MMHG | DIASTOLIC BLOOD PRESSURE: 78 MMHG | HEIGHT: 72 IN | TEMPERATURE: 98.5 F | HEART RATE: 70 BPM | OXYGEN SATURATION: 99 % | BODY MASS INDEX: 42.66 KG/M2 | RESPIRATION RATE: 18 BRPM

## 2022-08-07 DIAGNOSIS — L30.9 DERMATITIS: Primary | ICD-10-CM

## 2022-08-07 PROCEDURE — 99283 EMERGENCY DEPT VISIT LOW MDM: CPT

## 2022-08-07 RX ORDER — CEPHALEXIN 500 MG/1
500 CAPSULE ORAL 4 TIMES DAILY
Qty: 28 CAPSULE | Refills: 0 | Status: SHIPPED | OUTPATIENT
Start: 2022-08-07 | End: 2022-08-14

## 2022-08-07 RX ORDER — HYDROCORTISONE 0.5 %
OINTMENT (GRAM) TOPICAL 2 TIMES DAILY
Qty: 15 G | Refills: 0 | Status: SHIPPED | OUTPATIENT
Start: 2022-08-07 | End: 2022-08-17

## 2022-08-07 NOTE — DISCHARGE INSTRUCTIONS
It was a pleasure taking care of you at [facility] today. We know that when you come to [facility], you are entrusting us with your health, comfort, and safety. Our physicians and nurses honor that trust, and we truly appreciate the opportunity to care for you and your loved ones. We also value your feedback. If you receive a survey about your Emergency Department experience today, please fill it out. We care about our patients' feedback, and we listen to what you have to say. Thank you!

## 2022-08-07 NOTE — ED PROVIDER NOTES
EMERGENCY DEPARTMENT HISTORY AND PHYSICAL EXAM      Date: 8/7/2022  Patient Name: Alli Moreno    History of Presenting Illness     Chief Complaint   Patient presents with    Rash     Pt cc of rash on chest, pt states it is itchy. No other complaints       History Provided By: Patient    HPI: Alli Moreno, 56 y.o. male with significant PMHx as below, presents by POV to the ED with cc of rash to his chest wall. The rash is itchy. It is not painful. It started out in the midline of his chest.  This location is improving but it is spread to the left breast.  There is been no treatment prior to arrival.  He denies fever, chills, nausea, vomiting. He does report using new body washes in the shower just prior to this starting. He denies any contact with any other potential allergens. There are no other complaints, changes, or physical findings at this time. Social Hx: Tobacco (former smoker), EtOH (denies), Illicit drug use (denies)     PCP: Deshawn Cancino MD    No current facility-administered medications on file prior to encounter. Current Outpatient Medications on File Prior to Encounter   Medication Sig Dispense Refill    sacubitriL-valsartan (Entresto)  mg tablet Take 1 Tablet by mouth two (2) times a day. 180 Tablet 1    metFORMIN (GLUCOPHAGE) 1,000 mg tablet Take 1 Tablet by mouth two (2) times daily (with meals). Start taking from Wednesday 6/22/22. 60 Tablet 12    calcium carbonate (TUMS) 200 mg calcium (500 mg) chew Take 1 Tablet by mouth daily. furosemide (LASIX) 80 mg tablet TAKE  One- TABLETS BY MOUTH EVERY MORNING AND TAKE ONE TABLET BY MOUTH EVERY EVENING 90 Tablet 11    potassium chloride SR (K-TAB) 20 mEq tablet 1 tabs po bid 180 Tablet 12    Insulin Needles, Disposable, (Pamela Pen Needle) 32 gauge x 5/32\" ndle Use with Victoza daily 100 Pen Needle 11    traMADoL (ULTRAM) 50 mg tablet Take 50 mg by mouth every six (6) hours as needed.       glipiZIDE (GLUCOTROL) 10 mg tablet Take 1 Tablet by mouth two (2) times a day. For diabetes 120 Tablet 12    zolpidem (AMBIEN) 10 mg tablet TAKE ONE TABLET BY MOUTH EVERY NIGHT AT BEDTIME AS NEEDED FOR SLEEP 30 Tablet 5    albuterol-ipratropium (DUO-NEB) 2.5 mg-0.5 mg/3 ml nebu INHALE THREE MILLILITERS VIA NEBULIZATION BY MOUTH EVERY 6 HOURS AS NEEDED 30 Nebule 12    albuterol (PROVENTIL HFA, VENTOLIN HFA, PROAIR HFA) 90 mcg/actuation inhaler Take 1 Puff by inhalation every six (6) hours as needed for Wheezing or Shortness of Breath. 1 Each 12    topiramate (TOPAMAX) 100 mg tablet Take 1 Tablet by mouth two (2) times a day. 180 Tablet 3    glucose blood VI test strips (OneTouch Ultra Test) strip USE ONE STRIP TO TEST THREE TIMES A DAY BEFORE MEALS 100 Strip 11    OneTouch Ultra Test strip USE ONE STRIP TO TEST THREE TIMES A DAY BEFORE MEALS 100 Strip 10    Victoza 3-Rick 0.6 mg/0.1 mL (18 mg/3 mL) pnij DIAL AND INJECT UNDER THE SKIN 1.8 MG DAILY 9 mL 5    traZODone (DESYREL) 100 mg tablet Take 1 Tablet by mouth nightly. 30 Tablet 5    atorvastatin (LIPITOR) 40 mg tablet TAKE ONE TABLET BY MOUTH DAILY 30 Tablet 11    carvediloL (COREG) 12.5 mg tablet TAKE ONE TABLET BY MOUTH TWICE A DAY TO SLOW HEART RATE DOWN 60 Tablet 10    Eliquis 5 mg tablet TAKE ONE TABLET BY MOUTH TWICE A DAY 60 Tablet 10    empagliflozin (Jardiance) 10 mg tablet Take 1 Tablet by mouth daily. For diabetes 90 Tablet 3    diclofenac (VOLTAREN) 1 % gel Apply  to affected area four (4) times daily as needed. Pamela Pen Needle 32 gauge x 5/32\" ndle USE DAILY WITH VICTOZA 100 Pen Needle 10    nitroglycerin (NITROSTAT) 0.4 mg SL tablet 0.4 mg by SubLINGual route. Past History     Past Medical History:  Past Medical History:   Diagnosis Date    A-fib [facility]) 1/6/15    [facility] ED.  Pt reported this    Arthritis     hands    Asthma     Atrial fibrillation with RVR ([facility] [address] 8/11/2017    Cardiomyopathy ([facility] [address]     ET 25-30%    Carpal tunnel syndrome     Chronic obstructive pulmonary disease (HCC)     Chronic pain     back    Congestive heart failure (HCC)     Diabetes (HCC)     Type II    Gastrointestinal disorder     GERD    GERD (gastroesophageal reflux disease)     Heart failure (HCC)     High cholesterol     Hypertension     Long term current use of anticoagulant therapy     Myocardial infarction [facility])     Pacemaker     Restrictive lung disease     FVC2.59 (52%), FEV1 1.95 (49)- 2017    S/P cardiac cath     Dr. Matthew Sue, 2016. minimal cad    Sleep apnea     No CPAP    Stroke ([facility])     TIA x 4 - weak on left side       Past Surgical History:  Past Surgical History:   Procedure Laterality Date    COLONOSCOPY N/A 2020    COLONOSCOPY performed by Alvarado Bautista MD at [facility] ENDOSCOPY    COLONOSCOPY N/A 11/10/2020    COLONOSCOPY performed by Alavrado Bautista MD at [facility] ENDOSCOPY    HX PACEMAKER Left     defib,     MT CARDIAC SURG PROCEDURE UNLIST  2018    Implantation of PACEMAKER    MT INSJ/RPLCMT PERM DFB W/TRNSVNS LDS 1/DUAL CHMBR N/A 3/11/2019    INSERT ICD BIV MULTI performed by Charmayne Leeds, MD at [facility] CARDIAC CATH LAB    MT LEFT HEART CATH,PERCUTANEOUS      cardiac cath       Family History:  Family History   Adopted: Yes   Problem Relation Age of Onset    No Known Problems Mother     No Known Problems Father        Social History:  Social History     Tobacco Use    Smoking status: Former     Packs/day: 1.00     Years: 20.00     Pack years: 20.00     Types: Cigarettes     Quit date: 2/3/2014     Years since quittin.5    Smokeless tobacco: Never   Substance Use Topics    Alcohol use: No     Comment: stopped drinking at age 29    Drug use: No       Allergies:   Allergies   Allergen Reactions    Codeine Hives    Hydrocodone Itching    Influenza Virus Vaccines Nausea Only     Fever  diarrhea    Mushroom Flavor Swelling    Oxycodone Rash    Pneumococcal 23-Allison Ps Vaccine Nausea and Vomiting     Vomit           Review of Systems   Review of Systems Constitutional:  Negative for chills, diaphoresis and fever. HENT:  Negative for congestion, ear pain, rhinorrhea and sore throat. Respiratory:  Negative for cough and shortness of breath. Cardiovascular:  Negative for chest pain. Gastrointestinal:  Negative for abdominal pain, constipation, diarrhea, nausea and vomiting. Genitourinary:  Negative for difficulty urinating, dysuria, frequency and hematuria. Musculoskeletal:  Negative for arthralgias and myalgias. Skin:  Positive for rash. Neurological:  Negative for headaches. All other systems reviewed and are negative. Physical Exam   Physical Exam  Vitals and nursing note reviewed. Constitutional:       General: He is not in acute distress. Appearance: He is well-developed. He is obese. He is not diaphoretic. Comments: 56 y.o.  male    HENT:      Head: Normocephalic and atraumatic. Eyes:      General:         Right eye: No discharge. Left eye: No discharge. Conjunctiva/sclera: Conjunctivae normal.   Cardiovascular:      Rate and Rhythm: Normal rate and regular rhythm. Heart sounds: Normal heart sounds. No murmur heard. Pulmonary:      Effort: Pulmonary effort is normal. No respiratory distress. Breath sounds: Normal breath sounds. Musculoskeletal:      Cervical back: Normal range of motion and neck supple. Skin:     General: Skin is warm and dry. Findings: Rash present. Comments: Papular rash on erythematous base to the midline of chest and left breast.  Dry. No drainage. Neurological:      Mental Status: He is alert and oriented to person, place, and time. Psychiatric:         Behavior: Behavior normal.       Diagnostic Study Results     Labs - none    Radiologic Studies - none    Medical Decision Making   I am the first provider for this patient.     I reviewed the vital signs, available nursing notes, past medical history, past surgical history, family history and social history. Vital Signs-Reviewed the patient's vital signs. Patient Vitals for the past 12 hrs:   Temp Pulse Resp BP SpO2   08/07/22 1725 98.5 °F (36.9 °C) 70 18 129/78 99 %       Records Reviewed: Nursing Notes and Old Medical Records    Provider Notes (Medical Decision Making): The patient presents to the ED with stable vitals for a rash. Differential diagnosis include but not limited to dermatitis, allergic reaction, eczema, cellulitis. Will treat with topical steroids secondary to patient's diabetes and oral antibiotics. He should follow-up with primary care medicine for reevaluation but can return to the ED if he gets worse. ED Course:   Initial assessment performed. The patients presenting problems have been discussed, and they are in agreement with the care plan formulated and outlined with them. I have encouraged them to ask questions as they arise throughout their visit. Critical Care Time: None    Disposition:  DISCHARGE NOTE:  5:48 PM  The pt is ready for discharge. The pt's signs, symptoms, diagnosis, and discharge instructions have been discussed and pt has conveyed their understanding. The pt is to follow up as recommended or return to ER should their symptoms worsen. Plan has been discussed and pt is in agreement. PLAN:  1. Current Discharge Medication List        START taking these medications    Details   cephALEXin (Keflex) 500 mg capsule Take 1 Capsule by mouth four (4) times daily for 7 days. Qty: 28 Capsule, Refills: 0  Start date: 8/7/2022, End date: 8/14/2022      hydrocortisone (CORTIZONE) 0.5 % ointment Apply  to affected area two (2) times a day for 10 days. Apply to affected area  Qty: 15 g, Refills: 0  Start date: 8/7/2022, End date: 8/17/2022           2.    Follow-up Information       Follow up With Specialties Details Why Contact Donte Beltran MD Family Medicine In 2 days For wound re-check [address]  [phone] [facility] EMERGENCY DEPT Emergency Medicine  If symptoms worsen [address] [place] [address]  [phone]          Return to ED if worse     Diagnosis     Clinical Impression:   1. Dermatitis          Please note that this dictation was completed with [employer], the computer voice recognition software. Quite often unanticipated grammatical, syntax, homophones, and other interpretive errors are inadvertently transcribed by the computer software. Please disregards these errors. Please excuse any errors that have escaped final proofreading.

## 2022-08-11 DIAGNOSIS — E11.9 CONTROLLED TYPE 2 DIABETES MELLITUS WITHOUT COMPLICATION, WITHOUT LONG-TERM CURRENT USE OF INSULIN (HCC): ICD-10-CM

## 2022-08-11 NOTE — TELEPHONE ENCOUNTER
Patient needs a refill on his:Victoza 3-Rick 0.6 mg/0.1 mL (18 mg/3 mL) pnij   Please call @[phone].

## 2022-08-14 RX ORDER — LIRAGLUTIDE 6 MG/ML
INJECTION SUBCUTANEOUS
Qty: 9 ML | Refills: 5 | Status: SHIPPED | OUTPATIENT
Start: 2022-08-14

## 2022-08-29 RX ORDER — EMPAGLIFLOZIN 10 MG/1
TABLET, FILM COATED ORAL
Qty: 90 TABLET | Refills: 3 | Status: SHIPPED | OUTPATIENT
Start: 2022-08-29

## 2022-09-07 ENCOUNTER — DOCUMENTATION ONLY (OUTPATIENT)
Dept: FAMILY MEDICINE CLINIC | Age: 57
End: 2022-09-07

## 2022-09-13 ENCOUNTER — OFFICE VISIT (OUTPATIENT)
Dept: FAMILY MEDICINE CLINIC | Age: 57
End: 2022-09-13
Payer: MEDICAID

## 2022-09-13 VITALS
DIASTOLIC BLOOD PRESSURE: 74 MMHG | SYSTOLIC BLOOD PRESSURE: 119 MMHG | TEMPERATURE: 97.6 F | WEIGHT: 315 LBS | HEIGHT: 72 IN | OXYGEN SATURATION: 97 % | HEART RATE: 75 BPM | BODY MASS INDEX: 42.66 KG/M2 | RESPIRATION RATE: 17 BRPM

## 2022-09-13 DIAGNOSIS — F11.90 OPIOID USE: ICD-10-CM

## 2022-09-13 DIAGNOSIS — I10 ESSENTIAL HYPERTENSION: Primary | ICD-10-CM

## 2022-09-13 DIAGNOSIS — E78.00 HYPERCHOLESTEROLEMIA: ICD-10-CM

## 2022-09-13 DIAGNOSIS — E11.21 TYPE 2 DIABETES WITH NEPHROPATHY (HCC): ICD-10-CM

## 2022-09-13 PROCEDURE — 3044F HG A1C LEVEL LT 7.0%: CPT | Performed by: FAMILY MEDICINE

## 2022-09-13 PROCEDURE — 99213 OFFICE O/P EST LOW 20 MIN: CPT | Performed by: FAMILY MEDICINE

## 2022-09-13 RX ORDER — ASPIRIN 81 MG/1
TABLET ORAL
COMMUNITY

## 2022-09-13 NOTE — PROGRESS NOTES
HISTORY OF PRESENT ILLNESS  Morenita Grove is a 56 y.o. male. HPI Seen recently in ER for chest pain. Had a cardiac cath in June of this year- normal coronary arteries. Blood sugars have been around 150. ROS    Physical Exam  Vitals and nursing note reviewed. Constitutional:       Appearance: He is well-developed. HENT:      Right Ear: External ear normal.      Left Ear: External ear normal.   Neck:      Thyroid: No thyromegaly. Cardiovascular:      Rate and Rhythm: Normal rate and regular rhythm. Heart sounds: Normal heart sounds. Pulmonary:      Effort: Pulmonary effort is normal. No respiratory distress. Breath sounds: Normal breath sounds. No wheezing. Abdominal:      General: Bowel sounds are normal. There is no distension. Palpations: Abdomen is soft. There is no mass. Tenderness: There is no abdominal tenderness. There is no guarding. Musculoskeletal:         General: Normal range of motion. Lymphadenopathy:      Cervical: No cervical adenopathy. ASSESSMENT and PLAN  Orders Placed This Encounter    10-DRUG SCREEN, URINE W RFLX CONFIRMATION    LIPID PANEL    HEMOGLOBIN A1C WITH EAG    semaglutide (OZEMPIC) 1 mg/dose (2 mg/1.5 mL) sub-q pen     Diagnoses and all orders for this visit:    1. Essential hypertension    2. Opioid use  -     10-DRUG SCREEN, URINE W RFLX CONFIRMATION; Future    3. Hypercholesterolemia  -     LIPID PANEL; Future    4. Type 2 diabetes with nephropathy (HCC)  -     HEMOGLOBIN A1C WITH EAG; Future    Other orders  -     semaglutide (OZEMPIC) 1 mg/dose (2 mg/1.5 mL) sub-q pen; 1 mg by SubCUTAneous route every seven (7) days. Follow-up and Dispositions    Return in about 3 months (around 12/13/2022).

## 2022-09-13 NOTE — PROGRESS NOTES
Identified pt with two pt identifiers(name and ). Reviewed record in preparation for visit and have obtained necessary documentation. All patient medications has been reviewed. Chief Complaint   Patient presents with    Follow-up       3 most recent PHQ Screens 2022   Little interest or pleasure in doing things Not at all   Feeling down, depressed, irritable, or hopeless Not at all   Total Score PHQ 2 0   Trouble falling or staying asleep, or sleeping too much -   Feeling tired or having little energy -   Poor appetite, weight loss, or overeating -   Feeling bad about yourself - or that you are a failure or have let yourself or your family down -   Trouble concentrating on things such as school, work, reading, or watching TV -   Moving or speaking so slowly that other people could have noticed; or the opposite being so fidgety that others notice -   Thoughts of being better off dead, or hurting yourself in some way -   PHQ 9 Score -   How difficult have these problems made it for you to do your work, take care of your home and get along with others -     Abuse Screening Questionnaire 3/8/2021   Do you ever feel afraid of your partner? N   Are you in a relationship with someone who physically or mentally threatens you? N   Is it safe for you to go home? Y       Health Maintenance Due   Topic    Hepatitis C Screening     Eye Exam Retinal or Dilated     Shingrix Vaccine Age 50> (1 of 2)    Low dose CT lung screening     Foot Exam Q1     MICROALBUMIN Q1     COVID-19 Vaccine (4 - Booster for [place] series)     Health Maintenance Review: Patient reminded of \"due or due soon\" health maintenance. I have asked the patient to contact his/her primary care provider (PCP) for follow-up on his/her health maintenance.     Vitals:    22 1451   BP: 119/74   Pulse: 75   Resp: 17   Temp: 97.6 °F (36.4 °C)   TempSrc: Oral   SpO2: 97%   Weight: 338 lb 9.6 oz (153.6 kg)   Height: 6' (1.829 m)       Wt Readings from Last 3 Encounters:   09/13/22 338 lb 9.6 oz (153.6 kg)   08/07/22 335 lb (152 kg)   07/31/22 334 lb (151.5 kg)     Temp Readings from Last 3 Encounters:   09/13/22 97.6 °F (36.4 °C) (Oral)   08/07/22 98.5 °F (36.9 °C)   07/31/22 98.9 °F (37.2 °C)     BP Readings from Last 3 Encounters:   09/13/22 119/74   08/07/22 129/78   07/31/22 (!) 158/104     Pulse Readings from Last 3 Encounters:   09/13/22 75   08/07/22 70   07/31/22 76       1. \"Have you been to the ER, urgent care clinic since your last visit? Hospitalized since your last visit? \" Yes      2. \"Have you seen or consulted any other health care providers outside of the [address] [place] [address] since your last visit? \"  no    3. For patients aged 39-70: Has the patient had a colonoscopy / FIT/ Cologuard?  Yes - no Care Gap present

## 2022-09-14 LAB
CHOLEST SERPL-MCNC: 112 MG/DL
EST. AVERAGE GLUCOSE BLD GHB EST-MCNC: 140 MG/DL
HBA1C MFR BLD: 6.5 % (ref 4–5.6)
HDLC SERPL-MCNC: 31 MG/DL
HDLC SERPL: 3.6 {RATIO} (ref 0–5)
LDLC SERPL CALC-MCNC: 28.6 MG/DL (ref 0–100)
TRIGL SERPL-MCNC: 262 MG/DL (ref ?–150)
VLDLC SERPL CALC-MCNC: 52.4 MG/DL

## 2022-09-15 ENCOUNTER — DOCUMENTATION ONLY (OUTPATIENT)
Dept: FAMILY MEDICINE CLINIC | Age: 57
End: 2022-09-15

## 2022-09-19 LAB
ALPRAZ UR QL: NEGATIVE
AMPHETAMINES UR QL SCN: NEGATIVE NG/ML
BARBITURATES UR QL SCN: NEGATIVE NG/ML
BENZODIAZ UR QL: NEGATIVE NG/ML
BZE UR QL SCN: NEGATIVE NG/ML
CANNABINOIDS UR QL SCN: NEGATIVE NG/ML
CLONAZEPAM UR QL: NEGATIVE
CREAT UR-MCNC: 81.7 MG/DL (ref 20–300)
FLURAZEPAM UR QL: NEGATIVE
LORAZEPAM UR QL: NEGATIVE
METHADONE UR QL SCN: NEGATIVE NG/ML
MIDAZOLAM UR QL CFM: NEGATIVE
NORDIAZEPAM UR QL: NEGATIVE
OPIATES UR QL SCN: NEGATIVE NG/ML
OXAZEPAM UR QL: NEGATIVE
OXYCODONE+OXYMORPHONE UR QL SCN: NEGATIVE NG/ML
PCP UR QL: NEGATIVE NG/ML
PH UR: 7.3 [PH] (ref 4.5–8.9)
PLEASE NOTE:, 733157: NORMAL
PROPOXYPH UR QL SCN: NEGATIVE NG/ML
SP GR UR: 1.01
TEMAZEPAM UR QL CFM: NEGATIVE
TRIAZOLAM UR QL: NEGATIVE

## 2022-09-22 ENCOUNTER — APPOINTMENT (OUTPATIENT)
Dept: GENERAL RADIOLOGY | Age: 57
End: 2022-09-22
Attending: STUDENT IN AN ORGANIZED HEALTH CARE EDUCATION/TRAINING PROGRAM
Payer: MEDICAID

## 2022-09-22 ENCOUNTER — HOSPITAL ENCOUNTER (EMERGENCY)
Age: 57
Discharge: HOME | End: 2022-09-22
Attending: STUDENT IN AN ORGANIZED HEALTH CARE EDUCATION/TRAINING PROGRAM
Payer: MEDICAID

## 2022-09-22 VITALS
SYSTOLIC BLOOD PRESSURE: 133 MMHG | WEIGHT: 315 LBS | RESPIRATION RATE: 18 BRPM | HEIGHT: 72 IN | HEART RATE: 66 BPM | DIASTOLIC BLOOD PRESSURE: 85 MMHG | TEMPERATURE: 98.4 F | OXYGEN SATURATION: 98 % | BODY MASS INDEX: 42.66 KG/M2

## 2022-09-22 DIAGNOSIS — R10.9 LEFT FLANK PAIN: Primary | ICD-10-CM

## 2022-09-22 DIAGNOSIS — T23.269A: ICD-10-CM

## 2022-09-22 DIAGNOSIS — Z79.01 ANTICOAGULATED: ICD-10-CM
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
## 2022-09-22 PROCEDURE — 99283 EMERGENCY DEPT VISIT LOW MDM: CPT

## 2022-09-22 PROCEDURE — 71101 X-RAY EXAM UNILAT RIBS/CHEST: CPT

## 2022-09-22 RX ORDER — LIDOCAINE 4 G/100G
1 PATCH TOPICAL EVERY 12 HOURS
Qty: 15 PATCH | Refills: 2 | Status: SHIPPED | OUTPATIENT
Start: 2022-09-22

## 2022-09-22 RX ORDER — BACITRACIN 500 [USP'U]/G
OINTMENT TOPICAL DAILY
Qty: 14 G | Refills: 0 | Status: SHIPPED | OUTPATIENT
Start: 2022-09-22 | End: 2022-10-02

## 2022-09-22 RX ORDER — ACETAMINOPHEN 325 MG/1
650 TABLET ORAL
Qty: 20 TABLET | Refills: 0 | Status: SHIPPED | OUTPATIENT
Start: 2022-09-22

## 2022-09-22 NOTE — ED PROVIDER NOTES
EMERGENCY DEPARTMENT HISTORY AND PHYSICAL EXAM      Date: 9/22/2022  Patient Name: Garrick Salazar    History of Presenting Illness     Chief Complaint   Patient presents with    Fall    Flank Pain     Pt reporting that a few days ago he fell while walking home onto his L side, did not hit his head, no LOC. Pt reporting persistent L flank pain, no obvious deformity or bruising, mild erythema noted. Pt has COPD but denies any new respiratory distress. History Provided By: Patient    HPI: Garrick Salazar, 56 y.o. male with PMHx of DM, HTN, CHF, COPD, CVA, afib on eliquis, chronic back pain presents BIB self to the ED with cc of left flank pain in setting of fall that occurred 2 weeks ago. The patient was walking home in the rain and slipped, striking the left side of his back against a garbage can. No head injury or LOC. He reports the wheel of the can struck his ribs. He has had constant pain in the area, made worse with lateral movements and sleeping on the left side. He has been taking ibuprofen with temporary pain relief. He denies cough, hemoptysis, shortness of breath, chest pain, abdominal pain, nausea, vomiting, fevers, myalgias, difficulty urinating, gross hematuria. He also complains of a burn to the dorsal aspect of his right hand sustained 1 week ago when he spilled gravy. He is requesting medication for it. There are no other complaints, changes, or physical findings at this time. PCP: Kalani Ocasio MD    No current facility-administered medications on file prior to encounter. Current Outpatient Medications on File Prior to Encounter   Medication Sig Dispense Refill    aspirin delayed-release 81 mg tablet aspirin 81 mg tablet,delayed release   Take 1 tablet every day by oral route. semaglutide (OZEMPIC) 1 mg/dose (2 mg/1.5 mL) sub-q pen 1 mg by SubCUTAneous route every seven (7) days.  2 Box 12    traMADoL (ULTRAM) 50 mg tablet Take 1 Tablet by mouth every six (6) hours as needed (as needed) for up to 30 days. Max Daily Amount: 200 mg. Take 50 mg by mouth every six (6) hours as needed. 120 Tablet 2    Jardiance 10 mg tablet TAKE ONE TABLET BY MOUTH DAILY 90 Tablet 3    liraglutide (Victoza 3-Rick) 0.6 mg/0.1 mL (18 mg/3 mL) pnij DIAL AND INJECT UNDER THE SKIN 1.8 MG DAILY 9 mL 5    sacubitriL-valsartan (Entresto)  mg tablet Take 1 Tablet by mouth two (2) times a day. 180 Tablet 1    metFORMIN (GLUCOPHAGE) 1,000 mg tablet Take 1 Tablet by mouth two (2) times daily (with meals). Start taking from Wednesday 6/22/22. 60 Tablet 12    calcium carbonate (TUMS) 200 mg calcium (500 mg) chew Take 1 Tablet by mouth daily. furosemide (LASIX) 80 mg tablet TAKE  One- TABLETS BY MOUTH EVERY MORNING AND TAKE ONE TABLET BY MOUTH EVERY EVENING 90 Tablet 11    potassium chloride SR (K-TAB) 20 mEq tablet 1 tabs po bid 180 Tablet 12    Insulin Needles, Disposable, (Pamela Pen Needle) 32 gauge x 5/32\" ndle Use with Victoza daily 100 Pen Needle 11    glipiZIDE (GLUCOTROL) 10 mg tablet Take 1 Tablet by mouth two (2) times a day. For diabetes 120 Tablet 12    zolpidem (AMBIEN) 10 mg tablet TAKE ONE TABLET BY MOUTH EVERY NIGHT AT BEDTIME AS NEEDED FOR SLEEP 30 Tablet 5    albuterol-ipratropium (DUO-NEB) 2.5 mg-0.5 mg/3 ml nebu INHALE THREE MILLILITERS VIA NEBULIZATION BY MOUTH EVERY 6 HOURS AS NEEDED 30 Nebule 12    albuterol (PROVENTIL HFA, VENTOLIN HFA, PROAIR HFA) 90 mcg/actuation inhaler Take 1 Puff by inhalation every six (6) hours as needed for Wheezing or Shortness of Breath. 1 Each 12    topiramate (TOPAMAX) 100 mg tablet Take 1 Tablet by mouth two (2) times a day. 180 Tablet 3    glucose blood VI test strips (OneTouch Ultra Test) strip USE ONE STRIP TO TEST THREE TIMES A DAY BEFORE MEALS 100 Strip 11    OneTouch Ultra Test strip USE ONE STRIP TO TEST THREE TIMES A DAY BEFORE MEALS 100 Strip 10    traZODone (DESYREL) 100 mg tablet Take 1 Tablet by mouth nightly.  30 Tablet 5    atorvastatin (LIPITOR) 40 mg tablet TAKE ONE TABLET BY MOUTH DAILY 30 Tablet 11    carvediloL (COREG) 12.5 mg tablet TAKE ONE TABLET BY MOUTH TWICE A DAY TO SLOW HEART RATE DOWN 60 Tablet 10    Eliquis 5 mg tablet TAKE ONE TABLET BY MOUTH TWICE A DAY 60 Tablet 10    diclofenac (VOLTAREN) 1 % gel Apply  to affected area four (4) times daily as needed. Pamela Pen Needle 32 gauge x 5/32\" ndle USE DAILY WITH VICTOZA 100 Pen Needle 10    nitroglycerin (NITROSTAT) 0.4 mg SL tablet 0.4 mg by SubLINGual route. Past History     Past Medical History:  Past Medical History:   Diagnosis Date    A-fib [facility]) 1/6/15    [facility] ED. Pt reported this    Arthritis     hands    Asthma     Atrial fibrillation with RVR ([facility] 8/11/2017    Cardiomyopathy ([facility] [address]     ET 25-30%    Carpal tunnel syndrome     Chronic obstructive pulmonary disease (HCC)     Chronic pain     back    Congestive heart failure (HCC)     Diabetes ([facility])     Type II    Gastrointestinal disorder     GERD    GERD (gastroesophageal reflux disease)     Heart failure (HCC)     High cholesterol     Hypertension     Long term current use of anticoagulant therapy     Myocardial infarction [facility])     Pacemaker     Restrictive lung disease     FVC2.59 (52%), FEV1 1.95 (49)- 2017    S/P cardiac cath     Dr. Lan Chan, 2016.  minimal cad    Sleep apnea     No CPAP    Stroke (HCC)     TIA x 4 - weak on left side       Past Surgical History:  Past Surgical History:   Procedure Laterality Date    COLONOSCOPY N/A 11/4/2020    COLONOSCOPY performed by Sam Aggarwal MD at [facility] ENDOSCOPY    COLONOSCOPY N/A 11/10/2020    COLONOSCOPY performed by Sam Aggarwal MD at [facility] ENDOSCOPY    HX PACEMAKER Left     defib,     [place] CARDIAC SURG PROCEDURE UNLIST  11/14/2018    Implantation of PACEMAKER    [place] INSJ/RPLCMT PERM DFB W/TRNSVNS LDS 1/DUAL CHMBR N/A 3/11/2019    INSERT ICD BIV MULTI performed by Vania Mendiola MD at [facility] C[facility]AC CATH LAB    [address] [place] [address] cardiac cath       Family History:  Family History   Adopted: Yes   Problem Relation Age of Onset    No Known Problems Mother     No Known Problems Father        Social History:  Social History     Tobacco Use    Smoking status: Former     Packs/day: 1.00     Years: 20.00     Pack years: 20.00     Types: Cigarettes     Quit date: 2/3/2014     Years since quittin.6    Smokeless tobacco: Never   Substance Use Topics    Alcohol use: No     Comment: stopped drinking at age 29    Drug use: No       Allergies: Allergies   Allergen Reactions    Codeine Hives    Hydrocodone Itching    Influenza Virus Vaccines Nausea Only     Fever  diarrhea    Mushroom Flavor Swelling    Oxycodone Rash    Pneumococcal 23-Allison Ps Vaccine Nausea and Vomiting     Vomit           Review of Systems   Review of Systems   Constitutional:  Negative for diaphoresis. HENT:  Negative for voice change. Eyes:  Negative for redness. Respiratory:  Negative for shortness of breath. Cardiovascular:  Negative for chest pain. Gastrointestinal:  Negative for abdominal pain, nausea and vomiting. Endocrine:        +DM   Genitourinary:  Positive for flank pain. Negative for difficulty urinating and hematuria. Musculoskeletal:  Negative for gait problem. Skin:         Burn to hand   Neurological:  Negative for dizziness. Hematological:  Bruises/bleeds easily. Psychiatric/Behavioral:  Negative for agitation. Physical Exam   Physical Exam  Vitals and nursing note reviewed. Constitutional:       General: He is not in acute distress. Appearance: Normal appearance. He is obese. HENT:      Head: Normocephalic and atraumatic. Nose: Nose normal.      Mouth/Throat:      Mouth: Mucous membranes are moist.   Eyes:      Extraocular Movements: Extraocular movements intact. Conjunctiva/sclera: Conjunctivae normal.      Pupils: Pupils are equal, round, and reactive to light.    Neck:      Trachea: Phonation normal. Cardiovascular:      Rate and Rhythm: Normal rate and regular rhythm. Pulmonary:      Effort: Pulmonary effort is normal.      Breath sounds: Normal breath sounds. Abdominal:      Palpations: Abdomen is soft. Tenderness: There is no abdominal tenderness. There is no guarding. Musculoskeletal:         General: Normal range of motion. Cervical back: Normal range of motion and neck supple. No rigidity or tenderness. Comments: No midline tenderness. Tenderness reported over the left posterior lateral ribs. No ecchymosis, crepitus, or deformity. Skin:     General: Skin is warm and dry. Comments: Approximately 2 inch round partial-thickness skin burn to the dorsal aspect of the right hand. No evidence of infection. Venous stasis discoloration bilateral lower shins. Neurological:      General: No focal deficit present. Mental Status: He is alert and oriented to person, place, and time. GCS: GCS eye subscore is 4. GCS verbal subscore is 5. GCS motor subscore is 6. Psychiatric:         Mood and Affect: Mood normal.         Behavior: Behavior normal.       Diagnostic Study Results     Labs -   No results found for this or any previous visit (from the past 12 hour(s)). Radiologic Studies -   XR RIBS LT W PA CXR MIN 3 V   Final Result   No definite rib fractures. Questionable mild pulmonary edema. CT Results  (Last 48 hours)      None          CXR Results  (Last 48 hours)      None              Medical Decision Making   I am the first provider for this patient. I reviewed the vital signs, available nursing notes, past medical history, past surgical history, family history and social history. Vital Signs-Reviewed the patient's vital signs.   Patient Vitals for the past 12 hrs:   Temp Pulse Resp BP SpO2   09/22/22 1635 98.4 °F (36.9 °C) 66 18 133/85 98 %       Records Reviewed: Nursing Notes, Old Medical Records, and Previous Laboratory Studies    Provider Notes (Medical Decision Making):   56-year-old male with multiple chronic medical conditions on blood thinners presents with left flank pain in setting of fall that occurred 2 weeks ago. Plain film x-ray shows no acute fracture, questionable mild pulmonary edema. Considered obtaining further imaging to assess for occult fracture, pulmonary contusion, intra thoracic or intra-abdominal bleed, however given that 2 weeks have passed since the fall, I think this would have a low utility since the patient is well-appearing in the ED, not tachycardic, not tachypneic, not hypoxic. We discussed the possibility of an occult rib fracture. He states he has an incentive spirometer at home and knows how to use it. We will plan to discharge the patient with lidocaine patches, Tylenol for pain relief. He also complains of a burn to the back of his right hand sustained 1 week ago. Recommended topical bacitracin and daily bandage changes. Watch for signs/symptoms of infection. Follow-up with PCP. ED return precautions reviewed. ED Course:   Initial assessment performed. The patients presenting problems have been discussed, and they are in agreement with the care plan formulated and outlined with them. I have encouraged them to ask questions as they arise throughout their visit. Case discussed with Chase Mckeon, attending physician. I reviewed the patient's medical history, the PA's findings on physical examination, the patient's diagnoses, and treatment plan as documented in the PA note. I concur with the treatment plan as documented. Additional suggestions noted. Chase Mckeon MD  Signed By: Anthony Pope MD     September 23, 2022          Critical Care Time: None    Disposition:  Dc    PLAN:  1. Discharge Medication List as of 9/22/2022  6:37 PM        START taking these medications    Details   lidocaine 4 % patch 1 Patch by TransDERmal route every twelve (12) hours. , Normal, Disp-15 Patch, R-2 acetaminophen (TYLENOL) 325 mg tablet Take 2 Tablets by mouth every four (4) hours as needed for Pain., Normal, Disp-20 Tablet, R-0      bacitracin (BACITRACIN) 500 unit/gram oint Apply  to affected area daily for 10 days. Apply to affected area, Normal, Disp-14 g, R-0           CONTINUE these medications which have NOT CHANGED    Details   aspirin delayed-release 81 mg tablet aspirin 81 mg tablet,delayed release   Take 1 tablet every day by oral route., Historical Med      semaglutide (OZEMPIC) 1 mg/dose (2 mg/1.5 mL) sub-q pen 1 mg by SubCUTAneous route every seven (7) days. , Print, Disp-2 Box, R-12      traMADoL (ULTRAM) 50 mg tablet Take 1 Tablet by mouth every six (6) hours as needed (as needed) for up to 30 days. Max Daily Amount: 200 mg. Take 50 mg by mouth every six (6) hours as needed., Normal, Disp-120 Tablet, R-2      Jardiance 10 mg tablet TAKE ONE TABLET BY MOUTH DAILY, Normal, Disp-90 Tablet, R-3      liraglutide (Victoza 3-Rick) 0.6 mg/0.1 mL (18 mg/3 mL) pnij DIAL AND INJECT UNDER THE SKIN 1.8 MG DAILY, Normal, Disp-9 mL, R-5      sacubitriL-valsartan (Entresto)  mg tablet Take 1 Tablet by mouth two (2) times a day.  , Normal, Disp-180 Tablet, R-1      metFORMIN (GLUCOPHAGE) 1,000 mg tablet Take 1 Tablet by mouth two (2) times daily (with meals). Start taking from Wednesday 6/22/22., No Print, Disp-60 Tablet, R-12      calcium carbonate (TUMS) 200 mg calcium (500 mg) chew Take 1 Tablet by mouth daily. , Historical Med      furosemide (LASIX) 80 mg tablet TAKE  One- TABLETS BY MOUTH EVERY MORNING AND TAKE ONE TABLET BY MOUTH EVERY EVENING, No Print, Disp-90 Tablet, R-11      potassium chloride SR (K-TAB) 20 mEq tablet 1 tabs po bid, No Print, Disp-180 Tablet, R-12      !! Insulin Needles, Disposable, (Pamela Pen Needle) 32 gauge x 5/32\" ndle Use with Victoza daily, Normal, Disp-100 Pen Needle, R-11      glipiZIDE (GLUCOTROL) 10 mg tablet Take 1 Tablet by mouth two (2) times a day.  For diabetes, Normal, Disp-120 Tablet, R-12      zolpidem (AMBIEN) 10 mg tablet TAKE ONE TABLET BY MOUTH EVERY NIGHT AT BEDTIME AS NEEDED FOR SLEEP, Normal, Disp-30 Tablet, R-5      albuterol-ipratropium (DUO-NEB) 2.5 mg-0.5 mg/3 ml nebu INHALE THREE MILLILITERS VIA NEBULIZATION BY MOUTH EVERY 6 HOURS AS NEEDED, Normal, Disp-30 Nebule, R-12      albuterol (PROVENTIL HFA, VENTOLIN HFA, PROAIR HFA) 90 mcg/actuation inhaler Take 1 Puff by inhalation every six (6) hours as needed for Wheezing or Shortness of Breath., Normal, Disp-1 Each, R-12      topiramate (TOPAMAX) 100 mg tablet Take 1 Tablet by mouth two (2) times a day., Normal, Disp-180 Tablet, R-3      !! glucose blood VI test strips (OneTouch Ultra Test) strip USE ONE STRIP TO TEST THREE TIMES A DAY BEFORE MEALS, Normal, Disp-100 Strip, R-11      !! OneTouch Ultra Test strip USE ONE STRIP TO TEST THREE TIMES A DAY BEFORE MEALS, Normal, Disp-100 Strip, R-10      traZODone (DESYREL) 100 mg tablet Take 1 Tablet by mouth nightly., Normal, Disp-30 Tablet, R-5      atorvastatin (LIPITOR) 40 mg tablet TAKE ONE TABLET BY MOUTH DAILY, Normal, Disp-30 Tablet, R-11      carvediloL (COREG) 12.5 mg tablet TAKE ONE TABLET BY MOUTH TWICE A DAY TO SLOW HEART RATE DOWN, Normal, Disp-60 Tablet, R-10      Eliquis 5 mg tablet TAKE ONE TABLET BY MOUTH TWICE A DAY, Normal, Disp-60 Tablet, R-10      diclofenac (VOLTAREN) 1 % gel Apply  to affected area four (4) times daily as needed., Historical Med      !! Pamela Pen Needle 32 gauge x 5/32\" ndle USE DAILY WITH ELVIS, Normal, Disp-100 Pen Needle, R-10      nitroglycerin (NITROSTAT) 0.4 mg SL tablet 0.4 mg by SubLINGual route., Historical Med       !! - Potential duplicate medications found. Please discuss with provider.         2.   Follow-up Information       Follow up With Specialties Details Why Contact Info    [facility] EMERGENCY DEPT Emergency Medicine  As needed, If symptoms worsen [address]  [address] [facility]  [phone] Magdiel Valdez MD Family Medicine Call  For follow up [address] [place] [address] [phone]            Return to ED if worse     Diagnosis     Clinical Impression:   1. Left flank pain    2. Fall from ground level    3. Anticoagulated    4. Partial thickness burn of back of hand, initial encounter          Please note that this dictation was completed with [employer], the computer voice recognition software. Quite often unanticipated grammatical, syntax, homophones, and other interpretive errors are inadvertently transcribed by the computer software. Please disregards these errors. Please excuse any errors that have escaped final proofreading.

## 2022-10-03 NOTE — TELEPHONE ENCOUNTER
Last visit:9/13/22  Next visit: 12/13/22  Previous refill 10/31/21(60+10R)    Requested Prescriptions     Pending Prescriptions Disp Refills    apixaban (Eliquis) 5 mg tablet 60 Tablet 10     Sig: Take 1 Tablet by mouth two (2) times a day. For [facility] in place:   Recommendation Provided To:    Intervention Detail: New Rx: 1, reason: Patient Preference  Gap Closed?:   Intervention Accepted By:   Time Spent (min): 5

## 2022-12-07 DIAGNOSIS — I10 ESSENTIAL HYPERTENSION: ICD-10-CM

## 2022-12-07 DIAGNOSIS — F51.01 PRIMARY INSOMNIA: ICD-10-CM

## 2022-12-07 RX ORDER — CARVEDILOL 12.5 MG/1
12.5 TABLET ORAL 2 TIMES DAILY
Qty: 60 TABLET | Refills: 11 | Status: SHIPPED | OUTPATIENT
Start: 2022-12-07

## 2022-12-07 RX ORDER — ZOLPIDEM TARTRATE 10 MG/1
TABLET ORAL
Qty: 30 TABLET | Refills: 5 | Status: SHIPPED | OUTPATIENT
Start: 2022-12-07

## 2022-12-07 RX ORDER — ATORVASTATIN CALCIUM 40 MG/1
40 TABLET, FILM COATED ORAL DAILY
Qty: 30 TABLET | Refills: 11 | Status: SHIPPED | OUTPATIENT
Start: 2022-12-07

## 2022-12-07 NOTE — TELEPHONE ENCOUNTER
Last Visit: 9/13/22 with MD Donnie Benavidez  Next Appointment: 12/13/22 with MD Donnie Benavidez  Previous Refill Encounter(s): 5/13/22 Ambien #30 with 5 refills, 10/31/21 Coreg #60 with 10 refill, 12/1/21 Lipitor #30 with 11 refills    Requested Prescriptions     Pending Prescriptions Disp Refills    zolpidem (AMBIEN) 10 mg tablet 30 Tablet 5     Sig: TAKE ONE TABLET BY MOUTH EVERY NIGHT AT BEDTIME AS NEEDED FOR SLEEP    carvediloL (COREG) 12.5 mg tablet 60 Tablet 11     Sig: Take 1 Tablet by mouth two (2) times a day. Indications: to slow heart rate down    atorvastatin (LIPITOR) 40 mg tablet 30 Tablet 11     Sig: Take 1 Tablet by mouth daily. For [facility]:   Recommendation Provided To:    Intervention Detail: New Rx: 3, reason: Patient Preference  Gap Closed?:   Intervention Accepted By:   Time Spent (min): 5

## 2022-12-13 ENCOUNTER — OFFICE VISIT (OUTPATIENT)
Dept: FAMILY MEDICINE CLINIC | Age: 57
End: 2022-12-13
Payer: MEDICAID

## 2022-12-13 VITALS
HEART RATE: 70 BPM | HEIGHT: 72 IN | BODY MASS INDEX: 42.66 KG/M2 | SYSTOLIC BLOOD PRESSURE: 102 MMHG | RESPIRATION RATE: 16 BRPM | TEMPERATURE: 98.1 F | OXYGEN SATURATION: 94 % | DIASTOLIC BLOOD PRESSURE: 64 MMHG | WEIGHT: 315 LBS

## 2022-12-13 DIAGNOSIS — I10 ESSENTIAL HYPERTENSION: Primary | ICD-10-CM

## 2022-12-13 DIAGNOSIS — T14.8XXA ABRASION: ICD-10-CM

## 2022-12-13 PROCEDURE — 3074F SYST BP LT 130 MM HG: CPT | Performed by: FAMILY MEDICINE

## 2022-12-13 PROCEDURE — 99213 OFFICE O/P EST LOW 20 MIN: CPT | Performed by: FAMILY MEDICINE

## 2022-12-13 PROCEDURE — 3078F DIAST BP <80 MM HG: CPT | Performed by: FAMILY MEDICINE

## 2022-12-13 RX ORDER — MUPIROCIN 20 MG/G
OINTMENT TOPICAL 2 TIMES DAILY
Qty: 22 G | Refills: 2 | Status: SHIPPED | OUTPATIENT
Start: 2022-12-13

## 2022-12-13 NOTE — PROGRESS NOTES
Chief Complaint   Patient presents with    Hypertension     3 month f/u       1. \"Have you been to the ER, urgent care clinic since your last visit? Hospitalized since your last visit? \" No    2. \"Have you seen or consulted any other health care providers outside of the [address] [place] [address] since your last visit? \" No     3. For patients aged 39-70: Has the patient had a colonoscopy / FIT/ Cologuard? NA - based on age      If the patient is female:    4. For patients aged 41-77: Has the patient had a mammogram within the past 2 years? NA - based on age or sex      11. For patients aged 21-65: Has the patient had a pap smear?  No      Visit Vitals  /64 (BP 1 Location: Left upper arm, BP Patient Position: Sitting)   Pulse 70   Temp 98.1 °F (36.7 °C) (Oral)   Resp 16   Ht 6' (1.829 m)   Wt 345 lb 3.2 oz (156.6 kg)   SpO2 94%   BMI 46.82 kg/m²

## 2022-12-13 NOTE — PROGRESS NOTES
HISTORY OF PRESENT ILLNESS  Tanvi Robins is a 56 y.o. male. HPI Pt. Comes in for blood pressure check. Went to [facility] on Sunday, was walking around, developed some dyspnea and chest tightness. Got back to car ok. CAn walk to see a friend 4 houses down and come home. Had a cardiac cath 6-2-2022- nl coronaries. EF 50-55%. Also has a lesion on l leg, started as a blister, then popped, and is scabbing over now. Had a covid booster one month ago. Allergic to flu shots. ROS    Physical Exam  Vitals and nursing note reviewed. Constitutional:       Appearance: He is well-developed. HENT:      Right Ear: External ear normal.      Left Ear: External ear normal.   Neck:      Thyroid: No thyromegaly. Cardiovascular:      Rate and Rhythm: Normal rate and regular rhythm. Heart sounds: Normal heart sounds. Pulmonary:      Effort: Pulmonary effort is normal. No respiratory distress. Breath sounds: Normal breath sounds. No wheezing. Abdominal:      General: Bowel sounds are normal. There is no distension. Palpations: Abdomen is soft. There is no mass. Tenderness: There is no abdominal tenderness. There is no guarding. Musculoskeletal:         General: Normal range of motion. Comments: L lower leg- scabbed 2 x 1 cm lesion . No surrounding erythema or tenderness   Lymphadenopathy:      Cervical: No cervical adenopathy. ASSESSMENT and PLAN  Orders Placed This Encounter    METABOLIC PANEL, BASIC    mupirocin (BACTROBAN) 2 % ointment     Diagnoses and all orders for this visit:    1. Essential hypertension  -     METABOLIC PANEL, BASIC; Future    2. Abrasion    Other orders  -     mupirocin (BACTROBAN) 2 % ointment; Apply  to affected area two (2) times a day.

## 2022-12-14 LAB
ANION GAP SERPL CALC-SCNC: 8 MMOL/L (ref 5–15)
BUN SERPL-MCNC: 19 MG/DL (ref 6–20)
BUN/CREAT SERPL: 16 (ref 12–20)
CALCIUM SERPL-MCNC: 8.1 MG/DL (ref 8.5–10.1)
CHLORIDE SERPL-SCNC: 106 MMOL/L (ref 97–108)
CO2 SERPL-SCNC: 25 MMOL/L (ref 21–32)
CREAT SERPL-MCNC: 1.19 MG/DL (ref 0.7–1.3)
GLUCOSE SERPL-MCNC: 261 MG/DL (ref 65–100)
POTASSIUM SERPL-SCNC: 3.5 MMOL/L (ref 3.5–5.1)
SODIUM SERPL-SCNC: 139 MMOL/L (ref 136–145)

## 2023-01-12 NOTE — PROGRESS NOTES
pc with pt. Will increase entresto to 49/51 bid. Refill trazodone. Quality 226: Preventive Care And Screening: Tobacco Use: Screening And Cessation Intervention: Patient screened for tobacco use and is an ex/non-smoker Detail Level: Detailed Quality 130: Documentation Of Current Medications In The Medical Record: Current Medications Documented Quality 431: Preventive Care And Screening: Unhealthy Alcohol Use - Screening: Patient not identified as an unhealthy alcohol user when screened for unhealthy alcohol use using a systematic screening method

## 2023-02-07 RX ORDER — SACUBITRIL AND VALSARTAN 97; 103 MG/1; MG/1
1 TABLET, FILM COATED ORAL 2 TIMES DAILY
Qty: 180 TABLET | Refills: 1 | Status: SHIPPED | OUTPATIENT
Start: 2023-02-07

## 2023-02-07 NOTE — TELEPHONE ENCOUNTER
Last Visit: 12/13/22 with MD Cesar Blackwood  Next Appointment: 3/8/23 with MD Cesar Blackwood  Previous Refill Encounter(s): 7/26/22 #180 with 1 refill    Requested Prescriptions     Pending Prescriptions Disp Refills    sacubitriL-valsartan (Entresto)  mg tablet 180 Tablet 1     Sig: Take 1 Tablet by mouth two (2) times a day. For Pharmacy Admin Tracking Only    Program: Medication Refill  CPA in place:   Recommendation Provided To:    Intervention Detail: New Rx: 1, reason: Patient Preference  Intervention Accepted By:   [facility] Closed?:   Time Spent (min): 5

## 2023-02-10 ENCOUNTER — OFFICE VISIT (OUTPATIENT)
Dept: NEUROLOGY | Age: 58
End: 2023-02-10
Payer: MEDICAID

## 2023-02-10 VITALS
HEART RATE: 70 BPM | OXYGEN SATURATION: 96 % | DIASTOLIC BLOOD PRESSURE: 60 MMHG | HEIGHT: 72 IN | WEIGHT: 315 LBS | RESPIRATION RATE: 16 BRPM | TEMPERATURE: 98.4 F | SYSTOLIC BLOOD PRESSURE: 82 MMHG | BODY MASS INDEX: 42.66 KG/M2

## 2023-02-10 DIAGNOSIS — G43.009 MIGRAINE WITHOUT AURA AND WITHOUT STATUS MIGRAINOSUS, NOT INTRACTABLE: Primary | ICD-10-CM

## 2023-02-10 DIAGNOSIS — G45.9 TIA (TRANSIENT ISCHEMIC ATTACK): ICD-10-CM

## 2023-02-10 RX ORDER — GALCANEZUMAB 120 MG/ML
120 INJECTION, SOLUTION SUBCUTANEOUS
Qty: 1 ML | Refills: 11 | Status: SHIPPED | OUTPATIENT
Start: 2023-02-10

## 2023-02-10 RX ORDER — GALCANEZUMAB 120 MG/ML
240 INJECTION, SOLUTION SUBCUTANEOUS ONCE
Qty: 2 ML | Refills: 0 | Status: SHIPPED | COMMUNITY
Start: 2023-02-10 | End: 2023-02-10

## 2023-02-10 NOTE — PROGRESS NOTES
[facility] 85  In Office FOLLOW-UP VISIT         Jennifer Navarro is a 66 y.o. male who presents today for the following:  Chief Complaint   Patient presents with    Follow-up     One year follow up. States that one before dr Davonte Samayoa found that migraines caused mini strokes. Still has occasionally and states that he is taking topiramate. ASSESSMENT AND PLAN  Patient is known to this practice. This is my first time seeing the patient. Chart and history reviewed in detail at today's office visit. 1. Migraine without aura and without status migrainosus, not intractable  Assessment & Plan:   Patient presently on Topamax 100 mg taking 2 tablets a day for migraine prophylaxis  He is also previously been on Depakote  Still with high rate of migraine frequency with high severity  Having at least 12 migraine days per month most of which put him in bed    Beta-blockers are contraindicated secondary to cardiac issues  Patient is already on trazodone so NSRI's are also contraindicated  He has failed or had suboptimal response to Depakote and Topamax    Discussed CGRP therapy with the patient he is elected to want to go on therapy  Loading dose of Emgality was given at today's office visit  Prescription for maintenance dose was sent to his local pharmacy and our PA staff has been notified to start the PA process      Triptans and ergotamine's are contraindicated from a rescue perspective secondary to significant cardiac disease        Orders:  -     galcanezumab-gnlm (Emgality Pen) 120 mg/mL injection; 2 mL by SubCUTAneous route once for 1 dose. Indications: migraine prevention, Sample, Disp-2 mL, R-0  -     galcanezumab-gnlm (Emgality Pen) 120 mg/mL injection; 1 mL by SubCUTAneous route every thirty (30) days. Indications: migraine prevention, Normal, Disp-1 mL, R-11  2.  TIA (transient ischemic attack)  Assessment & Plan:   Stable without recurrence of symptoms  Patient is to continue on aspirin Eliquis and Lipitor   Eliquis is being given more from a cardiac perspective than a neuro perspective at this time    Patient is to continue to work to all of his comorbid conditions as managed by PCP and other specialists as appropriate    RECOMMENDATIONS:  - BP goal is less than 140/90  - Goal HbA1c is less than 7.   - LDL less than 70     Patient and/or family was given time to ask questions and voice concerns. I believe all questions concerns were adequately addressed at this  office visit. Patient and/or family also verbalized agreement and understanding of the above-stated plan    Complex neurologic decision making secondary any or all of the following to include unclear etiology, and /or polypharmacy, and/or significant comorbid conditions, and/or use of high-risk medications which complicate the decision making process related to patient's neurologic diagnosis          ICD-10-CM ICD-9-CM    1. Migraine without aura and without status migrainosus, not intractable  G43.009 346.10 galcanezumab-gnlm (Emgality Pen) 120 mg/mL injection      galcanezumab-gnlm (Emgality Pen) 120 mg/mL injection      2. TIA (transient ischemic attack)  G45.9 435.9             I attest that 30 minutes was spent on today's visit reviewing medical records and diagnostic testing deemed pertinent to this patient's care, along with direct time spent at patient's visit including the history, physical assessment and plan, discussing diagnosis and management along with documentation.       HPI  Historical Data  Patient is known to the practice and was previously seen by multiple providers in the practice most recently Dr Bárbara Carranza    Neurologic diagnosis  History of stroke [x2] and TIA   Residual mild left-sided weakness   Residual mild left-sided numbness     Other symptoms related to stroke/TIA: 2020 left-sided numbness involving the left face arm and leg lasting for about 2 hours with associated light sensitivity   residual minimal left-sided    Headaches and migraines   Location: Frontal   Associated symptoms nausea but no vomiting, light and sound sensitivity   Onset of headaches around 2013   Location: Holocephalic   Severity: 22/05   Quality: Sharp, dull, pounding   Duration: Less than 4 hours     Preventative medications:    Topamax    Depakote      Other significant comorbid conditions/concerns  Hypertension  Diabetes  Former smoker  Atrial fibrillation  Status post AICD  Heart failure  Long-term anticoagulation therapy      Interim Data:     Migraines  Continues on Topamax 100 mg BID  Several times per week still getting headaches and migraines and usually needs to go to bed        Stroke follow-up   Patient denies any new or worsening signs or symptoms consistent with cerebrovascular insufficiency since last evaluated  Pt continues on his prescribed medication of aspirin and Eliquis [more for cardiac purposes] and Lipitor and denies side effects    Patient continues to work to keep risk factor comorbid conditions under good control          Pertinent diagnostic data      No results found for this or any previous visit. Allergies   Allergen Reactions    Codeine Hives    Hydrocodone Itching    Influenza Virus Vaccines Nausea Only     Fever  diarrhea    Mushroom Flavor Swelling    Oxycodone Rash    Pneumococcal 23-Allison Ps Vaccine Nausea and Vomiting     Vomit         Current Outpatient Medications   Medication Sig    galcanezumab-gnlm (Emgality Pen) 120 mg/mL injection 2 mL by SubCUTAneous route once for 1 dose. Indications: migraine prevention    galcanezumab-gnlm (Emgality Pen) 120 mg/mL injection 1 mL by SubCUTAneous route every thirty (30) days. Indications: migraine prevention    sacubitriL-valsartan (Entresto)  mg tablet Take 1 Tablet by mouth two (2) times a day. traMADoL (ULTRAM) 50 mg tablet     metFORMIN (GLUCOPHAGE) 1,000 mg tablet Take 1 Tablet by mouth two (2) times daily (with meals).  Start taking from Wednesday 6/22/22.    zolpidem (AMBIEN) 10 mg tablet TAKE ONE TABLET BY MOUTH EVERY NIGHT AT BEDTIME AS NEEDED FOR SLEEP    carvediloL (COREG) 12.5 mg tablet Take 1 Tablet by mouth two (2) times a day. Indications: to slow heart rate down    atorvastatin (LIPITOR) 40 mg tablet Take 1 Tablet by mouth daily. apixaban (Eliquis) 5 mg tablet Take 1 Tablet by mouth two (2) times a day. aspirin delayed-release 81 mg tablet aspirin 81 mg tablet,delayed release   Take 1 tablet every day by oral route. Jardiance 10 mg tablet TAKE ONE TABLET BY MOUTH DAILY    liraglutide (Victoza 3-Rick) 0.6 mg/0.1 mL (18 mg/3 mL) pnij DIAL AND INJECT UNDER THE SKIN 1.8 MG DAILY    calcium carbonate (TUMS) 200 mg calcium (500 mg) chew Take 1 Tablet by mouth as needed. furosemide (LASIX) 80 mg tablet TAKE  One- TABLETS BY MOUTH EVERY MORNING AND TAKE ONE TABLET BY MOUTH EVERY EVENING    potassium chloride SR (K-TAB) 20 mEq tablet 1 tabs po bid    Insulin Needles, Disposable, (Pamela Pen Needle) 32 gauge x 5/32\" ndle Use with Victoza daily    glipiZIDE (GLUCOTROL) 10 mg tablet Take 1 Tablet by mouth two (2) times a day. For diabetes    albuterol-ipratropium (DUO-NEB) 2.5 mg-0.5 mg/3 ml nebu INHALE THREE MILLILITERS VIA NEBULIZATION BY MOUTH EVERY 6 HOURS AS NEEDED    albuterol (PROVENTIL HFA, VENTOLIN HFA, PROAIR HFA) 90 mcg/actuation inhaler Take 1 Puff by inhalation every six (6) hours as needed for Wheezing or Shortness of Breath. topiramate (TOPAMAX) 100 mg tablet Take 1 Tablet by mouth two (2) times a day. glucose blood VI test strips (OneTouch Ultra Test) strip USE ONE STRIP TO TEST THREE TIMES A DAY BEFORE MEALS    OneTouch Ultra Test strip USE ONE STRIP TO TEST THREE TIMES A DAY BEFORE MEALS    traZODone (DESYREL) 100 mg tablet Take 1 Tablet by mouth nightly. diclofenac (VOLTAREN) 1 % gel Apply  to affected area four (4) times daily as needed.     Pamela Pen Needle 32 gauge x 5/32\" ndle USE DAILY WITH VICTOZA nitroglycerin (NITROSTAT) 0.4 mg SL tablet 0.4 mg by SubLINGual route. No current facility-administered medications for this visit. Past medical history/surgical history, family history, and social history have been reviewed for today's visit      ROS    A ten system review of constitutional, cardiovascular, respiratory, musculoskeletal, endocrine, skin, SHEENT, genitourinary, psychiatric and neurologic systems was obtained and is unremarkable except as mentioned under HPI          EXAMINATION:     Visit Vitals  BP (!) 82/60 (BP 1 Location: Left upper arm, BP Patient Position: Sitting, BP Cuff Size: Large adult)   Pulse 70   Temp 98.4 °F (36.9 °C) (Temporal)   Resp 16   Ht 6' (1.829 m)   Wt 334 lb (151.5 kg)   SpO2 96%   BMI 45.30 kg/m²         General appearance: Patient is well-developed and well-nourished in no apparent distress and well groomed.     Psych/mental health:  Affect: Appropriate    PHQ  3 most recent PHQ Screens 2/10/2023   Little interest or pleasure in doing things Not at all   Feeling down, depressed, irritable, or hopeless Not at all   Total Score PHQ 2 0   Trouble falling or staying asleep, or sleeping too much -   Feeling tired or having little energy -   Poor appetite, weight loss, or overeating -   Feeling bad about yourself - or that you are a failure or have let yourself or your family down -   Trouble concentrating on things such as school, work, reading, or watching TV -   Moving or speaking so slowly that other people could have noticed; or the opposite being so fidgety that others notice -   Thoughts of being better off dead, or hurting yourself in some way -   PHQ 9 Score -   How difficult have these problems made it for you to do your work, take care of your home and get along with others -       HEENT:   Normocephalic  With evidence of trauma: No  Full range of motion head neck: Yes  Tenderness to palpation of the head neck region: No      Cardiovascular:     Extremities warm to touch: Yes  Extremity swelling: No  Discoloration: No  Evidence of PVD: No    Respiratory:   Dyspnea on exertion: No   Abnormal effort on casual observation: No   Use of portable oxygen: No   Evidence of cyanosis: No     Musculoskeletal:   Evidence of significant bone deformities: No   Spinal curvature: No     Integumentary:    Obvious bruising: No   Lacerations or discoloration on casual observation: No       Neurological Examination:   Mental Status:        MMSE  No flowsheet data found. Formal testing was not completed    there was nothing concerning on general observation and discussion. Alert oriented and appropriate to general conversation  Normal processing on general observation  Followed conversation and responded seemingly appropriate throughout the office visit  No word finding difficulties noted on casual observation  Able to follow directions without difficulty     Cranial Nerves:    EOMs intact gaze is conjugate  No nystagmus is appreciated  Facial motor intact bilaterally  Hearing intact to conversation  Voice with normal projection, no evidence of secretion pooling  Shoulder shrug intact bilaterally  No tongue deviation appreciated     Motor:   Normal bulk  No tremor appreciated on today's exam  No abnormal movements appreciated on today's exam  Moves extremities spontaneously and with purpose  5/5 x 4      Sensation: Intact to light touch    Coordination/Cerebellar:   Grossly intact    Gait: Ambulates independently    Reflexes: Not tested    Fall risk assessment  Fall Risk Assessment, last 12 mths 3/8/2021   Able to walk? Yes   Fall in past 12 months? 0         Follow-up and Dispositions    Return in about 1 year (around 2/10/2024) for med check.              Jemam Blackmon MS, ANP-BC, [facility]

## 2023-02-10 NOTE — ASSESSMENT & PLAN NOTE
Stable without recurrence of symptoms  Patient is to continue on aspirin Eliquis and Lipitor   Eliquis is being given more from a cardiac perspective than a neuro perspective at this time    Patient is to continue to work to all of his comorbid conditions as managed by PCP and other specialists as appropriate    RECOMMENDATIONS:  - BP goal is less than 140/90  - Goal HbA1c is less than 7.   - LDL less than 70

## 2023-02-10 NOTE — ASSESSMENT & PLAN NOTE
Patient presently on Topamax 100 mg taking 2 tablets a day for migraine prophylaxis  He is also previously been on Depakote  Still with high rate of migraine frequency with high severity  Having at least 12 migraine days per month most of which put him in bed    Beta-blockers are contraindicated secondary to cardiac issues  Patient is already on trazodone so NSRI's are also contraindicated  He has failed or had suboptimal response to Depakote and Topamax    Discussed CGRP therapy with the patient he is elected to want to go on therapy  Loading dose of Emgality was given at today's office visit  Prescription for maintenance dose was sent to his local pharmacy and our PA staff has been notified to start the PA process      Triptans and ergotamine's are contraindicated from a rescue perspective secondary to significant cardiac disease

## 2023-02-10 NOTE — PATIENT INSTRUCTIONS
As per discussion overall you are doing well from a neurologic perspective but I do think we can get these migraines under slightly better control working to start you on Emgality monthly injections you were given a loading dose today in the office which is 2 injections but after that will just be 1 injection every 30 days  I sent the prescription to your pharmacy but we will need to get the prior authorization and I have sent a message to my prior authorization team to get that initiated    Once the prior authorization is completed you can pick it up from your pharmacy but keep it in your refrigerator until the day of your injection at which point you will bring it out and warm it to room temperature before your injection    Continue on the Topamax 100 mg 2 tablets daily    Continue on all your medications unchanged      Office Policies      Appointments  Please make sure that you arrive for your next appointment at least 15 minutes prior to your appointment time. If for some reason you are going to be late please notify the office to determine if you need to be rescheduled or we can adjust your appointment time      Phone calls/patient messages:  Please allow up to 24 hours for someone in the office to contact you about your call or message. Be mindful your provider may be out of the office or your message may require further review. We encourage you to use [employer] for your messages as this is a faster, more efficient way to communicate with our office    Medication Refills:  Prescription medications require up to 48 business hours to process. We encourage you to use [employer] for your refills. For controlled medications: Please allow up to 72 business hours to process. Certain medications may require you to  a written prescription at our office.     NO narcotic/controlled medications will be prescribed after 4pm Monday through Friday or on weekends    Form/Paperwork Completion:  We ask that you allow 7-14 business days. You may also download your forms to [employer] to have your doctor print off.

## 2023-02-10 NOTE — LETTER
2/10/2023    Patient: Jerome Spence   YOB: 1965   Date of Visit: 2/10/2023     Laxmi Nelson MD  [address] [place] [address]  [facility] [place]    Dear Laxmi Nelson MD,      Thank you for referring Mr. Alice [place] to [address] [place] [address] for evaluation. My notes for this consultation are attached. If you have questions, please do not hesitate to call me. I look forward to following your patient along with you.       Sincerely,    Rajnan Alcantara, ISAIAH-C

## 2023-03-03 DIAGNOSIS — R52 PAIN: Primary | ICD-10-CM

## 2023-03-03 DIAGNOSIS — E11.9 CONTROLLED TYPE 2 DIABETES MELLITUS WITHOUT COMPLICATION, WITHOUT LONG-TERM CURRENT USE OF INSULIN (HCC): ICD-10-CM

## 2023-03-03 RX ORDER — TRAMADOL HYDROCHLORIDE 50 MG/1
50 TABLET ORAL
Qty: 120 TABLET | Refills: 2 | Status: SHIPPED | OUTPATIENT
Start: 2023-03-03 | End: 2023-04-02

## 2023-03-03 RX ORDER — BLOOD SUGAR DIAGNOSTIC
STRIP MISCELLANEOUS
Qty: 300 STRIP | Refills: 3 | Status: SHIPPED | OUTPATIENT
Start: 2023-03-03

## 2023-03-03 RX ORDER — LIRAGLUTIDE 6 MG/ML
1.8 INJECTION SUBCUTANEOUS DAILY
Qty: 27 ML | Refills: 1 | Status: SHIPPED | OUTPATIENT
Start: 2023-03-03

## 2023-03-03 NOTE — TELEPHONE ENCOUNTER
Last Visit: 22 with MD Oumou Hugo  Next Appointment: 3/8/23 with MD Oumou Hugo  Previous Refill Encounter(s): 22 Ultram #120 with 2 refills, 22 Victoza #9ml with 5 refills, 22 #100 with 11 refills    Requested Prescriptions     Pending Prescriptions Disp Refills    glucose blood VI test strips (OneTouch Ultra Test) strip 300 Strip 3     Sig: USE ONE STRIP TO TEST THREE TIMES A DAY BEFORE MEALS    liraglutide (Victoza 3-Rick) 0.6 mg/0.1 mL (18 mg/3 mL) pnij 27 mL 1     Si.8 mg by SubCUTAneous route daily. traMADoL (ULTRAM) 50 mg tablet 120 Tablet 2     Sig: Take 1 Tablet by mouth every six (6) hours as needed for Pain for up to 30 days. Max Daily Amount: 200 mg. For Pharmacy Admin Tracking Only    Program: Medication Refill  CPA in place:   Recommendation Provided To:    Intervention Detail: New Rx: 3, reason: Patient Preference  Intervention Accepted By:   Bree Patel Closed?:   Time Spent (min): 5

## 2023-03-08 ENCOUNTER — OFFICE VISIT (OUTPATIENT)
Dept: FAMILY MEDICINE CLINIC | Age: 58
End: 2023-03-08
Payer: MEDICAID

## 2023-03-08 VITALS — HEART RATE: 72 BPM | OXYGEN SATURATION: 96 %

## 2023-03-08 DIAGNOSIS — I10 ESSENTIAL HYPERTENSION: Primary | ICD-10-CM

## 2023-03-08 DIAGNOSIS — E11.21 TYPE 2 DIABETES WITH NEPHROPATHY (HCC): ICD-10-CM

## 2023-03-08 DIAGNOSIS — E78.00 HYPERCHOLESTEROLEMIA: ICD-10-CM

## 2023-03-08 PROCEDURE — 99213 OFFICE O/P EST LOW 20 MIN: CPT | Performed by: FAMILY MEDICINE

## 2023-03-08 NOTE — PROGRESS NOTES
HISTORY OF PRESENT ILLNESS  Bhargavi Mclean is a 66 y.o. male. HPI In for followup. Breathing has been doing ok. Gets some migraine headaches. Blood sugars have been in the 100s. Occ hypoglycemic episode- got shaky, helped by eating a candy bar. No complaints of chest pain, shortness of breath, TIAs, claudication or edema. ROS    Physical Exam  Vitals and nursing note reviewed. Constitutional:       Appearance: He is well-developed. HENT:      Right Ear: External ear normal.      Left Ear: External ear normal.   Neck:      Thyroid: No thyromegaly. Cardiovascular:      Rate and Rhythm: Normal rate and regular rhythm. Heart sounds: Normal heart sounds. Pulmonary:      Effort: Pulmonary effort is normal. No respiratory distress. Breath sounds: Normal breath sounds. No wheezing. Abdominal:      General: Bowel sounds are normal. There is no distension. Palpations: Abdomen is soft. There is no mass. Tenderness: There is no abdominal tenderness. There is no guarding. Musculoskeletal:      Comments: 1+ edema bilaterally     Lymphadenopathy:      Cervical: No cervical adenopathy. ASSESSMENT and PLAN  Orders Placed This Encounter    CBC WITH AUTOMATED DIFF    METABOLIC PANEL, COMPREHENSIVE    LIPID PANEL    HEMOGLOBIN A1C WITH EAG     Diagnoses and all orders for this visit:    1. Essential hypertension  -     CBC WITH AUTOMATED DIFF; Future  -     METABOLIC PANEL, COMPREHENSIVE; Future    2. Hypercholesterolemia  -     LIPID PANEL; Future    3. Type 2 diabetes with nephropathy (HCC)  -     HEMOGLOBIN A1C WITH EAG; Future      Follow-up and Dispositions    Return in about 6 months (around 9/8/2023).

## 2023-03-08 NOTE — PROGRESS NOTES
No chief complaint on file. 1. \"Have you been to the ER, urgent care clinic since your last visit? Hospitalized since your last visit?\"     2. \"Have you seen or consulted any other health care providers outside of the [address] [place] [address] since your last visit? \"     3. For patients aged 39-70: Has the patient had a colonoscopy / FIT/ Cologuard? If the patient is female:    4. For patients aged 41-77: Has the patient had a mammogram within the past 2 years? 5. For patients aged 21-65: Has the patient had a pap smear?      Health Maintenance Due   Topic Date Due    Hepatitis C Screening  Never done    Eye Exam Retinal or Dilated  Never done    Hepatitis B Vaccine (1 of 3 - Risk 3-dose series) Never done    Shingles Vaccine (1 of 2) Never done    Low dose CT lung screening  Never done    Diabetic Alb to Cr ratio (uACR) test  09/19/2020    Foot Exam Q1  09/19/2020    COVID-19 Vaccine (4 - Booster for [employer] series) 02/10/2022

## 2023-03-09 LAB
ALBUMIN SERPL-MCNC: 3.8 G/DL (ref 3.5–5)
ALBUMIN/GLOB SERPL: 1.1 (ref 1.1–2.2)
ALP SERPL-CCNC: 79 U/L (ref 45–117)
ALT SERPL-CCNC: 41 U/L (ref 12–78)
ANION GAP SERPL CALC-SCNC: 8 MMOL/L (ref 5–15)
AST SERPL-CCNC: 14 U/L (ref 15–37)
BASOPHILS # BLD: 0.1 K/UL (ref 0–0.1)
BASOPHILS NFR BLD: 1 % (ref 0–1)
BILIRUB SERPL-MCNC: 0.4 MG/DL (ref 0.2–1)
BUN SERPL-MCNC: 22 MG/DL (ref 6–20)
BUN/CREAT SERPL: 17 (ref 12–20)
CALCIUM SERPL-MCNC: 9.2 MG/DL (ref 8.5–10.1)
CHLORIDE SERPL-SCNC: 110 MMOL/L (ref 97–108)
CHOLEST SERPL-MCNC: 111 MG/DL
CO2 SERPL-SCNC: 26 MMOL/L (ref 21–32)
CREAT SERPL-MCNC: 1.32 MG/DL (ref 0.7–1.3)
DIFFERENTIAL METHOD BLD: ABNORMAL
EOSINOPHIL # BLD: 0.1 K/UL (ref 0–0.4)
EOSINOPHIL NFR BLD: 1 % (ref 0–7)
ERYTHROCYTE [DISTWIDTH] IN BLOOD BY AUTOMATED COUNT: 15.3 % (ref 11.5–14.5)
EST. AVERAGE GLUCOSE BLD GHB EST-MCNC: 148 MG/DL
GLOBULIN SER CALC-MCNC: 3.4 G/DL (ref 2–4)
GLUCOSE SERPL-MCNC: 179 MG/DL (ref 65–100)
HBA1C MFR BLD: 6.8 % (ref 4–5.6)
HCT VFR BLD AUTO: 56.9 % (ref 36.6–50.3)
HDLC SERPL-MCNC: 32 MG/DL
HDLC SERPL: 3.5 (ref 0–5)
HGB BLD-MCNC: 17.5 G/DL (ref 12.1–17)
IMM GRANULOCYTES # BLD AUTO: 0 K/UL (ref 0–0.04)
IMM GRANULOCYTES NFR BLD AUTO: 0 % (ref 0–0.5)
LDLC SERPL CALC-MCNC: 30.2 MG/DL (ref 0–100)
LYMPHOCYTES # BLD: 2.1 K/UL (ref 0.8–3.5)
LYMPHOCYTES NFR BLD: 21 % (ref 12–49)
MCH RBC QN AUTO: 29.1 PG (ref 26–34)
MCHC RBC AUTO-ENTMCNC: 30.8 G/DL (ref 30–36.5)
MCV RBC AUTO: 94.7 FL (ref 80–99)
MONOCYTES # BLD: 0.6 K/UL (ref 0–1)
MONOCYTES NFR BLD: 5 % (ref 5–13)
NEUTS SEG # BLD: 7.5 K/UL (ref 1.8–8)
NEUTS SEG NFR BLD: 72 % (ref 32–75)
NRBC # BLD: 0 K/UL (ref 0–0.01)
NRBC BLD-RTO: 0 PER 100 WBC
PLATELET # BLD AUTO: 214 K/UL (ref 150–400)
PMV BLD AUTO: 11.4 FL (ref 8.9–12.9)
POTASSIUM SERPL-SCNC: 3.7 MMOL/L (ref 3.5–5.1)
PROT SERPL-MCNC: 7.2 G/DL (ref 6.4–8.2)
RBC # BLD AUTO: 6.01 M/UL (ref 4.1–5.7)
SODIUM SERPL-SCNC: 144 MMOL/L (ref 136–145)
TRIGL SERPL-MCNC: 244 MG/DL (ref ?–150)
VLDLC SERPL CALC-MCNC: 48.8 MG/DL
WBC # BLD AUTO: 10.4 K/UL (ref 4.1–11.1)

## 2023-03-17 ENCOUNTER — TELEPHONE (OUTPATIENT)
Dept: NEUROLOGY | Age: 58
End: 2023-03-17

## 2023-04-22 DIAGNOSIS — G43.009 MIGRAINE WITHOUT AURA AND WITHOUT STATUS MIGRAINOSUS, NOT INTRACTABLE: ICD-10-CM

## 2023-04-24 RX ORDER — TOPIRAMATE 100 MG/1
TABLET, FILM COATED ORAL
Qty: 180 TABLET | Refills: 3 | Status: SHIPPED | OUTPATIENT
Start: 2023-04-24

## 2023-05-01 ENCOUNTER — TELEPHONE (OUTPATIENT)
Dept: NEUROLOGY | Age: 58
End: 2023-05-01

## 2023-05-01 RX ORDER — TRAZODONE HYDROCHLORIDE 100 MG/1
100 TABLET ORAL
Qty: 30 TABLET | Refills: 0 | Status: SHIPPED | OUTPATIENT
Start: 2023-05-01

## 2023-05-01 NOTE — TELEPHONE ENCOUNTER
Last Visit: 3/8/23 with MD Griselda Chappell  Next Appointment: 9/11/23 with MD Griselda Chappell  Previous Refill Encounter(s): 1/12/22 #30 with 5 refills    Requested Prescriptions     Pending Prescriptions Disp Refills    traZODone (DESYREL) 100 mg tablet 30 Tablet 0     Sig: Take 1 Tablet by mouth nightly. For Pharmacy Admin Tracking Only    Program: Medication Refill  CPA in place:   Recommendation Provided To:    Intervention Detail: New Rx: 1, reason: Patient Preference  Intervention Accepted By:   Karri Thornton Closed?:   Time Spent (min): 5

## 2023-05-01 NOTE — TELEPHONE ENCOUNTER
Previous PA submitted - was pending return of clinicals - .       Resubmitted PA renewal for Emgality today to [facility] via [address] [place] [address] Avenue: [id number]

## 2023-05-05 ENCOUNTER — TELEPHONE (OUTPATIENT)
Dept: NEUROLOGY | Age: 58
End: 2023-05-05

## 2023-05-12 ENCOUNTER — TELEPHONE (OUTPATIENT)
Age: 58
End: 2023-05-12

## 2023-05-31 RX ORDER — FUROSEMIDE 80 MG
TABLET ORAL
Qty: 90 TABLET | Refills: 0 | Status: SHIPPED | OUTPATIENT
Start: 2023-05-31

## 2023-05-31 RX ORDER — GLIPIZIDE 10 MG/1
10 TABLET ORAL 2 TIMES DAILY
Qty: 60 TABLET | Refills: 0 | Status: SHIPPED | OUTPATIENT
Start: 2023-05-31

## 2023-05-31 NOTE — TELEPHONE ENCOUNTER
Last appointment: 3/8/23  Next appointment: 6/7/23  Previous refill encounter(s): 5/13/22 #120 with 12 refills, 5/18/22 Lasix #90 with 11 refills    Requested Prescriptions     Pending Prescriptions Disp Refills    furosemide (LASIX) 80 MG tablet [Pharmacy Med Name: FUROSEMIDE 80 MG TABLET] 90 tablet 0     Sig: TAKE TWO TABLETS BY MOUTH EVERY MORNING AND TAKE ONE TABLET BY MOUTH EVERY EVENING    glipiZIDE (GLUCOTROL) 10 MG tablet 60 tablet 0     Sig: Take 1 tablet by mouth 2 times daily         For Pharmacy Admin Tracking Only    Program: Medication Refill  CPA in place:    Recommendation Provided To:    Intervention Detail: New Rx: 2, reason: Patient Preference  Intervention Accepted By:   Malini Howard Closed?:    Time Spent (min): 5

## 2023-06-07 ENCOUNTER — OFFICE VISIT (OUTPATIENT)
Age: 58
End: 2023-06-07
Payer: COMMERCIAL

## 2023-06-07 VITALS
TEMPERATURE: 98 F | DIASTOLIC BLOOD PRESSURE: 64 MMHG | OXYGEN SATURATION: 96 % | RESPIRATION RATE: 18 BRPM | BODY MASS INDEX: 42.66 KG/M2 | HEART RATE: 76 BPM | HEIGHT: 72 IN | SYSTOLIC BLOOD PRESSURE: 93 MMHG | WEIGHT: 315 LBS

## 2023-06-07 DIAGNOSIS — F51.01 PRIMARY INSOMNIA: Primary | ICD-10-CM

## 2023-06-07 DIAGNOSIS — I48.11 LONGSTANDING PERSISTENT ATRIAL FIBRILLATION (HCC): ICD-10-CM

## 2023-06-07 DIAGNOSIS — M54.50 CHRONIC BILATERAL LOW BACK PAIN WITHOUT SCIATICA: ICD-10-CM

## 2023-06-07 DIAGNOSIS — G89.29 CHRONIC BILATERAL LOW BACK PAIN WITHOUT SCIATICA: ICD-10-CM

## 2023-06-07 DIAGNOSIS — L91.8 SKIN TAG: ICD-10-CM

## 2023-06-07 PROCEDURE — 99213 OFFICE O/P EST LOW 20 MIN: CPT | Performed by: FAMILY MEDICINE

## 2023-06-07 PROCEDURE — 3074F SYST BP LT 130 MM HG: CPT | Performed by: FAMILY MEDICINE

## 2023-06-07 PROCEDURE — 3078F DIAST BP <80 MM HG: CPT | Performed by: FAMILY MEDICINE

## 2023-06-07 RX ORDER — IPRATROPIUM BROMIDE AND ALBUTEROL SULFATE 2.5; .5 MG/3ML; MG/3ML
SOLUTION RESPIRATORY (INHALATION)
Qty: 360 ML | Refills: 5 | Status: SHIPPED | OUTPATIENT
Start: 2023-06-07

## 2023-06-07 RX ORDER — LIRAGLUTIDE 6 MG/ML
1.8 INJECTION SUBCUTANEOUS DAILY
Qty: 2 ADJUSTABLE DOSE PRE-FILLED PEN SYRINGE | Refills: 12 | Status: SHIPPED | OUTPATIENT
Start: 2023-06-07

## 2023-06-07 RX ORDER — BLOOD SUGAR DIAGNOSTIC
STRIP MISCELLANEOUS
COMMUNITY
Start: 2023-06-06

## 2023-06-07 RX ORDER — ZOLPIDEM TARTRATE 10 MG/1
10 TABLET ORAL NIGHTLY PRN
Qty: 30 TABLET | Refills: 2 | Status: SHIPPED | OUTPATIENT
Start: 2023-06-07 | End: 2023-09-05

## 2023-06-07 RX ORDER — TRAMADOL HYDROCHLORIDE 50 MG/1
50 TABLET ORAL EVERY 6 HOURS PRN
COMMUNITY
Start: 2021-01-29 | End: 2023-06-07 | Stop reason: SDUPTHER

## 2023-06-07 RX ORDER — TRAMADOL HYDROCHLORIDE 50 MG/1
50 TABLET ORAL EVERY 6 HOURS PRN
Qty: 120 TABLET | Refills: 2 | Status: SHIPPED | OUTPATIENT
Start: 2023-06-07 | End: 2023-09-05

## 2023-06-07 RX ORDER — POTASSIUM CHLORIDE 20 MEQ/1
TABLET, EXTENDED RELEASE ORAL
Qty: 60 TABLET | Refills: 12 | Status: SHIPPED | OUTPATIENT
Start: 2023-06-07

## 2023-06-07 RX ORDER — GLIPIZIDE 10 MG/1
10 TABLET ORAL 2 TIMES DAILY
Qty: 60 TABLET | Refills: 12 | Status: SHIPPED | OUTPATIENT
Start: 2023-06-07

## 2023-06-07 RX ORDER — ALBUTEROL SULFATE 90 UG/1
1 AEROSOL, METERED RESPIRATORY (INHALATION) EVERY 6 HOURS PRN
Qty: 18 G | Refills: 12 | Status: SHIPPED | OUTPATIENT
Start: 2023-06-07

## 2023-06-07 RX ORDER — ATORVASTATIN CALCIUM 40 MG/1
40 TABLET, FILM COATED ORAL DAILY
Qty: 30 TABLET | Refills: 12 | Status: SHIPPED | OUTPATIENT
Start: 2023-06-07

## 2023-06-07 RX ORDER — PEN NEEDLE, DIABETIC 32GX 5/32"
NEEDLE, DISPOSABLE MISCELLANEOUS
COMMUNITY
Start: 2023-03-02

## 2023-06-07 RX ORDER — SACUBITRIL AND VALSARTAN 97; 103 MG/1; MG/1
1 TABLET, FILM COATED ORAL 2 TIMES DAILY
Qty: 60 TABLET | Refills: 12 | Status: SHIPPED | OUTPATIENT
Start: 2023-06-07

## 2023-06-07 RX ORDER — FUROSEMIDE 80 MG
TABLET ORAL
Qty: 90 TABLET | Refills: 12 | Status: SHIPPED | OUTPATIENT
Start: 2023-06-07

## 2023-06-07 RX ORDER — TRAZODONE HYDROCHLORIDE 100 MG/1
100 TABLET ORAL NIGHTLY
Qty: 30 TABLET | Refills: 5 | Status: SHIPPED | OUTPATIENT
Start: 2023-06-07

## 2023-06-07 RX ORDER — TOPIRAMATE 100 MG/1
TABLET, FILM COATED ORAL 2 TIMES DAILY
Qty: 60 TABLET | Status: CANCELLED | OUTPATIENT
Start: 2023-06-07

## 2023-06-07 RX ORDER — CARVEDILOL 12.5 MG/1
12.5 TABLET ORAL 2 TIMES DAILY
Qty: 60 TABLET | Refills: 12 | Status: SHIPPED | OUTPATIENT
Start: 2023-06-07

## 2023-06-07 SDOH — ECONOMIC STABILITY: FOOD INSECURITY: WITHIN THE PAST 12 MONTHS, THE FOOD YOU BOUGHT JUST DIDN'T LAST AND YOU DIDN'T HAVE MONEY TO GET MORE.: NEVER TRUE

## 2023-06-07 SDOH — ECONOMIC STABILITY: HOUSING INSECURITY
IN THE LAST 12 MONTHS, WAS THERE A TIME WHEN YOU DID NOT HAVE A STEADY PLACE TO SLEEP OR SLEPT IN A SHELTER (INCLUDING NOW)?: NO

## 2023-06-07 SDOH — ECONOMIC STABILITY: INCOME INSECURITY: HOW HARD IS IT FOR YOU TO PAY FOR THE VERY BASICS LIKE FOOD, HOUSING, MEDICAL CARE, AND HEATING?: SOMEWHAT HARD

## 2023-06-07 SDOH — ECONOMIC STABILITY: FOOD INSECURITY: WITHIN THE PAST 12 MONTHS, YOU WORRIED THAT YOUR FOOD WOULD RUN OUT BEFORE YOU GOT MONEY TO BUY MORE.: NEVER TRUE

## 2023-06-07 ASSESSMENT — PATIENT HEALTH QUESTIONNAIRE - PHQ9
SUM OF ALL RESPONSES TO PHQ9 QUESTIONS 1 & 2: 0
SUM OF ALL RESPONSES TO PHQ QUESTIONS 1-9: 0
2. FEELING DOWN, DEPRESSED OR HOPELESS: 0
SUM OF ALL RESPONSES TO PHQ QUESTIONS 1-9: 0
SUM OF ALL RESPONSES TO PHQ QUESTIONS 1-9: 0
1. LITTLE INTEREST OR PLEASURE IN DOING THINGS: 0
SUM OF ALL RESPONSES TO PHQ QUESTIONS 1-9: 0

## 2023-06-07 ASSESSMENT — LIFESTYLE VARIABLES
HOW MANY STANDARD DRINKS CONTAINING ALCOHOL DO YOU HAVE ON A TYPICAL DAY: PATIENT DOES NOT DRINK
HOW OFTEN DO YOU HAVE A DRINK CONTAINING ALCOHOL: NEVER

## 2023-06-07 NOTE — PROGRESS NOTES
Chief Complaint   Patient presents with    Medicare AWV         1. \"Have you been to the ER, urgent care clinic since your last visit? Hospitalized since your last visit?\"     2. \"Have you seen or consulted any other health care providers outside of the [address] [place] [address] since your last visit? \" Yes - Dr. Julieta Acuña and Ortho surgeon - Dr. Min Talbert     3. For patients aged 39-70: Has the patient had a colonoscopy / FIT/ Cologuard? yes      If the patient is female:    4. For patients aged 41-77: Has the patient had a mammogram within the past 2 years? na      5.  For patients aged 21-65: Has the patient had a pap smear? na      Health Maintenance Due   Topic Date Due    HIV screen  Never done    Diabetic retinal exam  Never done    Hepatitis C screen  Never done    Hepatitis B vaccine (1 of 3 - Risk 3-dose series) Never done    DTaP/Tdap/Td vaccine (1 - Tdap) 04/14/2016    Shingles vaccine (1 of 2) Never done    Low dose CT lung screening &/or counseling  Never done    Diabetic foot exam  09/19/2020    Diabetic Alb to Cr ratio (uACR) test  09/19/2020    COVID-19 Vaccine (4 - Booster for [employer] series) 02/10/2022    Annual Wellness Visit (AWV)  06/24/2022

## 2023-06-07 NOTE — PROGRESS NOTES
Saintclair Romance (:  1965) is a 66 y.o. male,Established patient, here for evaluation of the following chief complaint(s):  Medicare AWV         ASSESSMENT/PLAN:  1. Primary insomnia  -     zolpidem (AMBIEN) 10 MG tablet; Take 1 tablet by mouth nightly as needed for Sleep for up to 90 days. Max Daily Amount: 10 mg, Disp-30 tablet, R-2Normal  2. Chronic bilateral low back pain without sciatica  -     traMADol (ULTRAM) 50 MG tablet; Take 1 tablet by mouth every 6 hours as needed for Pain for up to 90 days. Max Daily Amount: 200 mg, Disp-120 tablet, R-2Normal      No follow-ups on file. Subjective   SUBJECTIVE/OBJECTIVE:  HPI In for diabetes followup. Has been losing some weight. Saw Dr. Dina Faria, who has recommended a watchman procedure. Pt had some postcolonoscopy bleeding requiring transfusions afterwards a few years ago. Also needs several meds refilled. Needs a skin tag frozen    Review of Systems       Objective   Physical Exam  Cardiovascular:      Rate and Rhythm: Normal rate and regular rhythm. Heart sounds: Normal heart sounds. No murmur heard. Pulmonary:      Effort: Pulmonary effort is normal.      Breath sounds: Normal breath sounds. Abdominal:      General: Abdomen is flat. Bowel sounds are normal.      Palpations: Abdomen is soft. Musculoskeletal:      Right lower leg: No edema. Left lower leg: No edema. Skin:     Comments: Skin tag present in L lateral thoracic area. An electronic signature was used to authenticate this note.     --Annie Gomez MD

## 2023-07-03 ENCOUNTER — CLINICAL DOCUMENTATION (OUTPATIENT)
Age: 58
End: 2023-07-03

## 2023-07-03 ENCOUNTER — TELEPHONE (OUTPATIENT)
Age: 58
End: 2023-07-03

## 2023-07-03 NOTE — TELEPHONE ENCOUNTER
Madelyn with [facility] is requesting the medical necessity form be faxed back to her for automatic BP monitor she can be reached @ [phone] and ([phone]

## 2023-07-03 NOTE — PROGRESS NOTES
PA for tramadol 50 mg tablet was denied, Limit exceeded, Maximum 14 days supply in 60 days, 7 day supply every 30 days.

## 2023-07-13 ENCOUNTER — TELEPHONE (OUTPATIENT)
Age: 58
End: 2023-07-13

## 2023-07-13 NOTE — TELEPHONE ENCOUNTER
Patient calling refill on his Tramadol 50mg. Medication requires a prior authorization patient call on on the status of the prior auth.   Best phone number for the patient [phone]

## 2023-07-20 ENCOUNTER — CLINICAL DOCUMENTATION (OUTPATIENT)
Age: 58
End: 2023-07-20

## 2023-07-20 RX ORDER — PEN NEEDLE, DIABETIC 32GX 5/32"
NEEDLE, DISPOSABLE MISCELLANEOUS
Qty: 100 EACH | Refills: 5 | Status: SHIPPED | OUTPATIENT
Start: 2023-07-20

## 2023-08-10 DIAGNOSIS — G89.29 CHRONIC BILATERAL LOW BACK PAIN WITHOUT SCIATICA: ICD-10-CM

## 2023-08-10 DIAGNOSIS — M54.50 CHRONIC BILATERAL LOW BACK PAIN WITHOUT SCIATICA: ICD-10-CM

## 2023-08-10 RX ORDER — TRAMADOL HYDROCHLORIDE 50 MG/1
50 TABLET ORAL EVERY 6 HOURS PRN
Qty: 60 TABLET | Refills: 2 | Status: SHIPPED | OUTPATIENT
Start: 2023-08-10 | End: 2023-11-08

## 2023-08-28 RX ORDER — LIRAGLUTIDE 6 MG/ML
INJECTION SUBCUTANEOUS
Qty: 9 ML | Refills: 1 | Status: SHIPPED | OUTPATIENT
Start: 2023-08-28

## 2023-08-28 NOTE — TELEPHONE ENCOUNTER
Tim Song is requesting a refill. Previous Rx was sent to [facility]. Last appointment: 6/7/23  Next appointment: 9/11/23    Requested Prescriptions     Pending Prescriptions Disp Refills    VICTOZA 18 MG/3ML SOPN SC injection [Pharmacy Med Name: Adria Spice 18 MG/3ML Subcutaneous Solution Pen-injector] 9 mL 1     Sig: INJECT 1.8 MG SUBCUTANEOUSLY DAILY         For Pharmacy Admin Tracking Only    Program: Medication Refill  CPA in place:    Recommendation Provided To:    Intervention Detail: New Rx: 1, reason: Patient Preference  Intervention Accepted By:   Lewis Hernadezer Closed?:    Time Spent (min): 5

## 2023-09-28 DIAGNOSIS — F51.01 PRIMARY INSOMNIA: Primary | ICD-10-CM

## 2023-09-29 RX ORDER — ZOLPIDEM TARTRATE 10 MG/1
TABLET ORAL
Qty: 30 TABLET | Refills: 0 | Status: SHIPPED | OUTPATIENT
Start: 2023-09-29 | End: 2023-10-29

## 2023-09-29 NOTE — TELEPHONE ENCOUNTER
Last appointment: 6/7/23  Next appointment: no show 9/11/23  Previous refill encounter(s): 6/7/23 #30 with 2 refills    Requested Prescriptions     Pending Prescriptions Disp Refills    zolpidem (AMBIEN) 10 MG tablet [Pharmacy Med Name: Zolpidem Tartrate 10 MG Oral Tablet] 30 tablet 0     Sig: TAKE 1 TABLET BY MOUTH ONCE DAILY AT BEDTIME AS NEEDED FOR SLEEP         For Pharmacy Admin Tracking Only    Program: Medication Refill  CPA in place:    Recommendation Provided To:    Intervention Detail: New Rx: 1, reason: Patient Preference  Intervention Accepted By:   Lino Moody Closed?:    Time Spent (min): 5

## 2023-10-26 DIAGNOSIS — F51.01 PRIMARY INSOMNIA: ICD-10-CM

## 2023-10-26 RX ORDER — ZOLPIDEM TARTRATE 10 MG/1
TABLET ORAL
Qty: 30 TABLET | Refills: 0 | Status: SHIPPED | OUTPATIENT
Start: 2023-10-26 | End: 2023-11-25

## 2023-10-26 NOTE — TELEPHONE ENCOUNTER
Last appointment: 6/7/23  Next appointment: no show 9/11/23  Previous refill encounter(s): 9/29/23 #30    Requested Prescriptions     Pending Prescriptions Disp Refills    zolpidem (AMBIEN) 10 MG tablet [Pharmacy Med Name: Zolpidem Tartrate 10 MG Oral Tablet] 30 tablet 0     Sig: TAKE 1 TABLET BY MOUTH ONCE DAILY AT BEDTIME AS NEEDED FOR SLEEP         For Pharmacy Admin Tracking Only    Program: Medication Refill  CPA in place:    Recommendation Provided To:    Intervention Detail: New Rx: 1, reason: Patient Preference  Intervention Accepted By:   Lewis Sepulveda Closed?:    Time Spent (min): 5

## 2023-11-29 ENCOUNTER — OFFICE VISIT (OUTPATIENT)
Dept: NEUROLOGY | Age: 58
End: 2023-11-29
Payer: COMMERCIAL

## 2023-11-29 VITALS
HEART RATE: 70 BPM | DIASTOLIC BLOOD PRESSURE: 80 MMHG | WEIGHT: 315 LBS | SYSTOLIC BLOOD PRESSURE: 110 MMHG | HEIGHT: 72 IN | OXYGEN SATURATION: 95 % | BODY MASS INDEX: 42.66 KG/M2

## 2023-11-29 DIAGNOSIS — G43.009 MIGRAINE WITHOUT AURA, NOT INTRACTABLE, WITHOUT STATUS MIGRAINOSUS: Primary | ICD-10-CM

## 2023-11-29 PROCEDURE — 3074F SYST BP LT 130 MM HG: CPT | Performed by: PSYCHIATRY & NEUROLOGY

## 2023-11-29 PROCEDURE — 3078F DIAST BP <80 MM HG: CPT | Performed by: PSYCHIATRY & NEUROLOGY

## 2023-11-29 PROCEDURE — 99244 OFF/OP CNSLTJ NEW/EST MOD 40: CPT | Performed by: PSYCHIATRY & NEUROLOGY

## 2023-11-29 RX ORDER — TRAMADOL HYDROCHLORIDE 50 MG/1
50 TABLET ORAL EVERY 6 HOURS PRN
COMMUNITY

## 2023-11-29 RX ORDER — ZOLPIDEM TARTRATE 10 MG/1
TABLET ORAL NIGHTLY PRN
COMMUNITY

## 2023-11-29 RX ORDER — GALCANEZUMAB 120 MG/ML
INJECTION, SOLUTION SUBCUTANEOUS
Qty: 2 ADJUSTABLE DOSE PRE-FILLED PEN SYRINGE | Refills: 4 | Status: SHIPPED | OUTPATIENT
Start: 2023-11-29

## 2023-11-29 NOTE — PATIENT INSTRUCTIONS
Obtain records from previous neurologist in [place] for review. Continue with Topamax 100 mg two times a day  Restart Emgality 120 mg/ml  subcutaneous injection monthly  BMP  Sed rate   Referral to sleep medicine re: history of sleep apnea, for CPAP needs  Keep headache diary  Call with any new symptoms or concerns. Follow up in 3 months.

## 2023-12-01 RX ORDER — ZOLPIDEM TARTRATE 10 MG/1
TABLET ORAL
Qty: 30 TABLET | Refills: 0 | OUTPATIENT
Start: 2023-12-01

## 2023-12-01 NOTE — TELEPHONE ENCOUNTER
Patient no longer under prescriber care      For Pharmacy Admin Tracking Only    Program: Medication Refill  CPA in place:    Recommendation Provided To:   Intervention Detail: New Rx: 1, reason: Patient Preference  Intervention Accepted By:   Gap Closed?:    Time Spent (min): 5

## 2023-12-04 ENCOUNTER — TELEPHONE (OUTPATIENT)
Dept: NEUROLOGY | Age: 58
End: 2023-12-04

## 2023-12-04 NOTE — TELEPHONE ENCOUNTER
Patient notified and verbalized understanding.  Results faxed to Dr Candelaria Brought per patient request.

## 2023-12-04 NOTE — TELEPHONE ENCOUNTER
----- Message from DIMITRI Tidwell CNP sent at 12/4/2023  8:44 AM EST -----  Please ask pt to follow up with his PCP regarding low potassium=3.2.   Addie Shen CNP

## 2023-12-05 NOTE — PROGRESS NOTES
III, IV, VI: Extraocular Movements: intact   Cranial nerve V: Facial sensation: intact   Cranial nerve VII:Facial strength: intact   Cranial nerve VIII: Hearing: intact    Cranial nerve IX: Palate Elevation intact bilaterally  Cranial nerve XI: Shoulder shrug intact bilaterally  Cranial nerve XII: Tongue midline   neck supple without rigidity, there is  limitation of range of motion of the neck, bilaterally. DTR's are decreased distal and symmetric  Babinski sign negative   Motor exam is 5/5 in the upper and lower extremities. Normal muscle tone. There is no muscle atrophy. Sensory is intact for light touch   Coordination: finger to nose,  intact  Gait and station guarded, uses cane. Abnormal movement none, vibration reduced distally  Skin - warm, dry to touch, normal coloration, no rashes, no suspicious skin lesions  Superficial temporal artery pulses are normal.   There is no resting tremor, no pin rolling, no bradykinesia, no Hypohonia, normal blink rate. Musculoskeletal: Has no hand arthritis, no limitation of ROM in any of the four extremities. There is no leg edema. The Heart was regular in rate and rhythm. No heart murmur  Chest Clear, with  good effort. Abdomen soft, intact bowel sounds. Results for orders placed in visit on 05/16/19    CT HEAD WO CONTRAST    Narrative  EXAM: CT HEAD WO CONT  INDICATION: headache  COMPARISON: 2009. CONTRAST: None. TECHNIQUE: Unenhanced CT of the head was performed using 5 mm images. Brain and  bone windows were generated. CT dose reduction was achieved through use of a  standardized protocol tailored for this examination and automatic exposure  control for dose modulation. FINDINGS:  Ventricles and sulci are upper limits of normal. No hemorrhage mass or acute  infarction identified. Bony structures appear intact. Impression  IMPRESSION: No acute abnormality is identified.         We reviewed the patient records from referring provider and available

## 2023-12-26 RX ORDER — ATORVASTATIN CALCIUM 40 MG/1
40 TABLET, FILM COATED ORAL DAILY
Qty: 30 TABLET | Refills: 0 | Status: SHIPPED | OUTPATIENT
Start: 2023-12-26

## 2024-01-08 ENCOUNTER — TELEPHONE (OUTPATIENT)
Dept: PULMONOLOGY | Age: 59
End: 2024-01-08

## 2024-01-24 RX ORDER — ATORVASTATIN CALCIUM 40 MG/1
40 TABLET, FILM COATED ORAL DAILY
Qty: 30 TABLET | Refills: 0 | OUTPATIENT
Start: 2024-01-24

## 2024-01-28 DIAGNOSIS — E78.00 PURE HYPERCHOLESTEROLEMIA, UNSPECIFIED: Primary | ICD-10-CM

## 2024-01-29 RX ORDER — ATORVASTATIN CALCIUM 40 MG/1
40 TABLET, FILM COATED ORAL DAILY
Qty: 30 TABLET | Refills: 0 | OUTPATIENT
Start: 2024-01-29

## 2024-04-02 ENCOUNTER — OFFICE VISIT (OUTPATIENT)
Dept: PULMONOLOGY | Age: 59
End: 2024-04-02
Payer: COMMERCIAL

## 2024-04-02 VITALS
HEART RATE: 70 BPM | BODY MASS INDEX: 41.28 KG/M2 | HEIGHT: 72 IN | WEIGHT: 304.8 LBS | TEMPERATURE: 96.9 F | DIASTOLIC BLOOD PRESSURE: 86 MMHG | SYSTOLIC BLOOD PRESSURE: 118 MMHG | OXYGEN SATURATION: 94 %

## 2024-04-02 DIAGNOSIS — E66.01 OBESITY, MORBID (HCC): ICD-10-CM

## 2024-04-02 DIAGNOSIS — I42.9 CARDIOMYOPATHY, UNSPECIFIED TYPE (HCC): ICD-10-CM

## 2024-04-02 DIAGNOSIS — G25.81 RESTLESS LEGS: ICD-10-CM

## 2024-04-02 DIAGNOSIS — G47.8 SLEEP TALKING: ICD-10-CM

## 2024-04-02 DIAGNOSIS — F51.3 SLEEPWALKING: ICD-10-CM

## 2024-04-02 DIAGNOSIS — G45.9 TIA (TRANSIENT ISCHEMIC ATTACK): ICD-10-CM

## 2024-04-02 DIAGNOSIS — I50.9 CONGESTIVE HEART FAILURE, UNSPECIFIED HF CHRONICITY, UNSPECIFIED HEART FAILURE TYPE (HCC): ICD-10-CM

## 2024-04-02 DIAGNOSIS — I10 PRIMARY HYPERTENSION: ICD-10-CM

## 2024-04-02 DIAGNOSIS — G47.33 OSA (OBSTRUCTIVE SLEEP APNEA): Primary | ICD-10-CM

## 2024-04-02 PROCEDURE — 3079F DIAST BP 80-89 MM HG: CPT | Performed by: SPECIALIST

## 2024-04-02 PROCEDURE — 3074F SYST BP LT 130 MM HG: CPT | Performed by: SPECIALIST

## 2024-04-02 PROCEDURE — 99205 OFFICE O/P NEW HI 60 MIN: CPT | Performed by: SPECIALIST

## 2024-04-02 RX ORDER — AMOXICILLIN AND CLAVULANATE POTASSIUM 875; 125 MG/1; MG/1
1 TABLET, FILM COATED ORAL 2 TIMES DAILY
COMMUNITY
Start: 2024-03-27 | End: 2024-04-03

## 2024-04-02 RX ORDER — PREGABALIN 50 MG/1
50 CAPSULE ORAL 3 TIMES DAILY
COMMUNITY
Start: 2024-03-27 | End: 2024-04-26

## 2024-04-02 RX ORDER — TOPIRAMATE 100 MG/1
100 TABLET, FILM COATED ORAL 2 TIMES DAILY
Qty: 180 TABLET | Refills: 1 | Status: SHIPPED | OUTPATIENT
Start: 2024-04-02

## 2024-04-02 NOTE — TELEPHONE ENCOUNTER
Aldo Pruitt called requesting a refill on the following medications:  Requested Prescriptions     Pending Prescriptions Disp Refills    topiramate (TOPAMAX) 100 MG tablet 180 tablet 1     Sig: Take 1 tablet by mouth 2 times daily       Date of last visit: 11/29/2023- Dr. Clarke  Date of next visit (if applicable):4/17/24- Paige Leopold, CNP  Date of last refill: Historical Provider*  Pharmacy Name: Anuradha Alexis LPN

## 2024-06-03 ENCOUNTER — OFFICE VISIT (OUTPATIENT)
Dept: NEUROLOGY | Age: 59
End: 2024-06-03
Payer: COMMERCIAL

## 2024-06-03 VITALS
WEIGHT: 298 LBS | DIASTOLIC BLOOD PRESSURE: 85 MMHG | HEIGHT: 72 IN | BODY MASS INDEX: 40.36 KG/M2 | OXYGEN SATURATION: 97 % | SYSTOLIC BLOOD PRESSURE: 120 MMHG | HEART RATE: 67 BPM

## 2024-06-03 DIAGNOSIS — G43.009 MIGRAINE WITHOUT AURA, NOT INTRACTABLE, WITHOUT STATUS MIGRAINOSUS: Primary | ICD-10-CM

## 2024-06-03 PROCEDURE — 3079F DIAST BP 80-89 MM HG: CPT | Performed by: NURSE PRACTITIONER

## 2024-06-03 PROCEDURE — 99213 OFFICE O/P EST LOW 20 MIN: CPT | Performed by: NURSE PRACTITIONER

## 2024-06-03 PROCEDURE — 3074F SYST BP LT 130 MM HG: CPT | Performed by: NURSE PRACTITIONER

## 2024-06-03 RX ORDER — GALCANEZUMAB 120 MG/ML
INJECTION, SOLUTION SUBCUTANEOUS
Qty: 2 ADJUSTABLE DOSE PRE-FILLED PEN SYRINGE | Refills: 4 | Status: SHIPPED | OUTPATIENT
Start: 2024-06-03

## 2024-06-03 NOTE — PROGRESS NOTES
SEDIMENTATION RATE)  0 - 20 mm/hr 15        12/1/23 1332    SODIUM  136 - 145 mEq/L 140   POTASSIUM  3.5 - 5.1 mEq/L 3.2 Low    CHLORIDE  98 - 107 mEq/L 105   CARBON DIOXIDE (CO2)  22 - 31 mEq/L 23   Glucose  70 - 126 mg/dL 128 High    Comment: Reference Range for FASTING patients is  mg/dL   BUN  7 - 22 mg/dL 19   CREATININE SERUM  0.4 - 1.1 mg/dL 1.3 High    ESTIMATED GFR 60   Comment:      Estimated Glomerular filtration Rate       Reference Ranges:           GFR,  mL/min/1.73m2         >= 60          Adequate       30 - 59          Moderately decreased GFR       15 - 29          Severely decreased GFR       <18          Kidney failure (or dialysis)       GFR calculated using abbreviated MDRD formula.       MDRD equation not suitable for patients who are       under 18, have unstable creatinine concentrations   CALCIUM  8.4 - 10.2 mg/dL 9.2   Resulting Agency [facility]     Specimen Collected: 12/01/23 13:32    Performed by: [facility] Hye NIYA Last Resulted: 12/01/23 15:23   Received From: [facility]       Results for orders placed in visit on 05/16/19    CT HEAD WO CONTRAST    Narrative  EXAM: CT HEAD WO CONT    INDICATION: headache    COMPARISON: 2009.    CONTRAST: None.    TECHNIQUE: Unenhanced CT of the head was performed using 5 mm images. Brain and  bone windows were generated.  CT dose reduction was achieved through use of a  standardized protocol tailored for this examination and automatic exposure  control for dose modulation.    FINDINGS:  Ventricles and sulci are upper limits of normal. No hemorrhage mass or acute  infarction identified. Bony structures appear intact.    Impression  IMPRESSION: No acute abnormality is identified.         Assessment:     Diagnosis Orders   1. Migraine without aura, not intractable, without status migrainosus             Follow up for headache. His headache are some improved. He is

## 2024-06-03 NOTE — PATIENT INSTRUCTIONS
Continue with Topamax 100 mg two times a day  Continue with Emgality 120 mg/ml  subcutaneous injection monthly  Proceed with sleep medicine evaluation re: history of sleep apnea, for CPAP needs  Keep headache diary  Call with any new symptoms or concerns.   Follow up in 6 months.

## 2024-06-25 RX ORDER — FUROSEMIDE 80 MG
TABLET ORAL
Qty: 90 TABLET | Refills: 0 | Status: SHIPPED | OUTPATIENT
Start: 2024-06-25

## 2024-06-25 RX ORDER — APIXABAN 5 MG/1
5 TABLET, FILM COATED ORAL 2 TIMES DAILY
Qty: 60 TABLET | Refills: 0 | Status: SHIPPED | OUTPATIENT
Start: 2024-06-25

## 2024-06-25 RX ORDER — GLIPIZIDE 10 MG/1
10 TABLET ORAL 2 TIMES DAILY
Qty: 60 TABLET | Refills: 0 | Status: SHIPPED | OUTPATIENT
Start: 2024-06-25

## 2024-07-10 ENCOUNTER — HOSPITAL ENCOUNTER (OUTPATIENT)
Dept: SLEEP CENTER | Age: 59
Discharge: HOME | End: 2024-07-12
Payer: COMMERCIAL

## 2024-07-10 DIAGNOSIS — G47.33 OSA (OBSTRUCTIVE SLEEP APNEA): ICD-10-CM

## 2024-07-10 DIAGNOSIS — G45.9 TIA (TRANSIENT ISCHEMIC ATTACK): ICD-10-CM

## 2024-07-10 DIAGNOSIS — I10 PRIMARY HYPERTENSION: ICD-10-CM

## 2024-07-10 DIAGNOSIS — I50.9 CONGESTIVE HEART FAILURE, UNSPECIFIED HF CHRONICITY, UNSPECIFIED HEART FAILURE TYPE (HCC): ICD-10-CM

## 2024-07-10 DIAGNOSIS — E66.01 OBESITY, MORBID (HCC): ICD-10-CM

## 2024-07-10 PROCEDURE — 95810 POLYSOM 6/> YRS 4/> PARAM: CPT

## 2024-07-11 ENCOUNTER — TELEPHONE (OUTPATIENT)
Dept: PULMONOLOGY | Age: 59
End: 2024-07-11

## 2024-07-11 DIAGNOSIS — G47.33 OSA (OBSTRUCTIVE SLEEP APNEA): Primary | ICD-10-CM

## 2024-07-11 NOTE — TELEPHONE ENCOUNTER
I called patient at given phone number i.e Home/Mobile and spoke with him over phone. I informed in detail about the sleep test results and available treatment options At the end of discussion, he decided to go for treatment with a CPAP device after having an in lab CPAP/BiPAP/AutoSV titration study.    He was also informed about the abnormal cardiac rhythm noted during the sleep study consistent with atrial fibrillation with controlled ventricular response.  Patient is aware of his atrial fibrillation diagnosis.  He is currently taking blood thinner I.e Eliquis and follows with his cardiologist Dr. Esau MD.    Will schedule patient for CPAP/BiPAP/AutoSV titration at [facility] sleep lab as soon as possible. Patient to follow with my clinic at [facility] sleep [facility] in 6 to 8 weeks with CPAP/BiPAP/AutoSV download for further evaluation.    He advised to keep good compliance with  recommended pressure to get optimal results and clinical improvement.    He was advised to call [facility] regarding supplies if needed.  He was advised call my office for earlier appointment if needed for worsening of sleep symptoms.    He verbalizes understanding.

## 2024-07-24 RX ORDER — FUROSEMIDE 80 MG
TABLET ORAL
Qty: 90 TABLET | Refills: 0 | OUTPATIENT
Start: 2024-07-24

## 2024-07-26 ENCOUNTER — TELEPHONE (OUTPATIENT)
Dept: SLEEP CENTER | Age: 59
End: 2024-07-26

## 2024-07-26 NOTE — TELEPHONE ENCOUNTER
Voice messages left on Aldo's listed phone on 7/15, 7/18 and 7/26, asking him to call the sleep center to schedule CPAP titration.  The last phone call he was encourage to either schedule CPAP titration or at least keep his follow up appointment on 8/6/24.  No further follow up calls will be made.

## 2024-07-29 ENCOUNTER — TELEPHONE (OUTPATIENT)
Dept: PULMONOLOGY | Age: 59
End: 2024-07-29

## 2024-07-29 NOTE — TELEPHONE ENCOUNTER
Patient was last seen 4/2/2024 by Dr Feliciano as a new patient.  He had a follow up scheduled for 8/6/2024 but cancelled due to out of town and he wanted to call back to reschedule once he returns in September.  He is calling in asking for the pulmonary/sleep office to take over his prescription for zolpidem 10 mg once nightly to [facility] in [place].  Please advise if ok for refill?

## 2024-07-29 NOTE — TELEPHONE ENCOUNTER
Patient would need to be scheduled in office to be seen before any scripts could be sent in. Called and left message for patient to call back and schedule a follow up with any NP.

## 2024-09-23 RX ORDER — TOPIRAMATE 100 MG/1
100 TABLET, FILM COATED ORAL 2 TIMES DAILY
Qty: 180 TABLET | Refills: 1 | Status: SHIPPED | OUTPATIENT
Start: 2024-09-23

## 2024-09-25 ENCOUNTER — TELEPHONE (OUTPATIENT)
Dept: SLEEP CENTER | Age: 59
End: 2024-09-25

## 2024-11-12 ENCOUNTER — OFFICE VISIT (OUTPATIENT)
Dept: PULMONOLOGY | Age: 59
End: 2024-11-12
Payer: COMMERCIAL

## 2024-11-12 VITALS
HEART RATE: 70 BPM | BODY MASS INDEX: 38.19 KG/M2 | WEIGHT: 282 LBS | DIASTOLIC BLOOD PRESSURE: 82 MMHG | HEIGHT: 72 IN | OXYGEN SATURATION: 99 % | SYSTOLIC BLOOD PRESSURE: 126 MMHG | TEMPERATURE: 97.6 F

## 2024-11-12 DIAGNOSIS — G47.33 OSA (OBSTRUCTIVE SLEEP APNEA): ICD-10-CM

## 2024-11-12 DIAGNOSIS — I50.9 CONGESTIVE HEART FAILURE, UNSPECIFIED HF CHRONICITY, UNSPECIFIED HEART FAILURE TYPE (HCC): ICD-10-CM

## 2024-11-12 DIAGNOSIS — I48.91 ATRIAL FIBRILLATION, UNSPECIFIED TYPE (HCC): ICD-10-CM

## 2024-11-12 DIAGNOSIS — G47.00 INSOMNIA, UNSPECIFIED TYPE: Primary | ICD-10-CM

## 2024-11-12 PROBLEM — I25.10 CORONARY ARTERY DISEASE INVOLVING NATIVE CORONARY ARTERY OF NATIVE HEART WITHOUT ANGINA PECTORIS: Status: ACTIVE | Noted: 2023-09-07

## 2024-11-12 PROCEDURE — 99214 OFFICE O/P EST MOD 30 MIN: CPT | Performed by: NURSE PRACTITIONER

## 2024-11-12 PROCEDURE — 3074F SYST BP LT 130 MM HG: CPT | Performed by: NURSE PRACTITIONER

## 2024-11-12 PROCEDURE — 3079F DIAST BP 80-89 MM HG: CPT | Performed by: NURSE PRACTITIONER

## 2024-11-12 RX ORDER — ZOLPIDEM TARTRATE 10 MG/1
10 TABLET ORAL NIGHTLY PRN
Qty: 30 TABLET | Refills: 0 | Status: SHIPPED | OUTPATIENT
Start: 2024-11-12 | End: 2024-12-12

## 2024-11-12 NOTE — PROGRESS NOTES
[facility]    Aldo Pruitt, 59 y.o.  [id number]      Pt of Dr Feliciano  Nurses Notes   7 month sleep follow up with split night 7/10/24 (patient did not qualify to split), patient has not completed titration study and is not setup on PAP machine, needs refill on Ambien.   Study Results  Initial Study Date -  7/10/24  AHI -  16.6    TotalEvents - 86  (Apneas  2  Hypopneas 84  Central  1)  LM w/Arousals - 57  Sleep Efficiency - 71.9 % (Total Sleep Time - 310.5 min)  Time with Sats below 88% - 48.6 min    Neck Size: 19.5 inches  MP: 4  ESS: 1  SAQLI: 83    Interval History       Aldo Pruitt is a 59 y.o. old male who comes in to review the results of his recent sleep study, to answer questions and to explore options for treatment.   Aldo has the sleep study done in [facility] sometime in October 5, 2017 as a split-night sleep study and the study report is reviewed which showed that patient had a AHI of 33.5 with significant desaturations and the lowest being 70% and try to control with the CPAP. At that time patient was on Medicaid as well and they refused to pay hence he did not do anything. Recently patient moved to [facility] [place] because of the family. He has a history of mini stroke for which the patient was evaluated by neurology who strongly recommended the patient to the sleep evaluation done and manage the patient is here.   An order was placed for split-night.  Patient did not qualify for the split-night but did complete a full baseline sleep study on 7/10/2024.  He was then recommended to go for a CPAP titration study.  Patient never followed up with a titration.  States he is here today to get an Ambien refill  Has been taking Ambien for 10 years, prescribed by PCP. Per pt now that he is est with sleep clinic his family doctor is requesting further refills from us   Follows with [facility] cardiologist for AFIB, CAD, CHF.  Has pacemaker

## 2024-12-10 ENCOUNTER — HOSPITAL ENCOUNTER (OUTPATIENT)
Age: 59
Discharge: HOME | End: 2024-12-12
Payer: COMMERCIAL

## 2024-12-10 DIAGNOSIS — I48.91 ATRIAL FIBRILLATION, UNSPECIFIED TYPE (HCC): ICD-10-CM

## 2024-12-10 DIAGNOSIS — G47.33 OSA (OBSTRUCTIVE SLEEP APNEA): ICD-10-CM

## 2024-12-10 DIAGNOSIS — I50.9 CONGESTIVE HEART FAILURE, UNSPECIFIED HF CHRONICITY, UNSPECIFIED HEART FAILURE TYPE (HCC): ICD-10-CM

## 2024-12-10 LAB
ECHO AO ASC DIAM: 3.5 CM
ECHO AV MEAN GRADIENT: 4 MMHG
ECHO AV MEAN VELOCITY: 0.9 M/S
ECHO AV PEAK GRADIENT: 6 MMHG
ECHO AV PEAK VELOCITY: 1.2 M/S
ECHO AV VELOCITY RATIO: 0.83
ECHO AV VTI: 24 CM
ECHO LA AREA 2C: 29.3 CM2
ECHO LA AREA 4C: 30.2 CM2
ECHO LA DIAMETER: 4.7 CM
ECHO LA MAJOR AXIS: 7 CM
ECHO LA MINOR AXIS: 7.6 CM
ECHO LA VOL BP: 102 ML (ref 18–58)
ECHO LA VOL MOD A2C: 92 ML (ref 18–58)
ECHO LA VOL MOD A4C: 104 ML (ref 18–58)
ECHO LV E' LATERAL VELOCITY: 6.5 CM/S
ECHO LV E' SEPTAL VELOCITY: 5.9 CM/S
ECHO LV EDV A2C: 188 ML
ECHO LV EDV A4C: 204 ML
ECHO LV EJECTION FRACTION A2C: 33 %
ECHO LV EJECTION FRACTION A4C: 25 %
ECHO LV EJECTION FRACTION BIPLANE: 28 % (ref 55–100)
ECHO LV ESV A2C: 127 ML
ECHO LV ESV A4C: 152 ML
ECHO LV FRACTIONAL SHORTENING: 22 % (ref 28–44)
ECHO LV INTERNAL DIMENSION DIASTOLIC: 6.3 CM (ref 4.2–5.9)
ECHO LV INTERNAL DIMENSION SYSTOLIC: 4.9 CM
ECHO LV ISOVOLUMETRIC RELAXATION TIME (IVRT): 85 MS
ECHO LV IVSD: 1.1 CM (ref 0.6–1)
ECHO LV MASS 2D: 303.5 G (ref 88–224)
ECHO LV POSTERIOR WALL DIASTOLIC: 1.1 CM (ref 0.6–1)
ECHO LV RELATIVE WALL THICKNESS RATIO: 0.35
ECHO LVOT AV VTI INDEX: 0.72
ECHO LVOT MEAN GRADIENT: 2 MMHG
ECHO LVOT PEAK GRADIENT: 4 MMHG
ECHO LVOT PEAK VELOCITY: 1 M/S
ECHO LVOT VTI: 17.2 CM
ECHO MV E DECELERATION TIME (DT): 270 MS
ECHO MV E VELOCITY: 0.81 M/S
ECHO MV E/E' LATERAL: 12.46
ECHO MV E/E' RATIO (AVERAGED): 13.1
ECHO MV E/E' SEPTAL: 13.73
ECHO MV REGURGITANT PEAK GRADIENT: 29 MMHG
ECHO MV REGURGITANT PEAK VELOCITY: 2.7 M/S
ECHO PV MAX VELOCITY: 0.5 M/S
ECHO PV PEAK GRADIENT: 1 MMHG
ECHO RV INTERNAL DIMENSION: 3.7 CM
ECHO RV TAPSE: 1.9 CM (ref 1.7–?)
ECHO TV E WAVE: 0.6 M/S
ECHO TV REGURGITANT MAX VELOCITY: 2.21 M/S
ECHO TV REGURGITANT PEAK GRADIENT: 20 MMHG

## 2024-12-10 PROCEDURE — 6360000004 HC RX CONTRAST MEDICATION: Performed by: NURSE PRACTITIONER

## 2024-12-10 PROCEDURE — 2580000003 HC RX 258: Performed by: NURSE PRACTITIONER

## 2024-12-10 PROCEDURE — C8929 TTE W OR WO FOL WCON,DOPPLER: HCPCS

## 2024-12-10 PROCEDURE — 93306 TTE W/DOPPLER COMPLETE: CPT | Performed by: INTERNAL MEDICINE

## 2024-12-10 RX ADMIN — PERFLUTREN 10 ML: 6.52 INJECTION, SUSPENSION INTRAVENOUS at 15:35

## 2024-12-11 NOTE — RESULT ENCOUNTER NOTE
We need to find out who patient's cardiologist is, his EF has reduced compared to prior study   Orders were placed for Auto CPAP, but he needs set pressure due to low EF.   Please find out if he has been set up on CPAP yet, is so, I need a download.

## 2024-12-12 DIAGNOSIS — I42.9 CARDIOMYOPATHY, UNSPECIFIED TYPE (HCC): ICD-10-CM

## 2024-12-12 DIAGNOSIS — I50.9 CONGESTIVE HEART FAILURE, UNSPECIFIED HF CHRONICITY, UNSPECIFIED HEART FAILURE TYPE (HCC): ICD-10-CM

## 2024-12-12 DIAGNOSIS — G47.33 OSA (OBSTRUCTIVE SLEEP APNEA): Primary | ICD-10-CM

## 2024-12-21 DIAGNOSIS — G47.00 INSOMNIA, UNSPECIFIED TYPE: ICD-10-CM

## 2024-12-23 RX ORDER — ZOLPIDEM TARTRATE 10 MG/1
10 TABLET ORAL NIGHTLY PRN
Qty: 30 TABLET | Refills: 0 | Status: SHIPPED | OUTPATIENT
Start: 2024-12-23 | End: 2025-01-22

## 2024-12-23 NOTE — TELEPHONE ENCOUNTER
Patient called in stating that he is \"pissed\" because he does not know why Yoselyn did not send in this medication last month (after looking through the past medications it looks like Yoselyn did send in the medication on 11/12/2024). I told him that I can send the request to her now. He said \"okay but I am still pissed\". I verified the pharmacy and our call ended. Please advise thanks.

## 2025-01-01 RX ORDER — GALCANEZUMAB 120 MG/ML
INJECTION, SOLUTION SUBCUTANEOUS
Qty: 1 ML | Refills: 0 | Status: CANCELLED | OUTPATIENT
Start: 2025-01-01

## 2025-02-01 DIAGNOSIS — G47.00 INSOMNIA, UNSPECIFIED TYPE: ICD-10-CM

## 2025-02-03 RX ORDER — ZOLPIDEM TARTRATE 10 MG/1
10 TABLET ORAL NIGHTLY PRN
Qty: 30 TABLET | Refills: 0 | Status: SHIPPED | OUTPATIENT
Start: 2025-02-03 | End: 2025-03-05

## 2025-02-25 DIAGNOSIS — G47.00 INSOMNIA, UNSPECIFIED TYPE: ICD-10-CM

## 2025-02-26 RX ORDER — ZOLPIDEM TARTRATE 10 MG/1
TABLET ORAL
Qty: 30 TABLET | Refills: 0 | Status: SHIPPED | OUTPATIENT
Start: 2025-02-26 | End: 2025-03-28

## 2025-03-25 DIAGNOSIS — G47.00 INSOMNIA, UNSPECIFIED TYPE: ICD-10-CM

## 2025-03-26 RX ORDER — TOPIRAMATE 100 MG/1
100 TABLET, FILM COATED ORAL 2 TIMES DAILY
Qty: 180 TABLET | Refills: 0 | Status: SHIPPED | OUTPATIENT
Start: 2025-03-26

## 2025-03-26 NOTE — TELEPHONE ENCOUNTER
Please approve or deny     Last Visit Date:  6/3/2024   Paige Leopold CNP    Next Visit Date:    Visit date not found.  Follow up requested for 6 months.   [employer] sent requesting appt.

## 2025-03-31 RX ORDER — ZOLPIDEM TARTRATE 10 MG/1
TABLET ORAL
Qty: 30 TABLET | Refills: 0 | Status: SHIPPED | OUTPATIENT
Start: 2025-03-31 | End: 2025-04-30

## 2025-04-27 DIAGNOSIS — G47.00 INSOMNIA, UNSPECIFIED TYPE: ICD-10-CM

## 2025-04-28 NOTE — TELEPHONE ENCOUNTER
Please approve or deny     Last Visit Date:  6/3/2024   Paige Leopold CNP    Next Visit Date:    Visit date not found  Instructions to follow up in 6 months.   Patient canceled scheduled appointment 12/2024. And declined to reschedule at that time.

## 2025-04-30 RX ORDER — ZOLPIDEM TARTRATE 10 MG/1
10 TABLET ORAL NIGHTLY PRN
Qty: 30 TABLET | Refills: 0 | Status: SHIPPED | OUTPATIENT
Start: 2025-04-30 | End: 2025-05-30

## 2025-05-06 ENCOUNTER — OFFICE VISIT (OUTPATIENT)
Age: 60
End: 2025-05-06
Payer: COMMERCIAL

## 2025-05-06 VITALS
OXYGEN SATURATION: 96 % | HEART RATE: 70 BPM | DIASTOLIC BLOOD PRESSURE: 80 MMHG | BODY MASS INDEX: 40.26 KG/M2 | HEIGHT: 72 IN | WEIGHT: 297.2 LBS | TEMPERATURE: 97.2 F | SYSTOLIC BLOOD PRESSURE: 124 MMHG

## 2025-05-06 DIAGNOSIS — G47.33 OSA (OBSTRUCTIVE SLEEP APNEA): Primary | ICD-10-CM

## 2025-05-06 DIAGNOSIS — F40.240 CLAUSTROPHOBIA: ICD-10-CM

## 2025-05-06 DIAGNOSIS — Z78.9 INTOLERANCE OF CONTINUOUS POSITIVE AIRWAY PRESSURE (CPAP) VENTILATION: ICD-10-CM

## 2025-05-06 PROBLEM — M75.40 SUBACROMIAL IMPINGEMENT: Status: ACTIVE | Noted: 2025-02-03

## 2025-05-06 PROBLEM — H52.12 MYOPIA OF LEFT EYE: Status: ACTIVE | Noted: 2020-06-18

## 2025-05-06 PROBLEM — H52.4 PRESBYOPIA: Status: ACTIVE | Noted: 2020-06-18

## 2025-05-06 PROBLEM — M54.50 LOW BACK PAIN: Status: ACTIVE | Noted: 2025-02-03

## 2025-05-06 PROCEDURE — 3079F DIAST BP 80-89 MM HG: CPT | Performed by: NURSE PRACTITIONER

## 2025-05-06 PROCEDURE — 3074F SYST BP LT 130 MM HG: CPT | Performed by: NURSE PRACTITIONER

## 2025-05-06 PROCEDURE — 99214 OFFICE O/P EST MOD 30 MIN: CPT | Performed by: NURSE PRACTITIONER

## 2025-05-06 NOTE — PROGRESS NOTES
efforts to bring his BMI below 40, which would facilitate the approval process for Inspire therapy.   A referral to an ENT specialist will be initiated for further evaluation and potential surgical intervention.   He will be provided with additional information about the Inspire device, including a fact sheet and instructions on how to download the Inspire rosmeary.    Follow-up  The patient is scheduled for a follow-up visit in 6 months.      Patient verbalizes understanding.  Information added by my medical assistant/LPN was reviewed today    billing based on medical decision making     SOPHIA Mancuso   5/6/2025  The patient (or guardian, if applicable) and other individuals in attendance with the patient were advised that Artificial Intelligence will be utilized during this visit to record, process the conversation to generate a clinical note, and support improvement of the AI technology. The patient (or guardian, if applicable) and other individuals in attendance at the appointment consented to the use of AI, including the recording.

## 2025-05-28 DIAGNOSIS — G47.00 INSOMNIA, UNSPECIFIED TYPE: ICD-10-CM

## 2025-05-28 RX ORDER — ZOLPIDEM TARTRATE 10 MG/1
10 TABLET ORAL NIGHTLY PRN
Qty: 30 TABLET | Refills: 0 | Status: SHIPPED | OUTPATIENT
Start: 2025-05-28 | End: 2025-06-27

## 2025-05-28 NOTE — TELEPHONE ENCOUNTER
Received refill request for Ambien.   Medication was last ordered by Yoselyn.   Medication was last ordered on 04/30/2025 with 0 refills.     Patient was last seen in the office 05/06/2025.   Does patient have a scheduled follow up?: yes - 11/04/2025    Medication needs to be sent to [facility] [place].     Thank you, please advise!    Patient's Allergies:  Allergies   Allergen Reactions    Mushroom Extract Complex (Obsolete) Swelling    Codeine Hives    Hydrocodone Itching    Influenza Vaccines Nausea Only     Fever  diarrhea    Influenza Virus Vaccine Nausea Only    Oxycodone-Acetaminophen     Oxycodone Rash    Pneumococcal Vaccine Nausea Only and Nausea And Vomiting

## 2025-05-29 ENCOUNTER — TELEPHONE (OUTPATIENT)
Dept: PULMONOLOGY | Age: 60
End: 2025-05-29

## 2025-05-29 NOTE — TELEPHONE ENCOUNTER
Pt called and is asking for you to place an order for a nebulizer.  Saw 5/6 for sleep.  I do not see where he is seen for pulmonary. Says has COPD.  Pt said he is getting at [facility]. Please advise.

## 2025-06-02 ENCOUNTER — OFFICE VISIT (OUTPATIENT)
Dept: NEUROLOGY | Age: 60
End: 2025-06-02
Payer: COMMERCIAL

## 2025-06-02 VITALS
SYSTOLIC BLOOD PRESSURE: 128 MMHG | HEIGHT: 72 IN | BODY MASS INDEX: 40.31 KG/M2 | HEART RATE: 70 BPM | DIASTOLIC BLOOD PRESSURE: 66 MMHG

## 2025-06-02 DIAGNOSIS — G43.009 MIGRAINE WITHOUT AURA, NOT INTRACTABLE, WITHOUT STATUS MIGRAINOSUS: Primary | ICD-10-CM

## 2025-06-02 PROCEDURE — 3074F SYST BP LT 130 MM HG: CPT | Performed by: NURSE PRACTITIONER

## 2025-06-02 PROCEDURE — 99213 OFFICE O/P EST LOW 20 MIN: CPT | Performed by: NURSE PRACTITIONER

## 2025-06-02 PROCEDURE — 3078F DIAST BP <80 MM HG: CPT | Performed by: NURSE PRACTITIONER

## 2025-06-02 RX ORDER — TOPIRAMATE 100 MG/1
100 TABLET, FILM COATED ORAL 2 TIMES DAILY
Qty: 180 TABLET | Refills: 2 | Status: SHIPPED | OUTPATIENT
Start: 2025-06-02

## 2025-06-02 RX ORDER — GALCANEZUMAB 120 MG/ML
INJECTION, SOLUTION SUBCUTANEOUS
Qty: 2 ADJUSTABLE DOSE PRE-FILLED PEN SYRINGE | Refills: 4 | Status: SHIPPED | OUTPATIENT
Start: 2025-06-02 | End: 2025-06-04 | Stop reason: SDUPTHER

## 2025-06-02 NOTE — PROGRESS NOTES
NEUROLOGY OUT PATIENT FOLLOW UP NOTE:  6/2/20252:15 PM    Aldo Pruitt is here for follow up for headache        Allergies   Allergen Reactions    Mushroom Extract Complex (Obsolete) Swelling    Codeine Hives    Hydrocodone Itching    Influenza Vaccines Nausea Only     Fever  diarrhea    Influenza Virus Vaccine Nausea Only    Oxycodone-Acetaminophen     Oxycodone Rash    Pneumococcal Vaccine Nausea Only and Nausea And Vomiting       Current Outpatient Medications:     zolpidem (AMBIEN) 10 MG tablet, Take 1 tablet by mouth nightly as needed for Sleep for up to 30 days. for sleep Max Daily Amount: 10 mg, Disp: 30 tablet, Rfl: 0    topiramate (TOPAMAX) 100 MG tablet, Take 1 tablet by mouth twice daily, Disp: 180 tablet, Rfl: 0    Misc. Devices (CPAP MACHINE) MISC, by Does not apply route Please change his current CPAP/BiPAP pressure to a CPAP 8 cm H2O, Disp: 1 each, Rfl: 0    ELIQUIS 5 MG TABS tablet, Take 1 tablet by mouth twice daily, Disp: 60 tablet, Rfl: 0    furosemide (LASIX) 80 MG tablet, TAKE 2 TABLETS BY MOUTH IN THE MORNING AND 1 IN THE EVENING, Disp: 90 tablet, Rfl: 0    metFORMIN (GLUCOPHAGE) 1000 MG tablet, TAKE 1 TABLET BY MOUTH TWICE DAILY WITH MEALS, Disp: 60 tablet, Rfl: 0    glipiZIDE (GLUCOTROL) 10 MG tablet, Take 1 tablet by mouth twice daily, Disp: 60 tablet, Rfl: 0    Galcanezumab-gnlm (EMGALITY) 120 MG/ML SOAJ, Inject 2 120 mg/ml injections and then change to 1 120 mg/ml injection monthly thereafter, Disp: 2 Adjustable Dose Pre-filled Pen Syringe, Rfl: 4    pregabalin (LYRICA) 50 MG capsule, Take 1 capsule by mouth 3 times daily., Disp: , Rfl:     atorvastatin (LIPITOR) 40 MG tablet, Take 1 tablet by mouth once daily, Disp: 30 tablet, Rfl: 0    traMADol (ULTRAM) 50 MG tablet, Take 1 tablet by mouth every 6 hours as needed for Pain., Disp: , Rfl:     VICTOZA 18 MG/3ML SOPN SC injection, INJECT 1.8 MG SUBCUTANEOUSLY DAILY, Disp: 9 mL, Rfl: 1    BD PEN NEEDLE ILDA 2ND GEN 32G X 4 MM MISC, USE WITH

## 2025-06-02 NOTE — PATIENT INSTRUCTIONS
Restart with Topamax 100 mg two times a day  Restart with Emgality 120 mg/ml  subcutaneous injection monthly. Refills given  You need to wear you CPAP nightly.   Keep headache diary  Call with any new symptoms or concerns.   Follow up in 12 months.

## 2025-06-04 DIAGNOSIS — G43.009 MIGRAINE WITHOUT AURA, NOT INTRACTABLE, WITHOUT STATUS MIGRAINOSUS: ICD-10-CM

## 2025-06-04 RX ORDER — GALCANEZUMAB 120 MG/ML
120 INJECTION, SOLUTION SUBCUTANEOUS
Qty: 2 ADJUSTABLE DOSE PRE-FILLED PEN SYRINGE | Refills: 4 | Status: SHIPPED | OUTPATIENT
Start: 2025-06-04

## 2025-06-04 NOTE — TELEPHONE ENCOUNTER
Pharmacy requesting new prescription without loading dose listed.   Prescription pended for review.   Please advise.       Last seen by Paige Leopold CNP 6/2/25  Next Appt 6/8/26 with .

## (undated) DEVICE — SYR 10ML LUER LOK 1/5ML GRAD --

## (undated) DEVICE — GLIDESHEATH SLENDER ACCESS KIT: Brand: GLIDESHEATH SLENDER

## (undated) DEVICE — TOWEL,OR,DSP,ST,BLUE,STD,2/PK,40PK/CS: Brand: MEDLINE

## (undated) DEVICE — KIT ACCS INTRO 4FR L10CM NDL 21GA L7CM GWIRE L40CM

## (undated) DEVICE — HEART CATH-MRMC: Brand: MEDLINE INDUSTRIES, INC.

## (undated) DEVICE — PINNACLE INTRODUCER SHEATH: Brand: PINNACLE

## (undated) DEVICE — Device

## (undated) DEVICE — NEEDLE HYPO 18GA L1.5IN PNK S STL HUB POLYPR SHLD REG BVL

## (undated) DEVICE — IRRIGATION KT PIST SYR 60ML -- CONVERT TO ITEM 116415

## (undated) DEVICE — SNARE ENDOSCP M L240CM W27MM SHTH DIA2.4MM CHN 2.8MM OVL

## (undated) DEVICE — CATH IV AUTOGRD BC PNK 20GA 25 -- INSYTE

## (undated) DEVICE — CATHETER DIAG 5FR L75CM ID0.046IN HK CRV VEIN SEL UNIQUE

## (undated) DEVICE — SYR 3ML LL TIP 1/10ML GRAD --

## (undated) DEVICE — BASIN EMSIS 16OZ GRAPHITE PLAS KID SHP MOLD GRAD FOR ORAL

## (undated) DEVICE — STOPCOCK IV 4 W TRNSPAR

## (undated) DEVICE — BASIC SINGLE BASIN BTC-LF: Brand: MEDLINE INDUSTRIES, INC.

## (undated) DEVICE — REM POLYHESIVE ADULT PATIENT RETURN ELECTRODE: Brand: VALLEYLAB

## (undated) DEVICE — HI-TORQUE VERSACORE FLOPPY GUIDE WIRE SYSTEM 145 CM: Brand: HI-TORQUE VERSACORE

## (undated) DEVICE — GUIDEWIRE VASC L260CM 0.035IN J TIP L3MM PTFE FIX COR NAMIC

## (undated) DEVICE — YANKAUER,TAPERED BULBOUS TIP,W/O VENT: Brand: MEDLINE

## (undated) DEVICE — ELECTRODE,RADIOTRANSLUCENT,FOAM,5PK: Brand: MEDLINE

## (undated) DEVICE — BAG SPEC BIOHZRD 10 X 10 IN --

## (undated) DEVICE — RADIFOCUS OPTITORQUE ANGIOGRAPHIC CATHETER: Brand: OPTITORQUE

## (undated) DEVICE — BLOCK BITE ENDOSCP AD 21 MM W/ DIL BLU LF DISP

## (undated) DEVICE — 3M™ IOBAN™ 2 ANTIMICROBIAL INCISE DRAPE 6650EZ: Brand: IOBAN™ 2

## (undated) DEVICE — DRAPE STRL ANGIO W/ 2 FLD COLLCTN PCH 86X135 218X343 CM 2

## (undated) DEVICE — PACK PROCEDURE SURG HRT CATH

## (undated) DEVICE — SET ADMIN 16ML TBNG L100IN 2 Y INJ SITE IV PIGGY BK DISP

## (undated) DEVICE — SYRINGE 50ML E/T

## (undated) DEVICE — CATHETER DIAG 6FR L110CM INTRO 6FR BLLN DIA9MM 1CC PULM ART

## (undated) DEVICE — LIMB HOLDER, WRIST/ANKLE: Brand: DEROYAL

## (undated) DEVICE — STRAINER URIN CALC RNL MSH -- CONVERT TO ITEM 357634

## (undated) DEVICE — AMPLATZ EXTRA STIFF WIRE GUIDE: Brand: AMPLATZ

## (undated) DEVICE — KENDALL RADIOLUCENT FOAM MONITORING ELECTRODE RECTANGULAR SHAPE: Brand: KENDALL

## (undated) DEVICE — CONTAINER SPEC 20 ML LID NEUT BUFF FORMALIN 10 % POLYPR STS

## (undated) DEVICE — GUIDEWIRE VASC L40CM DIA0018IN NDL 21GA L7CM Z S STL MAK

## (undated) DEVICE — PLASMABLADE PS200-040 4.0: Brand: PLASMABLADE™

## (undated) DEVICE — GUIDE COR SNUS L40CM DIA9FR 0.035IN STD CRV ADV UNIQUE

## (undated) DEVICE — TR BAND RADIAL ARTERY COMPRESSION DEVICE: Brand: TR BAND

## (undated) DEVICE — TUBING PRSS MON L6IN PVC M FEM CONN

## (undated) DEVICE — Z DISCONTINUED PER MEDLINE LINE GAS SAMPLING O2/CO2 LNG AD 13 FT NSL W/ TBNG FILTERLINE

## (undated) DEVICE — SYRINGE ANGIO 10 CC BRL STD PRNT POLYCARB LT BLU MEDALLION

## (undated) DEVICE — CABLE PACE ALGTR CLP SAF 12FT --

## (undated) DEVICE — COVADERM: Brand: DEROYAL

## (undated) DEVICE — FORCEPS BX L160CM DIA8MM GRSP DISECT CUP TIP NONLOCKING ROT

## (undated) DEVICE — SOLIDIFIER FLD 2OZ 1500CC N DISINF IN BTL DISP SAFESORB

## (undated) DEVICE — CATHETER ANGIO PIG 145 DEG 6 FRX110 CM 7.5 CM PERFORMA

## (undated) DEVICE — ROCKER SWITCH PENCIL HOLSTER: Brand: VALLEYLAB

## (undated) DEVICE — SPLINT WR POS F/ARTERIAL ACC -- BX/10

## (undated) DEVICE — 1200 GUARD II KIT W/5MM TUBE W/O VAC TUBE: Brand: GUARDIAN

## (undated) DEVICE — DRUG IRRIGATION 250 ML 0.9% SODIUM CHLORIDE LATEX-FREE AQUALITE PLASTIC POUR BOTTLE

## (undated) DEVICE — MEDI-TRACE CADENCE ADULT, DEFIBRILLATION ELECTRODE -RTS  (10 PR/PK) - PHYSIO-CONTROL: Brand: MEDI-TRACE CADENCE

## (undated) DEVICE — 3M™ IOBAN™ 2 ANTIMICROBIAL INCISE DRAPE 6640EZ: Brand: IOBAN™ 2

## (undated) DEVICE — NEEDLE SCLERO 25GA L240CM OD0.51MM ID0.24MM EXTN L4MM SHTH

## (undated) DEVICE — NON-REM POLYHESIVE PATIENT RETURN ELECTRODE: Brand: VALLEYLAB

## (undated) DEVICE — CLIP INT L235CM WRK CHAN DIA2.8MM OPN 11MM LCK MECHANISM MR

## (undated) DEVICE — SUTURE ABSORBABLE BRAIDED 2-0 CT-1 27 IN UD VICRYL J259H

## (undated) DEVICE — SYSTEM INTRO 10.5FR L13CM STD WHT CAP HEMSTAT SPLITTABLE

## (undated) DEVICE — GDWIRE WHISPER HITORQ EDS CSJ -- ACUITY SOLD BY BX ONLY 4648

## (undated) DEVICE — WORKING LENGTH 235CM, WORKING CHANNEL 2.8MM: Brand: RESOLUTION 360 CLIP

## (undated) DEVICE — NEONATAL-ADULT SPO2 SENSOR: Brand: NELLCOR

## (undated) DEVICE — TOWEL 4 PLY TISS 19X30 SUE WHT

## (undated) DEVICE — DRAPE SURG W25XL50IN E OPN CIR BND BG

## (undated) DEVICE — 3M™ TEGADERM™ TRANSPARENT FILM DRESSING FRAME STYLE, 1626W, 4 IN X 4-3/4 IN (10 CM X 12 CM), 50/CT 4CT/CASE: Brand: 3M™ TEGADERM™

## (undated) DEVICE — ANGIOGRAPHY KIT CUST [K0910930B] [MERIT MEDICAL SYSTEMS INC]

## (undated) DEVICE — LEAD PCMKR CAPSUR FIX NOVUS 58 --
Type: IMPLANTABLE DEVICE | Status: NON-FUNCTIONAL
Removed: 2019-03-11

## (undated) DEVICE — CATHETER ETER ANGIO L110CM OD5FR ID046IN L75CM 038IN 145DEG CARD

## (undated) DEVICE — IMPLANTABLE DEVICE
Type: IMPLANTABLE DEVICE | Status: NON-FUNCTIONAL
Removed: 2019-03-11

## (undated) DEVICE — BAND COMPR L24CM REG CLR PLAS HEMSTAT EXT HK AND LOOP RETEN

## (undated) DEVICE — RADIFOCUS GLIDEWIRE: Brand: GLIDEWIRE

## (undated) DEVICE — TRAP,MUCUS SPECIMEN, 80CC: Brand: MEDLINE

## (undated) DEVICE — PACEMAKER PACK: Brand: MEDLINE INDUSTRIES, INC.